# Patient Record
Sex: FEMALE | Race: WHITE | NOT HISPANIC OR LATINO | Employment: OTHER | URBAN - METROPOLITAN AREA
[De-identification: names, ages, dates, MRNs, and addresses within clinical notes are randomized per-mention and may not be internally consistent; named-entity substitution may affect disease eponyms.]

---

## 2017-01-04 ENCOUNTER — TELEPHONE (OUTPATIENT)
Dept: GASTROENTEROLOGY | Facility: CLINIC | Age: 67
End: 2017-01-04

## 2017-01-04 ENCOUNTER — TELEPHONE (OUTPATIENT)
Dept: MEDSURG UNIT | Facility: HOSPITAL | Age: 67
End: 2017-01-04

## 2017-01-05 NOTE — TELEPHONE ENCOUNTER
Patient notified and understands biopsy results, continue current medications and repeat egd 1-2 years for IM recall placed

## 2017-01-06 ENCOUNTER — OFFICE VISIT (OUTPATIENT)
Dept: PLASTIC SURGERY | Facility: CLINIC | Age: 67
End: 2017-01-06
Payer: MEDICARE

## 2017-01-06 DIAGNOSIS — Z09 SURGERY FOLLOW-UP EXAMINATION: Primary | ICD-10-CM

## 2017-01-06 PROCEDURE — 99024 POSTOP FOLLOW-UP VISIT: CPT | Mod: S$GLB,,, | Performed by: PHYSICIAN ASSISTANT

## 2017-01-06 PROCEDURE — 99999 PR PBB SHADOW E&M-EST. PATIENT-LVL III: CPT | Mod: PBBFAC,,, | Performed by: PHYSICIAN ASSISTANT

## 2017-01-06 NOTE — PROGRESS NOTES
Fifi Mcnair [facility] for a follow up visit status post panniculectomy on 11/28/16    Current Outpatient Prescriptions on File Prior to Visit   Medication Sig Dispense Refill    allopurinol (ZYLOPRIM) 100 MG tablet Take 2 tablets (200 mg total) by mouth nightly. 180 tablet 3    atenolol (TENORMIN) 25 MG tablet Take 1 tablet (25 mg total) by mouth once daily. 90 tablet 3    calcitRIOL (ROCALTROL) 0.25 MCG Cap Take 1 capsule by mouth twice a week. Monday Friday      CALCIUM CITRATE/VITAMIN D3 (CALCIUM CITRATE + D ORAL) Take 400 mg by mouth 3 (three) times daily.      cetirizine (ZYRTEC) 10 MG tablet Take 1 tablet (10 mg total) by mouth once daily. 1 Bottle 5    clotrimazole-betamethasone 1-0.05% (LOTRISONE) cream       cyanocobalamin, vitamin B-12, (VITAMIN B-12) 5,000 mcg Subl Place 5,000 mg under the tongue once a week.      DENOSUMAB (PROLIA SUBQ) Inject into the skin every 6 (six) months.       DIABETIC SUPPLIES,MISCELL (MEDTRONIC REMOTE CONTROL MISC) No Sig Provided      diphenoxylate-atropine 2.5-0.025 mg (LOMOTIL) 2.5-0.025 mg per tablet Take 1 tablet by mouth 4 (four) times daily as needed for Diarrhea. 60 tablet 3    fluoxetine (PROZAC) 20 MG capsule Take 20 mg by mouth once daily.       fluticasone (FLONASE) 50 mcg/actuation nasal spray 2 sprays by Nasal route once daily.       fluticasone-vilanterol (BREO) 100-25 mcg/dose diskus inhaler Inhale 1 puff into the lungs once daily.      insulin aspart (NOVOLOG) 100 unit/mL injection Inject into the skin continuous. Pt has insulin pump      Lactobacillus rhamnosus GG (CULTURELLE) 10 billion cell capsule Take 1 capsule by mouth once daily.      levmefolate-B6 phos-methyl-B12 3-35-2 mg Tab Take 1 tablet by mouth 2 (two) times daily. 60 each 2    levothyroxine (SYNTHROID) 25 MCG tablet Take 1 tablet (25 mcg total) by mouth once daily. 90 tablet 3    linezolid (ZYVOX) 600 mg Tab Take 1 tablet (600 mg total)  by mouth every 12 (twelve) hours. 10 tablet 0    lorazepam (ATIVAN) 0.5 MG tablet Take 1 tablet (0.5 mg total) by mouth every evening. (Patient taking differently: Take 1 mg by mouth every evening. ) 30 tablet 3    losartan (COZAAR) 25 MG tablet Take 1 tablet by mouth once daily.      melatonin 3 mg Tab Take 6 mg by mouth every evening.       metOLazone (ZAROXOLYN) 5 MG tablet Take 1 tablet (5 mg total) by mouth daily as needed (swelling). 30 tablet 11    metronidazole (METROGEL) 0.75 % gel Apply topically 2 (two) times daily. 45 g 1    montelukast (SINGULAIR) 10 mg tablet Take 10 mg by mouth once daily.       MULTIVIT-IRON-MIN-FOLIC ACID 3,500-18-0.4 UNIT-MG-MG ORAL CHEW Take by mouth 2 (two) times daily.      mupirocin (BACTROBAN) 2 % ointment APPLY OINTMENT TOPICALLY TO AFFECTED AREA ON NOSE THREE TIMES DAILY AS DIRECTED 1 Tube 11    nystatin (MYCOSTATIN) 100,000 unit/mL suspension Take 4 mLs (400,000 Units total) by mouth 4 (four) times daily. 224 mL 0    oxycodone (ROXICODONE) 15 MG Tab Take 15 mg by mouth every 8 (eight) hours as needed for Pain.       pantoprazole (PROTONIX) 40 MG tablet Take 1 tablet (40 mg total) by mouth once daily. 90 tablet 3    potassium chloride SA (K-DUR,KLOR-CON) 20 MEQ tablet Take 1 tablet (20 mEq total) by mouth 2 (two) times daily. 180 tablet 3    ranitidine (ZANTAC) 300 MG tablet Take 1 tablet (300 mg total) by mouth every evening. 30 tablet 5    torsemide (DEMADEX) 20 MG Tab Take 2 tablets (40 mg total) by mouth once daily. 60 tablet 0     No current facility-administered medications on file prior to visit.      Patient Active Problem List   Diagnosis    Arthritis    Hypertension    S/P LASIK (laser assisted in situ keratomileusis)    Allergy    Allergic asthma    Depression    GERD (gastroesophageal reflux disease)    Chronic kidney disease, stage III (moderate)    Thyroid disease    DM due to underlying condition with diabetic nephropathy, on Insulin     Conjunctival laceration - Left Eye    LOERA (dyspnea on exertion)    Leg pain, bilateral    COPD exacerbation    Chronic rhinitis    Asthma, severe persistent, poorly-controlled    Obstructive sleep apnea on CPAP    Chronic allergic rhinitis    Chronic diastolic heart failure, NYHA class 1    Frequent UTI    Decubitus ulcer of buttock, stage 3    Nuclear sclerosis    Dry eye    Myopia with astigmatism and presbyopia    Morbid obesity with BMI of 40.0-44.9, adult, 41.8    Peripheral edema    Cough    Diastolic dysfunction without heart failure    UTI (lower urinary tract infection)    Edema    Recurrent infections    Drug allergy    Anti-polysaccharide antibody deficiency    Vulvar abscess    Type II or unspecified type diabetes mellitus with renal manifestations, uncontrolled    Insulin pump status    Bariatric surgery status, 12/2014    LBBB (left bundle branch block)    Osteoarthritis, knee    COPD (chronic obstructive pulmonary disease)    Vitreo-retinal adhesions    Vitreous hemorrhage, right eye    Posterior vitreous detachment, right eye    Moderate nonproliferative diabetic retinopathy of both eyes    Osteopenia with high risk of fracture    BMI 38.0-38.9,adult    Chest pain syndrome    Symptomatic posterior vitreous detachment of left eye    Type 2 diabetes mellitus with moderate nonproliferative diabetic retinopathy and without macular edema    Chronic low back pain    MECHELLE on CPAP, using 100%    Chronic bronchitis    Posterior vitreous detachment, left eye    Vitreous hemorrhage, left eye    Compression fracture    Abdominal pain    Esophagitis    Cardiovascular event risk, ASCVD 10-year risk 12.3%, 12/2015    CKD (chronic kidney disease) stage 3, GFR 30-59 ml/min    Hypertensive left ventricular hypertrophy, without heart failure    Irritable bowel syndrome with diarrhea    Abdominal pannus    PUD (peptic ulcer disease)    Abscess of abdominal wall     Essential hypertension     Past Surgical History   Procedure Laterality Date    Rhinoplasty tip      Tonsillectomy      Cosmetic surgery      Tubal ligation      Cystoscopy      Refractive surgery       mono va//ou//    Adenoidectomy      Hysterectomy       ovaries remain    Hernia repair       5 years old    Gastrectomy      Colonoscopy  03/05/2013     repeat in 5 years    Upper gastrointestinal endoscopy  03/05/2013    Upper gastrointestinal endoscopy  08/24/2016     Dr. Veras, repeat in 8 weeks    Upper gastrointestinal endoscopy  07/21/2016     Dr. Randall    Gastric sleeve      Gastric sleeve      Abdominal surgery      Eye surgery       Doing well today. Was admitted to [facility] on 12/29/2016 s/p syncopal episode. I spoke with ER  to discuss his findings of fluid collection on abdomen. She was admitted, placed on IV abx and drain placed by IR. Fluid cx positive for MSSA and Enterococcus faecilis. Was discharged home on Linezolid in stable condition. Doing well today. Denies fever/chills/N/V. Does c/o nonproductive cough    Incision clean/dry/intact. No erythema or drainage. Drain in place with approx 35cc in bag. She asked that I remove the drain and I explained to her that we would have to leave the drain until the output decreases in order ot avoid another seroma.     All questions were answered. Will return to clinic in 1 week. The patient was advised to call the clinic with any questions or concerns prior to their next visit.

## 2017-01-09 ENCOUNTER — OFFICE VISIT (OUTPATIENT)
Dept: OPHTHALMOLOGY | Facility: CLINIC | Age: 67
End: 2017-01-09
Payer: MEDICARE

## 2017-01-09 ENCOUNTER — TELEPHONE (OUTPATIENT)
Dept: SURGERY | Facility: CLINIC | Age: 67
End: 2017-01-09

## 2017-01-09 ENCOUNTER — HOSPITAL ENCOUNTER (INPATIENT)
Facility: HOSPITAL | Age: 67
LOS: 2 days | Discharge: HOME | DRG: 920 | End: 2017-01-11
Attending: EMERGENCY MEDICINE | Admitting: FAMILY MEDICINE
Payer: MEDICARE

## 2017-01-09 DIAGNOSIS — L02.211 ABSCESS OF ABDOMINAL WALL: ICD-10-CM

## 2017-01-09 DIAGNOSIS — H52.7 REFRACTIVE ERROR: ICD-10-CM

## 2017-01-09 DIAGNOSIS — H25.13 NUCLEAR SCLEROSIS, BILATERAL: ICD-10-CM

## 2017-01-09 DIAGNOSIS — H43.813 PVD (POSTERIOR VITREOUS DETACHMENT), BILATERAL: ICD-10-CM

## 2017-01-09 DIAGNOSIS — L03.311 ABDOMINAL WALL CELLULITIS: ICD-10-CM

## 2017-01-09 DIAGNOSIS — H43.89 VITREO-RETINAL ADHESIONS: ICD-10-CM

## 2017-01-09 DIAGNOSIS — K65.1 ABSCESS OF ABDOMINAL CAVITY: Primary | ICD-10-CM

## 2017-01-09 DIAGNOSIS — E11.3393 MODERATE NONPROLIFERATIVE DIABETIC RETINOPATHY OF BOTH EYES ASSOCIATED WITH TYPE 2 DIABETES MELLITUS, MACULAR EDEMA PRESENCE UNSPECIFIED: Primary | ICD-10-CM

## 2017-01-09 LAB
ALBUMIN SERPL BCP-MCNC: 2.5 G/DL
ALP SERPL-CCNC: 101 U/L
ALT SERPL W/O P-5'-P-CCNC: 14 U/L
ANION GAP SERPL CALC-SCNC: 10 MMOL/L
AST SERPL-CCNC: 21 U/L
BASOPHILS # BLD AUTO: 0 K/UL
BASOPHILS NFR BLD: 0.3 %
BILIRUB SERPL-MCNC: 0.6 MG/DL
BUN SERPL-MCNC: 11 MG/DL
CALCIUM SERPL-MCNC: 8.7 MG/DL
CHLORIDE SERPL-SCNC: 107 MMOL/L
CO2 SERPL-SCNC: 24 MMOL/L
CREAT SERPL-MCNC: 0.9 MG/DL
DIFFERENTIAL METHOD: ABNORMAL
EOSINOPHIL # BLD AUTO: 0.1 K/UL
EOSINOPHIL NFR BLD: 0.9 %
ERYTHROCYTE [DISTWIDTH] IN BLOOD BY AUTOMATED COUNT: 14.8 %
EST. GFR  (AFRICAN AMERICAN): >60 ML/MIN/1.73 M^2
EST. GFR  (NON AFRICAN AMERICAN): >60 ML/MIN/1.73 M^2
GLUCOSE SERPL-MCNC: 138 MG/DL
HCT VFR BLD AUTO: 31.1 %
HGB BLD-MCNC: 10.3 G/DL
LYMPHOCYTES # BLD AUTO: 1.5 K/UL
LYMPHOCYTES NFR BLD: 12.5 %
MCH RBC QN AUTO: 29.1 PG
MCHC RBC AUTO-ENTMCNC: 33 %
MCV RBC AUTO: 88 FL
MONOCYTES # BLD AUTO: 0.4 K/UL
MONOCYTES NFR BLD: 3.7 %
NEUTROPHILS # BLD AUTO: 9.8 K/UL
NEUTROPHILS NFR BLD: 82.6 %
PLATELET # BLD AUTO: 502 K/UL
PMV BLD AUTO: 6.4 FL
POCT GLUCOSE: 110 MG/DL (ref 70–110)
POTASSIUM SERPL-SCNC: 4.2 MMOL/L
PROT SERPL-MCNC: 6.7 G/DL
RBC # BLD AUTO: 3.52 M/UL
SODIUM SERPL-SCNC: 141 MMOL/L
WBC # BLD AUTO: 11.9 K/UL

## 2017-01-09 PROCEDURE — 25000003 PHARM REV CODE 250: Performed by: PHYSICIAN ASSISTANT

## 2017-01-09 PROCEDURE — 63600175 PHARM REV CODE 636 W HCPCS: Performed by: PHYSICIAN ASSISTANT

## 2017-01-09 PROCEDURE — 99999 PR PBB SHADOW E&M-EST. PATIENT-LVL IV: CPT | Mod: PBBFAC,,, | Performed by: OPHTHALMOLOGY

## 2017-01-09 PROCEDURE — 36415 COLL VENOUS BLD VENIPUNCTURE: CPT

## 2017-01-09 PROCEDURE — 85025 COMPLETE CBC W/AUTO DIFF WBC: CPT

## 2017-01-09 PROCEDURE — 99285 EMERGENCY DEPT VISIT HI MDM: CPT

## 2017-01-09 PROCEDURE — 11000001 HC ACUTE MED/SURG PRIVATE ROOM

## 2017-01-09 PROCEDURE — 92014 COMPRE OPH EXAM EST PT 1/>: CPT | Mod: S$GLB,,, | Performed by: OPHTHALMOLOGY

## 2017-01-09 PROCEDURE — 25500020 PHARM REV CODE 255

## 2017-01-09 PROCEDURE — 80053 COMPREHEN METABOLIC PANEL: CPT

## 2017-01-09 RX ORDER — ONDANSETRON 2 MG/ML
4 INJECTION INTRAMUSCULAR; INTRAVENOUS EVERY 8 HOURS PRN
Status: DISCONTINUED | OUTPATIENT
Start: 2017-01-09 | End: 2017-01-11 | Stop reason: HOSPADM

## 2017-01-09 RX ORDER — POTASSIUM CHLORIDE 20 MEQ/1
20 TABLET, EXTENDED RELEASE ORAL 2 TIMES DAILY
Status: DISCONTINUED | OUTPATIENT
Start: 2017-01-09 | End: 2017-01-11 | Stop reason: HOSPADM

## 2017-01-09 RX ORDER — LEVOTHYROXINE SODIUM 25 UG/1
25 TABLET ORAL
Status: DISCONTINUED | OUTPATIENT
Start: 2017-01-10 | End: 2017-01-11 | Stop reason: HOSPADM

## 2017-01-09 RX ORDER — LORAZEPAM 0.5 MG/1
0.5 TABLET ORAL NIGHTLY
Status: DISCONTINUED | OUTPATIENT
Start: 2017-01-09 | End: 2017-01-11 | Stop reason: HOSPADM

## 2017-01-09 RX ORDER — LOSARTAN POTASSIUM 25 MG/1
25 TABLET ORAL DAILY
Status: DISCONTINUED | OUTPATIENT
Start: 2017-01-10 | End: 2017-01-11 | Stop reason: HOSPADM

## 2017-01-09 RX ORDER — IBUPROFEN 200 MG
24 TABLET ORAL
Status: DISCONTINUED | OUTPATIENT
Start: 2017-01-09 | End: 2017-01-11 | Stop reason: HOSPADM

## 2017-01-09 RX ORDER — HYDROCODONE BITARTRATE AND ACETAMINOPHEN 5; 325 MG/1; MG/1
1 TABLET ORAL EVERY 4 HOURS PRN
Status: DISCONTINUED | OUTPATIENT
Start: 2017-01-09 | End: 2017-01-09

## 2017-01-09 RX ORDER — OXYCODONE HYDROCHLORIDE 5 MG/1
15 TABLET ORAL EVERY 8 HOURS PRN
Status: DISCONTINUED | OUTPATIENT
Start: 2017-01-09 | End: 2017-01-11 | Stop reason: HOSPADM

## 2017-01-09 RX ORDER — GLUCAGON 1 MG
1 KIT INJECTION
Status: DISCONTINUED | OUTPATIENT
Start: 2017-01-09 | End: 2017-01-11 | Stop reason: HOSPADM

## 2017-01-09 RX ORDER — SODIUM CHLORIDE 9 MG/ML
INJECTION, SOLUTION INTRAVENOUS CONTINUOUS
Status: DISCONTINUED | OUTPATIENT
Start: 2017-01-09 | End: 2017-01-10

## 2017-01-09 RX ORDER — NYSTATIN 100000 [USP'U]/ML
4 SUSPENSION ORAL 4 TIMES DAILY
Status: DISCONTINUED | OUTPATIENT
Start: 2017-01-10 | End: 2017-01-11 | Stop reason: HOSPADM

## 2017-01-09 RX ORDER — OXYCODONE AND ACETAMINOPHEN 5; 325 MG/1; MG/1
1 TABLET ORAL
Status: COMPLETED | OUTPATIENT
Start: 2017-01-09 | End: 2017-01-09

## 2017-01-09 RX ORDER — PANTOPRAZOLE SODIUM 40 MG/1
40 TABLET, DELAYED RELEASE ORAL DAILY
Status: DISCONTINUED | OUTPATIENT
Start: 2017-01-10 | End: 2017-01-11 | Stop reason: HOSPADM

## 2017-01-09 RX ORDER — ALLOPURINOL 100 MG/1
200 TABLET ORAL NIGHTLY
Status: DISCONTINUED | OUTPATIENT
Start: 2017-01-09 | End: 2017-01-11 | Stop reason: HOSPADM

## 2017-01-09 RX ORDER — ATENOLOL 25 MG/1
25 TABLET ORAL DAILY
Status: DISCONTINUED | OUTPATIENT
Start: 2017-01-10 | End: 2017-01-11 | Stop reason: HOSPADM

## 2017-01-09 RX ORDER — METOLAZONE 5 MG/1
5 TABLET ORAL DAILY PRN
Status: DISCONTINUED | OUTPATIENT
Start: 2017-01-09 | End: 2017-01-11 | Stop reason: HOSPADM

## 2017-01-09 RX ORDER — TORSEMIDE 20 MG/1
40 TABLET ORAL DAILY
Status: DISCONTINUED | OUTPATIENT
Start: 2017-01-10 | End: 2017-01-11 | Stop reason: HOSPADM

## 2017-01-09 RX ORDER — MORPHINE SULFATE 2 MG/ML
2 INJECTION, SOLUTION INTRAMUSCULAR; INTRAVENOUS
Status: DISCONTINUED | OUTPATIENT
Start: 2017-01-09 | End: 2017-01-09

## 2017-01-09 RX ORDER — MONTELUKAST SODIUM 10 MG/1
10 TABLET ORAL DAILY
Status: DISCONTINUED | OUTPATIENT
Start: 2017-01-10 | End: 2017-01-11 | Stop reason: HOSPADM

## 2017-01-09 RX ORDER — IBUPROFEN 200 MG
16 TABLET ORAL
Status: DISCONTINUED | OUTPATIENT
Start: 2017-01-09 | End: 2017-01-11 | Stop reason: HOSPADM

## 2017-01-09 RX ORDER — INSULIN ASPART 100 [IU]/ML
0-5 INJECTION, SOLUTION INTRAVENOUS; SUBCUTANEOUS
Status: DISCONTINUED | OUTPATIENT
Start: 2017-01-09 | End: 2017-01-11 | Stop reason: HOSPADM

## 2017-01-09 RX ORDER — FLUOXETINE HYDROCHLORIDE 20 MG/1
20 CAPSULE ORAL DAILY
Status: DISCONTINUED | OUTPATIENT
Start: 2017-01-10 | End: 2017-01-11 | Stop reason: HOSPADM

## 2017-01-09 RX ADMIN — ALLOPURINOL 200 MG: 100 TABLET ORAL at 08:01

## 2017-01-09 RX ADMIN — OXYCODONE AND ACETAMINOPHEN 1 TABLET: 5; 325 TABLET ORAL at 05:01

## 2017-01-09 RX ADMIN — POTASSIUM CHLORIDE 20 MEQ: 1500 TABLET, EXTENDED RELEASE ORAL at 08:01

## 2017-01-09 RX ADMIN — SODIUM CHLORIDE: 0.9 INJECTION, SOLUTION INTRAVENOUS at 08:01

## 2017-01-09 RX ADMIN — LORAZEPAM 0.5 MG: 0.5 TABLET ORAL at 08:01

## 2017-01-09 RX ADMIN — DAPTOMYCIN 600 MG: 500 INJECTION, POWDER, LYOPHILIZED, FOR SOLUTION INTRAVENOUS at 08:01

## 2017-01-09 RX ADMIN — IOHEXOL 100 ML: 350 INJECTION, SOLUTION INTRAVENOUS at 03:01

## 2017-01-09 NOTE — TELEPHONE ENCOUNTER
Pt c/o leg pain/heaviness and states she thinks it is caused by a staph infection in her abdominal area. Informed MD who recommended that pt go to the ER for eval & tx. Called to inform pt. No answer. Left a voice message.

## 2017-01-09 NOTE — IP AVS SNAPSHOT
[address] [place] [address]  Phone: [phone]           I have received a copy of my After Visit Summary and discharge instructions from [facility].    INSTRUCTIONS RECEIVED AND UNDERSTOOD BY:                     Patient/Patient Representative: ________________________________________________________________     Date/Time: ________________________________________________________________                     Instructions Given By: ________________________________________________________________     Date/Time: ________________________________________________________________

## 2017-01-09 NOTE — IP AVS SNAPSHOT
[address] [place] [address]  Phone: [phone]           Patient Discharge Instructions     Our goal is to set you up for success. This packet includes information on your condition, medications, and your home care. It will help you to care for yourself so you don't get sicker and need to go back to the hospital.     Please ask your nurse if you have any questions.        There are many details to remember when preparing to leave the hospital. Here is what you will need to do:    1. Take your medicine. If you are prescribed medications, review your Medication List in the following pages. You may have new medications to  at the pharmacy and others that you'll need to stop taking. Review the instructions for how and when to take your medications. Talk with your doctor or nurses if you are unsure of what to do.     2. Go to your follow-up appointments. Specific follow-up information is listed in the following pages. Your may be contacted by a transition nurse or clinical provider about future appointments. Be sure we have all of the phone numbers to reach you, if needed. Please contact your provider's office if you are unable to make an appointment.     3. Watch for warning signs. Your doctor or nurse will give you detailed warning signs to watch for and when to call for assistance. These instructions may also include educational information about your condition. If you experience any of warning signs to your health, call your doctor.               ** Verify the list of medication(s) below is accurate and up to date. Carry this with you in case of emergency. If your medications have changed, please notify your healthcare provider.             Medication List      CHANGE how you take these medications        Additional Info                      lorazepam 0.5 MG tablet   Commonly known as:  ATIVAN   Quantity:  30 tablet   Refills:  3   Dose:  0.5 mg   What  changed:  how much to take    Last time this was given:  0.5 mg on 1/10/2017  9:31 PM   Instructions:  Take 1 tablet (0.5 mg total) by mouth every evening.     Begin Date    AM    Noon    PM    Bedtime         CONTINUE taking these medications        Additional Info                      allopurinol 100 MG tablet   Commonly known as:  ZYLOPRIM   Quantity:  180 tablet   Refills:  3   Dose:  200 mg    Last time this was given:  200 mg on 1/10/2017  9:31 PM   Instructions:  Take 2 tablets (200 mg total) by mouth nightly.     Begin Date    AM    Noon    PM    Bedtime       atenolol 25 MG tablet   Commonly known as:  TENORMIN   Quantity:  90 tablet   Refills:  3   Dose:  25 mg    Last time this was given:  25 mg on 1/11/2017  8:16 AM   Instructions:  Take 1 tablet (25 mg total) by mouth once daily.     Begin Date    AM    Noon    PM    Bedtime       calcitRIOL 0.25 MCG Cap   Commonly known as:  ROCALTROL   Refills:  0   Dose:  1 capsule    Instructions:  Take 1 capsule by mouth twice a week. Monday Friday     Begin Date    AM    Noon    PM    Bedtime       CALCIUM CITRATE + D ORAL   Refills:  0   Dose:  400 mg    Instructions:  Take 400 mg by mouth 3 (three) times daily.     Begin Date    AM    Noon    PM    Bedtime       CENTRUM 3,500-18-0.4 unit-mg-mg Chew   Refills:  0   Generic drug:  multivit-iron-min-folic acid    Instructions:  Take by mouth 2 (two) times daily.     Begin Date    AM    Noon    PM    Bedtime       cetirizine 10 MG tablet   Commonly known as:  ZYRTEC   Quantity:  1 Bottle   Refills:  5   Dose:  10 mg    Instructions:  Take 1 tablet (10 mg total) by mouth once daily.     Begin Date    AM    Noon    PM    Bedtime       clotrimazole-betamethasone 1-0.05% cream   Commonly known as:  LOTRISONE   Refills:  0      Begin Date    AM    Noon    PM    Bedtime       fluoxetine 20 MG capsule   Commonly known as:  PROZAC   Refills:  0   Dose:  20 mg    Last time this was given:  20 mg on 1/11/2017  8:16 AM    Instructions:  Take 20 mg by mouth once daily.     Begin Date    AM    Noon    PM    Bedtime       fluticasone 50 mcg/actuation nasal spray   Commonly known as:  FLONASE   Refills:  0   Dose:  2 spray    Instructions:  2 sprays by Nasal route once daily.     Begin Date    AM    Noon    PM    Bedtime       fluticasone-vilanterol 100-25 mcg/dose diskus inhaler   Commonly known as:  BREO   Refills:  0   Dose:  1 puff    Instructions:  Inhale 1 puff into the lungs once daily.     Begin Date    AM    Noon    PM    Bedtime       insulin aspart 100 unit/mL injection   Commonly known as:  NOVOLOG   Refills:  0    Instructions:  Inject into the skin continuous. Pt has insulin pump     Begin Date    AM    Noon    PM    Bedtime       Lactobacillus rhamnosus GG 10 billion cell capsule   Commonly known as:  CULTURELLE   Refills:  0   Dose:  1 capsule    Instructions:  Take 1 capsule by mouth once daily.     Begin Date    AM    Noon    PM    Bedtime       levothyroxine 25 MCG tablet   Commonly known as:  SYNTHROID   Quantity:  90 tablet   Refills:  3   Dose:  25 mcg    Last time this was given:  25 mcg on 1/11/2017  5:36 AM   Instructions:  Take 1 tablet (25 mcg total) by mouth once daily.     Begin Date    AM    Noon    PM    Bedtime       linezolid 600 mg Tab   Commonly known as:  ZYVOX   Quantity:  10 tablet   Refills:  0   Dose:  600 mg    Instructions:  Take 1 tablet (600 mg total) by mouth every 12 (twelve) hours.     Begin Date    AM    Noon    PM    Bedtime       losartan 25 MG tablet   Commonly known as:  COZAAR   Refills:  0   Dose:  1 tablet    Last time this was given:  25 mg on 1/11/2017  8:16 AM   Instructions:  Take 1 tablet by mouth once daily.     Begin Date    AM    Noon    PM    Bedtime       mecobal-levomefolat Ca-B6 phos 3-35-2 mg Tab   Quantity:  60 each   Refills:  2   Dose:  1 tablet    Instructions:  Take 1 tablet by mouth 2 (two) times daily.     Begin Date    AM    Noon    PM    Bedtime        MEDTRONIC REMOTE CONTROL MISC   Refills:  0    Instructions:  No Sig Provided     Begin Date    AM    Noon    PM    Bedtime       melatonin 3 mg Tab   Refills:  0   Dose:  6 mg    Instructions:  Take 6 mg by mouth every evening.     Begin Date    AM    Noon    PM    Bedtime       metOLazone 5 MG tablet   Commonly known as:  ZAROXOLYN   Quantity:  30 tablet   Refills:  11   Dose:  5 mg    Instructions:  Take 1 tablet (5 mg total) by mouth daily as needed (swelling).     Begin Date    AM    Noon    PM    Bedtime       metronidazole 0.75 % gel   Commonly known as:  METROGEL   Quantity:  45 g   Refills:  1    Instructions:  Apply topically 2 (two) times daily.     Begin Date    AM    Noon    PM    Bedtime       montelukast 10 mg tablet   Commonly known as:  SINGULAIR   Refills:  0   Dose:  10 mg    Last time this was given:  10 mg on 1/11/2017  8:16 AM   Instructions:  Take 10 mg by mouth once daily.     Begin Date    AM    Noon    PM    Bedtime       mupirocin 2 % ointment   Commonly known as:  BACTROBAN   Quantity:  1 Tube   Refills:  11    Instructions:  APPLY OINTMENT TOPICALLY TO AFFECTED AREA ON NOSE THREE TIMES DAILY AS DIRECTED     Begin Date    AM    Noon    PM    Bedtime       nystatin 100,000 unit/mL suspension   Commonly known as:  MYCOSTATIN   Quantity:  224 mL   Refills:  0   Dose:  4 mL    Last time this was given:  400,000 Units on 1/11/2017  8:16 AM   Instructions:  Take 4 mLs (400,000 Units total) by mouth 4 (four) times daily.     Begin Date    AM    Noon    PM    Bedtime       oxycodone 15 MG Tab   Commonly known as:  ROXICODONE   Refills:  0   Dose:  15 mg    Last time this was given:  15 mg on 1/11/2017  9:52 AM   Instructions:  Take 15 mg by mouth every 8 (eight) hours as needed for Pain.     Begin Date    AM    Noon    PM    Bedtime       pantoprazole 40 MG tablet   Commonly known as:  PROTONIX   Quantity:  90 tablet   Refills:  3   Dose:  40 mg    Last time this was given:  40 mg on 1/11/2017   8:16 AM   Instructions:  Take 1 tablet (40 mg total) by mouth once daily.     Begin Date    AM    Noon    PM    Bedtime       potassium chloride SA 20 MEQ tablet   Commonly known as:  K-DUR,KLOR-CON   Quantity:  180 tablet   Refills:  3   Dose:  20 mEq    Last time this was given:  20 mEq on 1/11/2017  8:16 AM   Instructions:  Take 1 tablet (20 mEq total) by mouth 2 (two) times daily.     Begin Date    AM    Noon    PM    Bedtime       PROLIA SUBQ   Refills:  0    Instructions:  Inject into the skin every 6 (six) months.     Begin Date    AM    Noon    PM    Bedtime       ranitidine 300 MG tablet   Commonly known as:  ZANTAC   Quantity:  30 tablet   Refills:  5   Dose:  300 mg    Instructions:  Take 1 tablet (300 mg total) by mouth every evening.     Begin Date    AM    Noon    PM    Bedtime       torsemide 20 MG Tab   Commonly known as:  DEMADEX   Quantity:  60 tablet   Refills:  0   Dose:  40 mg    Last time this was given:  40 mg on 1/11/2017  8:16 AM   Instructions:  Take 2 tablets (40 mg total) by mouth once daily.     Begin Date    AM    Noon    PM    Bedtime       VITAMIN B-12 5,000 mcg Subl   Refills:  0   Dose:  5000 mg   Generic drug:  cyanocobalamin (vitamin B-12)    Instructions:  Place 5,000 mg under the tongue once a week.     Begin Date    AM    Noon    PM    Bedtime         STOP taking these medications     diphenoxylate-atropine 2.5-0.025 mg 2.5-0.025 mg per tablet   Commonly known as:  LOMOTIL            Where to Get Your Medications      These medications were sent to [facility]  [place] - [address] [place], [place] [address] [address]     Phone:  [phone]     linezolid 600 mg Tab                  Please bring to all follow up appointments:    1. A copy of your discharge instructions.  2. All medicines you are currently taking in their original bottles.  3. Identification and insurance card.    Please arrive 15 minutes ahead of scheduled appointment  time.    Please call 24 hours in advance if you must reschedule your appointment and/or time.        Your Scheduled Appointments     Jan 13, 2017  9:15 AM CST   Post OP with Christiano Becerril MD   [place] [facility] [place]    [address] [facility] [address] [place] [address]   [phone]            Apr 12, 2017  1:00 PM CDT   Non-Fasting Lab with [facility], [facility] ([facility])    [address]  [facility] [address]   [phone]            Apr 12, 2017  4:00 PM CDT   Established Patient Visit with Werner Vargas MD   [place] [facility] ([place]    [address] [facility] [address]  [facility] [address]   [phone]            Apr 19, 2017  1:00 PM CDT   Established Patient Visit with Gabriela Green MD   [place] [facility]cology ([facility])    [address]  [facility] [address]   [phone]              Follow-up Information     Follow up with Werner Vargas MD In 1 week.    Specialty:  Family Medicine    Why:  As needed    Contact information:    [address] [facility] [address]  [phone]          Discharge Instructions     Future Orders    Activity as tolerated     Call MD for:  difficulty breathing or increased cough     Call MD for:  increased confusion or weakness     Call MD for:  persistent dizziness, light-headedness, or visual disturbances     Call MD for:  persistent nausea and vomiting or diarrhea     Call MD for:  redness, tenderness, or signs of infection (pain, swelling, redness, odor or green/yellow discharge around incision site)     Call MD for:  severe persistent headache     Call MD for:  severe uncontrolled pain     Call MD for:  worsening rash     Diet Cardiac     Diet Diabetic 2000 Calories         Discharge Instructions       Thank you for choosing [facility] for your medical care. The primary doctor who is taking care of you at the time of your discharge is Keena  "MD Prakash.     You were admitted to the hospital with Abscess of abdominal wall.     Please note your discharge instructions, including diet/activity restrictions, follow-up appointments, and medication changes.  If you have any questions about your medical issues, prescriptions, or any other questions, please feel free to contact the [facility] Medicine Dept at [phone] and we will help.    If you are previously with Home health, outpatient PT/OT or under a therapy program, you are cleared to return to those programs.    Please direct all long term medication refills and follow up to your primary care provider, Werner Vargas MD. Thank you again for letting us take care of your health care needs.    Please note the following discharge instructions per your discharging physician-  Keep your appointment with Dr. Becerril on Friday. Take all medications as prescribed.         Primary Diagnosis     Your primary diagnosis was:  Abscess Of Abdominal Wall      Admission Information     Date & Time Provider Department CSN    1/9/2017  2:13 PM Keena Randall MD [facility] [id number]      Care Providers     Provider Role Specialty Primary office phone    Keena Randall MD Attending Provider Family Medicine [phone]    Marcela Hollins MD Consulting Physician  Infectious Diseases [phone]    Sreedhar Hill MD Consulting Physician  General Surgery [phone]      Your Vitals Were     BP Pulse Temp Resp Height Weight    112/55 (BP Location: Right arm, Patient Position: Lying, BP Method: Automatic) 74 98.6 °F (37 °C) (Axillary) 18 5' 2" (1.575 m) 99.8 kg (220 lb)    Last Period SpO2 BMI          (LMP Unknown) 97% 40.24 kg/m2        Recent Lab Values        12/18/2013 4/28/2014 8/12/2014 6/18/2015 8/14/2015 12/3/2015 8/29/2016 12/29/2016     12:16 PM 11:46 AM 12:09 PM  3:03 PM  1:19 PM  9:30 AM  3:11 PM  1:48 PM    A1C 8.1 (H) 7.3 (H) 6.1 6.6 (H) 6.8 (H) 6.1 6.7 " (H) 6.6 (H)    Comment for A1C at  3:11 PM on 8/29/2016:  According to ADA guidelines, hemoglobin A1C <7.0% represents  optimal control in non-pregnant diabetic patients.  Different  metrics may apply to specific populations.   Standards of Medical Care in Diabetes - 2016.  For the purpose of screening for the presence of diabetes:  <5.7%     Consistent with the absence of diabetes  5.7-6.4%  Consistent with increasing risk for diabetes   (prediabetes)  >or=6.5%  Consistent with diabetes  Currently no consensus exists for use of hemoglobin A1C  for diagnosis of diabetes for children.      Comment for A1C at  1:48 PM on 12/29/2016:  According to ADA guidelines, hemoglobin A1C <7.0% represents  optimal control in non-pregnant diabetic patients.  Different  metrics may apply to specific populations.   Standards of Medical Care in Diabetes - 2016.  For the purpose of screening for the presence of diabetes:  <5.7%     Consistent with the absence of diabetes  5.7-6.4%  Consistent with increasing risk for diabetes   (prediabetes)  >or=6.5%  Consistent with diabetes  Currently no consensus exists for use of hemoglobin A1C  for diagnosis of diabetes for children.        Pending Labs     Order Current Status    Aerobic culture In process    Blood culture Preliminary result      Allergies as of 1/11/2017        Reactions    Adhesive Other (See Comments)    Blisters    Cleocin [Clindamycin Hcl] Hives    Ceclor [Cefaclor] Hives    Morphine Nausea And Vomiting    Advair Diskus [Fluticasone-salmeterol]     Dry mouth    Aleve [Naproxen Sodium]     Unable to take secondary to kidney function.     Levaquin [Levofloxacin] Dermatitis    Macrodantin [Nitrofurantoin Macrocrystalline] Hives    Motrin [Ibuprofen]     Unable to take secondary to kidney functions.    Sulfa (Sulfonamide Antibiotics)     Hold due to renal problems    Vancomycin Analogues Rash    Feet broke out/inflammed blood vessels      [facility]     [facility]  Nurse Care Line - 24/7 Assistance  Unless otherwise directed by your provider, please contact [facility], our nurse care line that is available for 24/7 assistance.     Registered nurses in the [facility] provide clinical advisement, health education, appointment booking, and other advisory services.  Call for this free service at [phone].        Advance Directives     An advance directive is a document which, in the event you are no longer able to make decisions for yourself, tells your healthcare team what kind of treatment you do or do not want to receive, or who you would like to make those decisions for you.  If you do not currently have an advance directive, [facility] encourages you to create one.  For more information call:  [phone]), [phone] ([phone]),  or log on to [email or web address].        Smoking Cessation     If you would like to quit smoking:   You may be eligible for free services if you are a [place] resident and started smoking cigarettes before September 1, 1988.  Call the Smoking Cessation Trust (SCT) toll free at (727) 390-1542 or (545) 452-4140.   Call -547-QUIT-NOW if you do not meet the above criteria.            Language Assistance Services     ATTENTION: Language assistance services are available, free of charge. Please call [phone].      ATENCIÓN: Si habla español, tiene a luz disposición servicios gratuitos de asistencia lingüística. Llame al [phone].     [facility] Ý: N?u b?n nói Ti?ng Vi?t, có các d?ch v? h? tr? ngôn ng? mi?n phí dành cho b?n. G?i s? [phone].        Heart Failure Education       Heart Failure: Being Active  You have a condition called heart failure. Being active doesnt mean that you have to wear yourself out. Even a little movement each day helps to strengthen your heart. If you cant get out to exercise, you can do simple stretching and strengthening exercises at home. These are good ways to  keep you well-conditioned and prevent you and your heart from becoming excessively weak.    Ideas to get you started  · Add a little movement to things you do now. Walk to mail letters. Park your car at the far end of the parking lot and walk to the store. Walk up a flight of stairs instead of taking the elevator.  · Choose activities you enjoy. You might walk, swim, or ride an exercise bike. Things like gardening and washing the car count, too. Other possibilities include: washing dishes, walking the dog, walking around the mall, and doing aerobic activities with friends.  · Join a group exercise program at a [facility] or [facility], a senior center, or a community center. Or look into a hospital cardiac rehabilitation program. Ask your doctor if you qualify.  Tips to keep you going  · Get up and get dressed each day. Go to a coffee shop and read a newspaper or go somewhere that you'll be in the presence of other active people. Youll feel more like being active.  · Make a plan. Choose one or more activities that you enjoy and that you can easily do. Then plan to do at least one each day. You might write your plan on a calendar.  · Go with a friend or a group if you like company. This can help you feel supported and stay motivated, too.  · Plan social events that you enjoy. This will keep you mentally engaged as well as physically motivated to do things you find pleasure in.  For your safety  · Talk with your healthcare provider before starting an exercise program.  · Exercise indoors when its too hot or too cold outside, or when the air quality is poor. Try walking at a shopping mall.  · Wear socks and sturdy shoes to maintain your balance and prevent falls.  · Start slowly. Do a few minutes several times a day at first. Increase your time and speed little by little.  · Stop and rest whenever you feel tired or get short of breath.  · Dont push yourself on days when you dont feel well.  © 2901-7829 [employer],  [employer]. [address] [place] [address]. All rights reserved. This information is not intended as a substitute for professional medical care. Always follow your healthcare professional's instructions.              Heart Failure: Evaluating Your Heart  You have a condition called heart failure. To evaluate your condition, your doctor will examine you, ask questions, and do some tests. Along with looking for signs of heart failure, the doctor looks for any other health problems that may have led to heart failure. The results of your evaluation will help your doctor form a treatment plan.  Health history and physical exam  Your visit will start with a health history. Tell the doctor about any symptoms youve noticed and about all medicines you take. Then youll have a physical exam. This includes listening to your heartbeat and breathing. Youll also be checked for swelling (edema) in your legs and neck. When you have fluid buildup or fluid in the lungs, it may be called congestive heart failure.  Diagnosing heart failure     During an echocardiogram, sound waves bounce off the heart. These are converted into a picture on the screen.   The following may be done to help your doctor form a diagnosis:  · X-rays show the size and shape of your heart. These pictures can also show fluid in your lungs.  · An electrocardiogram (ECG or EKG) shows the pattern of your heartbeat. Small pads (electrodes) are placed on your chest, arms, and legs. Wires connect the pads to the ECG machine, which records your hearts electrical signals. This can give the doctor information about heart function.  · An echocardiogram uses ultrasound waves to show the structure and movement of your heart muscle. This shows how well the heart pumps. It also shows the thickness of the heart walls, and if the heart is enlarged. It is one of the most useful, non-invasive tests as it provides information about the heart's general function. This helps  your doctor make treatment decisions.  · Lab tests evaluate small amounts of blood or urine for signs of problems. A BNP lab test can help diagnose and evaluate heart failure. BNP stands for B-type natriuretic peptide. The ventricles secrete more BNP when heart failure worsens. Lab tests can also provide information about metabolic dysfunction or heart dysfunction.  Your treatment plan  Based on the results of your evaluation and tests, your doctor will develop a treatment plan. This plan is designed to relieve some of your heart failure symptoms and help make you more comfortable. Your treatment plan may include:  · Medicine to help your heart work better and improve your quality of life  · Changes in what you eat and drink to help prevent fluid from backing up in your body  · Daily monitoring of your weight and heart failure symptoms to see how well your treatment plan is working  · Exercise to help you stay healthy  · Help with quitting smoking  · Emotional and psychological support to help adjust to the changes  · Referrals to other specialists to make sure you are being treated comprehensively  © 3628-1945 [employer]. [address] [place] [address], [place] [address]. All rights reserved. This information is not intended as a substitute for professional medical care. Always follow your healthcare professional's instructions.              Heart Failure: Making Changes to Your Diet  You have a condition called heart failure. When you have heart failure, excess fluid is more likely to build up in your body because your heart isn't working well. This makes the heart work harder to pump blood. Fluid buildup causes symptoms such as shortness of breath and swelling (edema). This is often referred to as congestive heart failure or CHF. Controlling the amount of salt (sodium) you eat may help stop fluid from building up. Your doctor may also tell you to reduce the amount of fluid you drink.  Reading food  labels    Your healthcare provider will tell you how much sodium you can eat each day. Read food labels to keep track. Keep in mind that certain foods are high in salt. These include canned, frozen, and processed foods. Check the amount of sodium in each serving. Watch out for high-sodium ingredients. These include MSG (monosodium glutamate), baking soda, and sodium phosphate.   Eating less salt  Give yourself time to get used to eating less salt. It may take a little while. Here are some tips to help:  · Take the saltshaker off the table. Replace it with salt-free herb mixes and spices.  · Eat fresh or plain frozen vegetables. These have much less salt than canned vegetables.  · Choose low-sodium snacks like sodium-free pretzels, crackers, or air-popped popcorn.  · Dont add salt to your food when youre cooking. Instead, season your foods with pepper, lemon, garlic, or onion.  · When you eat out, ask that your food be cooked without added salt.  · Avoid eating fried foods as these often have a great deal of salt.  If youre told to limit fluids  You may need to limit how much fluid you have to help prevent swelling. This includes anything that is liquid at room temperature, such as ice cream and soup. If your doctor tells you to limit fluid, try these tips:  · Measure drinks in a measuring cup before you drink them. This will help you meet daily goals.  · Chill drinks to make them more refreshing.  · Suck on frozen lemon wedges to quench thirst.  · Only drink when youre thirsty.  · Chew sugarless gum or suck on hard candy to keep your mouth moist.  · Weigh yourself daily to know if your body's fluid content is rising.  My sodium goal  Your healthcare provider may give you a sodium goal to meet each day. This includes sodium found in food as well as salt that you add. My goal is to eat no more than ___________ mg of sodium per day.     When to call your doctor  Call your doctor right away if you have any symptoms  of worsening heart failure. These can include:  · Sudden weight gain  · Increased swelling of your legs or ankles  · Trouble breathing when youre resting or at night  · Increase in the number of pillows you have to sleep on  · Chest pain, pressure, discomfort, or pain in the jaw, neck, or back   © 20009419-3406 [employer]. [address] [place] [address]. All rights reserved. This information is not intended as a substitute for professional medical care. Always follow your healthcare professional's instructions.              Heart Failure: Medicines to Help Your Heart    You have a condition called heart failure (also known as congestive heart failure, or CHF). Your doctor will likely prescribe medicines for heart failure and any underlying health problems you have. Most heart failure patients take one or more types of medicinen. Your healthcare provider will work to find the combination of medicines that works best for you.  Heart failure medicines  Here are the most common heart failure medicines:  · ACE inhibitors lower blood pressure and decrease strain on the heart. This makes it easier for the heart to pump. Angiotensin receptor blockers have similar effects. These are prescribed for some patients instead of ACE inhibitors.  · Beta-blockers relieve stress on the heart. They also improve symptoms. They may also improve the heart's pumping action over time.  · Diuretics (also called water pills) help rid your body of excess water. This can help rid your body of swelling (edema). Having less fluid to pump means your heart doesnt have to work as hard. Some diuretics make your body lose a mineral called potassium. Your doctor will tell you if you need to take supplements or eat more foods high in potassium.  · Digoxin helps your heart pump with more strength. This helps your heart pump more blood with each beat. So, more oxygen-rich blood travels to the rest of the body.  · Aldosterone  antagonists help alter hormones and decrease strain on the heart.  · Hydralazine and nitrates are two separate medicines used together to treat heart failure. They may come in one combination pill. They lower blood pressure and decrease how hard the heart has to pump.  Medicines for related conditions  Controlling other heart problems helps keep heart failure under control, too. Depending on other heart problems you have, medicines may be prescribed to:  · Lower blood pressure (antihypertensives).  · Lower cholesterol levels (statins).  · Prevent blood clots (anticoagulants or aspirin).  · Keep the heartbeat steady (antiarrhythmics).  © 9007-2277 [employer]. [address] [place] [address], [place] [address]. All rights reserved. This information is not intended as a substitute for professional medical care. Always follow your healthcare professional's instructions.              Heart Failure: Procedures That May Help    The heart is a muscle that pumps oxygen-rich blood to all parts of the body. When you have heart failure, the heart is not able to pump as well as it should. Blood and fluid may back up into the lungs (congestive heart failure), and some parts of the body dont get enough oxygen-rich blood to work normally. These problems lead to the symptoms of heart failure.     Certain procedures may help the heart pump better in some cases of heart failure. Some procedures are done to treat health problems that may have caused the heart failure such as coronary artery disease or heart rhythm problems. For more serious heart failure, other options are available.  Treating artery and valve problems  If you have coronary artery disease or valve disease, procedures may be done to improve blood flow. This helps the heart pump better, which can improve heart failure symptoms. First, your doctor may do a cardiac catheterization to help detect clogged blood vessels or valve damage. During this procedure, a  thin  tube (catheter) in inserted into a blood vessel and guided to the heart. There a dye is injected and a special type of X-ray (angiogram) is taken of the blood vessels. Procedures to open a blocked artery or fix damaged valves can also be done using catheterization.  · Angioplasty uses a balloon-tipped instrument at the end of the catheter. The balloon is inflated to widen the narrowed artery. In many cases, a stent is expanded to further support the narrowed artery. A stent is a metal mesh tube.  · Valve surgery repairs or replacement of faulty valves can also be done during catheterization so blood can flow properly through the chambers of the heart.  Bypass surgery is another option to help treat blocked arteries. It uses a healthy blood vessel from elsewhere in the body. The healthy blood vessel is attached above and below the blocked area so that blood can flow around the blocked artery.  Treating heart rhythm problems  A device may be placed in the chest to help a weak heart maintain a healthy, heartbeat so the heart can pump more effectively:  · Pacemaker. A pacemaker is an implanted device that regulates your heartbeat electronically. It monitors your heart's rhythm and generates a painless electric impulse that helps the heart beat in a regular rhythm. A pacemaker is programmed to meet your specific heart rhythm needs.  · Biventricular pacing/cardiac resynchronization therapy. A type of pacemaker that paces both pumping chambers of the heart at the same time to coordinate contractions and to improve the heart's function. Some people with heart failure are candidates for this therapy.  · Implantable cardioverter defibrillator. A device similar to a pacemaker that senses when the heart is beating too fast and delivers an electrical shock to convert the fast rhythm to a normal rhythm. This can be a life saving device.  In severe cases  In more serious cases of heart failure when other treatments no longer work,  other options may include:  · Ventricular assist devices (VADs). These are mechanical devices used to take over the pumping function for one or both of the heart's ventricles, or pumping chambers. A VAD may be necessary when heart failure progresses to the point that medicines and other treatments no longer help. In some cases, a VAD may be used as a bridge to transplant.  · Heart transplant. This is replacing the diseased heart with a healthy one from a donor. This is an option for a few people who are very sick. A heart transplant is very serious and not an option for all patients. Your doctor can tell you more.  © 5542-2885 [employer]. [address] [place] [address], [place] [address]. All rights reserved. This information is not intended as a substitute for professional medical care. Always follow your healthcare professional's instructions.              Heart Failure: Tracking Your Weight  You have a condition called heart failure. When you have heart failure, a sudden weight gain or a steady rise in weight is a warning sign that your body is retaining too much water and salt. This could mean your heart failure is getting worse. If left untreated, it can cause problems for your lungs and result in shortness of breath. Weighing yourself each day is the best way to know if youre retaining water. If your weight goes up quickly, call your doctor. You will be given instructions on how to get rid of the excess water. You will likely need medicines and to avoid salt. This will help your heart work better.  Call your doctor if you gain more than 2 pounds in 1 day, more than 5 pounds in 1 week, or whatever weight gain you were told to report by your doctor. This is often a sign of worsening heart failure and needs to be evaluated and treated. Your doctor will tell you what to do next.   Tips for weighing yourself    · Weigh yourself at the same time each morning, wearing the same clothes. Weigh yourself after  urinating and before eating.  · Use the same scale each day. Make sure the numbers are easy to read. Put the scale on a flat, hard surface -- not on a rug or carpet.  · Do not stop weighing yourself. If you forget one day, weigh again the next morning.  How to use your weight chart  · Keep your weight chart near the scale. Write your weight on the chart as soon as you get off the scale.  · Fill in the month and the start date on the chart. Then write down your weight each day. Your chart will look like this:    · If you miss a day, leave the space blank. Weigh yourself the next day and write your weight in the next space.  · Take your weight chart with you when you go to see your doctor.  © 5325-3411 [employer]. [address] [place] [address], [place] [address]. All rights reserved. This information is not intended as a substitute for professional medical care. Always follow your healthcare professional's instructions.              Heart Failure: Warning Signs of a Flare-Up  You have a condition called heart failure. Once you have heart failure, flare-ups can happen. Below are signs that can mean your heart failure is getting worse. If you notice any of these warning signs, call your healthcare provider.  Swelling    · Your feet, ankles, or lower legs get puffier.  · You notice skin changes on your lower legs.  · Your shoes feel too tight.  · Your clothes are tighter in the waist.  · You have trouble getting rings on or off your fingers.  Shortness of breath  · You have to breathe harder even when youre doing your normal activities or when youre resting.  · You are short of breath walking up stairs or even short distances.  · You wake up at night short of breath or coughing.  · You need to use more pillows or sit up to sleep.  · You wake up tired or restless.  Other warning signs  · You feel weaker, dizzy, or more tired.  · You have chest pain or changes in your heartbeat.  · You have a cough that wont go  away.  · You cant remember things or dont feel like eating.  Tracking your weight  Gaining weight is often the first warning sign that heart failure is getting worse. Gaining even a few pounds can be a sign that your body is retaining excess water and salt. Weighing yourself each day in the morning after you urinate and before you eat, is the best way to know if you're retaining water. Get a scale that is easy to read and make sure you wear the same clothes and use the same scale every time you weigh. Your healthcare provider will show you how to track your weight. Call your doctor if you gain more than 2 pounds in 1 day, 5 pounds in 1 week, or whatever weight gain you were told to report by your doctor. This is often a sign of worsening heart failure and needs to be evaluated and treated before it compromises your breathing. Your doctor will tell you what to do next.    © 6300-3186 [employer]. [address] [place] [address], [place] [address]. All rights reserved. This information is not intended as a substitute for professional medical care. Always follow your healthcare professional's instructions.              Chronic [place] Disease Education             Diabetes Discharge Instructions                                    [facility] complies with applicable Federal civil rights laws and does not discriminate on the basis of race, color, national origin, age, disability, or sex.

## 2017-01-09 NOTE — MR AVS SNAPSHOT
[facility] - Ophthalmology  [address] [place] [address] Drive [address]  [place] [address]  Phone: [phone]                  Fifi Mcnair   2017 9:15 AM   Office Visit    Description:  Female : 1950   Provider:  Marimar Yap MD   Department:  [facility] - Ophthalmology           Reason for Visit     Eye Problem                To Do List           Future Appointments        Provider Department Dept Phone    2017 10:40 AM MOIZ [facility] [phone]    2017 9:15 AM Christiano Becerril MD [facility] [phone]    2017 1:00 PM [facility] [phone]    2017 4:00 PM Werner Vargas MD [facility] [phone]    2017 1:00 PM Gabriela Green MD [facility] - Hematology Oncology [phone]      Goals (5 Years of Data)     None      Follow-Up and Disposition     Return for Pre-op cataract surgery OD - call when wound is healed.      [facility] On Call     [facility] On Call Nurse Care Line - 24 Assistance  Registered nurses in the [facility] On Call Center provide clinical advisement, health education, appointment booking, and other advisory services.  Call for this free service at [phone].             Medications           Message regarding Medications     Verify the changes and/or additions to your medication regime listed below are the same as discussed with your clinician today.  If any of these changes or additions are incorrect, please notify your healthcare provider.             Verify that the below list of medications is an accurate representation of the medications you are currently taking.  If none reported, the list may be blank. If incorrect, please contact your healthcare provider. Carry this list with you in case of emergency.           Current Medications     allopurinol (ZYLOPRIM) 100 MG tablet Take 2 tablets (200 mg total) by mouth  nightly.    atenolol (TENORMIN) 25 MG tablet Take 1 tablet (25 mg total) by mouth once daily.    calcitRIOL (ROCALTROL) 0.25 MCG Cap Take 1 capsule by mouth twice a week. Monday Friday    CALCIUM CITRATE/VITAMIN D3 (CALCIUM CITRATE + D ORAL) Take 400 mg by mouth 3 (three) times daily.    cetirizine (ZYRTEC) 10 MG tablet Take 1 tablet (10 mg total) by mouth once daily.    clotrimazole-betamethasone 1-0.05% (LOTRISONE) cream     cyanocobalamin, vitamin B-12, (VITAMIN B-12) 5,000 mcg Subl Place 5,000 mg under the tongue once a week.    DENOSUMAB (PROLIA SUBQ) Inject into the skin every 6 (six) months.     DIABETIC SUPPLIES,MISCELL (Syn[employer]ile REMOTE CONTROL MISC) No Sig Provided    diphenoxylate-atropine 2.5-0.025 mg (LOMOTIL) 2.5-0.025 mg per tablet Take 1 tablet by mouth 4 (four) times daily as needed for Diarrhea.    fluoxetine (PROZAC) 20 MG capsule Take 20 mg by mouth once daily.     fluticasone (FLONASE) 50 mcg/actuation nasal spray 2 sprays by Nasal route once daily.     fluticasone-vilanterol (BREO) 100-25 mcg/dose diskus inhaler Inhale 1 puff into the lungs once daily.    insulin aspart (NOVOLOG) 100 unit/mL injection Inject into the skin continuous. Pt has insulin pump    Lactobacillus rhamnosus GG (CULTURELLE) 10 billion cell capsule Take 1 capsule by mouth once daily.    levmefolate-B6 phos-methyl-B12 3-35-2 mg Tab Take 1 tablet by mouth 2 (two) times daily.    levothyroxine (SYNTHROID) 25 MCG tablet Take 1 tablet (25 mcg total) by mouth once daily.    lorazepam (ATIVAN) 0.5 MG tablet Take 1 tablet (0.5 mg total) by mouth every evening.    losartan (COZAAR) 25 MG tablet Take 1 tablet by mouth once daily.    melatonin 3 mg Tab Take 6 mg by mouth every evening.     metOLazone (ZAROXOLYN) 5 MG tablet Take 1 tablet (5 mg total) by mouth daily as needed (swelling).    metronidazole (METROGEL) 0.75 % gel Apply topically 2 (two) times daily.    montelukast (SINGULAIR) 10 mg tablet Take 10 mg by mouth once daily.      MULTIVIT-IRON-MIN-FOLIC ACID 3,500-18-0.4 UNIT-MG-MG ORAL CHEW Take by mouth 2 (two) times daily.    mupirocin (BACTROBAN) 2 % ointment APPLY OINTMENT TOPICALLY TO AFFECTED AREA ON NOSE THREE TIMES DAILY AS DIRECTED    nystatin (MYCOSTATIN) 100,000 unit/mL suspension Take 4 mLs (400,000 Units total) by mouth 4 (four) times daily.    oxycodone (ROXICODONE) 15 MG Tab Take 15 mg by mouth every 8 (eight) hours as needed for Pain.     pantoprazole (PROTONIX) 40 MG tablet Take 1 tablet (40 mg total) by mouth once daily.    potassium chloride SA (K-DUR,KLOR-CON) 20 MEQ tablet Take 1 tablet (20 mEq total) by mouth 2 (two) times daily.    ranitidine (ZANTAC) 300 MG tablet Take 1 tablet (300 mg total) by mouth every evening.    torsemide (DEMADEX) 20 MG Tab Take 2 tablets (40 mg total) by mouth once daily.           Clinical Reference Information           Allergies as of 1/9/2017     Adhesive    Cleocin [Clindamycin Hcl]    Ceclor [Cefaclor]    Advair Diskus [Fluticasone-salmeterol]    Aleve [Naproxen Sodium]    Levaquin [Levofloxacin]    Macrodantin [Nitrofurantoin Macrocrystalline]    Motrin [Ibuprofen]    Sulfa (Sulfonamide Antibiotics)    Vancomycin Analogues      Immunizations Administered on Date of Encounter - 1/9/2017     None      Instructions      What Are Cataracts?  A clear lens in the eye focuses light. This lets the eye see images sharply. With age, the lens slowly becomes cloudy. The cloudy lens is a cataract. A cataract scatters light and makes it hard for the eye to focus. Cataracts often form in both eyes. But one lens may cloud faster than the other.      The aging of your lens  Your lens may cloud so slowly that you don`t notice any vision changes at first. But as the cataract gets worse, the eye has a harder time focusing. In early stages, glasses may help you see better. As the lens gets more cloudy, your doctor may recommend surgery to restore your vision.  © 9071-2541 [employer] [address]  [place] [address]. All rights reserved. This information is not intended as a substitute for professional medical care. Always follow your healthcare professional's instructions.        Small-Incision Cataract Surgery: Removing the Old Lens  You may be surprised by how little time small-incision cataract surgery takes.  The procedure  Your doctor uses a microscope and tiny tools to make the incision and remove the old lens. A special tool breaks apart the old lens with sound waves (ultrasound) and then takes out the pieces. This process is called phacoemulsification. The natural membrane (capsule) that held your lens is left in place.  Incision size  A smaller incision (top) means a shorter recovery time for you. The location of the incision will vary.         © 4844-1319 [employer]. [address] [place] [address]. All rights reserved. This information is not intended as a substitute for professional medical care. Always follow your healthcare professional's instructions.        Small-Incision Cataract Surgery: Implanting the New Lens  Once your old lens has been removed, your doctor slips the new lens (IOL or intraocular lens) in through the incision. The IOL is then placed in the capsule that held your old lens. With the new lens in place, your doctor is ready to close the incision. Some incisions may be closed with stitches. Others are self-sealing. That means they will stay closed on their own without stitches. The IOL does much the same thing as your old lens did before it became cloudy. It focuses light, letting you see sharp images and vivid colors. The IOL normally lasts a lifetime.  How small is an IOL?        Flexible tabs hold the IOL in place in the eye's natural capsule.     © 7467-7739 [employer]. [address] [place] [address]. All rights reserved. This information is not intended as a substitute for professional medical care.  Always follow your healthcare professional's instructions.        Before Small-Incision Cataract Surgery  Like any operation, small-incision cataract surgery requires preparation.    Your health history  Your eye doctor will review your health history. Based on that, he or she may order tests or talk to your other health care providers. Tell your eye doctor which medicines you take. That includes over-the-counter medicine such as aspirin.  Your eye exam  You will have a complete eye exam. Your eye doctor or a technician will use devices that measure the length and curve of your eye. These measurements let your doctor select the proper new lens (IOL or intraocular lens) for you.  The night before surgery  Dont eat or drink anything after midnight the night before your surgery. This includes water, coffee, chewing gum, and mints. If you have been told to continue your daily medicine, take it only with small sips of water. Make sure you follow any other instructions your doctor gives you.  The day of surgery  Have someone you know drive you to and from the outpatient surgery clinic or hospital. Plan to be there for about 2 to 3 hours. When you arrive, youll sign a consent form if you havent done so already. This form explains the risks of surgery. Just before surgery, your doctor will give you medicine that will relax you and keep you from feeling pain. You may sleep lightly.  Risks and complications  · Your doctor may have to shift from a small incision to a larger incision.  · There is a small chance of bleeding, infection, or swelling.   © 1662-0313 [employer]. [address] [place] [address], [place] [address]. All rights reserved. This information is not intended as a substitute for professional medical care. Always follow your healthcare professional's instructions.        After Small-Incision Cataract Surgery: The First 24 Hours  After surgery, youll rest in a recovery area for about an hour. Even  though you may feel fine, you should take it easy. Your doctor will let you know what you should and shouldnt do once you get home. You may need to wear eye protection the first day. Also remember to take any eye drops or other medicine your doctor prescribes.    Back at home  Spend your first day relaxing at home. Watch TV, read, or talk to a friend. Be careful to:  · Not rub your eye  · Not lift anything that makes you strain  · Not drink alcohol within the first 24 hours  What to expect  Its normal for your eye to be bruised or bloodshot at first. You may also feel itching or mild discomfort. You may have some short-term (temporary) fluid discharge. These wont last long.   Getting back in action  You may be able to get back to much of your routine on the first day. But with some tasks, your doctor may ask you to wait. Always follow your doctor's recommendations.  Here are some general guidelines:  · You can shower again in 2 to 3 days.  · You can cook again in 2 to 3 days.  · You can drive again in 5 to 7 days.  · You can exercise again in 14 days.  When to call your doctor  Call your doctor if:  · Your pain is not relieved by over-the-counter medicine.  · You have nausea or vomiting.  · Your vision suddenly becomes worse.   © 1981-6403 [employer]. [address] [place] [address], [place] [address]. All rights reserved. This information is not intended as a substitute for professional medical care. Always follow your healthcare professional's instructions.

## 2017-01-09 NOTE — ED PROVIDER NOTES
Encounter Date: 1/9/2017    SCRIBE #1 NOTE: INathaly, am scribing for, and in the presence of, Ava Clay PA-C.       History     Chief Complaint   Patient presents with    Fever    Abscess     Review of patient's allergies indicates:   Allergen Reactions    Adhesive Other (See Comments)     Blisters    Cleocin [clindamycin hcl] Hives    Ceclor [cefaclor] Hives    Advair diskus [fluticasone-salmeterol]      Dry mouth    Aleve [naproxen sodium]      Unable to take secondary to kidney function.     Levaquin [levofloxacin] Dermatitis    Macrodantin [nitrofurantoin macrocrystalline] Hives    Motrin [ibuprofen]      Unable to take secondary to kidney functions.    Sulfa (sulfonamide antibiotics)      Hold due to renal problems    Vancomycin analogues Rash     Feet broke out/inflammed blood vessels       HPI Comments: 01/09/2017  2:53 PM     Chief Complaint: Diffuse redness around Abd Incision Site      The patient is a 66 y.o. female with a PMHx of allergy; anemia; anxiety; arthritis; asthma; CHF; CKD; colon polyp; COPD; depression; DM; diastolic dysfunction; diverticulosis; former smoker; GERD; hernia of unspecified site of abdominal cavity without mention of obstruction or gangrene; HLD; HTN; IBS; morbid obesity; nuclear sclerosis; osteoporosis; peripheral edema; rash; recurrent URI; sleep apnea; thyroid disease; TIA; and UTI who is presenting with an acute onset of diffuse redness around abd incision that started today s/p panniculectomy 6 wks ago. Pt started to c/o a fever at nighttime 2 days ago. She also c/o right leg pain and swelling. Associated symptoms of diaphoresis, decreased appetite, and nausea. Pt recently admitted for abd abscess and had a drain placed. She reported that 360 cc's of fluid were removed. Pt discharged home on a round of antibiotics 6 days ago, which she completed yesterday. She denied any redness or swelling when she was discharged. Her drain has not  "put out much for the past 2 days and her daughter noted green discharge from drain. Pt reported that her blood sugar is running fine. Pt f/u with plastic surgeon's PA 3 days ago and she was told everything was fine. No urinary symptoms. Pt denied any diarrhea, but she reported her BMs consist of "small chunks" of stool. No vomiting. Pt has a past surgical history that includes Cosmetic surgery; Tubal ligation; Cystoscopy; Hysterectomy; Hernia repair; Gastrectomy; Colonoscopy; Upper gastrointestinal endoscopy; gastric sleeve; and Abdominal surgery.      The history is provided by the patient and medical records.     Past Medical History   Diagnosis Date    Allergy      multiple antibiotic allergies    Anemia     Anxiety     Arthritis     Asthma     CHF (congestive heart failure)      NYHA class III     Chronic kidney disease     Colon polyp      benign    COPD (chronic obstructive pulmonary disease)      DLCO 37% , and mild pulmonary HTN    Depression     Diabetes mellitus     Diabetic retinopathy     Diastolic dysfunction     Diverticulosis     Former smoker     Fracture of lumbar spine     GERD (gastroesophageal reflux disease)     Hernia of unspecified site of abdominal cavity without mention of obstruction or gangrene     Hyperlipidemia     Hypertension      hypertensive renal    IBS (irritable bowel syndrome)     Mild nonproliferative diabetic retinopathy(362.04) 11/25/2013    Morbid obesity     Nuclear sclerosis 11/25/2013    Osteoporosis     Peripheral edema     Rash     Recurrent upper respiratory infection (URI)     S/P LASIK (laser assisted in situ keratomileusis)     Sleep apnea      sleep apnea uses bipap.    Thyroid disease      on synthroid    TIA (transient ischemic attack)     Urinary tract infection      Past Medical History Pertinent Negatives   Diagnosis Date Noted    Abnormal Pap smear 4/4/2012    Amblyopia 11/19/2012    Angio-edema 3/19/2013    Celiac disease " 2/19/2013    Cholelithiasis 2/19/2013    Chronic constipation 2/19/2013    Chronic diarrhea 2/19/2013    Cirrhosis 2/19/2013    Crohn's disease 2/19/2013    Cystic fibrosis 2/19/2013    Diverticulitis 2/19/2013    Eczema 3/19/2013    Encopresis(307.7) 2/19/2013    Food intolerance 2/19/2013    Glaucoma 11/25/2013    Headache(784.0) 4/10/2013    HEARING LOSS 4/10/2013    Hemochromatosis 2/19/2013    Hepatitis 2/19/2013    Hirschsprung's disease and other congenital functional disorders of colon 2/19/2013    Infertility 4/4/2012    Liver disease 4/4/2012    Macular degeneration 11/19/2012    Otitis media 4/10/2013    Pancreatitis 2/19/2013    Peripheral vascular disease 4/4/2012    Retinal detachment 11/19/2012    Sickle cell anemia 8/3/2015    Sickle cell trait 8/3/2015    STD (sexually transmitted disease) 4/4/2012    Strabismus 11/19/2012    Strep throat 4/10/2013    Ulcerative colitis 2/19/2013    Urticaria 3/19/2013    Uveitis 11/19/2012    Vaginal infection 4/4/2012    Catarino's disease 2/19/2013     Past Surgical History   Procedure Laterality Date    Rhinoplasty tip      Tonsillectomy      Cosmetic surgery      Tubal ligation      Cystoscopy      Refractive surgery       mono va//ou//    Adenoidectomy      Hysterectomy       ovaries remain    Hernia repair       5 years old    Gastrectomy      Colonoscopy  03/05/2013     repeat in 5 years    Upper gastrointestinal endoscopy  03/05/2013    Upper gastrointestinal endoscopy  08/24/2016     Dr. Veras, repeat in 8 weeks    Upper gastrointestinal endoscopy  07/21/2016     Dr. Randall    Gastric sleeve      Gastric sleeve      Abdominal surgery      Eye surgery Right      [facility]     Family History   Problem Relation Age of Onset    Heart disease Mother 59    Cancer Mother 59     throat    Allergies Mother     Diabetes Mother     Heart disease Father 70     flutter    Allergies Father     Diabetes Father      Cancer Sister 22     22 thyroid,49  breast    Allergies Sister     Breast cancer Sister     Diabetes Sister     Cancer Brother 62     lung    Diabetes Brother     Asthma Daughter     Diabetes Daughter     Depression Daughter     Asthma Grandchild     Eczema Grandchild     Eczema Grandchild     Breast cancer Maternal Grandmother     Ovarian cancer Cousin     Amblyopia Neg Hx     Blindness Neg Hx     Cataracts Neg Hx     Glaucoma Neg Hx     Hypertension Neg Hx     Macular degeneration Neg Hx     Retinal detachment Neg Hx     Strabismus Neg Hx     Stroke Neg Hx     Thyroid disease Neg Hx     Angioedema Neg Hx     Immunodeficiency Neg Hx     Allergic rhinitis Neg Hx     Atopy Neg Hx     Rhinitis Neg Hx     Urticaria Neg Hx     Colon cancer Neg Hx     Colon polyps Neg Hx     Crohn's disease Neg Hx     Ulcerative colitis Neg Hx     Celiac disease Neg Hx      Social History   Substance Use Topics    Smoking status: Former Smoker     Types: Cigarettes    Smokeless tobacco: Never Used      Comment: quit 30 years ago    Alcohol use No      Comment: rare     Review of Systems   Constitutional: Positive for appetite change (Decreased appetite.), diaphoresis and fever.   HENT: Negative for sore throat.    Eyes: Negative for visual disturbance.   Respiratory: Negative for shortness of breath.    Cardiovascular: Positive for leg swelling (Right leg swelling.). Negative for chest pain.   Gastrointestinal: Positive for nausea. Negative for diarrhea and vomiting.   Genitourinary: Negative for dysuria.   Musculoskeletal: Positive for myalgias (Right leg pain.).   Skin:        +Diffuse redness around abd incision site.   Neurological: Negative for weakness.   Hematological: Does not bruise/bleed easily.   Psychiatric/Behavioral: Negative for confusion.       Physical Exam   Initial Vitals   BP Pulse Resp Temp SpO2   01/09/17 1409 01/09/17 1409 01/09/17 1409 01/09/17 1409 01/09/17 1409   131/60  78 20 99 °F (37.2 °C) 97 %     Physical Exam    Nursing note and vitals reviewed.  Constitutional: She appears well-developed and well-nourished. No distress.   HENT:   Head: Normocephalic and atraumatic.   Right Ear: External ear normal.   Left Ear: External ear normal.   Nose: Nose normal.   Eyes: Conjunctivae are normal. Pupils are equal, round, and reactive to light. Right eye exhibits no discharge. Left eye exhibits no discharge.   Neck: Normal range of motion. Neck supple.   Cardiovascular: Normal rate, regular rhythm and normal heart sounds. Exam reveals no gallop and no friction rub.    No murmur heard.  Pulmonary/Chest: Breath sounds normal. She has no wheezes. She has no rhonchi. She has no rales.   Abdominal: Soft. Bowel sounds are normal. There is tenderness. There is no rigidity, no rebound, no guarding and no CVA tenderness.   Musculoskeletal: Normal range of motion.   No calf tenderness. Negative homans sign.   Neurological: She is alert.   Skin: Skin is warm and dry. There is erythema.              ED Course   Procedures  Labs Reviewed   CBC W/ AUTO DIFFERENTIAL - Abnormal; Notable for the following:        Result Value    RBC 3.52 (*)     Hemoglobin 10.3 (*)     Hematocrit 31.1 (*)     RDW 14.8 (*)     Platelets 502 (*)     MPV 6.4 (*)     Gran # 9.8 (*)     Gran% 82.6 (*)     Lymph% 12.5 (*)     Mono% 3.7 (*)     All other components within normal limits   COMPREHENSIVE METABOLIC PANEL - Abnormal; Notable for the following:     Glucose 138 (*)     Albumin 2.5 (*)     All other components within normal limits             Medical Decision Making:   History:   I obtained history from: someone other than patient.  Old Medical Records: I decided to obtain old medical records.  Old Records Summarized: records from previous admission(s).  Clinical Tests:   Lab Tests: Ordered  Radiological Study: Ordered  Other:   I have discussed this case with another health care provider.       APC / Resident Notes:    Patient is a 66-year-old female with complaint of redness and swelling to the abdomen. She reports mild fever of 100.5 Fahrenheit at home.  She reports she completed her course of antibiotics yesterday. Family member reports significant increase in erythema and swelling. She reports taking tylenol with improvement in fever. She reports she does not think her drain is working appropriately, as there is not adequate output. Well healed panniculectomy scar noted. There is no dehiscence noted. However, there if diffuse swelling, erythema and warmth noted across abdomen extending down to the mons pubis. Concern for continued intra-abdominal abscess in addition to abdominal wall cellulitis. Afebrile in ED. Vital signs stable. CT scan showed continued abscess formation, increased in size with drain in place. Case discussed with Dr. Boss. Unable to properly aspirate drain, able to flush without difficulty. Dr. Boss also attempted with no success. Due to worsening abscess and overlying cellulitis will admit for IV antibiotics and IR evaluation. Case discussed with Dr. Randall who has accepted the patient. Discussed results with patient. Case was discussed with Dr. Porter who has evaluated the patient and is in agreement with the plan of care. All questions answered.          Scribe Attestation:   Scribe #1: I performed the above scribed service and the documentation accurately describes the services I performed. I attest to the accuracy of the note.    Attending Attestation:           Physician Attestation for Scribe:  Physician Attestation Statement for Scribe #1: I, Ava Clay PA-C, reviewed documentation, as scribed by Nathaly Sellers in my presence, and it is both accurate and complete.                 ED Course     Clinical Impression:   The primary encounter diagnosis was Abscess of abdominal cavity. Diagnoses of Abdominal wall cellulitis and Abscess of abdominal wall were also pertinent to this  visit.          Ava Clay PA-C  01/10/17 2532

## 2017-01-09 NOTE — PATIENT INSTRUCTIONS
What Are Cataracts?  A clear lens in the eye focuses light. This lets the eye see images sharply. With age, the lens slowly becomes cloudy. The cloudy lens is a cataract. A cataract scatters light and makes it hard for the eye to focus. Cataracts often form in both eyes. But one lens may cloud faster than the other.      The aging of your lens  Your lens may cloud so slowly that you don`t notice any vision changes at first. But as the cataract gets worse, the eye has a harder time focusing. In early stages, glasses may help you see better. As the lens gets more cloudy, your doctor may recommend surgery to restore your vision.  © 8318-7576 [employer]. [address] [place] [address]. All rights reserved. This information is not intended as a substitute for professional medical care. Always follow your healthcare professional's instructions.        Small-Incision Cataract Surgery: Removing the Old Lens  You may be surprised by how little time small-incision cataract surgery takes.  The procedure  Your doctor uses a microscope and tiny tools to make the incision and remove the old lens. A special tool breaks apart the old lens with sound waves (ultrasound) and then takes out the pieces. This process is called phacoemulsification. The natural membrane (capsule) that held your lens is left in place.  Incision size  A smaller incision (top) means a shorter recovery time for you. The location of the incision will vary.         © 6756-2353 [employer]. [address] [place] [address]. All rights reserved. This information is not intended as a substitute for professional medical care. Always follow your healthcare professional's instructions.        Small-Incision Cataract Surgery: Implanting the New Lens  Once your old lens has been removed, your doctor slips the new lens (IOL or intraocular lens) in through the incision. The IOL is then placed in the capsule that held your  old lens. With the new lens in place, your doctor is ready to close the incision. Some incisions may be closed with stitches. Others are self-sealing. That means they will stay closed on their own without stitches. The IOL does much the same thing as your old lens did before it became cloudy. It focuses light, letting you see sharp images and vivid colors. The IOL normally lasts a lifetime.  How small is an IOL?        Flexible tabs hold the IOL in place in the eye's natural capsule.     © 0163-2695 [employer]. [address] [place] [address]. All rights reserved. This information is not intended as a substitute for professional medical care. Always follow your healthcare professional's instructions.        Before Small-Incision Cataract Surgery  Like any operation, small-incision cataract surgery requires preparation.    Your health history  Your eye doctor will review your health history. Based on that, he or she may order tests or talk to your other health care providers. Tell your eye doctor which medicines you take. That includes over-the-counter medicine such as aspirin.  Your eye exam  You will have a complete eye exam. Your eye doctor or a technician will use devices that measure the length and curve of your eye. These measurements let your doctor select the proper new lens (IOL or intraocular lens) for you.  The night before surgery  Dont eat or drink anything after midnight the night before your surgery. This includes water, coffee, chewing gum, and mints. If you have been told to continue your daily medicine, take it only with small sips of water. Make sure you follow any other instructions your doctor gives you.  The day of surgery  Have someone you know drive you to and from the outpatient surgery clinic or hospital. Plan to be there for about 2 to 3 hours. When you arrive, youll sign a consent form if you havent done so already. This form explains the risks of surgery. Just  before surgery, your doctor will give you medicine that will relax you and keep you from feeling pain. You may sleep lightly.  Risks and complications  · Your doctor may have to shift from a small incision to a larger incision.  · There is a small chance of bleeding, infection, or swelling.   © 8123-3393 [employer]. [address] [place] [address], [place] [address]. All rights reserved. This information is not intended as a substitute for professional medical care. Always follow your healthcare professional's instructions.        After Small-Incision Cataract Surgery: The First 24 Hours  After surgery, youll rest in a recovery area for about an hour. Even though you may feel fine, you should take it easy. Your doctor will let you know what you should and shouldnt do once you get home. You may need to wear eye protection the first day. Also remember to take any eye drops or other medicine your doctor prescribes.    Back at home  Spend your first day relaxing at home. Watch TV, read, or talk to a friend. Be careful to:  · Not rub your eye  · Not lift anything that makes you strain  · Not drink alcohol within the first 24 hours  What to expect  Its normal for your eye to be bruised or bloodshot at first. You may also feel itching or mild discomfort. You may have some short-term (temporary) fluid discharge. These wont last long.   Getting back in action  You may be able to get back to much of your routine on the first day. But with some tasks, your doctor may ask you to wait. Always follow your doctor's recommendations.  Here are some general guidelines:  · You can shower again in 2 to 3 days.  · You can cook again in 2 to 3 days.  · You can drive again in 5 to 7 days.  · You can exercise again in 14 days.  When to call your doctor  Call your doctor if:  · Your pain is not relieved by over-the-counter medicine.  · You have nausea or vomiting.  · Your vision suddenly becomes worse.   © 3270-2630 [employer]. [address] [place] [address], [place] [address]. All rights reserved. This information is not intended as a substitute for professional medical care. Always follow your healthcare professional's instructions.

## 2017-01-09 NOTE — ED NOTES
Was recently in hospital for abdominal abscess with wound vac in place currently which she states thinks has stopped working because it will not decompress anymore, small amount light yellow fluid present in bag, states low grade fever of 100 yesterday was told by PCP to be seen if any fever was present insulin pump in place to upper abdomin states that her blood sugars have been very good. C/o increased right sided abdominal pain causing her right leg to be painful able to ambulate but it is difficult. Alert calm aware to notify nurse of needs or concerns.

## 2017-01-09 NOTE — PROGRESS NOTES
"HPI     Eye Problem    Additional comments: pt va goes in and out x "when ever it does it"           Comments   pt va goes in and out x "when ever it does it"  Dm Hemoglobin A1C       Date                     Value               Ref Range             Status                12/29/2016               6.6 (H)             4.5 - 6.2 %           Final                 12/03/2015               6.1                 4.5 - 6.2 %           Final                 08/30/2013               8.0 (H)             4.8 - 5.6 %           Final              02/02/2013               8.2 (H)             4.8 - 5.6 %         Final                ----------    Agree with above. States spider webs and tree branches are gone. After   that, everything went back to normal. About a month ago, vision started   fluctuating, seemed to be OU. Things would been going blurry where she   cannot read them. States her glucose levels have been good, she even   checked it when having the episodes and it would be ok. BP also ok per pt.   Lasts at least a day, does go back to normal. Notices more at night.   Denies flashes or new floaters. Long-standing trouble with glare from   headlights at night, seems to be getting worse. States she is lubricating   OU TID.         Last edited by Marimar Yap MD on 1/9/2017 10:11 AM.   (History)        ROS     Positive for: Gastrointestinal (sleeve procedure, taking vitamins), Skin   (on steroids for hives), Genitourinary (nephropathy), Musculoskeletal   (recently dx'd with gout), Endocrine (DM - insulin pump), Cardiovascular   (HTN - controlled with meds per pt), Eyes (LASIK OU ~10 years ago),   Respiratory (SOB, COPD), Heme/Lymph (ASA QD d/c'd due to easy bruising on   arms, resumed again the quit again couple weeks ago)    Negative for: Neurological (denies neuropathy)    Last edited by Marimar Yap MD on 1/9/2017  9:50 AM.   (History)        Assessment /Plan     For exam results, see Encounter " Report.    Moderate nonproliferative diabetic retinopathy of both eyes associated with type 2 diabetes mellitus, macular edema presence unspecified    Vitreo-retinal adhesions    Nuclear sclerosis, bilateral    PVD (posterior vitreous detachment), bilateral    Refractive error        Moderate nonproliferative diabetic retinopathy of both eyes associated with type 2 diabetes mellitus, macular edema presence unspecified - appears stable, OCT stable. Pt with DM x 20 years, now with insulin pump. Will need Ketorolac 1 week prior to CE.    Vitreo-retinal adhesions - hemorrhagic PVD 2015, hx laser retinopexy OD 10/15. Stable.     Nuclear sclerosis, bilateral - appears to be visually significant, OD>OS. Dilates well. States Versed does not work well on her, but Propofol does. RTC for pre-op CE after wound heals from recent surgical procedure - states she will call to schedule.    PVD (posterior vitreous detachment), bilateral - RD precautions    Refractive error      Dry eye, bilateral - continue artificial tears.

## 2017-01-10 PROBLEM — D75.839 THROMBOCYTOSIS: Status: ACTIVE | Noted: 2017-01-10

## 2017-01-10 LAB
ALBUMIN SERPL BCP-MCNC: 2.2 G/DL
ALP SERPL-CCNC: 89 U/L
ALT SERPL W/O P-5'-P-CCNC: 13 U/L
ANION GAP SERPL CALC-SCNC: 9 MMOL/L
AST SERPL-CCNC: 19 U/L
BASOPHILS # BLD AUTO: 0 K/UL
BASOPHILS NFR BLD: 0.2 %
BILIRUB SERPL-MCNC: 0.4 MG/DL
BNP SERPL-MCNC: 34 PG/ML
BUN SERPL-MCNC: 9 MG/DL
CALCIUM SERPL-MCNC: 8.3 MG/DL
CHLORIDE SERPL-SCNC: 109 MMOL/L
CO2 SERPL-SCNC: 21 MMOL/L
CREAT SERPL-MCNC: 0.8 MG/DL
DIFFERENTIAL METHOD: ABNORMAL
EOSINOPHIL # BLD AUTO: 0.1 K/UL
EOSINOPHIL NFR BLD: 1.1 %
ERYTHROCYTE [DISTWIDTH] IN BLOOD BY AUTOMATED COUNT: 15 %
EST. GFR  (AFRICAN AMERICAN): >60 ML/MIN/1.73 M^2
EST. GFR  (NON AFRICAN AMERICAN): >60 ML/MIN/1.73 M^2
GLUCOSE SERPL-MCNC: 110 MG/DL
HCT VFR BLD AUTO: 28.7 %
HGB BLD-MCNC: 9.8 G/DL
LYMPHOCYTES # BLD AUTO: 1.2 K/UL
LYMPHOCYTES NFR BLD: 13 %
MCH RBC QN AUTO: 30.7 PG
MCHC RBC AUTO-ENTMCNC: 34.2 %
MCV RBC AUTO: 90 FL
MONOCYTES # BLD AUTO: 0.4 K/UL
MONOCYTES NFR BLD: 4.5 %
NEUTROPHILS # BLD AUTO: 7.5 K/UL
NEUTROPHILS NFR BLD: 81.2 %
PLATELET # BLD AUTO: 404 K/UL
PMV BLD AUTO: 6.9 FL
POCT GLUCOSE: 116 MG/DL (ref 70–110)
POCT GLUCOSE: 123 MG/DL (ref 70–110)
POCT GLUCOSE: 196 MG/DL (ref 70–110)
POCT GLUCOSE: 89 MG/DL (ref 70–110)
POTASSIUM SERPL-SCNC: 4.5 MMOL/L
PROT SERPL-MCNC: 6.1 G/DL
RBC # BLD AUTO: 3.2 M/UL
SODIUM SERPL-SCNC: 139 MMOL/L
WBC # BLD AUTO: 9.3 K/UL

## 2017-01-10 PROCEDURE — 11000001 HC ACUTE MED/SURG PRIVATE ROOM

## 2017-01-10 PROCEDURE — 87070 CULTURE OTHR SPECIMN AEROBIC: CPT

## 2017-01-10 PROCEDURE — 0W9F30Z DRAINAGE OF ABDOMINAL WALL WITH DRAINAGE DEVICE, PERCUTANEOUS APPROACH: ICD-10-PCS | Performed by: RADIOLOGY

## 2017-01-10 PROCEDURE — 25000003 PHARM REV CODE 250: Performed by: RADIOLOGY

## 2017-01-10 PROCEDURE — 85025 COMPLETE CBC W/AUTO DIFF WBC: CPT

## 2017-01-10 PROCEDURE — 80053 COMPREHEN METABOLIC PANEL: CPT

## 2017-01-10 PROCEDURE — 25000003 PHARM REV CODE 250: Performed by: PHYSICIAN ASSISTANT

## 2017-01-10 PROCEDURE — 99222 1ST HOSP IP/OBS MODERATE 55: CPT | Mod: ,,, | Performed by: SURGERY

## 2017-01-10 PROCEDURE — 63600175 PHARM REV CODE 636 W HCPCS: Performed by: RADIOLOGY

## 2017-01-10 PROCEDURE — 83880 ASSAY OF NATRIURETIC PEPTIDE: CPT

## 2017-01-10 PROCEDURE — 87040 BLOOD CULTURE FOR BACTERIA: CPT

## 2017-01-10 PROCEDURE — 63600175 PHARM REV CODE 636 W HCPCS: Performed by: PHYSICIAN ASSISTANT

## 2017-01-10 PROCEDURE — 36415 COLL VENOUS BLD VENIPUNCTURE: CPT

## 2017-01-10 PROCEDURE — 25000003 PHARM REV CODE 250: Performed by: FAMILY MEDICINE

## 2017-01-10 RX ORDER — MIDAZOLAM HYDROCHLORIDE 5 MG/ML
1 INJECTION INTRAMUSCULAR; INTRAVENOUS ONCE
Status: COMPLETED | OUTPATIENT
Start: 2017-01-10 | End: 2017-01-10

## 2017-01-10 RX ORDER — MEPERIDINE HYDROCHLORIDE 100 MG/ML
INJECTION INTRAMUSCULAR; INTRAVENOUS; SUBCUTANEOUS
Status: DISPENSED
Start: 2017-01-10 | End: 2017-01-11

## 2017-01-10 RX ORDER — DEXTROSE MONOHYDRATE AND SODIUM CHLORIDE 5; .45 G/100ML; G/100ML
INJECTION, SOLUTION INTRAVENOUS CONTINUOUS
Status: DISCONTINUED | OUTPATIENT
Start: 2017-01-10 | End: 2017-01-11 | Stop reason: HOSPADM

## 2017-01-10 RX ORDER — MEPERIDINE HYDROCHLORIDE 50 MG/ML
50 INJECTION INTRAMUSCULAR; INTRAVENOUS; SUBCUTANEOUS ONCE
Status: COMPLETED | OUTPATIENT
Start: 2017-01-10 | End: 2017-01-10

## 2017-01-10 RX ORDER — MIDAZOLAM HYDROCHLORIDE 1 MG/ML
INJECTION, SOLUTION INTRAMUSCULAR; INTRAVENOUS
Status: DISPENSED
Start: 2017-01-10 | End: 2017-01-11

## 2017-01-10 RX ADMIN — MIDAZOLAM HYDROCHLORIDE 2 MG: 5 INJECTION, SOLUTION INTRAMUSCULAR; INTRAVENOUS at 02:01

## 2017-01-10 RX ADMIN — DEXTROSE AND SODIUM CHLORIDE: 5; .45 INJECTION, SOLUTION INTRAVENOUS at 09:01

## 2017-01-10 RX ADMIN — POTASSIUM CHLORIDE 20 MEQ: 1500 TABLET, EXTENDED RELEASE ORAL at 09:01

## 2017-01-10 RX ADMIN — PANTOPRAZOLE SODIUM 40 MG: 40 TABLET, DELAYED RELEASE ORAL at 09:01

## 2017-01-10 RX ADMIN — ALLOPURINOL 200 MG: 100 TABLET ORAL at 09:01

## 2017-01-10 RX ADMIN — LEVOTHYROXINE SODIUM 25 MCG: 25 TABLET ORAL at 05:01

## 2017-01-10 RX ADMIN — NYSTATIN 400000 UNITS: 100000 SUSPENSION ORAL at 11:01

## 2017-01-10 RX ADMIN — DEXTROSE AND SODIUM CHLORIDE: 5; .45 INJECTION, SOLUTION INTRAVENOUS at 11:01

## 2017-01-10 RX ADMIN — FLUOXETINE 20 MG: 20 CAPSULE ORAL at 09:01

## 2017-01-10 RX ADMIN — MEPERIDINE HYDROCHLORIDE 50 MG: 50 INJECTION INTRAMUSCULAR; INTRAVENOUS; SUBCUTANEOUS at 02:01

## 2017-01-10 RX ADMIN — ATENOLOL 25 MG: 25 TABLET ORAL at 09:01

## 2017-01-10 RX ADMIN — MONTELUKAST SODIUM 10 MG: 10 TABLET, FILM COATED ORAL at 09:01

## 2017-01-10 RX ADMIN — TORSEMIDE 40 MG: 20 TABLET ORAL at 09:01

## 2017-01-10 RX ADMIN — DAPTOMYCIN 600 MG: 500 INJECTION, POWDER, LYOPHILIZED, FOR SOLUTION INTRAVENOUS at 09:01

## 2017-01-10 RX ADMIN — OXYCODONE HYDROCHLORIDE 15 MG: 5 TABLET ORAL at 09:01

## 2017-01-10 RX ADMIN — LOSARTAN POTASSIUM 25 MG: 25 TABLET, FILM COATED ORAL at 09:01

## 2017-01-10 RX ADMIN — NYSTATIN 400000 UNITS: 100000 SUSPENSION ORAL at 05:01

## 2017-01-10 RX ADMIN — LORAZEPAM 0.5 MG: 0.5 TABLET ORAL at 09:01

## 2017-01-10 RX ADMIN — OXYCODONE HYDROCHLORIDE 15 MG: 5 TABLET ORAL at 05:01

## 2017-01-10 NOTE — NURSING
Pt BG was 89mg/dL at thus time, pt has an insulin pump with cont Q1hr injections, pt stated she will suspended insulin pump until she is able to eat , nad noted will cont to monitor

## 2017-01-10 NOTE — CONSULTS
Consult Note  Infectious Disease    Reason for Consult:  Antibiotic advice    HPI: Fifi Mcnair is a 66 y.o. female who underwent a panniculectomy on 11/28. She had 2 drains post operatively and one was removed on 12/9. The other(right) had continued drainage and she developed fever to 103 at which time it was removed and she was started on keflex. She was admitted the following day to [facility] and found to have a fluid collection on CT scan. A percutaneous drain was placed on the right, draining 350 cc and she describes the drainage as purulent. She received daptomycin with improvement. A culture of the fluid grew MSSA and enterococcus. She was discharged on oral linezolid and was seen in [facility] clinic 1/6. She developed fever, on linezolid 1/6 pm and the drainage from the drain stopped and she was readmitted on 1/9. A repeat CT showed a much larger fluid collection and an additional drain was placed on the left. Only 100 cc came out suggesting that the fluid collection may be loculated(this is not evident on the CT). The quality of drainage from both drains is serous, slightly cloudy. She is again on daptomycin.     Review of patient's allergies indicates:   Allergen Reactions    Adhesive Other (See Comments)     Blisters    Cleocin [clindamycin hcl] Hives    Ceclor [cefaclor] Hives    Morphine Nausea And Vomiting    Advair diskus [fluticasone-salmeterol]      Dry mouth    Aleve [naproxen sodium]      Unable to take secondary to kidney function.     Levaquin [levofloxacin] Dermatitis    Macrodantin [nitrofurantoin macrocrystalline] Hives    Motrin [ibuprofen]      Unable to take secondary to kidney functions.    Sulfa (sulfonamide antibiotics)      Hold due to renal problems    Vancomycin analogues Rash     Feet broke out/inflammed blood vessels       Past Medical History   Diagnosis Date    Allergy      multiple antibiotic allergies    Anemia     Anxiety     Arthritis     Asthma      CHF (congestive heart failure)      NYHA class III     Chronic kidney disease     Colon polyp      benign    COPD (chronic obstructive pulmonary disease)      DLCO 37% , and mild pulmonary HTN    Depression     Diabetes mellitus     Diabetic retinopathy     Diastolic dysfunction     Diverticulosis     Former smoker     Fracture of lumbar spine     GERD (gastroesophageal reflux disease)     Hernia of unspecified site of abdominal cavity without mention of obstruction or gangrene     Hyperlipidemia     Hypertension      hypertensive renal    IBS (irritable bowel syndrome)     Mild nonproliferative diabetic retinopathy(362.04) 11/25/2013    Morbid obesity     Nuclear sclerosis 11/25/2013    Osteoporosis     Peripheral edema     Rash     Recurrent upper respiratory infection (URI)     S/P LASIK (laser assisted in situ keratomileusis)     Sleep apnea      sleep apnea uses bipap.    Thyroid disease      on synthroid    TIA (transient ischemic attack)     Urinary tract infection      Past Surgical History   Procedure Laterality Date    Rhinoplasty tip      Tonsillectomy      Cosmetic surgery      Tubal ligation      Cystoscopy      Refractive surgery       mono va//ou//    Adenoidectomy      Hysterectomy       ovaries remain    Hernia repair       5 years old    Gastrectomy      Colonoscopy  03/05/2013     repeat in 5 years    Upper gastrointestinal endoscopy  03/05/2013    Upper gastrointestinal endoscopy  08/24/2016     Dr. Veras, repeat in 8 weeks    Upper gastrointestinal endoscopy  07/21/2016     Dr. Randall    Gastric sleeve      Gastric sleeve      Abdominal surgery      Eye surgery [facility]     Social History     Social History    Marital status:      Spouse name: N/A    Number of children: N/A    Years of education: N/A     Social History Main Topics    Smoking status: Former Smoker     Types: Cigarettes    Smokeless tobacco: Never Used       Comment: quit 30 years ago    Alcohol use No      Comment: rare    Drug use: No    Sexual activity: Yes     Birth control/ protection: Surgical     Other Topics Concern    None     Social History Narrative     Family History   Problem Relation Age of Onset    Heart disease Mother 59    Cancer Mother 59     throat    Allergies Mother     Diabetes Mother     Heart disease Father 70     flutter    Allergies Father     Diabetes Father     Cancer Sister 22     22 thyroid,49  breast    Allergies Sister     Breast cancer Sister     Diabetes Sister     Cancer Brother 62     lung    Diabetes Brother     Asthma Daughter     Diabetes Daughter     Depression Daughter     Asthma Grandchild     Eczema Grandchild     Eczema Grandchild     Breast cancer Maternal Grandmother     Ovarian cancer Cousin     Amblyopia Neg Hx     Blindness Neg Hx     Cataracts Neg Hx     Glaucoma Neg Hx     Hypertension Neg Hx     Macular degeneration Neg Hx     Retinal detachment Neg Hx     Strabismus Neg Hx     Stroke Neg Hx     Thyroid disease Neg Hx     Angioedema Neg Hx     Immunodeficiency Neg Hx     Allergic rhinitis Neg Hx     Atopy Neg Hx     Rhinitis Neg Hx     Urticaria Neg Hx     Colon cancer Neg Hx     Colon polyps Neg Hx     Crohn's disease Neg Hx     Ulcerative colitis Neg Hx     Celiac disease Neg Hx        Pertinent medications noted:     Review of Systems:   Positive chills, fever, sweats  No nausea, vomiting, diarrhea,  Her blood sugar has been well controlled with an insulin pump.  She did have a staph infection on buttock for which I saw her a couple of years ago in the office    EXAM & DIAGNOSTICS REVIEWED:   Vitals:     Temp:  [98 °F (36.7 °C)-98.7 °F (37.1 °C)]   Temp: 98 °F (36.7 °C) (01/10/17 1700)  Pulse: 75 (01/10/17 1700)  Resp: 18 (01/10/17 1700)  BP: (!) 110/55 (01/10/17 1700)  SpO2: 98 % (01/10/17 1700)    Intake/Output Summary (Last 24 hours) at 01/10/17 1926  Last data filed  at 01/10/17 1900   Gross per 24 hour   Intake          2006.75 ml   Output              130 ml   Net          1876.75 ml       General:  In NAD. Looks non toxic Alert and attentive, cooperative  Eyes:  Anicteric, PERRL, EOMI  ENT:  Mouth w/ pink MMM, no lesions/exudate, good dentition  Neck:  Trachea midline, supple, no adenopathy appreciated  Lungs: clear  Heart:  RRR, no gallop/murmur noted  Abd:  soft, NT, ND, normal BS, no masses/organomegaly appreciated.  :  Voids  Musc:  Joints without effusion, swelling,  erythema, synovitis,   Skin:  Generally warm, dry, normal for color. No rashes. No palmar or plantar    lesions. No subungual petechiae  Wound: Trans abd incision is well healed. She has congestion and erythema of the mons pubis. There is abd wall edema, most prominent inferior and lateral to incision with increased redness along the lateral edges. No palpable abscess. Bilateral drains are in place, both serous in quality.   Neuro: AAOx3, speech clear, moves all extrems equally  Extrem: No edema, erythema, phlebitis, cellulitis,   VAD:    Lines/Tubes/Drains:    General Labs reviewed:    Recent Labs  Lab 01/10/17  0550   WBC 9.30   RBC 3.20*   HGB 9.8*   HCT 28.7*   *   MCV 90   MCH 30.7   MCHC 34.2       Recent Labs  Lab 01/10/17  0550   CALCIUM 8.3*   PROT 6.1      K 4.5   CO2 21*      BUN 9   CREATININE 0.8   ALKPHOS 89   ALT 13   AST 19   BILITOT 0.4     Micro:  Microbiology Results (last 7 days)     Procedure Component Value Units Date/Time    Aerobic culture [[id number]] Collected:  01/10/17 1505    Order Status:  Sent Specimen:  Abscess from Abdomen Updated:  01/10/17 1505    Blood culture [[id number]] Collected:  01/10/17 1139    Order Status:  Sent Specimen:  Blood Updated:  01/10/17 1139    Clostridium difficile EIA [[id number]]     Order Status:  No result Specimen:  Stool from Stool         Imaging Reviewed:   CT abd from last admit and this one      IMPRESSION & PLAN   Imp:  persistent seroma, lower abd wall           Cellulitis of the abd wall and mons pubis, may be from infection or from lymphedema.             Fluid from both drains appears non purulent, serous today           Purulent drainage and positive culture from initial drain placed 12/30, MSSA and enterococcus    Rec; continue daptomycin. May be able to go back to linezolid at discharge           We need to contact Dr. Becerril so that he can review images and advise on anything he wants done before visit on Friday.

## 2017-01-10 NOTE — NURSING
Pt has a CT guided drain to left quadrant clean, dry intact with clear drainage noted, pt still has CT guided drain to right quadrant intact. Nurse called Dr. Randall to informed her pt has returned to floor with the same right quadrant drain. pt and family is wondering why the RQ drain wasn't removed since it isn't working properly. Dr. Randall stated she will call Dr. Martines in regards of why drain wasn't removed and she will remove the drain if necessary.

## 2017-01-10 NOTE — ASSESSMENT & PLAN NOTE
Allergic vs asthma vs COPD vs CHF  Monitor  PRN nebulized bronchodilator  PRN antihistamine  Consider further workup - BNP, CT chest

## 2017-01-10 NOTE — PROGRESS NOTES
Radiology Post-Procedure Note    Pre Op Diagnosis: anterior abdominal wall mass  Post Op Diagnosis: Same    Procedure: percutaneous ultrasound guided biopsy    Procedure performed by: Dr. Margaret Martines    Written Informed Consent Obtained: Yes  Specimen Removed:YES , sent to lab  Estimated Blood Loss: Minimal    Findings:   Successful ultrasound guided percutaneous biopsy anterior abdominal wall mass    Patient tolerated procedure well.    @SIG@

## 2017-01-10 NOTE — PLAN OF CARE
Abdominal abscess drainage completed with 100 ml of yellowish orange colored fluid noted with drain placement to right abd, pt tolerated the procedure well with Versed 2 mg and Demerol 50 mg IV given as directed. Vitals stable view graphics and report called to [facility] with questions and concerns addressed at this time. No needs noted at this time. Safety maintained. Specimen collected and walked over to lab for processing.

## 2017-01-10 NOTE — PROGRESS NOTES
Called Dr. Hollins's office. Spoke with Nancy, states she will notify Dr. Hollins of consult. IDALIA Jamison

## 2017-01-10 NOTE — PLAN OF CARE
"Met with pt to complete her readmission questionnaire.  Pt verified that her demographic information had not changed since her last admission in December, 2016.  Pt reports that her PCP Dr. Vargas has been treating her for depression for the past 15 years and that she is on Prozac.  Pt reports that her current medical condition is making her feel depressed and not having interest in doing any activities.  Pt denies suicidal ideations and reports good sleep.  Pt awaiting consults to determine her course of treatment.  Pt stated that she is diabetic, requesting to have something to eat as she  has not eaten anything since yesterday.  Updated Dr. Randall.   Pt's discharge plan is home with no anticipated needs.  CM will continue to follow for discharge needs.      01/10/17 1013   Readmission Questionnaire   At the time of your discharge, did someone talk to you about what your health problems were? Yes   At the time of discharge, did someone talk to you about what to watch out for regarding worsening of your health problem? Yes   At the time of discharge, did someone talk to you about what to do if you experienced worsening of your health problem? Yes   At the time of discharge, did someone talk to you about which medication to take when you left the hospital and which ones to stop taking? Yes   At the time of discharge, did someone talk to you about when and where to follow up with a doctor after you left the hospital? Yes   What do you believe caused you to be sick enough to be re-admitted? ("my drain stopped working")   How often do you need to have someone help you when you read instructions, pamphlets, or other written material from your doctor or pharmacy? Never   Do you have problems taking your medications as prescribed? No   Do you have any problems affording any of  your prescribed medications? Yes  (Pt states that she had a problem affording her inhaler.  Pt states that she called the company and they sent " her 3 inhalers.)   Do you have problems obtaining/receiving your medications? No   Does the patient have transportation to healthcare appointments? Yes   Lives With spouse   Living Arrangements house   Does the patient have family/friends to help with healtcare needs after discharge? yes   Who are your caregiver(s) and their phone number(s)? (spouse Florian Mcnair, [phone])   Does your caregiver provide all the help you need? Yes   Are you currently feeling confused? No   Are you currently having problems thinking? No   Are you currently having memory problems? No   Have you felt down, depressed, or hopeless? 1   Have you felt little interest or pleasure in doing things? 1  (Pt reports that her current medical condition affects her mood and not wanting to do anything. )   In the last 7 days, my sleep quality was: good

## 2017-01-10 NOTE — ASSESSMENT & PLAN NOTE
Uncertain cause. Possibly chronic disease vs hypoplastic marrow.  Trend H/H, monitor for bleeding. Can follow up with her hematologist (Dr. Green)

## 2017-01-10 NOTE — ASSESSMENT & PLAN NOTE
With cellulitis and possible panniculitis and possible fistula to bowel  Start Daptomycin  Consult a general surgeon for evaluation and possible surgical intervention - debridement  Consult an infectious disease physician  Monitor temperature  Consider CT A/P with IV contrast only to evaluate for fistula

## 2017-01-10 NOTE — CONSULTS
Consult Note    Inpatient consult to General Surgery  Consult performed by: NABILA PEREZ  Consult ordered by: ARA PALMA        SUBJECTIVE:     History of Present Illness:  Patient is a 66 y.o. female presents lower abdominal pain and reported fever. Pt had abdominoplasty one month ago by Dr. Becerril. Was seen in his clinic 4 days ago. Had CT guided drain placed last week in subcutaneous fluid collection which has stopped draining. CT shows re accumulation of fluid collection. There is redness of abdominal wall. No recorded fever. Cultures from drainage grew staph and enterococcus. No intra abdominal findings on CT. Pt is scheduled to see Dr. Becerril in clinic in 3 days.    Review of patient's allergies indicates:   Allergen Reactions    Adhesive Other (See Comments)     Blisters    Cleocin [clindamycin hcl] Hives    Ceclor [cefaclor] Hives    Morphine Nausea And Vomiting    Advair diskus [fluticasone-salmeterol]      Dry mouth    Aleve [naproxen sodium]      Unable to take secondary to kidney function.     Levaquin [levofloxacin] Dermatitis    Macrodantin [nitrofurantoin macrocrystalline] Hives    Motrin [ibuprofen]      Unable to take secondary to kidney functions.    Sulfa (sulfonamide antibiotics)      Hold due to renal problems    Vancomycin analogues Rash     Feet broke out/inflammed blood vessels       Past Medical History   Diagnosis Date    Allergy      multiple antibiotic allergies    Anemia     Anxiety     Arthritis     Asthma     CHF (congestive heart failure)      NYHA class III     Chronic kidney disease     Colon polyp      benign    COPD (chronic obstructive pulmonary disease)      DLCO 37% , and mild pulmonary HTN    Depression     Diabetes mellitus     Diabetic retinopathy     Diastolic dysfunction     Diverticulosis     Former smoker     Fracture of lumbar spine     GERD (gastroesophageal reflux disease)     Hernia of unspecified site of abdominal cavity  without mention of obstruction or gangrene     Hyperlipidemia     Hypertension      hypertensive renal    IBS (irritable bowel syndrome)     Mild nonproliferative diabetic retinopathy(362.04) 11/25/2013    Morbid obesity     Nuclear sclerosis 11/25/2013    Osteoporosis     Peripheral edema     Rash     Recurrent upper respiratory infection (URI)     S/P LASIK (laser assisted in situ keratomileusis)     Sleep apnea      sleep apnea uses bipap.    Thyroid disease      on synthroid    TIA (transient ischemic attack)     Urinary tract infection      Past Surgical History   Procedure Laterality Date    Rhinoplasty tip      Tonsillectomy      Cosmetic surgery      Tubal ligation      Cystoscopy      Refractive surgery       mono va//ou//    Adenoidectomy      Hysterectomy       ovaries remain    Hernia repair       5 years old    Gastrectomy      Colonoscopy  03/05/2013     repeat in 5 years    Upper gastrointestinal endoscopy  03/05/2013    Upper gastrointestinal endoscopy  08/24/2016     Dr. Veras, repeat in 8 weeks    Upper gastrointestinal endoscopy  07/21/2016     Dr. Randall    Gastric sleeve      Gastric sleeve      Abdominal surgery      Eye surgery Right      [facility]     Family History   Problem Relation Age of Onset    Heart disease Mother 59    Cancer Mother 59     throat    Allergies Mother     Diabetes Mother     Heart disease Father 70     flutter    Allergies Father     Diabetes Father     Cancer Sister 22     22 thyroid,49  breast    Allergies Sister     Breast cancer Sister     Diabetes Sister     Cancer Brother 62     lung    Diabetes Brother     Asthma Daughter     Diabetes Daughter     Depression Daughter     Asthma Grandchild     Eczema Grandchild     Eczema Grandchild     Breast cancer Maternal Grandmother     Ovarian cancer Cousin     Amblyopia Neg Hx     Blindness Neg Hx     Cataracts Neg Hx     Glaucoma Neg Hx     Hypertension Neg Hx      Macular degeneration Neg Hx     Retinal detachment Neg Hx     Strabismus Neg Hx     Stroke Neg Hx     Thyroid disease Neg Hx     Angioedema Neg Hx     Immunodeficiency Neg Hx     Allergic rhinitis Neg Hx     Atopy Neg Hx     Rhinitis Neg Hx     Urticaria Neg Hx     Colon cancer Neg Hx     Colon polyps Neg Hx     Crohn's disease Neg Hx     Ulcerative colitis Neg Hx     Celiac disease Neg Hx      Social History   Substance Use Topics    Smoking status: Former Smoker     Types: Cigarettes    Smokeless tobacco: Never Used      Comment: quit 30 years ago    Alcohol use No      Comment: rare     Review of Systems   Constitutional: Positive for fever and malaise/fatigue. Negative for weight loss.   HENT: Negative.    Eyes: Negative.    Respiratory: Negative.    Cardiovascular: Negative.    Gastrointestinal: Positive for abdominal pain. Negative for blood in stool.   Neurological: Negative.    Psychiatric/Behavioral: Negative.      OBJECTIVE:     Vital Signs:  Temp:  [98 °F (36.7 °C)-98.6 °F (37 °C)]   Pulse:  [74-89]   Resp:  [17-20]   BP: ()/(46-71)   SpO2:  [96 %-99 %]     Physical Exam   Constitutional: She is oriented to person, place, and time. She appears well-developed and well-nourished. No distress.   HENT:   Head: Normocephalic and atraumatic.   Mouth/Throat: Oropharynx is clear and moist. No oropharyngeal exudate.   Eyes: Conjunctivae and EOM are normal. Pupils are equal, round, and reactive to light. No scleral icterus.   Neck: Normal range of motion. Neck supple.   Cardiovascular: Normal rate, regular rhythm, normal heart sounds and intact distal pulses.    No murmur heard.  Pulmonary/Chest: Effort normal and breath sounds normal. No respiratory distress.   Abdominal: Soft. Bowel sounds are normal. She exhibits no distension and no mass. There is tenderness (Tender in lower abdomen.). There is no rebound and no guarding.   Erythema of lower abdominal wall. Incision appears to be  healing well.   Musculoskeletal: Normal range of motion. She exhibits no edema.   Neurological: She is alert and oriented to person, place, and time.   Skin: Skin is warm and dry. No rash noted. No erythema.   Psychiatric: She has a normal mood and affect. Her behavior is normal. Judgment normal.     Laboratory:  CBC:   Recent Labs  Lab 01/10/17  0550   WBC 9.30   RBC 3.20*   HGB 9.8*   HCT 28.7*   *   MCV 90   MCH 30.7   MCHC 34.2     BMP:   Recent Labs  Lab 01/10/17  0550         K 4.5      CO2 21*   BUN 9   CREATININE 0.8   CALCIUM 8.3*     CT reports and images reviewed.  DIscussed with Radiology.        ASSESSMENT/PLAN:     1. Reaccumulation of subcutaneous fluid collection    Plan: 1. CT guided drain placement into current collection. Discussed with Radiology.  2. Would notify Dr. Becerril. Pt needs follow up with him.  3. No need for General Surgery intervention.

## 2017-01-10 NOTE — SUBJECTIVE & OBJECTIVE
Past Medical History   Diagnosis Date    Allergy      multiple antibiotic allergies    Anemia     Anxiety     Arthritis     Asthma     CHF (congestive heart failure)      NYHA class III     Chronic kidney disease     Colon polyp      benign    COPD (chronic obstructive pulmonary disease)      DLCO 37% , and mild pulmonary HTN    Depression     Diabetes mellitus     Diabetic retinopathy     Diastolic dysfunction     Diverticulosis     Former smoker     Fracture of lumbar spine     GERD (gastroesophageal reflux disease)     Hernia of unspecified site of abdominal cavity without mention of obstruction or gangrene     Hyperlipidemia     Hypertension      hypertensive renal    IBS (irritable bowel syndrome)     Mild nonproliferative diabetic retinopathy(362.04) 11/25/2013    Morbid obesity     Nuclear sclerosis 11/25/2013    Osteoporosis     Peripheral edema     Rash     Recurrent upper respiratory infection (URI)     S/P LASIK (laser assisted in situ keratomileusis)     Sleep apnea      sleep apnea uses bipap.    Thyroid disease      on synthroid    TIA (transient ischemic attack)     Urinary tract infection        Past Surgical History   Procedure Laterality Date    Rhinoplasty tip      Tonsillectomy      Cosmetic surgery      Tubal ligation      Cystoscopy      Refractive surgery       mono va//ou//    Adenoidectomy      Hysterectomy       ovaries remain    Hernia repair       5 years old    Gastrectomy      Colonoscopy  03/05/2013     repeat in 5 years    Upper gastrointestinal endoscopy  03/05/2013    Upper gastrointestinal endoscopy  08/24/2016     Dr. Veras, repeat in 8 weeks    Upper gastrointestinal endoscopy  07/21/2016     Dr. Randall    Gastric sleeve      Gastric sleeve      Abdominal surgery      [facility]       Review of patient's allergies indicates:   Allergen Reactions    Adhesive Other (See Comments)     Blisters    Cleocin  [clindamycin hcl] Hives    Ceclor [cefaclor] Hives    Morphine Nausea And Vomiting    Advair diskus [fluticasone-salmeterol]      Dry mouth    Aleve [naproxen sodium]      Unable to take secondary to kidney function.     Levaquin [levofloxacin] Dermatitis    Macrodantin [nitrofurantoin macrocrystalline] Hives    Motrin [ibuprofen]      Unable to take secondary to kidney functions.    Sulfa (sulfonamide antibiotics)      Hold due to renal problems    Vancomycin analogues Rash     Feet broke out/inflammed blood vessels         No current facility-administered medications on file prior to encounter.      Current Outpatient Prescriptions on File Prior to Encounter   Medication Sig    allopurinol (ZYLOPRIM) 100 MG tablet Take 2 tablets (200 mg total) by mouth nightly.    atenolol (TENORMIN) 25 MG tablet Take 1 tablet (25 mg total) by mouth once daily.    calcitRIOL (ROCALTROL) 0.25 MCG Cap Take 1 capsule by mouth twice a week. Monday Friday    CALCIUM CITRATE/VITAMIN D3 (CALCIUM CITRATE + D ORAL) Take 400 mg by mouth 3 (three) times daily.    cetirizine (ZYRTEC) 10 MG tablet Take 1 tablet (10 mg total) by mouth once daily.    clotrimazole-betamethasone 1-0.05% (LOTRISONE) cream     cyanocobalamin, vitamin B-12, (VITAMIN B-12) 5,000 mcg Subl Place 5,000 mg under the tongue once a week.    diphenoxylate-atropine 2.5-0.025 mg (LOMOTIL) 2.5-0.025 mg per tablet Take 1 tablet by mouth 4 (four) times daily as needed for Diarrhea.    fluoxetine (PROZAC) 20 MG capsule Take 20 mg by mouth once daily.     fluticasone (FLONASE) 50 mcg/actuation nasal spray 2 sprays by Nasal route once daily.     fluticasone-vilanterol (BREO) 100-25 mcg/dose diskus inhaler Inhale 1 puff into the lungs once daily.    insulin aspart (NOVOLOG) 100 unit/mL injection Inject into the skin continuous. Pt has insulin pump    Lactobacillus rhamnosus GG (CULTURELLE) 10 billion cell capsule Take 1 capsule by mouth once daily.     levmefolate-B6 phos-methyl-B12 3-35-2 mg Tab Take 1 tablet by mouth 2 (two) times daily.    levothyroxine (SYNTHROID) 25 MCG tablet Take 1 tablet (25 mcg total) by mouth once daily.    lorazepam (ATIVAN) 0.5 MG tablet Take 1 tablet (0.5 mg total) by mouth every evening. (Patient taking differently: Take 1 mg by mouth every evening. )    losartan (COZAAR) 25 MG tablet Take 1 tablet by mouth once daily.    melatonin 3 mg Tab Take 6 mg by mouth every evening.     metOLazone (ZAROXOLYN) 5 MG tablet Take 1 tablet (5 mg total) by mouth daily as needed (swelling).    montelukast (SINGULAIR) 10 mg tablet Take 10 mg by mouth once daily.     MULTIVIT-IRON-MIN-FOLIC ACID 3,500-18-0.4 UNIT-MG-MG ORAL CHEW Take by mouth 2 (two) times daily.    mupirocin (BACTROBAN) 2 % ointment APPLY OINTMENT TOPICALLY TO AFFECTED AREA ON NOSE THREE TIMES DAILY AS DIRECTED    nystatin (MYCOSTATIN) 100,000 unit/mL suspension Take 4 mLs (400,000 Units total) by mouth 4 (four) times daily.    oxycodone (ROXICODONE) 15 MG Tab Take 15 mg by mouth every 8 (eight) hours as needed for Pain.     pantoprazole (PROTONIX) 40 MG tablet Take 1 tablet (40 mg total) by mouth once daily.    potassium chloride SA (K-DUR,KLOR-CON) 20 MEQ tablet Take 1 tablet (20 mEq total) by mouth 2 (two) times daily.    ranitidine (ZANTAC) 300 MG tablet Take 1 tablet (300 mg total) by mouth every evening.    torsemide (DEMADEX) 20 MG Tab Take 2 tablets (40 mg total) by mouth once daily.    DENOSUMAB (PROLIA SUBQ) Inject into the skin every 6 (six) months.     DIABETIC SUPPLIES,MISCELL (MEDTRONIC REMOTE CONTROL MISC) No Sig Provided    metronidazole (METROGEL) 0.75 % gel Apply topically 2 (two) times daily.     Family History     Problem Relation (Age of Onset)    Allergies Mother, Father, Sister    Asthma Daughter, Grandchild    Breast cancer Sister, Maternal Grandmother    Cancer Mother (59), Sister (22), Brother (62)    Depression Daughter    Diabetes Mother,  Father, Sister, Brother, Daughter    Eczema Grandchild, Grandchild    Heart disease Mother (59), Father (70)    Ovarian cancer Cousin        Social History Main Topics    Smoking status: Former Smoker     Types: Cigarettes    Smokeless tobacco: Never Used      Comment: quit 30 years ago    Alcohol use No      Comment: rare    Drug use: No    Sexual activity: Yes     Birth control/ protection: Surgical     Review of Systems   Constitutional: Positive for chills and fever.   HENT: Negative for congestion and sore throat.    Eyes: Positive for visual disturbance. Negative for photophobia.   Respiratory: Negative for cough and shortness of breath.    Cardiovascular: Negative for palpitations and leg swelling.   Gastrointestinal: Negative for constipation, diarrhea and nausea.   Genitourinary: Negative for difficulty urinating and dyspareunia.   Musculoskeletal: Negative for neck pain and neck stiffness.   Skin: Positive for rash and wound.   Neurological: Positive for light-headedness. Negative for syncope.   Psychiatric/Behavioral: Negative for confusion. The patient is not nervous/anxious.      Objective:     Vital Signs (Most Recent):  Temp: 98.6 °F (37 °C) (01/10/17 0100)  Pulse: 70 (01/10/17 0100)  Resp: 16 (01/10/17 0100)  BP: 122/76 (01/10/17 0100)  SpO2: 97 % (01/10/17 0100) Vital Signs (24h Range):  Temp:  [97.8 °F (36.6 °C)-99 °F (37.2 °C)] 98.6 °F (37 °C)  Pulse:  [70-81] 70  Resp:  [16-20] 16  BP: (115-132)/(52-76) 122/76     Weight: 99.8 kg (220 lb)  Body mass index is 40.24 kg/(m^2).    Physical Exam   Constitutional: She is oriented to person, place, and time. She appears well-developed and well-nourished. No distress.   HENT:   Head: Normocephalic and atraumatic.   Eyes: Right eye exhibits no discharge. Left eye exhibits no discharge. No scleral icterus.   Neck: Neck supple. No JVD present.   Cardiovascular: Normal rate and regular rhythm.    LEFT upper chest device   Pulmonary/Chest: No respiratory  distress. She has wheezes.   Abdominal:   Obese. Suprapubic area erythematous with bulging area which is warm, this bulge is not fluctuant. There is a percutaneous drain to her RIGHT lower quadrant with serous fluid in the collection bag.   Musculoskeletal: She exhibits no edema or tenderness.   Neurological: She is alert and oriented to person, place, and time.   Skin: Skin is warm and dry.   Psychiatric: She has a normal mood and affect. Her behavior is normal.   Nursing note and vitals reviewed.       Significant Labs:   CBC:   Recent Labs  Lab 01/09/17  1554   WBC 11.90   HGB 10.3*   HCT 31.1*   *     CMP:   Recent Labs  Lab 01/09/17  1554      K 4.2      CO2 24   *   BUN 11   CREATININE 0.9   CALCIUM 8.7   PROT 6.7   ALBUMIN 2.5*   BILITOT 0.6   ALKPHOS 101   AST 21   ALT 14   ANIONGAP 10   EGFRNONAA >60       Significant Imaging:     CT abdomen & pelvis with IV and oral contrast:    Impression:           1.  Interval increase in size of large ventral lower abdominal superficial fluid collection when compared to the 12/29/16 examination.  A right-sided percutaneous drain has been placed in the interim and does appear to be within the right superior lateral aspect of the collection.  Recommend checking drain patency and flushing the drain, as needed.  Surrounding cellulitis noted.    2.  No acute intra-abdominal findings identified.

## 2017-01-11 VITALS
HEART RATE: 74 BPM | DIASTOLIC BLOOD PRESSURE: 55 MMHG | HEIGHT: 62 IN | OXYGEN SATURATION: 97 % | SYSTOLIC BLOOD PRESSURE: 112 MMHG | RESPIRATION RATE: 18 BRPM | TEMPERATURE: 99 F | WEIGHT: 220 LBS | BODY MASS INDEX: 40.48 KG/M2

## 2017-01-11 LAB
ALBUMIN SERPL BCP-MCNC: 2.1 G/DL
ALP SERPL-CCNC: 84 U/L
ALT SERPL W/O P-5'-P-CCNC: 11 U/L
ANION GAP SERPL CALC-SCNC: 9 MMOL/L
AST SERPL-CCNC: 18 U/L
BASOPHILS # BLD AUTO: 0 K/UL
BASOPHILS NFR BLD: 0.3 %
BILIRUB SERPL-MCNC: 0.5 MG/DL
BUN SERPL-MCNC: 12 MG/DL
CALCIUM SERPL-MCNC: 8.1 MG/DL
CHLORIDE SERPL-SCNC: 103 MMOL/L
CO2 SERPL-SCNC: 22 MMOL/L
CREAT SERPL-MCNC: 0.8 MG/DL
DIFFERENTIAL METHOD: ABNORMAL
EOSINOPHIL # BLD AUTO: 0.2 K/UL
EOSINOPHIL NFR BLD: 1.8 %
ERYTHROCYTE [DISTWIDTH] IN BLOOD BY AUTOMATED COUNT: 14.6 %
EST. GFR  (AFRICAN AMERICAN): >60 ML/MIN/1.73 M^2
EST. GFR  (NON AFRICAN AMERICAN): >60 ML/MIN/1.73 M^2
GLUCOSE SERPL-MCNC: 174 MG/DL
HCT VFR BLD AUTO: 27.1 %
HGB BLD-MCNC: 9.1 G/DL
LYMPHOCYTES # BLD AUTO: 1.3 K/UL
LYMPHOCYTES NFR BLD: 15.2 %
MCH RBC QN AUTO: 30.5 PG
MCHC RBC AUTO-ENTMCNC: 33.6 %
MCV RBC AUTO: 91 FL
MONOCYTES # BLD AUTO: 0.6 K/UL
MONOCYTES NFR BLD: 7.5 %
NEUTROPHILS # BLD AUTO: 6.2 K/UL
NEUTROPHILS NFR BLD: 75.2 %
PLATELET # BLD AUTO: 416 K/UL
PMV BLD AUTO: 6.5 FL
POCT GLUCOSE: 170 MG/DL (ref 70–110)
POCT GLUCOSE: 198 MG/DL (ref 70–110)
POTASSIUM SERPL-SCNC: 4.1 MMOL/L
PROT SERPL-MCNC: 5.9 G/DL
RBC # BLD AUTO: 2.99 M/UL
SODIUM SERPL-SCNC: 134 MMOL/L
WBC # BLD AUTO: 8.2 K/UL

## 2017-01-11 PROCEDURE — 25000003 PHARM REV CODE 250: Performed by: PHYSICIAN ASSISTANT

## 2017-01-11 PROCEDURE — 85025 COMPLETE CBC W/AUTO DIFF WBC: CPT

## 2017-01-11 PROCEDURE — 36415 COLL VENOUS BLD VENIPUNCTURE: CPT

## 2017-01-11 PROCEDURE — 80053 COMPREHEN METABOLIC PANEL: CPT

## 2017-01-11 RX ORDER — LINEZOLID 600 MG/1
600 TABLET, FILM COATED ORAL EVERY 12 HOURS
Qty: 10 TABLET | Refills: 0 | Status: SHIPPED | OUTPATIENT
Start: 2017-01-11 | End: 2017-01-21

## 2017-01-11 RX ADMIN — POTASSIUM CHLORIDE 20 MEQ: 1500 TABLET, EXTENDED RELEASE ORAL at 08:01

## 2017-01-11 RX ADMIN — FLUOXETINE 20 MG: 20 CAPSULE ORAL at 08:01

## 2017-01-11 RX ADMIN — NYSTATIN 400000 UNITS: 100000 SUSPENSION ORAL at 08:01

## 2017-01-11 RX ADMIN — OXYCODONE HYDROCHLORIDE 15 MG: 5 TABLET ORAL at 09:01

## 2017-01-11 RX ADMIN — MONTELUKAST SODIUM 10 MG: 10 TABLET, FILM COATED ORAL at 08:01

## 2017-01-11 RX ADMIN — LOSARTAN POTASSIUM 25 MG: 25 TABLET, FILM COATED ORAL at 08:01

## 2017-01-11 RX ADMIN — ATENOLOL 25 MG: 25 TABLET ORAL at 08:01

## 2017-01-11 RX ADMIN — LEVOTHYROXINE SODIUM 25 MCG: 25 TABLET ORAL at 05:01

## 2017-01-11 RX ADMIN — TORSEMIDE 40 MG: 20 TABLET ORAL at 08:01

## 2017-01-11 RX ADMIN — PANTOPRAZOLE SODIUM 40 MG: 40 TABLET, DELAYED RELEASE ORAL at 08:01

## 2017-01-11 NOTE — PLAN OF CARE
Problem: Patient Care Overview  Goal: Plan of Care Review  Q 2hour rounding/safety checks utilized. Pt. Needs met. POC discussed with pt. She verbalized understanding to same. New drain placed to left side today. Moderate amount of serosang drainage noted to drainage bag. Cannister kept compressed. Right sided drain has minimal drainage, yellow/clear liquid. Pannus area of abdomen remains red, warm to touch. Pt. States it looks better than it did. Cont. On IV Cubicin. Possible DC home in am. Pain well controlled with po Oxycodone 15mg, which is a home medication. Pt. Remains free from injury. Call light in reach. Bed low and locked. SRx2.

## 2017-01-11 NOTE — DISCHARGE SUMMARY
[facility] Medicine  Discharge Summary      Patient Name: Fifi Mcnair  MRN: [id number]  Admission Date: 1/9/2017  Hospital Length of Stay: 2 days  Discharge Date and Time: 1/11/2017 2:18 PM  Attending Physician: Keena Randall MD   Discharging Provider: Keena Randall MD  Primary Care Provider: Werner Vargas MD      HPI:   Miss Mcnair presents for evaluation of fever. She reports fever and chills for a couple of days. She was recently admitted for abdominal wall abscess. A drain was inserted and she was discharged on oral antibiotics. She reports there has been less drainage and there has been pain and redness to her skin of her lower abdomen. She also reports yellow watery BM. She reports a non-productive cough for the past week or so. She denies sore throat. She reports visual disturbance. She was evaluated by her ophthalmologist who attributed her LEFT eye visual disturbance to cataract. She denies vomiting. She reports generalized weakness. She denies chest pain.    * No surgery found *      Indwelling Lines/Drains at time of discharge:   Lines/Drains/Airways     Drain                 Closed/Suction Drain 11/28/16 0901 Left Abdomen Bulb 15 Fr. 44 days         Closed/Suction Drain 11/28/16 0902 Right Abdomen 15 Fr. 44 days         Closed/Suction Drain 12/29/16 2257 RLQ Accordion 12 days         Closed/Suction Drain 12/30/16 1430 Right Abdomen Accordion 11 days         Closed/Suction Drain 01/10/17 1439 Left Abdomen less than 1 day              Hospital Course:   The patient was admitted to hospital service for management of her malfunctioning drain and surrounding cellulitis. Surgery was consulted for I&D, but, it was not felt warranted at this time. IR was consulted repeat drain placement. She underwent successful placement of a new drain without complication. ID was consulted for antibiotic management. It was felt she can continue oral linezolid. She will follow up with  her plastic surgeon this upcoming Friday the 13th. I have personally spoken to him and given him the details of her hospitalization. He agrees he will see her this Friday.      Consults:   Consults         Status Ordering Provider     Inpatient consult to General Surgery  Once     Provider:  Sreedhar Hill MD    Completed SHELL ENGLAND     Inpatient consult to Infectious Diseases  Once     Provider:  Marcela Hollins MD    Completed TAYLA VANEGAS     Inpatient consult to Interventional Radi  Once     Provider:  (Not yet assigned)    Acknowledged TAYLA VANEGAS          Significant Diagnostic Studies: Labs: All labs within the past 24 hours have been reviewed  Microbiology:   blood and Wound Culture: pending      Pending Diagnostic Studies:     None        Physical Exam:  General- Patient alert and oriented x3 in NAD  HEENT- PERRLA, EOMI, OP clear, MMM  Neck- No JVD, Lymphadenopathy, Thyromegaly  CV- Regular rate and rhythm, No Murmur/callie/rubs  Resp- Lungs CTA Bilaterally, No increased WOB  GI- pannus in lower abdomen has erythema and induration- no fluctuance. Transabdominal surgical scar present. Mid pannus with drain present. Small amount of fluid present bag. R flank with percutaneous drain in place. No active drainage. + thin goldish fluid in bag. Surrounding skin (of old drain) is normal.   Extrem- No cyanosis, clubbing, edema. Pulses 2+ and symmetric  Neuro- Strength 5/5 flexors/extensors. She has spontaneous jerks of her upper extremities/ torso.       Final Active Diagnoses:    Diagnosis Date Noted POA    PRINCIPAL PROBLEM:  Abscess of abdominal wall [L02.211] 12/29/2016 Yes    Thrombocytosis [D47.3] 01/10/2017 Yes    Essential hypertension [I10] 12/29/2016 Yes    PUD (peptic ulcer disease) [K27.9] 12/27/2016 Yes    Obstructive sleep apnea, on CPAP (16), using 100% [G47.33] 01/21/2016 Yes    COPD (chronic obstructive pulmonary disease) [J44.9]  Yes    Controlled  diabetes mellitus type 2 with complications [E11.8] 12/12/2014 Yes    Obesity, Class III, BMI 40-49.9 (morbid obesity) [E66.01] 11/15/2012 Yes    Anemia [D64.9]  Yes    Depressive disorder [F32.9]  Yes    Acquired hypothyroidism [E03.9]  Yes      Problems Resolved During this Admission:    Diagnosis Date Noted Date Resolved POA    Coughing [R05] 12/18/2013 01/11/2017 Yes      * Abscess of abdominal wall  Keep drain outpt. PO linezolid for 10 days. Fu with surgeon in 2 days.       Anemia  Stable. Follow up with pcp/ hemotologist.       Depressive disorder  Continue Fluoxetine.      Acquired hypothyroidism  Continue Levothyroxine.      Obesity, Class III, BMI 40-49.9 (morbid obesity)  Encouraged weight-loss with diet and exercise.      Controlled diabetes mellitus type 2 with complications  Continue insulin pump therapy. Follow up with pcp/ endocrine.       COPD (chronic obstructive pulmonary disease)  Stable. Continue home regimen.       Obstructive sleep apnea, on CPAP (16), using 100%  Continue CPAP      PUD (peptic ulcer disease)  Pantoprazole daily      Essential hypertension  Continue Losartan, Atenolol, Torsemide      Thrombocytosis  Stable. Likely reactive.         Discharged Condition: good    Disposition: Home or Self Care    Follow Up:  Follow-up Information     Follow up with Werner Vargas MD In 1 week.    Specialty:  Family Medicine    Why:  As needed    Contact information:    [address]  [place] [address] [phone]          Patient Instructions:     Diet Diabetic 2000 Calories     Diet Cardiac     Activity as tolerated     Call MD for:  persistent nausea and vomiting or diarrhea     Call MD for:  severe uncontrolled pain     Call MD for:  redness, tenderness, or signs of infection (pain, swelling, redness, odor or green/yellow discharge around incision site)     Call MD for:  difficulty breathing or increased cough     Call MD for:  severe persistent headache     Call MD for:  persistent  dizziness, light-headedness, or visual disturbances     Call MD for:  increased confusion or weakness     Call MD for:  worsening rash       Medications:  Reconciled Home Medications:   Current Discharge Medication List      CONTINUE these medications which have CHANGED    Details   linezolid (ZYVOX) 600 mg Tab Take 1 tablet (600 mg total) by mouth every 12 (twelve) hours.  Qty: 10 tablet, Refills: 0         CONTINUE these medications which have NOT CHANGED    Details   allopurinol (ZYLOPRIM) 100 MG tablet Take 2 tablets (200 mg total) by mouth nightly.  Qty: 180 tablet, Refills: 3      atenolol (TENORMIN) 25 MG tablet Take 1 tablet (25 mg total) by mouth once daily.  Qty: 90 tablet, Refills: 3    Associated Diagnoses: Diastolic dysfunction; CHF (congestive heart failure), NYHA class III, chronic, diastolic      calcitRIOL (ROCALTROL) 0.25 MCG Cap Take 1 capsule by mouth twice a week. Monday Friday      CALCIUM CITRATE/VITAMIN D3 (CALCIUM CITRATE + D ORAL) Take 400 mg by mouth 3 (three) times daily.      cetirizine (ZYRTEC) 10 MG tablet Take 1 tablet (10 mg total) by mouth once daily.  Qty: 1 Bottle, Refills: 5      clotrimazole-betamethasone 1-0.05% (LOTRISONE) cream       cyanocobalamin, vitamin B-12, (VITAMIN B-12) 5,000 mcg Subl Place 5,000 mg under the tongue once a week.      fluoxetine (PROZAC) 20 MG capsule Take 20 mg by mouth once daily.       fluticasone (FLONASE) 50 mcg/actuation nasal spray 2 sprays by Nasal route once daily.       fluticasone-vilanterol (BREO) 100-25 mcg/dose diskus inhaler Inhale 1 puff into the lungs once daily.      insulin aspart (NOVOLOG) 100 unit/mL injection Inject into the skin continuous. Pt has insulin pump      Lactobacillus rhamnosus GG (CULTURELLE) 10 billion cell capsule Take 1 capsule by mouth once daily.      levmefolate-B6 phos-methyl-B12 3-35-2 mg Tab Take 1 tablet by mouth 2 (two) times daily.  Qty: 60 each, Refills: 2      levothyroxine (SYNTHROID) 25 MCG tablet Take  1 tablet (25 mcg total) by mouth once daily.  Qty: 90 tablet, Refills: 3      lorazepam (ATIVAN) 0.5 MG tablet Take 1 tablet (0.5 mg total) by mouth every evening.  Qty: 30 tablet, Refills: 3    Associated Diagnoses: Sleep initiation disorder      losartan (COZAAR) 25 MG tablet Take 1 tablet by mouth once daily.      melatonin 3 mg Tab Take 6 mg by mouth every evening.       metOLazone (ZAROXOLYN) 5 MG tablet Take 1 tablet (5 mg total) by mouth daily as needed (swelling).  Qty: 30 tablet, Refills: 11      montelukast (SINGULAIR) 10 mg tablet Take 10 mg by mouth once daily.       MULTIVIT-IRON-MIN-FOLIC ACID 3,500-18-0.4 UNIT-MG-MG ORAL CHEW Take by mouth 2 (two) times daily.      mupirocin (BACTROBAN) 2 % ointment APPLY OINTMENT TOPICALLY TO AFFECTED AREA ON NOSE THREE TIMES DAILY AS DIRECTED  Qty: 1 Tube, Refills: 11      nystatin (MYCOSTATIN) 100,000 unit/mL suspension Take 4 mLs (400,000 Units total) by mouth 4 (four) times daily.  Qty: 224 mL, Refills: 0      oxycodone (ROXICODONE) 15 MG Tab Take 15 mg by mouth every 8 (eight) hours as needed for Pain.       pantoprazole (PROTONIX) 40 MG tablet Take 1 tablet (40 mg total) by mouth once daily.  Qty: 90 tablet, Refills: 3      potassium chloride SA (K-DUR,KLOR-CON) 20 MEQ tablet Take 1 tablet (20 mEq total) by mouth 2 (two) times daily.  Qty: 180 tablet, Refills: 3    Associated Diagnoses: Hypokalemia      ranitidine (ZANTAC) 300 MG tablet Take 1 tablet (300 mg total) by mouth every evening.  Qty: 30 tablet, Refills: 5    Associated Diagnoses: Gastroesophageal reflux disease without esophagitis      torsemide (DEMADEX) 20 MG Tab Take 2 tablets (40 mg total) by mouth once daily.  Qty: 60 tablet, Refills: 0      DENOSUMAB (PROLIA SUBQ) Inject into the skin every 6 (six) months.       DIABETIC SUPPLIES,MISCELL (MEDTRONIC REMOTE CONTROL MISC) No Sig Provided      metronidazole (METROGEL) 0.75 % gel Apply topically 2 (two) times daily.  Qty: 45 g, Refills: 1     Associated Diagnoses: Rosacea         STOP taking these medications       diphenoxylate-atropine 2.5-0.025 mg (LOMOTIL) 2.5-0.025 mg per tablet Comments:   Reason for Stopping:             Time spent on the discharge of patient: 45 minutes    Keena Randall MD  Department of Hospital Medicine  [facility]

## 2017-01-11 NOTE — PLAN OF CARE
01/11/17 1201   Final Note   Assessment Type Final Discharge Note   Discharge Disposition Home   Discharge planning education complete? Yes

## 2017-01-11 NOTE — PLAN OF CARE
Problem: Patient Care Overview  Goal: Plan of Care Review  Outcome: Ongoing (interventions implemented as appropriate)  Pt denied pain this shift, VSS and noted, cont fluids infusing, absent from injury, accu checks WDL nad noted will cont to monitor

## 2017-01-11 NOTE — NURSING
Pt discharge instructions given to pt. And pt stated understanding. piv removed. Vss. Pt discharged with accordion drain and teaching reinforced of drain care. Pt demonstrated understanding. F/u appts encouraged. Discharge instructions sent home with pt. Pt discharged to home. Pt escorted off floor via wheel chair  By transport attendant.

## 2017-01-11 NOTE — DISCHARGE INSTRUCTIONS
Thank you for choosing [facility] for your medical care. The primary doctor who is taking care of you at the time of your discharge is Keena Randall MD.     You were admitted to the hospital with Abscess of abdominal wall.     Please note your discharge instructions, including diet/activity restrictions, follow-up appointments, and medication changes.  If you have any questions about your medical issues, prescriptions, or any other questions, please feel free to contact the [facility] Medicine Dept at [phone] and we will help.    If you are previously with Home health, outpatient PT/OT or under a therapy program, you are cleared to return to those programs.    Please direct all long term medication refills and follow up to your primary care provider, Werner Vargas MD. Thank you again for letting us take care of your health care needs.    Please note the following discharge instructions per your discharging physician-  Keep your appointment with Dr. Becerril on Friday. Take all medications as prescribed.

## 2017-01-11 NOTE — PROGRESS NOTES
"Spoke with pt regarding recommended discharge follow up with Dr. Vargas "in one week, as needed."  Pt stated that it takes 6 months to see him and that she had an appointment today with his NP however had to cancel it.  Pt denied wanting me to make the appointment stating that she would call herself.  Pt reports having an appointment with her surgeon, Dr. Day this Friday, 1-13-17 @ 9:16 a.m. In [place].  Pt's nurse and a friend were in the room as we spoke.  "

## 2017-01-13 ENCOUNTER — OFFICE VISIT (OUTPATIENT)
Dept: PLASTIC SURGERY | Facility: CLINIC | Age: 67
End: 2017-01-13
Payer: MEDICARE

## 2017-01-13 ENCOUNTER — PATIENT OUTREACH (OUTPATIENT)
Dept: ADMINISTRATIVE | Facility: CLINIC | Age: 67
End: 2017-01-13
Payer: MEDICARE

## 2017-01-13 DIAGNOSIS — Z09 SURGERY FOLLOW-UP EXAMINATION: Primary | ICD-10-CM

## 2017-01-13 PROCEDURE — 10140 I&D HMTMA SEROMA/FLUID COLLJ: CPT | Mod: 58,S$GLB,, | Performed by: SURGERY

## 2017-01-13 PROCEDURE — 99024 POSTOP FOLLOW-UP VISIT: CPT | Mod: S$GLB,,, | Performed by: SURGERY

## 2017-01-13 PROCEDURE — 99999 PR PBB SHADOW E&M-EST. PATIENT-LVL I: CPT | Mod: PBBFAC,,, | Performed by: SURGERY

## 2017-01-13 NOTE — PROGRESS NOTES
Ms. Mcnair presents.  She has a significant history of having multiple   admissions secondary to seromas to the hospital.  According to the record, she   did grew out a few colonies what I believe is staph.  Her drainage today, she   has no purulence draining, but she swollen, so we took her to the minor surgery   room after proper informed consent, prepped and draped, numbed her up and opened   a small amount of the incision.  She had loculated areas of a seroma, this was   all cleaned out fresh drain was placed, the old drain was removed.  She was   closed using 3-0 nylon.  She is on appropriate antibiotics from her Infectious   Disease doctor, we will see her back next week.      QUIQUE  dd: 01/13/2017 13:06:15 (CST)  td: 01/14/2017 12:27:20 (CST)  Doc ID   #[id number]  Job ID #[id number]    CC:

## 2017-01-13 NOTE — PATIENT INSTRUCTIONS
Abscess (Incision & Drainage)  An abscess (sometimes called a boil) occurs when bacteria get trapped under the skin and begin to grow. Pus forms inside the abscess as the body responds to the bacteria. An abscess can occur with an insect bite, ingrown hair, blocked oil gland, pimple, cyst, or puncture wound.  Treatment of your abscess has required an incision to drain the pus. If the abscess pocket was large, a gauze packing may have been inserted. This will need to be removed and possibly replaced on your next visit. Antibiotics are not needed in the treatment of a simple abscess, unless the infection is spreading into the skin around the wound (cellulitis).  Healing of the wound will take about 1 to 2 weeks, depending on the size of the abscess. Healthy tissue will grow from the bottom and sides of the opening until it seals over.  Home care  The following home care guidelines can help your wound heal:  · The wound may drain for the first 2 days. Cover the wound with a clean dry dressing. If the dressing becomes soaked with blood or pus, change it.  · If a gauze packing was placed inside the abscess cavity, you may be told to remove it yourself. You may do this in the shower. Once the packing is removed, you should wash the area in the shower or bath 3 to 4 times a day, until the skin opening has closed. Make sure you wash your hands after changing the packing or cleaning the wound.  · If you were prescribed antibiotics, take them as directed until they are all gone.  · You may use acetaminophen or ibuprofen to control pain, unless another pain medicine was prescribed. If you have liver disease or ever had a stomach ulcer, talk with your doctor before using these medicines.  Follow-up care  Follow up with your doctor as advised by our staff. If a gauze packing was inserted in your wound, it should be removed in 1 to 2 days. Check your wound every day for the signs of worsening infection listed below.  When to  seek medical advice  Call your healthcare provider right away if any of the following occur:  · Increasing redness or swelling  · Red streaks in the skin leading away from the wound  · Increasing local pain or swelling  · Continued pus draining from the wound 2 days after treatment  · Fever of 100.4ºF (38ºC) or higher, or as directed by your healthcare provider  · Boil returns when you are at home  © 7555-1805 [employer]. [address] [place] [address], [place] [address]. All rights reserved. This information is not intended as a substitute for professional medical care. Always follow your healthcare professional's instructions.

## 2017-01-14 LAB — BACTERIA SPEC AEROBE CULT: NO GROWTH

## 2017-01-15 LAB — BACTERIA BLD CULT: NORMAL

## 2017-01-16 RX ORDER — TORSEMIDE 20 MG/1
TABLET ORAL
Qty: 60 TABLET | Refills: 8 | Status: SHIPPED | OUTPATIENT
Start: 2017-01-16 | End: 2017-01-23 | Stop reason: SDUPTHER

## 2017-01-20 ENCOUNTER — OFFICE VISIT (OUTPATIENT)
Dept: PLASTIC SURGERY | Facility: CLINIC | Age: 67
End: 2017-01-20
Payer: MEDICARE

## 2017-01-20 ENCOUNTER — DOCUMENTATION ONLY (OUTPATIENT)
Dept: FAMILY MEDICINE | Facility: CLINIC | Age: 67
End: 2017-01-20

## 2017-01-20 DIAGNOSIS — Z09 SURGERY FOLLOW-UP EXAMINATION: Primary | ICD-10-CM

## 2017-01-20 PROCEDURE — 99024 POSTOP FOLLOW-UP VISIT: CPT | Mod: S$GLB,,, | Performed by: SURGERY

## 2017-01-20 NOTE — PROGRESS NOTES
Pre-Visit Chart Review  For Appointment Scheduled on 1/23/17    Health Maintenance Due   Topic Date Due    Lipid Panel  12/03/2016

## 2017-01-20 NOTE — PROGRESS NOTES
Fifi Mcnair is doing well today after having drain placed in abdomen last Friday.     Incision healing, no infection. No erythema or incisional drainage.     States she is feeling really good. Will leave drain in and have her see us back next Friday.     All questions were answered.

## 2017-01-23 ENCOUNTER — OFFICE VISIT (OUTPATIENT)
Dept: FAMILY MEDICINE | Facility: CLINIC | Age: 67
End: 2017-01-23
Payer: MEDICARE

## 2017-01-23 ENCOUNTER — LAB VISIT (OUTPATIENT)
Dept: LAB | Facility: HOSPITAL | Age: 67
End: 2017-01-23
Attending: NURSE PRACTITIONER
Payer: MEDICARE

## 2017-01-23 VITALS
TEMPERATURE: 98 F | DIASTOLIC BLOOD PRESSURE: 62 MMHG | BODY MASS INDEX: 39.39 KG/M2 | HEIGHT: 62 IN | HEART RATE: 77 BPM | WEIGHT: 214.06 LBS | SYSTOLIC BLOOD PRESSURE: 125 MMHG

## 2017-01-23 DIAGNOSIS — E11.59 HYPERTENSION ASSOCIATED WITH DIABETES: ICD-10-CM

## 2017-01-23 DIAGNOSIS — I15.2 HYPERTENSION ASSOCIATED WITH DIABETES: ICD-10-CM

## 2017-01-23 DIAGNOSIS — E11.8 CONTROLLED TYPE 2 DIABETES MELLITUS WITH COMPLICATION, WITH LONG-TERM CURRENT USE OF INSULIN: ICD-10-CM

## 2017-01-23 DIAGNOSIS — Z09 HOSPITAL DISCHARGE FOLLOW-UP: ICD-10-CM

## 2017-01-23 DIAGNOSIS — Z09 HOSPITAL DISCHARGE FOLLOW-UP: Primary | ICD-10-CM

## 2017-01-23 DIAGNOSIS — L02.211 ABDOMINAL WALL ABSCESS: ICD-10-CM

## 2017-01-23 DIAGNOSIS — Z79.4 CONTROLLED TYPE 2 DIABETES MELLITUS WITH COMPLICATION, WITH LONG-TERM CURRENT USE OF INSULIN: ICD-10-CM

## 2017-01-23 DIAGNOSIS — G47.09 SLEEP INITIATION DISORDER: ICD-10-CM

## 2017-01-23 LAB
BASOPHILS # BLD AUTO: 0.02 K/UL
BASOPHILS NFR BLD: 0.2 %
DIFFERENTIAL METHOD: ABNORMAL
EOSINOPHIL # BLD AUTO: 0.3 K/UL
EOSINOPHIL NFR BLD: 2.9 %
ERYTHROCYTE [DISTWIDTH] IN BLOOD BY AUTOMATED COUNT: 15.4 %
HCT VFR BLD AUTO: 33.6 %
HGB BLD-MCNC: 10.8 G/DL
LYMPHOCYTES # BLD AUTO: 2.4 K/UL
LYMPHOCYTES NFR BLD: 25.4 %
MCH RBC QN AUTO: 29.3 PG
MCHC RBC AUTO-ENTMCNC: 32.1 %
MCV RBC AUTO: 91 FL
MONOCYTES # BLD AUTO: 0.6 K/UL
MONOCYTES NFR BLD: 6.7 %
NEUTROPHILS # BLD AUTO: 5.9 K/UL
NEUTROPHILS NFR BLD: 63.5 %
PLATELET # BLD AUTO: 393 K/UL
PMV BLD AUTO: 9.2 FL
RBC # BLD AUTO: 3.68 M/UL
WBC # BLD AUTO: 9.24 K/UL

## 2017-01-23 PROCEDURE — 99999 PR PBB SHADOW E&M-EST. PATIENT-LVL III: CPT | Mod: PBBFAC,,, | Performed by: NURSE PRACTITIONER

## 2017-01-23 PROCEDURE — 85025 COMPLETE CBC W/AUTO DIFF WBC: CPT

## 2017-01-23 PROCEDURE — 80053 COMPREHEN METABOLIC PANEL: CPT

## 2017-01-23 PROCEDURE — 36415 COLL VENOUS BLD VENIPUNCTURE: CPT | Mod: PO

## 2017-01-23 NOTE — PROGRESS NOTES
Subjective:       Patient ID: Fifi Mcnair is a 66 y.o. female.    Chief Complaint: Transitional Care (abdominal wall abscess)    HPI Comments: Ms. Mcnair presents to the clinic today for transitional care for abdominal wall abscess.  She was hospitalized at [facility] from 12/29-1/3 for and again 1/9-1/11 for abdominal abscess.  She had a pannectomy with Dr. Becerril in November.  She states the drain stopped working and she went to see Dr. Becerril's PA who pulled the drain because it was almost already out.  She states she then became weak and passed out several days later at her grandson's doctor's office.  She was revived with fluids by EMS and she states her blood pressure was 50's/30's.  She had fever 103.  She was taking oral antibiotics at the time.  She required IV antibiotics and another drain was placed.  She was discharged and this drain stopped working as well so she went back to the hospital and had yet another drain placed.  She was discharged again and has seen Dr. Becerril in the office.  Dr. Becerril incised the abscessed area, cleaned this out.  She has sutures to the abdomen and will follow up again this Friday.  She has not had fevers since this past discharge.  No new problems.  She is feeling much better since the area was opened up by Dr. Becerril.       Family and/or Caretaker present at visit?  No.  Diagnostic tests reviewed/disposition: No diagnosic tests pending after this hospitalization.  Disease/illness education: abscess  Home health/community services discussion/referrals: Patient does not have home health established from hospital visit.  They do not need home health.  If needed, we will set up home health for the patient.   Establishment or re-establishment of referral orders for community resources: No other necessary community resources.   Discussion with other health care providers: No discussion with other health care providers necessary.         Review of Systems    Constitutional: Negative for chills, fatigue and fever.   HENT: Negative for congestion, ear pain and sinus pressure.    Respiratory: Negative for cough, shortness of breath and wheezing.    Cardiovascular: Negative for chest pain, palpitations and leg swelling.   Gastrointestinal: Negative for abdominal pain, constipation, diarrhea, nausea and vomiting.   Skin: Positive for wound. Negative for rash.       Objective:      Physical Exam   Constitutional: She is oriented to person, place, and time. She appears well-nourished. No distress.   HENT:   Head: Normocephalic and atraumatic.   Right Ear: External ear normal.   Left Ear: External ear normal.   Mouth/Throat: Oropharynx is clear and moist. No oropharyngeal exudate.   Eyes: Pupils are equal, round, and reactive to light. Right eye exhibits no discharge. Left eye exhibits no discharge.   Neck: Neck supple. No thyromegaly present.   Cardiovascular: Normal rate and regular rhythm.  Exam reveals no gallop and no friction rub.    No murmur heard.  Pulmonary/Chest: Effort normal and breath sounds normal. No respiratory distress. She has no wheezes. She has no rales.   Abdominal: Soft. She exhibits no distension. There is no tenderness.   Lymphadenopathy:     She has no cervical adenopathy.   Neurological: She is alert and oriented to person, place, and time. Coordination normal.   Skin: Skin is warm and dry.   Abdominal incision with 10 sutures,  without redness or surrounding cellulitis.  No visible drainage to site.  SIENA drain to right abdomen with dressing CDI.  This is taped to leg with paper tape.  Left abdominal wall with one suture.   Psychiatric: She has a normal mood and affect. Her behavior is normal. Thought content normal.   Vitals reviewed.          Current Outpatient Prescriptions:     allopurinol (ZYLOPRIM) 100 MG tablet, Take 2 tablets (200 mg total) by mouth nightly., Disp: 180 tablet, Rfl: 3    atenolol (TENORMIN) 25 MG tablet, Take 1 tablet (25 mg  total) by mouth once daily., Disp: 90 tablet, Rfl: 3    calcitRIOL (ROCALTROL) 0.25 MCG Cap, Take 1 capsule by mouth twice a week. Monday Friday, Disp: , Rfl:     CALCIUM CITRATE/VITAMIN D3 (CALCIUM CITRATE + D ORAL), Take 400 mg by mouth 3 (three) times daily., Disp: , Rfl:     cetirizine (ZYRTEC) 10 MG tablet, Take 1 tablet (10 mg total) by mouth once daily., Disp: 1 Bottle, Rfl: 5    clotrimazole-betamethasone 1-0.05% (LOTRISONE) cream, , Disp: , Rfl:     cyanocobalamin, vitamin B-12, (VITAMIN B-12) 5,000 mcg Subl, Place 5,000 mg under the tongue once a week., Disp: , Rfl:     DENOSUMAB (PROLIA SUBQ), Inject into the skin every 6 (six) months. , Disp: , Rfl:     DIABETIC SUPPLIES,MISCELL ([employer] REMOTE CONTROL MISC), No Sig Provided, Disp: , Rfl:     fluoxetine (PROZAC) 20 MG capsule, Take 20 mg by mouth once daily. , Disp: , Rfl:     fluticasone (FLONASE) 50 mcg/actuation nasal spray, 2 sprays by Nasal route once daily. , Disp: , Rfl:     fluticasone-vilanterol (BREO) 100-25 mcg/dose diskus inhaler, Inhale 1 puff into the lungs once daily., Disp: , Rfl:     insulin aspart (NOVOLOG) 100 unit/mL injection, Inject into the skin continuous. Pt has insulin pump, Disp: , Rfl:     Lactobacillus rhamnosus GG (CULTURELLE) 10 billion cell capsule, Take 1 capsule by mouth once daily., Disp: , Rfl:     levmefolate-B6 phos-methyl-B12 3-35-2 mg Tab, Take 1 tablet by mouth 2 (two) times daily., Disp: 60 each, Rfl: 2    levothyroxine (SYNTHROID) 25 MCG tablet, Take 1 tablet (25 mcg total) by mouth once daily., Disp: 90 tablet, Rfl: 3    lorazepam (ATIVAN) 0.5 MG tablet, Take 1 tablet (0.5 mg total) by mouth every evening. (Patient taking differently: Take 1 mg by mouth every evening. ), Disp: 30 tablet, Rfl: 3    losartan (COZAAR) 25 MG tablet, Take 1 tablet by mouth once daily., Disp: , Rfl:     melatonin 3 mg Tab, Take 6 mg by mouth every evening. , Disp: , Rfl:     metOLazone (ZAROXOLYN) 5 MG tablet,  Take 1 tablet (5 mg total) by mouth daily as needed (swelling)., Disp: 30 tablet, Rfl: 11    metronidazole (METROGEL) 0.75 % gel, Apply topically 2 (two) times daily., Disp: 45 g, Rfl: 1    montelukast (SINGULAIR) 10 mg tablet, Take 10 mg by mouth once daily. , Disp: , Rfl:     MULTIVIT-IRON-MIN-FOLIC ACID 3,500-18-0.4 UNIT-MG-MG ORAL CHEW, Take by mouth 2 (two) times daily., Disp: , Rfl:     mupirocin (BACTROBAN) 2 % ointment, APPLY OINTMENT TOPICALLY TO AFFECTED AREA ON NOSE THREE TIMES DAILY AS DIRECTED, Disp: 1 Tube, Rfl: 11    oxycodone (ROXICODONE) 15 MG Tab, Take 15 mg by mouth every 8 (eight) hours as needed for Pain. , Disp: , Rfl:     pantoprazole (PROTONIX) 40 MG tablet, Take 1 tablet (40 mg total) by mouth once daily., Disp: 90 tablet, Rfl: 3    potassium chloride SA (K-DUR,KLOR-CON) 20 MEQ tablet, Take 1 tablet (20 mEq total) by mouth 2 (two) times daily., Disp: 180 tablet, Rfl: 3    ranitidine (ZANTAC) 300 MG tablet, Take 1 tablet (300 mg total) by mouth every evening., Disp: 30 tablet, Rfl: 5    torsemide (DEMADEX) 20 MG Tab, Take 2 tablets (40 mg total) by mouth once daily., Disp: 60 tablet, Rfl: 0  Assessment:       1. Hospital discharge follow-up    2. Hypertension associated with diabetes    3. Controlled type 2 diabetes mellitus with complication, with long-term current use of insulin    4. Abdominal wall abscess        Plan:      Hospital discharge follow-up  -     Comprehensive metabolic panel; Future; Expected date: 1/23/17  -     CBC auto differential; Future; Expected date: 1/23/17    Hypertension associated with diabetes  Patient readiness: acceptance and barriers:none    During the course of the visit the patient was educated and counseled about the following:     Diabetes:  Discussed general issues about diabetes pathophysiology and management.  Encouraged aerobic exercise.  Hypertension:   Dietary sodium restriction.  Regular aerobic exercise.    Goals: Diabetes: Maintain  Hemoglobin A1C below 7 and Hypertension: Reduce Blood Pressure    Did patient meet goals/outcomes: Yes    The following self management tools provided: declined    Patient Instructions (the written plan) was given to the patient/family.     Time spent with patient: 30 minutes    Controlled type 2 diabetes mellitus with complication, with long-term current use of insulin    Abdominal wall abscess  Healing well, with SIENA drain to right abdominal wall that is still draining.  Follow up Dr. Becerril.

## 2017-01-23 NOTE — MR AVS SNAPSHOT
[facility]  [address]  [place] [address]  Phone: [phone]  Fax: [phone]                  Fifi Mcnair   2017 2:40 PM   Office Visit    Description:  Female : 1950   Provider:  MOIZ Maurer   Department:  [facility]           Reason for Visit     Transitional Care           Diagnoses this Visit        Comments    Hospital discharge follow-up    -  Primary            To Do List           Future Appointments        Provider Department Dept Phone    2017 3:45 PM LAB, [facility] - Lab [phone]    2017 9:30 AM Christiano Becerril MD [facility] Plastic Surgery [phone]    2017 1:00 PM LAB, [facility] [phone]    2017 4:00 PM Werner Vargas MD [facility] [phone]    2017 1:00 PM Gabriela Green MD [facility] Hematology Oncology [phone]      Goals (5 Years of Data)     None      Follow-Up and Disposition     Return if symptoms worsen or fail to improve, for labs now.      [facility]     [facility] Care [facility] - 24/7 Assistance  Registered nurses in the [facility] provide clinical advisement, health education, appointment booking, and other advisory services.  Call for this free service at [phone].             Medications           Message regarding Medications     Verify the changes and/or additions to your medication regime listed below are the same as discussed with your clinician today.  If any of these changes or additions are incorrect, please notify your healthcare provider.             Verify that the below list of medications is an accurate representation of the medications you are currently taking.  If none reported, the list may be blank. If incorrect, please contact your healthcare provider. Carry this list with you in case of emergency.           Current Medications     allopurinol  (ZYLOPRIM) 100 MG tablet Take 2 tablets (200 mg total) by mouth nightly.    atenolol (TENORMIN) 25 MG tablet Take 1 tablet (25 mg total) by mouth once daily.    calcitRIOL (ROCALTROL) 0.25 MCG Cap Take 1 capsule by mouth twice a week. Monday Friday    CALCIUM CITRATE/VITAMIN D3 (CALCIUM CITRATE + D ORAL) Take 400 mg by mouth 3 (three) times daily.    cetirizine (ZYRTEC) 10 MG tablet Take 1 tablet (10 mg total) by mouth once daily.    clotrimazole-betamethasone 1-0.05% (LOTRISONE) cream     cyanocobalamin, vitamin B-12, (VITAMIN B-12) 5,000 mcg Subl Place 5,000 mg under the tongue once a week.    DENOSUMAB (PROLIA SUBQ) Inject into the skin every 6 (six) months.     DIABETIC SUPPLIES,MISCELL ([employer] REMOTE CONTROL MISC) No Sig Provided    fluoxetine (PROZAC) 20 MG capsule Take 20 mg by mouth once daily.     fluticasone (FLONASE) 50 mcg/actuation nasal spray 2 sprays by Nasal route once daily.     fluticasone-vilanterol (BREO) 100-25 mcg/dose diskus inhaler Inhale 1 puff into the lungs once daily.    insulin aspart (NOVOLOG) 100 unit/mL injection Inject into the skin continuous. Pt has insulin pump    Lactobacillus rhamnosus GG (CULTURELLE) 10 billion cell capsule Take 1 capsule by mouth once daily.    levmefolate-B6 phos-methyl-B12 3-35-2 mg Tab Take 1 tablet by mouth 2 (two) times daily.    levothyroxine (SYNTHROID) 25 MCG tablet Take 1 tablet (25 mcg total) by mouth once daily.    lorazepam (ATIVAN) 0.5 MG tablet Take 1 tablet (0.5 mg total) by mouth every evening.    losartan (COZAAR) 25 MG tablet Take 1 tablet by mouth once daily.    melatonin 3 mg Tab Take 6 mg by mouth every evening.     metOLazone (ZAROXOLYN) 5 MG tablet Take 1 tablet (5 mg total) by mouth daily as needed (swelling).    metronidazole (METROGEL) 0.75 % gel Apply topically 2 (two) times daily.    montelukast (SINGULAIR) 10 mg tablet Take 10 mg by mouth once daily.     MULTIVIT-IRON-MIN-FOLIC ACID 3,500-18-0.4 UNIT-MG-MG ORAL CHEW Take by  "mouth 2 (two) times daily.    mupirocin (BACTROBAN) 2 % ointment APPLY OINTMENT TOPICALLY TO AFFECTED AREA ON NOSE THREE TIMES DAILY AS DIRECTED    oxycodone (ROXICODONE) 15 MG Tab Take 15 mg by mouth every 8 (eight) hours as needed for Pain.     pantoprazole (PROTONIX) 40 MG tablet Take 1 tablet (40 mg total) by mouth once daily.    potassium chloride SA (K-DUR,KLOR-CON) 20 MEQ tablet Take 1 tablet (20 mEq total) by mouth 2 (two) times daily.    ranitidine (ZANTAC) 300 MG tablet Take 1 tablet (300 mg total) by mouth every evening.    torsemide (DEMADEX) 20 MG Tab Take 2 tablets (40 mg total) by mouth once daily.           Clinical Reference Information           Vital Signs - Last Recorded  Most recent update: 1/23/2017  2:42 PM by Luanne Mccord MA    BP Pulse Temp Ht Wt LMP    125/62 (BP Location: Right arm, Patient Position: Sitting, BP Method: Automatic) 77 98.3 °F (36.8 °C) (Oral) 5' 2" (1.575 m) 97.1 kg (214 lb 1.1 oz) (LMP Unknown)    BMI                39.15 kg/m2          Blood Pressure          Most Recent Value    BP  125/62      Allergies as of 1/23/2017     Adhesive    Cleocin [Clindamycin Hcl]    Ceclor [Cefaclor]    Morphine    Advair Diskus [Fluticasone-salmeterol]    Aleve [Naproxen Sodium]    Levaquin [Levofloxacin]    Macrodantin [Nitrofurantoin Macrocrystalline]    Motrin [Ibuprofen]    Sulfa (Sulfonamide Antibiotics)    Remeron [Mirtazapine]    Vancomycin Analogues      Immunizations Administered on Date of Encounter - 1/23/2017     None      Orders Placed During Today's Visit     Future Labs/Procedures Expected by Expires    CBC auto differential  1/23/2017 3/24/2018    Comprehensive metabolic panel  1/23/2017 3/24/2018      "

## 2017-01-23 NOTE — TELEPHONE ENCOUNTER
Patient states she does not want to take Remeron because it caused palpitations, jitters.  She wants to take Ativan 1 mg nightly like she used to instead of 0.5 mg nightly.

## 2017-01-24 LAB
ALBUMIN SERPL BCP-MCNC: 3.1 G/DL
ALP SERPL-CCNC: 87 U/L
ALT SERPL W/O P-5'-P-CCNC: 14 U/L
ANION GAP SERPL CALC-SCNC: 10 MMOL/L
AST SERPL-CCNC: 20 U/L
BILIRUB SERPL-MCNC: 0.3 MG/DL
BUN SERPL-MCNC: 16 MG/DL
CALCIUM SERPL-MCNC: 8.5 MG/DL
CHLORIDE SERPL-SCNC: 101 MMOL/L
CO2 SERPL-SCNC: 26 MMOL/L
CREAT SERPL-MCNC: 1.1 MG/DL
EST. GFR  (AFRICAN AMERICAN): >60 ML/MIN/1.73 M^2
EST. GFR  (NON AFRICAN AMERICAN): 52.4 ML/MIN/1.73 M^2
GLUCOSE SERPL-MCNC: 187 MG/DL
POTASSIUM SERPL-SCNC: 3.7 MMOL/L
PROT SERPL-MCNC: 7.2 G/DL
SODIUM SERPL-SCNC: 137 MMOL/L

## 2017-01-24 RX ORDER — LORAZEPAM 0.5 MG/1
0.5 TABLET ORAL NIGHTLY
Qty: 30 TABLET | Refills: 3 | Status: SHIPPED | OUTPATIENT
Start: 2017-01-24 | End: 2017-04-12

## 2017-01-27 ENCOUNTER — OFFICE VISIT (OUTPATIENT)
Dept: PLASTIC SURGERY | Facility: CLINIC | Age: 67
End: 2017-01-27
Payer: MEDICARE

## 2017-01-27 DIAGNOSIS — Z09 SURGERY FOLLOW-UP EXAMINATION: Primary | ICD-10-CM

## 2017-01-27 PROCEDURE — 99024 POSTOP FOLLOW-UP VISIT: CPT | Mod: S$GLB,,, | Performed by: SURGERY

## 2017-01-27 NOTE — PROGRESS NOTES
Ms. Mcnair presents today.  She has done really well.  She has 15 mL in her   drain this morning.  She had 20 mL the entire day yesterday and the day before,   it was about 25 mL  No sign of infection.  She is looking good.  The drain has   been in two weeks.  We went ahead and removed the drain.  She will see us next   week.      AMAURI/VINCE  dd: 01/27/2017 10:39:54 (CST)  td: 01/27/2017 18:30:35 (CST)  Doc ID   #[id number]  Job ID #[id number]    CC:

## 2017-02-03 ENCOUNTER — OFFICE VISIT (OUTPATIENT)
Dept: PLASTIC SURGERY | Facility: CLINIC | Age: 67
End: 2017-02-03
Payer: MEDICARE

## 2017-02-03 DIAGNOSIS — Z09 SURGERY FOLLOW-UP EXAMINATION: Primary | ICD-10-CM

## 2017-02-03 PROCEDURE — 99024 POSTOP FOLLOW-UP VISIT: CPT | Mod: S$GLB,,, | Performed by: SURGERY

## 2017-02-03 NOTE — PROGRESS NOTES
Ms. Mcnair presents to the [facility] after a panniculectomy.  She   had difficulty with a chronic seroma.  She has had several drains.  She presents   today.  She looks good.  She has one pinpoint area measuring about 1.5 x 1.5 cm   of redness around the old drain site.  A Q-tip was used to basically to open   that tract.  The patient says it has stopped draining on Tuesday.  I elicited no   fluid from this.  We have placed her on Bactrim.  We will go ahead and see her   next week.      AMAURI/VINCE  dd: 02/03/2017 10:07:43 (CST)  td: 02/03/2017 14:00:23 (CST)  Doc ID   #[id number]  Job ID #[id number]    CC:

## 2017-02-09 PROCEDURE — 99495 TRANSJ CARE MGMT MOD F2F 14D: CPT | Mod: S$GLB,,, | Performed by: NURSE PRACTITIONER

## 2017-02-10 ENCOUNTER — OFFICE VISIT (OUTPATIENT)
Dept: PLASTIC SURGERY | Facility: CLINIC | Age: 67
End: 2017-02-10
Payer: MEDICARE

## 2017-02-10 DIAGNOSIS — Z09 SURGERY FOLLOW-UP EXAMINATION: Primary | ICD-10-CM

## 2017-02-10 PROCEDURE — 99024 POSTOP FOLLOW-UP VISIT: CPT | Mod: S$GLB,,, | Performed by: SURGERY

## 2017-02-10 NOTE — PROGRESS NOTES
Ms. Mcnair presents to [facility] after having a panniculectomy.    She is all healed.  She is very happy.  We will go ahead and see her in two   months.      AMAURI/VINCE  dd: 02/10/2017 09:36:00 (CST)  td: 02/10/2017 10:16:10 (CST)  Doc ID   #[id number]  Job ID #[id number]    CC:

## 2017-02-16 DIAGNOSIS — K21.9 GASTROESOPHAGEAL REFLUX DISEASE WITHOUT ESOPHAGITIS: ICD-10-CM

## 2017-02-21 ENCOUNTER — DOCUMENTATION ONLY (OUTPATIENT)
Dept: FAMILY MEDICINE | Facility: CLINIC | Age: 67
End: 2017-02-21

## 2017-02-21 NOTE — PROGRESS NOTES
Pre-Visit Chart Review  For Appointment Scheduled on 2/22/17    Health Maintenance Due   Topic Date Due    Lipid Panel  12/03/2016

## 2017-02-22 ENCOUNTER — OFFICE VISIT (OUTPATIENT)
Dept: FAMILY MEDICINE | Facility: CLINIC | Age: 67
End: 2017-02-22
Payer: MEDICARE

## 2017-02-22 ENCOUNTER — LAB VISIT (OUTPATIENT)
Dept: LAB | Facility: HOSPITAL | Age: 67
End: 2017-02-22
Attending: NURSE PRACTITIONER
Payer: MEDICARE

## 2017-02-22 ENCOUNTER — TELEPHONE (OUTPATIENT)
Dept: FAMILY MEDICINE | Facility: CLINIC | Age: 67
End: 2017-02-22

## 2017-02-22 VITALS
SYSTOLIC BLOOD PRESSURE: 119 MMHG | WEIGHT: 220.25 LBS | HEIGHT: 62 IN | DIASTOLIC BLOOD PRESSURE: 54 MMHG | BODY MASS INDEX: 40.53 KG/M2 | TEMPERATURE: 98 F | HEART RATE: 66 BPM

## 2017-02-22 DIAGNOSIS — G89.29 CHRONIC LEFT-SIDED THORACIC BACK PAIN: ICD-10-CM

## 2017-02-22 DIAGNOSIS — M54.6 CHRONIC LEFT-SIDED THORACIC BACK PAIN: ICD-10-CM

## 2017-02-22 DIAGNOSIS — I10 ESSENTIAL HYPERTENSION: ICD-10-CM

## 2017-02-22 DIAGNOSIS — M54.6 CHRONIC LEFT-SIDED THORACIC BACK PAIN: Primary | ICD-10-CM

## 2017-02-22 DIAGNOSIS — G47.00 INSOMNIA, UNSPECIFIED TYPE: Primary | ICD-10-CM

## 2017-02-22 DIAGNOSIS — G89.29 CHRONIC LEFT-SIDED THORACIC BACK PAIN: Primary | ICD-10-CM

## 2017-02-22 LAB
BILIRUB UR QL STRIP: NEGATIVE
CLARITY UR REFRACT.AUTO: CLEAR
COLOR UR AUTO: NORMAL
GLUCOSE UR QL STRIP: NEGATIVE
HGB UR QL STRIP: NEGATIVE
KETONES UR QL STRIP: NEGATIVE
LEUKOCYTE ESTERASE UR QL STRIP: NEGATIVE
NITRITE UR QL STRIP: NEGATIVE
PH UR STRIP: 7 [PH] (ref 5–8)
PROT UR QL STRIP: NEGATIVE
SP GR UR STRIP: 1 (ref 1–1.03)
URN SPEC COLLECT METH UR: NORMAL
UROBILINOGEN UR STRIP-ACNC: NEGATIVE EU/DL

## 2017-02-22 PROCEDURE — 81003 URINALYSIS AUTO W/O SCOPE: CPT

## 2017-02-22 PROCEDURE — 99999 PR PBB SHADOW E&M-EST. PATIENT-LVL III: CPT | Mod: PBBFAC,,, | Performed by: NURSE PRACTITIONER

## 2017-02-22 PROCEDURE — 87086 URINE CULTURE/COLONY COUNT: CPT

## 2017-02-22 PROCEDURE — 99213 OFFICE O/P EST LOW 20 MIN: CPT | Mod: S$GLB,,, | Performed by: NURSE PRACTITIONER

## 2017-02-22 RX ORDER — CYCLOBENZAPRINE HCL 10 MG
10 TABLET ORAL 3 TIMES DAILY PRN
Qty: 60 TABLET | Refills: 1 | Status: SHIPPED | OUTPATIENT
Start: 2017-02-22 | End: 2017-09-16 | Stop reason: SDUPTHER

## 2017-02-22 RX ORDER — TRAZODONE HYDROCHLORIDE 50 MG/1
50 TABLET ORAL NIGHTLY
Qty: 30 TABLET | Refills: 11 | Status: SHIPPED | OUTPATIENT
Start: 2017-02-22 | End: 2017-04-12

## 2017-02-22 NOTE — MR AVS SNAPSHOT
[facility]  [address]  [place] [address]  Phone: [phone]  Fax: [phone]                  Fifi Mcnair   2017 11:20 AM   Office Visit    Description:  Female : 1950   Provider:  MOIZ Maurer   Department:  [place] [facility]           Reason for Visit     Back Pain           Diagnoses this Visit        Comments    Chronic left-sided thoracic back pain    -  Primary            To Do List           Future Appointments        Provider Department Dept Phone    2017 1:00 PM LAB, [facility] [phone]    2017 4:00 PM Werner Vargas MD [facility] [phone]    2017 1:00 PM Gabriela Green MD [place] [facility] [phone]      Goals (5 Years of Data)     None      Follow-Up and Disposition     Return if symptoms worsen or fail to improve.       These Medications        Disp Refills Start End    cyclobenzaprine (FLEXERIL) 10 MG tablet 60 tablet 1 2017 3/4/2017    Take 1 tablet (10 mg total) by mouth 3 (three) times daily as needed for Muscle spasms. - Oral    Pharmacy: [facility] [id number]  [place] [address]  #: [phone]         [facility] On Call     [facility] On Call Nurse Care Line -  Assistance  Registered nurses in the [facility] provide clinical advisement, health education, appointment booking, and other advisory services.  Call for this free service at [phone].             Medications           Message regarding Medications     Verify the changes and/or additions to your medication regime listed below are the same as discussed with your clinician today.  If any of these changes or additions are incorrect, please notify your healthcare provider.        START taking these NEW medications        Refills    cyclobenzaprine (FLEXERIL) 10 MG tablet 1    Sig: Take 1 tablet (10 mg total) by mouth 3 (three) times daily  as needed for Muscle spasms.    Class: Normal    Route: Oral           Verify that the below list of medications is an accurate representation of the medications you are currently taking.  If none reported, the list may be blank. If incorrect, please contact your healthcare provider. Carry this list with you in case of emergency.           Current Medications     allopurinol (ZYLOPRIM) 100 MG tablet Take 2 tablets (200 mg total) by mouth nightly.    atenolol (TENORMIN) 25 MG tablet Take 1 tablet (25 mg total) by mouth once daily.    calcitRIOL (ROCALTROL) 0.25 MCG Cap Take 1 capsule by mouth twice a week. Monday Friday    CALCIUM CITRATE/VITAMIN D3 (CALCIUM CITRATE + D ORAL) Take 400 mg by mouth 3 (three) times daily.    cetirizine (ZYRTEC) 10 MG tablet Take 1 tablet (10 mg total) by mouth once daily.    clotrimazole-betamethasone 1-0.05% (LOTRISONE) cream     cyanocobalamin, vitamin B-12, (VITAMIN B-12) 5,000 mcg Subl Place 5,000 mg under the tongue once a week.    DENOSUMAB (PROLIA SUBQ) Inject into the skin every 6 (six) months.     DIABETIC SUPPLIES,MISCELL (MEDTRONIC REMOTE CONTROL MISC) No Sig Provided    fluoxetine (PROZAC) 20 MG capsule Take 20 mg by mouth once daily.     fluticasone (FLONASE) 50 mcg/actuation nasal spray 2 sprays by Nasal route once daily.     fluticasone-vilanterol (BREO) 100-25 mcg/dose diskus inhaler Inhale 1 puff into the lungs once daily.    insulin aspart (NOVOLOG) 100 unit/mL injection Inject into the skin continuous. Pt has insulin pump    Lactobacillus rhamnosus GG (CULTURELLE) 10 billion cell capsule Take 1 capsule by mouth once daily.    levmefolate-B6 phos-methyl-B12 3-35-2 mg Tab Take 1 tablet by mouth 2 (two) times daily.    levothyroxine (SYNTHROID) 25 MCG tablet Take 1 tablet (25 mcg total) by mouth once daily.    lorazepam (ATIVAN) 0.5 MG tablet Take 1 tablet (0.5 mg total) by mouth every evening.    losartan (COZAAR) 25 MG tablet Take 1 tablet by mouth once daily.     "melatonin 3 mg Tab Take 6 mg by mouth every evening.     metOLazone (ZAROXOLYN) 5 MG tablet Take 1 tablet (5 mg total) by mouth daily as needed (swelling).    metronidazole (METROGEL) 0.75 % gel Apply topically 2 (two) times daily.    montelukast (SINGULAIR) 10 mg tablet Take 10 mg by mouth once daily.     MULTIVIT-IRON-MIN-FOLIC ACID 3,500-18-0.4 UNIT-MG-MG ORAL CHEW Take by mouth 2 (two) times daily.    mupirocin (BACTROBAN) 2 % ointment APPLY OINTMENT TOPICALLY TO AFFECTED AREA ON NOSE THREE TIMES DAILY AS DIRECTED    oxycodone (ROXICODONE) 15 MG Tab Take 15 mg by mouth every 8 (eight) hours as needed for Pain.     pantoprazole (PROTONIX) 40 MG tablet Take 1 tablet (40 mg total) by mouth once daily.    potassium chloride SA (K-DUR,KLOR-CON) 20 MEQ tablet Take 1 tablet (20 mEq total) by mouth 2 (two) times daily.    ranitidine (ZANTAC) 300 MG tablet TAKE ONE TABLET BY MOUTH IN THE EVENING    torsemide (DEMADEX) 20 MG Tab Take 2 tablets (40 mg total) by mouth once daily.    cyclobenzaprine (FLEXERIL) 10 MG tablet Take 1 tablet (10 mg total) by mouth 3 (three) times daily as needed for Muscle spasms.           Clinical Reference Information           Your Vitals Were     BP Pulse Temp Height Weight Last Period    119/54 (BP Location: Right arm, Patient Position: Sitting, BP Method: Automatic) 66 98.2 °F (36.8 °C) (Oral) 5' 2" (1.575 m) 99.9 kg (220 lb 3.8 oz) (LMP Unknown)    BMI                40.28 kg/m2          Blood Pressure          Most Recent Value    BP  (!)  119/54      Allergies as of 2/22/2017     Adhesive    Cleocin [Clindamycin Hcl]    Ceclor [Cefaclor]    Morphine    Advair Diskus [Fluticasone-salmeterol]    Aleve [Naproxen Sodium]    Levaquin [Levofloxacin]    Macrodantin [Nitrofurantoin Macrocrystalline]    Motrin [Ibuprofen]    Sulfa (Sulfonamide Antibiotics)    Remeron [Mirtazapine]    Vancomycin Analogues      Immunizations Administered on Date of Encounter - 2/22/2017     None      Instructions  "     Controlling High Blood Pressure  High blood pressure (hypertension) is often called the silent killer. This is because many people who have it dont know it. High blood pressure is defined as 140/90 mm Hg or higher. Know your blood pressure and remember to check it regularly. Doing so can save your life. Here are some things you can do to help control your blood pressure.    Choose heart-healthy foods  · Select low-salt, low-fat foods. Limit sodium intake to 2,400 mg per day or the amount suggested by your healthcare provider.  · Limit canned, dried, cured, packaged, and fast foods. These can contain a lot of salt.  · Eat 8 to 10 servings of fruits and vegetables every day.  · Choose lean meats, fish, or chicken.  · Eat whole-grain pasta, brown rice, and beans.  · Eat 2 to 3 servings of low-fat or fat-free dairy products.  · Ask your doctor about the DASH eating plan. This plan helps reduce blood pressure.  · When you go to a restaurant, ask that your meal be prepared with no added salt.  Maintain a healthy weight  · Ask your healthcare provider how many calories to eat a day. Then stick to that number.  · Ask your healthcare provider what weight range is healthiest for you. If you are overweight, a weight loss of only 3% to 5% of your body weight can help lower blood pressure. Generally, a good weight loss goal is to lose 10% of your body weight in a year.  · Limit snacks and sweets.  · Get regular exercise.  Get up and get active  · Choose activities you enjoy. Find ones you can do with friends or family. This includes bicycling, dancing, walking, and jogging.  · Park farther away from building entrances.  · Use stairs instead of the elevator.  · When you can, walk or bike instead of driving.  · [place] leaves, garden, or do household repairs.  · Be active at a moderate to vigorous level of physical activity for at least 40 minutes for a minimum of 3 to 4 days a week.   Manage stress  · Make time to relax and  enjoy life. Find time to laugh.  · Communicate your concerns with your loved ones and your healthcare provider.  · Visit with family and friends, and keep up with hobbies.  Limit alcohol and quit smoking  · Men should have no more than 2 drinks per day.  · Women should have no more than 1 drink per day.  · Talk with your healthcare provider about quitting smoking. Smoking significantly increases your risk for heart disease and stroke. Ask your healthcare provider about community smoking cessation programs and other options.  Medicines  If lifestyle changes arent enough, your healthcare provider may prescribe high blood pressure medicine. Take all medicines as prescribed. If you have any questions about your medicines, ask your healthcare provider before stopping or changing them.   Date Last Reviewed: 4/27/2016 © 2000-2016 [employer]. [address] [place] [address], [place] [address]. All rights reserved. This information is not intended as a substitute for professional medical care. Always follow your healthcare professional's instructions.        Diabetes (General Information)  Diabetes is a long-term health problem. It means your body does not make enough insulin. Or it may mean that your body cannot use the insulin it makes. Insulin is a hormone in your body. It lets blood sugar (glucose) reach the cells in your body. All of your cells need glucose for fuel.  When you have diabetes, the glucose in your blood builds up because it cannot get into the cells. This buildup is called high blood sugar (hyperglycemia).  Your blood sugar level depends on several things. It depends on what kind of food you eat and how much of it you eat. It also depends on how much exercise you get, and how much insulin you have in your body. Eating too much of the wrong kinds of food or not taking diabetes medicine on time can cause high blood sugar. Infections can cause high blood sugar even if you are taking medicines  correctly.  These things can also cause low blood sugar:  · Missing meals  · Not eating enough food  · Taking too much diabetes medicine  Diabetes can cause serious problems over time if you do not get treated. These problems include heart disease, stroke, kidney failure, and blindness. They also include nerve pain or loss of feeling in your legs and feet, and gangrene of the feet. By keeping your blood sugar under control you can prevent or delay these problems.  Normal blood sugar levels are 80 to 100 before a meal and less than 180 in the 1 to 2 hours after a meal.  Home care  Follow these guidelines when caring for yourself at home:  · Follow the diet your healthcare provider gives you. Take insulin or other diabetes medicine exactly as told to.  · Watch your blood sugar as you are told to. Keep a log of your results. This will help your provider change your medicines to keep your blood sugar under control.  · Try to reach your ideal weight. You may be able to cut back on or not have to take diabetes medicine if you eat the right foods and get exercise.  · Do not smoke. Smoking worsens the effects of diabetes on your circulation. You are much more likely to have a heart attack if you have diabetes and you smoke.  · Take good care of your feet. If you have lost feeling in your feet, you may not see an injury or infection. Check your feet and between your toes at least once a week.  · Wear a medical alert bracelet or necklace, or carry a card in your wallet that says you have diabetes. This will help healthcare providers give you the right care if you get very ill and cannot tell them that you have diabetes.  Sick day plan  If you get a cold, the flu, or a bacterial or viral infection, take these steps:  · Look at your diabetes sick plan and call your healthcare provider as you were told to. You may need to call your provider right away if:  ¨ Your blood sugar is above 240 while taking your diabetes  medicine  ¨ Your urine ketone levels are above normal or high  ¨ You have been vomiting more than 6 hours  ¨ You have trouble breathing or your breath ha s a fruity smell  ¨ You have a high fever  ¨ You have a fever for several days and you are not getting better  ¨ You get light-headed and are sleepier than usual  · Keep taking your diabetes pills (oral medicine) even if you have been vomiting and are feeling sick. Call your provider right away because you may need insulin to lower your blood sugar until you recover from your illness.  · Keep taking your insulin even if you have been vomiting and are feeling sick. Call your provider right away to ask if you need to change your insulin dose. This will depend on your blood sugar results.  · Check your blood sugar every 2 to 4 hours, or at least 4 times a day.  · Check your ketones often. If you are vomiting and having diarrhea, watch them more often.  · Do not skip meals. Try to eat small meals on a regular schedule. Do this even if you do not feel like eating.  · Drink water or other liquids that do not have caffeine or calories. This will keep you from getting dehydrated. If you are nauseated or vomiting, takes small sips every 5 minutes. To prevent dehydration try to drink a cup (8 ounces) of fluids every hour while you are awake.  General care  Always bring a source of fast-acting sugar with you in case you have symptoms of low blood sugar (below 70). At the first sign of low blood sugar, eat or drink 15 to 20 grams of fast-acting sugar to raise your blood sugar. Examples are:  · 3 to 4 glucose tablets. You can buy these at most drugstores.  · 4 ounces (1/2 cup) of regular (not diet) soft drinks  · 4 ounces (1/2 cup) of any fruit juice  · 8 ounces (1 cup) of milk  · 5 to 6 pieces of hard candy  · 1 tablespoon of honey  Check your blood sugar 15 minutes after treating yourself. If it is still below 70, take 15 to 20 more grams of fast-acting sugar. Test again in  15 minutes. If it returns to normal (70 or above), eat a snack or meal to keep your blood sugar in a safe range. If it stays low, call your doctor or go to an emergency room.  Follow-up care  Follow-up with your healthcare provider, or as advised. For more information about diabetes, visit the American Diabetes Association website at www.diabetes.org or call 720-400-6757.  When to seek medical advice  Call your healthcare provider right away if you have any of these symptoms of high blood sugar:  · Frequent urination  · Dizziness  · Drowsiness  · Thirst  · Headache  · Nausea or vomiting  · Abdominal pain  · Eyesight changes  · Fast breathing  · Confusion or loss of consciousness  Also call your provider right away if you have any of these signs of low blood sugar:  · Fatigue  · Headache  · Shakes  · Excess sweating  · Hunger  · Feeling anxious or restless  · Eyesight changes  · Drowsiness  · Weakness  · Confusion or loss of consciousness  Call [phone]  Call for emergency help right away if any of these occur:  · Chest pain or shortness of breath  · Dizziness or fainting  · Weakness of an arm or leg or one side of the face  · Trouble speaking or seeing   Date Last Reviewed: 6/1/2016  © 2510-2757 [employer]. [address] [place] [address], [place] [address]. All rights reserved. This information is not intended as a substitute for professional medical care. Always follow your healthcare professional's instructions.        Weight Management: Getting Started  Healthy bodies come in all shapes and sizes. Not all bodies are made to be thin. For some people, a healthy weight is higher than the average weight listed on weight charts. Your healthcare provider can help you decide on a healthy weight for you.    Reasons to lose weight  Losing weight can help with some health problems, such as high blood pressure, heart disease, diabetes, sleep apnea, and arthritis. You may also feel more energy.  Set your long-term  goal  Your goal doesn't even have to be a specific weight. You may decide on a fitness goal (such as being able to walk 10 miles a week), or a health goal (such as lowering your blood pressure). Choose a goal that is measurable and reasonable, so you know when you've reached it. A goal of reaching a BMI of less than 25 is not always reasonable (or possible).   Make an action plan  Habits dont change overnight. Setting your goals too high can leave you feeling discouraged if you cant reach them. Be realistic. Choose one or two small changes you can make now. Set an action plan for how you are going to make these changes. When you can stick to this plan, keep making a few more small changes. Taking small steps will help you stay on the path to success.  Track your progress  Write down your goals. Then, keep a daily record of your progress. Write down what you eat and how active you are. This record lets you look back on how much youve done. It may also help when youre feeling frustrated. Reward yourself for success. Even if you dont reach every goal, give yourself credit for what you do get done.  Get support  Encouragement from others can help make losing weight easier. Ask your family members and friends for support. They may even want to join you. Also look to your healthcare provider, registered dietitian, and  for help. Your local hospital can give you more information about nutrition, exercise, and weight loss.  Date Last Reviewed: 1/31/2016  © 0583-4240 [employer], [employer]. [address] [place] [address], [place] [address]. All rights reserved. This information is not intended as a substitute for professional medical care. Always follow your healthcare professional's instructions.        Walking for Fitness  Fitness walking has something for everyone, even people who are already fit. Walking is one of the safest ways to condition your body aerobically. It can boost energy, help you lose  weight, and reduce stress.    Physical benefits  · Walking strengthens your heart and lungs, and tones your muscles.  · When walking, your feet land with less impact than in other sports. This reduces chances of muscle, bone, and joint injury.  · Regular walking improves your cholesterol levels and lowers your risk of heart disease. And it helps you control your blood sugar if you have diabetes.  · Walking is a weight-bearing activity, which helps maintain bone density. This can help prevent osteoporosis.  Personal rewards  · Taking walks can help you relax and manage stress. And fitness walking may make you feel better about yourself.  · Walking can help you sleep better at night and make you less likely to be depressed.  · Regular walking may help maintain your memory as you get older.  · Walking is a great way to spend extra time with friends and family members. Be sure to invite your dog along!  Q&A about fitness walking  Q: Will walking keep me fit?  A: Yes. Regular walking at the right pace gives you all the benefits of other aerobic activities, such as jogging and swimming.  Q: Will walking help me lose weight and keep it off?  A: Yes. Per mile, walking can burn as many calories as jogging. Your health care provider can help work walking into your weight-loss plan.  Q: Is walking safe for my health?  A: Yes. Walking is safe if you have high blood pressure, diabetes, heart disease, or other conditions. Talk to your health care provider before you start.  Date Last Reviewed: 5/9/2015  © 2713-1337 [employer]. [address] [place] [address], [place] [address]. All rights reserved. This information is not intended as a substitute for professional medical care. Always follow your healthcare professional's instructions.        Back Care Tips    Caring for your back  These are things you can do to prevent a recurrence of acute back pain and to reduce symptoms from chronic back pain:  · Maintain a healthy  weight. If you are overweight, losing weight will help most types of back pain.  · Exercise is an important part of recovery from most types of back pain. The muscles behind and in front of the spine support the back. This means strengthening both the back muscles and the abdominal muscles will provide better support for your spine.   · Swimming and brisk walking are good overall exercises to improve your fitness level.  · Practice safe lifting methods (below).  · Practice good posture when sitting, standing and walking. Avoid prolonged sitting. This puts more stress on the lower back than standing or walking.  · Wear quality shoes with sufficient arch support. Foot and ankle alignment can affect back symptoms. Women should avoid wearing high heels.  · Therapeutic massage can help relax the back muscles without stretching them.  · During the first 24 to 72 hours after an acute injury or flare-up of chronic back pain, apply an ice pack to the painful area for 20 minutes and then remove it for 20 minutes, over a period of 60 to 90 minutes, or several times a day. As a safety precaution, do not use a heating pad at bedtime. Sleeping on a heating pad can lead to skin burns or tissue damage.  · You can alternate ice and heat therapies.  Medications  Talk to your healthcare provider before using medicines, especially if you have other medical problems or are taking other medicines.  · You may use acetaminophen or ibuprofen to control pain, unless your healthcare provider prescribed other pain medicine. If you have chronic conditions like diabetes, liver or kidney disease, stomach ulcers, or gastrointestinal bleeding, or are taking blood thinners, talk with your healthcare provider before taking any medicines.  · Be careful if you are given prescription pain medicines, narcotics, or medicine for muscle spasm. They can cause drowsiness, affect your coordination, reflexes, and judgment. Do not drive or operate heavy  machinery while taking these types of medicines. Take prescription pain medicine only as prescribed by your healthcare provider.  Lumbar stretch  Here is a simple stretching exercise that will help relax muscle spasm and keep your back more limber. If exercise makes your back pain worse, dont do it.  · Lie on your back with your knees bent and both feet on the ground.  · Slowly raise your left knee to your chest as you flatten your lower back against the floor. Hold for 5 seconds.  · Relax and repeat the exercise with your right knee.  · Do 10 of these exercises for each leg.  Safe lifting method  · Dont bend over at the waist to lift an object off the floor.  Instead, bend your knees and hips in a squat.   · Keep your back and head upright  · Hold the object close to your body, directly in front of you.  · Straighten your legs to lift the object.   · Lower the object to the floor in the reverse fashion.  · If you must slide something across the floor, push it.  Posture tips  Sitting  Sit in chairs with straight backs or low-back support. Keep your knees lower than your hips, with your feet flat on the floor.  When driving, sit up straight. Adjust the seat forward so you are not leaning toward the steering wheel.  A small pillow or rolled towel behind your lower back may help if you are driving long distances.   Standing  When standing for long periods, shift most of your weight to one leg at a time. Alternate legs every few minutes.   Sleeping  The best way to sleep is on your side with your knees bent. Put a low pillow under your head to support your neck in a neutral spine position. Avoid thick pillows that bend your neck to one side. Put a pillow between your legs to further relax your lower back. If you sleep on your back, put pillows under your knees to support your legs in a slightly flexed position. Use a firm mattress. If your mattress sags, replace it, or use a 1/2-inch plywood board under the mattress  to add support.  Follow-up care  Follow up with your healthcare provider, or as advised.  If X-rays, a CT scan or an MRI scan were taken, they will be reviewed by a radiologist. You will be notified of any new findings that may affect your care.  Call 911  Seek emergency medical care if any of the following occur:  · Trouble breathing  · Confusion  · Very drowsy  · Fainting or loss of consciousness  · Rapid or very slow heart rate  · Loss of  bowel or bladder control  When to seek medical care  Call your healthcare provider if any of the following occur:  · Pain becomes worse or spreads to your arms or legs  · Weakness or numbness in one or both arms or legs  · Numbness in the groin area  Date Last Reviewed: 6/1/2016 © 2000-2016 [employer]. [address] [place] [address]. All rights reserved. This information is not intended as a substitute for professional medical care. Always follow your healthcare professional's instructions.             Language Assistance Services     ATTENTION: Language assistance services are available, free of charge. Please call [phone].      ATENCIÓN: Si habla español, tiene a luz disposición servicios gratuitos de asistencia lingüística. Llame al [phone].     ZAKIA Ý: N?u b?n nói Ti?ng Vi?t, có các d?ch v? h? tr? ngôn ng? mi?n phí dành cho b?n. G?i s? [phone].         [facility] complies with applicable Federal civil rights laws and does not discriminate on the basis of race, color, national origin, age, disability, or sex.

## 2017-02-22 NOTE — PROGRESS NOTES
Subjective:       Patient ID: Fifi Mcnair is a 66 y.o. female.    Chief Complaint: Back Pain    HPI Comments: Ms. Mcnair presents to the clinic today for left sided thoracic back pain which has been present for two months.  She states this is worse when she is moving around and using her arms.  She denies any injury.  She has tried heat, ice, Bengay, Voltaren.  No dysuria, no fever, no constipation. She has not tried a muscle relaxant.  She does have chronic lower back pain but this is higher than her usual pain.  She does have a history of lumbar compression fracture last year and she follows up with neurology.  She cannot remember her Neurologist's name.      Back Pain   This is a new problem. The current episode started more than 1 month ago (2 mos). The problem occurs intermittently. The problem has been gradually worsening since onset. The pain is present in the thoracic spine. The quality of the pain is described as stabbing. The pain does not radiate. The pain is the same all the time. The symptoms are aggravated by position and twisting. Associated symptoms include numbness (right leg, chronic). Pertinent negatives include no abdominal pain, bladder incontinence, bowel incontinence, chest pain, dysuria, fever, leg pain, paresis, paresthesias, perianal numbness or weakness. Risk factors include poor posture and lack of exercise. She has tried heat and analgesics for the symptoms. The treatment provided no relief.     Review of Systems   Constitutional: Negative for fever.   Eyes: Negative for visual disturbance.   Cardiovascular: Negative for chest pain.   Gastrointestinal: Negative for abdominal pain and bowel incontinence.   Genitourinary: Negative for bladder incontinence, difficulty urinating, dysuria and hematuria.   Musculoskeletal: Positive for back pain. Negative for gait problem and neck stiffness.   Neurological: Positive for numbness (right leg, chronic). Negative for dizziness, weakness and  paresthesias.       Objective:      Physical Exam   Constitutional: She is oriented to person, place, and time. She appears well-nourished. No distress.   HENT:   Head: Normocephalic and atraumatic.   Right Ear: External ear normal.   Left Ear: External ear normal.   Mouth/Throat: Oropharynx is clear and moist. No oropharyngeal exudate.   Eyes: Pupils are equal, round, and reactive to light. Right eye exhibits no discharge. Left eye exhibits no discharge.   Cardiovascular: Normal rate.    Pulmonary/Chest: Effort normal.   Musculoskeletal:        Arms:  Pain reproduced with twisting   Neurological: She is alert and oriented to person, place, and time. Coordination normal.   Skin: Skin is warm and dry.   Psychiatric: She has a normal mood and affect. Her behavior is normal. Thought content normal.   Vitals reviewed.          Current Outpatient Prescriptions:     allopurinol (ZYLOPRIM) 100 MG tablet, Take 2 tablets (200 mg total) by mouth nightly., Disp: 180 tablet, Rfl: 3    atenolol (TENORMIN) 25 MG tablet, Take 1 tablet (25 mg total) by mouth once daily., Disp: 90 tablet, Rfl: 3    calcitRIOL (ROCALTROL) 0.25 MCG Cap, Take 1 capsule by mouth twice a week. Monday Friday, Disp: , Rfl:     CALCIUM CITRATE/VITAMIN D3 (CALCIUM CITRATE + D ORAL), Take 400 mg by mouth 3 (three) times daily., Disp: , Rfl:     cetirizine (ZYRTEC) 10 MG tablet, Take 1 tablet (10 mg total) by mouth once daily., Disp: 1 Bottle, Rfl: 5    clotrimazole-betamethasone 1-0.05% (LOTRISONE) cream, , Disp: , Rfl:     cyanocobalamin, vitamin B-12, (VITAMIN B-12) 5,000 mcg Subl, Place 5,000 mg under the tongue once a week., Disp: , Rfl:     DENOSUMAB (PROLIA SUBQ), Inject into the skin every 6 (six) months. , Disp: , Rfl:     DIABETIC SUPPLIES,MISCELL ([employer] REMOTE CONTROL MISC), No Sig Provided, Disp: , Rfl:     fluoxetine (PROZAC) 20 MG capsule, Take 20 mg by mouth once daily. , Disp: , Rfl:     fluticasone (FLONASE) 50 mcg/actuation  nasal spray, 2 sprays by Nasal route once daily. , Disp: , Rfl:     fluticasone-vilanterol (BREO) 100-25 mcg/dose diskus inhaler, Inhale 1 puff into the lungs once daily., Disp: , Rfl:     insulin aspart (NOVOLOG) 100 unit/mL injection, Inject into the skin continuous. Pt has insulin pump, Disp: , Rfl:     Lactobacillus rhamnosus GG (CULTURELLE) 10 billion cell capsule, Take 1 capsule by mouth once daily., Disp: , Rfl:     levmefolate-B6 phos-methyl-B12 3-35-2 mg Tab, Take 1 tablet by mouth 2 (two) times daily., Disp: 60 each, Rfl: 2    levothyroxine (SYNTHROID) 25 MCG tablet, Take 1 tablet (25 mcg total) by mouth once daily., Disp: 90 tablet, Rfl: 3    lorazepam (ATIVAN) 0.5 MG tablet, Take 1 tablet (0.5 mg total) by mouth every evening., Disp: 30 tablet, Rfl: 3    losartan (COZAAR) 25 MG tablet, Take 1 tablet by mouth once daily., Disp: , Rfl:     melatonin 3 mg Tab, Take 6 mg by mouth every evening. , Disp: , Rfl:     metOLazone (ZAROXOLYN) 5 MG tablet, Take 1 tablet (5 mg total) by mouth daily as needed (swelling)., Disp: 30 tablet, Rfl: 11    metronidazole (METROGEL) 0.75 % gel, Apply topically 2 (two) times daily., Disp: 45 g, Rfl: 1    montelukast (SINGULAIR) 10 mg tablet, Take 10 mg by mouth once daily. , Disp: , Rfl:     MULTIVIT-IRON-MIN-FOLIC ACID 3,500-18-0.4 UNIT-MG-MG ORAL CHEW, Take by mouth 2 (two) times daily., Disp: , Rfl:     mupirocin (BACTROBAN) 2 % ointment, APPLY OINTMENT TOPICALLY TO AFFECTED AREA ON NOSE THREE TIMES DAILY AS DIRECTED, Disp: 1 Tube, Rfl: 11    oxycodone (ROXICODONE) 15 MG Tab, Take 15 mg by mouth every 8 (eight) hours as needed for Pain. , Disp: , Rfl:     pantoprazole (PROTONIX) 40 MG tablet, Take 1 tablet (40 mg total) by mouth once daily., Disp: 90 tablet, Rfl: 3    potassium chloride SA (K-DUR,KLOR-CON) 20 MEQ tablet, Take 1 tablet (20 mEq total) by mouth 2 (two) times daily., Disp: 180 tablet, Rfl: 3    ranitidine (ZANTAC) 300 MG tablet, TAKE ONE TABLET  BY MOUTH IN THE EVENING, Disp: 30 tablet, Rfl: 3    torsemide (DEMADEX) 20 MG Tab, Take 2 tablets (40 mg total) by mouth once daily., Disp: 60 tablet, Rfl: 0    cyclobenzaprine (FLEXERIL) 10 MG tablet, Take 1 tablet (10 mg total) by mouth 3 (three) times daily as needed for Muscle spasms., Disp: 60 tablet, Rfl: 1  Assessment:       1. Chronic left-sided thoracic back pain    2. Essential hypertension        Plan:      Chronic left-sided thoracic back pain  Back care tips given.  If no relief, notify provider.  May need imaging at that time.  -     cyclobenzaprine (FLEXERIL) 10 MG tablet; Take 1 tablet (10 mg total) by mouth 3 (three) times daily as needed for Muscle spasms.  Dispense: 60 tablet; Refill: 1    Essential hypertension  Patient readiness: acceptance and barriers:none    During the course of the visit the patient was educated and counseled about the following:     Hypertension:   Dietary sodium restriction.    Goals: Hypertension: Reduce Blood Pressure    Did patient meet goals/outcomes: Yes    The following self management tools provided: declined    Patient Instructions (the written plan) was given to the patient/family.     Time spent with patient: 15 minutes

## 2017-02-22 NOTE — PATIENT INSTRUCTIONS
Controlling High Blood Pressure  High blood pressure (hypertension) is often called the silent killer. This is because many people who have it dont know it. High blood pressure is defined as 140/90 mm Hg or higher. Know your blood pressure and remember to check it regularly. Doing so can save your life. Here are some things you can do to help control your blood pressure.    Choose heart-healthy foods  · Select low-salt, low-fat foods. Limit sodium intake to 2,400 mg per day or the amount suggested by your healthcare provider.  · Limit canned, dried, cured, packaged, and fast foods. These can contain a lot of salt.  · Eat 8 to 10 servings of fruits and vegetables every day.  · Choose lean meats, fish, or chicken.  · Eat whole-grain pasta, brown rice, and beans.  · Eat 2 to 3 servings of low-fat or fat-free dairy products.  · Ask your doctor about the DASH eating plan. This plan helps reduce blood pressure.  · When you go to a restaurant, ask that your meal be prepared with no added salt.  Maintain a healthy weight  · Ask your healthcare provider how many calories to eat a day. Then stick to that number.  · Ask your healthcare provider what weight range is healthiest for you. If you are overweight, a weight loss of only 3% to 5% of your body weight can help lower blood pressure. Generally, a good weight loss goal is to lose 10% of your body weight in a year.  · Limit snacks and sweets.  · Get regular exercise.  Get up and get active  · Choose activities you enjoy. Find ones you can do with friends or family. This includes bicycling, dancing, walking, and jogging.  · Park farther away from building entrances.  · Use stairs instead of the elevator.  · When you can, walk or bike instead of driving.  · [place] leaves, garden, or do household repairs.  · Be active at a moderate to vigorous level of physical activity for at least 40 minutes for a minimum of 3 to 4 days a week.   Manage stress  · Make time to relax and enjoy  life. Find time to laugh.  · Communicate your concerns with your loved ones and your healthcare provider.  · Visit with family and friends, and keep up with hobbies.  Limit alcohol and quit smoking  · Men should have no more than 2 drinks per day.  · Women should have no more than 1 drink per day.  · Talk with your healthcare provider about quitting smoking. Smoking significantly increases your risk for heart disease and stroke. Ask your healthcare provider about community smoking cessation programs and other options.  Medicines  If lifestyle changes arent enough, your healthcare provider may prescribe high blood pressure medicine. Take all medicines as prescribed. If you have any questions about your medicines, ask your healthcare provider before stopping or changing them.   Date Last Reviewed: 4/27/2016 © 2000-2016 [employer]. [address] [place] [address], [place] [address]. All rights reserved. This information is not intended as a substitute for professional medical care. Always follow your healthcare professional's instructions.        Diabetes (General Information)  Diabetes is a long-term health problem. It means your body does not make enough insulin. Or it may mean that your body cannot use the insulin it makes. Insulin is a hormone in your body. It lets blood sugar (glucose) reach the cells in your body. All of your cells need glucose for fuel.  When you have diabetes, the glucose in your blood builds up because it cannot get into the cells. This buildup is called high blood sugar (hyperglycemia).  Your blood sugar level depends on several things. It depends on what kind of food you eat and how much of it you eat. It also depends on how much exercise you get, and how much insulin you have in your body. Eating too much of the wrong kinds of food or not taking diabetes medicine on time can cause high blood sugar. Infections can cause high blood sugar even if you are taking medicines  correctly.  These things can also cause low blood sugar:  · Missing meals  · Not eating enough food  · Taking too much diabetes medicine  Diabetes can cause serious problems over time if you do not get treated. These problems include heart disease, stroke, kidney failure, and blindness. They also include nerve pain or loss of feeling in your legs and feet, and gangrene of the feet. By keeping your blood sugar under control you can prevent or delay these problems.  Normal blood sugar levels are 80 to 100 before a meal and less than 180 in the 1 to 2 hours after a meal.  Home care  Follow these guidelines when caring for yourself at home:  · Follow the diet your healthcare provider gives you. Take insulin or other diabetes medicine exactly as told to.  · Watch your blood sugar as you are told to. Keep a log of your results. This will help your provider change your medicines to keep your blood sugar under control.  · Try to reach your ideal weight. You may be able to cut back on or not have to take diabetes medicine if you eat the right foods and get exercise.  · Do not smoke. Smoking worsens the effects of diabetes on your circulation. You are much more likely to have a heart attack if you have diabetes and you smoke.  · Take good care of your feet. If you have lost feeling in your feet, you may not see an injury or infection. Check your feet and between your toes at least once a week.  · Wear a medical alert bracelet or necklace, or carry a card in your wallet that says you have diabetes. This will help healthcare providers give you the right care if you get very ill and cannot tell them that you have diabetes.  Sick day plan  If you get a cold, the flu, or a bacterial or viral infection, take these steps:  · Look at your diabetes sick plan and call your healthcare provider as you were told to. You may need to call your provider right away if:  ¨ Your blood sugar is above 240 while taking your diabetes  medicine  ¨ Your urine ketone levels are above normal or high  ¨ You have been vomiting more than 6 hours  ¨ You have trouble breathing or your breath ha s a fruity smell  ¨ You have a high fever  ¨ You have a fever for several days and you are not getting better  ¨ You get light-headed and are sleepier than usual  · Keep taking your diabetes pills (oral medicine) even if you have been vomiting and are feeling sick. Call your provider right away because you may need insulin to lower your blood sugar until you recover from your illness.  · Keep taking your insulin even if you have been vomiting and are feeling sick. Call your provider right away to ask if you need to change your insulin dose. This will depend on your blood sugar results.  · Check your blood sugar every 2 to 4 hours, or at least 4 times a day.  · Check your ketones often. If you are vomiting and having diarrhea, watch them more often.  · Do not skip meals. Try to eat small meals on a regular schedule. Do this even if you do not feel like eating.  · Drink water or other liquids that do not have caffeine or calories. This will keep you from getting dehydrated. If you are nauseated or vomiting, takes small sips every 5 minutes. To prevent dehydration try to drink a cup (8 ounces) of fluids every hour while you are awake.  General care  Always bring a source of fast-acting sugar with you in case you have symptoms of low blood sugar (below 70). At the first sign of low blood sugar, eat or drink 15 to 20 grams of fast-acting sugar to raise your blood sugar. Examples are:  · 3 to 4 glucose tablets. You can buy these at most drugstores.  · 4 ounces (1/2 cup) of regular (not diet) soft drinks  · 4 ounces (1/2 cup) of any fruit juice  · 8 ounces (1 cup) of milk  · 5 to 6 pieces of hard candy  · 1 tablespoon of honey  Check your blood sugar 15 minutes after treating yourself. If it is still below 70, take 15 to 20 more grams of fast-acting sugar. Test again in  15 minutes. If it returns to normal (70 or above), eat a snack or meal to keep your blood sugar in a safe range. If it stays low, call your doctor or go to an emergency room.  Follow-up care  Follow-up with your healthcare provider, or as advised. For more information about diabetes, visit the American Diabetes Association website at www.diabetes.org or call 025-322-0843.  When to seek medical advice  Call your healthcare provider right away if you have any of these symptoms of high blood sugar:  · Frequent urination  · Dizziness  · Drowsiness  · Thirst  · Headache  · Nausea or vomiting  · Abdominal pain  · Eyesight changes  · Fast breathing  · Confusion or loss of consciousness  Also call your provider right away if you have any of these signs of low blood sugar:  · Fatigue  · Headache  · Shakes  · Excess sweating  · Hunger  · Feeling anxious or restless  · Eyesight changes  · Drowsiness  · Weakness  · Confusion or loss of consciousness  Call [phone]  Call for emergency help right away if any of these occur:  · Chest pain or shortness of breath  · Dizziness or fainting  · Weakness of an arm or leg or one side of the face  · Trouble speaking or seeing   Date Last Reviewed: 6/1/2016  © 0227-4598 [employer]. [address] [place] [address], [place] [address]. All rights reserved. This information is not intended as a substitute for professional medical care. Always follow your healthcare professional's instructions.        Weight Management: Getting Started  Healthy bodies come in all shapes and sizes. Not all bodies are made to be thin. For some people, a healthy weight is higher than the average weight listed on weight charts. Your healthcare provider can help you decide on a healthy weight for you.    Reasons to lose weight  Losing weight can help with some health problems, such as high blood pressure, heart disease, diabetes, sleep apnea, and arthritis. You may also feel more energy.  Set your long-term  goal  Your goal doesn't even have to be a specific weight. You may decide on a fitness goal (such as being able to walk 10 miles a week), or a health goal (such as lowering your blood pressure). Choose a goal that is measurable and reasonable, so you know when you've reached it. A goal of reaching a BMI of less than 25 is not always reasonable (or possible).   Make an action plan  Habits dont change overnight. Setting your goals too high can leave you feeling discouraged if you cant reach them. Be realistic. Choose one or two small changes you can make now. Set an action plan for how you are going to make these changes. When you can stick to this plan, keep making a few more small changes. Taking small steps will help you stay on the path to success.  Track your progress  Write down your goals. Then, keep a daily record of your progress. Write down what you eat and how active you are. This record lets you look back on how much youve done. It may also help when youre feeling frustrated. Reward yourself for success. Even if you dont reach every goal, give yourself credit for what you do get done.  Get support  Encouragement from others can help make losing weight easier. Ask your family members and friends for support. They may even want to join you. Also look to your healthcare provider, registered dietitian, and  for help. Your local hospital can give you more information about nutrition, exercise, and weight loss.  Date Last Reviewed: 1/31/2016  © 4914-1583 [employer]. [address] [place] [address], [place] [address]. All rights reserved. This information is not intended as a substitute for professional medical care. Always follow your healthcare professional's instructions.        Walking for Fitness  Fitness walking has something for everyone, even people who are already fit. Walking is one of the safest ways to condition your body aerobically. It can boost energy, help you lose  weight, and reduce stress.    Physical benefits  · Walking strengthens your heart and lungs, and tones your muscles.  · When walking, your feet land with less impact than in other sports. This reduces chances of muscle, bone, and joint injury.  · Regular walking improves your cholesterol levels and lowers your risk of heart disease. And it helps you control your blood sugar if you have diabetes.  · Walking is a weight-bearing activity, which helps maintain bone density. This can help prevent osteoporosis.  Personal rewards  · Taking walks can help you relax and manage stress. And fitness walking may make you feel better about yourself.  · Walking can help you sleep better at night and make you less likely to be depressed.  · Regular walking may help maintain your memory as you get older.  · Walking is a great way to spend extra time with friends and family members. Be sure to invite your dog along!  Q&A about fitness walking  Q: Will walking keep me fit?  A: Yes. Regular walking at the right pace gives you all the benefits of other aerobic activities, such as jogging and swimming.  Q: Will walking help me lose weight and keep it off?  A: Yes. Per mile, walking can burn as many calories as jogging. Your health care provider can help work walking into your weight-loss plan.  Q: Is walking safe for my health?  A: Yes. Walking is safe if you have high blood pressure, diabetes, heart disease, or other conditions. Talk to your health care provider before you start.  Date Last Reviewed: 5/9/2015  © 8525-2934 [employer]. [address] [place] [address], [place] [address]. All rights reserved. This information is not intended as a substitute for professional medical care. Always follow your healthcare professional's instructions.        Back Care Tips    Caring for your back  These are things you can do to prevent a recurrence of acute back pain and to reduce symptoms from chronic back pain:  · Maintain a healthy  weight. If you are overweight, losing weight will help most types of back pain.  · Exercise is an important part of recovery from most types of back pain. The muscles behind and in front of the spine support the back. This means strengthening both the back muscles and the abdominal muscles will provide better support for your spine.   · Swimming and brisk walking are good overall exercises to improve your fitness level.  · Practice safe lifting methods (below).  · Practice good posture when sitting, standing and walking. Avoid prolonged sitting. This puts more stress on the lower back than standing or walking.  · Wear quality shoes with sufficient arch support. Foot and ankle alignment can affect back symptoms. Women should avoid wearing high heels.  · Therapeutic massage can help relax the back muscles without stretching them.  · During the first 24 to 72 hours after an acute injury or flare-up of chronic back pain, apply an ice pack to the painful area for 20 minutes and then remove it for 20 minutes, over a period of 60 to 90 minutes, or several times a day. As a safety precaution, do not use a heating pad at bedtime. Sleeping on a heating pad can lead to skin burns or tissue damage.  · You can alternate ice and heat therapies.  Medications  Talk to your healthcare provider before using medicines, especially if you have other medical problems or are taking other medicines.  · You may use acetaminophen or ibuprofen to control pain, unless your healthcare provider prescribed other pain medicine. If you have chronic conditions like diabetes, liver or kidney disease, stomach ulcers, or gastrointestinal bleeding, or are taking blood thinners, talk with your healthcare provider before taking any medicines.  · Be careful if you are given prescription pain medicines, narcotics, or medicine for muscle spasm. They can cause drowsiness, affect your coordination, reflexes, and judgment. Do not drive or operate heavy  machinery while taking these types of medicines. Take prescription pain medicine only as prescribed by your healthcare provider.  Lumbar stretch  Here is a simple stretching exercise that will help relax muscle spasm and keep your back more limber. If exercise makes your back pain worse, dont do it.  · Lie on your back with your knees bent and both feet on the ground.  · Slowly raise your left knee to your chest as you flatten your lower back against the floor. Hold for 5 seconds.  · Relax and repeat the exercise with your right knee.  · Do 10 of these exercises for each leg.  Safe lifting method  · Dont bend over at the waist to lift an object off the floor.  Instead, bend your knees and hips in a squat.   · Keep your back and head upright  · Hold the object close to your body, directly in front of you.  · Straighten your legs to lift the object.   · Lower the object to the floor in the reverse fashion.  · If you must slide something across the floor, push it.  Posture tips  Sitting  Sit in chairs with straight backs or low-back support. Keep your knees lower than your hips, with your feet flat on the floor.  When driving, sit up straight. Adjust the seat forward so you are not leaning toward the steering wheel.  A small pillow or rolled towel behind your lower back may help if you are driving long distances.   Standing  When standing for long periods, shift most of your weight to one leg at a time. Alternate legs every few minutes.   Sleeping  The best way to sleep is on your side with your knees bent. Put a low pillow under your head to support your neck in a neutral spine position. Avoid thick pillows that bend your neck to one side. Put a pillow between your legs to further relax your lower back. If you sleep on your back, put pillows under your knees to support your legs in a slightly flexed position. Use a firm mattress. If your mattress sags, replace it, or use a 1/2-inch plywood board under the mattress  to add support.  Follow-up care  Follow up with your healthcare provider, or as advised.  If X-rays, a CT scan or an MRI scan were taken, they will be reviewed by a radiologist. You will be notified of any new findings that may affect your care.  Call 911  Seek emergency medical care if any of the following occur:  · Trouble breathing  · Confusion  · Very drowsy  · Fainting or loss of consciousness  · Rapid or very slow heart rate  · Loss of  bowel or bladder control  When to seek medical care  Call your healthcare provider if any of the following occur:  · Pain becomes worse or spreads to your arms or legs  · Weakness or numbness in one or both arms or legs  · Numbness in the groin area  Date Last Reviewed: 6/1/2016  © 0505-9767 [employer], [employer] [address] [place] [address], [place] [address]. All rights reserved. This information is not intended as a substitute for professional medical care. Always follow your healthcare professional's instructions.

## 2017-02-24 ENCOUNTER — TELEPHONE (OUTPATIENT)
Dept: HEMATOLOGY/ONCOLOGY | Facility: CLINIC | Age: 67
End: 2017-02-24

## 2017-02-24 LAB
BACTERIA UR CULT: NORMAL
BACTERIA UR CULT: NORMAL

## 2017-02-24 NOTE — TELEPHONE ENCOUNTER
Returned pts call and rescheduled her lab appt to 5/17/17 @ 1000, Dr. Green appt to 5/24/17 @ 1:40PM, and scheduled her prolia for 5/24/17 @ 2:20PM @ [facility].  Pt verbalizes understanding and appreciation.

## 2017-02-24 NOTE — TELEPHONE ENCOUNTER
----- Message from Corie Ibarra sent at 2/24/2017  1:11 PM CST -----  Contact: self  Patient [phone] is needing to change her appt on [phone] as she will not be in town/please call patient

## 2017-03-13 ENCOUNTER — OFFICE VISIT (OUTPATIENT)
Dept: ORTHOPEDICS | Facility: CLINIC | Age: 67
End: 2017-03-13
Payer: MEDICARE

## 2017-03-13 DIAGNOSIS — M17.0 PRIMARY OSTEOARTHRITIS OF BOTH KNEES: Primary | ICD-10-CM

## 2017-03-13 PROCEDURE — 99999 PR PBB SHADOW E&M-EST. PATIENT-LVL II: CPT | Mod: PBBFAC,,, | Performed by: ORTHOPAEDIC SURGERY

## 2017-03-13 PROCEDURE — 99213 OFFICE O/P EST LOW 20 MIN: CPT | Mod: 25,S$GLB,, | Performed by: ORTHOPAEDIC SURGERY

## 2017-03-13 PROCEDURE — 20610 DRAIN/INJ JOINT/BURSA W/O US: CPT | Mod: 50,S$GLB,, | Performed by: ORTHOPAEDIC SURGERY

## 2017-03-13 NOTE — PROGRESS NOTES
Past Medical History:   Diagnosis Date    Allergy     multiple antibiotic allergies    Anemia     Anxiety     Arthritis     Asthma     CHF (congestive heart failure)     NYHA class III     Chronic kidney disease     Colon polyp     benign    COPD (chronic obstructive pulmonary disease)     DLCO 37% , and mild pulmonary HTN    Depression     Diabetes mellitus     Diabetic retinopathy     Diastolic dysfunction     Diverticulosis     Former smoker     Fracture of lumbar spine     GERD (gastroesophageal reflux disease)     Hernia of unspecified site of abdominal cavity without mention of obstruction or gangrene     Hyperlipidemia     Hypertension     hypertensive renal    IBS (irritable bowel syndrome)     Mild nonproliferative diabetic retinopathy(362.04) 11/25/2013    Morbid obesity     Nuclear sclerosis 11/25/2013    Osteoporosis     Peripheral edema     Rash     Recurrent upper respiratory infection (URI)     S/P LASIK (laser assisted in situ keratomileusis)     Sleep apnea     sleep apnea uses bipap.    Thyroid disease     on synthroid    TIA (transient ischemic attack)     Urinary tract infection        Past Surgical History:   Procedure Laterality Date    ABDOMINAL SURGERY      ADENOIDECTOMY      COLONOSCOPY  03/05/2013    repeat in 5 years    COSMETIC SURGERY      CYSTOSCOPY      EYE SURGERY Right     mazzulla    GASTRECTOMY      gastric sleeve      gastric sleeve      HERNIA REPAIR      5 years old    HYSTERECTOMY      ovaries remain    REFRACTIVE SURGERY      mono va//ou//    RHINOPLASTY TIP      TONSILLECTOMY      TUBAL LIGATION      UPPER GASTROINTESTINAL ENDOSCOPY  03/05/2013    UPPER GASTROINTESTINAL ENDOSCOPY  08/24/2016    Dr. Veras, repeat in 8 weeks    UPPER GASTROINTESTINAL ENDOSCOPY  07/21/2016    Dr. Randall       Current Outpatient Prescriptions   Medication Sig    allopurinol (ZYLOPRIM) 100 MG tablet Take 2 tablets (200 mg total) by mouth  nightly.    atenolol (TENORMIN) 25 MG tablet Take 1 tablet (25 mg total) by mouth once daily.    calcitRIOL (ROCALTROL) 0.25 MCG Cap Take 1 capsule by mouth twice a week. Monday Friday    CALCIUM CITRATE/VITAMIN D3 (CALCIUM CITRATE + D ORAL) Take 400 mg by mouth 3 (three) times daily.    cetirizine (ZYRTEC) 10 MG tablet Take 1 tablet (10 mg total) by mouth once daily.    clotrimazole-betamethasone 1-0.05% (LOTRISONE) cream     cyanocobalamin, vitamin B-12, (VITAMIN B-12) 5,000 mcg Subl Place 5,000 mg under the tongue once a week.    DENOSUMAB (PROLIA SUBQ) Inject into the skin every 6 (six) months.     DIABETIC SUPPLIES,MISCELL ([employer] REMOTE CONTROL MISC) No Sig Provided    fluoxetine (PROZAC) 20 MG capsule Take 20 mg by mouth once daily.     fluticasone (FLONASE) 50 mcg/actuation nasal spray 2 sprays by Nasal route once daily.     fluticasone-vilanterol (BREO) 100-25 mcg/dose diskus inhaler Inhale 1 puff into the lungs once daily.    insulin aspart (NOVOLOG) 100 unit/mL injection Inject into the skin continuous. Pt has insulin pump    Lactobacillus rhamnosus GG (CULTURELLE) 10 billion cell capsule Take 1 capsule by mouth once daily.    levmefolate-B6 phos-methyl-B12 3-35-2 mg Tab Take 1 tablet by mouth 2 (two) times daily.    levothyroxine (SYNTHROID) 25 MCG tablet Take 1 tablet (25 mcg total) by mouth once daily.    lorazepam (ATIVAN) 0.5 MG tablet Take 1 tablet (0.5 mg total) by mouth every evening.    losartan (COZAAR) 25 MG tablet Take 1 tablet by mouth once daily.    melatonin 3 mg Tab Take 6 mg by mouth every evening.     metOLazone (ZAROXOLYN) 5 MG tablet Take 1 tablet (5 mg total) by mouth daily as needed (swelling).    metronidazole (METROGEL) 0.75 % gel Apply topically 2 (two) times daily.    montelukast (SINGULAIR) 10 mg tablet Take 10 mg by mouth once daily.     MULTIVIT-IRON-MIN-FOLIC ACID 3,500-18-0.4 UNIT-MG-MG ORAL CHEW Take by mouth 2 (two) times daily.    mupirocin  (BACTROBAN) 2 % ointment APPLY OINTMENT TOPICALLY TO AFFECTED AREA ON NOSE THREE TIMES DAILY AS DIRECTED    oxycodone (ROXICODONE) 15 MG Tab Take 15 mg by mouth every 8 (eight) hours as needed for Pain.     pantoprazole (PROTONIX) 40 MG tablet Take 1 tablet (40 mg total) by mouth once daily.    potassium chloride SA (K-DUR,KLOR-CON) 20 MEQ tablet Take 1 tablet (20 mEq total) by mouth 2 (two) times daily.    ranitidine (ZANTAC) 300 MG tablet TAKE ONE TABLET BY MOUTH IN THE EVENING    torsemide (DEMADEX) 20 MG Tab Take 2 tablets (40 mg total) by mouth once daily.    trazodone (DESYREL) 50 MG tablet Take 1 tablet (50 mg total) by mouth every evening.     No current facility-administered medications for this visit.        Review of patient's allergies indicates:   Allergen Reactions    Adhesive Other (See Comments)     Blisters    Cleocin [clindamycin hcl] Hives    Ceclor [cefaclor] Hives    Morphine Nausea And Vomiting    Advair diskus [fluticasone-salmeterol]      Dry mouth    Aleve [naproxen sodium]      Unable to take secondary to kidney function.     Levaquin [levofloxacin] Dermatitis    Macrodantin [nitrofurantoin macrocrystalline] Hives    Motrin [ibuprofen]      Unable to take secondary to kidney functions.    Sulfa (sulfonamide antibiotics)      Hold due to renal problems    Remeron [mirtazapine] Palpitations and Other (See Comments)     Jittery    Vancomycin analogues Rash     Feet broke out/inflammed blood vessels         Family History   Problem Relation Age of Onset    Heart disease Mother 59    Cancer Mother 59     throat    Allergies Mother     Diabetes Mother     Heart disease Father 70     flutter    Allergies Father     Diabetes Father     Cancer Sister 22     22 thyroid,49  breast    Allergies Sister     Breast cancer Sister     Diabetes Sister     Cancer Brother 62     lung    Diabetes Brother     Asthma Daughter     Diabetes Daughter     Depression Daughter      Asthma Grandchild     Eczema Grandchild     Eczema Grandchild     Breast cancer Maternal Grandmother     Ovarian cancer Cousin     Amblyopia Neg Hx     Blindness Neg Hx     Cataracts Neg Hx     Glaucoma Neg Hx     Hypertension Neg Hx     Macular degeneration Neg Hx     Retinal detachment Neg Hx     Strabismus Neg Hx     Stroke Neg Hx     Thyroid disease Neg Hx     Angioedema Neg Hx     Immunodeficiency Neg Hx     Allergic rhinitis Neg Hx     Atopy Neg Hx     Rhinitis Neg Hx     Urticaria Neg Hx     Colon cancer Neg Hx     Colon polyps Neg Hx     Crohn's disease Neg Hx     Ulcerative colitis Neg Hx     Celiac disease Neg Hx        Social History     Social History    Marital status:      Spouse name: N/A    Number of children: N/A    Years of education: N/A     Occupational History    Not on file.     Social History Main Topics    Smoking status: Former Smoker     Types: Cigarettes    Smokeless tobacco: Never Used      Comment: quit 30 years ago    Alcohol use No      Comment: rare    Drug use: No    Sexual activity: Yes     Birth control/ protection: Surgical     Other Topics Concern    Not on file     Social History Narrative       Chief Complaint:   Chief Complaint   Patient presents with    Knee Pain     bilateral Synvisc 1/3        History: This is a 66-year-old female with chronic bilateral knee pain.  Left is greater than the right.  Patient has had injections previously.  Cortisone does not work very well.  Has had good success with a Synvisc series.  Synvisc one did not work well for her.  Rates her pain currently as a 7 out of 10.  Symptoms are worsening and moderate to severe.    Present: She got good results with the last Synvisc series.  Pain is back up to an 8 out of 10.  Been getting slowly worse over the last few months.    Review of Systems:    Musculoskeletal:  See HPI          Physical Examination:    Vital Signs:    There were no vitals filed for this  visit.    This a well-developed, well nourished patient in no acute distress.  They are alert and oriented and cooperative to examination.  Pt. walks without an antalgic gait.      Examination of bilateral knees knee shows no rashes or erythema. There are no masses ecchymosis or effusion. Patient has full range of motion from 0-130°. Patient is nontender to palpation over lateral joint line and moderately tender to palpation over the medial joint line.  Knee is stable to varus and valgus stress. 5 out of 5 motor strength. Palpable distal pulses. Intact light touch sensation. Negative Patellofemoral crepitus        X-rays: 4 views of bilateral knees are  reviewed which show medial narrowing that is significantly worse on the left     Assessment:: Bilateral knee arthritis     Plan: Talked about treatment options.  Patient gets good results from the Synvisc series.  I injected her with Synvisc 1 of 3 today.  Follow-up next week

## 2017-03-20 ENCOUNTER — TELEPHONE (OUTPATIENT)
Dept: FAMILY MEDICINE | Facility: CLINIC | Age: 67
End: 2017-03-20

## 2017-03-20 ENCOUNTER — OFFICE VISIT (OUTPATIENT)
Dept: ORTHOPEDICS | Facility: CLINIC | Age: 67
End: 2017-03-20
Payer: MEDICARE

## 2017-03-20 DIAGNOSIS — M17.0 PRIMARY OSTEOARTHRITIS OF BOTH KNEES: Primary | ICD-10-CM

## 2017-03-20 PROCEDURE — 99999 PR PBB SHADOW E&M-EST. PATIENT-LVL II: CPT | Mod: PBBFAC,,, | Performed by: ORTHOPAEDIC SURGERY

## 2017-03-20 PROCEDURE — 20610 DRAIN/INJ JOINT/BURSA W/O US: CPT | Mod: 50,S$GLB,, | Performed by: ORTHOPAEDIC SURGERY

## 2017-03-20 PROCEDURE — 99499 UNLISTED E&M SERVICE: CPT | Mod: S$GLB,,, | Performed by: ORTHOPAEDIC SURGERY

## 2017-03-20 NOTE — PROCEDURES
Large Joint Aspiration/Injection  Date/Time: 3/20/2017 1:13 PM  Performed by: WALE BENTON  Authorized by: WALE BENTON     Consent Done?:  Yes (Verbal)  Indications:  Pain  Procedure site marked: Yes    Timeout: Prior to procedure the correct patient, procedure, and site was verified      Location:  Knee  Site:  R knee and L knee  Prep: Patient was prepped and draped in usual sterile fashion    Needle size:  20 G  Approach:  Anterolateral  Medications:  16 mg hyaluronate 16 mg/2 mL; 16 mg hyaluronate 16 mg/2 mL  Patient tolerance:  Patient tolerated the procedure well with no immediate complications

## 2017-03-20 NOTE — PROGRESS NOTES
Past Medical History:   Diagnosis Date    Allergy     multiple antibiotic allergies    Anemia     Anxiety     Arthritis     Asthma     CHF (congestive heart failure)     NYHA class III     Chronic kidney disease     Colon polyp     benign    COPD (chronic obstructive pulmonary disease)     DLCO 37% , and mild pulmonary HTN    Depression     Diabetes mellitus     Diabetic retinopathy     Diastolic dysfunction     Diverticulosis     Former smoker     Fracture of lumbar spine     GERD (gastroesophageal reflux disease)     Hernia of unspecified site of abdominal cavity without mention of obstruction or gangrene     Hyperlipidemia     Hypertension     hypertensive renal    IBS (irritable bowel syndrome)     Mild nonproliferative diabetic retinopathy(362.04) 11/25/2013    Morbid obesity     Nuclear sclerosis 11/25/2013    Osteoporosis     Peripheral edema     Rash     Recurrent upper respiratory infection (URI)     S/P LASIK (laser assisted in situ keratomileusis)     Sleep apnea     sleep apnea uses bipap.    Thyroid disease     on synthroid    TIA (transient ischemic attack)     Urinary tract infection        Past Surgical History:   Procedure Laterality Date    ABDOMINAL SURGERY      ADENOIDECTOMY      COLONOSCOPY  03/05/2013    repeat in 5 years    COSMETIC SURGERY      CYSTOSCOPY      EYE SURGERY Right     mazzulla    GASTRECTOMY      gastric sleeve      gastric sleeve      HERNIA REPAIR      5 years old    HYSTERECTOMY      ovaries remain    REFRACTIVE SURGERY      mono va//ou//    RHINOPLASTY TIP      TONSILLECTOMY      TUBAL LIGATION      UPPER GASTROINTESTINAL ENDOSCOPY  03/05/2013    UPPER GASTROINTESTINAL ENDOSCOPY  08/24/2016    Dr. Veras, repeat in 8 weeks    UPPER GASTROINTESTINAL ENDOSCOPY  07/21/2016    Dr. Randall       Current Outpatient Prescriptions   Medication Sig    allopurinol (ZYLOPRIM) 100 MG tablet Take 2 tablets (200 mg total) by mouth  nightly.    atenolol (TENORMIN) 25 MG tablet Take 1 tablet (25 mg total) by mouth once daily.    calcitRIOL (ROCALTROL) 0.25 MCG Cap Take 1 capsule by mouth twice a week. Monday Friday    CALCIUM CITRATE/VITAMIN D3 (CALCIUM CITRATE + D ORAL) Take 400 mg by mouth 3 (three) times daily.    cetirizine (ZYRTEC) 10 MG tablet Take 1 tablet (10 mg total) by mouth once daily.    clotrimazole-betamethasone 1-0.05% (LOTRISONE) cream     cyanocobalamin, vitamin B-12, (VITAMIN B-12) 5,000 mcg Subl Place 5,000 mg under the tongue once a week.    DENOSUMAB (PROLIA SUBQ) Inject into the skin every 6 (six) months.     DIABETIC SUPPLIES,MISCELL ([employer] REMOTE CONTROL MISC) No Sig Provided    fluoxetine (PROZAC) 20 MG capsule Take 20 mg by mouth once daily.     fluticasone (FLONASE) 50 mcg/actuation nasal spray 2 sprays by Nasal route once daily.     fluticasone-vilanterol (BREO) 100-25 mcg/dose diskus inhaler Inhale 1 puff into the lungs once daily.    insulin aspart (NOVOLOG) 100 unit/mL injection Inject into the skin continuous. Pt has insulin pump    Lactobacillus rhamnosus GG (CULTURELLE) 10 billion cell capsule Take 1 capsule by mouth once daily.    levmefolate-B6 phos-methyl-B12 3-35-2 mg Tab Take 1 tablet by mouth 2 (two) times daily.    levothyroxine (SYNTHROID) 25 MCG tablet Take 1 tablet (25 mcg total) by mouth once daily.    lorazepam (ATIVAN) 0.5 MG tablet Take 1 tablet (0.5 mg total) by mouth every evening.    losartan (COZAAR) 25 MG tablet Take 1 tablet by mouth once daily.    melatonin 3 mg Tab Take 6 mg by mouth every evening.     metOLazone (ZAROXOLYN) 5 MG tablet Take 1 tablet (5 mg total) by mouth daily as needed (swelling).    metronidazole (METROGEL) 0.75 % gel Apply topically 2 (two) times daily.    montelukast (SINGULAIR) 10 mg tablet Take 10 mg by mouth once daily.     MULTIVIT-IRON-MIN-FOLIC ACID 3,500-18-0.4 UNIT-MG-MG ORAL CHEW Take by mouth 2 (two) times daily.    mupirocin  (BACTROBAN) 2 % ointment APPLY OINTMENT TOPICALLY TO AFFECTED AREA ON NOSE THREE TIMES DAILY AS DIRECTED    oxycodone (ROXICODONE) 15 MG Tab Take 15 mg by mouth every 8 (eight) hours as needed for Pain.     pantoprazole (PROTONIX) 40 MG tablet Take 1 tablet (40 mg total) by mouth once daily.    potassium chloride SA (K-DUR,KLOR-CON) 20 MEQ tablet Take 1 tablet (20 mEq total) by mouth 2 (two) times daily.    ranitidine (ZANTAC) 300 MG tablet TAKE ONE TABLET BY MOUTH IN THE EVENING    torsemide (DEMADEX) 20 MG Tab Take 2 tablets (40 mg total) by mouth once daily.    trazodone (DESYREL) 50 MG tablet Take 1 tablet (50 mg total) by mouth every evening.     No current facility-administered medications for this visit.        Review of patient's allergies indicates:   Allergen Reactions    Adhesive Other (See Comments)     Blisters    Cleocin [clindamycin hcl] Hives    Ceclor [cefaclor] Hives    Morphine Nausea And Vomiting    Advair diskus [fluticasone-salmeterol]      Dry mouth    Aleve [naproxen sodium]      Unable to take secondary to kidney function.     Levaquin [levofloxacin] Dermatitis    Macrodantin [nitrofurantoin macrocrystalline] Hives    Motrin [ibuprofen]      Unable to take secondary to kidney functions.    Sulfa (sulfonamide antibiotics)      Hold due to renal problems    Remeron [mirtazapine] Palpitations and Other (See Comments)     Jittery    Vancomycin analogues Rash     Feet broke out/inflammed blood vessels         Family History   Problem Relation Age of Onset    Heart disease Mother 59    Cancer Mother 59     throat    Allergies Mother     Diabetes Mother     Heart disease Father 70     flutter    Allergies Father     Diabetes Father     Cancer Sister 22     22 thyroid,49  breast    Allergies Sister     Breast cancer Sister     Diabetes Sister     Cancer Brother 62     lung    Diabetes Brother     Asthma Daughter     Diabetes Daughter     Depression Daughter      Asthma Grandchild     Eczema Grandchild     Eczema Grandchild     Breast cancer Maternal Grandmother     Ovarian cancer Cousin     Amblyopia Neg Hx     Blindness Neg Hx     Cataracts Neg Hx     Glaucoma Neg Hx     Hypertension Neg Hx     Macular degeneration Neg Hx     Retinal detachment Neg Hx     Strabismus Neg Hx     Stroke Neg Hx     Thyroid disease Neg Hx     Angioedema Neg Hx     Immunodeficiency Neg Hx     Allergic rhinitis Neg Hx     Atopy Neg Hx     Rhinitis Neg Hx     Urticaria Neg Hx     Colon cancer Neg Hx     Colon polyps Neg Hx     Crohn's disease Neg Hx     Ulcerative colitis Neg Hx     Celiac disease Neg Hx        Social History     Social History    Marital status:      Spouse name: N/A    Number of children: N/A    Years of education: N/A     Occupational History    Not on file.     Social History Main Topics    Smoking status: Former Smoker     Types: Cigarettes    Smokeless tobacco: Never Used      Comment: quit 30 years ago    Alcohol use No      Comment: rare    Drug use: No    Sexual activity: Yes     Birth control/ protection: Surgical     Other Topics Concern    Not on file     Social History Narrative       Chief Complaint:   Chief Complaint   Patient presents with    Knee Pain     bilateral knee-Synvisc 2/3        History: This is a 66-year-old female with chronic bilateral knee pain.  Left is greater than the right.  Patient has had injections previously.  Cortisone does not work very well.  Has had good success with a Synvisc series.  Synvisc one did not work well for her.  Rates her pain currently as a 7 out of 10.  Symptoms are worsening and moderate to severe.    Present: She got good results with the last Synvisc series.  Pain as a 5 out of 10.    Review of Systems:    Musculoskeletal:  See HPI          Physical Examination:    Vital Signs:    There were no vitals filed for this visit.    This a well-developed, well nourished patient in no  acute distress.  They are alert and oriented and cooperative to examination.  Pt. walks without an antalgic gait.      Examination of bilateral knees knee shows no rashes or erythema. There are no masses ecchymosis or effusion. Patient has full range of motion from 0-130°. Patient is nontender to palpation over lateral joint line and moderately tender to palpation over the medial joint line.  Knee is stable to varus and valgus stress. 5 out of 5 motor strength. Palpable distal pulses. Intact light touch sensation. Negative Patellofemoral crepitus        X-rays: 4 views of bilateral knees are  reviewed which show medial narrowing that is significantly worse on the left     Assessment:: Bilateral knee arthritis     Plan:   I injected her with Synvisc 2 of 3 today.  Follow-up next week

## 2017-03-20 NOTE — TELEPHONE ENCOUNTER
Received message stating trazodone is causing increased pulse. Call placed to patient regarding. No answer received. Phone rings several x's and then there is a busy signal. No voicemail available.

## 2017-03-22 ENCOUNTER — TELEPHONE (OUTPATIENT)
Dept: GASTROENTEROLOGY | Facility: CLINIC | Age: 67
End: 2017-03-22

## 2017-03-22 ENCOUNTER — TELEPHONE (OUTPATIENT)
Dept: FAMILY MEDICINE | Facility: CLINIC | Age: 67
End: 2017-03-22

## 2017-03-22 NOTE — TELEPHONE ENCOUNTER
----- Message from Kellen Mcintyre sent at 3/22/2017 11:07 AM CDT -----  Contact: self  Patient called regarding an issue with medication, trazodone. Patient left a message on Monday and did not receive a call. Please contact [phone] (home)

## 2017-03-22 NOTE — TELEPHONE ENCOUNTER
Spoke to patient who states for 4 nights in a row after taking Trazodone she experienced a feeling of her heart racing for 3 straight hours. Patient stopped taking the trazodone and switched back to taking Ativan. Patient has an appointment with Dr. Vargas on 4-12-17, states she will discuss this at that appointment. Assured patient her concerns will be forwarded to PCP for notification.

## 2017-03-22 NOTE — TELEPHONE ENCOUNTER
rojelio states she is having diarrhea and nothing helping patient informed the stool specimen she collected yesterday is no longer good to submit for sample. Will keep appointment as scheduled.

## 2017-03-22 NOTE — TELEPHONE ENCOUNTER
----- Message from Kellen Mcintyre sent at 3/22/2017 11:06 AM CDT -----  Contact: self  Patient called regarding having issues with stool. Stating she has a sample of it and wanted to drop off at the lab. Wanted the office to submit an order to do so. Please contact [phone] (home)

## 2017-03-27 ENCOUNTER — OFFICE VISIT (OUTPATIENT)
Dept: ORTHOPEDICS | Facility: CLINIC | Age: 67
End: 2017-03-27
Payer: MEDICARE

## 2017-03-27 DIAGNOSIS — M17.0 PRIMARY OSTEOARTHRITIS OF BOTH KNEES: Primary | ICD-10-CM

## 2017-03-27 PROCEDURE — 20610 DRAIN/INJ JOINT/BURSA W/O US: CPT | Mod: 50,S$GLB,, | Performed by: ORTHOPAEDIC SURGERY

## 2017-03-27 PROCEDURE — 99499 UNLISTED E&M SERVICE: CPT | Mod: S$GLB,,, | Performed by: ORTHOPAEDIC SURGERY

## 2017-03-27 PROCEDURE — 99999 PR PBB SHADOW E&M-EST. PATIENT-LVL II: CPT | Mod: PBBFAC,,, | Performed by: ORTHOPAEDIC SURGERY

## 2017-03-27 RX ORDER — FLUTICASONE PROPIONATE 50 MCG
SPRAY, SUSPENSION (ML) NASAL
Qty: 2 BOTTLE | Refills: 3 | Status: SHIPPED | OUTPATIENT
Start: 2017-03-27 | End: 2017-12-23 | Stop reason: SDUPTHER

## 2017-03-27 NOTE — PROCEDURES
Large Joint Aspiration/Injection  Date/Time: 3/27/2017 1:19 PM  Performed by: WALE BENTON  Authorized by: WALE BENTON     Consent Done?:  Yes (Verbal)  Indications:  Pain  Procedure site marked: Yes    Timeout: Prior to procedure the correct patient, procedure, and site was verified      Location:  Knee  Site:  R knee and L knee  Prep: Patient was prepped and draped in usual sterile fashion    Needle size:  20 G  Approach:  Anterolateral  Medications:  16 mg hyaluronate 16 mg/2 mL; 16 mg hyaluronate 16 mg/2 mL  Patient tolerance:  Patient tolerated the procedure well with no immediate complications

## 2017-04-02 ENCOUNTER — TELEPHONE (OUTPATIENT)
Dept: FAMILY MEDICINE | Facility: CLINIC | Age: 67
End: 2017-04-02

## 2017-04-02 RX ORDER — TEMAZEPAM 15 MG/1
15 CAPSULE ORAL NIGHTLY PRN
Qty: 15 CAPSULE | Refills: 0 | Status: SHIPPED | OUTPATIENT
Start: 2017-04-02 | End: 2017-04-12

## 2017-04-02 NOTE — TELEPHONE ENCOUNTER
----- Message from Tanika Srivastava LPN sent at 3/20/2017  6:09 PM CDT -----  Contact: Patient      ----- Message -----     From: Marie Bee     Sent: 3/20/2017  10:26 AM       To: Alicia Caldera Staff    Fifi, patient [phone], Calling about sleeping medication Rx Trazodone is making her heart race and she advised pharmacy. They have label her allergic to the medication. Patient is wanting to speak with the nurse, regarding same. Patient has stopped medication and went back to the Ativan. Please advise. Thanks.

## 2017-04-07 ENCOUNTER — LAB VISIT (OUTPATIENT)
Dept: LAB | Facility: HOSPITAL | Age: 67
End: 2017-04-07
Attending: FAMILY MEDICINE
Payer: MEDICARE

## 2017-04-07 ENCOUNTER — OFFICE VISIT (OUTPATIENT)
Dept: PLASTIC SURGERY | Facility: CLINIC | Age: 67
End: 2017-04-07
Payer: MEDICARE

## 2017-04-07 DIAGNOSIS — E11.9 TYPE 2 DIABETES MELLITUS WITHOUT COMPLICATION: ICD-10-CM

## 2017-04-07 DIAGNOSIS — Z09 SURGERY FOLLOW-UP EXAMINATION: Primary | ICD-10-CM

## 2017-04-07 LAB
ESTIMATED AVG GLUCOSE: 157 MG/DL
HBA1C MFR BLD HPLC: 7.1 %

## 2017-04-07 PROCEDURE — 83036 HEMOGLOBIN GLYCOSYLATED A1C: CPT

## 2017-04-07 PROCEDURE — 99212 OFFICE O/P EST SF 10 MIN: CPT | Mod: S$GLB,,, | Performed by: SURGERY

## 2017-04-07 PROCEDURE — 36415 COLL VENOUS BLD VENIPUNCTURE: CPT | Mod: PO

## 2017-04-07 NOTE — PROGRESS NOTES
Ms. Mcnair presents after having a panniculectomy.  She has done great.    Everything is healing nicely.  She has gained over 10 pounds.  I encouraged her   not to do that.  We will see her back in six months.      AMAURI/VINCE  dd: 04/07/2017 10:41:04 (CDT)  td: 04/07/2017 17:41:13 (CDT)  Doc ID   #[id number]  Job ID #[id number]    CC:

## 2017-04-10 ENCOUNTER — OFFICE VISIT (OUTPATIENT)
Dept: GASTROENTEROLOGY | Facility: CLINIC | Age: 67
End: 2017-04-10
Payer: MEDICARE

## 2017-04-10 VITALS
HEART RATE: 65 BPM | BODY MASS INDEX: 40.93 KG/M2 | WEIGHT: 222.44 LBS | HEIGHT: 62 IN | DIASTOLIC BLOOD PRESSURE: 56 MMHG | SYSTOLIC BLOOD PRESSURE: 117 MMHG

## 2017-04-10 DIAGNOSIS — Z86.010 HISTORY OF COLON POLYPS: ICD-10-CM

## 2017-04-10 DIAGNOSIS — K92.1 BLOOD IN STOOL: ICD-10-CM

## 2017-04-10 DIAGNOSIS — R19.7 DIARRHEA, UNSPECIFIED TYPE: Primary | ICD-10-CM

## 2017-04-10 DIAGNOSIS — R19.4 CHANGE IN BOWEL HABITS: ICD-10-CM

## 2017-04-10 DIAGNOSIS — D64.9 ANEMIA, UNSPECIFIED TYPE: ICD-10-CM

## 2017-04-10 PROCEDURE — 99999 PR PBB SHADOW E&M-EST. PATIENT-LVL II: CPT | Mod: PBBFAC,,, | Performed by: INTERNAL MEDICINE

## 2017-04-10 PROCEDURE — 99214 OFFICE O/P EST MOD 30 MIN: CPT | Mod: S$GLB,,, | Performed by: INTERNAL MEDICINE

## 2017-04-10 RX ORDER — DIPHENOXYLATE HYDROCHLORIDE AND ATROPINE SULFATE 2.5; .025 MG/1; MG/1
TABLET ORAL
COMMUNITY
Start: 2017-02-16 | End: 2017-11-30 | Stop reason: SDUPTHER

## 2017-04-10 NOTE — PROGRESS NOTES
Subjective:       Patient ID: Fifi Mcnair is a 66 y.o. female.    This is an established patient.      Chief Complaint: Diarrhea and Rectal Bleeding    Diarrhea    This is a new problem. The current episode started more than 1 month ago. The problem occurs 2 to 4 times per day. The problem has been waxing and waning. Diarrhea characteristics: sometimes with scant BRBPR and whitish mucus discharge. The patient states that diarrhea does not awaken her from sleep. Associated symptoms include abdominal pain (vague, improving). Pertinent negatives include no arthralgias, bloating, chills, coughing, fever, headaches, myalgias, vomiting or weight loss. Associated symptoms comments: BRBPR. Nothing aggravates the symptoms. Risk factors include recent antibiotic use (Had panniculectomy in 11/2016 with subsequent skin infection/sepsis). She has tried anti-motility drug and change of diet for the symptoms. The treatment provided mild relief. Her past medical history is significant for a recent abdominal surgery. + history of sleeve gastrectomy and panniculectomy.  She had adenoma resected during colonoscopy 4 years ago with Dr. Randall     Review of Systems   Constitutional: Negative for chills, fatigue, fever and weight loss.   HENT: Negative for sore throat and trouble swallowing.    Respiratory: Negative for cough, shortness of breath and wheezing.    Cardiovascular: Negative for chest pain and palpitations.   Gastrointestinal: Positive for abdominal pain (vague, improving) and diarrhea. Negative for bloating, blood in stool, nausea and vomiting.   Genitourinary: Negative for dysuria and hematuria.   Musculoskeletal: Negative for arthralgias and myalgias.   Skin: Negative for color change and rash.   Neurological: Negative for dizziness and headaches.   Hematological: Negative for adenopathy.   Psychiatric/Behavioral: Negative for confusion. The patient is not nervous/anxious.    All other systems reviewed and are  "negative.      Objective:       Vitals:    04/10/17 1319   BP: (!) 117/56   Pulse: 65   Weight: 100.9 kg (222 lb 7.1 oz)   Height: 5' 2" (1.575 m)       Physical Exam   Constitutional: She appears well-developed and well-nourished.   HENT:   Head: Normocephalic and atraumatic.   Eyes: Pupils are equal, round, and reactive to light. No scleral icterus.   Neck: Normal range of motion.   Cardiovascular: Normal rate and regular rhythm.    No murmur heard.  Pulmonary/Chest: Effort normal and breath sounds normal. She has no wheezes.   Abdominal: Soft. Bowel sounds are normal. She exhibits no distension. There is tenderness (mild).   + large transverse surgical scar - well healed     Musculoskeletal: She exhibits no edema or tenderness.   Lymphadenopathy:     She has no cervical adenopathy.   Neurological: She is alert.   Skin: Skin is warm and dry. No rash noted.         Lab Results   Component Value Date    WBC 9.24 01/23/2017    HGB 10.8 (L) 01/23/2017    HCT 33.6 (L) 01/23/2017    MCV 91 01/23/2017     (H) 01/23/2017       CMP  Sodium   Date Value Ref Range Status   01/23/2017 137 136 - 145 mmol/L Final     Potassium   Date Value Ref Range Status   01/23/2017 3.7 3.5 - 5.1 mmol/L Final     Chloride   Date Value Ref Range Status   01/23/2017 101 95 - 110 mmol/L Final     CO2   Date Value Ref Range Status   01/23/2017 26 23 - 29 mmol/L Final     Glucose   Date Value Ref Range Status   01/23/2017 187 (H) 70 - 110 mg/dL Final     BUN, Bld   Date Value Ref Range Status   01/23/2017 16 8 - 23 mg/dL Final     Creatinine   Date Value Ref Range Status   01/23/2017 1.1 0.5 - 1.4 mg/dL Final   08/31/2013 1.1 0.5 - 1.4 mg/dL Final     Calcium   Date Value Ref Range Status   01/23/2017 8.5 (L) 8.7 - 10.5 mg/dL Final   08/31/2013 9.5 8.7 - 10.5 mg/dL Final     Total Protein   Date Value Ref Range Status   01/23/2017 7.2 6.0 - 8.4 g/dL Final     Albumin   Date Value Ref Range Status   01/23/2017 3.1 (L) 3.5 - 5.2 g/dL Final "     Total Bilirubin   Date Value Ref Range Status   01/23/2017 0.3 0.1 - 1.0 mg/dL Final     Comment:     For infants and newborns, interpretation of results should be based  on gestational age, weight and in agreement with clinical  observations.  Premature Infant recommended reference ranges:  Up to 24 hours.............<8.0 mg/dL  Up to 48 hours............<12.0 mg/dL  3-5 days..................<15.0 mg/dL  6-29 days.................<15.0 mg/dL       Alkaline Phosphatase   Date Value Ref Range Status   01/23/2017 87 55 - 135 U/L Final   08/30/2013 93 55 - 135 U/L Final     AST   Date Value Ref Range Status   01/23/2017 20 10 - 40 U/L Final   08/30/2013 21 10 - 40 U/L Final     ALT   Date Value Ref Range Status   01/23/2017 14 10 - 44 U/L Final     Anion Gap   Date Value Ref Range Status   01/23/2017 10 8 - 16 mmol/L Final   08/31/2013 12 5 - 15 meq/L Final     eGFR if    Date Value Ref Range Status   01/23/2017 >60.0 >60 mL/min/1.73 m^2 Final     eGFR if non    Date Value Ref Range Status   01/23/2017 52.4 (A) >60 mL/min/1.73 m^2 Final     Comment:     Calculation used to obtain the estimated glomerular filtration  rate (eGFR) is the CKD-EPI equation. Since race is unknown   in our information system, the eGFR values for   -American and Non--American patients are given   for each creatinine result.           Assessment:       1. Diarrhea, unspecified type    2. Blood in stool    3. Change in bowel habits    4. Anemia, unspecified type    5. History of colon polyps        Plan:       1.  Check stool studies  2.  Check labs   3.  Schedule colonoscopy with biopsy  4.  Increase fiber intake  5.  OK to use antimotility agents PRN but patient has been cautioned to use them only sparingly.  6.  Further recommendations to follow after above.  7.  Communication will be sent to the referring MD, Dr. Mcnair regarding my assessment and plan on this patient via EP[facility].

## 2017-04-12 ENCOUNTER — OFFICE VISIT (OUTPATIENT)
Dept: FAMILY MEDICINE | Facility: CLINIC | Age: 67
End: 2017-04-12
Payer: MEDICARE

## 2017-04-12 VITALS
BODY MASS INDEX: 40.98 KG/M2 | SYSTOLIC BLOOD PRESSURE: 124 MMHG | WEIGHT: 222.69 LBS | HEART RATE: 66 BPM | DIASTOLIC BLOOD PRESSURE: 64 MMHG | TEMPERATURE: 98 F | HEIGHT: 62 IN

## 2017-04-12 DIAGNOSIS — R60.0 EDEMA EXTREMITIES: ICD-10-CM

## 2017-04-12 DIAGNOSIS — J45.50 ASTHMA, SEVERE PERSISTENT, POORLY-CONTROLLED, UNCOMPLICATED: ICD-10-CM

## 2017-04-12 DIAGNOSIS — M54.42 CHRONIC LOW BACK PAIN WITH LEFT-SIDED SCIATICA, UNSPECIFIED BACK PAIN LATERALITY: ICD-10-CM

## 2017-04-12 DIAGNOSIS — K59.1 FUNCTIONAL DIARRHEA: Chronic | ICD-10-CM

## 2017-04-12 DIAGNOSIS — E11.3399 TYPE 2 DIABETES MELLITUS WITH MODERATE NONPROLIFERATIVE RETINOPATHY WITHOUT MACULAR EDEMA, WITHOUT LONG-TERM CURRENT USE OF INSULIN, UNSPECIFIED LATERALITY: Primary | ICD-10-CM

## 2017-04-12 DIAGNOSIS — G89.29 CHRONIC LOW BACK PAIN WITH LEFT-SIDED SCIATICA, UNSPECIFIED BACK PAIN LATERALITY: ICD-10-CM

## 2017-04-12 DIAGNOSIS — F51.02 ADJUSTMENT INSOMNIA: ICD-10-CM

## 2017-04-12 DIAGNOSIS — F32.A DEPRESSIVE DISORDER: ICD-10-CM

## 2017-04-12 PROCEDURE — 99214 OFFICE O/P EST MOD 30 MIN: CPT | Mod: S$GLB,,, | Performed by: FAMILY MEDICINE

## 2017-04-12 PROCEDURE — 99999 PR PBB SHADOW E&M-EST. PATIENT-LVL III: CPT | Mod: PBBFAC,,, | Performed by: FAMILY MEDICINE

## 2017-04-12 RX ORDER — METOLAZONE 5 MG/1
5 TABLET ORAL DAILY PRN
Qty: 30 TABLET | Refills: 11
Start: 2017-04-12 | End: 2018-08-16 | Stop reason: ALTCHOICE

## 2017-04-12 RX ORDER — DIAZEPAM 5 MG/1
5 TABLET ORAL EVERY 6 HOURS PRN
Qty: 15 TABLET | Refills: 0 | Status: SHIPPED | OUTPATIENT
Start: 2017-04-12 | End: 2017-08-03 | Stop reason: SINTOL

## 2017-04-12 RX ORDER — TORSEMIDE 20 MG/1
40 TABLET ORAL DAILY
Qty: 60 TABLET | Refills: 0
Start: 2017-04-12 | End: 2018-08-16

## 2017-04-12 NOTE — PATIENT INSTRUCTIONS
Managing Diabetes: The A1C Test       Healthy red blood cells have some glucose stuck to them. A high A1C means that unhealthy amounts of glucose are stuck to the cells.   What is the A1C test?  Using your meter helps you track your blood sugar every day. But your glucose meter tells you the value at the time of testing only. You also need to know if your treatment plan is keeping you healthy over time. The hemoglobin A1C (or glycated hemoglobin) test can help. This test measures your average blood sugar level over a few months. A higher A1C result means that you have a higher risk of developing complications.  The A1C test  The A1C is a blood test done by your healthcare provider. You will likely have an A1C test every 3 to 6 months.  Your blood glucose goal  A1C has been shown as a percentage. But it can also be shown as a number representing the estimated Average Glucose (eAG). Unlike the A1C percentage, eAG is a number similar to the numbers listed on your daily glucose monitor. Both A1C and eAG measure the amount of glucose stuck to a protein called hemoglobin in red blood cells. Your healthcare provider will help you figure out what your ideal A1C or eAG should be. Your target number will depend on your age, general health, and other factors. If your current number is too high, your treatment plan may need changes, such as different medicines.  Sample results  Most people aim for an A1c lower than 7%. Thats an eAG less than 154 mg/dL. Or, your healthcare provider may want you to aim for an A1C of 6%. Thats an eAG of 126 mg/dL.     Glucose calculator  Visit http://professional.diabetes.org/diapro/glucose_calc for a chart that helps convert your A1C percentages into eAG numbers.   Date Last Reviewed: 6/1/2016  © 3895-7691 [employer]. [address] [place] [address], [place] [address]. All rights reserved. This information is not intended as a substitute for professional medical care. Always follow your  healthcare professional's instructions.        Healthy Meals for Diabetes     A healthcare provider will help you develop a meal plan that fits your needs.   Ask your healthcare team to help you make a meal plan that fits your needs. Your meal plan tells you when to eat your meals and snacks, what kinds of foods to eat, and how much of each food to eat. You dont have to give up all the foods you like. But you do need to follow some guidelines.  Choose healthy carbohydrates  Starches, sugars, and fiber are all types of carbohydrates. Fiber can help lower your cholesterol and triglycerides. Fiber is also healthy for your heart. You should have 20 to 35 grams of total fiber each day. Fiber-rich foods include:  · Whole-grain breads and cereals  · Bulgur wheat  · Brown rice     · Whole-wheat pasta  · Fruits and vegetables  · Dry beans, and peas   Keep track of the amount of carbohydrates you eat. This can help you keep the right balance of physical activity and medicine. The amount of carbohydrates needed will vary for each person. It depends on many things such as your health, the medicines you take, and how active you are. Your healthcare team will help you figure out the right amount of carbohydrates for you. You may start with around 45 to 60 grams of carbohydrates per meal, depending on your situation.   Here are some examples of foods containing about 15 grams of carbohydrates (1 serving of carbohydrates):  · 1/2 cup of canned or frozen fruit  · A small piece of fresh fruit (4 ounces)  · 1 slice of bread  · 1/2 cup of oatmeal  · 1/3 cup of rice  · 4 to 6 crackers  · 1/2 English muffin  · 1/2 cup of black beans  · 1/4 of a large baked potato (3 ounces)  · 2/3 cup of plain fat-free yogurt  · 1 cup of soup  · 1/2 cup of casserole  · 6 chicken nuggets  · 2-inch-square brownie or cake without frosting  · 2 small cookies  · 1/2 cup of ice cream or sherbet  Choose healthy protein foods  Eating protein that is low in fat  can help you control your weight. It also helps keep your heart healthy. Low-fat protein foods include:  · Fish  · Plant proteins, such as dry beans and peas, nuts, and soy products like tofu and soymilk  · Lean meat with all visible fat removed  · Poultry with the skin removed  · Low-fat or nonfat milk, cheese, and yogurt  Limit unhealthy fats and sugar  Saturated and trans fats are unhealthy for your heart. They raise LDL (bad) cholesterol. Fat is also high in calories, so it can make you gain weight. To cut down on unhealthy fats and sugar, limit these foods:  · Butter or margarine  · Palm and palm kernel oils and coconut oil  · Cream  · Cheese  · Pantoja  · Lunch meats     · Ice cream  · Sweet bakery goods such as pies, muffins, and donuts  · Jams and jellies  · Candy bars  · Regular sodas   How much to eat  The amount of food you eat affects your blood sugar. It also affects your weight. Your healthcare team will tell you how much of each type of food you should eat.  · Use measuring cups and spoons and a food scale to measure serving sizes.  · Learn what a correct serving size looks like on your plate. This will help when you are away from home and cant measure your servings.  · Eat only the number of servings given on your meal plan for each food. Dont take seconds.  · Learn to read food labels. Be sure to look at serving size, total carbohydrates, fiber, calories, sugar, and saturated and trans fats. Look for healthier alternatives to foods that have added sugar.  · Plan ahead for parties so you can still have a good time without going overboard with unhealthy food choices. Set a good example yourself by bringing a healthy dish to pot lucks.   Choose healthy snacks  When it comes to snacks, we usually think about foods with added sugar and fats. But there are many other options for healthier snack choices. Here are a few snack ideas to choose from:  Snacks with less than 5 grams of carbohydrates  · 1 piece  of string cheese  · 3 celery sticks plus 1 tablespoon of peanut butter  · 5 cherry tomatoes plus 1 tablespoon of ranch dressing  · 1 hard-boiled egg  · 1/4 cup of fresh blueberries  ·  5 baby carrots  · 1 cup of light popcorn  · 1/2 cup of sugar-free gelatin  · 15 almonds  Snacks with about 10 to 20 grams of carbohydrates  · 1/3 cup of hummus plus 1 cup of fresh cut nonstarchy vegetables (carrots, green peppers, broccoli, celery, or a combination)  · 1/2 cup of fresh or canned fruit plus 1/4 cup of cottage cheese  · 1/2 cup of tuna salad with 4 crackers  · 2 rice cakes and a tablespoon of peanut butter  · 1 small apple or orange  · 3 cups light popcorn  · 1/2 of a turkey sandwich (1 slice of whole-wheat bread, 2 ounces of turkey, and mustard)  Portion sizes are important to controlling your blood sugar and staying at a healthy weight. Stock up on healthy snack items so you always have them on hand.  When to eat  Your meal plan will likely include breakfast, lunch, dinner, and some snacks.  · Try to eat your meals and snacks at about the same times each day.  · Eat all your meals and snacks. Skipping a meal or snack can make your blood sugar drop too low. It can also cause you to eat too much at the next meal or snack. Then your blood sugar could get too high.  Date Last Reviewed: 7/1/2016  © 5646-8107 [employer]. [address] [place] [address], [place] [address]. All rights reserved. This information is not intended as a substitute for professional medical care. Always follow your healthcare professional's instructions.

## 2017-04-12 NOTE — MR AVS SNAPSHOT
[facility]  [address]  [facility] [address]  Phone: [phone]  Fax: [phone]                  Fifi Mcnair   2017 4:20 PM   Office Visit    Description:  Female : 1950   Provider:  Werner Vargas MD   Department:  [facility]           Reason for Visit     Diabetes     COPD     Anxiety           Diagnoses this Visit        Comments    Type 2 diabetes mellitus with moderate nonproliferative retinopathy without macular edema, without long-term current use of insulin, unspecified laterality    -  Primary     Adjustment insomnia         Functional diarrhea         Uncontrolled type 2 diabetes mellitus with other neurologic complication, with long-term current use of insulin         Edema extremities         Asthma, severe persistent, poorly-controlled, uncomplicated         Chronic low back pain with left-sided sciatica, unspecified back pain laterality         Depressive disorder                To Do List           Future Appointments        Provider Department Dept Phone    2017 10:00 AM LAB, [facility] [phone]    2017 1:40 PM Gabriela Green MD [facility] H[facility]ology [facility] [phone]    2017 1:00 PM Werner Vargas MD [facility] [phone]      Your Future Surgeries/Procedures     May 04, 2017   Surgery with Luis Navarro MD   [facility] ([facility]) [address]  [facility] [address] [phone]              Goals (5 Years of Data)     None      Follow-Up and Disposition     Return in about 4 months (around 2017).       These Medications        Disp Refills Start End    diazePAM (VALIUM) 5 MG tablet 15 tablet 0 2017    Take 1 tablet (5 mg total) by mouth every 6 (six) hours as needed for Anxiety. - Oral    Pharmacy: [id number] - [place] - [address]. Ph #:  [phone]       torsemide (DEMADEX) 20 MG Tab 60 tablet 0 4/12/2017     Take 2 tablets (40 mg total) by mouth once daily. - Oral    Pharmacy: [address] Ph #: [phone]       metOLazone (ZAROXOLYN) 5 MG tablet 30 tablet 11 4/12/2017 4/12/2018    Take 1 tablet (5 mg total) by mouth daily as needed (swelling). - Oral    Pharmacy: [address] Ph #: [phone]       glucagon (human recombinant) inj 1mg/mL kit 2 kit 3 4/12/2017 4/12/2018    Inject 1 mL (1 mg total) into the muscle as needed. - Intramuscular    Pharmacy: [address] Ph #: [phone]         [facility]     [facility] On Call Nurse Care Line - 24/7 Assistance  Unless otherwise directed by your provider, please contact [facility], our nurse care line that is available for 24/7 assistance.     Registered nurses in the [facility] provide: appointment scheduling, clinical advisement, health education, and other advisory services.  Call: [phone] (toll free)               Medications           Message regarding Medications     Verify the changes and/or additions to your medication regime listed below are the same as discussed with your clinician today.  If any of these changes or additions are incorrect, please notify your healthcare provider.        START taking these NEW medications        Refills    diazePAM (VALIUM) 5 MG tablet 0    Sig: Take 1 tablet (5 mg total) by mouth every 6 (six) hours as needed for Anxiety.    Class: Print    Route: Oral    glucagon (human recombinant) inj 1mg/mL kit 3    Sig: Inject 1 mL (1 mg total) into the muscle as needed.    Class: Normal    Route: Intramuscular      STOP taking these medications     levmefolate-B6 phos-methyl-B12 3-35-2 mg Tab Take 1 tablet by mouth 2 (two) times daily.    lorazepam (ATIVAN) 0.5 MG tablet Take 1 tablet (0.5 mg  total) by mouth every evening.    temazepam (RESTORIL) 15 mg Cap Take 1 capsule (15 mg total) by mouth nightly as needed.    trazodone (DESYREL) 50 MG tablet Take 1 tablet (50 mg total) by mouth every evening.           Verify that the below list of medications is an accurate representation of the medications you are currently taking.  If none reported, the list may be blank. If incorrect, please contact your healthcare provider. Carry this list with you in case of emergency.           Current Medications     allopurinol (ZYLOPRIM) 100 MG tablet Take 2 tablets (200 mg total) by mouth nightly.    atenolol (TENORMIN) 25 MG tablet Take 1 tablet (25 mg total) by mouth once daily.    calcitRIOL (ROCALTROL) 0.25 MCG Cap Take 1 capsule by mouth twice a week. Monday Friday    CALCIUM CITRATE/VITAMIN D3 (CALCIUM CITRATE + D ORAL) Take 400 mg by mouth 3 (three) times daily.    cetirizine (ZYRTEC) 10 MG tablet Take 1 tablet (10 mg total) by mouth once daily.    clotrimazole-betamethasone 1-0.05% (LOTRISONE) cream     cyanocobalamin, vitamin B-12, (VITAMIN B-12) 5,000 mcg Subl Place 5,000 mg under the tongue once a week.    DENOSUMAB (PROLIA SUBQ) Inject into the skin every 6 (six) months.     DIABETIC SUPPLIES,MISCELL (MEDTRONIC REMOTE CONTROL MISC) No Sig Provided    diphenoxylate-atropine 2.5-0.025 mg (LOMOTIL) 2.5-0.025 mg per tablet     fluoxetine (PROZAC) 20 MG capsule Take 20 mg by mouth once daily.     fluticasone (FLONASE) 50 mcg/actuation nasal spray 2 sprays by Nasal route once daily.     fluticasone-vilanterol (BREO) 100-25 mcg/dose diskus inhaler Inhale 1 puff into the lungs once daily.    insulin aspart (NOVOLOG) 100 unit/mL injection Inject into the skin continuous. Pt has insulin pump    l-methylfolate-b2-b6-b12 (CEREFOLIN) 6-5-50-1 mg Tab Take 1 tablet by mouth once daily.    Lactobacillus rhamnosus GG (CULTURELLE) 10 billion cell capsule Take 1 capsule by mouth once daily.    levothyroxine (SYNTHROID) 25 MCG  "tablet Take 1 tablet (25 mcg total) by mouth once daily.    losartan (COZAAR) 25 MG tablet Take 1 tablet by mouth once daily.    melatonin 3 mg Tab Take 5 mg by mouth every evening. 10 MG NIGHTLY    metOLazone (ZAROXOLYN) 5 MG tablet Take 1 tablet (5 mg total) by mouth daily as needed (swelling).    montelukast (SINGULAIR) 10 mg tablet Take 10 mg by mouth once daily.     MULTIVIT-IRON-MIN-FOLIC ACID 3,500-18-0.4 UNIT-MG-MG ORAL CHEW Take by mouth 2 (two) times daily.    mupirocin (BACTROBAN) 2 % ointment APPLY OINTMENT TOPICALLY TO AFFECTED AREA ON NOSE THREE TIMES DAILY AS DIRECTED    oxycodone (ROXICODONE) 15 MG Tab Take 15 mg by mouth every 8 (eight) hours as needed for Pain.     pantoprazole (PROTONIX) 40 MG tablet Take 1 tablet (40 mg total) by mouth once daily.    potassium chloride SA (K-DUR,KLOR-CON) 20 MEQ tablet Take 1 tablet (20 mEq total) by mouth 2 (two) times daily.    ranitidine (ZANTAC) 300 MG tablet TAKE ONE TABLET BY MOUTH IN THE EVENING    torsemide (DEMADEX) 20 MG Tab Take 2 tablets (40 mg total) by mouth once daily.    diazePAM (VALIUM) 5 MG tablet Take 1 tablet (5 mg total) by mouth every 6 (six) hours as needed for Anxiety.    glucagon (human recombinant) inj 1mg/mL kit Inject 1 mL (1 mg total) into the muscle as needed.    metronidazole (METROGEL) 0.75 % gel Apply topically 2 (two) times daily.           Clinical Reference Information           Your Vitals Were     BP Pulse Temp Height Weight Last Period    124/64 (BP Location: Right arm, Patient Position: Sitting, BP Method: Automatic) 66 98.2 °F (36.8 °C) (Oral) 5' 2" (1.575 m) 101 kg (222 lb 10.6 oz) (LMP Unknown)    BMI                40.73 kg/m2          Blood Pressure          Most Recent Value    BP  124/64      Allergies as of 4/12/2017     Adhesive    Cleocin [Clindamycin Hcl]    Ceclor [Cefaclor]    Morphine    Advair Diskus [Fluticasone-salmeterol]    Aleve [Naproxen Sodium]    Levaquin [Levofloxacin]    Macrodantin [Nitrofurantoin " Macrocrystalline]    Motrin [Ibuprofen]    Restoril [Temazepam]    Sulfa (Sulfonamide Antibiotics)    Trazodone    Remeron [Mirtazapine]    Vancomycin Analogues      Immunizations Administered on Date of Encounter - 4/12/2017     None      Instructions      Managing Diabetes: The A1C Test       Healthy red blood cells have some glucose stuck to them. A high A1C means that unhealthy amounts of glucose are stuck to the cells.   What is the A1C test?  Using your meter helps you track your blood sugar every day. But your glucose meter tells you the value at the time of testing only. You also need to know if your treatment plan is keeping you healthy over time. The hemoglobin A1C (or glycated hemoglobin) test can help. This test measures your average blood sugar level over a few months. A higher A1C result means that you have a higher risk of developing complications.  The A1C test  The A1C is a blood test done by your healthcare provider. You will likely have an A1C test every 3 to 6 months.  Your blood glucose goal  A1C has been shown as a percentage. But it can also be shown as a number representing the estimated Average Glucose (eAG). Unlike the A1C percentage, eAG is a number similar to the numbers listed on your daily glucose monitor. Both A1C and eAG measure the amount of glucose stuck to a protein called hemoglobin in red blood cells. Your healthcare provider will help you figure out what your ideal A1C or eAG should be. Your target number will depend on your age, general health, and other factors. If your current number is too high, your treatment plan may need changes, such as different medicines.  Sample results  Most people aim for an A1c lower than 7%. Thats an eAG less than 154 mg/dL. Or, your healthcare provider may want you to aim for an A1C of 6%. Thats an eAG of 126 mg/dL.     Glucose calculator  Visit http://professional.diabetes.org/diapro/glucose_calc for a chart that helps convert your A1C  percentages into eAG numbers.   Date Last Reviewed: 6/1/2016 © 2000-2016 "[employer]". [address] [place] [address], [place] [address]. All rights reserved. This information is not intended as a substitute for professional medical care. Always follow your healthcare professional's instructions.        Healthy Meals for Diabetes     A healthcare provider will help you develop a meal plan that fits your needs.   Ask your healthcare team to help you make a meal plan that fits your needs. Your meal plan tells you when to eat your meals and snacks, what kinds of foods to eat, and how much of each food to eat. You dont have to give up all the foods you like. But you do need to follow some guidelines.  Choose healthy carbohydrates  Starches, sugars, and fiber are all types of carbohydrates. Fiber can help lower your cholesterol and triglycerides. Fiber is also healthy for your heart. You should have 20 to 35 grams of total fiber each day. Fiber-rich foods include:  · Whole-grain breads and cereals  · Bulgur wheat  · Brown rice     · Whole-wheat pasta  · Fruits and vegetables  · Dry beans, and peas   Keep track of the amount of carbohydrates you eat. This can help you keep the right balance of physical activity and medicine. The amount of carbohydrates needed will vary for each person. It depends on many things such as your health, the medicines you take, and how active you are. Your healthcare team will help you figure out the right amount of carbohydrates for you. You may start with around 45 to 60 grams of carbohydrates per meal, depending on your situation.   Here are some examples of foods containing about 15 grams of carbohydrates (1 serving of carbohydrates):  · 1/2 cup of canned or frozen fruit  · A small piece of fresh fruit (4 ounces)  · 1 slice of bread  · 1/2 cup of oatmeal  · 1/3 cup of rice  · 4 to 6 crackers  · 1/2 English muffin  · 1/2 cup of black beans  · 1/4 of a large baked potato (3  ounces)  · 2/3 cup of plain fat-free yogurt  · 1 cup of soup  · 1/2 cup of casserole  · 6 chicken nuggets  · 2-inch-square brownie or cake without frosting  · 2 small cookies  · 1/2 cup of ice cream or sherbet  Choose healthy protein foods  Eating protein that is low in fat can help you control your weight. It also helps keep your heart healthy. Low-fat protein foods include:  · Fish  · Plant proteins, such as dry beans and peas, nuts, and soy products like tofu and soymilk  · Lean meat with all visible fat removed  · Poultry with the skin removed  · Low-fat or nonfat milk, cheese, and yogurt  Limit unhealthy fats and sugar  Saturated and trans fats are unhealthy for your heart. They raise LDL (bad) cholesterol. Fat is also high in calories, so it can make you gain weight. To cut down on unhealthy fats and sugar, limit these foods:  · Butter or margarine  · Palm and palm kernel oils and coconut oil  · Cream  · Cheese  · Pantoja  · Lunch meats     · Ice cream  · Sweet bakery goods such as pies, muffins, and donuts  · Jams and jellies  · Candy bars  · Regular sodas   How much to eat  The amount of food you eat affects your blood sugar. It also affects your weight. Your healthcare team will tell you how much of each type of food you should eat.  · Use measuring cups and spoons and a food scale to measure serving sizes.  · Learn what a correct serving size looks like on your plate. This will help when you are away from home and cant measure your servings.  · Eat only the number of servings given on your meal plan for each food. Dont take seconds.  · Learn to read food labels. Be sure to look at serving size, total carbohydrates, fiber, calories, sugar, and saturated and trans fats. Look for healthier alternatives to foods that have added sugar.  · Plan ahead for parties so you can still have a good time without going overboard with unhealthy food choices. Set a good example yourself by bringing a healthy dish to pot  liang.   Choose healthy snacks  When it comes to snacks, we usually think about foods with added sugar and fats. But there are many other options for healthier snack choices. Here are a few snack ideas to choose from:  Snacks with less than 5 grams of carbohydrates  · 1 piece of string cheese  · 3 celery sticks plus 1 tablespoon of peanut butter  · 5 cherry tomatoes plus 1 tablespoon of ranch dressing  · 1 hard-boiled egg  · 1/4 cup of fresh blueberries  ·  5 baby carrots  · 1 cup of light popcorn  · 1/2 cup of sugar-free gelatin  · 15 almonds  Snacks with about 10 to 20 grams of carbohydrates  · 1/3 cup of hummus plus 1 cup of fresh cut nonstarchy vegetables (carrots, green peppers, broccoli, celery, or a combination)  · 1/2 cup of fresh or canned fruit plus 1/4 cup of cottage cheese  · 1/2 cup of tuna salad with 4 crackers  · 2 rice cakes and a tablespoon of peanut butter  · 1 small apple or orange  · 3 cups light popcorn  · 1/2 of a turkey sandwich (1 slice of whole-wheat bread, 2 ounces of turkey, and mustard)  Portion sizes are important to controlling your blood sugar and staying at a healthy weight. Stock up on healthy snack items so you always have them on hand.  When to eat  Your meal plan will likely include breakfast, lunch, dinner, and some snacks.  · Try to eat your meals and snacks at about the same times each day.  · Eat all your meals and snacks. Skipping a meal or snack can make your blood sugar drop too low. It can also cause you to eat too much at the next meal or snack. Then your blood sugar could get too high.  Date Last Reviewed: 7/1/2016  © 8815-6455 [employer]. [address] [place] [address], [place] [address]. All rights reserved. This information is not intended as a substitute for professional medical care. Always follow your healthcare professional's instructions.             Language Assistance Services     ATTENTION: Language assistance services are available, free of charge.  Please call [phone].      ATENCIÓN: Si habla español, tiene a luz disposición servicios gratuitos de asistencia lingüística. Llame al [phone].     CHÚ Ý: N?u b?n nói Ti?ng Vi?t, có các d?ch v? h? tr? ngôn ng? mi?n phí dành cho b?n. G?i s? [phone].         [facility] complies with applicable Federal civil rights laws and does not discriminate on the basis of race, color, national origin, age, disability, or sex.

## 2017-04-17 NOTE — PROGRESS NOTES
Answers for HPI/ROS submitted by the patient on 8/28/2016   Rash  Chronicity: chronic  Onset: more than 1 month ago  Progression since onset: gradually worsening  Affected locations: groin  Characteristics: burning, pain, redness, itchiness  Exposed to: nothing  fatigue: Yes  Treatments tried: antibiotic cream, moisturizer  Improvement on treatment: no relief  asthma: Yes  allergies: Yes  varicella: No  The patient appears alert, well appearing, and in no distress, oriented to person, place, and time, morbidly obese, acyanotic, in no respiratory distress and anxious. ENT: ENT exam normal, no neck nodes or sinus tenderness, bilateral TM normal without fluid or infection and throat normal without erythema or exudate. Chest:clear to auscultation, no rales or rhonchi, symmetric air entry, no tachypnea, retractions or cyanosis, wheezing noted . Heart sounds are normal. Abdomen soft, nontender, no masses or organomegaly.  exposures (soaps, lotions, laundry detergents, foods, medications, plants, insects or animals.)  Exam of the skin shows an active dermatitis; atopic dermatitis on generalized.Protective Sensation (w/ 10 gram monofilament):  Right: Intact  Left: Intact    Visual Inspection:  Normal -  Bilateral    Pedal Pulses:   Right: Present  Left: Present    Posterior tibialis:   Right:Present  Left: Present    Lab Results   Component Value Date    HGBA1C 7.1 (H) 04/07/2017       Type 2 diabetes mellitus with moderate nonproliferative retinopathy without macular edema, without long-term current use of insulin, unspecified laterality  -     glucagon (human recombinant) inj 1mg/mL kit; Inject 1 mL (1 mg total) into the muscle as needed.  Dispense: 2 kit; Refill: 3    Adjustment insomnia  -     diazePAM (VALIUM) 5 MG tablet; Take 1 tablet (5 mg total) by mouth every 6 (six) hours as needed for Anxiety.  Dispense: 15 tablet; Refill: 0    Functional diarrhea    Uncontrolled type 2 diabetes mellitus with other neurologic  complication, with long-term current use of insulin  -     glucagon (human recombinant) inj 1mg/mL kit; Inject 1 mL (1 mg total) into the muscle as needed.  Dispense: 2 kit; Refill: 3    Edema extremities  -     torsemide (DEMADEX) 20 MG Tab; Take 2 tablets (40 mg total) by mouth once daily.  Dispense: 60 tablet; Refill: 0  -     metOLazone (ZAROXOLYN) 5 MG tablet; Take 1 tablet (5 mg total) by mouth daily as needed (swelling).  Dispense: 30 tablet; Refill: 11    Asthma, severe persistent, poorly-controlled, uncomplicated    Chronic low back pain with left-sided sciatica, unspecified back pain laterality    Depressive disorder    Patient readiness: acceptance and barriers:readiness    During the course of the visit the patient was educated and counseled about the following:     Diabetes:  Discussed general issues about diabetes pathophysiology and management.  Hypertension:   Dietary sodium restriction.  Regular aerobic exercise.  Obesity:   General weight loss/lifestyle modification strategies discussed (elicit support from others; identify saboteurs; non-food rewards, etc).    Goals: Diabetes: Maintain Hemoglobin A1C below 7, Hypertension: Reduce Blood Pressure and Obesity: Reduce calorie intake and BMI    Did patient meet goals/outcomes: Yes    The following self management tools provided: blood pressure log  excercise log    Patient Instructions (the written plan) was given to the patient/family.     Time spent with patient: 30 minutes

## 2017-04-27 ENCOUNTER — LAB VISIT (OUTPATIENT)
Dept: LAB | Facility: HOSPITAL | Age: 67
End: 2017-04-27
Attending: INTERNAL MEDICINE
Payer: MEDICARE

## 2017-04-27 DIAGNOSIS — K92.1 BLOOD IN STOOL: ICD-10-CM

## 2017-04-27 DIAGNOSIS — D64.9 ANEMIA, UNSPECIFIED TYPE: ICD-10-CM

## 2017-04-27 DIAGNOSIS — R19.7 DIARRHEA, UNSPECIFIED TYPE: ICD-10-CM

## 2017-04-27 DIAGNOSIS — R19.4 CHANGE IN BOWEL HABITS: ICD-10-CM

## 2017-04-27 LAB
ALBUMIN SERPL BCP-MCNC: 3.6 G/DL
ALP SERPL-CCNC: 101 U/L
ALT SERPL W/O P-5'-P-CCNC: 17 U/L
ANION GAP SERPL CALC-SCNC: 10 MMOL/L
AST SERPL-CCNC: 22 U/L
BASOPHILS # BLD AUTO: 0 K/UL
BASOPHILS NFR BLD: 0.4 %
BILIRUB SERPL-MCNC: 0.4 MG/DL
BUN SERPL-MCNC: 25 MG/DL
CALCIUM SERPL-MCNC: 10.2 MG/DL
CHLORIDE SERPL-SCNC: 100 MMOL/L
CO2 SERPL-SCNC: 29 MMOL/L
CREAT SERPL-MCNC: 1.1 MG/DL
DIFFERENTIAL METHOD: ABNORMAL
EOSINOPHIL # BLD AUTO: 0.1 K/UL
EOSINOPHIL NFR BLD: 1.6 %
ERYTHROCYTE [DISTWIDTH] IN BLOOD BY AUTOMATED COUNT: 15.6 %
EST. GFR  (AFRICAN AMERICAN): >60 ML/MIN/1.73 M^2
EST. GFR  (NON AFRICAN AMERICAN): 52 ML/MIN/1.73 M^2
FERRITIN SERPL-MCNC: 26 NG/ML
GLUCOSE SERPL-MCNC: 88 MG/DL
HCT VFR BLD AUTO: 35.8 %
HGB BLD-MCNC: 12.1 G/DL
IGA SERPL-MCNC: 460 MG/DL
IRON SERPL-MCNC: 51 UG/DL
LYMPHOCYTES # BLD AUTO: 1.5 K/UL
LYMPHOCYTES NFR BLD: 25.4 %
MCH RBC QN AUTO: 29.4 PG
MCHC RBC AUTO-ENTMCNC: 33.8 %
MCV RBC AUTO: 87 FL
MONOCYTES # BLD AUTO: 0.4 K/UL
MONOCYTES NFR BLD: 7.7 %
NEUTROPHILS # BLD AUTO: 3.7 K/UL
NEUTROPHILS NFR BLD: 64.9 %
PLATELET # BLD AUTO: 230 K/UL
PMV BLD AUTO: 7.6 FL
POTASSIUM SERPL-SCNC: 4.6 MMOL/L
PROT SERPL-MCNC: 7.2 G/DL
RBC # BLD AUTO: 4.12 M/UL
SATURATED IRON: 15 %
SODIUM SERPL-SCNC: 139 MMOL/L
TOTAL IRON BINDING CAPACITY: 348 UG/DL
TRANSFERRIN SERPL-MCNC: 235 MG/DL
TSH SERPL DL<=0.005 MIU/L-ACNC: 1.07 UIU/ML
WBC # BLD AUTO: 5.7 K/UL

## 2017-04-27 PROCEDURE — 82784 ASSAY IGA/IGD/IGG/IGM EACH: CPT

## 2017-04-27 PROCEDURE — 82728 ASSAY OF FERRITIN: CPT

## 2017-04-27 PROCEDURE — 85025 COMPLETE CBC W/AUTO DIFF WBC: CPT | Mod: 91

## 2017-04-27 PROCEDURE — 84443 ASSAY THYROID STIM HORMONE: CPT

## 2017-04-27 PROCEDURE — 36415 COLL VENOUS BLD VENIPUNCTURE: CPT

## 2017-04-27 PROCEDURE — 83540 ASSAY OF IRON: CPT

## 2017-04-27 PROCEDURE — 83516 IMMUNOASSAY NONANTIBODY: CPT

## 2017-05-01 LAB — TTG IGA SER IA-ACNC: 7 UNITS

## 2017-05-02 ENCOUNTER — TELEPHONE (OUTPATIENT)
Dept: GASTROENTEROLOGY | Facility: CLINIC | Age: 67
End: 2017-05-02

## 2017-05-02 NOTE — TELEPHONE ENCOUNTER
----- Message from Robel Jackson sent at 5/2/2017 10:43 AM CDT -----  Contact: self    [phone]  Patient needs to reschedule procedure. Thanks!

## 2017-05-03 ENCOUNTER — TELEPHONE (OUTPATIENT)
Dept: HEMATOLOGY/ONCOLOGY | Facility: CLINIC | Age: 67
End: 2017-05-03

## 2017-05-03 DIAGNOSIS — K58.0 IRRITABLE BOWEL SYNDROME WITH DIARRHEA: ICD-10-CM

## 2017-05-03 RX ORDER — DIPHENOXYLATE HYDROCHLORIDE AND ATROPINE SULFATE 2.5; .025 MG/1; MG/1
TABLET ORAL
Qty: 60 TABLET | Refills: 1 | Status: SHIPPED | OUTPATIENT
Start: 2017-05-03 | End: 2021-03-03 | Stop reason: DRUGHIGH

## 2017-05-03 NOTE — TELEPHONE ENCOUNTER
----- Message from Tanika Burns sent at 5/3/2017 11:43 AM CDT -----  Patients is requesting to reschedule her labs but has questions about timing of labs contact her at [phone] to advise.     Thank you

## 2017-05-04 ENCOUNTER — TELEPHONE (OUTPATIENT)
Dept: FAMILY MEDICINE | Facility: CLINIC | Age: 67
End: 2017-05-04

## 2017-05-04 RX ORDER — FLUOXETINE HYDROCHLORIDE 20 MG/1
CAPSULE ORAL
Qty: 180 CAPSULE | Refills: 4 | Status: SHIPPED | OUTPATIENT
Start: 2017-05-04 | End: 2019-05-31 | Stop reason: SDUPTHER

## 2017-05-04 NOTE — TELEPHONE ENCOUNTER
Received message from patient requesting prescription for Cpap supplies. Spoke to patient who states she needs a prescription for hose, mask, and filters. States she used DME [facility] [facility] who no longer accept her insurance. Requesting prescription be sent to [facility]. Call placed to [facility] who states patient's insurance is not accepted. Call placed to patient for notification who states she will contact her insurance company to received a list of DME companies who will accept her insurance and she will contact office with which company to refill Cpap supplies through.

## 2017-05-04 NOTE — TELEPHONE ENCOUNTER
----- Message from Daphnie Bennett sent at 5/3/2017  2:50 PM CDT -----  Patient is requesting a refill on her CPAP supplies    Please call patient back at [phone]    Thank you

## 2017-05-05 ENCOUNTER — TELEPHONE (OUTPATIENT)
Dept: FAMILY MEDICINE | Facility: CLINIC | Age: 67
End: 2017-05-05

## 2017-05-05 NOTE — TELEPHONE ENCOUNTER
----- Message from Josi Cancino sent at 5/5/2017  9:25 AM CDT -----  Please call patient in reference to her sleep machine.  Call [phone]

## 2017-05-05 NOTE — TELEPHONE ENCOUNTER
"Received message from patient requesting a call regarding "sleep machine".  Call placed to patient. No answer received. No voicemail available.  "

## 2017-05-11 ENCOUNTER — TELEPHONE (OUTPATIENT)
Dept: FAMILY MEDICINE | Facility: CLINIC | Age: 67
End: 2017-05-11

## 2017-05-11 DIAGNOSIS — G47.33 OSA (OBSTRUCTIVE SLEEP APNEA): Primary | ICD-10-CM

## 2017-05-18 ENCOUNTER — LAB VISIT (OUTPATIENT)
Dept: LAB | Facility: HOSPITAL | Age: 67
End: 2017-05-18
Attending: INTERNAL MEDICINE
Payer: MEDICARE

## 2017-05-18 DIAGNOSIS — M81.0 OSTEOPOROSIS: ICD-10-CM

## 2017-05-18 DIAGNOSIS — D50.9 ANEMIA, IRON DEFICIENCY: ICD-10-CM

## 2017-05-18 DIAGNOSIS — M85.80 OSTEOPENIA: ICD-10-CM

## 2017-05-18 LAB
ALBUMIN SERPL BCP-MCNC: 3.3 G/DL
ALBUMIN SERPL BCP-MCNC: 3.3 G/DL
ALP SERPL-CCNC: 90 U/L
ALP SERPL-CCNC: 90 U/L
ALT SERPL W/O P-5'-P-CCNC: 13 U/L
ALT SERPL W/O P-5'-P-CCNC: 13 U/L
ANION GAP SERPL CALC-SCNC: 8 MMOL/L
ANION GAP SERPL CALC-SCNC: 8 MMOL/L
AST SERPL-CCNC: 18 U/L
AST SERPL-CCNC: 18 U/L
B2 MICROGLOB SERPL-MCNC: 3.3 UG/ML
BASOPHILS # BLD AUTO: 0 K/UL
BASOPHILS NFR BLD: 0.2 %
BILIRUB SERPL-MCNC: 0.4 MG/DL
BILIRUB SERPL-MCNC: 0.4 MG/DL
BUN SERPL-MCNC: 24 MG/DL
BUN SERPL-MCNC: 24 MG/DL
CALCIUM SERPL-MCNC: 9.5 MG/DL
CALCIUM SERPL-MCNC: 9.5 MG/DL
CHLORIDE SERPL-SCNC: 103 MMOL/L
CHLORIDE SERPL-SCNC: 103 MMOL/L
CO2 SERPL-SCNC: 28 MMOL/L
CO2 SERPL-SCNC: 28 MMOL/L
CREAT SERPL-MCNC: 1.1 MG/DL
CREAT SERPL-MCNC: 1.1 MG/DL
DIFFERENTIAL METHOD: ABNORMAL
EOSINOPHIL # BLD AUTO: 0.1 K/UL
EOSINOPHIL NFR BLD: 1.6 %
ERYTHROCYTE [DISTWIDTH] IN BLOOD BY AUTOMATED COUNT: 15.7 %
EST. GFR  (AFRICAN AMERICAN): >60 ML/MIN/1.73 M^2
EST. GFR  (AFRICAN AMERICAN): >60 ML/MIN/1.73 M^2
EST. GFR  (NON AFRICAN AMERICAN): 52 ML/MIN/1.73 M^2
EST. GFR  (NON AFRICAN AMERICAN): 52 ML/MIN/1.73 M^2
GLUCOSE SERPL-MCNC: 230 MG/DL
GLUCOSE SERPL-MCNC: 230 MG/DL
HCT VFR BLD AUTO: 34.6 %
HGB BLD-MCNC: 11.7 G/DL
IGA SERPL-MCNC: 433 MG/DL
LYMPHOCYTES # BLD AUTO: 1.5 K/UL
LYMPHOCYTES NFR BLD: 27.9 %
MCH RBC QN AUTO: 29.5 PG
MCHC RBC AUTO-ENTMCNC: 33.9 %
MCV RBC AUTO: 87 FL
MONOCYTES # BLD AUTO: 0.3 K/UL
MONOCYTES NFR BLD: 6 %
NEUTROPHILS # BLD AUTO: 3.4 K/UL
NEUTROPHILS NFR BLD: 64.3 %
PLATELET # BLD AUTO: 210 K/UL
PMV BLD AUTO: 7.8 FL
POTASSIUM SERPL-SCNC: 3.4 MMOL/L
POTASSIUM SERPL-SCNC: 3.4 MMOL/L
PROT SERPL-MCNC: 6.9 G/DL
PROT SERPL-MCNC: 6.9 G/DL
RBC # BLD AUTO: 3.98 M/UL
SODIUM SERPL-SCNC: 139 MMOL/L
SODIUM SERPL-SCNC: 139 MMOL/L
WBC # BLD AUTO: 5.4 K/UL

## 2017-05-18 PROCEDURE — 83520 IMMUNOASSAY QUANT NOS NONAB: CPT

## 2017-05-18 PROCEDURE — 82784 ASSAY IGA/IGD/IGG/IGM EACH: CPT

## 2017-05-18 PROCEDURE — 36415 COLL VENOUS BLD VENIPUNCTURE: CPT

## 2017-05-18 PROCEDURE — 82232 ASSAY OF BETA-2 PROTEIN: CPT

## 2017-05-18 PROCEDURE — 80053 COMPREHEN METABOLIC PANEL: CPT

## 2017-05-18 PROCEDURE — 85025 COMPLETE CBC W/AUTO DIFF WBC: CPT

## 2017-05-19 LAB
KAPPA LC SER QL IA: 4.69 MG/DL
KAPPA LC/LAMBDA SER IA: 1.86
LAMBDA LC SER QL IA: 2.52 MG/DL

## 2017-05-24 ENCOUNTER — OFFICE VISIT (OUTPATIENT)
Dept: HEMATOLOGY/ONCOLOGY | Facility: CLINIC | Age: 67
End: 2017-05-24
Payer: MEDICARE

## 2017-05-24 VITALS
TEMPERATURE: 98 F | SYSTOLIC BLOOD PRESSURE: 131 MMHG | WEIGHT: 227.06 LBS | RESPIRATION RATE: 18 BRPM | BODY MASS INDEX: 41.78 KG/M2 | HEIGHT: 62 IN | HEART RATE: 62 BPM | DIASTOLIC BLOOD PRESSURE: 63 MMHG

## 2017-05-24 DIAGNOSIS — M85.80 OSTEOPENIA WITH HIGH RISK OF FRACTURE: ICD-10-CM

## 2017-05-24 DIAGNOSIS — K92.1 HEMATOCHEZIA: ICD-10-CM

## 2017-05-24 DIAGNOSIS — F32.A DEPRESSIVE DISORDER: ICD-10-CM

## 2017-05-24 DIAGNOSIS — D47.2 MGUS (MONOCLONAL GAMMOPATHY OF UNKNOWN SIGNIFICANCE): Primary | ICD-10-CM

## 2017-05-24 DIAGNOSIS — K29.70 GASTRITIS, PRESENCE OF BLEEDING UNSPECIFIED, UNSPECIFIED CHRONICITY, UNSPECIFIED GASTRITIS TYPE: ICD-10-CM

## 2017-05-24 DIAGNOSIS — N28.9 RENAL INSUFFICIENCY: ICD-10-CM

## 2017-05-24 DIAGNOSIS — E66.01 OBESITY, CLASS III, BMI 40-49.9 (MORBID OBESITY): ICD-10-CM

## 2017-05-24 DIAGNOSIS — E11.8 DIABETES MELLITUS WITH COMPLICATION: ICD-10-CM

## 2017-05-24 DIAGNOSIS — Z71.89 ENCOUNTER FOR MEDICATION REVIEW AND COUNSELING: ICD-10-CM

## 2017-05-24 DIAGNOSIS — M81.0 SENILE OSTEOPOROSIS: Primary | ICD-10-CM

## 2017-05-24 PROCEDURE — 1125F AMNT PAIN NOTED PAIN PRSNT: CPT | Mod: S$GLB,,, | Performed by: INTERNAL MEDICINE

## 2017-05-24 PROCEDURE — 99999 PR PBB SHADOW E&M-EST. PATIENT-LVL III: CPT | Mod: PBBFAC,,, | Performed by: INTERNAL MEDICINE

## 2017-05-24 PROCEDURE — 3045F PR MOST RECENT HEMOGLOBIN A1C LEVEL 7.0-9.0%: CPT | Mod: S$GLB,,, | Performed by: INTERNAL MEDICINE

## 2017-05-24 PROCEDURE — 99215 OFFICE O/P EST HI 40 MIN: CPT | Mod: S$GLB,,, | Performed by: INTERNAL MEDICINE

## 2017-05-24 PROCEDURE — 4010F ACE/ARB THERAPY RXD/TAKEN: CPT | Mod: S$GLB,,, | Performed by: INTERNAL MEDICINE

## 2017-05-24 NOTE — PROGRESS NOTES
Subjective:       Patient ID: Fifi Mcnair is a 66 y.o. female.    Chief Complaint: I am worried about my labs    HPI    This is a 66 yr old female tolerating prolia for osteoporosis.  She started prolia 12 months ago for osteoporosis. Pt has had a gastric sleeve in the past. She remains on B12 and iron supplement    She is tolerating Synthroid for thyroid disorder and neurontin for neuropathy.  Labs revealed an elevated kappa light chain with elevated ratio.  Bone marrow showed no evidence of a plasma cell dyscrasia  Pt with history of sleeve gastrectomy    EGD reviewed with pt  C scope for may 31 since pt with hematochezia    IgA remains elevated yet the number is lower than it was 3 weeks ago same as with the elevated kappa levels  Patient has presented with hematochezia over the last week and she will undergo colonoscopy with GI        Current Outpatient Prescriptions:     atenolol (TENORMIN) 25 MG tablet, Take 1 tablet (25 mg total) by mouth once daily., Disp: 90 tablet, Rfl: 3    calcitRIOL (ROCALTROL) 0.25 MCG Cap, Take 1 capsule by mouth twice a week. Monday Friday, Disp: , Rfl:     CALCIUM CITRATE/VITAMIN D3 (CALCIUM CITRATE + D ORAL), Take 400 mg by mouth 3 (three) times daily., Disp: , Rfl:     cetirizine (ZYRTEC) 10 MG tablet, Take 1 tablet (10 mg total) by mouth once daily., Disp: 1 Bottle, Rfl: 5    clotrimazole-betamethasone 1-0.05% (LOTRISONE) cream, , Disp: , Rfl:     cyanocobalamin, vitamin B-12, (VITAMIN B-12) 5,000 mcg Subl, Place 5,000 mg under the tongue once a week., Disp: , Rfl:     DENOSUMAB (PROLIA SUBQ), Inject into the skin every 6 (six) months. , Disp: , Rfl:     DIABETIC SUPPLIES,MISCELL (MEDTRONIC REMOTE CONTROL MISC), No Sig Provided, Disp: , Rfl:     diphenoxylate-atropine 2.5-0.025 mg (LOMOTIL) 2.5-0.025 mg per tablet, TAKE ONE TABLET BY MOUTH 4 TIMES DAILY AS NEEDED FOR DIARRHEA, Disp: 60 tablet, Rfl: 1    fluoxetine (PROZAC) 20 MG capsule, Take 20 mg by mouth once  daily. , Disp: , Rfl:     fluoxetine (PROZAC) 20 MG capsule, TAKE TWO CAPSULES BY MOUTH ONCE DAILY, Disp: 180 capsule, Rfl: 4    fluticasone (FLONASE) 50 mcg/actuation nasal spray, 2 sprays by Nasal route once daily. , Disp: , Rfl:     fluticasone-vilanterol (BREO) 100-25 mcg/dose diskus inhaler, Inhale 1 puff into the lungs once daily., Disp: , Rfl:     glucagon (human recombinant) inj 1mg/mL kit, Inject 1 mL (1 mg total) into the muscle as needed., Disp: 2 kit, Rfl: 3    insulin aspart (NOVOLOG) 100 unit/mL injection, Inject into the skin continuous. Pt has insulin pump, Disp: , Rfl:     l-methylfolate-b2-b6-b12 (CEREFOLIN) 6-5-50-1 mg Tab, Take 1 tablet by mouth once daily., Disp: , Rfl:     Lactobacillus rhamnosus GG (CULTURELLE) 10 billion cell capsule, Take 1 capsule by mouth once daily., Disp: , Rfl:     levothyroxine (SYNTHROID) 25 MCG tablet, Take 1 tablet (25 mcg total) by mouth once daily., Disp: 90 tablet, Rfl: 3    losartan (COZAAR) 25 MG tablet, Take 1 tablet by mouth once daily., Disp: , Rfl:     melatonin 3 mg Tab, Take 5 mg by mouth every evening. 10 MG NIGHTLY, Disp: , Rfl:     metOLazone (ZAROXOLYN) 5 MG tablet, Take 1 tablet (5 mg total) by mouth daily as needed (swelling)., Disp: 30 tablet, Rfl: 11    metronidazole (METROGEL) 0.75 % gel, Apply topically 2 (two) times daily., Disp: 45 g, Rfl: 1    montelukast (SINGULAIR) 10 mg tablet, Take 10 mg by mouth once daily. , Disp: , Rfl:     MULTIVIT-IRON-MIN-FOLIC ACID 3,500-18-0.4 UNIT-MG-MG ORAL CHEW, Take by mouth 2 (two) times daily., Disp: , Rfl:     mupirocin (BACTROBAN) 2 % ointment, APPLY OINTMENT TOPICALLY TO AFFECTED AREA ON NOSE THREE TIMES DAILY AS DIRECTED, Disp: 1 Tube, Rfl: 11    oxycodone (ROXICODONE) 15 MG Tab, Take 15 mg by mouth every 8 (eight) hours as needed for Pain. , Disp: , Rfl:     pantoprazole (PROTONIX) 40 MG tablet, Take 1 tablet (40 mg total) by mouth once daily., Disp: 90 tablet, Rfl: 3    potassium  "chloride SA (K-DUR,KLOR-CON) 20 MEQ tablet, Take 1 tablet (20 mEq total) by mouth 2 (two) times daily., Disp: 180 tablet, Rfl: 3    ranitidine (ZANTAC) 300 MG tablet, TAKE ONE TABLET BY MOUTH IN THE EVENING, Disp: 30 tablet, Rfl: 3    torsemide (DEMADEX) 20 MG Tab, Take 2 tablets (40 mg total) by mouth once daily., Disp: 60 tablet, Rfl: 0    allopurinol (ZYLOPRIM) 100 MG tablet, Take 2 tablets (200 mg total) by mouth nightly., Disp: 180 tablet, Rfl: 3    diazePAM (VALIUM) 5 MG tablet, Take 1 tablet (5 mg total) by mouth every 6 (six) hours as needed for Anxiety., Disp: 15 tablet, Rfl: 0    diphenoxylate-atropine 2.5-0.025 mg (LOMOTIL) 2.5-0.025 mg per tablet, , Disp: , Rfl:     All medications and past history have been reviewed.    Review of Systems   Constitutional: Negative for fatigue, fever and unexpected weight change.   HENT: Negative for rhinorrhea and sore throat.    Eyes: Negative for photophobia.   Respiratory: Negative for cough and shortness of breath.    Cardiovascular: Negative for chest pain and leg swelling.   Gastrointestinal: Negative for abdominal pain, constipation, diarrhea, nausea and vomiting.   Endocrine: Negative for cold intolerance and heat intolerance.   Genitourinary: Negative for dysuria, frequency, hematuria and urgency.   Musculoskeletal: Negative for arthralgias, back pain and neck pain.   Skin: Negative for pallor and rash.   Neurological: Negative for weakness, numbness and headaches.   Hematological: Negative for adenopathy. Does not bruise/bleed easily.   Psychiatric/Behavioral: Negative for agitation.   All other systems reviewed and are negative.          Objective:        /63   Pulse 62   Temp 98.2 °F (36.8 °C) (Oral)   Resp 18   Ht 5' 2" (1.575 m)   Wt 103 kg (227 lb 1.2 oz)   LMP  (LMP Unknown)   BMI 41.53 kg/m²   Physical Exam   Constitutional: She is oriented to person, place, and time. She appears well-developed and well-nourished.   HENT:   Head: " Normocephalic.   Mouth/Throat: Oropharynx is clear and moist. No oropharyngeal exudate.   Eyes: Conjunctivae are normal. No scleral icterus.   Neck: Normal range of motion. No JVD present. No tracheal deviation present.   Cardiovascular: Normal rate and regular rhythm.    No murmur (2= capillary refill) heard.  Pulmonary/Chest: Effort normal. No respiratory distress. She has no wheezes.   Abdominal: Soft. She exhibits no distension. There is no tenderness (midepigastric).   Musculoskeletal: Normal range of motion. She exhibits no edema.   Neurological: She is alert and oriented to person, place, and time. No cranial nerve deficit.   Steady gait   Skin: Skin is dry. No rash noted. No erythema.   Psychiatric: She has a normal mood and affect.   Vitals reviewed.      Bone marrow with no evidence of plasma cell dyscrasia, no evidence of myelodysplasia.  I reviewed the report with her her daughter and her other relative extensively  I explained the meaning of B-cell monoclonality as well as T-cell expression.  I reviewed the percentage of cells noted in the marrow along with flow cytometry and cytogenetics  Skeletal survey negative for any lytic or ostial sclerotic disease  All labs and imaging have been reviewed.   Lab Results   Component Value Date    WBC 5.40 05/18/2017    HGB 11.7 (L) 05/18/2017    HCT 34.6 (L) 05/18/2017    MCV 87 05/18/2017     05/18/2017     CMP  Sodium   Date Value Ref Range Status   05/18/2017 139 136 - 145 mmol/L Final   05/18/2017 139 136 - 145 mmol/L Final     Potassium   Date Value Ref Range Status   05/18/2017 3.4 (L) 3.5 - 5.1 mmol/L Final   05/18/2017 3.4 (L) 3.5 - 5.1 mmol/L Final     Chloride   Date Value Ref Range Status   05/18/2017 103 95 - 110 mmol/L Final   05/18/2017 103 95 - 110 mmol/L Final     CO2   Date Value Ref Range Status   05/18/2017 28 23 - 29 mmol/L Final   05/18/2017 28 23 - 29 mmol/L Final     Glucose   Date Value Ref Range Status   05/18/2017 230 (H) 70 - 110  mg/dL Final   05/18/2017 230 (H) 70 - 110 mg/dL Final     BUN, Bld   Date Value Ref Range Status   05/18/2017 24 (H) 8 - 23 mg/dL Final   05/18/2017 24 (H) 8 - 23 mg/dL Final     Creatinine   Date Value Ref Range Status   05/18/2017 1.1 0.5 - 1.4 mg/dL Final   05/18/2017 1.1 0.5 - 1.4 mg/dL Final   08/31/2013 1.1 0.5 - 1.4 mg/dL Final     Calcium   Date Value Ref Range Status   05/18/2017 9.5 8.7 - 10.5 mg/dL Final   05/18/2017 9.5 8.7 - 10.5 mg/dL Final   08/31/2013 9.5 8.7 - 10.5 mg/dL Final     Total Protein   Date Value Ref Range Status   05/18/2017 6.9 6.0 - 8.4 g/dL Final   05/18/2017 6.9 6.0 - 8.4 g/dL Final     Albumin   Date Value Ref Range Status   05/18/2017 3.3 (L) 3.5 - 5.2 g/dL Final   05/18/2017 3.3 (L) 3.5 - 5.2 g/dL Final     Total Bilirubin   Date Value Ref Range Status   05/18/2017 0.4 0.1 - 1.0 mg/dL Final     Comment:     For infants and newborns, interpretation of results should be based  on gestational age, weight and in agreement with clinical  observations.  Premature Infant recommended reference ranges:  Up to 24 hours.............<8.0 mg/dL  Up to 48 hours............<12.0 mg/dL  3-5 days..................<15.0 mg/dL  6-29 days.................<15.0 mg/dL     05/18/2017 0.4 0.1 - 1.0 mg/dL Final     Comment:     For infants and newborns, interpretation of results should be based  on gestational age, weight and in agreement with clinical  observations.  Premature Infant recommended reference ranges:  Up to 24 hours.............<8.0 mg/dL  Up to 48 hours............<12.0 mg/dL  3-5 days..................<15.0 mg/dL  6-29 days.................<15.0 mg/dL       Alkaline Phosphatase   Date Value Ref Range Status   05/18/2017 90 55 - 135 U/L Final   05/18/2017 90 55 - 135 U/L Final   08/30/2013 93 55 - 135 U/L Final     AST   Date Value Ref Range Status   05/18/2017 18 10 - 40 U/L Final   05/18/2017 18 10 - 40 U/L Final   08/30/2013 21 10 - 40 U/L Final     ALT   Date Value Ref Range Status    05/18/2017 13 10 - 44 U/L Final   05/18/2017 13 10 - 44 U/L Final     Anion Gap   Date Value Ref Range Status   05/18/2017 8 8 - 16 mmol/L Final   05/18/2017 8 8 - 16 mmol/L Final   08/31/2013 12 5 - 15 meq/L Final     eGFR if    Date Value Ref Range Status   05/18/2017 >60 >60 mL/min/1.73 m^2 Final   05/18/2017 >60 >60 mL/min/1.73 m^2 Final     eGFR if non    Date Value Ref Range Status   05/18/2017 52 (A) >60 mL/min/1.73 m^2 Final     Comment:     Calculation used to obtain the estimated glomerular filtration  rate (eGFR) is the CKD-EPI equation. Since race is unknown   in our information system, the eGFR values for   -American and Non--American patients are given   for each creatinine result.     05/18/2017 52 (A) >60 mL/min/1.73 m^2 Final     Comment:     Calculation used to obtain the estimated glomerular filtration  rate (eGFR) is the CKD-EPI equation. Since race is unknown   in our information system, the eGFR values for   -American and Non--American patients are given   for each creatinine result.       IgA 40 - 350 mg/dL 416 (H)     All labs were reviewed, SPEP and UPEP and immunofixation studies were all negative    Assessment:       MGUS (monoclonal gammopathy of unknown significance)    Hematochezia    Gastritis, presence of bleeding unspecified, unspecified chronicity, unspecified gastritis type    Obesity, Class III, BMI 40-49.9 (morbid obesity)    Osteopenia with high risk of fracture    Depressive disorder    Renal insufficiency    Diabetes mellitus with complication    Encounter for medication review and counseling    Other orders  -     denosumab (PROLIA) injection 60 mg; Inject 1 mL (60 mg total) into the skin one time.          Plan:   Cleared for prolia    increase calcium to 3 tabs daily to prevent further hypocalcemia  She is to continue her usual medications for hypertension, diabetes as well as depression.  Review diet for  diabetes mellitus given the fact that she presented with hyperglycemia.  Watching IGA levels and hb since post gastric sleeve and may require IV iron in the future  RTC 8 weels with cbc and iron studies , proceed with colonoscopy 2 to hematochezia rule out GI bleed    The plan was discussed with the patient and all questions/concerns have been answered to the patient's satisfaction.

## 2017-05-31 ENCOUNTER — HOSPITAL ENCOUNTER (OUTPATIENT)
Facility: HOSPITAL | Age: 67
Discharge: HOME | End: 2017-05-31
Attending: INTERNAL MEDICINE | Admitting: INTERNAL MEDICINE
Payer: MEDICARE

## 2017-05-31 ENCOUNTER — SURGERY (OUTPATIENT)
Age: 67
End: 2017-05-31

## 2017-05-31 ENCOUNTER — ANESTHESIA (OUTPATIENT)
Dept: ENDOSCOPY | Facility: HOSPITAL | Age: 67
End: 2017-05-31
Payer: MEDICARE

## 2017-05-31 ENCOUNTER — ANESTHESIA EVENT (OUTPATIENT)
Dept: ENDOSCOPY | Facility: HOSPITAL | Age: 67
End: 2017-05-31
Payer: MEDICARE

## 2017-05-31 VITALS — RESPIRATION RATE: 24 BRPM

## 2017-05-31 DIAGNOSIS — K63.5 POLYP OF COLON, UNSPECIFIED PART OF COLON, UNSPECIFIED TYPE: Primary | ICD-10-CM

## 2017-05-31 DIAGNOSIS — K57.30 DIVERTICULOSIS OF LARGE INTESTINE WITHOUT HEMORRHAGE: ICD-10-CM

## 2017-05-31 DIAGNOSIS — K92.1 BLOOD IN STOOL: ICD-10-CM

## 2017-05-31 DIAGNOSIS — K64.8 INTERNAL HEMORRHOIDS: ICD-10-CM

## 2017-05-31 LAB — POCT GLUCOSE: 213 MG/DL (ref 70–110)

## 2017-05-31 PROCEDURE — 88341 IMHCHEM/IMCYTCHM EA ADD ANTB: CPT | Mod: 26,,, | Performed by: PATHOLOGY

## 2017-05-31 PROCEDURE — 37000008 HC ANESTHESIA 1ST 15 MINUTES: Performed by: INTERNAL MEDICINE

## 2017-05-31 PROCEDURE — D9220A PRA ANESTHESIA: Mod: CRNA,,, | Performed by: NURSE ANESTHETIST, CERTIFIED REGISTERED

## 2017-05-31 PROCEDURE — 82962 GLUCOSE BLOOD TEST: CPT | Performed by: INTERNAL MEDICINE

## 2017-05-31 PROCEDURE — 25000003 PHARM REV CODE 250: Performed by: INTERNAL MEDICINE

## 2017-05-31 PROCEDURE — 45385 COLONOSCOPY W/LESION REMOVAL: CPT | Performed by: INTERNAL MEDICINE

## 2017-05-31 PROCEDURE — 27201012 HC FORCEPS, HOT/COLD, DISP: Performed by: INTERNAL MEDICINE

## 2017-05-31 PROCEDURE — D9220A PRA ANESTHESIA: Mod: ANES,,, | Performed by: ANESTHESIOLOGY

## 2017-05-31 PROCEDURE — 88342 IMHCHEM/IMCYTCHM 1ST ANTB: CPT | Mod: 26,,, | Performed by: PATHOLOGY

## 2017-05-31 PROCEDURE — 45380 COLONOSCOPY AND BIOPSY: CPT | Performed by: INTERNAL MEDICINE

## 2017-05-31 PROCEDURE — 25000003 PHARM REV CODE 250: Performed by: NURSE ANESTHETIST, CERTIFIED REGISTERED

## 2017-05-31 PROCEDURE — 63600175 PHARM REV CODE 636 W HCPCS: Performed by: NURSE ANESTHETIST, CERTIFIED REGISTERED

## 2017-05-31 PROCEDURE — 88305 TISSUE EXAM BY PATHOLOGIST: CPT | Performed by: PATHOLOGY

## 2017-05-31 PROCEDURE — 27201089 HC SNARE, DISP (ANY): Performed by: INTERNAL MEDICINE

## 2017-05-31 PROCEDURE — 45380 COLONOSCOPY AND BIOPSY: CPT | Mod: 59,,, | Performed by: INTERNAL MEDICINE

## 2017-05-31 PROCEDURE — 45385 COLONOSCOPY W/LESION REMOVAL: CPT | Mod: ,,, | Performed by: INTERNAL MEDICINE

## 2017-05-31 PROCEDURE — 37000009 HC ANESTHESIA EA ADD 15 MINS: Performed by: INTERNAL MEDICINE

## 2017-05-31 RX ORDER — PROPOFOL 10 MG/ML
VIAL (ML) INTRAVENOUS
Status: DISCONTINUED | OUTPATIENT
Start: 2017-05-31 | End: 2017-05-31

## 2017-05-31 RX ORDER — SODIUM CHLORIDE 9 MG/ML
INJECTION, SOLUTION INTRAVENOUS CONTINUOUS
Status: DISCONTINUED | OUTPATIENT
Start: 2017-05-31 | End: 2017-05-31 | Stop reason: HOSPADM

## 2017-05-31 RX ORDER — PROPOFOL 10 MG/ML
INJECTION, EMULSION INTRAVENOUS
Status: DISCONTINUED
Start: 2017-05-31 | End: 2017-05-31 | Stop reason: HOSPADM

## 2017-05-31 RX ORDER — LIDOCAINE HYDROCHLORIDE 20 MG/ML
INJECTION, SOLUTION EPIDURAL; INFILTRATION; INTRACAUDAL; PERINEURAL
Status: DISCONTINUED
Start: 2017-05-31 | End: 2017-05-31 | Stop reason: HOSPADM

## 2017-05-31 RX ORDER — LIDOCAINE HCL/PF 100 MG/5ML
SYRINGE (ML) INTRAVENOUS
Status: DISCONTINUED | OUTPATIENT
Start: 2017-05-31 | End: 2017-05-31

## 2017-05-31 RX ORDER — PHENYLEPHRINE HYDROCHLORIDE 10 MG/ML
INJECTION INTRAVENOUS
Status: DISCONTINUED | OUTPATIENT
Start: 2017-05-31 | End: 2017-05-31

## 2017-05-31 RX ADMIN — LIDOCAINE HYDROCHLORIDE 50 MG: 20 INJECTION, SOLUTION INTRAVENOUS at 08:05

## 2017-05-31 RX ADMIN — SODIUM CHLORIDE 1000 ML: 0.9 INJECTION, SOLUTION INTRAVENOUS at 08:05

## 2017-05-31 RX ADMIN — PROPOFOL 60 MG: 10 INJECTION, EMULSION INTRAVENOUS at 08:05

## 2017-05-31 RX ADMIN — PROPOFOL 40 MG: 10 INJECTION, EMULSION INTRAVENOUS at 08:05

## 2017-05-31 RX ADMIN — PHENYLEPHRINE HYDROCHLORIDE 100 MCG: 10 INJECTION INTRAVENOUS at 08:05

## 2017-05-31 RX ADMIN — PROPOFOL 40 MG: 10 INJECTION, EMULSION INTRAVENOUS at 09:05

## 2017-05-31 NOTE — TRANSFER OF CARE
"Anesthesia Transfer of Care Note    Patient: Fifi Mcnair    Procedure(s) Performed: Procedure(s) (LRB):  COLONOSCOPY (N/A)    Patient location: PACU    Anesthesia Type: general    Transport from OR: Transported from OR on room air with adequate spontaneous ventilation    Post pain: adequate analgesia    Post assessment: no apparent anesthetic complications and tolerated procedure well    Post vital signs: stable    Level of consciousness: awake, alert and oriented    Nausea/Vomiting: no nausea/vomiting    Complications: none    Transfer of care protocol was followed      Last vitals:   Visit Vitals  BP (!) 132/59   Pulse 64   Temp 36.9 °C (98.4 °F)   Resp 11   Ht 5' 2" (1.575 m)   Wt 99.8 kg (220 lb)   LMP  (LMP Unknown)   SpO2 97%   Breastfeeding? No   BMI 40.24 kg/m²     "

## 2017-05-31 NOTE — ANESTHESIA POSTPROCEDURE EVALUATION
"Anesthesia Post Evaluation    Patient: Fifi Mcnair    Procedure(s) Performed: Procedure(s) (LRB):  COLONOSCOPY (N/A)    Final Anesthesia Type: general  Patient location during evaluation: PACU  Patient participation: Yes- Able to Participate  Level of consciousness: awake and alert and oriented  Post-procedure vital signs: reviewed and stable  Pain management: adequate  Airway patency: patent  PONV status at discharge: No PONV  Anesthetic complications: no      Cardiovascular status: blood pressure returned to baseline  Respiratory status: unassisted, spontaneous ventilation and room air  Hydration status: euvolemic  Follow-up not needed.        Visit Vitals  BP (!) 154/60   Pulse 64   Temp 36.6 °C (97.9 °F) (Temporal)   Resp 16   Ht 5' 2" (1.575 m)   Wt 99.8 kg (220 lb)   LMP  (LMP Unknown)   SpO2 100%   Breastfeeding? No   BMI 40.24 kg/m²       Pain/Mohamud Score: Pain Assessment Performed: Yes (5/31/2017  9:40 AM)  Presence of Pain: denies (5/31/2017  9:40 AM)  Mohamud Score: 10 (5/31/2017  9:40 AM)      "

## 2017-05-31 NOTE — PLAN OF CARE
Pt AAO, VSS, states no pain or nausea, tolerating sips of coffee. Spouse at bedside. Pt and spouse given discharge instructions, both verbalized understanding.  IV removed, tip WNL.  Pt meets criteria to discharge home.

## 2017-05-31 NOTE — DISCHARGE INSTRUCTIONS

## 2017-05-31 NOTE — ANESTHESIA PREPROCEDURE EVALUATION
05/31/2017  Fifi Mcnair is a 66 y.o., female.    Anesthesia Evaluation    I have reviewed the Patient Summary Reports.    I have reviewed the Nursing Notes.   I have reviewed the Medications.     Review of Systems  Anesthesia Hx:  No problems with previous Anesthesia Denies Hx of Anesthetic complications    Social:  Former Smoker    Cardiovascular:   Exercise tolerance: poor Hypertension Dysrhythmias atrial fibrillation Denies Angina. CHF    Pulmonary:   COPD Asthma Recent URI, resolved Sleep Apnea, CPAP    Renal/:   Chronic Renal Disease, CRI    Hepatic/GI:   PUD, GERD Denies Liver Disease.    Musculoskeletal:   Arthritis     Neurological:   TIA, Denies Seizures.    Endocrine:   Diabetes Hypothyroidism    Psych:   Psychiatric History depression          Physical Exam  General:  Well nourished, Obesity    Airway/Jaw/Neck:  Airway Findings: Mouth Opening: Normal Tongue: Normal  General Airway Assessment: Adult, Good  Mallampati: II  Improves to II with phonation.  TM Distance: 4-6 cm      Dental:  Dental Findings: In tact   Chest/Lungs:  Chest/Lungs Findings: Clear to auscultation, Normal Respiratory Rate     Heart/Vascular:  Heart Findings: Rate: Normal  Rhythm: Regular Rhythm  Sounds: Normal  Heart murmur: negative       Mental Status:  Mental Status Findings:  Cooperative, Alert and Oriented         Anesthesia Plan  Type of Anesthesia, risks & benefits discussed:  Anesthesia Type:  general  Patient's Preference:   Intra-op Monitoring Plan:   Intra-op Monitoring Plan Comments:   Post Op Pain Control Plan:   Post Op Pain Control Plan Comments:   Induction:   IV  Beta Blocker:  Patient is not currently on a Beta-Blocker (No further documentation required).       Informed Consent: Patient understands risks and agrees with Anesthesia plan.  Questions answered. Anesthesia consent signed with patient.  ASA  Score: 3     Day of Surgery Review of History & Physical:    H&P update referred to the surgeon.         Ready For Surgery From Anesthesia Perspective.

## 2017-05-31 NOTE — H&P
CC: Diarrhea, change in bowel habits - last scope in 2013    66 year old female with above. States that symptoms are stable, no alleviating/exacerbating factors. No family history of CA. Positive personal history of polyps. No bleeding or weight loss.     ROS:  No headache, no fever/chills, no chest pain/SOB, no nausea/vomiting/diarrhea/constipation/GI bleeding/abdominal pain, no dysuria/hematuria.    VSSAF   Exam:   Alert and oriented x 3; no apparent distress   PERRLA, sclera anicteric  CV: Regular rate/rhythm, normal PMI   Lungs: Clear bilaterally with no wheeze/rales   Abdomen: Soft, NT/ND, normal bowel sounds   Ext: No cyanosis, clubbing     Impression:   As above    Plan:   Proceed with endoscopy. Further recs to follow.

## 2017-06-01 VITALS
RESPIRATION RATE: 16 BRPM | DIASTOLIC BLOOD PRESSURE: 60 MMHG | BODY MASS INDEX: 40.48 KG/M2 | HEIGHT: 62 IN | SYSTOLIC BLOOD PRESSURE: 154 MMHG | OXYGEN SATURATION: 100 % | WEIGHT: 220 LBS | HEART RATE: 64 BPM | TEMPERATURE: 98 F

## 2017-06-08 ENCOUNTER — DOCUMENTATION ONLY (OUTPATIENT)
Dept: HEMATOLOGY/ONCOLOGY | Facility: CLINIC | Age: 67
End: 2017-06-08

## 2017-06-09 ENCOUNTER — TELEPHONE (OUTPATIENT)
Dept: GASTROENTEROLOGY | Facility: CLINIC | Age: 67
End: 2017-06-09

## 2017-06-10 DIAGNOSIS — K21.9 GASTROESOPHAGEAL REFLUX DISEASE WITHOUT ESOPHAGITIS: ICD-10-CM

## 2017-06-28 RX ORDER — ALLOPURINOL 100 MG/1
TABLET ORAL
Qty: 180 TABLET | Refills: 4 | Status: SHIPPED | OUTPATIENT
Start: 2017-06-28 | End: 2018-09-05 | Stop reason: SDUPTHER

## 2017-07-12 ENCOUNTER — LAB VISIT (OUTPATIENT)
Dept: LAB | Facility: HOSPITAL | Age: 67
End: 2017-07-12
Attending: INTERNAL MEDICINE
Payer: MEDICARE

## 2017-07-12 DIAGNOSIS — N18.2 CHRONIC KIDNEY DISEASE, STAGE II (MILD): ICD-10-CM

## 2017-07-12 DIAGNOSIS — E07.9 UNSPECIFIED DISORDER OF THYROID: ICD-10-CM

## 2017-07-12 DIAGNOSIS — K21.9 GASTROESOPHAGEAL REFLUX DISEASE WITHOUT ESOPHAGITIS: ICD-10-CM

## 2017-07-12 DIAGNOSIS — E11.9 DIABETES MELLITUS WITHOUT COMPLICATION: Primary | ICD-10-CM

## 2017-07-12 DIAGNOSIS — I10 ESSENTIAL HYPERTENSION, MALIGNANT: ICD-10-CM

## 2017-07-12 LAB
ALBUMIN SERPL BCP-MCNC: 3.4 G/DL
ALP SERPL-CCNC: 105 U/L
ALT SERPL W/O P-5'-P-CCNC: 15 U/L
ANION GAP SERPL CALC-SCNC: 12 MMOL/L
AST SERPL-CCNC: 20 U/L
BASOPHILS # BLD AUTO: 0 K/UL
BASOPHILS NFR BLD: 0.4 %
BILIRUB SERPL-MCNC: 0.4 MG/DL
BUN SERPL-MCNC: 32 MG/DL
CALCIUM SERPL-MCNC: 9.7 MG/DL
CHLORIDE SERPL-SCNC: 101 MMOL/L
CHOLEST/HDLC SERPL: 3.2 {RATIO}
CO2 SERPL-SCNC: 25 MMOL/L
CREAT SERPL-MCNC: 1.2 MG/DL
CREAT UR-MCNC: 71 MG/DL
DIFFERENTIAL METHOD: ABNORMAL
EOSINOPHIL # BLD AUTO: 0.1 K/UL
EOSINOPHIL NFR BLD: 0.8 %
ERYTHROCYTE [DISTWIDTH] IN BLOOD BY AUTOMATED COUNT: 15.6 %
EST. GFR  (AFRICAN AMERICAN): 54 ML/MIN/1.73 M^2
EST. GFR  (NON AFRICAN AMERICAN): 47 ML/MIN/1.73 M^2
GLUCOSE SERPL-MCNC: 337 MG/DL
HCT VFR BLD AUTO: 37 %
HDL/CHOLESTEROL RATIO: 31.3 %
HDLC SERPL-MCNC: 144 MG/DL
HDLC SERPL-MCNC: 45 MG/DL
HGB BLD-MCNC: 12.7 G/DL
LDLC SERPL CALC-MCNC: 50.6 MG/DL
LYMPHOCYTES # BLD AUTO: 1.6 K/UL
LYMPHOCYTES NFR BLD: 26.4 %
MCH RBC QN AUTO: 30.7 PG
MCHC RBC AUTO-ENTMCNC: 34.4 %
MCV RBC AUTO: 89 FL
MICROALBUMIN UR DL<=1MG/L-MCNC: 14 UG/ML
MICROALBUMIN/CREATININE RATIO: 19.7 UG/MG
MONOCYTES # BLD AUTO: 0.4 K/UL
MONOCYTES NFR BLD: 5.9 %
NEUTROPHILS # BLD AUTO: 4 K/UL
NEUTROPHILS NFR BLD: 66.5 %
NONHDLC SERPL-MCNC: 99 MG/DL
PLATELET # BLD AUTO: 224 K/UL
PMV BLD AUTO: 7.6 FL
POTASSIUM SERPL-SCNC: 4.2 MMOL/L
PROT SERPL-MCNC: 7.3 G/DL
RBC # BLD AUTO: 4.14 M/UL
SODIUM SERPL-SCNC: 138 MMOL/L
T3FREE SERPL-MCNC: 2.6 PG/ML
T4 FREE SERPL-MCNC: 0.97 NG/DL
TRIGL SERPL-MCNC: 242 MG/DL
TSH SERPL DL<=0.005 MIU/L-ACNC: 1.71 UIU/ML
WBC # BLD AUTO: 6 K/UL

## 2017-07-12 PROCEDURE — 83036 HEMOGLOBIN GLYCOSYLATED A1C: CPT

## 2017-07-12 PROCEDURE — 84443 ASSAY THYROID STIM HORMONE: CPT

## 2017-07-12 PROCEDURE — 84481 FREE ASSAY (FT-3): CPT

## 2017-07-12 PROCEDURE — 36415 COLL VENOUS BLD VENIPUNCTURE: CPT

## 2017-07-12 PROCEDURE — 84439 ASSAY OF FREE THYROXINE: CPT

## 2017-07-12 PROCEDURE — 80061 LIPID PANEL: CPT

## 2017-07-12 PROCEDURE — 80053 COMPREHEN METABOLIC PANEL: CPT

## 2017-07-12 PROCEDURE — 82570 ASSAY OF URINE CREATININE: CPT

## 2017-07-12 PROCEDURE — 85025 COMPLETE CBC W/AUTO DIFF WBC: CPT

## 2017-07-13 LAB
ESTIMATED AVG GLUCOSE: 180 MG/DL
HBA1C MFR BLD HPLC: 7.9 %

## 2017-07-17 ENCOUNTER — LAB VISIT (OUTPATIENT)
Dept: LAB | Facility: HOSPITAL | Age: 67
End: 2017-07-17
Attending: DENTIST
Payer: MEDICARE

## 2017-07-17 DIAGNOSIS — M81.0 SENILE OSTEOPOROSIS: ICD-10-CM

## 2017-07-17 LAB
BASOPHILS # BLD AUTO: 0 K/UL
BASOPHILS NFR BLD: 0.3 %
DIFFERENTIAL METHOD: ABNORMAL
EOSINOPHIL # BLD AUTO: 0.1 K/UL
EOSINOPHIL NFR BLD: 1.1 %
ERYTHROCYTE [DISTWIDTH] IN BLOOD BY AUTOMATED COUNT: 15.7 %
HCT VFR BLD AUTO: 37.1 %
HGB BLD-MCNC: 12.7 G/DL
IRON SERPL-MCNC: 116 UG/DL
LYMPHOCYTES # BLD AUTO: 1.6 K/UL
LYMPHOCYTES NFR BLD: 26.4 %
MCH RBC QN AUTO: 31 PG
MCHC RBC AUTO-ENTMCNC: 34.2 %
MCV RBC AUTO: 91 FL
MONOCYTES # BLD AUTO: 0.4 K/UL
MONOCYTES NFR BLD: 6.6 %
NEUTROPHILS # BLD AUTO: 4.1 K/UL
NEUTROPHILS NFR BLD: 65.6 %
PLATELET # BLD AUTO: 214 K/UL
PMV BLD AUTO: 7.7 FL
RBC # BLD AUTO: 4.09 M/UL
SATURATED IRON: 38 %
TOTAL IRON BINDING CAPACITY: 309 UG/DL
TRANSFERRIN SERPL-MCNC: 209 MG/DL
WBC # BLD AUTO: 6.2 K/UL

## 2017-07-17 PROCEDURE — 83540 ASSAY OF IRON: CPT

## 2017-07-17 PROCEDURE — 85025 COMPLETE CBC W/AUTO DIFF WBC: CPT

## 2017-07-17 PROCEDURE — 36415 COLL VENOUS BLD VENIPUNCTURE: CPT

## 2017-07-24 ENCOUNTER — OFFICE VISIT (OUTPATIENT)
Dept: HEMATOLOGY/ONCOLOGY | Facility: CLINIC | Age: 67
End: 2017-07-24
Payer: MEDICARE

## 2017-07-24 VITALS
BODY MASS INDEX: 43.08 KG/M2 | TEMPERATURE: 98 F | SYSTOLIC BLOOD PRESSURE: 149 MMHG | RESPIRATION RATE: 20 BRPM | HEIGHT: 62 IN | WEIGHT: 234.13 LBS | HEART RATE: 65 BPM | DIASTOLIC BLOOD PRESSURE: 68 MMHG

## 2017-07-24 DIAGNOSIS — R76.8 ELEVATED SERUM IMMUNOGLOBULIN FREE LIGHT CHAINS: ICD-10-CM

## 2017-07-24 DIAGNOSIS — E66.01 MORBID OBESITY WITH BMI OF 40.0-44.9, ADULT: ICD-10-CM

## 2017-07-24 DIAGNOSIS — E03.9 ACQUIRED HYPOTHYROIDISM: ICD-10-CM

## 2017-07-24 DIAGNOSIS — E11.8 CONTROLLED TYPE 2 DIABETES MELLITUS WITH COMPLICATION, WITH LONG-TERM CURRENT USE OF INSULIN: ICD-10-CM

## 2017-07-24 DIAGNOSIS — M85.80 OSTEOPENIA WITH HIGH RISK OF FRACTURE: ICD-10-CM

## 2017-07-24 DIAGNOSIS — D89.0 POLYCLONAL GAMMOPATHY: Primary | ICD-10-CM

## 2017-07-24 DIAGNOSIS — Z79.4 CONTROLLED TYPE 2 DIABETES MELLITUS WITH COMPLICATION, WITH LONG-TERM CURRENT USE OF INSULIN: ICD-10-CM

## 2017-07-24 PROCEDURE — 3045F PR MOST RECENT HEMOGLOBIN A1C LEVEL 7.0-9.0%: CPT | Mod: S$GLB,,, | Performed by: INTERNAL MEDICINE

## 2017-07-24 PROCEDURE — 1159F MED LIST DOCD IN RCRD: CPT | Mod: S$GLB,,, | Performed by: INTERNAL MEDICINE

## 2017-07-24 PROCEDURE — 99214 OFFICE O/P EST MOD 30 MIN: CPT | Mod: S$GLB,,, | Performed by: INTERNAL MEDICINE

## 2017-07-24 PROCEDURE — 99999 PR PBB SHADOW E&M-EST. PATIENT-LVL IV: CPT | Mod: PBBFAC,,, | Performed by: INTERNAL MEDICINE

## 2017-07-24 PROCEDURE — 1125F AMNT PAIN NOTED PAIN PRSNT: CPT | Mod: S$GLB,,, | Performed by: INTERNAL MEDICINE

## 2017-07-24 PROCEDURE — 4010F ACE/ARB THERAPY RXD/TAKEN: CPT | Mod: S$GLB,,, | Performed by: INTERNAL MEDICINE

## 2017-07-24 NOTE — PROGRESS NOTES
Subjective:       Patient ID: Fifi Mcnair is a 66 y.o. female.    Chief Complaint: I am worried about my labs    HPI    This is a 66 yr old female tolerating prolia for osteoporosis.  She started prolia  for osteoporosis. Pt has had a gastric sleeve in the past. She remains on B12 and iron supplement  Pt is transitioning from one benzodiazepine to another per Dr Vargas    She is tolerating Synthroid for thyroid disorder and neurontin for neuropathy.  Labs revealed an elevated kappa light chain with elevated ratio.  Bone marrow showed no evidence of a plasma cell dyscrasia  Pt with history of sleeve gastrectomy    EGD C scope reviewed    IgA remains elevated  as with the elevated kappa levels      Current Outpatient Prescriptions:     allopurinol (ZYLOPRIM) 100 MG tablet, TAKE TWO TABLETS BY MOUTH NIGHTLY, Disp: 180 tablet, Rfl: 4    atenolol (TENORMIN) 25 MG tablet, Take 1 tablet (25 mg total) by mouth once daily., Disp: 90 tablet, Rfl: 3    calcitRIOL (ROCALTROL) 0.25 MCG Cap, Take 1 capsule by mouth twice a week. Monday Friday, Disp: , Rfl:     CALCIUM CITRATE/VITAMIN D3 (CALCIUM CITRATE + D ORAL), Take 400 mg by mouth 3 (three) times daily., Disp: , Rfl:     cetirizine (ZYRTEC) 10 MG tablet, Take 1 tablet (10 mg total) by mouth once daily., Disp: 1 Bottle, Rfl: 5    clotrimazole-betamethasone 1-0.05% (LOTRISONE) cream, , Disp: , Rfl:     cyanocobalamin, vitamin B-12, (VITAMIN B-12) 5,000 mcg Subl, Place 5,000 mg under the tongue once a week., Disp: , Rfl:     DENOSUMAB (PROLIA SUBQ), Inject into the skin every 6 (six) months. , Disp: , Rfl:     DIABETIC SUPPLIES,MISCELL (MEDTRONIC REMOTE CONTROL MISC), No Sig Provided, Disp: , Rfl:     diazePAM (VALIUM) 5 MG tablet, Take 1 tablet (5 mg total) by mouth every 6 (six) hours as needed for Anxiety., Disp: 15 tablet, Rfl: 0    diphenoxylate-atropine 2.5-0.025 mg (LOMOTIL) 2.5-0.025 mg per tablet, , Disp: , Rfl:     diphenoxylate-atropine 2.5-0.025 mg  (LOMOTIL) 2.5-0.025 mg per tablet, TAKE ONE TABLET BY MOUTH 4 TIMES DAILY AS NEEDED FOR DIARRHEA, Disp: 60 tablet, Rfl: 1    fluoxetine (PROZAC) 20 MG capsule, Take 20 mg by mouth once daily. , Disp: , Rfl:     fluoxetine (PROZAC) 20 MG capsule, TAKE TWO CAPSULES BY MOUTH ONCE DAILY, Disp: 180 capsule, Rfl: 4    fluticasone (FLONASE) 50 mcg/actuation nasal spray, 2 sprays by Nasal route once daily. , Disp: , Rfl:     fluticasone-vilanterol (BREO) 100-25 mcg/dose diskus inhaler, Inhale 1 puff into the lungs once daily., Disp: , Rfl:     glucagon (human recombinant) inj 1mg/mL kit, Inject 1 mL (1 mg total) into the muscle as needed., Disp: 2 kit, Rfl: 3    insulin aspart (NOVOLOG) 100 unit/mL injection, Inject into the skin continuous. Pt has insulin pump, Disp: , Rfl:     l-methylfolate-b2-b6-b12 (CEREFOLIN) 6-5-50-1 mg Tab, Take 1 tablet by mouth once daily., Disp: , Rfl:     Lactobacillus rhamnosus GG (CULTURELLE) 10 billion cell capsule, Take 1 capsule by mouth once daily., Disp: , Rfl:     levothyroxine (SYNTHROID) 25 MCG tablet, Take 1 tablet (25 mcg total) by mouth once daily., Disp: 90 tablet, Rfl: 3    losartan (COZAAR) 25 MG tablet, Take 1 tablet by mouth once daily., Disp: , Rfl:     melatonin 3 mg Tab, Take 5 mg by mouth every evening. 10 MG NIGHTLY, Disp: , Rfl:     metOLazone (ZAROXOLYN) 5 MG tablet, Take 1 tablet (5 mg total) by mouth daily as needed (swelling)., Disp: 30 tablet, Rfl: 11    metronidazole (METROGEL) 0.75 % gel, Apply topically 2 (two) times daily., Disp: 45 g, Rfl: 1    montelukast (SINGULAIR) 10 mg tablet, Take 10 mg by mouth once daily. , Disp: , Rfl:     MULTIVIT-IRON-MIN-FOLIC ACID 3,500-18-0.4 UNIT-MG-MG ORAL CHEW, Take by mouth 2 (two) times daily., Disp: , Rfl:     mupirocin (BACTROBAN) 2 % ointment, APPLY OINTMENT TOPICALLY TO AFFECTED AREA ON NOSE THREE TIMES DAILY AS DIRECTED, Disp: 1 Tube, Rfl: 11    oxycodone (ROXICODONE) 15 MG Tab, Take 15 mg by mouth every  "8 (eight) hours as needed for Pain. , Disp: , Rfl:     pantoprazole (PROTONIX) 40 MG tablet, Take 1 tablet (40 mg total) by mouth once daily., Disp: 90 tablet, Rfl: 3    potassium chloride SA (K-DUR,KLOR-CON) 20 MEQ tablet, Take 1 tablet (20 mEq total) by mouth 2 (two) times daily., Disp: 180 tablet, Rfl: 3    ranitidine (ZANTAC) 300 MG tablet, TAKE ONE TABLET BY MOUTH IN THE EVENING, Disp: 30 tablet, Rfl: 2    torsemide (DEMADEX) 20 MG Tab, Take 2 tablets (40 mg total) by mouth once daily., Disp: 60 tablet, Rfl: 0    All medications and past history have been reviewed.    Review of Systems   Constitutional: Negative for fatigue, fever and unexpected weight change.   HENT: Negative for rhinorrhea and sore throat.    Eyes: Negative for photophobia.   Respiratory: Negative for cough and shortness of breath.    Cardiovascular: Negative for chest pain and leg swelling.   Gastrointestinal: Negative for abdominal pain, constipation, diarrhea, nausea and vomiting.   Endocrine: Negative for cold intolerance and heat intolerance.   Genitourinary: Negative for dysuria, frequency, hematuria and urgency.   Musculoskeletal: Negative for arthralgias, back pain and neck pain.   Skin: Negative for pallor and rash.   Neurological: Negative for weakness, numbness and headaches.   Hematological: Negative for adenopathy. Does not bruise/bleed easily.   Psychiatric/Behavioral: Negative for agitation.   All other systems reviewed and are negative.          Objective:        BP (!) 149/68 (BP Location: Left arm, Patient Position: Sitting, BP Method: Automatic)   Pulse 65   Temp 97.9 °F (36.6 °C) (Oral)   Resp 20   Ht 5' 2" (1.575 m)   Wt 106.2 kg (234 lb 2.1 oz)   LMP  (LMP Unknown)   BMI 42.82 kg/m²     Physical Exam   Constitutional: She is oriented to person, place, and time. She appears well-developed and well-nourished.   HENT:   Head: Normocephalic.   Mouth/Throat: Oropharynx is clear and moist. No oropharyngeal exudate. "   Eyes: Conjunctivae are normal. No scleral icterus.   Neck: Normal range of motion. No JVD present. No tracheal deviation present.   Cardiovascular: Normal rate and regular rhythm.    No murmur (2= capillary refill) heard.  Pulmonary/Chest: Effort normal. No respiratory distress. She has no wheezes.   Abdominal: Soft. She exhibits no distension. There is no tenderness (midepigastric).   Musculoskeletal: Normal range of motion. She exhibits no edema.   Neurological: She is alert and oriented to person, place, and time. No cranial nerve deficit.   Steady gait   Skin: Skin is dry. No rash noted. No erythema.   Psychiatric: She has a normal mood and affect.   Vitals reviewed.      Bone marrow with no evidence of plasma cell dyscrasia, no evidence of myelodysplasia.    I explained the meaning of B-cell monoclonality as well as T-cell expression.  I reviewed the percentage of cells noted in the marrow along with flow cytometry and cytogenetics  Skeletal survey negative for any lytic or ostial sclerotic disease  All labs and imaging have been reviewed.   Lab Results   Component Value Date    WBC 6.20 07/17/2017    HGB 12.7 07/17/2017    HCT 37.1 07/17/2017    MCV 91 07/17/2017     07/17/2017        12d ago  (7/12/17) 2mo ago  (5/18/17) 2mo ago  (5/18/17)      Sodium 136 - 145 mmol/L 138 139 139    Potassium 3.5 - 5.1 mmol/L 4.2 3.4  3.4     Chloride 95 - 110 mmol/L 101 103 103    CO2 23 - 29 mmol/L 25 28 28    Glucose 70 - 110 mg/dL 337  230  230     BUN, Bld 8 - 23 mg/dL 32  24  24     Creatinine 0.5 - 1.4 mg/dL 1.2 1.1 1.1    Calcium 8.7 - 10.5 mg/dL 9.7 9.5 9.5    Total Protein 6.0 - 8.4 g/dL 7.3 6.9 6.9    Albumin 3.5 - 5.2 g/dL 3.4  3.3  3.3     Total Bilirubin 0.1 - 1.0 mg/dL 0.4 0.4CM 0.4CM   Comments: For infants and newborns, interpretation of results should be based   on gestational age, weight and in agreement with clinical   observations.   Premature Infant recommended reference ranges:   Up to 24  hours.............<8.0 mg/dL   Up to 48 hours............<12.0 mg/dL   3-5 days..................<15.0 mg/dL   6-29 days.................<15.0 mg/dL     Alkaline Phosphatase 55 - 135 U/L 105 90 90    AST 10 - 40 U/L 20 18 18    ALT 10 - 44 U/L 15 13 13    Anion Gap 8 - 16 mmol/L 12 8 8    eGFR if African American >60 mL/min/1.73 m^2 54  >60 >60    eGFR if non African American >60 mL/min/1.73 m^2 47  52CM  52CM           IgA 40 - 350 mg/dL 416 (H)     All labs were reviewed, SPEP and UPEP and immunofixation studies were all negative    Assessment:       Polyclonal gammopathy  -     CBC auto differential; Future; Expected date: 07/24/2017  -     Immunoglobulin free LT chains blood; Future; Expected date: 07/24/2017  -     IgA; Future; Expected date: 07/24/2017  -     CMP; Future; Expected date: 07/24/2017  -     UIFE  -     Protein electrophoresis, urine; Future    Elevated serum immunoglobulin free light chains  -     CBC auto differential; Future; Expected date: 07/24/2017  -     Immunoglobulin free LT chains blood; Future; Expected date: 07/24/2017  -     IgA; Future; Expected date: 07/24/2017  -     CMP; Future; Expected date: 07/24/2017  -     UIFE  -     Protein electrophoresis, urine; Future    Morbid obesity with BMI of 40.0-44.9, adult, 41.8    Acquired hypothyroidism    Controlled type 2 diabetes mellitus with complication, with long-term current use of insulin    Osteopenia with high risk of fracture          Plan:       Pt tolerated prolia last visit  Polyclonal gamma  Explained that her glucose is too high  Renal function progressing  She is to continue her usual medications for hypertension, diabetes as well as depression.  Review diet for diabetes mellitus given the fact that she presented with hyperglycemia.  Watching IGA levels and hb since post gastric sleeve and may require IV iron in the future  RTC 8 weels with cbc and iron studies , proceed with colonoscopy 2 to hematochezia rule out GI  bleed      Current Outpatient Prescriptions:     allopurinol (ZYLOPRIM) 100 MG tablet, TAKE TWO TABLETS BY MOUTH NIGHTLY, Disp: 180 tablet, Rfl: 4    atenolol (TENORMIN) 25 MG tablet, Take 1 tablet (25 mg total) by mouth once daily., Disp: 90 tablet, Rfl: 3    calcitRIOL (ROCALTROL) 0.25 MCG Cap, Take 1 capsule by mouth twice a week. Monday Friday, Disp: , Rfl:     CALCIUM CITRATE/VITAMIN D3 (CALCIUM CITRATE + D ORAL), Take 400 mg by mouth 3 (three) times daily., Disp: , Rfl:     cetirizine (ZYRTEC) 10 MG tablet, Take 1 tablet (10 mg total) by mouth once daily., Disp: 1 Bottle, Rfl: 5    clotrimazole-betamethasone 1-0.05% (LOTRISONE) cream, , Disp: , Rfl:     cyanocobalamin, vitamin B-12, (VITAMIN B-12) 5,000 mcg Subl, Place 5,000 mg under the tongue once a week., Disp: , Rfl:     DENOSUMAB (PROLIA SUBQ), Inject into the skin every 6 (six) months. , Disp: , Rfl:     diphenoxylate-atropine 2.5-0.025 mg (LOMOTIL) 2.5-0.025 mg per tablet, , Disp: , Rfl:     diphenoxylate-atropine 2.5-0.025 mg (LOMOTIL) 2.5-0.025 mg per tablet, TAKE ONE TABLET BY MOUTH 4 TIMES DAILY AS NEEDED FOR DIARRHEA, Disp: 60 tablet, Rfl: 1    fluoxetine (PROZAC) 20 MG capsule, TAKE TWO CAPSULES BY MOUTH ONCE DAILY, Disp: 180 capsule, Rfl: 4    fluticasone (FLONASE) 50 mcg/actuation nasal spray, 2 sprays by Nasal route once daily. , Disp: , Rfl:     glucagon (human recombinant) inj 1mg/mL kit, Inject 1 mL (1 mg total) into the muscle as needed., Disp: 2 kit, Rfl: 3    insulin aspart (NOVOLOG) 100 unit/mL injection, Inject into the skin continuous. Pt has insulin pump, Disp: , Rfl:     l-methylfolate-b2-b6-b12 (CEREFOLIN) 6-5-50-1 mg Tab, Take 1 tablet by mouth once daily., Disp: , Rfl:     Lactobacillus rhamnosus GG (CULTURELLE) 10 billion cell capsule, Take 1 capsule by mouth once daily., Disp: , Rfl:     levothyroxine (SYNTHROID) 25 MCG tablet, Take 1 tablet (25 mcg total) by mouth once daily., Disp: 90 tablet, Rfl: 3     losartan (COZAAR) 25 MG tablet, Take 1 tablet by mouth once daily., Disp: , Rfl:     melatonin 3 mg Tab, Take 5 mg by mouth every evening. 10 MG NIGHTLY, Disp: , Rfl:     metOLazone (ZAROXOLYN) 5 MG tablet, Take 1 tablet (5 mg total) by mouth daily as needed (swelling)., Disp: 30 tablet, Rfl: 11    metronidazole (METROGEL) 0.75 % gel, Apply topically 2 (two) times daily., Disp: 45 g, Rfl: 1    montelukast (SINGULAIR) 10 mg tablet, Take 10 mg by mouth once daily. , Disp: , Rfl:     MULTIVIT-IRON-MIN-FOLIC ACID 3,500-18-0.4 UNIT-MG-MG ORAL CHEW, Take by mouth 2 (two) times daily., Disp: , Rfl:     mupirocin (BACTROBAN) 2 % ointment, APPLY OINTMENT TOPICALLY TO AFFECTED AREA ON NOSE THREE TIMES DAILY AS DIRECTED, Disp: 1 Tube, Rfl: 11    oxycodone (ROXICODONE) 15 MG Tab, Take 15 mg by mouth every 8 (eight) hours as needed for Pain. , Disp: , Rfl:     pantoprazole (PROTONIX) 40 MG tablet, Take 1 tablet (40 mg total) by mouth once daily., Disp: 90 tablet, Rfl: 3    potassium chloride SA (K-DUR,KLOR-CON) 20 MEQ tablet, Take 1 tablet (20 mEq total) by mouth 2 (two) times daily., Disp: 180 tablet, Rfl: 3    ranitidine (ZANTAC) 300 MG tablet, TAKE ONE TABLET BY MOUTH IN THE EVENING, Disp: 30 tablet, Rfl: 2    torsemide (DEMADEX) 20 MG Tab, Take 2 tablets (40 mg total) by mouth once daily., Disp: 60 tablet, Rfl: 0    DIABETIC SUPPLIES,MISCELL (MEDTRONIC REMOTE CONTROL MISC), No Sig Provided, Disp: , Rfl:     diazePAM (VALIUM) 5 MG tablet, Take 1 tablet (5 mg total) by mouth every 6 (six) hours as needed for Anxiety., Disp: 15 tablet, Rfl: 0    fluticasone-vilanterol (BREO) 100-25 mcg/dose diskus inhaler, Inhale 1 puff into the lungs once daily., Disp: , Rfl:     Discuss with Dr Vargas re ativan vs valium        The plan was discussed with the patient and all questions/concerns have been answered to the patient's satisfaction.

## 2017-07-28 DIAGNOSIS — E11.9 TYPE 2 DIABETES, HBA1C GOAL < 7%: Primary | ICD-10-CM

## 2017-07-31 RX ORDER — PANTOPRAZOLE SODIUM 40 MG/1
TABLET, DELAYED RELEASE ORAL
Qty: 90 TABLET | Refills: 0 | Status: SHIPPED | OUTPATIENT
Start: 2017-07-31 | End: 2017-11-16 | Stop reason: SDUPTHER

## 2017-08-01 ENCOUNTER — DOCUMENTATION ONLY (OUTPATIENT)
Dept: FAMILY MEDICINE | Facility: CLINIC | Age: 67
End: 2017-08-01

## 2017-08-01 NOTE — PROGRESS NOTES
Pre-Visit Chart Review  For Appointment Scheduled on 08/03/2017    Health Maintenance Due   Topic Date Due    Influenza Vaccine  08/01/2017    Foot Exam  08/29/2017

## 2017-08-03 ENCOUNTER — OFFICE VISIT (OUTPATIENT)
Dept: FAMILY MEDICINE | Facility: CLINIC | Age: 67
End: 2017-08-03
Payer: MEDICARE

## 2017-08-03 VITALS
DIASTOLIC BLOOD PRESSURE: 57 MMHG | HEART RATE: 67 BPM | SYSTOLIC BLOOD PRESSURE: 110 MMHG | HEIGHT: 62 IN | WEIGHT: 231.5 LBS | TEMPERATURE: 99 F | BODY MASS INDEX: 42.6 KG/M2

## 2017-08-03 DIAGNOSIS — N39.0 RECURRENT UTI: Primary | ICD-10-CM

## 2017-08-03 DIAGNOSIS — M62.838 MUSCLE SPASM: ICD-10-CM

## 2017-08-03 DIAGNOSIS — J44.9 CHRONIC OBSTRUCTIVE PULMONARY DISEASE, UNSPECIFIED COPD TYPE: ICD-10-CM

## 2017-08-03 PROCEDURE — 99214 OFFICE O/P EST MOD 30 MIN: CPT | Mod: S$GLB,,, | Performed by: FAMILY MEDICINE

## 2017-08-03 PROCEDURE — 99999 PR PBB SHADOW E&M-EST. PATIENT-LVL III: CPT | Mod: PBBFAC,,, | Performed by: FAMILY MEDICINE

## 2017-08-03 PROCEDURE — 3074F SYST BP LT 130 MM HG: CPT | Mod: S$GLB,,, | Performed by: FAMILY MEDICINE

## 2017-08-03 PROCEDURE — 3008F BODY MASS INDEX DOCD: CPT | Mod: S$GLB,,, | Performed by: FAMILY MEDICINE

## 2017-08-03 PROCEDURE — 1126F AMNT PAIN NOTED NONE PRSNT: CPT | Mod: S$GLB,,, | Performed by: FAMILY MEDICINE

## 2017-08-03 PROCEDURE — 1159F MED LIST DOCD IN RCRD: CPT | Mod: S$GLB,,, | Performed by: FAMILY MEDICINE

## 2017-08-03 PROCEDURE — 3078F DIAST BP <80 MM HG: CPT | Mod: S$GLB,,, | Performed by: FAMILY MEDICINE

## 2017-08-03 RX ORDER — METHOCARBAMOL 750 MG/1
750 TABLET, FILM COATED ORAL 2 TIMES DAILY PRN
Qty: 20 TABLET | Refills: 3 | Status: SHIPPED | OUTPATIENT
Start: 2017-08-03 | End: 2017-08-13

## 2017-08-03 RX ORDER — FLUCONAZOLE 100 MG/1
100 TABLET ORAL DAILY
Qty: 3 TABLET | Refills: 0 | Status: SHIPPED | OUTPATIENT
Start: 2017-08-03 | End: 2017-09-02

## 2017-08-03 RX ORDER — CIPROFLOXACIN 500 MG/5ML
KIT ORAL 2 TIMES DAILY
COMMUNITY
End: 2017-08-03 | Stop reason: CLARIF

## 2017-08-03 RX ORDER — LORAZEPAM 1 MG/1
1 TABLET ORAL EVERY 12 HOURS PRN
Qty: 45 TABLET | Refills: 3 | Status: SHIPPED | OUTPATIENT
Start: 2017-08-03 | End: 2018-02-26 | Stop reason: SDUPTHER

## 2017-08-04 ENCOUNTER — LAB VISIT (OUTPATIENT)
Dept: LAB | Facility: HOSPITAL | Age: 67
End: 2017-08-04
Attending: FAMILY MEDICINE
Payer: MEDICARE

## 2017-08-04 DIAGNOSIS — N39.0 RECURRENT UTI: ICD-10-CM

## 2017-08-04 LAB
BACTERIA #/AREA URNS AUTO: ABNORMAL /HPF
BILIRUB UR QL STRIP: NEGATIVE
CLARITY UR REFRACT.AUTO: CLEAR
COLOR UR AUTO: YELLOW
GLUCOSE UR QL STRIP: ABNORMAL
HGB UR QL STRIP: NEGATIVE
KETONES UR QL STRIP: NEGATIVE
LEUKOCYTE ESTERASE UR QL STRIP: ABNORMAL
MICROSCOPIC COMMENT: ABNORMAL
NITRITE UR QL STRIP: NEGATIVE
NON-SQ EPI CELLS #/AREA URNS AUTO: 1 /HPF
PH UR STRIP: 6 [PH] (ref 5–8)
PROT UR QL STRIP: NEGATIVE
SP GR UR STRIP: 1.01 (ref 1–1.03)
SQUAMOUS #/AREA URNS AUTO: 1 /HPF
URN SPEC COLLECT METH UR: ABNORMAL
UROBILINOGEN UR STRIP-ACNC: NEGATIVE EU/DL
WBC #/AREA URNS AUTO: 3 /HPF (ref 0–5)
YEAST UR QL AUTO: ABNORMAL

## 2017-08-04 PROCEDURE — 87086 URINE CULTURE/COLONY COUNT: CPT

## 2017-08-04 PROCEDURE — 81001 URINALYSIS AUTO W/SCOPE: CPT

## 2017-08-06 LAB — BACTERIA UR CULT: NO GROWTH

## 2017-08-10 ENCOUNTER — TELEPHONE (OUTPATIENT)
Dept: FAMILY MEDICINE | Facility: CLINIC | Age: 67
End: 2017-08-10

## 2017-08-10 NOTE — TELEPHONE ENCOUNTER
----- Message from Betsy Leon sent at 8/10/2017  4:00 PM CDT -----  Contact: self  Patient want to speak with a nurse regarding test results please call back at [phone]

## 2017-08-14 NOTE — PROGRESS NOTES
Subjective:       Patient ID: Fifi Mcnair is a 66 y.o. female.    Chief Complaint: Nausea; Urinary Tract Infection; tooth infection; and Diabetes    Nausea   This is a recurrent problem. The current episode started in the past 7 days. The problem occurs intermittently. The problem has been waxing and waning. Associated symptoms include abdominal pain, anorexia, arthralgias, nausea and urinary symptoms. Pertinent negatives include no chest pain, fatigue or headaches.   Urinary Tract Infection    Associated symptoms include nausea.   Diabetes   Pertinent negatives for hypoglycemia include no dizziness or headaches. Pertinent negatives for diabetes include no chest pain and no fatigue.     Review of Systems   Constitutional: Negative for fatigue and unexpected weight change.   Respiratory: Negative for chest tightness and shortness of breath.    Cardiovascular: Negative for chest pain, palpitations and leg swelling.   Gastrointestinal: Positive for abdominal pain, anorexia and nausea.   Musculoskeletal: Positive for arthralgias.   Neurological: Negative for dizziness, syncope, light-headedness and headaches.       Patient Active Problem List   Diagnosis    Arthritis    Hypertension    S/P LASIK (laser assisted in situ keratomileusis)    Allergy    Anemia    Allergic asthma    Depressive disorder    GERD (gastroesophageal reflux disease)    Chronic kidney disease, stage III (moderate)    Acquired hypothyroidism    DM due to underlying condition with diabetic nephropathy, on Insulin    Conjunctival laceration - Left Eye    Obesity, Class III, BMI 40-49.9 (morbid obesity)    Leg pain, bilateral    Chronic rhinitis    Asthma, severe persistent, poorly-controlled    Chronic allergic rhinitis    Chronic diastolic heart failure, NYHA class 1    Nuclear sclerosis    Dry eye    Myopia with astigmatism and presbyopia    Morbid obesity with BMI of 40.0-44.9, adult, 41.8    Peripheral edema     Diastolic dysfunction without heart failure    Edema    Anti-polysaccharide antibody deficiency    Vulvar abscess    Controlled diabetes mellitus type 2 with complications    Insulin pump status    Bariatric surgery status, 12/2014    LBBB (left bundle branch block)    Osteoarthritis, knee    COPD (chronic obstructive pulmonary disease)    Vitreo-retinal adhesions    Vitreous hemorrhage, right eye    Posterior vitreous detachment, right eye    Moderate nonproliferative diabetic retinopathy of both eyes    Osteopenia with high risk of fracture    BMI 38.0-38.9,adult    Chest pain syndrome    Symptomatic posterior vitreous detachment of left eye    Type 2 diabetes mellitus with moderate nonproliferative diabetic retinopathy and without macular edema    Chronic low back pain    Obstructive sleep apnea, on CPAP (16), using 100%    Chronic bronchitis    Posterior vitreous detachment, left eye    Vitreous hemorrhage, left eye    Compression fracture    Abdominal pain    Esophagitis    Cardiovascular event risk, ASCVD 10-year risk 12.3%, 12/2015    CKD (chronic kidney disease) stage 3, GFR 30-59 ml/min    Hypertensive left ventricular hypertrophy, without heart failure    Irritable bowel syndrome with diarrhea    Abdominal pannus    PUD (peptic ulcer disease)    Abscess of abdominal wall    Essential hypertension    Thrombocytosis    Adjustment insomnia    Functional diarrhea    Blood in stool       Objective:      Physical Exam   Constitutional: She is oriented to person, place, and time. She appears well-developed and well-nourished.   Cardiovascular: Normal rate, regular rhythm and normal heart sounds.    Pulses:       Dorsalis pedis pulses are 3+ on the right side, and 3+ on the left side.        Posterior tibial pulses are 3+ on the right side, and 3+ on the left side.   Pulmonary/Chest: Effort normal and breath sounds normal. No respiratory distress.   Abdominal: Soft. Bowel  sounds are normal. She exhibits no distension and no mass. There is tenderness in the suprapubic area. There is no rebound and no guarding. No hernia.   Feet:   Right Foot:   Protective Sensation: 6 sites tested. 6 sites sensed.   Skin Integrity: Negative for ulcer, blister or skin breakdown.   Left Foot:   Protective Sensation: 6 sites tested. 6 sites sensed.   Skin Integrity: Negative for ulcer, blister or skin breakdown.   Neurological: She is alert and oriented to person, place, and time.   Skin: Skin is warm and dry.   Psychiatric: She has a normal mood and affect.   Nursing note and vitals reviewed.      Lab Results   Component Value Date    WBC 6.20 07/17/2017    HGB 12.7 07/17/2017    HCT 37.1 07/17/2017     07/17/2017    CHOL 144 07/12/2017    TRIG 242 (H) 07/12/2017    HDL 45 07/12/2017    ALT 15 07/12/2017    AST 20 07/12/2017     07/12/2017    K 4.2 07/12/2017     07/12/2017    CREATININE 1.2 07/12/2017    BUN 32 (H) 07/12/2017    CO2 25 07/12/2017    TSH 1.711 07/12/2017    INR 1.1 12/30/2016    HGBA1C 7.9 (H) 07/12/2017     The 10-year ASCVD risk score (Radha JOSE Jr., et al., 2013) is: 10.7%    Values used to calculate the score:      Age: 66 years      Sex: Female      Is Non- : No      Diabetic: Yes      Tobacco smoker: No      Systolic Blood Pressure: 110 mmHg      Is BP treated: Yes      HDL Cholesterol: 45 mg/dL      Total Cholesterol: 144 mg/dL    Assessment:       1. Recurrent UTI    2. Chronic obstructive pulmonary disease, unspecified COPD type    3. Muscle spasm        Plan:       Recurrent UTI  -     URINALYSIS; Future; Expected date: 08/03/2017  -     Urine culture; Future; Expected date: 08/03/2017  -     fluconazole (DIFLUCAN) 100 MG tablet; Take 1 tablet (100 mg total) by mouth once daily.  Dispense: 3 tablet; Refill: 0    Chronic obstructive pulmonary disease, unspecified COPD type    Muscle spasm  -     lorazepam (ATIVAN) 1 MG tablet; Take 1  tablet (1 mg total) by mouth every 12 (twelve) hours as needed for Anxiety.  Dispense: 45 tablet; Refill: 3  -     methocarbamol (ROBAXIN) 750 MG Tab; Take 1 tablet (750 mg total) by mouth 2 (two) times daily as needed.  Dispense: 20 tablet; Refill: 3      Patient readiness: acceptance and barriers:none    During the course of the visit the patient was educated and counseled about the following:     Diabetes:  Discussed general issues about diabetes pathophysiology and management.  Hypertension:   Screening for causes of secondary hypertension: GFR (chronic kidney disease).  Dietary sodium restriction.  Regular aerobic exercise.  Check blood pressures daily and record.  Follow up: 3 month and as needed.  Obesity:   General weight loss/lifestyle modification strategies discussed (elicit support from others; identify saboteurs; non-food rewards, etc).  Informal exercise measures discussed, e.g. taking stairs instead of elevator.  Regular aerobic exercise program discussed.  Medication: bulk-forming agents.    Goals: Diabetes: Maintain Hemoglobin A1C below 7, Hypertension: Reduce Blood Pressure and Obesity: Reduce calorie intake and BMI    Did patient meet goals/outcomes: Yes    The following self management tools provided: blood pressure log  blood glucose log  excercise log    Patient Instructions (the written plan) was given to the patient/family.     Time spent with patient: 45 minutes          40-minute visit. 25 minutes spent counseling patient on diet, exercise, and weight loss.  This has been fully explained to the patient, who indicates understanding.

## 2017-09-16 DIAGNOSIS — M54.6 CHRONIC LEFT-SIDED THORACIC BACK PAIN: ICD-10-CM

## 2017-09-16 DIAGNOSIS — G89.29 CHRONIC LEFT-SIDED THORACIC BACK PAIN: ICD-10-CM

## 2017-09-17 RX ORDER — CYCLOBENZAPRINE HCL 10 MG
TABLET ORAL
Qty: 60 TABLET | Refills: 1 | Status: SHIPPED | OUTPATIENT
Start: 2017-09-17 | End: 2018-03-09 | Stop reason: SDUPTHER

## 2017-09-20 ENCOUNTER — TELEPHONE (OUTPATIENT)
Dept: HEMATOLOGY/ONCOLOGY | Facility: CLINIC | Age: 67
End: 2017-09-20

## 2017-09-20 NOTE — TELEPHONE ENCOUNTER
----- Message from Robel Jackson sent at 9/20/2017  2:44 PM CDT -----  Contact: self [phone]  Patient called stating she is schedule for jury duty on Monday. Not sure when she will be able to come in to see the doctor. Thanks!

## 2017-09-21 ENCOUNTER — TELEPHONE (OUTPATIENT)
Dept: HEMATOLOGY/ONCOLOGY | Facility: CLINIC | Age: 67
End: 2017-09-21

## 2017-09-21 NOTE — TELEPHONE ENCOUNTER
----- Message from Cameron Cesar sent at 9/21/2017 12:22 PM CDT -----  Contact: Fifi  Please call patient. She thought she was going to get a call yesterday. Please call back [phone] (home)   Thanks!

## 2017-09-21 NOTE — TELEPHONE ENCOUNTER
----- Message from Kellen Mcintyre sent at 9/21/2017  3:33 PM CDT -----  Contact: self  Patient called regarding rescheduling her appt on 10/5. Please contact [phone] (home)

## 2017-09-26 ENCOUNTER — LAB VISIT (OUTPATIENT)
Dept: LAB | Facility: HOSPITAL | Age: 67
End: 2017-09-26
Attending: INTERNAL MEDICINE
Payer: MEDICARE

## 2017-09-26 DIAGNOSIS — R05.9 COUGH: ICD-10-CM

## 2017-09-26 DIAGNOSIS — R60.9 EDEMA: ICD-10-CM

## 2017-09-26 DIAGNOSIS — I12.9 PARENCHYMAL RENAL HYPERTENSION: ICD-10-CM

## 2017-09-26 DIAGNOSIS — N39.0 URINARY TRACT INFECTION, SITE NOT SPECIFIED: ICD-10-CM

## 2017-09-26 DIAGNOSIS — E11.9 DIABETES MELLITUS WITHOUT COMPLICATION: ICD-10-CM

## 2017-09-26 DIAGNOSIS — E66.01 MORBID OBESITY: ICD-10-CM

## 2017-09-26 DIAGNOSIS — N18.30 CHRONIC KIDNEY DISEASE, STAGE III (MODERATE): Primary | ICD-10-CM

## 2017-09-26 DIAGNOSIS — I10 ESSENTIAL HYPERTENSION, MALIGNANT: ICD-10-CM

## 2017-09-26 LAB
ALBUMIN SERPL BCP-MCNC: 3.3 G/DL
ALP SERPL-CCNC: 86 U/L
ALT SERPL W/O P-5'-P-CCNC: 15 U/L
ANION GAP SERPL CALC-SCNC: 10 MMOL/L
AST SERPL-CCNC: 20 U/L
BASOPHILS # BLD AUTO: 0 K/UL
BASOPHILS NFR BLD: 0.4 %
BILIRUB SERPL-MCNC: 0.4 MG/DL
BUN SERPL-MCNC: 20 MG/DL
CALCIUM SERPL-MCNC: 9.5 MG/DL
CHLORIDE SERPL-SCNC: 100 MMOL/L
CO2 SERPL-SCNC: 28 MMOL/L
CREAT SERPL-MCNC: 1.2 MG/DL
DIFFERENTIAL METHOD: ABNORMAL
EOSINOPHIL # BLD AUTO: 0.1 K/UL
EOSINOPHIL NFR BLD: 1.8 %
ERYTHROCYTE [DISTWIDTH] IN BLOOD BY AUTOMATED COUNT: 14.5 %
EST. GFR  (AFRICAN AMERICAN): 54 ML/MIN/1.73 M^2
EST. GFR  (NON AFRICAN AMERICAN): 47 ML/MIN/1.73 M^2
GLUCOSE SERPL-MCNC: 121 MG/DL
HCT VFR BLD AUTO: 36.6 %
HGB BLD-MCNC: 12.6 G/DL
LYMPHOCYTES # BLD AUTO: 1.8 K/UL
LYMPHOCYTES NFR BLD: 28 %
MCH RBC QN AUTO: 32.2 PG
MCHC RBC AUTO-ENTMCNC: 34.5 G/DL
MCV RBC AUTO: 93 FL
MONOCYTES # BLD AUTO: 0.4 K/UL
MONOCYTES NFR BLD: 6.3 %
NEUTROPHILS # BLD AUTO: 4 K/UL
NEUTROPHILS NFR BLD: 63.5 %
PLATELET # BLD AUTO: 233 K/UL
PMV BLD AUTO: 7.9 FL
POTASSIUM SERPL-SCNC: 4.1 MMOL/L
PROT SERPL-MCNC: 6.9 G/DL
PTH-INTACT SERPL-MCNC: 64.7 PG/ML
RBC # BLD AUTO: 3.92 M/UL
SODIUM SERPL-SCNC: 138 MMOL/L
WBC # BLD AUTO: 6.3 K/UL

## 2017-09-26 PROCEDURE — 83970 ASSAY OF PARATHORMONE: CPT

## 2017-09-26 PROCEDURE — 85025 COMPLETE CBC W/AUTO DIFF WBC: CPT

## 2017-09-26 PROCEDURE — 80053 COMPREHEN METABOLIC PANEL: CPT

## 2017-09-26 PROCEDURE — 36415 COLL VENOUS BLD VENIPUNCTURE: CPT

## 2017-09-28 ENCOUNTER — LAB VISIT (OUTPATIENT)
Dept: LAB | Facility: HOSPITAL | Age: 67
End: 2017-09-28
Attending: INTERNAL MEDICINE
Payer: MEDICARE

## 2017-09-28 DIAGNOSIS — R76.8 ELEVATED SERUM IMMUNOGLOBULIN FREE LIGHT CHAINS: ICD-10-CM

## 2017-09-28 DIAGNOSIS — D89.0 POLYCLONAL GAMMOPATHY: ICD-10-CM

## 2017-09-28 LAB
ALBUMIN SERPL BCP-MCNC: 3.6 G/DL
ALP SERPL-CCNC: 102 U/L
ALT SERPL W/O P-5'-P-CCNC: 16 U/L
ANION GAP SERPL CALC-SCNC: 11 MMOL/L
AST SERPL-CCNC: 20 U/L
BASOPHILS # BLD AUTO: 0 K/UL
BASOPHILS NFR BLD: 0.5 %
BILIRUB SERPL-MCNC: 0.5 MG/DL
BUN SERPL-MCNC: 20 MG/DL
CALCIUM SERPL-MCNC: 9.5 MG/DL
CHLORIDE SERPL-SCNC: 101 MMOL/L
CO2 SERPL-SCNC: 28 MMOL/L
CREAT SERPL-MCNC: 1.1 MG/DL
DIFFERENTIAL METHOD: ABNORMAL
EOSINOPHIL # BLD AUTO: 0.1 K/UL
EOSINOPHIL NFR BLD: 1.6 %
ERYTHROCYTE [DISTWIDTH] IN BLOOD BY AUTOMATED COUNT: 14.5 %
EST. GFR  (AFRICAN AMERICAN): >60 ML/MIN/1.73 M^2
EST. GFR  (NON AFRICAN AMERICAN): 52 ML/MIN/1.73 M^2
GLUCOSE SERPL-MCNC: 97 MG/DL
HCT VFR BLD AUTO: 38.9 %
HGB BLD-MCNC: 13.3 G/DL
IGA SERPL-MCNC: 438 MG/DL
LYMPHOCYTES # BLD AUTO: 1.9 K/UL
LYMPHOCYTES NFR BLD: 26.9 %
MCH RBC QN AUTO: 31.6 PG
MCHC RBC AUTO-ENTMCNC: 34.2 G/DL
MCV RBC AUTO: 92 FL
MONOCYTES # BLD AUTO: 0.5 K/UL
MONOCYTES NFR BLD: 6.7 %
NEUTROPHILS # BLD AUTO: 4.5 K/UL
NEUTROPHILS NFR BLD: 64.3 %
PLATELET # BLD AUTO: 236 K/UL
PMV BLD AUTO: 8 FL
POTASSIUM SERPL-SCNC: 3.9 MMOL/L
PROT SERPL-MCNC: 7.5 G/DL
RBC # BLD AUTO: 4.21 M/UL
SODIUM SERPL-SCNC: 140 MMOL/L
WBC # BLD AUTO: 6.9 K/UL

## 2017-09-28 PROCEDURE — 36415 COLL VENOUS BLD VENIPUNCTURE: CPT

## 2017-09-28 PROCEDURE — 80053 COMPREHEN METABOLIC PANEL: CPT

## 2017-09-28 PROCEDURE — 85025 COMPLETE CBC W/AUTO DIFF WBC: CPT

## 2017-09-28 PROCEDURE — 84166 PROTEIN E-PHORESIS/URINE/CSF: CPT

## 2017-09-28 PROCEDURE — 83520 IMMUNOASSAY QUANT NOS NONAB: CPT

## 2017-09-28 PROCEDURE — 82784 ASSAY IGA/IGD/IGG/IGM EACH: CPT

## 2017-09-28 PROCEDURE — 84156 ASSAY OF PROTEIN URINE: CPT

## 2017-09-29 ENCOUNTER — TELEPHONE (OUTPATIENT)
Dept: FAMILY MEDICINE | Facility: CLINIC | Age: 67
End: 2017-09-29

## 2017-09-29 LAB
KAPPA LC SER QL IA: 3.6 MG/DL
KAPPA LC/LAMBDA SER IA: 1.35
LAMBDA LC SER QL IA: 2.67 MG/DL

## 2017-09-29 PROCEDURE — 84166 PROTEIN E-PHORESIS/URINE/CSF: CPT | Mod: 26,,, | Performed by: PATHOLOGY

## 2017-09-29 NOTE — TELEPHONE ENCOUNTER
----- Message from Francia Grey sent at 9/29/2017  2:51 PM CDT -----  sleep apnea supplies needed..[phone] ([facility]isd)

## 2017-09-29 NOTE — TELEPHONE ENCOUNTER
Received message from patient stating she needed sleep apnea supplies. Call placed to patient to discuss. Patient states she contacted the company she gets her supplies from and they are going to ship her supplies. No further action needed from office.

## 2017-09-30 LAB
PROT 24H UR-MRATE: NORMAL MG/SPEC
PROT UR-MCNC: <7 MG/DL
URINE COLLECTION DURATION: 24 HR
URINE VOLUME: 1400 ML

## 2017-10-02 ENCOUNTER — OFFICE VISIT (OUTPATIENT)
Dept: HEMATOLOGY/ONCOLOGY | Facility: CLINIC | Age: 67
End: 2017-10-02
Payer: MEDICARE

## 2017-10-02 VITALS
BODY MASS INDEX: 42.44 KG/M2 | HEIGHT: 62 IN | DIASTOLIC BLOOD PRESSURE: 66 MMHG | TEMPERATURE: 99 F | RESPIRATION RATE: 18 BRPM | SYSTOLIC BLOOD PRESSURE: 146 MMHG | WEIGHT: 230.63 LBS | HEART RATE: 75 BPM

## 2017-10-02 DIAGNOSIS — M85.80 OSTEOPENIA WITH HIGH RISK OF FRACTURE: ICD-10-CM

## 2017-10-02 DIAGNOSIS — D64.9 ANEMIA, UNSPECIFIED TYPE: Primary | ICD-10-CM

## 2017-10-02 DIAGNOSIS — Z96.41 INSULIN PUMP STATUS: ICD-10-CM

## 2017-10-02 DIAGNOSIS — E66.01 MORBID OBESITY WITH BMI OF 40.0-44.9, ADULT: ICD-10-CM

## 2017-10-02 DIAGNOSIS — Z98.84 BARIATRIC SURGERY STATUS: ICD-10-CM

## 2017-10-02 PROCEDURE — 1159F MED LIST DOCD IN RCRD: CPT | Mod: S$GLB,,, | Performed by: INTERNAL MEDICINE

## 2017-10-02 PROCEDURE — 99999 PR PBB SHADOW E&M-EST. PATIENT-LVL III: CPT | Mod: PBBFAC,,, | Performed by: INTERNAL MEDICINE

## 2017-10-02 PROCEDURE — 3078F DIAST BP <80 MM HG: CPT | Mod: S$GLB,,, | Performed by: INTERNAL MEDICINE

## 2017-10-02 PROCEDURE — 99214 OFFICE O/P EST MOD 30 MIN: CPT | Mod: S$GLB,,, | Performed by: INTERNAL MEDICINE

## 2017-10-02 PROCEDURE — 3008F BODY MASS INDEX DOCD: CPT | Mod: S$GLB,,, | Performed by: INTERNAL MEDICINE

## 2017-10-02 PROCEDURE — 3077F SYST BP >= 140 MM HG: CPT | Mod: S$GLB,,, | Performed by: INTERNAL MEDICINE

## 2017-10-02 NOTE — PROGRESS NOTES
Subjective:       Patient ID: Fifi Mcnair is a 66 y.o. female.    Chief Complaint: I am worried about my labs    HPI    This is a 66 yr old female tolerating prolia for osteoporosis.  She started prolia  for osteoporosis. Pt has had a gastric sleeve in the past. She remains on B12 and iron supplement  Pt is transitioning from one benzodiazepine to another per Dr Vargas    She is tolerating Synthroid for thyroid disorder and neurontin for neuropathy.  Labs revealed an elevated kappa light chain with elevated ratio.  Bone marrow showed no evidence of a plasma cell dyscrasia  Pt with history of sleeve gastrectomy    EGD C scope reviewed    IgA remains elevated  as with the elevated kappa levels      Current Outpatient Prescriptions:     allopurinol (ZYLOPRIM) 100 MG tablet, TAKE TWO TABLETS BY MOUTH NIGHTLY, Disp: 180 tablet, Rfl: 4    atenolol (TENORMIN) 25 MG tablet, Take 1 tablet (25 mg total) by mouth once daily., Disp: 90 tablet, Rfl: 3    calcitRIOL (ROCALTROL) 0.25 MCG Cap, Take 1 capsule by mouth twice a week. Monday Friday, Disp: , Rfl:     CALCIUM CITRATE/VITAMIN D3 (CALCIUM CITRATE + D ORAL), Take 400 mg by mouth 3 (three) times daily., Disp: , Rfl:     cetirizine (ZYRTEC) 10 MG tablet, Take 1 tablet (10 mg total) by mouth once daily., Disp: 1 Bottle, Rfl: 5    clotrimazole-betamethasone 1-0.05% (LOTRISONE) cream, , Disp: , Rfl:     cyanocobalamin, vitamin B-12, (VITAMIN B-12) 5,000 mcg Subl, Place 5,000 mg under the tongue once a week., Disp: , Rfl:     cyclobenzaprine (FLEXERIL) 10 MG tablet, TAKE ONE TABLET BY MOUTH THREE TIMES DAILY AS NEEDED FOR MUSCLE SPASM, Disp: 60 tablet, Rfl: 1    DENOSUMAB (PROLIA SUBQ), Inject into the skin every 6 (six) months. , Disp: , Rfl:     DIABETIC SUPPLIES,MISCELL (MEDTRONIC REMOTE CONTROL MISC), No Sig Provided, Disp: , Rfl:     diphenoxylate-atropine 2.5-0.025 mg (LOMOTIL) 2.5-0.025 mg per tablet, , Disp: , Rfl:     diphenoxylate-atropine 2.5-0.025 mg  (LOMOTIL) 2.5-0.025 mg per tablet, TAKE ONE TABLET BY MOUTH 4 TIMES DAILY AS NEEDED FOR DIARRHEA, Disp: 60 tablet, Rfl: 1    fluoxetine (PROZAC) 20 MG capsule, TAKE TWO CAPSULES BY MOUTH ONCE DAILY, Disp: 180 capsule, Rfl: 4    fluticasone (FLONASE) 50 mcg/actuation nasal spray, 2 sprays by Nasal route once daily. , Disp: , Rfl:     fluticasone-vilanterol (BREO) 100-25 mcg/dose diskus inhaler, Inhale 1 puff into the lungs once daily., Disp: , Rfl:     glucagon (human recombinant) inj 1mg/mL kit, Inject 1 mL (1 mg total) into the muscle as needed., Disp: 2 kit, Rfl: 3    insulin aspart (NOVOLOG) 100 unit/mL injection, Inject into the skin continuous. Pt has insulin pump, Disp: , Rfl:     l-methylfolate-b2-b6-b12 (CEREFOLIN) 6-5-50-1 mg Tab, Take 1 tablet by mouth once daily., Disp: , Rfl:     Lactobacillus rhamnosus GG (CULTURELLE) 10 billion cell capsule, Take 1 capsule by mouth once daily., Disp: , Rfl:     levothyroxine (SYNTHROID) 25 MCG tablet, Take 1 tablet (25 mcg total) by mouth once daily., Disp: 90 tablet, Rfl: 3    losartan (COZAAR) 25 MG tablet, Take 1 tablet by mouth once daily., Disp: , Rfl:     melatonin 3 mg Tab, Take 5 mg by mouth every evening. 10 MG NIGHTLY, Disp: , Rfl:     metOLazone (ZAROXOLYN) 5 MG tablet, Take 1 tablet (5 mg total) by mouth daily as needed (swelling)., Disp: 30 tablet, Rfl: 11    metronidazole (METROGEL) 0.75 % gel, Apply topically 2 (two) times daily., Disp: 45 g, Rfl: 1    montelukast (SINGULAIR) 10 mg tablet, Take 10 mg by mouth once daily. , Disp: , Rfl:     MULTIVIT-IRON-MIN-FOLIC ACID 3,500-18-0.4 UNIT-MG-MG ORAL CHEW, Take by mouth 2 (two) times daily., Disp: , Rfl:     mupirocin (BACTROBAN) 2 % ointment, APPLY OINTMENT TOPICALLY TO AFFECTED AREA ON NOSE THREE TIMES DAILY AS DIRECTED, Disp: 1 Tube, Rfl: 11    oxycodone (ROXICODONE) 15 MG Tab, Take 15 mg by mouth every 8 (eight) hours as needed for Pain. , Disp: , Rfl:     pantoprazole (PROTONIX) 40 MG  "tablet, TAKE ONE TABLET BY MOUTH ONCE DAILY, Disp: 90 tablet, Rfl: 0    potassium chloride SA (K-DUR,KLOR-CON) 20 MEQ tablet, Take 1 tablet (20 mEq total) by mouth 2 (two) times daily., Disp: 180 tablet, Rfl: 3    ranitidine (ZANTAC) 300 MG tablet, TAKE ONE TABLET BY MOUTH IN THE EVENING, Disp: 30 tablet, Rfl: 2    torsemide (DEMADEX) 20 MG Tab, Take 2 tablets (40 mg total) by mouth once daily., Disp: 60 tablet, Rfl: 0    lorazepam (ATIVAN) 1 MG tablet, Take 1 tablet (1 mg total) by mouth every 12 (twelve) hours as needed for Anxiety., Disp: 45 tablet, Rfl: 3    All medications and past history have been reviewed.    Review of Systems   Constitutional: Negative for fatigue, fever and unexpected weight change.   HENT: Negative for rhinorrhea and sore throat.    Eyes: Negative for photophobia.   Respiratory: Negative for cough and shortness of breath.    Cardiovascular: Negative for chest pain and leg swelling.   Gastrointestinal: Negative for abdominal pain, constipation, diarrhea, nausea and vomiting.   Endocrine: Negative for cold intolerance and heat intolerance.   Genitourinary: Negative for dysuria, frequency, hematuria and urgency.   Musculoskeletal: Negative for arthralgias, back pain and neck pain.   Skin: Negative for pallor and rash.   Neurological: Negative for weakness, numbness and headaches.   Hematological: Negative for adenopathy. Does not bruise/bleed easily.   Psychiatric/Behavioral: Negative for agitation.   All other systems reviewed and are negative.          Objective:        BP (!) 146/66   Pulse 75   Temp 98.9 °F (37.2 °C)   Resp 18   Ht 5' 2" (1.575 m)   Wt 104.6 kg (230 lb 9.6 oz)   LMP  (LMP Unknown)   BMI 42.18 kg/m²     Physical Exam   Constitutional: She is oriented to person, place, and time. She appears well-developed and well-nourished.   HENT:   Head: Normocephalic.   Mouth/Throat: Oropharynx is clear and moist. No oropharyngeal exudate.   Eyes: Conjunctivae are normal. No " scleral icterus.   Neck: Normal range of motion. No JVD present. No tracheal deviation present.   Cardiovascular: Normal rate and regular rhythm.    No murmur (2= capillary refill) heard.  Pulmonary/Chest: Effort normal. No respiratory distress. She has no wheezes.   Abdominal: Soft. She exhibits no distension. There is no tenderness (midepigastric).   Musculoskeletal: Normal range of motion. She exhibits no edema.   Neurological: She is alert and oriented to person, place, and time. No cranial nerve deficit.   Steady gait   Skin: Skin is dry. No rash noted. No erythema.   Psychiatric: She has a normal mood and affect.   Vitals reviewed.      Bone marrow with no evidence of plasma cell dyscrasia, no evidence of myelodysplasia.    I explained the meaning of B-cell monoclonality as well as T-cell expression.  I reviewed the percentage of cells noted in the marrow along with flow cytometry and cytogenetics  Skeletal survey negative for any lytic or ostial sclerotic disease  All labs and imaging have been reviewed.   Lab Results   Component Value Date    WBC 6.90 09/28/2017    HGB 13.3 09/28/2017    HCT 38.9 09/28/2017    MCV 92 09/28/2017     09/28/2017        12d ago  (7/12/17) 2mo ago  (5/18/17) 2mo ago  (5/18/17)      Sodium 136 - 145 mmol/L 138 139 139    Potassium 3.5 - 5.1 mmol/L 4.2 3.4  3.4     Chloride 95 - 110 mmol/L 101 103 103    CO2 23 - 29 mmol/L 25 28 28    Glucose 70 - 110 mg/dL 337  230  230     BUN, Bld 8 - 23 mg/dL 32  24  24     Creatinine 0.5 - 1.4 mg/dL 1.2 1.1 1.1    Calcium 8.7 - 10.5 mg/dL 9.7 9.5 9.5    Total Protein 6.0 - 8.4 g/dL 7.3 6.9 6.9    Albumin 3.5 - 5.2 g/dL 3.4  3.3  3.3     Total Bilirubin 0.1 - 1.0 mg/dL 0.4 0.4CM 0.4CM   Comments: For infants and newborns, interpretation of results should be based   on gestational age, weight and in agreement with clinical   observations.   Premature Infant recommended reference ranges:   Up to 24 hours.............<8.0 mg/dL   Up to 48  hours............<12.0 mg/dL   3-5 days..................<15.0 mg/dL   6-29 days.................<15.0 mg/dL     Alkaline Phosphatase 55 - 135 U/L 105 90 90    AST 10 - 40 U/L 20 18 18    ALT 10 - 44 U/L 15 13 13    Anion Gap 8 - 16 mmol/L 12 8 8    eGFR if African American >60 mL/min/1.73 m^2 54  >60 >60    eGFR if non African American >60 mL/min/1.73 m^2 47  52CM  52CM           IgA 40 - 350 mg/dL 416 (H now 438)     All labs were reviewed, SPEP and UPEP and immunofixation studies were all negative    Assessment:       Anemia, unspecified type  -     Comprehensive metabolic panel; Future; Expected date: 10/02/2017    Morbid obesity with BMI of 40.0-44.9, adult, 41.8    Insulin pump status    Bariatric surgery status, 12/2014    Osteopenia with high risk of fracture  -     Comprehensive metabolic panel; Future; Expected date: 10/02/2017    elev IGA and elev light chains  Likely related to renal function      Plan:       Pt due for prolia Nov 28   cmp   Polyclonal gamma  Explained that IGA is stable  SHe was worried about her kidneys  Explained she is doing well  Renal function improving  She is to continue her usual medications for hypertension, diabetes as well as depression.  Review diet for diabetes mellitus given the fact that she presented with hyperglycemia.  Watching IGA levels and hb since post gastric sleeve and may require IV iron in the future  RTC NOvember for prolia  Cont oral iron      Current Outpatient Prescriptions:     allopurinol (ZYLOPRIM) 100 MG tablet, TAKE TWO TABLETS BY MOUTH NIGHTLY, Disp: 180 tablet, Rfl: 4    atenolol (TENORMIN) 25 MG tablet, Take 1 tablet (25 mg total) by mouth once daily., Disp: 90 tablet, Rfl: 3    calcitRIOL (ROCALTROL) 0.25 MCG Cap, Take 1 capsule by mouth twice a week. Monday Friday, Disp: , Rfl:     CALCIUM CITRATE/VITAMIN D3 (CALCIUM CITRATE + D ORAL), Take 400 mg by mouth 3 (three) times daily., Disp: , Rfl:     cetirizine (ZYRTEC) 10 MG tablet, Take 1  tablet (10 mg total) by mouth once daily., Disp: 1 Bottle, Rfl: 5    clotrimazole-betamethasone 1-0.05% (LOTRISONE) cream, , Disp: , Rfl:     cyanocobalamin, vitamin B-12, (VITAMIN B-12) 5,000 mcg Subl, Place 5,000 mg under the tongue once a week., Disp: , Rfl:     cyclobenzaprine (FLEXERIL) 10 MG tablet, TAKE ONE TABLET BY MOUTH THREE TIMES DAILY AS NEEDED FOR MUSCLE SPASM, Disp: 60 tablet, Rfl: 1    DENOSUMAB (PROLIA SUBQ), Inject into the skin every 6 (six) months. , Disp: , Rfl:     DIABETIC SUPPLIES,MISCELL ([employer] REMOTE CONTROL MISC), No Sig Provided, Disp: , Rfl:     diphenoxylate-atropine 2.5-0.025 mg (LOMOTIL) 2.5-0.025 mg per tablet, , Disp: , Rfl:     diphenoxylate-atropine 2.5-0.025 mg (LOMOTIL) 2.5-0.025 mg per tablet, TAKE ONE TABLET BY MOUTH 4 TIMES DAILY AS NEEDED FOR DIARRHEA, Disp: 60 tablet, Rfl: 1    fluoxetine (PROZAC) 20 MG capsule, TAKE TWO CAPSULES BY MOUTH ONCE DAILY, Disp: 180 capsule, Rfl: 4    fluticasone (FLONASE) 50 mcg/actuation nasal spray, 2 sprays by Nasal route once daily. , Disp: , Rfl:     fluticasone-vilanterol (BREO) 100-25 mcg/dose diskus inhaler, Inhale 1 puff into the lungs once daily., Disp: , Rfl:     glucagon (human recombinant) inj 1mg/mL kit, Inject 1 mL (1 mg total) into the muscle as needed., Disp: 2 kit, Rfl: 3    insulin aspart (NOVOLOG) 100 unit/mL injection, Inject into the skin continuous. Pt has insulin pump, Disp: , Rfl:     l-methylfolate-b2-b6-b12 (CEREFOLIN) 6-5-50-1 mg Tab, Take 1 tablet by mouth once daily., Disp: , Rfl:     Lactobacillus rhamnosus GG (CULTURELLE) 10 billion cell capsule, Take 1 capsule by mouth once daily., Disp: , Rfl:     levothyroxine (SYNTHROID) 25 MCG tablet, Take 1 tablet (25 mcg total) by mouth once daily., Disp: 90 tablet, Rfl: 3    losartan (COZAAR) 25 MG tablet, Take 1 tablet by mouth once daily., Disp: , Rfl:     melatonin 3 mg Tab, Take 5 mg by mouth every evening. 10 MG NIGHTLY, Disp: , Rfl:      metOLazone (ZAROXOLYN) 5 MG tablet, Take 1 tablet (5 mg total) by mouth daily as needed (swelling)., Disp: 30 tablet, Rfl: 11    metronidazole (METROGEL) 0.75 % gel, Apply topically 2 (two) times daily., Disp: 45 g, Rfl: 1    montelukast (SINGULAIR) 10 mg tablet, Take 10 mg by mouth once daily. , Disp: , Rfl:     MULTIVIT-IRON-MIN-FOLIC ACID 3,500-18-0.4 UNIT-MG-MG ORAL CHEW, Take by mouth 2 (two) times daily., Disp: , Rfl:     mupirocin (BACTROBAN) 2 % ointment, APPLY OINTMENT TOPICALLY TO AFFECTED AREA ON NOSE THREE TIMES DAILY AS DIRECTED, Disp: 1 Tube, Rfl: 11    oxycodone (ROXICODONE) 15 MG Tab, Take 15 mg by mouth every 8 (eight) hours as needed for Pain. , Disp: , Rfl:     pantoprazole (PROTONIX) 40 MG tablet, TAKE ONE TABLET BY MOUTH ONCE DAILY, Disp: 90 tablet, Rfl: 0    potassium chloride SA (K-DUR,KLOR-CON) 20 MEQ tablet, Take 1 tablet (20 mEq total) by mouth 2 (two) times daily., Disp: 180 tablet, Rfl: 3    ranitidine (ZANTAC) 300 MG tablet, TAKE ONE TABLET BY MOUTH IN THE EVENING, Disp: 30 tablet, Rfl: 2    torsemide (DEMADEX) 20 MG Tab, Take 2 tablets (40 mg total) by mouth once daily., Disp: 60 tablet, Rfl: 0    lorazepam (ATIVAN) 1 MG tablet, Take 1 tablet (1 mg total) by mouth every 12 (twelve) hours as needed for Anxiety., Disp: 45 tablet, Rfl: 3    Discuss with Dr Alicia carpenter ativan vs valium        The plan was discussed with the patient and all questions/concerns have been answered to the patient's satisfaction.

## 2017-10-05 LAB
PATHOLOGIST INTERPRETATION UPE: NORMAL
PROT PATTERN UR ELPH-IMP: NORMAL

## 2017-10-10 DIAGNOSIS — K21.9 GASTROESOPHAGEAL REFLUX DISEASE WITHOUT ESOPHAGITIS: ICD-10-CM

## 2017-10-10 RX ORDER — LEVOTHYROXINE SODIUM 25 UG/1
TABLET ORAL
Qty: 90 TABLET | Refills: 3 | Status: SHIPPED | OUTPATIENT
Start: 2017-10-10 | End: 2018-11-20 | Stop reason: SDUPTHER

## 2017-10-10 NOTE — ASSESSMENT & PLAN NOTE
Keep drain outpt. PO linezolid for 10 days. Fu with surgeon in 2 days.      Gave 1 cup of apple juice just as dinner is being delivered. Consider transfusion STOP infusion for 60 minutes, then DECREASE dose rate by: 300 Unit(s)/Hr (3 mL/Hr). fingerstick taken after blood draw, glucose trending down follow heparin nomogram follow heparin nomogram as ordered. pause heparin gtt for 1 hour and restart at 17ml/hr. follow nomogram held for 60mins  run 16ml/hr hold heparin drip 60 minutes and restart at 18ml/hr per heparin nomogram lower dose to 18ml/hr per nomogram. scan heparin with current PTT result for nomogram calculation

## 2017-10-25 ENCOUNTER — TELEPHONE (OUTPATIENT)
Dept: ORTHOPEDICS | Facility: CLINIC | Age: 67
End: 2017-10-25

## 2017-10-25 NOTE — TELEPHONE ENCOUNTER
----- Message from Anna Ortega sent at 10/25/2017  3:52 PM CDT -----  Contact: patient  Patient called requesting prior authorization to come get injections in knee.please abbe back at [phone]. Thanks,

## 2017-10-26 ENCOUNTER — TELEPHONE (OUTPATIENT)
Dept: ORTHOPEDICS | Facility: CLINIC | Age: 67
End: 2017-10-26

## 2017-10-26 DIAGNOSIS — E11.9 TYPE 2 DIABETES MELLITUS WITHOUT COMPLICATION: ICD-10-CM

## 2017-10-26 NOTE — TELEPHONE ENCOUNTER
----- Message from Kaley Chau sent at 10/26/2017  4:23 PM CDT -----  Contact: self  Patient left message regarding referral for injections. Please call patient to confirm referral at [phone]. Thanks!

## 2017-11-02 ENCOUNTER — OFFICE VISIT (OUTPATIENT)
Dept: ORTHOPEDICS | Facility: CLINIC | Age: 67
End: 2017-11-02
Payer: MEDICARE

## 2017-11-02 DIAGNOSIS — M17.0 PRIMARY OSTEOARTHRITIS OF BOTH KNEES: Primary | ICD-10-CM

## 2017-11-02 PROCEDURE — 20610 DRAIN/INJ JOINT/BURSA W/O US: CPT | Mod: 50,S$GLB,, | Performed by: ORTHOPAEDIC SURGERY

## 2017-11-02 PROCEDURE — 99499 UNLISTED E&M SERVICE: CPT | Mod: S$GLB,,, | Performed by: ORTHOPAEDIC SURGERY

## 2017-11-02 PROCEDURE — 99999 PR PBB SHADOW E&M-EST. PATIENT-LVL II: CPT | Mod: PBBFAC,,, | Performed by: ORTHOPAEDIC SURGERY

## 2017-11-02 NOTE — PROCEDURES
Large Joint Aspiration/Injection  Date/Time: 11/2/2017 11:20 AM  Performed by: WALE BENTON  Authorized by: WALE BENTON     Consent Done?:  Yes (Verbal)  Indications:  Pain  Procedure site marked: Yes    Timeout: Prior to procedure the correct patient, procedure, and site was verified      Location:  Knee  Site:  R knee and L knee  Prep: Patient was prepped and draped in usual sterile fashion    Needle size:  20 G  Approach:  Anterolateral  Medications:  16 mg hyaluronate 16 mg/2 mL; 16 mg hyaluronate 16 mg/2 mL  Patient tolerance:  Patient tolerated the procedure well with no immediate complications

## 2017-11-02 NOTE — PROGRESS NOTES
Past Medical History:   Diagnosis Date    Allergy     multiple antibiotic allergies    Anemia     Anxiety     Arthritis     Asthma     CHF (congestive heart failure)     NYHA class III     Chronic kidney disease     Colon polyp     benign    COPD (chronic obstructive pulmonary disease)     DLCO 37% , and mild pulmonary HTN    Depression     Diabetes mellitus     Diabetic retinopathy     Diastolic dysfunction     Diverticulosis     Former smoker     Fracture of lumbar spine     GERD (gastroesophageal reflux disease)     Hernia of unspecified site of abdominal cavity without mention of obstruction or gangrene     Hyperlipidemia     Hypertension     hypertensive renal    IBS (irritable bowel syndrome)     Mild nonproliferative diabetic retinopathy(362.04) 11/25/2013    Morbid obesity     Nuclear sclerosis 11/25/2013    Osteoporosis     Peripheral edema     Rash     Recurrent upper respiratory infection (URI)     S/P LASIK (laser assisted in situ keratomileusis)     Sleep apnea     sleep apnea uses bipap.    Thyroid disease     on synthroid    TIA (transient ischemic attack)     Urinary tract infection        Past Surgical History:   Procedure Laterality Date    ABDOMINAL SURGERY      ADENOIDECTOMY      COLONOSCOPY  03/05/2013    repeat in 5 years    COLONOSCOPY N/A 5/31/2017    Procedure: COLONOSCOPY;  Surgeon: Luis Navarro MD;  Location: [facility];  Service: Endoscopy;  Laterality: N/A;    COSMETIC SURGERY      CYSTOSCOPY      EYE SURGERY Right     mazzulla    GASTRECTOMY      gastric sleeve      gastric sleeve      HERNIA REPAIR      5 years old    HYSTERECTOMY      ovaries remain    REFRACTIVE SURGERY      mono va//ou//    RHINOPLASTY TIP      TONSILLECTOMY      TUBAL LIGATION      UPPER GASTROINTESTINAL ENDOSCOPY  03/05/2013    UPPER GASTROINTESTINAL ENDOSCOPY  08/24/2016    Dr. Navarro, repeat in 8 weeks    UPPER GASTROINTESTINAL ENDOSCOPY  07/21/2016      Prakash       Current Outpatient Prescriptions   Medication Sig    allopurinol (ZYLOPRIM) 100 MG tablet TAKE TWO TABLETS BY MOUTH NIGHTLY    atenolol (TENORMIN) 25 MG tablet Take 1 tablet (25 mg total) by mouth once daily.    calcitRIOL (ROCALTROL) 0.25 MCG Cap Take 1 capsule by mouth twice a week. Monday Friday    CALCIUM CITRATE/VITAMIN D3 (CALCIUM CITRATE + D ORAL) Take 400 mg by mouth 3 (three) times daily.    cetirizine (ZYRTEC) 10 MG tablet Take 1 tablet (10 mg total) by mouth once daily.    clotrimazole-betamethasone 1-0.05% (LOTRISONE) cream     cyanocobalamin, vitamin B-12, (VITAMIN B-12) 5,000 mcg Subl Place 5,000 mg under the tongue once a week.    cyclobenzaprine (FLEXERIL) 10 MG tablet TAKE ONE TABLET BY MOUTH THREE TIMES DAILY AS NEEDED FOR MUSCLE SPASM    DENOSUMAB (PROLIA SUBQ) Inject into the skin every 6 (six) months.     DIABETIC SUPPLIES,MISCELL (MEDTRONIC REMOTE CONTROL MISC) No Sig Provided    diphenoxylate-atropine 2.5-0.025 mg (LOMOTIL) 2.5-0.025 mg per tablet     diphenoxylate-atropine 2.5-0.025 mg (LOMOTIL) 2.5-0.025 mg per tablet TAKE ONE TABLET BY MOUTH 4 TIMES DAILY AS NEEDED FOR DIARRHEA    fluoxetine (PROZAC) 20 MG capsule TAKE TWO CAPSULES BY MOUTH ONCE DAILY    fluticasone (FLONASE) 50 mcg/actuation nasal spray 2 sprays by Nasal route once daily.     fluticasone-vilanterol (BREO) 100-25 mcg/dose diskus inhaler Inhale 1 puff into the lungs once daily.    glucagon (human recombinant) inj 1mg/mL kit Inject 1 mL (1 mg total) into the muscle as needed.    insulin aspart (NOVOLOG) 100 unit/mL injection Inject into the skin continuous. Pt has insulin pump    l-methylfolate-b2-b6-b12 (CEREFOLIN) 6-5-50-1 mg Tab Take 1 tablet by mouth once daily.    Lactobacillus rhamnosus GG (CULTURELLE) 10 billion cell capsule Take 1 capsule by mouth once daily.    levothyroxine (SYNTHROID) 25 MCG tablet TAKE ONE TABLET BY MOUTH ONCE DAILY    losartan (COZAAR) 25 MG tablet Take 1 tablet  by mouth once daily.    melatonin 3 mg Tab Take 5 mg by mouth every evening. 10 MG NIGHTLY    metOLazone (ZAROXOLYN) 5 MG tablet Take 1 tablet (5 mg total) by mouth daily as needed (swelling).    metronidazole (METROGEL) 0.75 % gel Apply topically 2 (two) times daily.    montelukast (SINGULAIR) 10 mg tablet Take 10 mg by mouth once daily.     MULTIVIT-IRON-MIN-FOLIC ACID 3,500-18-0.4 UNIT-MG-MG ORAL CHEW Take by mouth 2 (two) times daily.    mupirocin (BACTROBAN) 2 % ointment APPLY OINTMENT TOPICALLY TO AFFECTED AREA ON NOSE THREE TIMES DAILY AS DIRECTED    oxycodone (ROXICODONE) 15 MG Tab Take 15 mg by mouth every 8 (eight) hours as needed for Pain.     pantoprazole (PROTONIX) 40 MG tablet TAKE ONE TABLET BY MOUTH ONCE DAILY    potassium chloride SA (K-DUR,KLOR-CON) 20 MEQ tablet Take 1 tablet (20 mEq total) by mouth 2 (two) times daily.    ranitidine (ZANTAC) 300 MG tablet TAKE ONE TABLET BY MOUTH IN THE EVENING    torsemide (DEMADEX) 20 MG Tab Take 2 tablets (40 mg total) by mouth once daily.    lorazepam (ATIVAN) 1 MG tablet Take 1 tablet (1 mg total) by mouth every 12 (twelve) hours as needed for Anxiety.     No current facility-administered medications for this visit.        Review of patient's allergies indicates:   Allergen Reactions    Adhesive Other (See Comments)     Blisters    Cleocin [clindamycin hcl] Hives    Ceclor [cefaclor] Hives    Morphine Nausea And Vomiting    Advair diskus [fluticasone-salmeterol]      Dry mouth    Aleve [naproxen sodium]      Unable to take secondary to kidney function.     Levaquin [levofloxacin] Dermatitis    Macrodantin [nitrofurantoin macrocrystalline] Hives    Motrin [ibuprofen]      Unable to take secondary to kidney functions.    Sulfa (sulfonamide antibiotics)      Hold due to renal problems    Remeron [mirtazapine] Palpitations and Other (See Comments)     Jittery    Vancomycin analogues Rash     Feet broke out/inflammed blood vessels          Family History   Problem Relation Age of Onset    Heart disease Mother 59    Cancer Mother 59     throat    Allergies Mother     Diabetes Mother     Heart disease Father 70     flutter    Allergies Father     Diabetes Father     Cancer Sister 22     22 thyroid,49  breast    Allergies Sister     Breast cancer Sister     Diabetes Sister     Cancer Brother 62     lung    Diabetes Brother     Asthma Daughter     Diabetes Daughter     Depression Daughter     Asthma Grandchild     Eczema Grandchild     Eczema Grandchild     Breast cancer Maternal Grandmother     Ovarian cancer Cousin     Amblyopia Neg Hx     Blindness Neg Hx     Cataracts Neg Hx     Glaucoma Neg Hx     Hypertension Neg Hx     Macular degeneration Neg Hx     Retinal detachment Neg Hx     Strabismus Neg Hx     Stroke Neg Hx     Thyroid disease Neg Hx     Angioedema Neg Hx     Immunodeficiency Neg Hx     Allergic rhinitis Neg Hx     Atopy Neg Hx     Rhinitis Neg Hx     Urticaria Neg Hx     Colon cancer Neg Hx     Colon polyps Neg Hx     Crohn's disease Neg Hx     Ulcerative colitis Neg Hx     Celiac disease Neg Hx        Social History     Social History    Marital status:      Spouse name: N/A    Number of children: N/A    Years of education: N/A     Occupational History    Not on file.     Social History Main Topics    Smoking status: Former Smoker     Types: Cigarettes    Smokeless tobacco: Never Used      Comment: quit 30 years ago    Alcohol use No      Comment: rare    Drug use: No    Sexual activity: Yes     Birth control/ protection: Surgical     Other Topics Concern    Not on file     Social History Narrative    No narrative on file       Chief Complaint:   Chief Complaint   Patient presents with    Knee Pain     bilateral knee-Synvisc 1/3        History: This is a 66-year-old female with chronic bilateral knee pain.  Left is greater than the right.  Patient has had injections  previously.  Cortisone does not work very well.  Has had good success with a Synvisc series.  Synvisc one did not work well for her.  Rates her pain currently as a 7 out of 10.  Symptoms are worsening and moderate to severe.    Present: She got good results with the last Synvisc series.  Here to started again.  Pain as a 6 out of 10.    Review of Systems:    Musculoskeletal:  See HPI          Physical Examination:    Vital Signs:    There were no vitals filed for this visit.    This a well-developed, well nourished patient in no acute distress.  They are alert and oriented and cooperative to examination.  Pt. walks without an antalgic gait.      Examination of bilateral knees knee shows no rashes or erythema. There are no masses ecchymosis or effusion. Patient has full range of motion from 0-130°. Patient is nontender to palpation over lateral joint line and moderately tender to palpation over the medial joint line.  Knee is stable to varus and valgus stress. 5 out of 5 motor strength. Palpable distal pulses. Intact light touch sensation. Negative Patellofemoral crepitus        X-rays: 4 views of bilateral knees are  reviewed which show medial narrowing that is significantly worse on the left     Assessment:: Bilateral knee arthritis     Plan:   I injected her with Synvisc 1 of 3 today.  Follow-up in one week.  Talked about possibly needing to do knee replacements of the stop working well.

## 2017-11-09 ENCOUNTER — OFFICE VISIT (OUTPATIENT)
Dept: ORTHOPEDICS | Facility: CLINIC | Age: 67
End: 2017-11-09
Payer: MEDICARE

## 2017-11-09 VITALS — HEIGHT: 62 IN | BODY MASS INDEX: 42.33 KG/M2 | WEIGHT: 230 LBS

## 2017-11-09 DIAGNOSIS — M17.0 PRIMARY OSTEOARTHRITIS OF BOTH KNEES: Primary | ICD-10-CM

## 2017-11-09 PROCEDURE — 20610 DRAIN/INJ JOINT/BURSA W/O US: CPT | Mod: 50,S$GLB,, | Performed by: ORTHOPAEDIC SURGERY

## 2017-11-09 PROCEDURE — 99499 UNLISTED E&M SERVICE: CPT | Mod: S$GLB,,, | Performed by: ORTHOPAEDIC SURGERY

## 2017-11-09 PROCEDURE — 99999 PR PBB SHADOW E&M-EST. PATIENT-LVL II: CPT | Mod: PBBFAC,,, | Performed by: ORTHOPAEDIC SURGERY

## 2017-11-09 NOTE — PROGRESS NOTES
Past Medical History:   Diagnosis Date    Allergy     multiple antibiotic allergies    Anemia     Anxiety     Arthritis     Asthma     CHF (congestive heart failure)     NYHA class III     Chronic kidney disease     Colon polyp     benign    COPD (chronic obstructive pulmonary disease)     DLCO 37% , and mild pulmonary HTN    Depression     Diabetes mellitus     Diabetic retinopathy     Diastolic dysfunction     Diverticulosis     Former smoker     Fracture of lumbar spine     GERD (gastroesophageal reflux disease)     Hernia of unspecified site of abdominal cavity without mention of obstruction or gangrene     Hyperlipidemia     Hypertension     hypertensive renal    IBS (irritable bowel syndrome)     Mild nonproliferative diabetic retinopathy(362.04) 11/25/2013    Morbid obesity     Nuclear sclerosis 11/25/2013    Osteoporosis     Peripheral edema     Rash     Recurrent upper respiratory infection (URI)     S/P LASIK (laser assisted in situ keratomileusis)     Sleep apnea     sleep apnea uses bipap.    Thyroid disease     on synthroid    TIA (transient ischemic attack)     Urinary tract infection        Past Surgical History:   Procedure Laterality Date    ABDOMINAL SURGERY      ADENOIDECTOMY      COLONOSCOPY  03/05/2013    repeat in 5 years    COLONOSCOPY N/A 5/31/2017    Procedure: COLONOSCOPY;  Surgeon: Luis Navarro MD;  Location: [facility];  Service: Endoscopy;  Laterality: N/A;    COSMETIC SURGERY      CYSTOSCOPY      EYE SURGERY Right     mazzulla    GASTRECTOMY      gastric sleeve      gastric sleeve      HERNIA REPAIR      5 years old    HYSTERECTOMY      ovaries remain    REFRACTIVE SURGERY      mono va//ou//    RHINOPLASTY TIP      TONSILLECTOMY      TUBAL LIGATION      UPPER GASTROINTESTINAL ENDOSCOPY  03/05/2013    UPPER GASTROINTESTINAL ENDOSCOPY  08/24/2016    Dr. Navarro, repeat in 8 weeks    UPPER GASTROINTESTINAL ENDOSCOPY  07/21/2016      Prakash       Current Outpatient Prescriptions   Medication Sig    allopurinol (ZYLOPRIM) 100 MG tablet TAKE TWO TABLETS BY MOUTH NIGHTLY    atenolol (TENORMIN) 25 MG tablet Take 1 tablet (25 mg total) by mouth once daily.    calcitRIOL (ROCALTROL) 0.25 MCG Cap Take 1 capsule by mouth twice a week. Monday Friday    CALCIUM CITRATE/VITAMIN D3 (CALCIUM CITRATE + D ORAL) Take 400 mg by mouth 3 (three) times daily.    cetirizine (ZYRTEC) 10 MG tablet Take 1 tablet (10 mg total) by mouth once daily.    clotrimazole-betamethasone 1-0.05% (LOTRISONE) cream     cyanocobalamin, vitamin B-12, (VITAMIN B-12) 5,000 mcg Subl Place 5,000 mg under the tongue once a week.    cyclobenzaprine (FLEXERIL) 10 MG tablet TAKE ONE TABLET BY MOUTH THREE TIMES DAILY AS NEEDED FOR MUSCLE SPASM    DENOSUMAB (PROLIA SUBQ) Inject into the skin every 6 (six) months.     DIABETIC SUPPLIES,MISCELL (MEDTRONIC REMOTE CONTROL MISC) No Sig Provided    diphenoxylate-atropine 2.5-0.025 mg (LOMOTIL) 2.5-0.025 mg per tablet     diphenoxylate-atropine 2.5-0.025 mg (LOMOTIL) 2.5-0.025 mg per tablet TAKE ONE TABLET BY MOUTH 4 TIMES DAILY AS NEEDED FOR DIARRHEA    fluoxetine (PROZAC) 20 MG capsule TAKE TWO CAPSULES BY MOUTH ONCE DAILY    fluticasone (FLONASE) 50 mcg/actuation nasal spray 2 sprays by Nasal route once daily.     fluticasone-vilanterol (BREO) 100-25 mcg/dose diskus inhaler Inhale 1 puff into the lungs once daily.    glucagon (human recombinant) inj 1mg/mL kit Inject 1 mL (1 mg total) into the muscle as needed.    insulin aspart (NOVOLOG) 100 unit/mL injection Inject into the skin continuous. Pt has insulin pump    l-methylfolate-b2-b6-b12 (CEREFOLIN) 6-5-50-1 mg Tab Take 1 tablet by mouth once daily.    Lactobacillus rhamnosus GG (CULTURELLE) 10 billion cell capsule Take 1 capsule by mouth once daily.    levothyroxine (SYNTHROID) 25 MCG tablet TAKE ONE TABLET BY MOUTH ONCE DAILY    losartan (COZAAR) 25 MG tablet Take 1 tablet  by mouth once daily.    melatonin 3 mg Tab Take 5 mg by mouth every evening. 10 MG NIGHTLY    metOLazone (ZAROXOLYN) 5 MG tablet Take 1 tablet (5 mg total) by mouth daily as needed (swelling).    metronidazole (METROGEL) 0.75 % gel Apply topically 2 (two) times daily.    montelukast (SINGULAIR) 10 mg tablet Take 10 mg by mouth once daily.     MULTIVIT-IRON-MIN-FOLIC ACID 3,500-18-0.4 UNIT-MG-MG ORAL CHEW Take by mouth 2 (two) times daily.    mupirocin (BACTROBAN) 2 % ointment APPLY OINTMENT TOPICALLY TO AFFECTED AREA ON NOSE THREE TIMES DAILY AS DIRECTED    oxycodone (ROXICODONE) 15 MG Tab Take 15 mg by mouth every 8 (eight) hours as needed for Pain.     pantoprazole (PROTONIX) 40 MG tablet TAKE ONE TABLET BY MOUTH ONCE DAILY    potassium chloride SA (K-DUR,KLOR-CON) 20 MEQ tablet Take 1 tablet (20 mEq total) by mouth 2 (two) times daily.    ranitidine (ZANTAC) 300 MG tablet TAKE ONE TABLET BY MOUTH IN THE EVENING    torsemide (DEMADEX) 20 MG Tab Take 2 tablets (40 mg total) by mouth once daily.    lorazepam (ATIVAN) 1 MG tablet Take 1 tablet (1 mg total) by mouth every 12 (twelve) hours as needed for Anxiety.     No current facility-administered medications for this visit.        Review of patient's allergies indicates:   Allergen Reactions    Adhesive Other (See Comments)     Blisters    Cleocin [clindamycin hcl] Hives    Ceclor [cefaclor] Hives    Morphine Nausea And Vomiting    Advair diskus [fluticasone-salmeterol]      Dry mouth    Aleve [naproxen sodium]      Unable to take secondary to kidney function.     Levaquin [levofloxacin] Dermatitis    Macrodantin [nitrofurantoin macrocrystalline] Hives    Motrin [ibuprofen]      Unable to take secondary to kidney functions.    Sulfa (sulfonamide antibiotics)      Hold due to renal problems    Remeron [mirtazapine] Palpitations and Other (See Comments)     Jittery    Vancomycin analogues Rash     Feet broke out/inflammed blood vessels          Family History   Problem Relation Age of Onset    Heart disease Mother 59    Cancer Mother 59     throat    Allergies Mother     Diabetes Mother     Heart disease Father 70     flutter    Allergies Father     Diabetes Father     Cancer Sister 22     22 thyroid,49  breast    Allergies Sister     Breast cancer Sister     Diabetes Sister     Cancer Brother 62     lung    Diabetes Brother     Asthma Daughter     Diabetes Daughter     Depression Daughter     Asthma Grandchild     Eczema Grandchild     Eczema Grandchild     Breast cancer Maternal Grandmother     Ovarian cancer Cousin     Amblyopia Neg Hx     Blindness Neg Hx     Cataracts Neg Hx     Glaucoma Neg Hx     Hypertension Neg Hx     Macular degeneration Neg Hx     Retinal detachment Neg Hx     Strabismus Neg Hx     Stroke Neg Hx     Thyroid disease Neg Hx     Angioedema Neg Hx     Immunodeficiency Neg Hx     Allergic rhinitis Neg Hx     Atopy Neg Hx     Rhinitis Neg Hx     Urticaria Neg Hx     Colon cancer Neg Hx     Colon polyps Neg Hx     Crohn's disease Neg Hx     Ulcerative colitis Neg Hx     Celiac disease Neg Hx        Social History     Social History    Marital status:      Spouse name: N/A    Number of children: N/A    Years of education: N/A     Occupational History    Not on file.     Social History Main Topics    Smoking status: Former Smoker     Types: Cigarettes    Smokeless tobacco: Never Used      Comment: quit 30 years ago    Alcohol use No      Comment: rare    Drug use: No    Sexual activity: Yes     Birth control/ protection: Surgical     Other Topics Concern    Not on file     Social History Narrative    No narrative on file       Chief Complaint:   Chief Complaint   Patient presents with    Knee Pain     bilateral knee Synvisc 2/3       History: This is a 66-year-old female with chronic bilateral knee pain.  Left is greater than the right.  Patient has had injections  previously.  Cortisone does not work very well.  Has had good success with a Synvisc series.  Synvisc one did not work well for her.  Rates her pain currently as a 7 out of 10.  Symptoms are worsening and moderate to severe.    Present: She got good results with the last Synvisc series.  Here to started again.  Pain as a 6 out of 10.    Review of Systems:    Musculoskeletal:  See HPI          Physical Examination:    Vital Signs:    There were no vitals filed for this visit.    This a well-developed, well nourished patient in no acute distress.  They are alert and oriented and cooperative to examination.  Pt. walks without an antalgic gait.      Examination of bilateral knees knee shows no rashes or erythema. There are no masses ecchymosis or effusion. Patient has full range of motion from 0-130°. Patient is nontender to palpation over lateral joint line and moderately tender to palpation over the medial joint line.  Knee is stable to varus and valgus stress. 5 out of 5 motor strength. Palpable distal pulses. Intact light touch sensation. Negative Patellofemoral crepitus        X-rays: 4 views of bilateral knees are  reviewed which show medial narrowing that is significantly worse on the left     Assessment:: Bilateral knee arthritis     Plan:   I injected her with Synvisc 2 of 3 today.  Follow-up in one week.

## 2017-11-09 NOTE — PROCEDURES
Large Joint Aspiration/Injection  Date/Time: 11/9/2017 1:32 PM  Performed by: WALE BENTON  Authorized by: WALE BENTON     Consent Done?:  Yes (Verbal)  Indications:  Pain  Procedure site marked: Yes    Timeout: Prior to procedure the correct patient, procedure, and site was verified      Location:  Knee  Site:  R knee and L knee  Prep: Patient was prepped and draped in usual sterile fashion    Needle size:  20 G  Approach:  Anterolateral  Medications:  16 mg hyaluronate 16 mg/2 mL; 16 mg hyaluronate 16 mg/2 mL  Patient tolerance:  Patient tolerated the procedure well with no immediate complications

## 2017-11-16 ENCOUNTER — OFFICE VISIT (OUTPATIENT)
Dept: ORTHOPEDICS | Facility: CLINIC | Age: 67
End: 2017-11-16
Payer: MEDICARE

## 2017-11-16 VITALS — BODY MASS INDEX: 42.31 KG/M2 | WEIGHT: 229.94 LBS | HEIGHT: 62 IN

## 2017-11-16 DIAGNOSIS — M17.0 PRIMARY OSTEOARTHRITIS OF BOTH KNEES: Primary | ICD-10-CM

## 2017-11-16 PROCEDURE — 99999 PR PBB SHADOW E&M-EST. PATIENT-LVL II: CPT | Mod: PBBFAC,,, | Performed by: ORTHOPAEDIC SURGERY

## 2017-11-16 PROCEDURE — 20610 DRAIN/INJ JOINT/BURSA W/O US: CPT | Mod: 50,S$GLB,, | Performed by: ORTHOPAEDIC SURGERY

## 2017-11-16 PROCEDURE — 99499 UNLISTED E&M SERVICE: CPT | Mod: S$GLB,,, | Performed by: ORTHOPAEDIC SURGERY

## 2017-11-16 NOTE — PROCEDURES
Large Joint Aspiration/Injection  Date/Time: 11/16/2017 2:51 PM  Performed by: WALE BENTON  Authorized by: WALE BENTON     Consent Done?:  Yes (Verbal)  Indications:  Pain  Procedure site marked: Yes    Timeout: Prior to procedure the correct patient, procedure, and site was verified      Location:  Knee  Site:  R knee and L knee  Prep: Patient was prepped and draped in usual sterile fashion    Needle size:  20 G  Approach:  Anterolateral  Medications:  16 mg hyaluronate 16 mg/2 mL; 16 mg hyaluronate 16 mg/2 mL  Patient tolerance:  Patient tolerated the procedure well with no immediate complications

## 2017-11-16 NOTE — PROGRESS NOTES
Past Medical History:   Diagnosis Date    Allergy     multiple antibiotic allergies    Anemia     Anxiety     Arthritis     Asthma     CHF (congestive heart failure)     NYHA class III     Chronic kidney disease     Colon polyp     benign    COPD (chronic obstructive pulmonary disease)     DLCO 37% , and mild pulmonary HTN    Depression     Diabetes mellitus     Diabetic retinopathy     Diastolic dysfunction     Diverticulosis     Former smoker     Fracture of lumbar spine     GERD (gastroesophageal reflux disease)     Hernia of unspecified site of abdominal cavity without mention of obstruction or gangrene     Hyperlipidemia     Hypertension     hypertensive renal    IBS (irritable bowel syndrome)     Mild nonproliferative diabetic retinopathy(362.04) 11/25/2013    Morbid obesity     Nuclear sclerosis 11/25/2013    Osteoporosis     Peripheral edema     Rash     Recurrent upper respiratory infection (URI)     S/P LASIK (laser assisted in situ keratomileusis)     Sleep apnea     sleep apnea uses bipap.    Thyroid disease     on synthroid    TIA (transient ischemic attack)     Urinary tract infection        Past Surgical History:   Procedure Laterality Date    ABDOMINAL SURGERY      ADENOIDECTOMY      COLONOSCOPY  03/05/2013    repeat in 5 years    COLONOSCOPY N/A 5/31/2017    Procedure: COLONOSCOPY;  Surgeon: Luis Navarro MD;  Location: [facility];  Service: Endoscopy;  Laterality: N/A;    COSMETIC SURGERY      CYSTOSCOPY      EYE SURGERY Right     mazzulla    GASTRECTOMY      gastric sleeve      gastric sleeve      HERNIA REPAIR      5 years old    HYSTERECTOMY      ovaries remain    REFRACTIVE SURGERY      mono va//ou//    RHINOPLASTY TIP      TONSILLECTOMY      TUBAL LIGATION      UPPER GASTROINTESTINAL ENDOSCOPY  03/05/2013    UPPER GASTROINTESTINAL ENDOSCOPY  08/24/2016    Dr. Navarro, repeat in 8 weeks    UPPER GASTROINTESTINAL ENDOSCOPY  07/21/2016      Prakash       Current Outpatient Prescriptions   Medication Sig    allopurinol (ZYLOPRIM) 100 MG tablet TAKE TWO TABLETS BY MOUTH NIGHTLY    atenolol (TENORMIN) 25 MG tablet Take 1 tablet (25 mg total) by mouth once daily.    calcitRIOL (ROCALTROL) 0.25 MCG Cap Take 1 capsule by mouth twice a week. Monday Friday    CALCIUM CITRATE/VITAMIN D3 (CALCIUM CITRATE + D ORAL) Take 400 mg by mouth 3 (three) times daily.    cetirizine (ZYRTEC) 10 MG tablet Take 1 tablet (10 mg total) by mouth once daily.    clotrimazole-betamethasone 1-0.05% (LOTRISONE) cream     cyanocobalamin, vitamin B-12, (VITAMIN B-12) 5,000 mcg Subl Place 5,000 mg under the tongue once a week.    cyclobenzaprine (FLEXERIL) 10 MG tablet TAKE ONE TABLET BY MOUTH THREE TIMES DAILY AS NEEDED FOR MUSCLE SPASM    DENOSUMAB (PROLIA SUBQ) Inject into the skin every 6 (six) months.     DIABETIC SUPPLIES,MISCELL (MEDTRONIC REMOTE CONTROL MISC) No Sig Provided    diphenoxylate-atropine 2.5-0.025 mg (LOMOTIL) 2.5-0.025 mg per tablet     diphenoxylate-atropine 2.5-0.025 mg (LOMOTIL) 2.5-0.025 mg per tablet TAKE ONE TABLET BY MOUTH 4 TIMES DAILY AS NEEDED FOR DIARRHEA    fluoxetine (PROZAC) 20 MG capsule TAKE TWO CAPSULES BY MOUTH ONCE DAILY    fluticasone (FLONASE) 50 mcg/actuation nasal spray 2 sprays by Nasal route once daily.     fluticasone-vilanterol (BREO) 100-25 mcg/dose diskus inhaler Inhale 1 puff into the lungs once daily.    glucagon (human recombinant) inj 1mg/mL kit Inject 1 mL (1 mg total) into the muscle as needed.    insulin aspart (NOVOLOG) 100 unit/mL injection Inject into the skin continuous. Pt has insulin pump    l-methylfolate-b2-b6-b12 (CEREFOLIN) 6-5-50-1 mg Tab Take 1 tablet by mouth once daily.    Lactobacillus rhamnosus GG (CULTURELLE) 10 billion cell capsule Take 1 capsule by mouth once daily.    levothyroxine (SYNTHROID) 25 MCG tablet TAKE ONE TABLET BY MOUTH ONCE DAILY    losartan (COZAAR) 25 MG tablet Take 1 tablet  by mouth once daily.    melatonin 3 mg Tab Take 5 mg by mouth every evening. 10 MG NIGHTLY    metOLazone (ZAROXOLYN) 5 MG tablet Take 1 tablet (5 mg total) by mouth daily as needed (swelling).    metronidazole (METROGEL) 0.75 % gel Apply topically 2 (two) times daily.    montelukast (SINGULAIR) 10 mg tablet Take 10 mg by mouth once daily.     MULTIVIT-IRON-MIN-FOLIC ACID 3,500-18-0.4 UNIT-MG-MG ORAL CHEW Take by mouth 2 (two) times daily.    mupirocin (BACTROBAN) 2 % ointment APPLY OINTMENT TOPICALLY TO AFFECTED AREA ON NOSE THREE TIMES DAILY AS DIRECTED    oxycodone (ROXICODONE) 15 MG Tab Take 15 mg by mouth every 8 (eight) hours as needed for Pain.     pantoprazole (PROTONIX) 40 MG tablet TAKE ONE TABLET BY MOUTH ONCE DAILY    potassium chloride SA (K-DUR,KLOR-CON) 20 MEQ tablet Take 1 tablet (20 mEq total) by mouth 2 (two) times daily.    ranitidine (ZANTAC) 300 MG tablet TAKE ONE TABLET BY MOUTH IN THE EVENING    torsemide (DEMADEX) 20 MG Tab Take 2 tablets (40 mg total) by mouth once daily.    lorazepam (ATIVAN) 1 MG tablet Take 1 tablet (1 mg total) by mouth every 12 (twelve) hours as needed for Anxiety.     No current facility-administered medications for this visit.        Review of patient's allergies indicates:   Allergen Reactions    Adhesive Other (See Comments)     Blisters    Cleocin [clindamycin hcl] Hives    Ceclor [cefaclor] Hives    Morphine Nausea And Vomiting    Advair diskus [fluticasone-salmeterol]      Dry mouth    Aleve [naproxen sodium]      Unable to take secondary to kidney function.     Levaquin [levofloxacin] Dermatitis    Macrodantin [nitrofurantoin macrocrystalline] Hives    Motrin [ibuprofen]      Unable to take secondary to kidney functions.    Sulfa (sulfonamide antibiotics)      Hold due to renal problems    Remeron [mirtazapine] Palpitations and Other (See Comments)     Jittery    Vancomycin analogues Rash     Feet broke out/inflammed blood vessels          Family History   Problem Relation Age of Onset    Heart disease Mother 59    Cancer Mother 59     throat    Allergies Mother     Diabetes Mother     Heart disease Father 70     flutter    Allergies Father     Diabetes Father     Cancer Sister 22     22 thyroid,49  breast    Allergies Sister     Breast cancer Sister     Diabetes Sister     Cancer Brother 62     lung    Diabetes Brother     Asthma Daughter     Diabetes Daughter     Depression Daughter     Asthma Grandchild     Eczema Grandchild     Eczema Grandchild     Breast cancer Maternal Grandmother     Ovarian cancer Cousin     Amblyopia Neg Hx     Blindness Neg Hx     Cataracts Neg Hx     Glaucoma Neg Hx     Hypertension Neg Hx     Macular degeneration Neg Hx     Retinal detachment Neg Hx     Strabismus Neg Hx     Stroke Neg Hx     Thyroid disease Neg Hx     Angioedema Neg Hx     Immunodeficiency Neg Hx     Allergic rhinitis Neg Hx     Atopy Neg Hx     Rhinitis Neg Hx     Urticaria Neg Hx     Colon cancer Neg Hx     Colon polyps Neg Hx     Crohn's disease Neg Hx     Ulcerative colitis Neg Hx     Celiac disease Neg Hx        Social History     Social History    Marital status:      Spouse name: N/A    Number of children: N/A    Years of education: N/A     Occupational History    Not on file.     Social History Main Topics    Smoking status: Former Smoker     Types: Cigarettes    Smokeless tobacco: Never Used      Comment: quit 30 years ago    Alcohol use No      Comment: rare    Drug use: No    Sexual activity: Yes     Birth control/ protection: Surgical     Other Topics Concern    Not on file     Social History Narrative    No narrative on file       Chief Complaint:   Chief Complaint   Patient presents with    Injections     Synvisc #3 Bilat knee       History: This is a 66-year-old female with chronic bilateral knee pain.  Left is greater than the right.  Patient has had injections previously.   Cortisone does not work very well.  Has had good success with a Synvisc series.  Synvisc one did not work well for her.  Rates her pain currently as a 7 out of 10.  Symptoms are worsening and moderate to severe.    Present: She got good results with the last Synvisc series.  Here to started again.  Pain as a 6 out of 10.    Review of Systems:    Musculoskeletal:  See HPI          Physical Examination:    Vital Signs:    There were no vitals filed for this visit.    This a well-developed, well nourished patient in no acute distress.  They are alert and oriented and cooperative to examination.  Pt. walks without an antalgic gait.      Examination of bilateral knees knee shows no rashes or erythema. There are no masses ecchymosis or effusion. Patient has full range of motion from 0-130°. Patient is nontender to palpation over lateral joint line and moderately tender to palpation over the medial joint line.  Knee is stable to varus and valgus stress. 5 out of 5 motor strength. Palpable distal pulses. Intact light touch sensation. Negative Patellofemoral crepitus        X-rays: 4 views of bilateral knees are  reviewed which show medial narrowing that is significantly worse on the left     Assessment:: Bilateral knee arthritis     Plan:   I injected her with Synvisc 3 of 3 today.  Follow-up in 6-8 weeks.

## 2017-11-17 RX ORDER — PANTOPRAZOLE SODIUM 40 MG/1
TABLET, DELAYED RELEASE ORAL
Qty: 90 TABLET | Refills: 3 | Status: SHIPPED | OUTPATIENT
Start: 2017-11-17 | End: 2018-11-20 | Stop reason: SDUPTHER

## 2017-11-21 ENCOUNTER — LAB VISIT (OUTPATIENT)
Dept: LAB | Facility: HOSPITAL | Age: 67
End: 2017-11-21
Attending: INTERNAL MEDICINE
Payer: MEDICARE

## 2017-11-21 DIAGNOSIS — D64.9 ANEMIA, UNSPECIFIED TYPE: ICD-10-CM

## 2017-11-21 DIAGNOSIS — M85.80 OSTEOPENIA WITH HIGH RISK OF FRACTURE: ICD-10-CM

## 2017-11-21 LAB
ALBUMIN SERPL BCP-MCNC: 3.4 G/DL
ALP SERPL-CCNC: 115 U/L
ALT SERPL W/O P-5'-P-CCNC: 16 U/L
ANION GAP SERPL CALC-SCNC: 8 MMOL/L
AST SERPL-CCNC: 19 U/L
BILIRUB SERPL-MCNC: 0.5 MG/DL
BUN SERPL-MCNC: 17 MG/DL
CALCIUM SERPL-MCNC: 9.4 MG/DL
CHLORIDE SERPL-SCNC: 98 MMOL/L
CO2 SERPL-SCNC: 30 MMOL/L
CREAT SERPL-MCNC: 1.2 MG/DL
EST. GFR  (AFRICAN AMERICAN): 54 ML/MIN/1.73 M^2
EST. GFR  (NON AFRICAN AMERICAN): 47 ML/MIN/1.73 M^2
GLUCOSE SERPL-MCNC: 235 MG/DL
POTASSIUM SERPL-SCNC: 4 MMOL/L
PROT SERPL-MCNC: 7.2 G/DL
SODIUM SERPL-SCNC: 136 MMOL/L

## 2017-11-21 PROCEDURE — 36415 COLL VENOUS BLD VENIPUNCTURE: CPT

## 2017-11-21 PROCEDURE — 80053 COMPREHEN METABOLIC PANEL: CPT

## 2017-11-30 ENCOUNTER — OFFICE VISIT (OUTPATIENT)
Dept: FAMILY MEDICINE | Facility: CLINIC | Age: 67
End: 2017-11-30
Payer: MEDICARE

## 2017-11-30 VITALS
TEMPERATURE: 98 F | BODY MASS INDEX: 43.05 KG/M2 | HEART RATE: 68 BPM | DIASTOLIC BLOOD PRESSURE: 60 MMHG | RESPIRATION RATE: 18 BRPM | HEIGHT: 62 IN | WEIGHT: 233.94 LBS | SYSTOLIC BLOOD PRESSURE: 128 MMHG

## 2017-11-30 DIAGNOSIS — L03.031 CELLULITIS OF TOE OF RIGHT FOOT: Primary | ICD-10-CM

## 2017-11-30 PROCEDURE — 99999 PR PBB SHADOW E&M-EST. PATIENT-LVL III: CPT | Mod: PBBFAC,,, | Performed by: FAMILY MEDICINE

## 2017-11-30 PROCEDURE — 99214 OFFICE O/P EST MOD 30 MIN: CPT | Mod: S$GLB,,, | Performed by: FAMILY MEDICINE

## 2017-11-30 RX ORDER — DOXYCYCLINE 100 MG/1
100 CAPSULE ORAL 2 TIMES DAILY
Qty: 20 CAPSULE | Refills: 0 | Status: SHIPPED | OUTPATIENT
Start: 2017-11-30 | End: 2018-02-05

## 2017-11-30 RX ORDER — MUPIROCIN 20 MG/G
OINTMENT TOPICAL
Qty: 1 TUBE | Refills: 11 | Status: SHIPPED | OUTPATIENT
Start: 2017-11-30 | End: 2018-03-30 | Stop reason: ALTCHOICE

## 2017-11-30 NOTE — PROGRESS NOTES
Subjective:       Patient ID: Fifi Mcnair is a 67 y.o. female.    Chief Complaint: sore on toe (Monday noticed a sore between right 2nd and 3rd toes)    Rash   This is a new problem. The current episode started in the past 7 days. The problem is unchanged. Location: b/t toes 2 and 3 on right. The rash is characterized by redness and draining. Pertinent negatives include no fever or shortness of breath. Past treatments include antibiotic cream. The treatment provided no relief.     Review of Systems   Constitutional: Negative for fever.   Respiratory: Negative for shortness of breath.    Cardiovascular: Negative for chest pain.   Gastrointestinal: Negative for abdominal pain and nausea.   Skin: Positive for rash.   All other systems reviewed and are negative.      Objective:      Physical Exam   Cardiovascular:   Pulses:       Dorsalis pedis pulses are 1+ on the right side.   Musculoskeletal:        Feet:    Skin: Capillary refill takes less than 2 seconds.       Assessment:       1. Cellulitis of toe of right foot        Plan:         Fifi was seen today for sore on toe.    Diagnoses and all orders for this visit:    Cellulitis of toe of right foot    Other orders  -     doxycycline (MONODOX) 100 MG capsule; Take 1 capsule (100 mg total) by mouth 2 (two) times daily.  -     mupirocin (BACTROBAN) 2 % ointment; APPLY OINTMENT TOPICALLY TO AFFECTED AREA ON NOSE THREE TIMES DAILY AS DIRECTED

## 2017-12-08 ENCOUNTER — TELEPHONE (OUTPATIENT)
Dept: HEMATOLOGY/ONCOLOGY | Facility: CLINIC | Age: 67
End: 2017-12-08

## 2017-12-08 NOTE — TELEPHONE ENCOUNTER
----- Message from Constance Ventura sent at 12/7/2017  4:32 PM CST -----  Patient has an appointment tomorrow but would like to reschedule due to the weather. Please call back at [phone].

## 2017-12-13 ENCOUNTER — TELEPHONE (OUTPATIENT)
Dept: HEMATOLOGY/ONCOLOGY | Facility: CLINIC | Age: 67
End: 2017-12-13

## 2017-12-13 NOTE — TELEPHONE ENCOUNTER
----- Message from Corie Bennett sent at 12/13/2017 11:22 AM CST -----  Contact: Jessica Giordano is calling again regarding appointment tomorrow. She is asking for today for Prolia injection. Please call [phone]. Thanks!

## 2017-12-13 NOTE — TELEPHONE ENCOUNTER
----- Message from Tanika Burns sent at 12/13/2017  9:27 AM CST -----  Patient is requesting to be seen today instead of tomorrow 12/14/17, contact patient at [phone].    Thank you

## 2017-12-14 ENCOUNTER — OFFICE VISIT (OUTPATIENT)
Dept: HEMATOLOGY/ONCOLOGY | Facility: CLINIC | Age: 67
End: 2017-12-14
Payer: MEDICARE

## 2017-12-14 VITALS
TEMPERATURE: 98 F | WEIGHT: 234.38 LBS | SYSTOLIC BLOOD PRESSURE: 130 MMHG | BODY MASS INDEX: 43.13 KG/M2 | HEIGHT: 62 IN | RESPIRATION RATE: 20 BRPM | DIASTOLIC BLOOD PRESSURE: 63 MMHG | HEART RATE: 73 BPM

## 2017-12-14 DIAGNOSIS — R76.8 ELEVATED SERUM IMMUNOGLOBULIN FREE LIGHT CHAIN LEVEL: Primary | ICD-10-CM

## 2017-12-14 DIAGNOSIS — D89.2 HYPERGAMMAGLOBULINEMIA: ICD-10-CM

## 2017-12-14 DIAGNOSIS — M81.8 OTHER OSTEOPOROSIS WITHOUT CURRENT PATHOLOGICAL FRACTURE: ICD-10-CM

## 2017-12-14 DIAGNOSIS — M81.0 AGE-RELATED OSTEOPOROSIS WITHOUT CURRENT PATHOLOGICAL FRACTURE: ICD-10-CM

## 2017-12-14 PROCEDURE — 99999 PR PBB SHADOW E&M-EST. PATIENT-LVL V: CPT | Mod: PBBFAC,,, | Performed by: INTERNAL MEDICINE

## 2017-12-14 PROCEDURE — 99215 OFFICE O/P EST HI 40 MIN: CPT | Mod: S$GLB,,, | Performed by: INTERNAL MEDICINE

## 2017-12-14 NOTE — PROGRESS NOTES
Subjective:       Patient ID: Fifi Mcnair is a 67 y.o. female.    Chief Complaint: I am here for prolia    HPI    This is a 67 yr old female tolerating prolia for osteoporosis.  She started prolia  for osteoporosis. Pt has had a gastric sleeve in the past. She remains on B12 and iron supplement  Pt is transitioning from one benzodiazepine to another per Dr Vargas    She is tolerating Synthroid for thyroid disorder and neurontin for neuropathy.  Labs revealed an elevated kappa light chain with elevated ratio.  Bone marrow showed no evidence of a plasma cell dyscrasia  Pt with history of sleeve gastrectomy    IgA remains elevated  as with the elevated kappa levels      Current Outpatient Prescriptions:     allopurinol (ZYLOPRIM) 100 MG tablet, TAKE TWO TABLETS BY MOUTH NIGHTLY, Disp: 180 tablet, Rfl: 4    atenolol (TENORMIN) 25 MG tablet, Take 1 tablet (25 mg total) by mouth once daily., Disp: 90 tablet, Rfl: 3    calcitRIOL (ROCALTROL) 0.25 MCG Cap, Take 1 capsule by mouth twice a week. Monday Friday, Disp: , Rfl:     CALCIUM CITRATE/VITAMIN D3 (CALCIUM CITRATE + D ORAL), Take 400 mg by mouth 3 (three) times daily., Disp: , Rfl:     cetirizine (ZYRTEC) 10 MG tablet, Take 1 tablet (10 mg total) by mouth once daily., Disp: 1 Bottle, Rfl: 5    clotrimazole-betamethasone 1-0.05% (LOTRISONE) cream, , Disp: , Rfl:     cyanocobalamin, vitamin B-12, (VITAMIN B-12) 5,000 mcg Subl, Place 5,000 mg under the tongue once a week., Disp: , Rfl:     cyclobenzaprine (FLEXERIL) 10 MG tablet, TAKE ONE TABLET BY MOUTH THREE TIMES DAILY AS NEEDED FOR MUSCLE SPASM, Disp: 60 tablet, Rfl: 1    DENOSUMAB (PROLIA SUBQ), Inject into the skin every 6 (six) months. , Disp: , Rfl:     DIABETIC SUPPLIES,MISCELL ([employer] REMOTE CONTROL MISC), No Sig Provided, Disp: , Rfl:     diphenoxylate-atropine 2.5-0.025 mg (LOMOTIL) 2.5-0.025 mg per tablet, TAKE ONE TABLET BY MOUTH 4 TIMES DAILY AS NEEDED FOR DIARRHEA, Disp: 60 tablet, Rfl:  1    doxycycline (MONODOX) 100 MG capsule, Take 1 capsule (100 mg total) by mouth 2 (two) times daily., Disp: 20 capsule, Rfl: 0    fluoxetine (PROZAC) 20 MG capsule, TAKE TWO CAPSULES BY MOUTH ONCE DAILY, Disp: 180 capsule, Rfl: 4    fluticasone (FLONASE) 50 mcg/actuation nasal spray, 2 sprays by Nasal route once daily. , Disp: , Rfl:     fluticasone-vilanterol (BREO) 100-25 mcg/dose diskus inhaler, Inhale 1 puff into the lungs once daily., Disp: , Rfl:     glucagon (human recombinant) inj 1mg/mL kit, Inject 1 mL (1 mg total) into the muscle as needed., Disp: 2 kit, Rfl: 3    insulin aspart (NOVOLOG) 100 unit/mL injection, Inject into the skin continuous. Pt has insulin pump, Disp: , Rfl:     l-methylfolate-b2-b6-b12 (CEREFOLIN) 6-5-50-1 mg Tab, Take 1 tablet by mouth once daily., Disp: , Rfl:     Lactobacillus rhamnosus GG (CULTURELLE) 10 billion cell capsule, Take 1 capsule by mouth once daily., Disp: , Rfl:     levothyroxine (SYNTHROID) 25 MCG tablet, TAKE ONE TABLET BY MOUTH ONCE DAILY, Disp: 90 tablet, Rfl: 3    losartan (COZAAR) 25 MG tablet, Take 1 tablet by mouth once daily., Disp: , Rfl:     melatonin 3 mg Tab, Take 5 mg by mouth every evening. 10 MG NIGHTLY, Disp: , Rfl:     metOLazone (ZAROXOLYN) 5 MG tablet, Take 1 tablet (5 mg total) by mouth daily as needed (swelling)., Disp: 30 tablet, Rfl: 11    metronidazole (METROGEL) 0.75 % gel, Apply topically 2 (two) times daily., Disp: 45 g, Rfl: 1    montelukast (SINGULAIR) 10 mg tablet, Take 10 mg by mouth once daily. , Disp: , Rfl:     MULTIVIT-IRON-MIN-FOLIC ACID 3,500-18-0.4 UNIT-MG-MG ORAL CHEW, Take by mouth 2 (two) times daily., Disp: , Rfl:     mupirocin (BACTROBAN) 2 % ointment, APPLY OINTMENT TOPICALLY TO AFFECTED AREA ON NOSE THREE TIMES DAILY AS DIRECTED, Disp: 1 Tube, Rfl: 11    oxycodone (ROXICODONE) 15 MG Tab, Take 15 mg by mouth every 6 (six) hours as needed for Pain. , Disp: , Rfl:     pantoprazole (PROTONIX) 40 MG tablet,  "TAKE ONE TABLET BY MOUTH ONCE DAILY, Disp: 90 tablet, Rfl: 3    potassium chloride SA (K-DUR,KLOR-CON) 20 MEQ tablet, Take 1 tablet (20 mEq total) by mouth 2 (two) times daily., Disp: 180 tablet, Rfl: 3    ranitidine (ZANTAC) 300 MG tablet, TAKE ONE TABLET BY MOUTH IN THE EVENING, Disp: 30 tablet, Rfl: 2    torsemide (DEMADEX) 20 MG Tab, Take 2 tablets (40 mg total) by mouth once daily., Disp: 60 tablet, Rfl: 0    lorazepam (ATIVAN) 1 MG tablet, Take 1 tablet (1 mg total) by mouth every 12 (twelve) hours as needed for Anxiety., Disp: 45 tablet, Rfl: 3    All medications and past history have been reviewed.    Review of Systems   Constitutional: Negative for fatigue, fever and unexpected weight change.   HENT: Negative for rhinorrhea and sore throat.    Eyes: Negative for photophobia.   Respiratory: Negative for cough and shortness of breath.    Cardiovascular: Negative for chest pain and leg swelling.   Gastrointestinal: Negative for abdominal pain, constipation, diarrhea, nausea and vomiting.   Endocrine: Negative for cold intolerance and heat intolerance.   Genitourinary: Negative for dysuria, frequency, hematuria and urgency.   Musculoskeletal: Negative for arthralgias, back pain and neck pain.   Skin: Negative for pallor and rash.   Neurological: Negative for weakness, numbness and headaches.   Hematological: Negative for adenopathy. Does not bruise/bleed easily.   Psychiatric/Behavioral: Negative for agitation.   All other systems reviewed and are negative.          Objective:        /63   Pulse 73   Temp 98.2 °F (36.8 °C) (Oral)   Resp 20   Ht 5' 2" (1.575 m)   Wt 106.3 kg (234 lb 5.6 oz)   LMP  (LMP Unknown)   BMI 42.86 kg/m²     Physical Exam   Constitutional: She is oriented to person, place, and time. She appears well-developed and well-nourished.   HENT:   Head: Normocephalic.   Mouth/Throat: Oropharynx is clear and moist. No oropharyngeal exudate.   Eyes: Conjunctivae are normal. No " scleral icterus.   Neck: Normal range of motion. No JVD present. No tracheal deviation present.   Cardiovascular: Normal rate and regular rhythm.    No murmur (2= capillary refill) heard.  Pulmonary/Chest: Effort normal. No respiratory distress. She has no wheezes.   Abdominal: Soft. She exhibits no distension. There is no tenderness (midepigastric).   Musculoskeletal: Normal range of motion. She exhibits no edema.   Neurological: She is alert and oriented to person, place, and time. No cranial nerve deficit.   Steady gait   Skin: Skin is dry. No rash noted. No erythema.   Psychiatric: She has a normal mood and affect.   Vitals reviewed.      Bone marrow with no evidence of plasma cell dyscrasia, no evidence of myelodysplasia.    I explained the meaning of B-cell monoclonality as well as T-cell expression.  I reviewed the percentage of cells noted in the marrow along with flow cytometry and cytogenetics  Skeletal survey negative for any lytic or ostial sclerotic disease  All labs and imaging have been reviewed.   Lab Results   Component Value Date    WBC 6.90 09/28/2017    HGB 13.3 09/28/2017    HCT 38.9 09/28/2017    MCV 92 09/28/2017     09/28/2017         CMP  Sodium   Date Value Ref Range Status   11/21/2017 136 136 - 145 mmol/L Final     Potassium   Date Value Ref Range Status   11/21/2017 4.0 3.5 - 5.1 mmol/L Final     Chloride   Date Value Ref Range Status   11/21/2017 98 95 - 110 mmol/L Final     CO2   Date Value Ref Range Status   11/21/2017 30 (H) 23 - 29 mmol/L Final     Glucose   Date Value Ref Range Status   11/21/2017 235 (H) 70 - 110 mg/dL Final     BUN, Bld   Date Value Ref Range Status   11/21/2017 17 8 - 23 mg/dL Final     Creatinine   Date Value Ref Range Status   11/21/2017 1.2 0.5 - 1.4 mg/dL Final   08/31/2013 1.1 0.5 - 1.4 mg/dL Final     Calcium   Date Value Ref Range Status   11/21/2017 9.4 8.7 - 10.5 mg/dL Final   08/31/2013 9.5 8.7 - 10.5 mg/dL Final     Total Protein   Date Value  Ref Range Status   11/21/2017 7.2 6.0 - 8.4 g/dL Final     Albumin   Date Value Ref Range Status   11/21/2017 3.4 (L) 3.5 - 5.2 g/dL Final     Total Bilirubin   Date Value Ref Range Status   11/21/2017 0.5 0.1 - 1.0 mg/dL Final     Comment:     For infants and newborns, interpretation of results should be based  on gestational age, weight and in agreement with clinical  observations.  Premature Infant recommended reference ranges:  Up to 24 hours.............<8.0 mg/dL  Up to 48 hours............<12.0 mg/dL  3-5 days..................<15.0 mg/dL  6-29 days.................<15.0 mg/dL       Alkaline Phosphatase   Date Value Ref Range Status   11/21/2017 115 55 - 135 U/L Final   08/30/2013 93 55 - 135 U/L Final     AST   Date Value Ref Range Status   11/21/2017 19 10 - 40 U/L Final   08/30/2013 21 10 - 40 U/L Final     ALT   Date Value Ref Range Status   11/21/2017 16 10 - 44 U/L Final     Anion Gap   Date Value Ref Range Status   11/21/2017 8 8 - 16 mmol/L Final   08/31/2013 12 5 - 15 meq/L Final     eGFR if    Date Value Ref Range Status   11/21/2017 54 (A) >60 mL/min/1.73 m^2 Final     eGFR if non    Date Value Ref Range Status   11/21/2017 47 (A) >60 mL/min/1.73 m^2 Final     Comment:     Calculation used to obtain the estimated glomerular filtration  rate (eGFR) is the CKD-EPI equation.          All labs were reviewed, SPEP and UPEP and immunofixation studies were all negative    Assessment:       Elevated serum immunoglobulin free light chain level  -     DXA Bone Density Spine And Hip; Future; Expected date: 12/14/2017  -     IgA; Future; Expected date: 12/14/2017  -     Immunoglobulin free LT chains blood; Future; Expected date: 12/14/2017  -     SPEP; Future; Expected date: 12/14/2017  -     IMMUNOFIXATION ELECTROPHORESIS, SERUM; Future; Expected date: 12/14/2017  -     CBC auto differential; Future; Expected date: 12/14/2017    Hypergammaglobulinemia  -     DXA Bone Density  Spine And Hip; Future; Expected date: 12/14/2017  -     IgA; Future; Expected date: 12/14/2017  -     Immunoglobulin free LT chains blood; Future; Expected date: 12/14/2017  -     SPEP; Future; Expected date: 12/14/2017  -     IMMUNOFIXATION ELECTROPHORESIS, SERUM; Future; Expected date: 12/14/2017  -     CBC auto differential; Future; Expected date: 12/14/2017    Age-related osteoporosis without current pathological fracture  -     DXA Bone Density Spine And Hip; Future; Expected date: 12/14/2017    Other osteoporosis without current pathological fracture         elev IGA and elev light chains    Plan:     See above  Pt due for prolia December 14   Cleared for today  Increase hydration  IGA increasing , kappa decreasing  Calcium normal  May need renal biopsy if counts progress  Cont calcium and vitamin D  Cont prozac for depression      Current Outpatient Prescriptions:     allopurinol (ZYLOPRIM) 100 MG tablet, TAKE TWO TABLETS BY MOUTH NIGHTLY, Disp: 180 tablet, Rfl: 4    atenolol (TENORMIN) 25 MG tablet, Take 1 tablet (25 mg total) by mouth once daily., Disp: 90 tablet, Rfl: 3    calcitRIOL (ROCALTROL) 0.25 MCG Cap, Take 1 capsule by mouth twice a week. Monday Friday, Disp: , Rfl:     CALCIUM CITRATE/VITAMIN D3 (CALCIUM CITRATE + D ORAL), Take 400 mg by mouth 3 (three) times daily., Disp: , Rfl:     cetirizine (ZYRTEC) 10 MG tablet, Take 1 tablet (10 mg total) by mouth once daily., Disp: 1 Bottle, Rfl: 5    clotrimazole-betamethasone 1-0.05% (LOTRISONE) cream, , Disp: , Rfl:     cyanocobalamin, vitamin B-12, (VITAMIN B-12) 5,000 mcg Subl, Place 5,000 mg under the tongue once a week., Disp: , Rfl:     cyclobenzaprine (FLEXERIL) 10 MG tablet, TAKE ONE TABLET BY MOUTH THREE TIMES DAILY AS NEEDED FOR MUSCLE SPASM, Disp: 60 tablet, Rfl: 1    DENOSUMAB (PROLIA SUBQ), Inject into the skin every 6 (six) months. , Disp: , Rfl:     DIABETIC SUPPLIES,MISCELL ([employer] REMOTE CONTROL MISC), No Sig Provided, Disp:  , Rfl:     diphenoxylate-atropine 2.5-0.025 mg (LOMOTIL) 2.5-0.025 mg per tablet, TAKE ONE TABLET BY MOUTH 4 TIMES DAILY AS NEEDED FOR DIARRHEA, Disp: 60 tablet, Rfl: 1    doxycycline (MONODOX) 100 MG capsule, Take 1 capsule (100 mg total) by mouth 2 (two) times daily., Disp: 20 capsule, Rfl: 0    fluoxetine (PROZAC) 20 MG capsule, TAKE TWO CAPSULES BY MOUTH ONCE DAILY, Disp: 180 capsule, Rfl: 4    fluticasone (FLONASE) 50 mcg/actuation nasal spray, 2 sprays by Nasal route once daily. , Disp: , Rfl:     fluticasone-vilanterol (BREO) 100-25 mcg/dose diskus inhaler, Inhale 1 puff into the lungs once daily., Disp: , Rfl:     glucagon (human recombinant) inj 1mg/mL kit, Inject 1 mL (1 mg total) into the muscle as needed., Disp: 2 kit, Rfl: 3    insulin aspart (NOVOLOG) 100 unit/mL injection, Inject into the skin continuous. Pt has insulin pump, Disp: , Rfl:     l-methylfolate-b2-b6-b12 (CEREFOLIN) 6-5-50-1 mg Tab, Take 1 tablet by mouth once daily., Disp: , Rfl:     Lactobacillus rhamnosus GG (CULTURELLE) 10 billion cell capsule, Take 1 capsule by mouth once daily., Disp: , Rfl:     levothyroxine (SYNTHROID) 25 MCG tablet, TAKE ONE TABLET BY MOUTH ONCE DAILY, Disp: 90 tablet, Rfl: 3    losartan (COZAAR) 25 MG tablet, Take 1 tablet by mouth once daily., Disp: , Rfl:     melatonin 3 mg Tab, Take 5 mg by mouth every evening. 10 MG NIGHTLY, Disp: , Rfl:     metOLazone (ZAROXOLYN) 5 MG tablet, Take 1 tablet (5 mg total) by mouth daily as needed (swelling)., Disp: 30 tablet, Rfl: 11    metronidazole (METROGEL) 0.75 % gel, Apply topically 2 (two) times daily., Disp: 45 g, Rfl: 1    montelukast (SINGULAIR) 10 mg tablet, Take 10 mg by mouth once daily. , Disp: , Rfl:     MULTIVIT-IRON-MIN-FOLIC ACID 3,500-18-0.4 UNIT-MG-MG ORAL CHEW, Take by mouth 2 (two) times daily., Disp: , Rfl:     mupirocin (BACTROBAN) 2 % ointment, APPLY OINTMENT TOPICALLY TO AFFECTED AREA ON NOSE THREE TIMES DAILY AS DIRECTED, Disp: 1  Tube, Rfl: 11    oxycodone (ROXICODONE) 15 MG Tab, Take 15 mg by mouth every 6 (six) hours as needed for Pain. , Disp: , Rfl:     pantoprazole (PROTONIX) 40 MG tablet, TAKE ONE TABLET BY MOUTH ONCE DAILY, Disp: 90 tablet, Rfl: 3    potassium chloride SA (K-DUR,KLOR-CON) 20 MEQ tablet, Take 1 tablet (20 mEq total) by mouth 2 (two) times daily., Disp: 180 tablet, Rfl: 3    ranitidine (ZANTAC) 300 MG tablet, TAKE ONE TABLET BY MOUTH IN THE EVENING, Disp: 30 tablet, Rfl: 2    torsemide (DEMADEX) 20 MG Tab, Take 2 tablets (40 mg total) by mouth once daily., Disp: 60 tablet, Rfl: 0    lorazepam (ATIVAN) 1 MG tablet, Take 1 tablet (1 mg total) by mouth every 12 (twelve) hours as needed for Anxiety., Disp: 45 tablet, Rfl: 3    Discuss with Dr Alicia beltránivan vs valium        The plan was discussed with the patient and all questions/concerns have been answered to the patient's satisfaction.

## 2017-12-18 ENCOUNTER — HOSPITAL ENCOUNTER (OUTPATIENT)
Dept: RADIOLOGY | Facility: HOSPITAL | Age: 67
Discharge: HOME | End: 2017-12-18
Attending: INTERNAL MEDICINE
Payer: MEDICARE

## 2017-12-18 DIAGNOSIS — M81.0 AGE-RELATED OSTEOPOROSIS WITHOUT CURRENT PATHOLOGICAL FRACTURE: ICD-10-CM

## 2017-12-18 DIAGNOSIS — D89.2 HYPERGAMMAGLOBULINEMIA: ICD-10-CM

## 2017-12-18 DIAGNOSIS — R76.8 ELEVATED SERUM IMMUNOGLOBULIN FREE LIGHT CHAIN LEVEL: ICD-10-CM

## 2017-12-18 PROCEDURE — 77080 DXA BONE DENSITY AXIAL: CPT | Mod: 26,,, | Performed by: RADIOLOGY

## 2017-12-18 PROCEDURE — 77080 DXA BONE DENSITY AXIAL: CPT | Mod: TC

## 2017-12-20 ENCOUNTER — TELEPHONE (OUTPATIENT)
Dept: HEMATOLOGY/ONCOLOGY | Facility: CLINIC | Age: 67
End: 2017-12-20

## 2017-12-20 NOTE — TELEPHONE ENCOUNTER
----- Message from Steff Norton sent at 12/20/2017  9:47 AM CST -----  Contact: Self  Patients states that when she called the  to get her injection, she was told there were no orders.  She wants to talk to someone about what happened, you told her she was to have the injection today.  Please call [phone] (home).  Thank you!

## 2017-12-21 RX ORDER — TORSEMIDE 20 MG/1
TABLET ORAL
Qty: 60 TABLET | Refills: 8 | Status: SHIPPED | OUTPATIENT
Start: 2017-12-21 | End: 2018-02-05 | Stop reason: SDUPTHER

## 2017-12-22 ENCOUNTER — OFFICE VISIT (OUTPATIENT)
Dept: PLASTIC SURGERY | Facility: CLINIC | Age: 67
End: 2017-12-22
Payer: MEDICARE

## 2017-12-22 VITALS
DIASTOLIC BLOOD PRESSURE: 63 MMHG | HEIGHT: 62 IN | SYSTOLIC BLOOD PRESSURE: 139 MMHG | BODY MASS INDEX: 43.06 KG/M2 | HEART RATE: 80 BPM | WEIGHT: 234 LBS

## 2017-12-22 DIAGNOSIS — Z98.890 S/P PANNICULECTOMY: Primary | ICD-10-CM

## 2017-12-22 PROCEDURE — 99212 OFFICE O/P EST SF 10 MIN: CPT | Mod: S$GLB,,, | Performed by: SURGERY

## 2017-12-23 DIAGNOSIS — H10.10 ALLERGIC RHINOCONJUNCTIVITIS: Primary | ICD-10-CM

## 2017-12-23 DIAGNOSIS — J30.9 ALLERGIC RHINOCONJUNCTIVITIS: Primary | ICD-10-CM

## 2017-12-23 RX ORDER — FLUTICASONE PROPIONATE 50 MCG
SPRAY, SUSPENSION (ML) NASAL
Qty: 3 BOTTLE | Refills: 1 | Status: SHIPPED | OUTPATIENT
Start: 2017-12-23 | End: 2018-07-05 | Stop reason: SDUPTHER

## 2018-01-03 ENCOUNTER — PATIENT MESSAGE (OUTPATIENT)
Dept: GASTROENTEROLOGY | Facility: CLINIC | Age: 68
End: 2018-01-03

## 2018-01-03 ENCOUNTER — TELEPHONE (OUTPATIENT)
Dept: GASTROENTEROLOGY | Facility: CLINIC | Age: 68
End: 2018-01-03

## 2018-01-03 DIAGNOSIS — K31.A0 INTESTINAL METAPLASIA OF GASTRIC MUCOSA: Primary | ICD-10-CM

## 2018-01-03 DIAGNOSIS — K21.9 GASTROESOPHAGEAL REFLUX DISEASE WITHOUT ESOPHAGITIS: ICD-10-CM

## 2018-01-03 NOTE — TELEPHONE ENCOUNTER
Please inform the patient that I refilled the prescription for zantac and she is due for a follow-up visit for continued evaluation and management.  Thanks  JOAO

## 2018-01-03 NOTE — TELEPHONE ENCOUNTER
Spoke with pt. Informed of message per SHANNAN Kuo NP. Pt verbalized understanding, however pt stated  wanted to scope her again in a year due to finding pre-cancerous cells. Pt would like me to check with Dr. Navarro's office & if this is correct, have his nurse call to set up scope.     Please advise & call pt to schedule scope.

## 2018-01-09 DIAGNOSIS — Z87.310 HISTORY OF PATHOLOGICAL FRACTURE DUE TO OSTEOPOROSIS: ICD-10-CM

## 2018-01-11 ENCOUNTER — HOSPITAL ENCOUNTER (OUTPATIENT)
Facility: HOSPITAL | Age: 68
Discharge: HOME | End: 2018-01-11
Attending: INTERNAL MEDICINE | Admitting: INTERNAL MEDICINE
Payer: MEDICARE

## 2018-01-11 ENCOUNTER — ANESTHESIA EVENT (OUTPATIENT)
Dept: ENDOSCOPY | Facility: HOSPITAL | Age: 68
End: 2018-01-11
Payer: MEDICARE

## 2018-01-11 ENCOUNTER — SURGERY (OUTPATIENT)
Age: 68
End: 2018-01-11

## 2018-01-11 ENCOUNTER — ANESTHESIA (OUTPATIENT)
Dept: ENDOSCOPY | Facility: HOSPITAL | Age: 68
End: 2018-01-11
Payer: MEDICARE

## 2018-01-11 VITALS
OXYGEN SATURATION: 97 % | RESPIRATION RATE: 29 BRPM | HEART RATE: 75 BPM | SYSTOLIC BLOOD PRESSURE: 125 MMHG | TEMPERATURE: 98 F | DIASTOLIC BLOOD PRESSURE: 58 MMHG

## 2018-01-11 DIAGNOSIS — K29.70 GASTRITIS, PRESENCE OF BLEEDING UNSPECIFIED, UNSPECIFIED CHRONICITY, UNSPECIFIED GASTRITIS TYPE: Primary | ICD-10-CM

## 2018-01-11 DIAGNOSIS — K31.A0 INTESTINAL METAPLASIA OF GASTRIC MUCOSA: ICD-10-CM

## 2018-01-11 PROCEDURE — 25000003 PHARM REV CODE 250: Performed by: INTERNAL MEDICINE

## 2018-01-11 PROCEDURE — 27201012 HC FORCEPS, HOT/COLD, DISP: Performed by: INTERNAL MEDICINE

## 2018-01-11 PROCEDURE — 63600175 PHARM REV CODE 636 W HCPCS: Performed by: NURSE ANESTHETIST, CERTIFIED REGISTERED

## 2018-01-11 PROCEDURE — D9220A PRA ANESTHESIA: Mod: CRNA,,,

## 2018-01-11 PROCEDURE — 37000008 HC ANESTHESIA 1ST 15 MINUTES: Performed by: INTERNAL MEDICINE

## 2018-01-11 PROCEDURE — 43239 EGD BIOPSY SINGLE/MULTIPLE: CPT | Mod: ,,, | Performed by: INTERNAL MEDICINE

## 2018-01-11 PROCEDURE — 88305 TISSUE EXAM BY PATHOLOGIST: CPT | Performed by: PATHOLOGY

## 2018-01-11 PROCEDURE — D9220A PRA ANESTHESIA: Mod: ANES,,, | Performed by: ANESTHESIOLOGY

## 2018-01-11 PROCEDURE — 43239 EGD BIOPSY SINGLE/MULTIPLE: CPT | Performed by: INTERNAL MEDICINE

## 2018-01-11 PROCEDURE — 88305 TISSUE EXAM BY PATHOLOGIST: CPT | Mod: 26,,, | Performed by: PATHOLOGY

## 2018-01-11 RX ORDER — SODIUM CHLORIDE 9 MG/ML
INJECTION, SOLUTION INTRAVENOUS CONTINUOUS
Status: DISCONTINUED | OUTPATIENT
Start: 2018-01-11 | End: 2018-01-11 | Stop reason: HOSPADM

## 2018-01-11 RX ORDER — PROPOFOL 10 MG/ML
INJECTION, EMULSION INTRAVENOUS
Status: COMPLETED
Start: 2018-01-11 | End: 2018-01-11

## 2018-01-11 RX ORDER — LIDOCAINE HCL/PF 100 MG/5ML
SYRINGE (ML) INTRAVENOUS
Status: DISCONTINUED | OUTPATIENT
Start: 2018-01-11 | End: 2018-01-11

## 2018-01-11 RX ORDER — LIDOCAINE HYDROCHLORIDE 20 MG/ML
INJECTION, SOLUTION EPIDURAL; INFILTRATION; INTRACAUDAL; PERINEURAL
Status: DISCONTINUED
Start: 2018-01-11 | End: 2018-01-11 | Stop reason: HOSPADM

## 2018-01-11 RX ORDER — PROPOFOL 10 MG/ML
VIAL (ML) INTRAVENOUS
Status: DISCONTINUED | OUTPATIENT
Start: 2018-01-11 | End: 2018-01-11

## 2018-01-11 RX ADMIN — SODIUM CHLORIDE: 0.9 INJECTION, SOLUTION INTRAVENOUS at 09:01

## 2018-01-11 RX ADMIN — PROPOFOL 50 MG: 10 INJECTION, EMULSION INTRAVENOUS at 11:01

## 2018-01-11 RX ADMIN — LIDOCAINE HYDROCHLORIDE 100 MG: 20 INJECTION, SOLUTION INTRAVENOUS at 11:01

## 2018-01-11 RX ADMIN — PROPOFOL 100 MG: 10 INJECTION, EMULSION INTRAVENOUS at 11:01

## 2018-01-11 NOTE — ANESTHESIA PREPROCEDURE EVALUATION
01/11/2018  Fifi Mcnair is a 67 y.o., female.    Anesthesia Evaluation    I have reviewed the Patient Summary Reports.    I have reviewed the Nursing Notes.      Review of Systems  Anesthesia Hx:  No problems with previous Anesthesia    Cardiovascular:   Hypertension, well controlled    Pulmonary:   COPD, mild    Hepatic/GI:   PUD, GERD, well controlled        Physical Exam  General:  Well nourished                 Anesthesia Plan  Type of Anesthesia, risks & benefits discussed:  Anesthesia Type:  general  Patient's Preference:   Intra-op Monitoring Plan:   Intra-op Monitoring Plan Comments:   Post Op Pain Control Plan:   Post Op Pain Control Plan Comments:   Induction:   IV  Beta Blocker:  Patient is not currently on a Beta-Blocker (No further documentation required).       Informed Consent: Patient understands risks and agrees with Anesthesia plan.  Questions answered. Anesthesia consent signed with patient.  ASA Score: 2     Day of Surgery Review of History & Physical:    H&P update referred to the surgeon.         Ready For Surgery From Anesthesia Perspective.

## 2018-01-11 NOTE — DISCHARGE INSTRUCTIONS
Gastritis (Adult)    Gastritis is inflammation and irritation of the stomach lining. It can be present for a short time (acute) or be long lasting (chronic). Gastritis is often caused by infection with bacteria called H pylori. More than a third of people in the [place] have this bacteria in their bodies. In many cases, H pylori causes no problems or symptoms. In some people, though, the infection irritates the stomach lining and causes gastritis. Other causes of stomach irritation include drinking alcohol or taking pain-relieving medicines called NSAIDs (such as aspirin or ibuprofen).   Symptoms of gastritis can include:  · Abdominal pain or bloating  · Loss of appetite  · Nausea or vomiting  · Vomiting blood or having black stools  · Feeling more tired than usual  An inflamed and irritated stomach lining is more likely to develop a sore called an ulcer. To help prevent this, gastritis should be treated.  Home care  If needed, medicines may be prescribed. If you have H pylori infection, treating it will likely relieve your symptoms. Other changes can help reduce stomach irritation and help it heal.  · If you have been prescribed medicines for H pylori infection, take them as directed. Take all of the medicine until it is finished or your healthcare provider tells you to stop, even if you feel better.  · Your healthcare provider may recommend avoiding NSAIDs. If you take daily aspirin for your heart or other medical reasons, do not stop without talking to your healthcare provider first.  · Avoid drinking alcohol.  · Stop smoking. Smoking can irritate the stomach and delay healing. As much as possible, stay away from second hand smoke.  Follow-up care  Follow up with your healthcare provider, or as advised by our staff. Testing may be needed to check for inflammation or an ulcer.  When to seek medical advice  Call your healthcare provider for any of the following:  · Stomach pain that gets worse or moves to the lower  right abdomen (appendix area)  · Chest pain that appears or gets worse, or spreads to the back, neck, shoulder, or arm  · Frequent vomiting (cant keep down liquids)  · Blood in the stool or vomit (red or black in color)  · Feeling weak or dizzy  · Fever of 100.4ºF (38ºC) or higher, or as directed by your healthcare provider  Date Last Reviewed: 6/22/2015  © 2597-9042 [employer]. [address] [place] [address]. All rights reserved. This information is not intended as a substitute for professional medical care. Always follow your healthcare professional's instructions.

## 2018-01-11 NOTE — OR NURSING
Patient awake, alert and oriented. No complaints of any abdominal pain or discomfort. Vital signs within defined limits, Tolerated procedure well. No distress observed. Sitting up in bed drinking coffee. Tolerating Po fluids well. Patient ready for transport home.

## 2018-01-11 NOTE — TRANSFER OF CARE
Anesthesia Transfer of Care Note    Patient: Fifi Mcnair    Procedure(s) Performed: Procedure(s) (LRB):  ESOPHAGOGASTRODUODENOSCOPY (EGD) (N/A)    Patient location: GI    Anesthesia Type: general    Transport from OR: Transported from OR on room air with adequate spontaneous ventilation    Post pain: adequate analgesia    Post assessment: no apparent anesthetic complications    Post vital signs: stable    Level of consciousness: awake    Nausea/Vomiting: no nausea/vomiting    Complications: none    Transfer of care protocol was followed      Last vitals:   Visit Vitals  /61   Pulse 77   Resp (!) 26   LMP  (LMP Unknown)   SpO2 98%   Breastfeeding? No

## 2018-01-11 NOTE — H&P
CC: Intestinal metaplasia of gastric mucosa    67 year old female with above. States that symptoms are absent, no alleviating/exacerbating factors. No family history of CA. No bleeding or weight loss.     ROS:  No headache, no fever/chills, no chest pain/SOB, no nausea/vomiting/diarrhea/constipation/GI bleeding/abdominal pain, no dysuria/hematuria.    VSSAF   Exam:   Alert and oriented x 3; no apparent distress   PERRLA, sclera anicteric  CV: Regular rate/rhythm, normal PMI   Lungs: Clear bilaterally with no wheeze/rales   Abdomen: Soft, NT/ND, normal bowel sounds   Ext: No cyanosis, clubbing     Impression:   As above    Plan:   Proceed with endoscopy. Further recs to follow.

## 2018-01-15 ENCOUNTER — PATIENT MESSAGE (OUTPATIENT)
Dept: GASTROENTEROLOGY | Facility: CLINIC | Age: 68
End: 2018-01-15

## 2018-01-18 ENCOUNTER — PATIENT MESSAGE (OUTPATIENT)
Dept: ADMINISTRATIVE | Facility: OTHER | Age: 68
End: 2018-01-18

## 2018-01-19 ENCOUNTER — TELEPHONE (OUTPATIENT)
Dept: HEMATOLOGY/ONCOLOGY | Facility: CLINIC | Age: 68
End: 2018-01-19

## 2018-01-19 NOTE — TELEPHONE ENCOUNTER
----- Message from Tressa Forte sent at 1/19/2018  1:39 PM CST -----  Contact: Patient  Patient is calling to cancel her appointment on 2/1.  Patient does not need to reschedule.  Call Back#[phone]  Thanks

## 2018-01-23 DIAGNOSIS — M79.671 RIGHT FOOT PAIN: ICD-10-CM

## 2018-01-23 DIAGNOSIS — M25.562 LEFT KNEE PAIN, UNSPECIFIED CHRONICITY: Primary | ICD-10-CM

## 2018-01-25 ENCOUNTER — HOSPITAL ENCOUNTER (OUTPATIENT)
Dept: RADIOLOGY | Facility: HOSPITAL | Age: 68
Discharge: HOME | End: 2018-01-25
Attending: ORTHOPAEDIC SURGERY
Payer: MEDICARE

## 2018-01-25 ENCOUNTER — OFFICE VISIT (OUTPATIENT)
Dept: ORTHOPEDICS | Facility: CLINIC | Age: 68
End: 2018-01-25
Payer: MEDICARE

## 2018-01-25 VITALS
DIASTOLIC BLOOD PRESSURE: 66 MMHG | SYSTOLIC BLOOD PRESSURE: 154 MMHG | HEART RATE: 63 BPM | WEIGHT: 234 LBS | HEIGHT: 62 IN | BODY MASS INDEX: 43.06 KG/M2

## 2018-01-25 DIAGNOSIS — M17.12 PRIMARY OSTEOARTHRITIS OF LEFT KNEE: Primary | ICD-10-CM

## 2018-01-25 DIAGNOSIS — M19.079 ARTHRITIS OF MIDFOOT: ICD-10-CM

## 2018-01-25 DIAGNOSIS — M25.562 LEFT KNEE PAIN, UNSPECIFIED CHRONICITY: ICD-10-CM

## 2018-01-25 DIAGNOSIS — M79.671 RIGHT FOOT PAIN: ICD-10-CM

## 2018-01-25 PROCEDURE — 73630 X-RAY EXAM OF FOOT: CPT | Mod: 26,RT,, | Performed by: RADIOLOGY

## 2018-01-25 PROCEDURE — 20610 DRAIN/INJ JOINT/BURSA W/O US: CPT | Mod: LT,S$GLB,, | Performed by: ORTHOPAEDIC SURGERY

## 2018-01-25 PROCEDURE — 73564 X-RAY EXAM KNEE 4 OR MORE: CPT | Mod: 26,LT,, | Performed by: RADIOLOGY

## 2018-01-25 PROCEDURE — 99214 OFFICE O/P EST MOD 30 MIN: CPT | Mod: 25,S$GLB,, | Performed by: ORTHOPAEDIC SURGERY

## 2018-01-25 PROCEDURE — 73630 X-RAY EXAM OF FOOT: CPT | Mod: TC,PN,RT

## 2018-01-25 PROCEDURE — 73562 X-RAY EXAM OF KNEE 3: CPT | Mod: TC,PN,RT

## 2018-01-25 PROCEDURE — 73562 X-RAY EXAM OF KNEE 3: CPT | Mod: 26,59,RT, | Performed by: RADIOLOGY

## 2018-01-25 PROCEDURE — 99999 PR PBB SHADOW E&M-EST. PATIENT-LVL III: CPT | Mod: PBBFAC,,, | Performed by: ORTHOPAEDIC SURGERY

## 2018-01-25 RX ORDER — OXYCODONE HYDROCHLORIDE 15 MG/1
15 TABLET ORAL EVERY 6 HOURS PRN
Qty: 30 TABLET | Refills: 0 | Status: ON HOLD | OUTPATIENT
Start: 2018-01-25 | End: 2020-09-21 | Stop reason: SDUPTHER

## 2018-01-25 RX ORDER — TRIAMCINOLONE ACETONIDE 40 MG/ML
40 INJECTION, SUSPENSION INTRA-ARTICULAR; INTRAMUSCULAR
Status: DISCONTINUED | OUTPATIENT
Start: 2018-01-25 | End: 2018-01-25 | Stop reason: HOSPADM

## 2018-01-25 RX ADMIN — TRIAMCINOLONE ACETONIDE 40 MG: 40 INJECTION, SUSPENSION INTRA-ARTICULAR; INTRAMUSCULAR at 11:01

## 2018-01-25 NOTE — PROGRESS NOTES
Past Medical History:   Diagnosis Date    Allergy     multiple antibiotic allergies    Anemia     Anxiety     Arthritis     Asthma     CHF (congestive heart failure)     NYHA class III     Chronic kidney disease     Colon polyp     benign    COPD (chronic obstructive pulmonary disease)     DLCO 37% , and mild pulmonary HTN    Depression     Diabetes mellitus     Diabetic retinopathy     Diastolic dysfunction     Diverticulosis     Former smoker     Fracture of lumbar spine     GERD (gastroesophageal reflux disease)     Hernia of unspecified site of abdominal cavity without mention of obstruction or gangrene     Hyperlipidemia     Hypertension     hypertensive renal    IBS (irritable bowel syndrome)     Mild nonproliferative diabetic retinopathy(362.04) 11/25/2013    Morbid obesity     Nuclear sclerosis 11/25/2013    Osteoporosis     Peripheral edema     Rash     Recurrent upper respiratory infection (URI)     S/P LASIK (laser assisted in situ keratomileusis)     Sleep apnea     sleep apnea uses bipap.    Thyroid disease     on synthroid    TIA (transient ischemic attack)     Urinary tract infection        Past Surgical History:   Procedure Laterality Date    ABDOMINAL SURGERY      ADENOIDECTOMY      COLONOSCOPY  03/05/2013    repeat in 5 years    COLONOSCOPY N/A 5/31/2017    Procedure: COLONOSCOPY;  Surgeon: Luis Navarro MD;  Location: [facility];  Service: Endoscopy;  Laterality: N/A;    COSMETIC SURGERY      CYSTOSCOPY      EYE SURGERY Right     mazzulla    GASTRECTOMY      gastric sleeve      gastric sleeve      HERNIA REPAIR      5 years old    HYSTERECTOMY      ovaries remain    REFRACTIVE SURGERY      mono va//ou//    RHINOPLASTY TIP      TONSILLECTOMY      TUBAL LIGATION      UPPER GASTROINTESTINAL ENDOSCOPY  03/05/2013    UPPER GASTROINTESTINAL ENDOSCOPY  08/24/2016    Dr. Navarro, repeat in 8 weeks    UPPER GASTROINTESTINAL ENDOSCOPY  07/21/2016      Prakash       Current Outpatient Prescriptions   Medication Sig    allopurinol (ZYLOPRIM) 100 MG tablet TAKE TWO TABLETS BY MOUTH NIGHTLY    calcitRIOL (ROCALTROL) 0.25 MCG Cap Take 1 capsule by mouth twice a week. Monday Friday    CALCIUM CITRATE/VITAMIN D3 (CALCIUM CITRATE + D ORAL) Take 400 mg by mouth 3 (three) times daily.    cetirizine (ZYRTEC) 10 MG tablet Take 1 tablet (10 mg total) by mouth once daily.    clotrimazole-betamethasone 1-0.05% (LOTRISONE) cream     cyanocobalamin, vitamin B-12, (VITAMIN B-12) 5,000 mcg Subl Place 5,000 mg under the tongue once a week.    cyclobenzaprine (FLEXERIL) 10 MG tablet TAKE ONE TABLET BY MOUTH THREE TIMES DAILY AS NEEDED FOR MUSCLE SPASM    DENOSUMAB (PROLIA SUBQ) Inject into the skin every 6 (six) months.     DIABETIC SUPPLIES,MISCELL (Magine REMOTE CONTROL MISC) No Sig Provided    diphenoxylate-atropine 2.5-0.025 mg (LOMOTIL) 2.5-0.025 mg per tablet TAKE ONE TABLET BY MOUTH 4 TIMES DAILY AS NEEDED FOR DIARRHEA    doxycycline (MONODOX) 100 MG capsule Take 1 capsule (100 mg total) by mouth 2 (two) times daily.    fluoxetine (PROZAC) 20 MG capsule TAKE TWO CAPSULES BY MOUTH ONCE DAILY    fluticasone (FLONASE) 50 mcg/actuation nasal spray 2 sprays by Nasal route once daily.     fluticasone (FLONASE) 50 mcg/actuation nasal spray USE TWO SPRAY(S) IN EACH NOSTRIL ONCE DAILY    fluticasone-vilanterol (BREO) 100-25 mcg/dose diskus inhaler Inhale 1 puff into the lungs once daily.    glucagon (human recombinant) inj 1mg/mL kit Inject 1 mL (1 mg total) into the muscle as needed.    insulin aspart (NOVOLOG) 100 unit/mL injection Inject into the skin continuous. Pt has insulin pump    l-methylfolate-b2-b6-b12 (CEREFOLIN) 6-5-50-1 mg Tab Take 1 tablet by mouth once daily.    Lactobacillus rhamnosus GG (CULTURELLE) 10 billion cell capsule Take 1 capsule by mouth once daily.    levothyroxine (SYNTHROID) 25 MCG tablet TAKE ONE TABLET BY MOUTH ONCE DAILY     losartan (COZAAR) 25 MG tablet Take 1 tablet by mouth once daily.    melatonin 3 mg Tab Take 5 mg by mouth every evening. 10 MG NIGHTLY    metOLazone (ZAROXOLYN) 5 MG tablet Take 1 tablet (5 mg total) by mouth daily as needed (swelling).    metronidazole (METROGEL) 0.75 % gel Apply topically 2 (two) times daily.    montelukast (SINGULAIR) 10 mg tablet Take 10 mg by mouth once daily.     MULTIVIT-IRON-MIN-FOLIC ACID 3,500-18-0.4 UNIT-MG-MG ORAL CHEW Take by mouth 2 (two) times daily.    mupirocin (BACTROBAN) 2 % ointment APPLY OINTMENT TOPICALLY TO AFFECTED AREA ON NOSE THREE TIMES DAILY AS DIRECTED    oxycodone (ROXICODONE) 15 MG Tab Take 15 mg by mouth every 6 (six) hours as needed for Pain.     pantoprazole (PROTONIX) 40 MG tablet TAKE ONE TABLET BY MOUTH ONCE DAILY    potassium chloride SA (K-DUR,KLOR-CON) 20 MEQ tablet Take 1 tablet (20 mEq total) by mouth 2 (two) times daily.    ranitidine (ZANTAC) 300 MG tablet TAKE ONE TABLET BY MOUTH IN THE EVENING    torsemide (DEMADEX) 20 MG Tab Take 2 tablets (40 mg total) by mouth once daily.    torsemide (DEMADEX) 20 MG Tab TAKE TWO TABLETS BY MOUTH ONCE DAILY    atenolol (TENORMIN) 25 MG tablet Take 1 tablet (25 mg total) by mouth once daily. (Patient taking differently: Take 25 mg by mouth once daily. )    lorazepam (ATIVAN) 1 MG tablet Take 1 tablet (1 mg total) by mouth every 12 (twelve) hours as needed for Anxiety.     No current facility-administered medications for this visit.        Review of patient's allergies indicates:   Allergen Reactions    Adhesive Other (See Comments)     Blisters    Cleocin [clindamycin hcl] Hives    Ceclor [cefaclor] Hives    Morphine Nausea And Vomiting    Advair diskus [fluticasone-salmeterol]      Dry mouth    Aleve [naproxen sodium]      Unable to take secondary to kidney function.     Levaquin [levofloxacin] Dermatitis    Macrodantin [nitrofurantoin macrocrystalline] Hives    Motrin [ibuprofen]      Unable  to take secondary to kidney functions.    Sulfa (sulfonamide antibiotics)      Hold due to renal problems    Remeron [mirtazapine] Palpitations and Other (See Comments)     Jittery    Vancomycin analogues Rash     Feet broke out/inflammed blood vessels         Family History   Problem Relation Age of Onset    Heart disease Mother 59    Cancer Mother 59     throat    Allergies Mother     Diabetes Mother     Heart disease Father 70     flutter    Allergies Father     Diabetes Father     Cancer Sister 22     22 thyroid,49  breast    Allergies Sister     Breast cancer Sister     Diabetes Sister     Cancer Brother 62     lung    Diabetes Brother     Asthma Daughter     Diabetes Daughter     Depression Daughter     Asthma Grandchild     Eczema Grandchild     Eczema Grandchild     Breast cancer Maternal Grandmother     Ovarian cancer Cousin     Amblyopia Neg Hx     Blindness Neg Hx     Cataracts Neg Hx     Glaucoma Neg Hx     Hypertension Neg Hx     Macular degeneration Neg Hx     Retinal detachment Neg Hx     Strabismus Neg Hx     Stroke Neg Hx     Thyroid disease Neg Hx     Angioedema Neg Hx     Immunodeficiency Neg Hx     Allergic rhinitis Neg Hx     Atopy Neg Hx     Rhinitis Neg Hx     Urticaria Neg Hx     Colon cancer Neg Hx     Colon polyps Neg Hx     Crohn's disease Neg Hx     Ulcerative colitis Neg Hx     Celiac disease Neg Hx        Social History     Social History    Marital status:      Spouse name: N/A    Number of children: N/A    Years of education: N/A     Occupational History    Not on file.     Social History Main Topics    Smoking status: Former Smoker     Types: Cigarettes    Smokeless tobacco: Never Used      Comment: quit 30 years ago    Alcohol use No      Comment: rare    Drug use: No    Sexual activity: Yes     Birth control/ protection: Surgical     Other Topics Concern    Not on file     Social History Narrative    No narrative on  file       Chief Complaint:   Chief Complaint   Patient presents with    Foot Pain     right foot pain    Knee Pain     left knee pain       History: This is a 67-year-old female with chronic bilateral knee pain.  Left is greater than the right.  Patient has had injections previously.  Cortisone does not work very well.  Has had good success with a Synvisc series.  Synvisc one did not work well for her.  Rates her pain currently as a 7 out of 10.  Symptoms are worsening and moderate to severe.    Present: The last Synvisc series didn't really help well.  Her left knee is still giving her significant trouble.  Pain of an 8 out of 10.  She is also having more pain along the dorsum of her right foot over the last week.  Pain with walking standing sitting or laying.    Review of Systems:    Musculoskeletal:  See HPI          Physical Examination:    Vital Signs:    Vitals:    01/25/18 1018   BP: (!) 154/66   Pulse: 63       This a well-developed, well nourished patient in no acute distress.  They are alert and oriented and cooperative to examination.  Pt. walks without an antalgic gait.      Examination of bilateral knees knee shows no rashes or erythema. There are no masses ecchymosis or effusion. Patient has full range of motion from 0-130°. Patient is nontender to palpation over lateral joint line and moderately tender to palpation over the medial joint line.  Knee is stable to varus and valgus stress. 5 out of 5 motor strength. Palpable distal pulses. Intact light touch sensation. Negative Patellofemoral crepitus    Examination of the patient's right foot and ankle shows no signs of rashes or erythema. The patient has no ecchymosis or effusion or masses. The patient has a negative anterior drawer and talar tilting exam. The patient has full range of motion of ankle dorsiflexion, plantarflexion, inversion, and eversion. Patient has 5 out of 5 motor strength in all muscle groups. Patient has 2+ dorsalis pedis pulses  and intact light touch sensation. The patient is nontender over both medial ankle ligaments and medial malleolus as well as lateral ankle ligaments and the lateral malleolus. Negative squeeze test.  Mild tenderness over the talonavicular joint and midfoot.      X-rays: 4 views of left knee is ordered and  reviewed which show medial narrowing that is significantly worse on the left.  Severe left knee arthritis  X-rays of the right foot are ordered and reviewed which show some degenerative changes particularly around the midfoot.     Assessment:: Bilateral knee arthritis  Right midfoot arthritis     Plan:   I inject her left knee with cortisone.  We talked in great detail about total knee replacement.  She might do it in the late spring or summer.  I did give her a small prescription of pain medicine until she gets in with a new pain management doctor.

## 2018-01-25 NOTE — PROCEDURES
Large Joint Aspiration/Injection  Date/Time: 1/25/2018 11:20 AM  Performed by: WALE BENTON  Authorized by: WALE BENTON     Consent Done?:  Yes (Verbal)  Indications:  Pain  Procedure site marked: Yes    Timeout: Prior to procedure the correct patient, procedure, and site was verified      Location:  Knee  Site:  L knee  Prep: Patient was prepped and draped in usual sterile fashion    Needle size:  20 G  Approach:  Anterolateral  Medications:  40 mg triamcinolone acetonide 40 mg/mL  Patient tolerance:  Patient tolerated the procedure well with no immediate complications

## 2018-02-05 ENCOUNTER — OFFICE VISIT (OUTPATIENT)
Dept: FAMILY MEDICINE | Facility: CLINIC | Age: 68
End: 2018-02-05
Payer: MEDICARE

## 2018-02-05 VITALS
DIASTOLIC BLOOD PRESSURE: 55 MMHG | WEIGHT: 233.69 LBS | HEART RATE: 66 BPM | BODY MASS INDEX: 43 KG/M2 | HEIGHT: 62 IN | SYSTOLIC BLOOD PRESSURE: 115 MMHG | TEMPERATURE: 98 F

## 2018-02-05 DIAGNOSIS — D50.8 IRON DEFICIENCY ANEMIA SECONDARY TO INADEQUATE DIETARY IRON INTAKE: ICD-10-CM

## 2018-02-05 DIAGNOSIS — D50.1 IRON DEFICIENCY ANEMIA DUE TO SIDEROPENIC DYSPHAGIA: ICD-10-CM

## 2018-02-05 DIAGNOSIS — Z12.31 VISIT FOR SCREENING MAMMOGRAM: ICD-10-CM

## 2018-02-05 DIAGNOSIS — E11.22 CONTROLLED TYPE 2 DIABETES MELLITUS WITH CHRONIC KIDNEY DISEASE, UNSPECIFIED CKD STAGE, UNSPECIFIED LONG TERM INSULIN USE STATUS: Primary | ICD-10-CM

## 2018-02-05 DIAGNOSIS — E79.0 HYPERURICEMIA: ICD-10-CM

## 2018-02-05 PROBLEM — M47.817 OSTEOARTHRITIS OF LUMBOSACRAL SPINE WITHOUT MYELOPATHY: Status: ACTIVE | Noted: 2018-02-05

## 2018-02-05 PROBLEM — M15.9 GENERALIZED OSTEOARTHRITIS: Status: ACTIVE | Noted: 2018-02-05

## 2018-02-05 PROBLEM — I50.9 CARDIAC FAILURE: Status: ACTIVE | Noted: 2018-02-05

## 2018-02-05 PROBLEM — N28.9 RENAL INSUFFICIENCY: Status: ACTIVE | Noted: 2018-02-05

## 2018-02-05 PROBLEM — M79.7 FIBROMYALGIA: Status: ACTIVE | Noted: 2018-02-05

## 2018-02-05 PROBLEM — M48.061 SPINAL STENOSIS OF LUMBAR REGION: Status: ACTIVE | Noted: 2018-02-05

## 2018-02-05 PROCEDURE — 99214 OFFICE O/P EST MOD 30 MIN: CPT | Mod: S$GLB,,, | Performed by: FAMILY MEDICINE

## 2018-02-05 PROCEDURE — 3008F BODY MASS INDEX DOCD: CPT | Mod: S$GLB,,, | Performed by: FAMILY MEDICINE

## 2018-02-05 PROCEDURE — 1159F MED LIST DOCD IN RCRD: CPT | Mod: S$GLB,,, | Performed by: FAMILY MEDICINE

## 2018-02-05 PROCEDURE — 1126F AMNT PAIN NOTED NONE PRSNT: CPT | Mod: S$GLB,,, | Performed by: FAMILY MEDICINE

## 2018-02-05 PROCEDURE — 99999 PR PBB SHADOW E&M-EST. PATIENT-LVL III: CPT | Mod: PBBFAC,,, | Performed by: FAMILY MEDICINE

## 2018-02-05 NOTE — PATIENT INSTRUCTIONS
Established High Blood Pressure    High blood pressure (hypertension) is a chronic disease. Often, healthcare providers dont know what causes it. But it can be caused by certain health conditions and medicines.  If you have high blood pressure, you may not have any symptoms. If you do have symptoms, they may include headache, dizziness, changes in your vision, chest pain, and shortness of breath. But even without symptoms, high blood pressure thats not treated raises your risk for heart attack and stroke. High blood pressure is a serious health risk and shouldnt be ignored.  A blood pressure reading is made up of two numbers: a higher number over a lower number. The top number is the systolic pressure. The bottom number is the diastolic pressure. A normal blood pressure is a systolic pressure of  less than 120 over a diastolic pressure of less than 80. You will see your blood pressure readings written together. For example, a person with a systolic pressure of 188 and a diastolic pressure of 78 will have 118/78 written in the medical record.  High blood pressure is when either the top number is 140 or higher, or the bottom number is 90 or higher. This must be the result when taking your blood pressure a number of times. The blood pressures between normal and high are called prehypertension.  Home care  If you have high blood pressure, you should do what is listed below to lower your blood pressure. If you are taking medicines for high blood pressure, these methods may reduce or end your need for medicines in the future.  · Begin a weight-loss program if you are overweight.  · Cut back on how much salt you get in your diet. Heres how to do this:  ¨ Dont eat foods that have a lot of salt. These include olives, pickles, smoked meats, and salted potato chips.  ¨ Dont add salt to your food at the table.  ¨ Use only small amounts of salt when cooking.  · Start an exercise program. Talk with your healthcare  provider about the type of exercise program that would be best for you. It doesn't have to be hard. Even brisk walking for 20 minutes 3 times a week is a good form of exercise.  · Dont take medicines that stimulate the heart. This includes many over-the-counter cold and sinus decongestant pills and sprays, as well as diet pills. Check the warnings about hypertension on the label. Before buying any over-the-counter medicines or supplements, always ask the pharmacist about the product's potential interaction with your high blood pressure and your high blood pressure medicines.  · Stimulants such as amphetamine or cocaine could be deadly for someone with high blood pressure. Never take these.  · Limit how much caffeine you get in your diet. Switch to caffeine-free products.  · Stop smoking. If you are a long-time smoker, this can be hard. Talk to your healthcare provider about medicines and nicotine replacement options to help you. Also, enroll in a stop-smoking program to make it more likely that you will quit for good.  · Learn how to handle stress. This is an important part of any program to lower blood pressure. Learn about relaxation methods like meditation, yoga, or biofeedback.  · If your provider prescribed medicines, take them exactly as directed. Missing doses may cause your blood pressure get out of control.  · If you miss a dose or doses, check with your healthcare provider or pharmacist about what to do.  · Consider buying an automatic blood pressure machine. Ask your provider for a recommendation. You can get one of these at most pharmacies.     The American Heart Association recommends the following guidelines for home blood pressure monitoring:  · Don't smoke or drink coffee for 30 minutes before taking your blood pressure.  · Go to the bathroom before the test.  · Relax for 5 minutes before taking the measurement.  · Sit with your back supported (don't sit on a couch or soft chair); keep your feet on  the floor uncrossed. Place your arm on a solid flat surface (like a table) with the upper part of the arm at heart level. Place the middle of the cuff directly above the eye of the elbow. Check the monitor's instruction manual for an illustration.  · Take multiple readings. When you measure, take 2 to 3 readings one minute apart and record all of the results.  · Take your blood pressure at the same time every day, or as your healthcare provider recommends.  · Record the date, time, and blood pressure reading.  · Take the record with you to your next medical appointment. If your blood pressure monitor has a built-in memory, simply take the monitor with you to your next appointment.  · Call your provider if you have several high readings. Don't be frightened by a single high blood pressure reading, but if you get several high readings, check in with your healthcare provider.  · Note: When blood pressure reaches a systolic (top number) of 180 or higher OR diastolic (bottom number) of 110 or higher, seek emergency medical treatment.  Follow-up care  You will need to see your healthcare provider regularly. This is to check your blood pressure and to make changes to your medicines. Make a follow-up appointment as directed. Bring the record of your home blood pressure readings to the appointment.  When to seek medical advice  Call your healthcare provider right away if any of these occur:  · Blood pressure reaches a systolic (upper number) of 180 or higher OR a diastolic (bottom number) of 110 or higher  · Chest pain or shortness of breath  · Severe headache  · Throbbing or rushing sound in the ears  · Nosebleed  · Sudden severe pain in your belly (abdomen)  · Extreme drowsiness, confusion, or fainting  · Dizziness or spinning sensation (vertigo)  · Weakness of an arm or leg or one side of the face  · You have problems speaking or seeing   Date Last Reviewed: 12/1/2016  © 5735-2679 [employer]. [address] [place] [address]  "[place] [address]. All rights reserved. This information is not intended as a substitute for professional medical care. Always follow your healthcare professional's instructions.        Exercise to Manage Your Blood Sugar    Being physically active every day can help you manage your blood sugar. Thats because an active lifestyle can improve your bodys ability to use insulin. Daily activity can also help delay or prevent complications of diabetes. And its a great way to relieve stress. If you arent normally active, be sure to consult your healthcare provider before getting started.  How much activity do you need?  If daily activity is new to you, start slow and steady. Begin with 10 minutes of activity each day. Then work up to at least 150 minutes a week of physical activity. Don't let more than 2 days go by without being active. When sitting for long periods of time, get up for short sessions of light activity every 30 minutes  Just move!  You dont have to join a gym or own pricey sports equipment. Just get out and walk. Walking is an aerobic exercise that makes your heart and lungs work hard. It helps your heart and blood vessels. Walking needs only a sturdy pair of sneakers and your own two feet. The more you walk, the easier it gets:  · Schedule time every day to move your feet.  · Make it part of your daily routine.  · Walk with a friend or a group to keep it interesting and fun.  · Try taking several short walks during the day to meet your daily activity goal.  A pedometer makes every step count  A pedometer is a small device that keeps track of how many steps you take. You can clip it to your belt (or a strap on your arm or leg) and go about your daily routine. "Smartphones" now also have apps to record your walking. At the end of the day, the pedometer shows the total number of steps you took. Use a pedometer to set daily goals for yourself. For instance, if you walk 4,000 steps a day, try " adding 200 more steps each day. Aim for a goal of 7,500. With every step, youre doing a little more to help your body use insulin.   Adding resistance exercise  Resistance exercise (also called strength training), makes muscles stronger. It also helps muscles use insulin better. Ask your healthcare provider whether this type of exercise is right for you. If it is, your healthcare provider can help you work it in to your activity plan.  Staying safe  Being active may cause blood sugar to drop faster than usual. This is especially true if you take medicine to manage your blood sugar. But there are things you can do to help reduce the risk of accidental lows. Keep these tips in mind:  · Always carry identification when you exercise outside your home. Carry a cell phone to use in case of emergency.  · If you can, include friends and family in your activities.  · Wear a medical ID bracelet that says you have diabetes.  · Use the right safety equipment for the activity you do (such as a bicycle helmet when you ride a bicycle outdoors). Wear closed-toed shoes that fit your feet well.  · Drink plenty of water before and during activity.  · Keep a fast-acting sugar (such as glucose tablets) on hand in case of low blood sugar.  · Dress properly for the weather. Wear a hat if its herb, or wait until evening if its too hot.  · Avoid being active for long periods in very hot or very cold weather.  · Skip activity if youre sick.     Notice how activity affects blood sugar  Physical activity is important when you have diabetes. But you need to keep an eye on your blood sugar level. Check often if you have been active for longer than usual, or if the activity was unplanned. Make it a habit to check your blood sugar before being active. And check again a few hours later. Use your log book to write down how activity affects your numbers. If you take insulin, you may be able to adjust your dose before a planned activity. This can  help prevent lows. You may also need to take a small carbohydrate snack before the exercise. Talk to your healthcare provider to learn more.    Date Last Reviewed: 6/1/2016  © 0834-6300 [employer]. [address] [place] [address], [place] [address]. All rights reserved. This information is not intended as a substitute for professional medical care. Always follow your healthcare professional's instructions.

## 2018-02-06 NOTE — PROGRESS NOTES
Subjective:       Patient ID: Fifi Mcnair is a 67 y.o. female.    Chief Complaint: Diabetes    HPI  Review of Systems   Constitutional: Negative for activity change and unexpected weight change.   HENT: Negative for hearing loss, rhinorrhea and trouble swallowing.    Eyes: Positive for discharge and visual disturbance.   Respiratory: Positive for shortness of breath. Negative for chest tightness and wheezing.    Cardiovascular: Negative for chest pain and palpitations.   Gastrointestinal: Positive for diarrhea. Negative for blood in stool, constipation and vomiting.   Endocrine: Negative for polydipsia and polyuria.   Genitourinary: Negative for difficulty urinating, dysuria, hematuria and menstrual problem.   Musculoskeletal: Positive for arthralgias, joint swelling and neck pain.   Neurological: Positive for headaches. Negative for weakness.   Psychiatric/Behavioral: Negative for confusion and dysphoric mood.       Patient Active Problem List   Diagnosis    Arthritis    Hypertension    S/P LASIK (laser assisted in situ keratomileusis)    Allergy    Anemia    Allergic asthma    Depressive disorder    GERD (gastroesophageal reflux disease)    Chronic kidney disease, stage III (moderate)    Acquired hypothyroidism    DM due to underlying condition with diabetic nephropathy, on Insulin    Conjunctival laceration - Left Eye    Obesity, Class III, BMI 40-49.9 (morbid obesity)    Leg pain, bilateral    Chronic rhinitis    Asthma, severe persistent, poorly-controlled    Chronic allergic rhinitis    Chronic diastolic heart failure, NYHA class 1    Nuclear sclerosis    Dry eye    Myopia with astigmatism and presbyopia    Morbid obesity with BMI of 40.0-44.9, adult, 41.8    Peripheral edema    Diastolic dysfunction without heart failure    Edema    Anti-polysaccharide antibody deficiency    Vulvar abscess    Controlled diabetes mellitus type 2 with complications    Insulin pump status     Bariatric surgery status, 12/2014    LBBB (left bundle branch block)    Osteoarthritis, knee    COPD (chronic obstructive pulmonary disease)    Vitreo-retinal adhesions    Vitreous hemorrhage, right eye    Posterior vitreous detachment, right eye    Moderate nonproliferative diabetic retinopathy of both eyes    Osteopenia with high risk of fracture    BMI 38.0-38.9,adult    Chest pain syndrome    Symptomatic posterior vitreous detachment of left eye    Type 2 diabetes mellitus with moderate nonproliferative diabetic retinopathy and without macular edema    Chronic low back pain    Obstructive sleep apnea, on CPAP (16), using 100%    Chronic bronchitis    Posterior vitreous detachment, left eye    Vitreous hemorrhage, left eye    Compression fracture    Abdominal pain    Esophagitis    Cardiovascular event risk, ASCVD 10-year risk 12.3%, 12/2015    CKD (chronic kidney disease) stage 3, GFR 30-59 ml/min    Hypertensive left ventricular hypertrophy, without heart failure    Irritable bowel syndrome with diarrhea    Abdominal pannus    PUD (peptic ulcer disease)    Abscess of abdominal wall    Essential hypertension    Thrombocytosis    Adjustment insomnia    Functional diarrhea    Blood in stool    Other osteoporosis without current pathological fracture    History of pathological fracture due to osteoporosis    Intestinal metaplasia of gastric mucosa       Objective:      Physical Exam   Constitutional: She appears well-developed and well-nourished.   HENT:   Right Ear: Tympanic membrane and ear canal normal.   Left Ear: Tympanic membrane and ear canal normal.   Nose: Mucosal edema and rhinorrhea present. Right sinus exhibits no maxillary sinus tenderness and no frontal sinus tenderness. Left sinus exhibits no maxillary sinus tenderness and no frontal sinus tenderness.   Mouth/Throat: Posterior oropharyngeal erythema present. No oropharyngeal exudate, posterior oropharyngeal edema or  tonsillar abscesses.   Cardiovascular: Normal rate, regular rhythm and normal heart sounds.    Pulses:       Carotid pulses are 2+ on the right side, and 2+ on the left side.       Radial pulses are 2+ on the right side, and 2+ on the left side.        Femoral pulses are 2+ on the right side, and 2+ on the left side.       Popliteal pulses are 2+ on the right side, and 2+ on the left side.        Dorsalis pedis pulses are 2+ on the right side, and 2+ on the left side.        Posterior tibial pulses are 2+ on the right side, and 2+ on the left side.   Pulmonary/Chest: Effort normal. She has decreased breath sounds in the right upper field and the left upper field.   Feet:   Right Foot:   Protective Sensation: 6 sites tested. 6 sites sensed.   Skin Integrity: Negative for ulcer, blister or skin breakdown.   Left Foot:   Protective Sensation: 6 sites tested. 6 sites sensed.   Skin Integrity: Negative for ulcer, blister or skin breakdown.   Skin: Skin is warm and dry.   Psychiatric: She has a normal mood and affect.   Nursing note and vitals reviewed.      Lab Results   Component Value Date    WBC 7.20 12/18/2017    HGB 13.1 12/18/2017    HCT 38.5 12/18/2017     12/18/2017    CHOL 144 07/12/2017    TRIG 242 (H) 07/12/2017    HDL 45 07/12/2017    ALT 16 11/21/2017    AST 19 11/21/2017     11/21/2017    K 4.0 11/21/2017    CL 98 11/21/2017    CREATININE 1.2 11/21/2017    BUN 17 11/21/2017    CO2 30 (H) 11/21/2017    TSH 1.711 07/12/2017    INR 1.1 12/30/2016    HGBA1C 7.9 (H) 07/12/2017     The 10-year ASCVD risk score (Highlandmayur GARCIA JrRigo, et al., 2013) is: 13%    Values used to calculate the score:      Age: 67 years      Sex: Female      Is Non- : No      Diabetic: Yes      Tobacco smoker: No      Systolic Blood Pressure: 115 mmHg      Is BP treated: Yes      HDL Cholesterol: 45 mg/dL      Total Cholesterol: 144 mg/dL    Assessment:       1. Iron deficiency anemia due to sideropenic  dysphagia    2. Visit for screening mammogram    3. Controlled type 2 diabetes mellitus with chronic kidney disease, unspecified CKD stage, unspecified long term insulin use status    4. Iron deficiency anemia secondary to inadequate dietary iron intake    5. Hyperuricemia        Plan:       Iron deficiency anemia due to sideropenic dysphagia  -     Iron and TIBC; Future; Expected date: 02/05/2018  -     Ferritin; Future; Expected date: 02/05/2018    Visit for screening mammogram  -     Mammo Digital Screening Bilateral With CAD; Future; Expected date: 02/05/2018    Controlled type 2 diabetes mellitus with chronic kidney disease, unspecified CKD stage, unspecified long term insulin use status  -     URINALYSIS; Future; Expected date: 02/05/2018  -     Uric acid; Future; Expected date: 02/05/2018    Iron deficiency anemia secondary to inadequate dietary iron intake  -     CBC auto differential; Future; Expected date: 02/05/2018    Hyperuricemia  -     Uric acid; Future; Expected date: 02/05/2018      Patient readiness: acceptance and barriers:readiness    During the course of the visit the patient was educated and counseled about the following:     Diabetes:  Discussed general issues about diabetes pathophysiology and management.  Educational material distributed.  Addressed ADA diet.  Suggested low cholesterol diet.  Encouraged aerobic exercise.  Discussed ways to avoid symptomatic hypoglycemia.  Discussed sick day management.  Endocrinology clinic referral.  Reminded to bring in blood sugar diary at next visit.  Hypertension:   Check blood pressures daily and record.  Obesity:   General weight loss/lifestyle modification strategies discussed (elicit support from others; identify saboteurs; non-food rewards, etc).    Goals: Diabetes: Maintain Hemoglobin A1C below 7, Hypertension: Reduce Blood Pressure and Obesity: Reduce calorie intake and BMI    Did patient meet goals/outcomes: No    The following self management  tools provided: blood pressure log  blood glucose log  excercise log    Patient Instructions (the written plan) was given to the patient/family.     Time spent with patient: 30 minutes    Barriers to medications present (yes )    Adverse reactions to current medications (yes)    Over the counter medications reviewed (Yes)        30-minute visit. 20 minutes spent counseling patient on diet, exercise, and weight loss.  This has been fully explained to the patient, who indicates understanding.

## 2018-02-08 ENCOUNTER — TELEPHONE (OUTPATIENT)
Dept: FAMILY MEDICINE | Facility: CLINIC | Age: 68
End: 2018-02-08

## 2018-02-08 ENCOUNTER — HOSPITAL ENCOUNTER (OUTPATIENT)
Dept: RADIOLOGY | Facility: CLINIC | Age: 68
Discharge: HOME | End: 2018-02-08
Attending: FAMILY MEDICINE
Payer: MEDICARE

## 2018-02-08 DIAGNOSIS — K57.32 DIVERTICULITIS OF LARGE INTESTINE WITHOUT PERFORATION OR ABSCESS, UNSPECIFIED BLEEDING STATUS: Primary | ICD-10-CM

## 2018-02-08 DIAGNOSIS — Z12.31 VISIT FOR SCREENING MAMMOGRAM: ICD-10-CM

## 2018-02-08 DIAGNOSIS — I50.32 CHF (CONGESTIVE HEART FAILURE), NYHA CLASS III, CHRONIC, DIASTOLIC: ICD-10-CM

## 2018-02-08 DIAGNOSIS — I51.89 DIASTOLIC DYSFUNCTION: ICD-10-CM

## 2018-02-08 PROCEDURE — 77063 BREAST TOMOSYNTHESIS BI: CPT | Mod: 26,,, | Performed by: RADIOLOGY

## 2018-02-08 PROCEDURE — 77067 SCR MAMMO BI INCL CAD: CPT | Mod: 26,,, | Performed by: RADIOLOGY

## 2018-02-08 PROCEDURE — 77067 SCR MAMMO BI INCL CAD: CPT | Mod: TC,PO

## 2018-02-08 RX ORDER — ATENOLOL 25 MG/1
25 TABLET ORAL DAILY
Qty: 90 TABLET | Refills: 0 | Status: SHIPPED | OUTPATIENT
Start: 2018-02-08 | End: 2018-08-06 | Stop reason: SDUPTHER

## 2018-02-08 RX ORDER — ATENOLOL 25 MG/1
25 TABLET ORAL DAILY
Qty: 90 TABLET | Refills: 0 | Status: SHIPPED | OUTPATIENT
Start: 2018-02-08 | End: 2018-02-08 | Stop reason: SDUPTHER

## 2018-02-08 NOTE — TELEPHONE ENCOUNTER
----- Message from Corie Ibarra sent at 2/8/2018  1:29 PM CST -----  Contact: self  Patient [phone] called her kidney doctor but he is not in the office/patient feels she has a urinary tract infection and the kidney doctor's office advised patient to call her primary doctor and ask him to call a prescription for this to her pharmacy/patient has burning when urinating/pains in the bottom of stomach/running a fever of 99.2/Dr Soto is out of town/please call patient to advise    [id number]  [place] [address] [place] [address]  Phone: [phone] Fax: [phone]

## 2018-02-08 NOTE — TELEPHONE ENCOUNTER
Called pt to schedule an appointment for her to be seen by Dr. Appiah in order to refill her Rx. Pt scheduled for 5/21/2018. Pt verbalized understanding. No further issues discussed. Letter sent in the mail.

## 2018-02-08 NOTE — TELEPHONE ENCOUNTER
----- Message from Marie Bee sent at 2/8/2018  1:08 PM CST -----  Contact: Patient  Fifi [phone], Patient is calling for a refill on Rx  atenolol (TENORMIN) 25 MG tablet . For a 90 day supply. Please advise. Will be out in four days. Thanks.    [id number] [address] [place] [address]  Phone: [phone] Fax: [phone]

## 2018-02-08 NOTE — TELEPHONE ENCOUNTER
"Patient states she feels like she has an urinary tract infection. Reports burning upon urination, pain in lower abdomen, temp of 99.2. Denies blood in urine but states "I know that will come next. I have had the same symptoms before."  Appointment scheduled for evaluation. Patient agreed to appointment date and time.   "

## 2018-02-08 NOTE — TELEPHONE ENCOUNTER
----- Message from Marie Bee sent at 2/8/2018  1:08 PM CST -----  Contact: Patient  Fifi [phone], Patient is calling for a refill on Rx  atenolol (TENORMIN) 25 MG tablet . For a 90 day supply. Please advise. Will be out in four days. Thanks.    [id number] [address] [place] [address]  Phone: [phone] Fax: [phone]

## 2018-02-09 ENCOUNTER — OFFICE VISIT (OUTPATIENT)
Dept: FAMILY MEDICINE | Facility: CLINIC | Age: 68
End: 2018-02-09
Payer: MEDICARE

## 2018-02-09 ENCOUNTER — HOSPITAL ENCOUNTER (OUTPATIENT)
Dept: RADIOLOGY | Facility: HOSPITAL | Age: 68
Discharge: HOME | End: 2018-02-09
Attending: FAMILY MEDICINE
Payer: MEDICARE

## 2018-02-09 VITALS
TEMPERATURE: 99 F | WEIGHT: 235.44 LBS | BODY MASS INDEX: 43.32 KG/M2 | HEIGHT: 62 IN | HEART RATE: 77 BPM | DIASTOLIC BLOOD PRESSURE: 57 MMHG | SYSTOLIC BLOOD PRESSURE: 118 MMHG | RESPIRATION RATE: 18 BRPM

## 2018-02-09 DIAGNOSIS — K57.92 DIVERTICULITIS: ICD-10-CM

## 2018-02-09 DIAGNOSIS — K57.92 DIVERTICULITIS: Primary | ICD-10-CM

## 2018-02-09 LAB
BILIRUB SERPL-MCNC: NORMAL MG/DL
BLOOD, POC UA: NEGATIVE
GLUCOSE UR QL STRIP: 1000
KETONES UR QL STRIP: NEGATIVE
LEUKOCYTE ESTERASE URINE, POC: NEGATIVE
NITRITE, POC UA: NEGATIVE
PH, POC UA: 6
PROTEIN, POC: NEGATIVE
SPECIFIC GRAVITY, POC UA: 1.01
UROBILINOGEN, POC UA: NORMAL

## 2018-02-09 PROCEDURE — 1125F AMNT PAIN NOTED PAIN PRSNT: CPT | Mod: S$GLB,,, | Performed by: FAMILY MEDICINE

## 2018-02-09 PROCEDURE — 74176 CT ABD & PELVIS W/O CONTRAST: CPT | Mod: TC

## 2018-02-09 PROCEDURE — 99999 PR PBB SHADOW E&M-EST. PATIENT-LVL III: CPT | Mod: PBBFAC,,, | Performed by: FAMILY MEDICINE

## 2018-02-09 PROCEDURE — 81000 URINALYSIS NONAUTO W/SCOPE: CPT | Mod: S$GLB,,, | Performed by: FAMILY MEDICINE

## 2018-02-09 PROCEDURE — 3008F BODY MASS INDEX DOCD: CPT | Mod: S$GLB,,, | Performed by: FAMILY MEDICINE

## 2018-02-09 PROCEDURE — 74176 CT ABD & PELVIS W/O CONTRAST: CPT | Mod: 26,,, | Performed by: RADIOLOGY

## 2018-02-09 PROCEDURE — 1159F MED LIST DOCD IN RCRD: CPT | Mod: S$GLB,,, | Performed by: FAMILY MEDICINE

## 2018-02-09 PROCEDURE — 99214 OFFICE O/P EST MOD 30 MIN: CPT | Mod: 25,S$GLB,, | Performed by: FAMILY MEDICINE

## 2018-02-09 PROCEDURE — 25500020 PHARM REV CODE 255

## 2018-02-09 RX ORDER — METRONIDAZOLE 500 MG/1
500 TABLET ORAL EVERY 12 HOURS
Qty: 20 TABLET | Refills: 1 | Status: SHIPPED | OUTPATIENT
Start: 2018-02-09 | End: 2018-03-09

## 2018-02-09 RX ORDER — OXYMORPHONE HYDROCHLORIDE 10 MG/1
1 TABLET, FILM COATED, EXTENDED RELEASE ORAL 2 TIMES DAILY PRN
COMMUNITY
Start: 2018-02-06 | End: 2018-08-16

## 2018-02-09 RX ORDER — CIPROFLOXACIN 500 MG/1
500 TABLET ORAL 2 TIMES DAILY
Qty: 20 TABLET | Refills: 1 | Status: SHIPPED | OUTPATIENT
Start: 2018-02-09 | End: 2018-03-09

## 2018-02-09 RX ADMIN — IOHEXOL 30 ML: 350 INJECTION, SOLUTION INTRAVENOUS at 12:02

## 2018-02-09 NOTE — Clinical Note
Please f/u CBC and CT being done stat now.  I am off and unavailable after noon today.  Suspect Diverticulosis - see chart

## 2018-02-09 NOTE — PROGRESS NOTES
Subjective:       Patient ID: Fifi Mcnair is a 67 y.o. female.    Chief Complaint: No chief complaint on file.    Abdominal Pain   This is a new problem. Episode onset: 2 days. The onset quality is gradual. The problem occurs constantly. The problem has been gradually worsening. The pain is located in the LLQ. The pain is moderate. The quality of the pain is aching. The abdominal pain radiates to the suprapubic region. Associated symptoms include a fever (102.8), melena and nausea. Pertinent negatives include no constipation, diarrhea, dysuria, frequency, hematuria or vomiting. The pain is aggravated by palpation, bowel movement, urination and movement. The pain is relieved by nothing.     Review of Systems   Constitutional: Positive for fever (102.8).   Respiratory: Negative for shortness of breath.    Cardiovascular: Negative for chest pain.   Gastrointestinal: Positive for abdominal pain, melena and nausea. Negative for constipation, diarrhea and vomiting.   Genitourinary: Negative for dysuria, frequency and hematuria.   Skin: Negative for rash.   All other systems reviewed and are negative.      Objective:      Physical Exam   Constitutional: She appears well-developed and well-nourished.   Eyes: Pupils are equal, round, and reactive to light. No scleral icterus.   Neck: Neck supple.   Cardiovascular: Normal rate and regular rhythm.    No murmur heard.  Pulmonary/Chest: Effort normal and breath sounds normal.   Abdominal: Soft. Bowel sounds are decreased. There is tenderness in the left lower quadrant. There is no rebound, no guarding and no CVA tenderness.   Musculoskeletal: She exhibits no edema or tenderness.   Lymphadenopathy:     She has no cervical adenopathy.   Skin: Skin is warm and dry.       Assessment:     h  1. Diverticulitis        Plan:         Diagnoses and all orders for this visit:    Diverticulitis  -     CBC auto differential; Future  -     Cancel: CT Abdomen Without Contrast;  Future    Stat labs and CT; Rx pending same.  CBC - WBC upper normal; increased from yesterday's CBC which was also higher than her average.  CT pending now - will defer treatment decisions to Dr Vargas as I will be out of office/offline as of now.

## 2018-02-09 NOTE — TELEPHONE ENCOUNTER
Diverticulitis, phegmon, no abscess. Need to rtc in MOnday. This has been fully explained to the patient, who indicates understanding.

## 2018-02-12 ENCOUNTER — HOSPITAL ENCOUNTER (OUTPATIENT)
Dept: RADIOLOGY | Facility: HOSPITAL | Age: 68
Discharge: HOME | End: 2018-02-12
Attending: FAMILY MEDICINE
Payer: MEDICARE

## 2018-02-12 ENCOUNTER — LAB VISIT (OUTPATIENT)
Dept: LAB | Facility: HOSPITAL | Age: 68
End: 2018-02-12
Attending: FAMILY MEDICINE
Payer: MEDICARE

## 2018-02-12 ENCOUNTER — OFFICE VISIT (OUTPATIENT)
Dept: FAMILY MEDICINE | Facility: CLINIC | Age: 68
End: 2018-02-12
Payer: MEDICARE

## 2018-02-12 VITALS
BODY MASS INDEX: 42.6 KG/M2 | WEIGHT: 231.5 LBS | DIASTOLIC BLOOD PRESSURE: 63 MMHG | TEMPERATURE: 98 F | HEIGHT: 62 IN | SYSTOLIC BLOOD PRESSURE: 134 MMHG | HEART RATE: 60 BPM

## 2018-02-12 DIAGNOSIS — K57.32 DIVERTICULITIS OF LARGE INTESTINE WITHOUT PERFORATION OR ABSCESS WITHOUT BLEEDING: ICD-10-CM

## 2018-02-12 DIAGNOSIS — R10.84 GENERALIZED ABDOMINAL PAIN: ICD-10-CM

## 2018-02-12 LAB
ANION GAP SERPL CALC-SCNC: 10 MMOL/L
BASOPHILS # BLD AUTO: 0 K/UL
BASOPHILS NFR BLD: 0.3 %
BILIRUB SERPL-MCNC: ABNORMAL MG/DL
BLOOD URINE, POC: ABNORMAL
BUN SERPL-MCNC: 14 MG/DL
CALCIUM SERPL-MCNC: 8.8 MG/DL
CHLORIDE SERPL-SCNC: 103 MMOL/L
CO2 SERPL-SCNC: 26 MMOL/L
COLOR, POC UA: ABNORMAL
CREAT SERPL-MCNC: 1.1 MG/DL
DIFFERENTIAL METHOD: ABNORMAL
EOSINOPHIL # BLD AUTO: 0.1 K/UL
EOSINOPHIL NFR BLD: 0.9 %
ERYTHROCYTE [DISTWIDTH] IN BLOOD BY AUTOMATED COUNT: 13.8 %
EST. GFR  (AFRICAN AMERICAN): >60 ML/MIN/1.73 M^2
EST. GFR  (NON AFRICAN AMERICAN): 52 ML/MIN/1.73 M^2
GLUCOSE SERPL-MCNC: 196 MG/DL
GLUCOSE UR QL STRIP: ABNORMAL
HCT VFR BLD AUTO: 35.8 %
HGB BLD-MCNC: 12.1 G/DL
KETONES UR QL STRIP: ABNORMAL
LEUKOCYTE ESTERASE URINE, POC: ABNORMAL
LYMPHOCYTES # BLD AUTO: 1.5 K/UL
LYMPHOCYTES NFR BLD: 28.2 %
MCH RBC QN AUTO: 31.4 PG
MCHC RBC AUTO-ENTMCNC: 33.7 G/DL
MCV RBC AUTO: 93 FL
MONOCYTES # BLD AUTO: 0.3 K/UL
MONOCYTES NFR BLD: 5.5 %
NEUTROPHILS # BLD AUTO: 3.5 K/UL
NEUTROPHILS NFR BLD: 65.1 %
NITRITE, POC UA: ABNORMAL
PH, POC UA: 5
PLATELET # BLD AUTO: 274 K/UL
PMV BLD AUTO: 7.4 FL
POTASSIUM SERPL-SCNC: 3.6 MMOL/L
PROTEIN, POC: ABNORMAL
RBC # BLD AUTO: 3.85 M/UL
SODIUM SERPL-SCNC: 139 MMOL/L
SPECIFIC GRAVITY, POC UA: 100.5
UROBILINOGEN, POC UA: ABNORMAL
WBC # BLD AUTO: 5.4 K/UL

## 2018-02-12 PROCEDURE — 3008F BODY MASS INDEX DOCD: CPT | Mod: S$GLB,,, | Performed by: FAMILY MEDICINE

## 2018-02-12 PROCEDURE — 74178 CT ABD&PLV WO CNTR FLWD CNTR: CPT | Mod: 26,,, | Performed by: RADIOLOGY

## 2018-02-12 PROCEDURE — 1159F MED LIST DOCD IN RCRD: CPT | Mod: S$GLB,,, | Performed by: FAMILY MEDICINE

## 2018-02-12 PROCEDURE — 99999 PR PBB SHADOW E&M-EST. PATIENT-LVL III: CPT | Mod: PBBFAC,,, | Performed by: FAMILY MEDICINE

## 2018-02-12 PROCEDURE — 85025 COMPLETE CBC W/AUTO DIFF WBC: CPT

## 2018-02-12 PROCEDURE — 74178 CT ABD&PLV WO CNTR FLWD CNTR: CPT | Mod: TC

## 2018-02-12 PROCEDURE — 1125F AMNT PAIN NOTED PAIN PRSNT: CPT | Mod: S$GLB,,, | Performed by: FAMILY MEDICINE

## 2018-02-12 PROCEDURE — 80048 BASIC METABOLIC PNL TOTAL CA: CPT

## 2018-02-12 PROCEDURE — 96372 THER/PROPH/DIAG INJ SC/IM: CPT | Mod: S$GLB,,, | Performed by: FAMILY MEDICINE

## 2018-02-12 PROCEDURE — 99214 OFFICE O/P EST MOD 30 MIN: CPT | Mod: 25,S$GLB,, | Performed by: FAMILY MEDICINE

## 2018-02-12 PROCEDURE — 25500020 PHARM REV CODE 255

## 2018-02-12 PROCEDURE — 81002 URINALYSIS NONAUTO W/O SCOPE: CPT | Mod: S$GLB,,, | Performed by: FAMILY MEDICINE

## 2018-02-12 PROCEDURE — 36415 COLL VENOUS BLD VENIPUNCTURE: CPT

## 2018-02-12 RX ORDER — SODIUM CHLORIDE 9 MG/ML
INJECTION, SOLUTION INTRAVENOUS
Status: DISCONTINUED
Start: 2018-02-12 | End: 2018-02-13 | Stop reason: HOSPADM

## 2018-02-12 RX ORDER — CEFTRIAXONE 500 MG/1
500 INJECTION, POWDER, FOR SOLUTION INTRAMUSCULAR; INTRAVENOUS
Status: COMPLETED | OUTPATIENT
Start: 2018-02-12 | End: 2018-02-12

## 2018-02-12 RX ADMIN — IOHEXOL 100 ML: 350 INJECTION, SOLUTION INTRAVENOUS at 03:02

## 2018-02-12 RX ADMIN — CEFTRIAXONE 500 MG: 500 INJECTION, POWDER, FOR SOLUTION INTRAMUSCULAR; INTRAVENOUS at 08:02

## 2018-02-12 NOTE — PROGRESS NOTES
Subjective:       Patient ID: Fifi Mcnair is a 67 y.o. female.    Chief Complaint: Diverticulitis    She has adiverticulitis with partial improvement with Cipro and Flagyl, tolerating solid food but has persistent abdominal fullness, chills and suprapubic pain.Ct positive for phlegmon.      Abdominal Pain   This is a recurrent problem. The current episode started 1 to 4 weeks ago. The onset quality is gradual. The most recent episode lasted 7 days. The problem has been gradually improving. The pain is located in the left flank. The pain is at a severity of 6/10. The pain is moderate. The quality of the pain is aching. The abdominal pain radiates to the LLQ. Associated symptoms include anorexia, a fever and nausea. Pertinent negatives include no frequency, headaches, hematochezia, melena or myalgias.     Review of Systems   Constitutional: Positive for fever.   Gastrointestinal: Positive for abdominal pain, anorexia and nausea. Negative for hematochezia and melena.   Genitourinary: Negative for frequency.   Musculoskeletal: Negative for myalgias.   Neurological: Negative for headaches.       Patient Active Problem List   Diagnosis    Arthritis    Hypertension    S/P LASIK (laser assisted in situ keratomileusis)    Allergy    Allergic asthma    GERD (gastroesophageal reflux disease)    Chronic kidney disease, stage III (moderate)    Acquired hypothyroidism    DM due to underlying condition with diabetic nephropathy, on Insulin    Conjunctival laceration - Left Eye    Adiposity    Leg pain, bilateral    Chronic rhinitis    Asthma, severe persistent, poorly-controlled    Chronic allergic rhinitis    Chronic diastolic heart failure, NYHA class 1    Nuclear sclerosis    Dry eye    Myopia with astigmatism and presbyopia    Morbid obesity with BMI of 40.0-44.9, adult, 41.8    Peripheral edema    Diastolic dysfunction without heart failure    Anti-polysaccharide antibody deficiency    Controlled  diabetes mellitus type 2 with complications    Insulin pump status    Bariatric surgery status, 12/2014    LBBB (left bundle branch block)    Osteoarthritis    COPD (chronic obstructive pulmonary disease)    Vitreo-retinal adhesions    Vitreous hemorrhage, right eye    Posterior vitreous detachment, right eye    Moderate nonproliferative diabetic retinopathy of both eyes    Osteopenia with high risk of fracture    BMI 38.0-38.9,adult    Symptomatic posterior vitreous detachment of left eye    Type 2 diabetes mellitus    Chronic low back pain    Sleep apnea    Chronic bronchitis    Posterior vitreous detachment, left eye    Vitreous hemorrhage, left eye    Compression fracture    Esophagitis    Cardiovascular event risk, ASCVD 10-year risk 12.3%, 12/2015    CKD (chronic kidney disease) stage 3, GFR 30-59 ml/min    Hypertensive left ventricular hypertrophy, without heart failure    Irritable bowel syndrome with diarrhea    PUD (peptic ulcer disease)    Essential hypertension    Thrombocytosis    Adjustment insomnia    Functional diarrhea    Blood in stool    Other osteoporosis without current pathological fracture    History of pathological fracture due to osteoporosis    Intestinal metaplasia of gastric mucosa    Fibromyalgia    Generalized osteoarthritis    Cardiac failure    Spinal stenosis of lumbar region    Renal insufficiency    Osteoarthritis of lumbosacral spine without myelopathy    Diverticulitis of large intestine without perforation or abscess without bleeding       Objective:      Physical Exam   Constitutional: She is oriented to person, place, and time. She appears well-developed and well-nourished.   Cardiovascular: Normal rate, regular rhythm and normal heart sounds.    Pulmonary/Chest: Effort normal and breath sounds normal. No respiratory distress.   Abdominal: Soft. She exhibits distension. She exhibits no mass. Bowel sounds are decreased. There is  tenderness in the suprapubic area. There is guarding. There is no rebound. No hernia.   Neurological: She is alert and oriented to person, place, and time.   Skin: Skin is warm and dry.   Psychiatric: She has a normal mood and affect.   Nursing note and vitals reviewed.      Lab Results   Component Value Date    WBC 12.50 02/09/2018    HGB 12.3 02/09/2018    HCT 36.4 (L) 02/09/2018     02/09/2018    CHOL 129 02/08/2018    TRIG 166 (H) 02/08/2018    HDL 42 02/08/2018    ALT 16 02/08/2018    AST 17 02/08/2018     02/08/2018    K 3.7 02/08/2018     02/08/2018    CREATININE 1.1 02/08/2018    BUN 19 02/08/2018    CO2 28 02/08/2018    TSH 1.711 07/12/2017    INR 1.1 12/30/2016    HGBA1C 8.0 (H) 02/08/2018     The ASCVD Risk score (Austin JOSE Jr., et al., 2013) failed to calculate for the following reasons:    The valid total cholesterol range is 130 to 320 mg/dL    Assessment:       1. Diverticulitis of large intestine without perforation or abscess without bleeding    2. Generalized abdominal pain        Plan:       Diverticulitis of large intestine without perforation or abscess without bleeding  -     CT Abdomen Pelvis W Wo Contrast; Future; Expected date: 02/12/2018  -     Basic metabolic panel; Future; Expected date: 02/12/2018  -     CBC auto differential; Future; Expected date: 02/12/2018  -     POCT URINE DIPSTICK WITHOUT MICROSCOPE  -     cefTRIAXone injection 500 mg; Inject 0.5 g (500 mg total) into the muscle one time.    Generalized abdominal pain  -     CT Abdomen Pelvis W Wo Contrast; Future; Expected date: 02/12/2018

## 2018-02-12 NOTE — PROGRESS NOTES
2 Patent identifiers used (name and ). Administered 500 mg Ceftriaxone IM. Patient tolerated well. No bleeding at insertion site noted. Pain scale 1/10. Aseptic technique maintained.

## 2018-02-14 ENCOUNTER — OFFICE VISIT (OUTPATIENT)
Dept: FAMILY MEDICINE | Facility: CLINIC | Age: 68
End: 2018-02-14
Payer: MEDICARE

## 2018-02-14 VITALS
HEIGHT: 62 IN | WEIGHT: 231.5 LBS | TEMPERATURE: 98 F | BODY MASS INDEX: 42.6 KG/M2 | SYSTOLIC BLOOD PRESSURE: 132 MMHG | DIASTOLIC BLOOD PRESSURE: 57 MMHG | HEART RATE: 62 BPM

## 2018-02-14 DIAGNOSIS — K57.92 DIVERTICULITIS: Primary | ICD-10-CM

## 2018-02-14 DIAGNOSIS — I10 ESSENTIAL HYPERTENSION: ICD-10-CM

## 2018-02-14 DIAGNOSIS — J44.9 CHRONIC OBSTRUCTIVE PULMONARY DISEASE, UNSPECIFIED COPD TYPE: ICD-10-CM

## 2018-02-14 PROCEDURE — 3008F BODY MASS INDEX DOCD: CPT | Mod: S$GLB,,, | Performed by: NURSE PRACTITIONER

## 2018-02-14 PROCEDURE — 99999 PR PBB SHADOW E&M-EST. PATIENT-LVL V: CPT | Mod: PBBFAC,,, | Performed by: NURSE PRACTITIONER

## 2018-02-14 PROCEDURE — 1126F AMNT PAIN NOTED NONE PRSNT: CPT | Mod: S$GLB,,, | Performed by: NURSE PRACTITIONER

## 2018-02-14 PROCEDURE — 99214 OFFICE O/P EST MOD 30 MIN: CPT | Mod: S$GLB,,, | Performed by: NURSE PRACTITIONER

## 2018-02-14 PROCEDURE — 1159F MED LIST DOCD IN RCRD: CPT | Mod: S$GLB,,, | Performed by: NURSE PRACTITIONER

## 2018-02-14 NOTE — PROGRESS NOTES
"Subjective:       Patient ID: Fifi Mcnair is a 67 y.o. female.    Chief Complaint: No chief complaint on file.    Ms. Mcnair presents to the clinic today for two day follow up diverticulitis.  She is feeling much better, no more abdominal pain.  She has diarrhea.  There is "maybe some blood" although this is dark not bright red.  She recently had EGD.  She is concerned her CT abdomen showed a possible mass of the colon.  She did have a colonoscopy less than one year ago and multiple polyps were removed.  She denies nausea or vomiting.      Review of Systems   Constitutional: Negative for chills and fever.   Eyes: Negative for visual disturbance.   Respiratory: Negative for cough and shortness of breath.    Cardiovascular: Negative for chest pain and leg swelling.   Gastrointestinal: Positive for diarrhea. Negative for abdominal pain and constipation.   Genitourinary: Negative for dysuria.   Neurological: Negative for dizziness and light-headedness.       Objective:      Physical Exam   Constitutional: She is oriented to person, place, and time. She appears well-nourished. No distress.   HENT:   Head: Normocephalic and atraumatic.   Right Ear: External ear normal.   Left Ear: External ear normal.   Mouth/Throat: Oropharynx is clear and moist. No oropharyngeal exudate.   Eyes: Pupils are equal, round, and reactive to light. Right eye exhibits no discharge. Left eye exhibits no discharge.   Neck: Neck supple. No thyromegaly present.   Cardiovascular: Normal rate and regular rhythm.  Exam reveals no gallop and no friction rub.    No murmur heard.  Pulmonary/Chest: Effort normal and breath sounds normal. No respiratory distress. She has no wheezes. She has no rales.   Abdominal: Soft. Bowel sounds are normal. She exhibits no distension. There is no tenderness.   Lymphadenopathy:     She has no cervical adenopathy.   Neurological: She is alert and oriented to person, place, and time. Coordination normal.   Skin: " Skin is warm and dry.   Psychiatric: She has a normal mood and affect. Her behavior is normal. Thought content normal.   Vitals reviewed.          Current Outpatient Prescriptions:     allopurinol (ZYLOPRIM) 100 MG tablet, TAKE TWO TABLETS BY MOUTH NIGHTLY, Disp: 180 tablet, Rfl: 4    atenolol (TENORMIN) 25 MG tablet, Take 1 tablet (25 mg total) by mouth once daily., Disp: 90 tablet, Rfl: 0    calcitRIOL (ROCALTROL) 0.25 MCG Cap, Take 1 capsule by mouth twice a week. Monday Friday, Disp: , Rfl:     CALCIUM CITRATE/VITAMIN D3 (CALCIUM CITRATE + D ORAL), Take 400 mg by mouth 3 (three) times daily., Disp: , Rfl:     cetirizine (ZYRTEC) 10 MG tablet, Take 1 tablet (10 mg total) by mouth once daily., Disp: 1 Bottle, Rfl: 5    ciprofloxacin HCl (CIPRO) 500 MG tablet, Take 1 tablet (500 mg total) by mouth 2 (two) times daily., Disp: 20 tablet, Rfl: 1    clotrimazole-betamethasone 1-0.05% (LOTRISONE) cream, , Disp: , Rfl:     cyanocobalamin, vitamin B-12, (VITAMIN B-12) 5,000 mcg Subl, Place 5,000 mg under the tongue once a week., Disp: , Rfl:     cyclobenzaprine (FLEXERIL) 10 MG tablet, TAKE ONE TABLET BY MOUTH THREE TIMES DAILY AS NEEDED FOR MUSCLE SPASM, Disp: 60 tablet, Rfl: 1    DENOSUMAB (PROLIA SUBQ), Inject into the skin every 6 (six) months. , Disp: , Rfl:     DIABETIC SUPPLIES,MISCELL (MEDTRONIC REMOTE CONTROL MISC), No Sig Provided, Disp: , Rfl:     diphenoxylate-atropine 2.5-0.025 mg (LOMOTIL) 2.5-0.025 mg per tablet, TAKE ONE TABLET BY MOUTH 4 TIMES DAILY AS NEEDED FOR DIARRHEA, Disp: 60 tablet, Rfl: 1    fluoxetine (PROZAC) 20 MG capsule, TAKE TWO CAPSULES BY MOUTH ONCE DAILY, Disp: 180 capsule, Rfl: 4    fluticasone (FLONASE) 50 mcg/actuation nasal spray, 2 sprays by Nasal route once daily. , Disp: , Rfl:     fluticasone-vilanterol (BREO) 100-25 mcg/dose diskus inhaler, Inhale 1 puff into the lungs once daily., Disp: , Rfl:     glucagon (human recombinant) inj 1mg/mL kit, Inject 1 mL (1 mg  total) into the muscle as needed., Disp: 2 kit, Rfl: 3    insulin aspart (NOVOLOG) 100 unit/mL injection, Inject into the skin continuous. Pt has insulin pump, Disp: , Rfl:     l-methylfolate-b2-b6-b12 (CEREFOLIN) 6-5-50-1 mg Tab, Take 1 tablet by mouth once daily., Disp: , Rfl:     Lacto.acidophilus-Bif.animalis (PROBIOTIC) 5 billion cell CpSP, Take 1 capsule by mouth once daily., Disp: 30 capsule, Rfl: 3    levothyroxine (SYNTHROID) 25 MCG tablet, TAKE ONE TABLET BY MOUTH ONCE DAILY, Disp: 90 tablet, Rfl: 3    lorazepam (ATIVAN) 1 MG tablet, Take 1 tablet (1 mg total) by mouth every 12 (twelve) hours as needed for Anxiety., Disp: 45 tablet, Rfl: 3    losartan (COZAAR) 25 MG tablet, Take 1 tablet by mouth once daily., Disp: , Rfl:     melatonin 3 mg Tab, Take 5 mg by mouth every evening. 10 MG NIGHTLY, Disp: , Rfl:     metOLazone (ZAROXOLYN) 5 MG tablet, Take 1 tablet (5 mg total) by mouth daily as needed (swelling)., Disp: 30 tablet, Rfl: 11    metroNIDAZOLE (FLAGYL) 500 MG tablet, Take 1 tablet (500 mg total) by mouth every 12 (twelve) hours., Disp: 20 tablet, Rfl: 1    metronidazole (METROGEL) 0.75 % gel, Apply topically 2 (two) times daily., Disp: 45 g, Rfl: 1    montelukast (SINGULAIR) 10 mg tablet, Take 10 mg by mouth once daily. , Disp: , Rfl:     MULTIVIT-IRON-MIN-FOLIC ACID 3,500-18-0.4 UNIT-MG-MG ORAL CHEW, Take by mouth 2 (two) times daily., Disp: , Rfl:     mupirocin (BACTROBAN) 2 % ointment, APPLY OINTMENT TOPICALLY TO AFFECTED AREA ON NOSE THREE TIMES DAILY AS DIRECTED, Disp: 1 Tube, Rfl: 11    oxyCODONE (ROXICODONE) 15 MG Tab, Take 1 tablet (15 mg total) by mouth every 6 (six) hours as needed for Pain., Disp: 30 tablet, Rfl: 0    oxyMORphone (OPANA ER) 10 MG 12 hr tablet, Take 1 tablet by mouth 2 (two) times daily as needed., Disp: , Rfl:     pantoprazole (PROTONIX) 40 MG tablet, TAKE ONE TABLET BY MOUTH ONCE DAILY, Disp: 90 tablet, Rfl: 3    potassium chloride SA (K-DUR,KLOR-CON) 20  MEQ tablet, Take 1 tablet (20 mEq total) by mouth 2 (two) times daily., Disp: 180 tablet, Rfl: 3    ranitidine (ZANTAC) 300 MG tablet, TAKE ONE TABLET BY MOUTH IN THE EVENING, Disp: 30 tablet, Rfl: 1    torsemide (DEMADEX) 20 MG Tab, Take 2 tablets (40 mg total) by mouth once daily., Disp: 60 tablet, Rfl: 0  Assessment:       1. Diverticulitis    2. Essential hypertension    3. Chronic obstructive pulmonary disease, unspecified COPD type    4. Uncontrolled type 2 diabetes mellitus without complication, with long-term current use of insulin        Plan:       Diagnoses and all orders for this visit:    Diverticulitis  Resolving.  Diarrhea may be from diverticulitis or from antibiotics.    Hydrate, advance diet as tolerated.  -     Ambulatory referral to Gastroenterology  -     Lacto.acidophilus-Bif.animalis (PROBIOTIC) 5 billion cell CpSP; Take 1 capsule by mouth once daily.    Essential hypertension  Stable on current medication.    Chronic obstructive pulmonary disease, unspecified COPD type  Stable on current medication.    Uncontrolled type 2 diabetes mellitus without complication, with long-term current use of insulin  Last hemoglobin A1c 8 on insulin pump.  Follow up with Endocrinology.    Patient readiness: acceptance and barriers:none    During the course of the visit the patient was educated and counseled about the following:     Diabetes:  Discussed general issues about diabetes pathophysiology and management.  Hypertension:   Medication: no change.    Goals: Diabetes: Maintain Hemoglobin A1C below 7 and Hypertension: Reduce Blood Pressure    Did patient meet goals/outcomes: No    The following self management tools provided: declined    Patient Instructions (the written plan) was given to the patient/family.     Time spent with patient: 15 minutes    Barriers to medications present (no )    Adverse reactions to current medications (no)    Over the counter medications reviewed (Yes)

## 2018-02-15 NOTE — PROGRESS NOTES
Patient, Fifi Mcnair (MRN #[id number]), presented with a recorded BMI of 42.34 kg/m^2 consistent with the definition of morbid obesity (ICD-10 E66.01). The patient's morbid obesity was monitored, evaluated, addressed and/or treated. This addendum to the medical record is made on 02/14/2018.

## 2018-02-26 DIAGNOSIS — M62.838 MUSCLE SPASM: ICD-10-CM

## 2018-03-01 RX ORDER — LORAZEPAM 1 MG/1
TABLET ORAL
Qty: 45 TABLET | Refills: 0 | Status: SHIPPED | OUTPATIENT
Start: 2018-03-01 | End: 2018-03-30 | Stop reason: SDUPTHER

## 2018-03-05 ENCOUNTER — TELEPHONE (OUTPATIENT)
Dept: GASTROENTEROLOGY | Facility: CLINIC | Age: 68
End: 2018-03-05

## 2018-03-05 ENCOUNTER — TELEPHONE (OUTPATIENT)
Dept: FAMILY MEDICINE | Facility: CLINIC | Age: 68
End: 2018-03-05

## 2018-03-05 DIAGNOSIS — M62.838 MUSCLE SPASM: ICD-10-CM

## 2018-03-05 NOTE — TELEPHONE ENCOUNTER
----- Message from Nitish Lee sent at 3/5/2018  2:45 PM CST -----  Contact: self   Patient want to speak with a nurse regarding if she has to come in to do another scope, please call back at [phone] (home)

## 2018-03-05 NOTE — TELEPHONE ENCOUNTER
Prior authorization for Lorazepam approved for dates 2-3-18 through 3-5-19.  Patient notified. Verbalized understanding.

## 2018-03-08 ENCOUNTER — DOCUMENTATION ONLY (OUTPATIENT)
Dept: FAMILY MEDICINE | Facility: CLINIC | Age: 68
End: 2018-03-08

## 2018-03-08 ENCOUNTER — NURSE TRIAGE (OUTPATIENT)
Dept: ADMINISTRATIVE | Facility: CLINIC | Age: 68
End: 2018-03-08

## 2018-03-08 NOTE — PROGRESS NOTES
Pre-Visit Chart Review  For Appointment Scheduled on 03/08/2019    Health Maintenance Due   Topic Date Due    Eye Exam  01/09/2018

## 2018-03-09 ENCOUNTER — OFFICE VISIT (OUTPATIENT)
Dept: FAMILY MEDICINE | Facility: CLINIC | Age: 68
End: 2018-03-09
Payer: MEDICARE

## 2018-03-09 VITALS
TEMPERATURE: 99 F | DIASTOLIC BLOOD PRESSURE: 64 MMHG | SYSTOLIC BLOOD PRESSURE: 122 MMHG | HEIGHT: 62 IN | BODY MASS INDEX: 42.6 KG/M2 | HEART RATE: 67 BPM | WEIGHT: 231.5 LBS

## 2018-03-09 DIAGNOSIS — G89.29 CHRONIC LEFT-SIDED THORACIC BACK PAIN: ICD-10-CM

## 2018-03-09 DIAGNOSIS — M54.6 CHRONIC LEFT-SIDED THORACIC BACK PAIN: ICD-10-CM

## 2018-03-09 DIAGNOSIS — I89.0 LYMPHEDEMA OF RIGHT LOWER EXTREMITY: Primary | ICD-10-CM

## 2018-03-09 DIAGNOSIS — G57.01 NEUROPATHY OF RIGHT SCIATIC NERVE: ICD-10-CM

## 2018-03-09 PROCEDURE — 3074F SYST BP LT 130 MM HG: CPT | Mod: S$GLB,,, | Performed by: FAMILY MEDICINE

## 2018-03-09 PROCEDURE — 99999 PR PBB SHADOW E&M-EST. PATIENT-LVL IV: CPT | Mod: PBBFAC,,, | Performed by: FAMILY MEDICINE

## 2018-03-09 PROCEDURE — 99214 OFFICE O/P EST MOD 30 MIN: CPT | Mod: S$GLB,,, | Performed by: FAMILY MEDICINE

## 2018-03-09 PROCEDURE — 3078F DIAST BP <80 MM HG: CPT | Mod: S$GLB,,, | Performed by: FAMILY MEDICINE

## 2018-03-09 RX ORDER — AMITRIPTYLINE HYDROCHLORIDE 50 MG/1
50 TABLET, FILM COATED ORAL NIGHTLY
Qty: 30 TABLET | Refills: 11 | Status: SHIPPED | OUTPATIENT
Start: 2018-03-09 | End: 2018-11-14 | Stop reason: SDUPTHER

## 2018-03-09 RX ORDER — CYCLOBENZAPRINE HCL 10 MG
10 TABLET ORAL 3 TIMES DAILY PRN
Qty: 60 TABLET | Refills: 1 | Status: SHIPPED | OUTPATIENT
Start: 2018-03-09 | End: 2018-03-30

## 2018-03-09 NOTE — TELEPHONE ENCOUNTER
Reason for Disposition   [1] Can't walk or can barely walk AND [2] new onset    Protocols used: ST LEG SWELLING AND EDEMA-A-AH

## 2018-03-09 NOTE — PROGRESS NOTES
Subjective:       Patient ID: Fifi Mcnair is a 67 y.o. female.    Chief Complaint: Diabetes and Leg Swelling    HPI  Review of Systems    Patient Active Problem List   Diagnosis    Arthritis    S/P LASIK (laser assisted in situ keratomileusis)    Allergy    Allergic asthma    GERD (gastroesophageal reflux disease)    Chronic kidney disease, stage III (moderate)    Acquired hypothyroidism    DM due to underlying condition with diabetic nephropathy, on Insulin    Conjunctival laceration - Left Eye    Adiposity    Leg pain, bilateral    Chronic rhinitis    Asthma, severe persistent, poorly-controlled    Chronic allergic rhinitis    Chronic diastolic heart failure, NYHA class 1    Nuclear sclerosis    Dry eye    Myopia with astigmatism and presbyopia    Morbid obesity with BMI of 40.0-44.9, adult, 41.8    Peripheral edema    Diastolic dysfunction without heart failure    Anti-polysaccharide antibody deficiency    Controlled diabetes mellitus type 2 with complications    Insulin pump status    Bariatric surgery status, 12/2014    LBBB (left bundle branch block)    Osteoarthritis    COPD (chronic obstructive pulmonary disease)    Vitreo-retinal adhesions    Vitreous hemorrhage, right eye    Posterior vitreous detachment, right eye    Moderate nonproliferative diabetic retinopathy of both eyes    Osteopenia with high risk of fracture    BMI 38.0-38.9,adult    Symptomatic posterior vitreous detachment of left eye    Type 2 diabetes mellitus    Chronic low back pain    Sleep apnea    Chronic bronchitis    Posterior vitreous detachment, left eye    Vitreous hemorrhage, left eye    Compression fracture    Esophagitis    Cardiovascular event risk, ASCVD 10-year risk 12.3%, 12/2015    CKD (chronic kidney disease) stage 3, GFR 30-59 ml/min    Hypertensive left ventricular hypertrophy, without heart failure    Irritable bowel syndrome with diarrhea    PUD (peptic ulcer disease)     Essential hypertension    Thrombocytosis    Adjustment insomnia    Functional diarrhea    Blood in stool    Other osteoporosis without current pathological fracture    History of pathological fracture due to osteoporosis    Intestinal metaplasia of gastric mucosa    Fibromyalgia    Generalized osteoarthritis    Cardiac failure    Spinal stenosis of lumbar region    Renal insufficiency    Osteoarthritis of lumbosacral spine without myelopathy    Diverticulitis of large intestine without perforation or abscess without bleeding       Objective:      Physical Exam   Constitutional: She is oriented to person, place, and time.   Musculoskeletal:        Right hip: She exhibits decreased range of motion, decreased strength and swelling.        Lumbar back: She exhibits decreased range of motion, tenderness, bony tenderness and pain.   Neurological: She is oriented to person, place, and time. A cranial nerve deficit is present. GCS eye subscore is 4. GCS verbal subscore is 5. GCS motor subscore is 6.   Reflex Scores:       Patellar reflexes are 1+ on the right side.  Rt leg with strait leg positive, decreased rom and rt leg weakness.       Lab Results   Component Value Date    WBC 5.40 02/12/2018    HGB 12.1 02/12/2018    HCT 35.8 (L) 02/12/2018     02/12/2018    CHOL 129 02/08/2018    TRIG 166 (H) 02/08/2018    HDL 42 02/08/2018    ALT 16 02/08/2018    AST 17 02/08/2018     02/12/2018    K 3.6 02/12/2018     02/12/2018    CREATININE 1.1 02/12/2018    BUN 14 02/12/2018    CO2 26 02/12/2018    TSH 1.711 07/12/2017    INR 1.1 12/30/2016    HGBA1C 8.0 (H) 02/08/2018     The ASCVD Risk score (Radha JOSE Jr., et al., 2013) failed to calculate for the following reasons:    The valid total cholesterol range is 130 to 320 mg/dL    Assessment:       1. Neuropathy of right sciatic nerve    2. Lymphedema of right lower extremity    3. Chronic left-sided thoracic back pain        Plan:       Neuropathy  of right sciatic nerve  -     Ambulatory referral to Pain Clinic  -     COMPRESSION STOCKINGS  -     Ambulatory Referral to Physical/Occupational Therapy  -     amitriptyline (ELAVIL) 50 MG tablet; Take 1 tablet (50 mg total) by mouth every evening.  Dispense: 30 tablet; Refill: 11  -     Ambulatory referral to Neurosurgery    Lymphedema of right lower extremity  -     Ambulatory referral to Pain Clinic  -     COMPRESSION STOCKINGS  -     Ambulatory Referral to Physical/Occupational Therapy  -     amitriptyline (ELAVIL) 50 MG tablet; Take 1 tablet (50 mg total) by mouth every evening.  Dispense: 30 tablet; Refill: 11  -     Ambulatory referral to Neurosurgery    Chronic left-sided thoracic back pain  -     cyclobenzaprine (FLEXERIL) 10 MG tablet; Take 1 tablet (10 mg total) by mouth 3 (three) times daily as needed.  Dispense: 60 tablet; Refill: 1  -     amitriptyline (ELAVIL) 50 MG tablet; Take 1 tablet (50 mg total) by mouth every evening.  Dispense: 30 tablet; Refill: 11  -     Ambulatory referral to Neurosurgery

## 2018-03-12 ENCOUNTER — TELEPHONE (OUTPATIENT)
Dept: NEUROSURGERY | Facility: CLINIC | Age: 68
End: 2018-03-12

## 2018-03-12 DIAGNOSIS — M54.5 LOW BACK PAIN, UNSPECIFIED BACK PAIN LATERALITY, UNSPECIFIED CHRONICITY, WITH SCIATICA PRESENCE UNSPECIFIED: Primary | ICD-10-CM

## 2018-03-12 NOTE — TELEPHONE ENCOUNTER
Spoke with pt and scheduled appt and imaging. Date, time, and location verified.  Pt verbalized understanding.

## 2018-03-12 NOTE — TELEPHONE ENCOUNTER
----- Message from Flora Gonzalez sent at 3/9/2018  3:29 PM CST -----  Pt was seen today by dr baron and needs an appt for Neuropathy of right sciatic nerve  Lymphedema of right lower extremity  Chronic left-sided thoracic back pain  Please call her @ [phone]  Thanks !

## 2018-03-19 ENCOUNTER — INITIAL CONSULT (OUTPATIENT)
Dept: NEUROSURGERY | Facility: CLINIC | Age: 68
End: 2018-03-19
Payer: MEDICARE

## 2018-03-19 ENCOUNTER — HOSPITAL ENCOUNTER (OUTPATIENT)
Dept: RADIOLOGY | Facility: HOSPITAL | Age: 68
Discharge: HOME | End: 2018-03-19
Attending: PHYSICIAN ASSISTANT
Payer: MEDICARE

## 2018-03-19 VITALS
HEART RATE: 88 BPM | WEIGHT: 231.5 LBS | DIASTOLIC BLOOD PRESSURE: 78 MMHG | HEIGHT: 62 IN | BODY MASS INDEX: 42.6 KG/M2 | SYSTOLIC BLOOD PRESSURE: 132 MMHG

## 2018-03-19 DIAGNOSIS — M51.36 DDD (DEGENERATIVE DISC DISEASE), LUMBAR: ICD-10-CM

## 2018-03-19 DIAGNOSIS — M54.16 LUMBAR RADICULOPATHY: Primary | ICD-10-CM

## 2018-03-19 DIAGNOSIS — M54.5 LOW BACK PAIN, UNSPECIFIED BACK PAIN LATERALITY, UNSPECIFIED CHRONICITY, WITH SCIATICA PRESENCE UNSPECIFIED: ICD-10-CM

## 2018-03-19 PROCEDURE — 3075F SYST BP GE 130 - 139MM HG: CPT | Mod: S$GLB,,, | Performed by: PHYSICIAN ASSISTANT

## 2018-03-19 PROCEDURE — 3078F DIAST BP <80 MM HG: CPT | Mod: S$GLB,,, | Performed by: PHYSICIAN ASSISTANT

## 2018-03-19 PROCEDURE — 72120 X-RAY BEND ONLY L-S SPINE: CPT | Mod: TC,FY,PO

## 2018-03-19 PROCEDURE — 99204 OFFICE O/P NEW MOD 45 MIN: CPT | Mod: S$GLB,,, | Performed by: PHYSICIAN ASSISTANT

## 2018-03-19 PROCEDURE — 99999 PR PBB SHADOW E&M-EST. PATIENT-LVL V: CPT | Mod: PBBFAC,,, | Performed by: PHYSICIAN ASSISTANT

## 2018-03-19 PROCEDURE — 72120 X-RAY BEND ONLY L-S SPINE: CPT | Mod: 26,,, | Performed by: RADIOLOGY

## 2018-03-19 PROCEDURE — 72100 X-RAY EXAM L-S SPINE 2/3 VWS: CPT | Mod: 26,,, | Performed by: RADIOLOGY

## 2018-03-19 NOTE — PROGRESS NOTES
Neurosurgery History & Physical    Patient ID: Fifi Mcnair is a 67 y.o. female.    Chief Complaint   Patient presents with    Lumbar Spine Pain (L-Spine)     Long history of worsening low back pain noting pain, numbness, and weakness to her entire RLE to her foot. Denies bladder or bowel dysfunction. Denies particular aggravating factors. Denies history of injections or PT.        Review of Systems   Constitutional: Negative for chills, diaphoresis, fatigue and fever.   HENT: Negative for congestion, ear pain, rhinorrhea, sneezing, sore throat and tinnitus.    Eyes: Negative for photophobia, pain, redness and visual disturbance.   Respiratory: Negative for cough, chest tightness, shortness of breath and wheezing.    Cardiovascular: Negative for chest pain, palpitations and leg swelling.   Gastrointestinal: Negative for abdominal distention, abdominal pain, constipation, diarrhea, nausea and vomiting.   Genitourinary: Negative for difficulty urinating, dysuria, frequency and urgency.   Musculoskeletal: Positive for back pain. Negative for gait problem, myalgias and neck pain.   Skin: Negative for pallor and rash.   Neurological: Positive for weakness and numbness. Negative for dizziness, seizures, speech difficulty and headaches.   Psychiatric/Behavioral: Negative for confusion and hallucinations.       Past Medical History:   Diagnosis Date    Allergy     multiple antibiotic allergies    Anemia     Anxiety     Arthritis     Asthma     CHF (congestive heart failure)     NYHA class III     Chronic kidney disease     Colon polyp     benign    COPD (chronic obstructive pulmonary disease)     DLCO 37% , and mild pulmonary HTN    Depression     Diabetes mellitus     Diabetic retinopathy     Diastolic dysfunction     Diverticulitis of large intestine without perforation or abscess without bleeding 2/12/2018    Diverticulosis     Former smoker     Fracture of lumbar spine     GERD (gastroesophageal  reflux disease)     Hernia of unspecified site of abdominal cavity without mention of obstruction or gangrene     Hyperlipidemia     Hypertension     hypertensive renal    IBS (irritable bowel syndrome)     Mild nonproliferative diabetic retinopathy(362.04) 11/25/2013    Morbid obesity     Nuclear sclerosis 11/25/2013    Osteoporosis     Peripheral edema     Rash     Recurrent upper respiratory infection (URI)     S/P LASIK (laser assisted in situ keratomileusis)     Sleep apnea     sleep apnea uses bipap.    Thyroid disease     on synthroid    TIA (transient ischemic attack)     Urinary tract infection      Social History     Social History    Marital status:      Spouse name: N/A    Number of children: N/A    Years of education: N/A     Occupational History    Not on file.     Social History Main Topics    Smoking status: Former Smoker     Types: Cigarettes    Smokeless tobacco: Never Used      Comment: quit 30 years ago    Alcohol use No      Comment: rare    Drug use: No    Sexual activity: Yes     Birth control/ protection: Surgical     Other Topics Concern    Not on file     Social History Narrative    No narrative on file     Family History   Problem Relation Age of Onset    Heart disease Mother 59    Cancer Mother 59     throat    Allergies Mother     Diabetes Mother     Heart disease Father 70     flutter    Allergies Father     Diabetes Father     Cancer Sister 22     22 thyroid,49  breast    Allergies Sister     Breast cancer Sister     Diabetes Sister     Cancer Brother 62     lung    Diabetes Brother     Asthma Daughter     Diabetes Daughter     Depression Daughter     Asthma Grandchild     Eczema Grandchild     Eczema Grandchild     Breast cancer Maternal Grandmother     Ovarian cancer Cousin     Amblyopia Neg Hx     Blindness Neg Hx     Cataracts Neg Hx     Glaucoma Neg Hx     Hypertension Neg Hx     Macular degeneration Neg Hx     Retinal  detachment Neg Hx     Strabismus Neg Hx     Stroke Neg Hx     Thyroid disease Neg Hx     Angioedema Neg Hx     Immunodeficiency Neg Hx     Allergic rhinitis Neg Hx     Atopy Neg Hx     Rhinitis Neg Hx     Urticaria Neg Hx     Colon cancer Neg Hx     Colon polyps Neg Hx     Crohn's disease Neg Hx     Ulcerative colitis Neg Hx     Celiac disease Neg Hx      Review of patient's allergies indicates:   Allergen Reactions    Adhesive Other (See Comments)     Blisters    Cleocin [clindamycin hcl] Hives    Ceclor [cefaclor] Hives    Morphine Nausea And Vomiting    Advair diskus [fluticasone-salmeterol]      Dry mouth    Aleve [naproxen sodium]      Unable to take secondary to kidney function.     Erythromycin Hives    Levaquin [levofloxacin] Dermatitis    Macrobid [nitrofurantoin monohyd/m-cryst] Other (See Comments)     Stomach pain/ GI issues    Macrodantin [nitrofurantoin macrocrystalline] Hives    Motrin [ibuprofen]      Unable to take secondary to kidney functions.    Restoril [temazepam]      LIGHTHEADED UPON WAKING.with poor results    Sulfa (sulfonamide antibiotics)      Hold due to renal problems    Trazodone      PALPITATIONS    Remeron [mirtazapine] Palpitations and Other (See Comments)     Jittery    Vancomycin analogues Rash     Feet broke out/inflammed blood vessels         Current Outpatient Prescriptions:     allopurinol (ZYLOPRIM) 100 MG tablet, TAKE TWO TABLETS BY MOUTH NIGHTLY, Disp: 180 tablet, Rfl: 4    amitriptyline (ELAVIL) 50 MG tablet, Take 1 tablet (50 mg total) by mouth every evening., Disp: 30 tablet, Rfl: 11    atenolol (TENORMIN) 25 MG tablet, Take 1 tablet (25 mg total) by mouth once daily., Disp: 90 tablet, Rfl: 0    calcitRIOL (ROCALTROL) 0.25 MCG Cap, Take 1 capsule by mouth twice a week. Monday Friday, Disp: , Rfl:     CALCIUM CITRATE/VITAMIN D3 (CALCIUM CITRATE + D ORAL), Take 400 mg by mouth 3 (three) times daily., Disp: , Rfl:     cetirizine (ZYRTEC)  10 MG tablet, Take 1 tablet (10 mg total) by mouth once daily., Disp: 1 Bottle, Rfl: 5    clotrimazole-betamethasone 1-0.05% (LOTRISONE) cream, , Disp: , Rfl:     cyanocobalamin, vitamin B-12, (VITAMIN B-12) 5,000 mcg Subl, Place 5,000 mg under the tongue once a week., Disp: , Rfl:     cyclobenzaprine (FLEXERIL) 10 MG tablet, Take 1 tablet (10 mg total) by mouth 3 (three) times daily as needed., Disp: 60 tablet, Rfl: 1    DENOSUMAB (PROLIA SUBQ), Inject into the skin every 6 (six) months. , Disp: , Rfl:     DIABETIC SUPPLIES,MISCELL ([employer] CONTROL MISC), No Sig Provided, Disp: , Rfl:     diphenoxylate-atropine 2.5-0.025 mg (LOMOTIL) 2.5-0.025 mg per tablet, TAKE ONE TABLET BY MOUTH 4 TIMES DAILY AS NEEDED FOR DIARRHEA, Disp: 60 tablet, Rfl: 1    fluoxetine (PROZAC) 20 MG capsule, TAKE TWO CAPSULES BY MOUTH ONCE DAILY, Disp: 180 capsule, Rfl: 4    fluticasone (FLONASE) 50 mcg/actuation nasal spray, 2 sprays by Nasal route once daily. , Disp: , Rfl:     fluticasone-vilanterol (BREO) 100-25 mcg/dose diskus inhaler, Inhale 1 puff into the lungs once daily., Disp: , Rfl:     INSULIN ASPART (NOVOLOG FLEXPEN U-100 INSULIN SUBQ), Inject into the skin., Disp: , Rfl:     l-methylfolate-b2-b6-b12 (CEREFOLIN) 6-5-50-1 mg Tab, Take 1 tablet by mouth once daily., Disp: , Rfl:     Lacto.acidophilus-Bif.animalis (PROBIOTIC) 5 billion cell CpSP, Take 1 capsule by mouth once daily., Disp: 30 capsule, Rfl: 3    levothyroxine (SYNTHROID) 25 MCG tablet, TAKE ONE TABLET BY MOUTH ONCE DAILY, Disp: 90 tablet, Rfl: 3    LORazepam (ATIVAN) 1 MG tablet, TAKE ONE TABLET BY MOUTH EVERY 12 HOURS AS NEEDED FOR ANXIETY, Disp: 45 tablet, Rfl: 0    losartan (COZAAR) 25 MG tablet, Take 1 tablet by mouth once daily., Disp: , Rfl:     melatonin 3 mg Tab, Take 5 mg by mouth every evening. 10 MG NIGHTLY, Disp: , Rfl:     metOLazone (ZAROXOLYN) 5 MG tablet, Take 1 tablet (5 mg total) by mouth daily as needed (swelling)., Disp:  "30 tablet, Rfl: 11    metronidazole (METROGEL) 0.75 % gel, Apply topically 2 (two) times daily., Disp: 45 g, Rfl: 1    montelukast (SINGULAIR) 10 mg tablet, Take 10 mg by mouth once daily. , Disp: , Rfl:     MULTIVIT-IRON-MIN-FOLIC ACID 3,500-18-0.4 UNIT-MG-MG ORAL CHEW, Take by mouth 2 (two) times daily., Disp: , Rfl:     mupirocin (BACTROBAN) 2 % ointment, APPLY OINTMENT TOPICALLY TO AFFECTED AREA ON NOSE THREE TIMES DAILY AS DIRECTED, Disp: 1 Tube, Rfl: 11    oxyCODONE (ROXICODONE) 15 MG Tab, Take 1 tablet (15 mg total) by mouth every 6 (six) hours as needed for Pain., Disp: 30 tablet, Rfl: 0    oxyMORphone (OPANA ER) 10 MG 12 hr tablet, Take 1 tablet by mouth 2 (two) times daily as needed., Disp: , Rfl:     pantoprazole (PROTONIX) 40 MG tablet, TAKE ONE TABLET BY MOUTH ONCE DAILY, Disp: 90 tablet, Rfl: 3    potassium chloride SA (K-DUR,KLOR-CON) 20 MEQ tablet, Take 1 tablet (20 mEq total) by mouth 2 (two) times daily., Disp: 180 tablet, Rfl: 3    ranitidine (ZANTAC) 300 MG tablet, TAKE ONE TABLET BY MOUTH IN THE EVENING, Disp: 30 tablet, Rfl: 1    torsemide (DEMADEX) 20 MG Tab, Take 2 tablets (40 mg total) by mouth once daily., Disp: 60 tablet, Rfl: 0  Blood pressure 132/78, pulse 88, height 5' 2" (1.575 m), weight 105 kg (231 lb 7.7 oz).      Neurologic Exam     Mental Status   Oriented to person, place, and time.   Oriented to person.   Oriented to place.   Oriented to time.   Follows 3 step commands.   Attention: normal. Concentration: normal.   Speech: speech is normal   Level of consciousness: alert  Knowledge: consistent with education.   Able to name object. Able to read. Able to repeat. Able to write. Normal comprehension.      Cranial Nerves      CN II   Visual acuity: normal  Right visual field deficit: none  Left visual field deficit: none      CN III, IV, VI   Pupils are equal, round, and reactive to light.  Right pupil: Size: 3 mm. Shape: regular. Reactivity: brisk. Consensual response: " intact.   Left pupil: Size: 3 mm. Shape: regular. Reactivity: brisk. Consensual response: intact.   CN III: no CN III palsy  CN VI: no CN VI palsy  Nystagmus: none   Diplopia: none  Ophthalmoparesis: none  Conjugate gaze: present     CN V   Right facial sensation deficit: none  Left facial sensation deficit: none     CN VII   Right facial weakness: none  Left facial weakness: none     CN VIII   Hearing: intact     CN IX, X   CN IX normal.   CN X normal.      CN XI   Right sternocleidomastoid strength: normal  Left sternocleidomastoid strength: normal  Right trapezius strength: normal  Left trapezius strength: normal     CN XII   Fasciculations: absent  Tongue deviation: none     Motor Exam   Muscle bulk: normal  Overall muscle tone: normal  Right arm pronator drift: absent  Left arm pronator drift: absent     Strength   Right neck flexion: 5/5  Left neck flexion: 5/5  Right neck extension: 5/5  Left neck extension: 5/5  Right deltoid: 5/5  Left deltoid: 5/5  Right biceps: 5/5  Left biceps: 5/5  Right triceps: 5/5  Left triceps: 5/5  Right wrist flexion: 5/5  Left wrist flexion: 5/5  Right wrist extension: 5/5  Left wrist extension: 5/5  Right interossei: 5/5  Left interossei: 5/5  Right abdominals: 5/5  Left abdominals: 5/5  Right iliopsoas: 4/5-pain limited  Left iliopsoas: 5/5  Right quadriceps: 5/5  Left quadriceps: 5/5  Right hamstrin/5  Left hamstrin/5  Right glutei: 5/5  Left glutei: 5/5  Right anterior tibial: 5/5  Left anterior tibial: 5/5  Right posterior tibial: 5/5  Left posterior tibial: 5/5  Right peroneal: 5/5  Left peroneal: 5/5  Right gastroc: 5/5  Left gastroc: 5/5     Sensory Exam   Right arm light touch: normal  Left arm light touch: normal  Right leg light touch: decreased in the S1 distribution  Left leg light touch: normal       Gait, Coordination, and Reflexes      Gait  Gait: antalgic     Coordination   Romberg: negative  Finger to nose coordination: normal  Heel to shin coordination:  normal  Tandem walking coordination: normal     Tremor   Resting tremor: absent  Intention tremor: absent  Action tremor: absent     Reflexes   Right brachioradialis: 2+  Left brachioradialis: 2+  Right biceps: 2+  Left biceps: 2+  Right triceps: 2+  Left triceps: 2+  Right patellar: 2+  Left patellar: 2+  Right achilles: 1+  Left achilles: 1+  Right Prakash: absent  Left Prakash: absent  Right ankle clonus: absent  Left ankle clonus: absent       Physical Exam  Constitutional: Oriented to person, place, and time. Appears well-developed and well-nourished.   HENT:   Head: Normocephalic and atraumatic.   Eyes: Pupils are equal, round, and reactive to light.   Neck: Normal range of motion. Neck supple.   Cardiovascular: Normal rate.    Pulmonary/Chest: Effort normal.   Musculoskeletal: Normal range of motion. Exhibits no edema.   Neurological: Alert and oriented to person, place, and time. Normal Finger-Nose-Finger Test, a normal Heel to Shin Test, a normal Romberg Test and a normal Tandem Gait Test. Gait normal.   Reflex Scores:       Tricep reflexes are 2+ on the right side and 2+ on the left side.       Bicep reflexes are 2+ on the right side and 2+ on the left side.       Brachioradialis reflexes are 2+ on the right side and 2+ on the left side.       Patellar reflexes are 2+ on the right side and 2+ on the left side.       Achilles reflexes are 1+ on the right side and 1+ on the left side.  Skin: Skin is warm, dry and intact.   Psychiatric: Normal mood and affect. Speech is normal and behavior is normal. Judgment and thought content normal.   Nursing note and vitals reviewed.    Oswestry Disability Index score: 62    Patient Health Questionnaire score: 19    Provider dictation:    Ms. Mcnair is a 67 year old female who presents for neurosurgical consultation referred by Dr. Vargas for back pain and right lower extremity pain. Patient reports chronic lower back pain, but reports new onset of right lower extremity  pain for 2 weeks. She states the pain is in her right posterior leg and travels down into the 3rd-5th digits of her right foot. The leg pain is constant and the back pain is intermittent. She also reports numbness in the right anterior thigh to mid tibia. She denies any bowel/bladder incontinence or retention. She does report right leg weakness and had a fall 1 week ago. Her pain became significantly worse following the fall. Her PCP has ordered PT, but this has not been scheduled at this time. She has seen Dr. Posey with pain management about 3 weeks ago for her back pain and was started on percocet and oxymorphone, but denies any significant improvement in her symptoms. She was informed by Dr. Posey office that her insurance would not cover ESIs at their clinic. Her leg pain is now worse than her back pain and is limiting her daily activities. She has borrowed a friend's walker to use since the fall.     On exam patient had decreased sensation in the right S1 distribution. She has full strength except pain limited right hip flexion. Antalgic gait.     X-ray lumbar spine with flex/ext was personally reviewed and shows DDD most significant at L5-S1. No motion on flex/ext views seen.       In conclusion, Ms. Mcnair is a 67 year old female with back pain and right lower extremity pain/numbness. I would like to obtain an MRI Lumbar spine w/o contrast to further review. In the meantime I would also like her to start PT and we will refer her to see Dr. Venegas for an L5-S1 LEO. She should follow-up with me in about 6 weeks following PT and LEO. She was informed to call the clinic with any further questions or concerns.    1. Lumbar radiculopathy     2. DDD (degenerative disc disease), lumbar

## 2018-03-19 NOTE — LETTER
March 19, 2018      Werner Vargas MD  [address] [facility] [place] [address]           [place] [facility]  [address] [facility] [address] [place] [address]  Phone: [phone]  Fax: [phone]          Patient: Fifi Mcnair   MR Number: [id number]   YOB: 1950   Date of Visit: 3/19/2018       Dear Dr. Werner Vargas:    Thank you for referring Fifi Mcnair to me for evaluation. Attached you will find relevant portions of my assessment and plan of care.    If you have questions, please do not hesitate to call me. I look forward to following Fifi Mcnair along with you.    Sincerely,    Cande Mak PA-C    Enclosure  CC:  No Recipients    If you would like to receive this communication electronically, please contact [email or web address] or [phone] to request more information on [facility] access.    For providers and/or their staff who would like to refer a patient to [facility], please contact us through our one-stop-shop provider referral line, [facility] , at [phone].    If you feel you have received this communication in error or would no longer like to receive these types of communications, please e-mail [email or web address]

## 2018-03-21 ENCOUNTER — TELEPHONE (OUTPATIENT)
Dept: PAIN MEDICINE | Facility: CLINIC | Age: 68
End: 2018-03-21

## 2018-03-21 NOTE — TELEPHONE ENCOUNTER
----- Message from Nancy Torres LPN sent at 3/21/2018  9:59 AM CDT -----  Can you schedule MRN [id number] Fifi Mcnair an LEO L5-S1 with Dr. Venegas per [facility] for lumbar radiculopathy and DDD lumbar? Thanks in advance.

## 2018-03-22 ENCOUNTER — LAB VISIT (OUTPATIENT)
Dept: LAB | Facility: HOSPITAL | Age: 68
End: 2018-03-22
Attending: INTERNAL MEDICINE
Payer: MEDICARE

## 2018-03-22 ENCOUNTER — OFFICE VISIT (OUTPATIENT)
Dept: ORTHOPEDICS | Facility: CLINIC | Age: 68
End: 2018-03-22
Payer: MEDICARE

## 2018-03-22 ENCOUNTER — TELEPHONE (OUTPATIENT)
Dept: ORTHOPEDICS | Facility: CLINIC | Age: 68
End: 2018-03-22

## 2018-03-22 ENCOUNTER — HOSPITAL ENCOUNTER (OUTPATIENT)
Dept: RADIOLOGY | Facility: HOSPITAL | Age: 68
Discharge: HOME | End: 2018-03-22
Attending: ORTHOPAEDIC SURGERY
Payer: MEDICARE

## 2018-03-22 VITALS
SYSTOLIC BLOOD PRESSURE: 145 MMHG | WEIGHT: 231 LBS | DIASTOLIC BLOOD PRESSURE: 67 MMHG | HEART RATE: 90 BPM | HEIGHT: 62 IN | BODY MASS INDEX: 42.51 KG/M2

## 2018-03-22 DIAGNOSIS — E66.01 MORBID OBESITY: ICD-10-CM

## 2018-03-22 DIAGNOSIS — E11.9 DIABETES MELLITUS WITHOUT COMPLICATION: ICD-10-CM

## 2018-03-22 DIAGNOSIS — M17.0 PRIMARY OSTEOARTHRITIS OF BOTH KNEES: Primary | ICD-10-CM

## 2018-03-22 DIAGNOSIS — N18.30 CHRONIC KIDNEY DISEASE, STAGE III (MODERATE): Primary | ICD-10-CM

## 2018-03-22 DIAGNOSIS — I10 ESSENTIAL HYPERTENSION, MALIGNANT: ICD-10-CM

## 2018-03-22 DIAGNOSIS — K21.9 GASTROESOPHAGEAL REFLUX DISEASE WITHOUT ESOPHAGITIS: ICD-10-CM

## 2018-03-22 DIAGNOSIS — R60.9 EDEMA: ICD-10-CM

## 2018-03-22 DIAGNOSIS — M25.561 RIGHT KNEE PAIN, UNSPECIFIED CHRONICITY: ICD-10-CM

## 2018-03-22 DIAGNOSIS — I12.9 PARENCHYMAL RENAL HYPERTENSION: ICD-10-CM

## 2018-03-22 DIAGNOSIS — M25.561 RIGHT KNEE PAIN, UNSPECIFIED CHRONICITY: Primary | ICD-10-CM

## 2018-03-22 DIAGNOSIS — N39.0 URINARY TRACT INFECTION, SITE NOT SPECIFIED: ICD-10-CM

## 2018-03-22 DIAGNOSIS — R05.9 COUGH: ICD-10-CM

## 2018-03-22 LAB
ALBUMIN SERPL BCP-MCNC: 3.5 G/DL
ALP SERPL-CCNC: 103 U/L
ALT SERPL W/O P-5'-P-CCNC: 19 U/L
ANION GAP SERPL CALC-SCNC: 9 MMOL/L
AST SERPL-CCNC: 23 U/L
BASOPHILS # BLD AUTO: 0 K/UL
BASOPHILS NFR BLD: 0.1 %
BILIRUB SERPL-MCNC: 0.5 MG/DL
BUN SERPL-MCNC: 10 MG/DL
CALCIUM SERPL-MCNC: 9.5 MG/DL
CHLORIDE SERPL-SCNC: 103 MMOL/L
CO2 SERPL-SCNC: 26 MMOL/L
CREAT SERPL-MCNC: 1 MG/DL
CREAT UR-MCNC: 26.2 MG/DL
DIFFERENTIAL METHOD: ABNORMAL
EOSINOPHIL # BLD AUTO: 0 K/UL
EOSINOPHIL NFR BLD: 0.6 %
ERYTHROCYTE [DISTWIDTH] IN BLOOD BY AUTOMATED COUNT: 14.2 %
EST. GFR  (AFRICAN AMERICAN): >60 ML/MIN/1.73 M^2
EST. GFR  (NON AFRICAN AMERICAN): 58 ML/MIN/1.73 M^2
GLUCOSE SERPL-MCNC: 149 MG/DL
HCT VFR BLD AUTO: 38.7 %
HGB BLD-MCNC: 13.2 G/DL
LYMPHOCYTES # BLD AUTO: 1 K/UL
LYMPHOCYTES NFR BLD: 17.5 %
MCH RBC QN AUTO: 31.2 PG
MCHC RBC AUTO-ENTMCNC: 34.1 G/DL
MCV RBC AUTO: 91 FL
MONOCYTES # BLD AUTO: 0.2 K/UL
MONOCYTES NFR BLD: 3.5 %
NEUTROPHILS # BLD AUTO: 4.6 K/UL
NEUTROPHILS NFR BLD: 78.3 %
PHOSPHATE SERPL-MCNC: 1.2 MG/DL
PLATELET # BLD AUTO: 257 K/UL
PMV BLD AUTO: 7.3 FL
POTASSIUM SERPL-SCNC: 4.3 MMOL/L
PROT SERPL-MCNC: 7.3 G/DL
PROT UR-MCNC: <7 MG/DL
PROT/CREAT RATIO, UR: NORMAL
PTH-INTACT SERPL-MCNC: 117.3 PG/ML
RBC # BLD AUTO: 4.24 M/UL
SODIUM SERPL-SCNC: 138 MMOL/L
WBC # BLD AUTO: 5.9 K/UL

## 2018-03-22 PROCEDURE — 73564 X-RAY EXAM KNEE 4 OR MORE: CPT | Mod: 26,RT,, | Performed by: RADIOLOGY

## 2018-03-22 PROCEDURE — 36415 COLL VENOUS BLD VENIPUNCTURE: CPT

## 2018-03-22 PROCEDURE — 73562 X-RAY EXAM OF KNEE 3: CPT | Mod: 26,59,LT, | Performed by: RADIOLOGY

## 2018-03-22 PROCEDURE — 99999 PR PBB SHADOW E&M-EST. PATIENT-LVL III: CPT | Mod: PBBFAC,,, | Performed by: ORTHOPAEDIC SURGERY

## 2018-03-22 PROCEDURE — 3078F DIAST BP <80 MM HG: CPT | Mod: S$GLB,,, | Performed by: ORTHOPAEDIC SURGERY

## 2018-03-22 PROCEDURE — 85025 COMPLETE CBC W/AUTO DIFF WBC: CPT

## 2018-03-22 PROCEDURE — 99213 OFFICE O/P EST LOW 20 MIN: CPT | Mod: 25,S$GLB,, | Performed by: ORTHOPAEDIC SURGERY

## 2018-03-22 PROCEDURE — 84100 ASSAY OF PHOSPHORUS: CPT

## 2018-03-22 PROCEDURE — 3077F SYST BP >= 140 MM HG: CPT | Mod: S$GLB,,, | Performed by: ORTHOPAEDIC SURGERY

## 2018-03-22 PROCEDURE — 84156 ASSAY OF PROTEIN URINE: CPT

## 2018-03-22 PROCEDURE — 73562 X-RAY EXAM OF KNEE 3: CPT | Mod: TC,PN,LT

## 2018-03-22 PROCEDURE — 80053 COMPREHEN METABOLIC PANEL: CPT

## 2018-03-22 PROCEDURE — 20610 DRAIN/INJ JOINT/BURSA W/O US: CPT | Mod: 50,S$GLB,, | Performed by: ORTHOPAEDIC SURGERY

## 2018-03-22 PROCEDURE — 83970 ASSAY OF PARATHORMONE: CPT

## 2018-03-22 RX ORDER — TRIAMCINOLONE ACETONIDE 40 MG/ML
40 INJECTION, SUSPENSION INTRA-ARTICULAR; INTRAMUSCULAR
Status: DISCONTINUED | OUTPATIENT
Start: 2018-03-22 | End: 2018-03-22 | Stop reason: HOSPADM

## 2018-03-22 RX ADMIN — TRIAMCINOLONE ACETONIDE 40 MG: 40 INJECTION, SUSPENSION INTRA-ARTICULAR; INTRAMUSCULAR at 11:03

## 2018-03-22 NOTE — PROGRESS NOTES
Past Medical History:   Diagnosis Date    Allergy     multiple antibiotic allergies    Anemia     Anxiety     Arthritis     Asthma     CHF (congestive heart failure)     NYHA class III     Chronic kidney disease     Colon polyp     benign    COPD (chronic obstructive pulmonary disease)     DLCO 37% , and mild pulmonary HTN    Depression     Diabetes mellitus     Diabetic retinopathy     Diastolic dysfunction     Diverticulitis of large intestine without perforation or abscess without bleeding 2/12/2018    Diverticulosis     Former smoker     Fracture of lumbar spine     GERD (gastroesophageal reflux disease)     Hernia of unspecified site of abdominal cavity without mention of obstruction or gangrene     Hyperlipidemia     Hypertension     hypertensive renal    IBS (irritable bowel syndrome)     Mild nonproliferative diabetic retinopathy(362.04) 11/25/2013    Morbid obesity     Nuclear sclerosis 11/25/2013    Osteoporosis     Peripheral edema     Rash     Recurrent upper respiratory infection (URI)     S/P LASIK (laser assisted in situ keratomileusis)     Sleep apnea     sleep apnea uses bipap.    Thyroid disease     on synthroid    TIA (transient ischemic attack)     Urinary tract infection        Past Surgical History:   Procedure Laterality Date    ABDOMINAL SURGERY      ADENOIDECTOMY      COLONOSCOPY  03/05/2013    repeat in 5 years    COLONOSCOPY N/A 5/31/2017    Procedure: COLONOSCOPY;  Surgeon: Luis Navarro MD;  Location[facility];  Service: Endoscopy;  Laterality: N/A;    COSMETIC SURGERY      CYSTOSCOPY      EYE SURGERY Right     mazzulla    GASTRECTOMY      gastric sleeve      gastric sleeve      HERNIA REPAIR      5 years old    HYSTERECTOMY      ovaries remain    REFRACTIVE SURGERY      mono va//ou//    RHINOPLASTY TIP      TONSILLECTOMY      TUBAL LIGATION      UPPER GASTROINTESTINAL ENDOSCOPY  03/05/2013    UPPER GASTROINTESTINAL ENDOSCOPY   08/24/2016    Dr. Veras, repeat in 8 weeks    UPPER GASTROINTESTINAL ENDOSCOPY  07/21/2016    Dr. Randall       Current Outpatient Prescriptions   Medication Sig    allopurinol (ZYLOPRIM) 100 MG tablet TAKE TWO TABLETS BY MOUTH NIGHTLY    amitriptyline (ELAVIL) 50 MG tablet Take 1 tablet (50 mg total) by mouth every evening.    atenolol (TENORMIN) 25 MG tablet Take 1 tablet (25 mg total) by mouth once daily.    calcitRIOL (ROCALTROL) 0.25 MCG Cap Take 1 capsule by mouth twice a week. Monday Friday    CALCIUM CITRATE/VITAMIN D3 (CALCIUM CITRATE + D ORAL) Take 400 mg by mouth 3 (three) times daily.    cetirizine (ZYRTEC) 10 MG tablet Take 1 tablet (10 mg total) by mouth once daily.    clotrimazole-betamethasone 1-0.05% (LOTRISONE) cream     cyanocobalamin, vitamin B-12, (VITAMIN B-12) 5,000 mcg Subl Place 5,000 mg under the tongue once a week.    cyclobenzaprine (FLEXERIL) 10 MG tablet Take 1 tablet (10 mg total) by mouth 3 (three) times daily as needed.    DENOSUMAB (PROLIA SUBQ) Inject into the skin every 6 (six) months.     DIABETIC SUPPLIES,MISCELL (MEDTRONIC REMOTE CONTROL MISC) No Sig Provided    diphenoxylate-atropine 2.5-0.025 mg (LOMOTIL) 2.5-0.025 mg per tablet TAKE ONE TABLET BY MOUTH 4 TIMES DAILY AS NEEDED FOR DIARRHEA    fluoxetine (PROZAC) 20 MG capsule TAKE TWO CAPSULES BY MOUTH ONCE DAILY    fluticasone (FLONASE) 50 mcg/actuation nasal spray 2 sprays by Nasal route once daily.     fluticasone-vilanterol (BREO) 100-25 mcg/dose diskus inhaler Inhale 1 puff into the lungs once daily.    INSULIN ASPART (NOVOLOG FLEXPEN U-100 INSULIN SUBQ) Inject into the skin.    l-methylfolate-b2-b6-b12 (CEREFOLIN) 6-5-50-1 mg Tab Take 1 tablet by mouth once daily.    Lacto.acidophilus-Bif.animalis (PROBIOTIC) 5 billion cell CpSP Take 1 capsule by mouth once daily.    levothyroxine (SYNTHROID) 25 MCG tablet TAKE ONE TABLET BY MOUTH ONCE DAILY    LORazepam (ATIVAN) 1 MG tablet TAKE ONE TABLET BY  MOUTH EVERY 12 HOURS AS NEEDED FOR ANXIETY    losartan (COZAAR) 25 MG tablet Take 1 tablet by mouth once daily.    melatonin 3 mg Tab Take 5 mg by mouth every evening. 10 MG NIGHTLY    metOLazone (ZAROXOLYN) 5 MG tablet Take 1 tablet (5 mg total) by mouth daily as needed (swelling).    metronidazole (METROGEL) 0.75 % gel Apply topically 2 (two) times daily.    montelukast (SINGULAIR) 10 mg tablet Take 10 mg by mouth once daily.     MULTIVIT-IRON-MIN-FOLIC ACID 3,500-18-0.4 UNIT-MG-MG ORAL CHEW Take by mouth 2 (two) times daily.    mupirocin (BACTROBAN) 2 % ointment APPLY OINTMENT TOPICALLY TO AFFECTED AREA ON NOSE THREE TIMES DAILY AS DIRECTED    oxyCODONE (ROXICODONE) 15 MG Tab Take 1 tablet (15 mg total) by mouth every 6 (six) hours as needed for Pain.    oxyMORphone (OPANA ER) 10 MG 12 hr tablet Take 1 tablet by mouth 2 (two) times daily as needed.    pantoprazole (PROTONIX) 40 MG tablet TAKE ONE TABLET BY MOUTH ONCE DAILY    potassium chloride SA (K-DUR,KLOR-CON) 20 MEQ tablet Take 1 tablet (20 mEq total) by mouth 2 (two) times daily.    ranitidine (ZANTAC) 300 MG tablet TAKE ONE TABLET BY MOUTH IN THE EVENING    torsemide (DEMADEX) 20 MG Tab Take 2 tablets (40 mg total) by mouth once daily.     No current facility-administered medications for this visit.        Review of patient's allergies indicates:   Allergen Reactions    Adhesive Other (See Comments)     Blisters    Cleocin [clindamycin hcl] Hives    Ceclor [cefaclor] Hives    Morphine Nausea And Vomiting    Advair diskus [fluticasone-salmeterol]      Dry mouth    Aleve [naproxen sodium]      Unable to take secondary to kidney function.     Levaquin [levofloxacin] Dermatitis    Macrodantin [nitrofurantoin macrocrystalline] Hives    Motrin [ibuprofen]      Unable to take secondary to kidney functions.    Sulfa (sulfonamide antibiotics)      Hold due to renal problems    Remeron [mirtazapine] Palpitations and Other (See Comments)      Jittery    Vancomycin analogues Rash     Feet broke out/inflammed blood vessels         Family History   Problem Relation Age of Onset    Heart disease Mother 59    Cancer Mother 59     throat    Allergies Mother     Diabetes Mother     Heart disease Father 70     flutter    Allergies Father     Diabetes Father     Cancer Sister 22     22 thyroid,49  breast    Allergies Sister     Breast cancer Sister     Diabetes Sister     Cancer Brother 62     lung    Diabetes Brother     Asthma Daughter     Diabetes Daughter     Depression Daughter     Asthma Grandchild     Eczema Grandchild     Eczema Grandchild     Breast cancer Maternal Grandmother     Ovarian cancer Cousin     Amblyopia Neg Hx     Blindness Neg Hx     Cataracts Neg Hx     Glaucoma Neg Hx     Hypertension Neg Hx     Macular degeneration Neg Hx     Retinal detachment Neg Hx     Strabismus Neg Hx     Stroke Neg Hx     Thyroid disease Neg Hx     Angioedema Neg Hx     Immunodeficiency Neg Hx     Allergic rhinitis Neg Hx     Atopy Neg Hx     Rhinitis Neg Hx     Urticaria Neg Hx     Colon cancer Neg Hx     Colon polyps Neg Hx     Crohn's disease Neg Hx     Ulcerative colitis Neg Hx     Celiac disease Neg Hx        Social History     Social History    Marital status:      Spouse name: N/A    Number of children: N/A    Years of education: N/A     Occupational History    Not on file.     Social History Main Topics    Smoking status: Former Smoker     Types: Cigarettes    Smokeless tobacco: Never Used      Comment: quit 30 years ago    Alcohol use No      Comment: rare    Drug use: No    Sexual activity: Yes     Birth control/ protection: Surgical     Other Topics Concern    Not on file     Social History Narrative    No narrative on file       Chief Complaint:   Chief Complaint   Patient presents with    Right Knee - Injury       History: This is a 67-year-old female with chronic bilateral knee pain.  Left  is greater than the right.  Patient has had injections previously.  Cortisone does not work very well.  Has had good success with a Synvisc series.  Synvisc one did not work well for her.  Rates her pain currently as a 7 out of 10.  Symptoms are worsening and moderate to severe.    Present: She had a fall about 2 weeks ago.  Significant pain particularly in the right knee for a few days.  Pain is an 8 out of 10.  Left knee is also starting to hurt her more since she is having to use it more predominantly.    Review of Systems:    Musculoskeletal:  See HPI          Physical Examination:    Vital Signs:    Vitals:    03/22/18 1038   BP: (!) 145/67   Pulse: 90       This a well-developed, well nourished patient in no acute distress.  They are alert and oriented and cooperative to examination.  Pt. walks without an antalgic gait.      Examination of bilateral knees knee shows no rashes or erythema. There are some enlargement of the right thigh. Patient has full range of motion from 0-130°. Patient is nontender to palpation over lateral joint line and moderately tender to palpation over the medial joint line.  Knee is stable to varus and valgus stress. 5 out of 5 motor strength. Palpable distal pulses. Intact light touch sensation. Negative Patellofemoral crepitus      X-rays: 4 views of right knee is ordered and  reviewed which show medial narrowing that is significantly worse on the left.  Severe left knee arthritis  X-rays of the right foot are reviewed which show some degenerative changes particularly around the midfoot.     Assessment:: Bilateral knee arthritis  Right midfoot arthritis     Plan:   I inject both of her knees with cortisone.  We talked in great detail about total knee replacement.  She might do it in the late spring or summer.  No new fracture or significant change.

## 2018-03-22 NOTE — TELEPHONE ENCOUNTER
----- Message from Nitish Lee sent at 3/21/2018  4:52 PM CDT -----  Contact: self   Patient fell and is having knee pain, she is requesting to be seen tomorrow. Please call back at [phone] (home)

## 2018-03-22 NOTE — TELEPHONE ENCOUNTER
"I have been asked to see this patient in consultation at the request of Dr. Mimi Zheng for paresthesias    CHIEF COMPLAINT:    Abnormal sensory symptoms in the arms and legs    HISTORY OF PRESENT ILLNESS:  dictated    PAST MEDICAL HISTORY:    Hypothyroid                                                   Current Outpatient Prescriptions   Medication Sig Dispense Refill   â¢ norethindrone-ethinyl estradiol-FE (MICROGESTIN FE 1/20) 1-20 MG-MCG per tablet Take 1 tablet by mouth daily. â¢ levothyroxine (SYNTHROID, LEVOTHROID) 75 MCG tablet Take 1 tablet by mouth daily. â¢ [START ON 12/25/2017] Vitamin D, Ergocalciferol, 13312 units capsule Take 1 capsule by mouth once a week. â¢ Cyanocobalamin (VITAMIN B12) 1000 MCG Tab CR daily 30 tablet    â¢ ferrous sulfate 324 (65 Fe) MG EC tablet daily     â¢ LORazepam (ATIVAN) 1 MG tablet One 30 to 60 minute prior to MRI, repeat if needed 3 tablet 0     No current facility-administered medications for this visit. ALLERGIES:  No Known Allergies    SOCIAL HISTORY:   Social History   Substance Use Topics   â¢ Smoking status: Never Smoker   â¢ Smokeless tobacco: Never Used   â¢ Alcohol use No       FAMILY HISTORY:  Family History   Problem Relation Age of Onset   â¢ Diabetes Mother    â¢ Hypertension Mother    â¢ Lipids Mother    â¢ Thyroid Mother    â¢ Cancer Father      lung   â¢ Cancer Maternal Grandmother      pancreatic   â¢ Diabetes Maternal Grandmother    â¢ Aneurysm Maternal Grandfather      aortic   â¢ Kidney disease Paternal Grandmother      dialysis   â¢ Crohn's Disease Son    â¢ Rheum Arthritis Son      diagnosed 13       REVIEW OF SYSTEMS:  Reviewed per nursing note, agree    NEUROLOGICAL EXAMINATION:  GENERAL:  Well appearing woman in no distress    Visit Vitals  /70   Pulse 80   Ht 5' 1"" (1.549 m)   Wt 83.5 kg   BMI 34.77 kg/mÂ²       CARDIOVASCULAR:  Heart rate was regular without abnormal sounds. CAROTID ARTERIES:  There were no carotid bruits.     PERIPHERAL " Appointment made today for right knee due to fall.    VASCULAR:  The peripheral vascular system was normal.    MENTAL STATUS  Orientation:  Normal for time, place, person. Memory:  Normal recent and remote. Attention/concentration:  Normal.  Language:  Normal.  Fund of knowledge:  Normal.    CRANIAL NERVES:    Cranial nerve II:  Examination of the fundi revealed no papilledema. The posterior segment was quiet. Visual fields were full to finger confrontation. Cranial nerve III, IV and VI:  The pupils were equal, round and reactive to light. There was no afferent pupillary defect. Extraocular movements were normal.  Cranial nerve V:  Facial sensation normal.  Cranial nerve VII:  Facial movement normal, no flattening of the nasolabial fold. Cranial nerve VIII:  Hearing intact to finger rub   Cranial nerves IX, X:  Normal palate movement. Cranial nerve XI:  Normal sternocleidomastoid and trapezius function. Cranial nerve XII:  Normal tongue protrusion, movement and appearance. MOTOR EXAMINATION:   Muscle strength was graded 5/5 throughout. Muscle tone                 Upper extremity normal            Lower extremity normal  Bulk                             Upper extremity normal            Lower extremity  normal    REFLEXES                        Right                          Left  Biceps                                   2/4                            2/4  Triceps                                  2/4                            2/4   Brachioradialis                      2/4                            2/4  Patellar                                  2+/4                            2+/4  Achilles                                  2+/4                            2+/4  Plantar response               Mute                       Mute    SENSORY EXAMINATION:     Vibration sense normal 15 plus seconds at the toes.   Joint position sense normal.  Pinprick normal.    COORDINATION:  Finger-to-nose normal.  Heel-to-shin normal.  Fine motor coordination normal.    GAIT AND STATION:  Routine gait, toe, heel and tandem maneuver was normal.      IMPRESSION:  dictated    RECOMMENDATIONS:  dictated

## 2018-03-22 NOTE — PROCEDURES
Large Joint Aspiration/Injection  Date/Time: 3/22/2018 11:18 AM  Performed by: WALE BENTON  Authorized by: WALE BENTON     Consent Done?:  Yes (Verbal)  Indications:  Pain  Procedure site marked: Yes    Timeout: Prior to procedure the correct patient, procedure, and site was verified      Location:  Knee  Site:  L knee and R knee  Prep: Patient was prepped and draped in usual sterile fashion    Needle size:  20 G  Approach:  Anterolateral  Medications:  40 mg triamcinolone acetonide 40 mg/mL; 40 mg triamcinolone acetonide 40 mg/mL  Patient tolerance:  Patient tolerated the procedure well with no immediate complications

## 2018-03-23 ENCOUNTER — TELEPHONE (OUTPATIENT)
Dept: VASCULAR SURGERY | Facility: CLINIC | Age: 68
End: 2018-03-23

## 2018-03-23 DIAGNOSIS — R60.0 LOCALIZED EDEMA: Primary | ICD-10-CM

## 2018-03-23 NOTE — TELEPHONE ENCOUNTER
Informed of Dr Varela's ultrasound guidelines for venous symptoms.  Ultrasound of deep and superficial veins BLE ordered and scheduled per [facility], and consultation also scheduled with Dr Varela.     Voices understanding, will call back as needed.

## 2018-03-23 NOTE — TELEPHONE ENCOUNTER
----- Message from Constance Ventura sent at 3/22/2018  2:51 PM CDT -----  Patient is calling to make an appointment because the top of right leg is three times the size of her left leg.  recommended her to come to you. It is very painful. She has already had an ultrasound at [facility]. Please call to advise at [phone].

## 2018-03-26 ENCOUNTER — HOSPITAL ENCOUNTER (OUTPATIENT)
Dept: RADIOLOGY | Facility: HOSPITAL | Age: 68
Discharge: HOME | End: 2018-03-26
Attending: PHYSICIAN ASSISTANT
Payer: MEDICARE

## 2018-03-26 DIAGNOSIS — M51.36 DDD (DEGENERATIVE DISC DISEASE), LUMBAR: ICD-10-CM

## 2018-03-26 DIAGNOSIS — M54.16 LUMBAR RADICULOPATHY: ICD-10-CM

## 2018-03-26 PROCEDURE — 72148 MRI LUMBAR SPINE W/O DYE: CPT | Mod: 26,,, | Performed by: RADIOLOGY

## 2018-03-26 PROCEDURE — 72148 MRI LUMBAR SPINE W/O DYE: CPT | Mod: TC

## 2018-03-27 DIAGNOSIS — G47.09 SLEEP INITIATION DISORDER: ICD-10-CM

## 2018-03-28 ENCOUNTER — TELEPHONE (OUTPATIENT)
Dept: FAMILY MEDICINE | Facility: CLINIC | Age: 68
End: 2018-03-28

## 2018-03-28 ENCOUNTER — OFFICE VISIT (OUTPATIENT)
Dept: GASTROENTEROLOGY | Facility: CLINIC | Age: 68
End: 2018-03-28
Payer: MEDICARE

## 2018-03-28 VITALS
HEART RATE: 76 BPM | HEIGHT: 62 IN | RESPIRATION RATE: 20 BRPM | SYSTOLIC BLOOD PRESSURE: 136 MMHG | BODY MASS INDEX: 42.31 KG/M2 | DIASTOLIC BLOOD PRESSURE: 60 MMHG | WEIGHT: 229.94 LBS

## 2018-03-28 DIAGNOSIS — K57.32 DIVERTICULITIS OF LARGE INTESTINE WITHOUT PERFORATION OR ABSCESS WITHOUT BLEEDING: Primary | ICD-10-CM

## 2018-03-28 DIAGNOSIS — R93.3 ABNORMAL CT SCAN, COLON: ICD-10-CM

## 2018-03-28 PROBLEM — K59.1 FUNCTIONAL DIARRHEA: Chronic | Status: RESOLVED | Noted: 2017-04-12 | Resolved: 2018-03-28

## 2018-03-28 PROBLEM — K92.1 BLOOD IN STOOL: Status: RESOLVED | Noted: 2017-05-31 | Resolved: 2018-03-28

## 2018-03-28 PROCEDURE — 3075F SYST BP GE 130 - 139MM HG: CPT | Mod: S$GLB,,, | Performed by: INTERNAL MEDICINE

## 2018-03-28 PROCEDURE — 99999 PR PBB SHADOW E&M-EST. PATIENT-LVL III: CPT | Mod: PBBFAC,,, | Performed by: INTERNAL MEDICINE

## 2018-03-28 PROCEDURE — 99214 OFFICE O/P EST MOD 30 MIN: CPT | Mod: S$GLB,,, | Performed by: INTERNAL MEDICINE

## 2018-03-28 PROCEDURE — 3078F DIAST BP <80 MM HG: CPT | Mod: S$GLB,,, | Performed by: INTERNAL MEDICINE

## 2018-03-28 NOTE — TELEPHONE ENCOUNTER
"Spoke to patient who states she has "a pocket of fluid" to right leg which is making it difficult to ambulate. Spoke to Dr. Vargas who states patient needs acute visit. Appointment scheduled for tomorrow 3-29-18. Patient agreed to appointment date and time.   "

## 2018-03-28 NOTE — TELEPHONE ENCOUNTER
----- Message from Kellen Mcintyre sent at 3/28/2018  2:56 PM CDT -----  Contact: self  Patient called regarding her rt leg pain/trouble walking, a lot of fluid retention. No availabilites with any orthopedic for tomorrow in [place]. Can't make the appts with Dr. Donaldson in [place]. Not sure how to proceed. Please contact [phone] (home)

## 2018-03-29 ENCOUNTER — OFFICE VISIT (OUTPATIENT)
Dept: FAMILY MEDICINE | Facility: CLINIC | Age: 68
End: 2018-03-29
Payer: MEDICARE

## 2018-03-29 ENCOUNTER — OFFICE VISIT (OUTPATIENT)
Dept: ORTHOPEDICS | Facility: CLINIC | Age: 68
End: 2018-03-29
Payer: MEDICARE

## 2018-03-29 ENCOUNTER — HOSPITAL ENCOUNTER (OUTPATIENT)
Dept: RADIOLOGY | Facility: CLINIC | Age: 68
Discharge: HOME | End: 2018-03-29
Attending: FAMILY MEDICINE
Payer: MEDICARE

## 2018-03-29 VITALS — BODY MASS INDEX: 42.74 KG/M2 | RESPIRATION RATE: 14 BRPM | HEIGHT: 62 IN | WEIGHT: 232.25 LBS

## 2018-03-29 VITALS
HEIGHT: 62 IN | BODY MASS INDEX: 42.44 KG/M2 | HEART RATE: 66 BPM | DIASTOLIC BLOOD PRESSURE: 72 MMHG | TEMPERATURE: 98 F | SYSTOLIC BLOOD PRESSURE: 139 MMHG | WEIGHT: 230.63 LBS

## 2018-03-29 DIAGNOSIS — S80.11XA TRAUMATIC HEMATOMA OF RIGHT LOWER LEG, INITIAL ENCOUNTER: Primary | ICD-10-CM

## 2018-03-29 DIAGNOSIS — S80.11XA TRAUMATIC HEMATOMA OF RIGHT LOWER LEG, INITIAL ENCOUNTER: ICD-10-CM

## 2018-03-29 DIAGNOSIS — M62.838 MUSCLE SPASM: ICD-10-CM

## 2018-03-29 DIAGNOSIS — M17.11 PRIMARY OSTEOARTHRITIS OF RIGHT KNEE: Primary | ICD-10-CM

## 2018-03-29 PROCEDURE — 76882 US LMTD JT/FCL EVL NVASC XTR: CPT | Mod: 26,,, | Performed by: RADIOLOGY

## 2018-03-29 PROCEDURE — 3078F DIAST BP <80 MM HG: CPT | Mod: S$GLB,,, | Performed by: NURSE PRACTITIONER

## 2018-03-29 PROCEDURE — 99214 OFFICE O/P EST MOD 30 MIN: CPT | Mod: S$GLB,,, | Performed by: FAMILY MEDICINE

## 2018-03-29 PROCEDURE — 99213 OFFICE O/P EST LOW 20 MIN: CPT | Mod: S$GLB,,, | Performed by: NURSE PRACTITIONER

## 2018-03-29 PROCEDURE — 3075F SYST BP GE 130 - 139MM HG: CPT | Mod: S$GLB,,, | Performed by: FAMILY MEDICINE

## 2018-03-29 PROCEDURE — 3078F DIAST BP <80 MM HG: CPT | Mod: S$GLB,,, | Performed by: FAMILY MEDICINE

## 2018-03-29 PROCEDURE — 76882 US LMTD JT/FCL EVL NVASC XTR: CPT | Mod: TC,PO

## 2018-03-29 PROCEDURE — 99999 PR PBB SHADOW E&M-EST. PATIENT-LVL III: CPT | Mod: PBBFAC,,, | Performed by: FAMILY MEDICINE

## 2018-03-29 PROCEDURE — 97760 ORTHOTIC MGMT&TRAING 1ST ENC: CPT | Mod: GP,S$GLB,, | Performed by: NURSE PRACTITIONER

## 2018-03-29 PROCEDURE — 3074F SYST BP LT 130 MM HG: CPT | Mod: S$GLB,,, | Performed by: NURSE PRACTITIONER

## 2018-03-29 PROCEDURE — 99999 PR PBB SHADOW E&M-EST. PATIENT-LVL II: CPT | Mod: PBBFAC,,, | Performed by: NURSE PRACTITIONER

## 2018-03-29 RX ORDER — AZELASTINE HYDROCHLORIDE AND FLUTICASONE PROPIONATE 137; 50 UG/1; UG/1
SPRAY, METERED NASAL
COMMUNITY
Start: 2018-03-21

## 2018-03-29 NOTE — LETTER
March 29, 2018      John Beckham MD  [address] [facility] [address]           [facility]  [address] [facility] [address] [place] [address]  Phone: [phone]          Patient: Fifi Mcnair   MR Number: [id number]   YOB: 1950   Date of Visit: 3/29/2018       Dear Dr. John Beckham:    Thank you for referring Fifi Mcnair to me for evaluation. Attached you will find relevant portions of my assessment and plan of care.    If you have questions, please do not hesitate to call me. I look forward to following Fifi Mcnair along with you.    Sincerely,    Martha Abel, APRN    Enclosure  CC:  No Recipients    If you would like to receive this communication electronically, please contact [email or web address] or [phone] to request more information on [employer] access.    For providers and/or their staff who would like to refer a patient to [facility], please contact us through our one-stop-shop provider referral line, [facility], at [phone].    If you feel you have received this communication in error or would no longer like to receive these types of communications, please e-mail [email or web address]

## 2018-03-29 NOTE — PROGRESS NOTES
"Subjective:       Patient ID: Fifi Mcnair is a 67 y.o. female.    This is an established patient.      Chief Complaint: Abnormal CT scan    Patient seen for abnormal CT scan, colonic in location, associated with eccentric thickening, recent onset, improved on subsequent scan 3 days later, associated with abdominal pain consistent with diverticulitis which has now improved since treatment.  Colonoscopy about a year ago reviewed and showed no mass and only small polyps, however the possibility of mass was questioned on report.  Patient denies any symptoms currently and would like colonoscopy to rule out mass.      Review of Systems   Constitutional: Negative for chills, fatigue and fever.   HENT: Negative for sore throat and trouble swallowing.    Respiratory: Negative for cough, shortness of breath and wheezing.    Cardiovascular: Negative for chest pain and palpitations.   Gastrointestinal: Negative for abdominal pain, blood in stool, nausea and vomiting.   Musculoskeletal: Negative for arthralgias and myalgias.   Skin: Negative for color change and rash.   Psychiatric/Behavioral: Negative for confusion. The patient is not nervous/anxious.    All other systems reviewed and are negative.      Objective:      Physical Exam   Constitutional: She is oriented to person, place, and time. She appears well-developed and well-nourished.   HENT:   Head: Normocephalic and atraumatic.   Eyes: Pupils are equal, round, and reactive to light. No scleral icterus.   Cardiovascular: Normal rate and regular rhythm.    No murmur heard.  Pulmonary/Chest: Effort normal and breath sounds normal. She has no wheezes.   Abdominal: Soft. Bowel sounds are normal. She exhibits no distension. There is no tenderness.   Neurological: She is alert and oriented to person, place, and time.   Vitals reviewed.        Vitals:    03/28/18 1531   BP: 136/60   Pulse: 76   Resp: 20   Weight: 104.3 kg (229 lb 15 oz)   Height: 5' 2" (1.575 m)       CT " scan was independently visualized and reviewed by me and showed findings as above.    Assessment:       1. Diverticulitis of large intestine without perforation or abscess without bleeding    2. Abnormal CT scan, colon        Plan:       1.  High fiber diet  2.  Schedule colonoscopy  3.  Further recommendations to follow after above.

## 2018-03-29 NOTE — PROGRESS NOTES
DATE: 3/29/2018  PATIENT: Fifi Mcnair  REFERRING MD:  CHIEF COMPLAINT:   Chief Complaint   Patient presents with    Right Knee - Pain, Swelling       HISTORY:  Fifi Mcnair is a 67 y.o. female  who presents for initial evaluation of her right knee pain.  She is referred by Dr Beckham today.  She saw Dr Molina last week for her bilateral knee pain and had bilateral cortisone injections.  She began having increased pain below her knee medially about 4 days ago.  She saw Dr Beckham today and he suggested that the pain be evaluated incase of secondary complication to the cortisone injections.  Her pain today is 9/10.      PAST MEDICAL/SURGICAL HISTORY:  Past Medical History:   Diagnosis Date    Allergy     multiple antibiotic allergies    Anemia     Anxiety     Arthritis     Asthma     CHF (congestive heart failure)     NYHA class III     Chronic kidney disease     Colon polyp     benign    COPD (chronic obstructive pulmonary disease)     DLCO 37% , and mild pulmonary HTN    Depression     Diabetes mellitus     Diabetic retinopathy     Diastolic dysfunction     Diverticulitis of large intestine without perforation or abscess without bleeding 2/12/2018    Diverticulosis     Former smoker     Fracture of lumbar spine     GERD (gastroesophageal reflux disease)     Hernia of unspecified site of abdominal cavity without mention of obstruction or gangrene     Hyperlipidemia     Hypertension     hypertensive renal    IBS (irritable bowel syndrome)     Mild nonproliferative diabetic retinopathy(362.04) 11/25/2013    Morbid obesity     Nuclear sclerosis 11/25/2013    Osteoporosis     Peripheral edema     Rash     Recurrent upper respiratory infection (URI)     S/P LASIK (laser assisted in situ keratomileusis)     Sleep apnea     sleep apnea uses bipap.    Thyroid disease     on synthroid    TIA (transient ischemic attack)     Urinary tract infection      Past Surgical History:    Procedure Laterality Date    ABDOMINAL SURGERY      ADENOIDECTOMY      COLONOSCOPY  03/05/2013    repeat in 5 years    COLONOSCOPY N/A 5/31/2017    Procedure: COLONOSCOPY;  Surgeon: Luis Navarro MD;  Location: [facility];  Service: Endoscopy;  Laterality: N/A;    COSMETIC SURGERY      CYSTOSCOPY      EYE SURGERY Right     mazzulla    GASTRECTOMY      gastric sleeve      gastric sleeve      HERNIA REPAIR      5 years old    HYSTERECTOMY      ovaries remain    REFRACTIVE SURGERY      mono va//ou//    RHINOPLASTY TIP      TONSILLECTOMY      TUBAL LIGATION      UPPER GASTROINTESTINAL ENDOSCOPY  03/05/2013    UPPER GASTROINTESTINAL ENDOSCOPY  08/24/2016    Dr. Navarro, repeat in 8 weeks    UPPER GASTROINTESTINAL ENDOSCOPY  07/21/2016    Dr. Randall       Current Medications:   Current Outpatient Prescriptions:     allopurinol (ZYLOPRIM) 100 MG tablet, TAKE TWO TABLETS BY MOUTH NIGHTLY, Disp: 180 tablet, Rfl: 4    amitriptyline (ELAVIL) 50 MG tablet, Take 1 tablet (50 mg total) by mouth every evening., Disp: 30 tablet, Rfl: 11    atenolol (TENORMIN) 25 MG tablet, Take 1 tablet (25 mg total) by mouth once daily., Disp: 90 tablet, Rfl: 0    calcitRIOL (ROCALTROL) 0.25 MCG Cap, Take 1 capsule by mouth twice a week. Monday Friday, Disp: , Rfl:     CALCIUM CITRATE/VITAMIN D3 (CALCIUM CITRATE + D ORAL), Take 400 mg by mouth 3 (three) times daily., Disp: , Rfl:     cetirizine (ZYRTEC) 10 MG tablet, Take 1 tablet (10 mg total) by mouth once daily., Disp: 1 Bottle, Rfl: 5    clotrimazole-betamethasone 1-0.05% (LOTRISONE) cream, , Disp: , Rfl:     cyanocobalamin, vitamin B-12, (VITAMIN B-12) 5,000 mcg Subl, Place 5,000 mg under the tongue once a week., Disp: , Rfl:     cyclobenzaprine (FLEXERIL) 10 MG tablet, Take 1 tablet (10 mg total) by mouth 3 (three) times daily as needed., Disp: 60 tablet, Rfl: 1    DENOSUMAB (PROLIA SUBQ), Inject into the skin every 6 (six) months. , Disp: , Rfl:      DIABETIC SUPPLIES,MISCELL (MEDTRONIC REMOTE CONTROL MISC), No Sig Provided, Disp: , Rfl:     diphenoxylate-atropine 2.5-0.025 mg (LOMOTIL) 2.5-0.025 mg per tablet, TAKE ONE TABLET BY MOUTH 4 TIMES DAILY AS NEEDED FOR DIARRHEA, Disp: 60 tablet, Rfl: 1    DYMISTA 137-50 mcg/spray [place] nassal spray, , Disp: , Rfl:     fluoxetine (PROZAC) 20 MG capsule, TAKE TWO CAPSULES BY MOUTH ONCE DAILY, Disp: 180 capsule, Rfl: 4    fluticasone (FLONASE) 50 mcg/actuation nasal spray, 2 sprays by Nasal route once daily. , Disp: , Rfl:     fluticasone-vilanterol (BREO) 100-25 mcg/dose diskus inhaler, Inhale 1 puff into the lungs once daily., Disp: , Rfl:     INSULIN ASPART (NOVOLOG FLEXPEN U-100 INSULIN SUBQ), Inject into the skin., Disp: , Rfl:     l-methylfolate-b2-b6-b12 (CEREFOLIN) 6-5-50-1 mg Tab, Take 1 tablet by mouth once daily., Disp: , Rfl:     Lacto.acidophilus-Bif.animalis (PROBIOTIC) 5 billion cell CpSP, Take 1 capsule by mouth once daily., Disp: 30 capsule, Rfl: 3    levothyroxine (SYNTHROID) 25 MCG tablet, TAKE ONE TABLET BY MOUTH ONCE DAILY, Disp: 90 tablet, Rfl: 3    LORazepam (ATIVAN) 1 MG tablet, TAKE ONE TABLET BY MOUTH EVERY 12 HOURS AS NEEDED FOR ANXIETY, Disp: 45 tablet, Rfl: 0    losartan (COZAAR) 25 MG tablet, Take 1 tablet by mouth once daily., Disp: , Rfl:     melatonin 3 mg Tab, Take 5 mg by mouth every evening. 10 MG NIGHTLY, Disp: , Rfl:     metOLazone (ZAROXOLYN) 5 MG tablet, Take 1 tablet (5 mg total) by mouth daily as needed (swelling)., Disp: 30 tablet, Rfl: 11    metronidazole (METROGEL) 0.75 % gel, Apply topically 2 (two) times daily., Disp: 45 g, Rfl: 1    montelukast (SINGULAIR) 10 mg tablet, Take 10 mg by mouth once daily. , Disp: , Rfl:     MULTIVIT-IRON-MIN-FOLIC ACID 3,500-18-0.4 UNIT-MG-MG ORAL CHEW, Take by mouth 2 (two) times daily., Disp: , Rfl:     mupirocin (BACTROBAN) 2 % ointment, APPLY OINTMENT TOPICALLY TO AFFECTED AREA ON NOSE THREE TIMES DAILY AS DIRECTED, Disp: 1  Tube, Rfl: 11    oxyCODONE (ROXICODONE) 15 MG Tab, Take 1 tablet (15 mg total) by mouth every 6 (six) hours as needed for Pain., Disp: 30 tablet, Rfl: 0    oxyMORphone (OPANA ER) 10 MG 12 hr tablet, Take 1 tablet by mouth 2 (two) times daily as needed., Disp: , Rfl:     pantoprazole (PROTONIX) 40 MG tablet, TAKE ONE TABLET BY MOUTH ONCE DAILY, Disp: 90 tablet, Rfl: 3    potassium chloride SA (K-DUR,KLOR-CON) 20 MEQ tablet, Take 1 tablet (20 mEq total) by mouth 2 (two) times daily., Disp: 180 tablet, Rfl: 3    ranitidine (ZANTAC) 300 MG tablet, TAKE ONE TABLET BY MOUTH IN THE EVENING, Disp: 30 tablet, Rfl: 1    torsemide (DEMADEX) 20 MG Tab, Take 2 tablets (40 mg total) by mouth once daily., Disp: 60 tablet, Rfl: 0    Family History: family history was reviewed and is noncontributory  Social History:   Social History     Social History    Marital status:      Spouse name: N/A    Number of children: N/A    Years of education: N/A     Occupational History    Not on file.     Social History Main Topics    Smoking status: Former Smoker     Types: Cigarettes    Smokeless tobacco: Never Used      Comment: quit 30 years ago    Alcohol use No      Comment: rare    Drug use: No    Sexual activity: Yes     Birth control/ protection: Surgical     Other Topics Concern    Not on file     Social History Narrative    No narrative on file       ROS:  Constitution: Negative for chills, fever, and sweats. Negative for unexplained weight loss.  HENT: Negative for headaches and blurry vision.   Cardiovascular: Negative for chest pain, irregular heartbeat, leg swelling and palpitations.   Respiratory: Negative for cough and shortness of breath.   Gastrointestinal: Negative for abdominal pain, heartburn, nausea and vomiting.   Genitourinary: Negative for bladder incontinence and dysuria.   Musculoskeletal: Negative for systemic arthritis, muscle weakness and myalgias.   Neurological: Negative for numbness.  "  Psychiatric/Behavioral: Negative for depression.  Endocrine: Negative for polyuria.   Hematologic/Lymphatic: Negative for bleeding disorders.   Skin: Negative for poor wound healing.        PHYSICAL EXAM:  Resp 14   Ht 5' 2" (1.575 m)   Wt 105.3 kg (232 lb 4.1 oz)   LMP  (LMP Unknown)   BMI 42.48 kg/m²   Fifi Mcnair is a well developed, well nourished female in no acute distress. Physical examination of the right knee evaluated the following:    Gait and Alignment  Inspection for ecchymosis, swelling, atrophy, or deformity  Inspection for intra-articular and/or bursal effusions  Tenderness to palpation over the bony and soft tissue structures around the knee  Range of Motion and presence of extensor lag/contractures  Sensation and motor strength  Varus/valgus or anterior/posterior/rotatory instability  Flexion pinch and Mervin's Tests  Patellar alignment/tracking/pain to palpation  Vascular exam to include skin temperature/color/capillary refill    Remarkable findings included:  No effusion or swelling to Right knee  Tenderness to upper leg, medially in soft tissue  No ecchymosis, warmth or erythema    IMAGING:   No xray images obtained today     ASSESSMENT:   osteoarthritis to right knee    PLAN:  The nature of the diagnosis, using models and diagrams when appropriate, was explained to the patient in detail.  I have explained to the patient that I believe this pain is referred from her knee.  It does not appear to be a hematoma or inflammation/infectious reaction to the cortisone injection.  Nataliya Boss and I, performed a custom orthotic /brace adjustment, fitting and training with the patient. The patient demonstrated understanding and proper care. This was performed for 15 minutes.  I have instructed her to keep her follow up appointment with Dr Molina and return prn worsening of symptoms.   "

## 2018-03-29 NOTE — PROGRESS NOTES
"Subjective:       Patient ID: Fifi Mcnair is a 67 y.o. female.    Chief Complaint: right knee swelling (x 4ays)    Pt reports a very painful swelling below her right knee noted past 4 days.  On Oxycodone 15 mg and amityiptiline and no relief of this pain.  She fell onto both knees about 10-11 days ago.  Got knees injected with good relief after that.  She also has a working diagnosis of lymphedema of right upper leg and due for further vascular eval.      Review of Systems   Constitutional: Negative for fever.   Respiratory: Negative for shortness of breath.    Cardiovascular: Negative for chest pain.   Gastrointestinal: Negative for abdominal pain and nausea.   Skin: Negative for rash.   All other systems reviewed and are negative.      Objective:      Physical Exam   Constitutional: She appears well-developed. No distress.   Cardiovascular: Normal rate, regular rhythm and intact distal pulses.    No murmur heard.  Pulmonary/Chest: Effort normal and breath sounds normal.   Musculoskeletal:   Right leg - ant-medial aspect of lower leg below knee has an area approx 4 x 6" of fluctuant, fluid feeling collection.  No surface bruising or redness or heat noted.  Pt reports is to be very tender.         Assessment:       1. Traumatic hematoma of right lower leg, initial encounter        Plan:         Fifi was seen today for right knee swelling.    Diagnoses and all orders for this visit:    Traumatic hematoma of right lower leg, initial encounter  -     Ambulatory consult to Orthopedics  -     US Extremity Non Vascular Limited Right; Future    Suspect delayed onset of hematoma - will get local US now and attempt urgent Ortho eval - may need aspiration??   "

## 2018-03-30 RX ORDER — LORAZEPAM 0.5 MG/1
TABLET ORAL
Qty: 30 TABLET | Refills: 3 | OUTPATIENT
Start: 2018-03-30

## 2018-03-30 RX ORDER — LORAZEPAM 1 MG/1
1 TABLET ORAL NIGHTLY
Qty: 30 TABLET | Refills: 2 | Status: SHIPPED | OUTPATIENT
Start: 2018-03-30 | End: 2019-04-10 | Stop reason: SDUPTHER

## 2018-04-11 ENCOUNTER — HOSPITAL ENCOUNTER (OUTPATIENT)
Facility: HOSPITAL | Age: 68
Discharge: HOME | End: 2018-04-11
Attending: INTERNAL MEDICINE | Admitting: INTERNAL MEDICINE
Payer: MEDICARE

## 2018-04-11 ENCOUNTER — SURGERY (OUTPATIENT)
Age: 68
End: 2018-04-11

## 2018-04-11 ENCOUNTER — ANESTHESIA (OUTPATIENT)
Dept: ENDOSCOPY | Facility: HOSPITAL | Age: 68
End: 2018-04-11
Payer: MEDICARE

## 2018-04-11 ENCOUNTER — ANESTHESIA EVENT (OUTPATIENT)
Dept: ENDOSCOPY | Facility: HOSPITAL | Age: 68
End: 2018-04-11
Payer: MEDICARE

## 2018-04-11 DIAGNOSIS — K64.8 INTERNAL HEMORRHOIDS: ICD-10-CM

## 2018-04-11 DIAGNOSIS — K57.30 DIVERTICULOSIS OF LARGE INTESTINE WITHOUT HEMORRHAGE: ICD-10-CM

## 2018-04-11 DIAGNOSIS — K63.5 POLYP OF COLON, UNSPECIFIED PART OF COLON, UNSPECIFIED TYPE: Primary | ICD-10-CM

## 2018-04-11 DIAGNOSIS — R93.89 ABNORMAL CT SCAN: ICD-10-CM

## 2018-04-11 PROCEDURE — 45385 COLONOSCOPY W/LESION REMOVAL: CPT | Performed by: INTERNAL MEDICINE

## 2018-04-11 PROCEDURE — 45385 COLONOSCOPY W/LESION REMOVAL: CPT | Mod: ,,, | Performed by: INTERNAL MEDICINE

## 2018-04-11 PROCEDURE — 63600175 PHARM REV CODE 636 W HCPCS: Performed by: NURSE ANESTHETIST, CERTIFIED REGISTERED

## 2018-04-11 PROCEDURE — 45380 COLONOSCOPY AND BIOPSY: CPT | Performed by: INTERNAL MEDICINE

## 2018-04-11 PROCEDURE — 45380 COLONOSCOPY AND BIOPSY: CPT | Mod: 59,,, | Performed by: INTERNAL MEDICINE

## 2018-04-11 PROCEDURE — D9220A PRA ANESTHESIA: Mod: 33,ANES,, | Performed by: ANESTHESIOLOGY

## 2018-04-11 PROCEDURE — 27201012 HC FORCEPS, HOT/COLD, DISP: Performed by: INTERNAL MEDICINE

## 2018-04-11 PROCEDURE — 37000009 HC ANESTHESIA EA ADD 15 MINS: Performed by: INTERNAL MEDICINE

## 2018-04-11 PROCEDURE — 25000003 PHARM REV CODE 250: Performed by: INTERNAL MEDICINE

## 2018-04-11 PROCEDURE — D9220A PRA ANESTHESIA: Mod: 33,CRNA,, | Performed by: NURSE ANESTHETIST, CERTIFIED REGISTERED

## 2018-04-11 PROCEDURE — 37000008 HC ANESTHESIA 1ST 15 MINUTES: Performed by: INTERNAL MEDICINE

## 2018-04-11 PROCEDURE — 88305 TISSUE EXAM BY PATHOLOGIST: CPT | Performed by: PATHOLOGY

## 2018-04-11 PROCEDURE — 27201089 HC SNARE, DISP (ANY): Performed by: INTERNAL MEDICINE

## 2018-04-11 RX ORDER — LIDOCAINE HYDROCHLORIDE 20 MG/ML
INJECTION, SOLUTION EPIDURAL; INFILTRATION; INTRACAUDAL; PERINEURAL
Status: DISCONTINUED
Start: 2018-04-11 | End: 2018-04-11 | Stop reason: HOSPADM

## 2018-04-11 RX ORDER — SODIUM CHLORIDE 9 MG/ML
INJECTION, SOLUTION INTRAVENOUS CONTINUOUS
Status: DISCONTINUED | OUTPATIENT
Start: 2018-04-11 | End: 2018-04-11 | Stop reason: HOSPADM

## 2018-04-11 RX ORDER — LIDOCAINE HCL/PF 100 MG/5ML
SYRINGE (ML) INTRAVENOUS
Status: DISCONTINUED | OUTPATIENT
Start: 2018-04-11 | End: 2018-04-11

## 2018-04-11 RX ORDER — PROPOFOL 10 MG/ML
VIAL (ML) INTRAVENOUS
Status: DISCONTINUED | OUTPATIENT
Start: 2018-04-11 | End: 2018-04-11

## 2018-04-11 RX ADMIN — PROPOFOL 50 MG: 10 INJECTION, EMULSION INTRAVENOUS at 09:04

## 2018-04-11 RX ADMIN — SODIUM CHLORIDE 1000 ML: 0.9 INJECTION, SOLUTION INTRAVENOUS at 09:04

## 2018-04-11 RX ADMIN — LIDOCAINE HYDROCHLORIDE 100 MG: 20 INJECTION, SOLUTION INTRAVENOUS at 09:04

## 2018-04-11 RX ADMIN — PROPOFOL 100 MG: 10 INJECTION, EMULSION INTRAVENOUS at 09:04

## 2018-04-11 NOTE — TRANSFER OF CARE
"Anesthesia Transfer of Care Note    Patient: Fifi Mcnair    Procedure(s) Performed: Procedure(s) (LRB):  COLONOSCOPY (N/A)    Patient location: GI    Anesthesia Type: general    Transport from OR: Transported from OR on room air with adequate spontaneous ventilation    Post pain: adequate analgesia    Post assessment: no apparent anesthetic complications    Post vital signs: stable    Level of consciousness: awake, alert and oriented    Nausea/Vomiting: no nausea/vomiting    Complications: none    Transfer of care protocol was followed      Last vitals:   Visit Vitals  /68 (BP Location: Left arm, Patient Position: Lying)   Pulse 72   Temp 36.7 °C (98 °F) (Oral)   Resp 16   Ht 5' 2" (1.575 m)   Wt 101.6 kg (224 lb)   LMP  (LMP Unknown)   SpO2 98%   Breastfeeding? No   BMI 40.97 kg/m²     "

## 2018-04-11 NOTE — OR NURSING
Sedation, oxygen delivery, and monitoring per anesthesia for endoscopic procedure.  \  Have you had a colonoscopy LESS THAN 3 years ago?   * If YES, answer these questions*:  Yes    1. Did patient have a prior colonic polyp in a previous surveillance/diagnostic colonoscopy and is 18 years or older on date of encounter?  Yes    2. Documentation of < 3 year interval since the patients last colonoscopy due to medical reasons (eg., last colonoscopy incomplete, last colonoscopy had inadequate prep, piecemeal removal of adenomas, or last colonoscopy found > 10 adenomas) ? Abn. ct

## 2018-04-11 NOTE — ANESTHESIA PREPROCEDURE EVALUATION
04/11/2018  Fifi Mcnair is a 67 y.o., female.    Pre-op Assessment    I have reviewed the Patient Summary Reports.     I have reviewed the Nursing Notes.   I have reviewed the Medications.     Review of Systems  Anesthesia Hx:  No problems with previous Anesthesia Denies Hx of Anesthetic complications  Denies Family Hx of Anesthesia complications.   Denies Personal Hx of Anesthesia complications.   Social:  Former Smoker    Cardiovascular:   Exercise tolerance: poor Hypertension Dysrhythmias atrial fibrillation Denies Angina. CHF    Pulmonary:   COPD Asthma Recent URI, resolved Sleep Apnea, CPAP    Renal/:   Chronic Renal Disease, CRI    Hepatic/GI:   PUD, GERD Denies Liver Disease.    Musculoskeletal:   Arthritis     Neurological:   TIA, Denies Seizures.    Endocrine:   Diabetes Hypothyroidism    Psych:   Psychiatric History depression          Physical Exam  General:  Well nourished, Obesity    Airway/Jaw/Neck:  Airway Findings: Mouth Opening: Normal Tongue: Normal  General Airway Assessment: Adult, Good  Mallampati: II  Improves to II with phonation.  TM Distance: 4-6 cm      Dental:  Dental Findings: In tact   Chest/Lungs:  Chest/Lungs Findings: Clear to auscultation, Normal Respiratory Rate     Heart/Vascular:  Heart Findings: Rate: Normal  Rhythm: Regular Rhythm  Sounds: Normal  Heart murmur: negative       Mental Status:  Mental Status Findings:  Cooperative, Alert and Oriented         Anesthesia Plan  Type of Anesthesia, risks & benefits discussed:  Anesthesia Type:  general  Patient's Preference:   Intra-op Monitoring Plan:   Intra-op Monitoring Plan Comments:   Post Op Pain Control Plan:   Post Op Pain Control Plan Comments:   Induction:   IV  Beta Blocker:  Patient is on a Beta-Blocker and has received one dose within the past 24 hours (No further documentation required).       Informed  Consent: Patient understands risks and agrees with Anesthesia plan.  Questions answered. Anesthesia consent signed with patient.  ASA Score: 3     Day of Surgery Review of History & Physical:    H&P update referred to the surgeon.         Ready For Surgery From Anesthesia Perspective.

## 2018-04-11 NOTE — DISCHARGE INSTRUCTIONS
Understanding Colon and Rectal Polyps    The colon (also called the large intestine) is a muscular tube that forms the last part of the digestive tract. It absorbs water and stores food waste. The colon is about 4 to 6 feet long. The rectum is the last 6 inches of the colon. The colon and rectum have a smooth lining composed of millions of cells. Changes in these cells can lead to growths in the colon that can become cancerous and should be removed. Multiple tests are available to screen for colon cancer, but the colonoscopy is the most recommended test. During colonoscopy, these polyps can be removed. How often you need this test depends on many things including your condition, your family history, symptoms, and what the findings were at the previous colonoscopy.   When the colon lining changes  Changes that happen in the cells that line the colon or rectum can lead to growths called polyps. Over a period of years, polyps can turn cancerous. Removing polyps early may prevent cancer from ever forming.  Polyps  Polyps are fleshy clumps of tissue that form on the lining of the colon or rectum. Small polyps are usually benign (not cancerous). However, over time, cells in a polyp can change and become cancerous. Certain types of polyps known as adenomatous polyps are premalignant. The risk for invasive cancer increases with the size of the polyp and certain cell and gene features. This means that they can become cancerous if they're not removed. Hyperplastic polyps are benign. They can grow quite large and not turn cancerous.   Cancer  Almost all colorectal cancers start when polyp cells begin growing abnormally. As a cancerous tumor grows, it may involve more and more of the colon or rectum. In time, cancer can also grow beyond the colon or rectum and spread to nearby organs or to glands called lymph nodes. The cells can also travel to other parts of the body. This is known as metastasis. The earlier a cancerous  tumor is removed, the better the chance of preventing its spread.    Date Last Reviewed: 8/1/2016  © 8674-2019 [employer]. [address] [place] [address], [place] [address]. All rights reserved. This information is not intended as a substitute for professional medical care. Always follow your healthcare professional's instructions.        Hemorrhoids    Hemorrhoids are swollen and inflamed veins inside the rectum and near the anus. The rectum is the last several inches of the colon. The anus is the passage between the rectum and the outside of the body.  Causes  The veins can become swollen due to increased pressure in them. This is most often caused by:  · Chronic constipation or diarrhea  · Straining when having a bowel movement  · Sitting too long on the toilet  · A low-fiber diet  · Pregnancy  Symptoms  · Bleeding from the rectum (this may be noticeable after bowel movements)  · Lump near the anus  · Itching around the anus  · Pain around the anus  There are different types of hemorrhoids. Depending on the type you have and the severity, you may be able to treat yourself at home. In some cases, a procedure may be the best treatment option. Your healthcare provider can tell you more about this, if needed.  Home care  General care  · To get relief from pain or itching, try:  ¨ Topical products. Your healthcare provider may prescribe or recommend creams, ointments, or pads that can be applied to the hemorrhoid. Use these exactly as directed.  ¨ Medicines. Your healthcare provider may recommend stool softeners, suppositories, or laxatives to help manage constipation. Use these exactly as directed.  ¨ Sitz baths. A sitz bath involves sitting in a few inches of warm bath water. Be careful not to make the water so hot that you burn yourself--test it before sitting in it. Soak for about 10 to 15 minutes a few times a day. This may help relieve pain.  Tips to help prevent hemorrhoids  · Eat more fiber. Fiber adds  bulk to stool and absorbs water as it moves through your colon. This makes stool softer and easier to pass.  ¨ Increase the fiber in your diet with more fiber-rich foods. These include fresh fruit, vegetables, and whole grains.  ¨ Take a fiber supplement or bulking agent, if advised to by your provider. These include products such as psyllium or methylcellulose.  · Drink plenty of water, if directed to by your provider. This can help keep stool soft.  · Be more active. Frequent exercise aids digestion and helps prevent constipation. It may also help make bowel movements more regular.  · Dont strain during bowel movements. This can make hemorrhoids more likely. Also, dont sit on the toilet for long periods of time.  Follow-up care  Follow up with your healthcare provider, or as advised. If a culture or imaging tests were done, you will be notified of the results when they are ready. This may take a few days or longer.  When to seek medical advice  Call your healthcare provider right away if any of these occur:  · Increased bleeding from the rectum  · Increased pain around the rectum or anus  · Weakness or dizziness  Call 911  Call 911 or return to the emergency department right away if any of these occur:  · Trouble breathing or swallowing  · Fainting or loss of consciousness  · Unusually fast heart rate  · Vomiting blood  · Large amounts of blood in stool  Date Last Reviewed: 6/22/2015  © 9176-1672 [employer]. [address] [place] [address]. All rights reserved. This information is not intended as a substitute for professional medical care. Always follow your healthcare professional's instructions.        Diverticulosis    Diverticulosis means that small pouches have formed in the wall of your large intestine (colon). Most often, this problem causes no symptoms and is common as people age. But the pouches in the colon are at risk of becoming infected. When this happens, the condition is  called diverticulitis. Although most people with diverticulosis never develop diverticulitis, it is still not uncommon. Rectal bleeding can also occur and in less common situations, a type of colon inflammation called colitis.  While most people do not have symptoms, some people with diverticulosis may have:  · Abdominal cramps and pain  · Bloating  · Constipation  · Change in bowel habits  Causes  The exact cause of diverticulosis (and diverticulitis) has not been proved, but a few things are associated with the condition:  · Low-fiber diet  · Constipation  · Lack of exercise  Your healthcare provider will talk with you about how to manage your condition. Diet changes may be all that are needed to help control diverticulosis and prevent progression to diverticulitis. If you develop diverticulitis, you will likely need other treatments.  Home care  You may be told to take fiber supplements daily. Fiber adds bulk to the stool so that it passes through the colon more easily. Stool softeners may be recommended. You may also be given medications for pain relief. Be sure to take all medications as directed.  In the past, people were told to avoid corn, nuts, and seeds. This is no longer necessary.  Follow these guidelines when caring for yourself at home:  · Eat unprocessed foods that are high in fiber. Whole grains, fruits, and vegetables are good choices.  · Drink 6 to 8 glasses of water every day unless your healthcare provider has you limit how much fluid you should have.  · Watch for changes in your bowel movements. Tell your provider if you notice any changes.  · Begin an exercise program. Ask your provider how to get started. Generally, walking is the best.  · Get plenty of rest and sleep.  Follow-up care  Follow up with your healthcare provider, or as advised. Regular visits may be needed to check on your health. Sometimes special procedures such as colonoscopy, are needed after an episode of diverticulitis or  blooding. Be sure to keep all your appointments.  If a stool sample was taken, or cultures were done, you should be told if they are positive, or if your treatment needs to be changed. You can call as directed for the results.  If X-rays were done, a radiologist will look at them. You will be told if there is a change in your treatment.  If antibiotics were prescribed, be sure to finish them all.  When to seek medical advice  Call your healthcare provider right away if any of these occur:  · Fever of 100.4°F (38°C) or higher, or as directed by your healthcare provider  · Severe cramps in the lower left side of the abdomen or pain that is getting worse  · Tenderness in the lower left side of the abdomen or worsening pain throughout the abdomen  · Diarrhea or constipation that doesn't get better within 24 hours  · Nausea and vomiting  · Bleeding from the rectum  Call 911  Call emergency services if any of the following occur:  · Trouble breathing  · Confusion  · Very drowsy or trouble awakening  · Fainting or loss of consciousness  · Rapid heart rate  · Chest pain  Date Last Reviewed: 12/30/2015 © 2000-2017 [employer]. [address] [place] [address]. All rights reserved. This information is not intended as a substitute for professional medical care. Always follow your healthcare professional's instructions.        Understanding Colon and Rectal Polyps    The colon (also called the large intestine) is a muscular tube that forms the last part of the digestive tract. It absorbs water and stores food waste. The colon is about 4 to 6 feet long. The rectum is the last 6 inches of the colon. The colon and rectum have a smooth lining composed of millions of cells. Changes in these cells can lead to growths in the colon that can become cancerous and should be removed. Multiple tests are available to screen for colon cancer, but the colonoscopy is the most recommended test. During colonoscopy, these  polyps can be removed. How often you need this test depends on many things including your condition, your family history, symptoms, and what the findings were at the previous colonoscopy.   When the colon lining changes  Changes that happen in the cells that line the colon or rectum can lead to growths called polyps. Over a period of years, polyps can turn cancerous. Removing polyps early may prevent cancer from ever forming.  Polyps  Polyps are fleshy clumps of tissue that form on the lining of the colon or rectum. Small polyps are usually benign (not cancerous). However, over time, cells in a polyp can change and become cancerous. Certain types of polyps known as adenomatous polyps are premalignant. The risk for invasive cancer increases with the size of the polyp and certain cell and gene features. This means that they can become cancerous if they're not removed. Hyperplastic polyps are benign. They can grow quite large and not turn cancerous.   Cancer  Almost all colorectal cancers start when polyp cells begin growing abnormally. As a cancerous tumor grows, it may involve more and more of the colon or rectum. In time, cancer can also grow beyond the colon or rectum and spread to nearby organs or to glands called lymph nodes. The cells can also travel to other parts of the body. This is known as metastasis. The earlier a cancerous tumor is removed, the better the chance of preventing its spread.    Date Last Reviewed: 8/1/2016  © 8673-8594 [employer]. [address] [place] [address], [place] [address]. All rights reserved. This information is not intended as a substitute for professional medical care. Always follow your healthcare professional's instructions.        Colonoscopy     A camera attached to a flexible tube with a viewing lens is used to take video pictures.     Colonoscopy is a test to view the inside of your lower digestive tract (colon and rectum). Sometimes it can show the last part of the  small intestine (ileum). During the test, small pieces of tissue may be removed for testing. This is called a biopsy. Small growths, such as polyps, may also be removed.   Why is colonoscopy done?  The test is done to help look for colon cancer. And it can help find the source of abdominal pain, bleeding, and changes in bowel habits. It may be needed once a year, depending on factors such as your:  · Age  · Health history  · Family health history  · Symptoms  · Results from any prior colonoscopy  Risks and possible complications  These include:  · Bleeding               · A puncture or tear in the colon   · Risks of anesthesia  · A cancer lesion not being seen  Getting ready   To prepare for the test:  · Talk with your healthcare provider about the risks of the test (see below). Also ask your healthcare provider about alternatives to the test.  · Tell your healthcare provider about any medicines you take. Also tell him or her about any health conditions you may have.  · Make sure your rectum and colon are empty for the test. Follow the diet and bowel prep instructions exactly. If you dont, the test may need to be rescheduled.  · Plan for a friend or family member to drive you home after the test.     Colonoscopy provides an inside view of the entire colon.     You may discuss the results with your doctor right away or at a future visit.  During the test   The test is usually done in the hospital on an outpatient basis. This means you go home the same day. The procedure takes about 30 minutes. During that time:  · You are given relaxing (sedating) medicine through an IV line. You may be drowsy, or fully asleep.  · The healthcare provider will first give you a physical exam to check for anal and rectal problems.  · Then the anus is lubricated and the scope inserted.  · If you are awake, you may have a feeling similar to needing to have a bowel movement. You may also feel pressure as air is pumped into the colon.  Its OK to pass gas during the procedure.  · Biopsy, polyp removal, or other treatments may be done during the test.  After the test   You may have gas right after the test. It can help to try to pass it to help prevent later bloating. Your healthcare provider may discuss the results with you right away. Or you may need to schedule a follow-up visit to talk about the results. After the test, you can go back to your normal eating and other activities. You may be tired from the sedation and need to rest for a few hours.  Date Last Reviewed: 11/1/2016  © 0303-8720 [employer]. [address] [place] [address]. All rights reserved. This information is not intended as a substitute for professional medical care. Always follow your healthcare professional's instructions.      Have you had a colonoscopy LESS THAN 3 years ago?   * If YES, answer these questions*:    1. Did patient have a prior colonic polyp in a previous surveillance/diagnostic colonoscopy and is 18 years or older on date of encounter?    2. Documentation of < 3 year interval since the patients last colonoscopy due to medical reasons (eg., last colonoscopy incomplete, last colonoscopy had inadequate prep, piecemeal removal of adenomas, or last colonoscopy found > 10 adenomas) ?

## 2018-04-11 NOTE — ANESTHESIA POSTPROCEDURE EVALUATION
"Anesthesia Post Evaluation    Patient: Fifi Mcnair    Procedure(s) Performed: Procedure(s) (LRB):  COLONOSCOPY (N/A)    Final Anesthesia Type: general  Patient location during evaluation: PACU  Patient participation: Yes- Able to Participate  Level of consciousness: awake and alert  Post-procedure vital signs: reviewed and stable  Pain management: adequate  Airway patency: patent  PONV status at discharge: No PONV  Anesthetic complications: no      Cardiovascular status: blood pressure returned to baseline  Respiratory status: unassisted  Hydration status: euvolemic  Follow-up not needed.        Visit Vitals  BP (!) 170/75   Pulse 76   Temp 36.4 °C (97.6 °F) (Temporal)   Resp 16   Ht 5' 2" (1.575 m)   Wt 101.6 kg (224 lb)   LMP  (LMP Unknown)   SpO2 100%   Breastfeeding? No   BMI 40.97 kg/m²       Pain/Mohamud Score: Pain Assessment Performed: Yes (4/11/2018 10:20 AM)  Presence of Pain: denies (4/11/2018 10:20 AM)  Pain Rating Prior to Med Admin: 0 (4/11/2018 10:20 AM)  Pain Rating Post Med Admin: 0 (4/11/2018 10:20 AM)  Mohamud Score: 10 (4/11/2018 10:19 AM)      "

## 2018-04-12 VITALS
BODY MASS INDEX: 41.22 KG/M2 | OXYGEN SATURATION: 100 % | TEMPERATURE: 98 F | HEIGHT: 62 IN | RESPIRATION RATE: 16 BRPM | WEIGHT: 224 LBS | SYSTOLIC BLOOD PRESSURE: 170 MMHG | HEART RATE: 76 BPM | DIASTOLIC BLOOD PRESSURE: 75 MMHG

## 2018-04-18 ENCOUNTER — PATIENT MESSAGE (OUTPATIENT)
Dept: GASTROENTEROLOGY | Facility: CLINIC | Age: 68
End: 2018-04-18

## 2018-04-28 DIAGNOSIS — K21.9 GASTROESOPHAGEAL REFLUX DISEASE WITHOUT ESOPHAGITIS: ICD-10-CM

## 2018-04-28 DIAGNOSIS — E87.6 HYPOKALEMIA: ICD-10-CM

## 2018-04-30 ENCOUNTER — OFFICE VISIT (OUTPATIENT)
Dept: NEUROSURGERY | Facility: CLINIC | Age: 68
End: 2018-04-30
Payer: MEDICARE

## 2018-04-30 ENCOUNTER — HOSPITAL ENCOUNTER (OUTPATIENT)
Dept: RADIOLOGY | Facility: CLINIC | Age: 68
Discharge: HOME | End: 2018-04-30
Attending: THORACIC SURGERY (CARDIOTHORACIC VASCULAR SURGERY)
Payer: MEDICARE

## 2018-04-30 VITALS
HEIGHT: 62 IN | DIASTOLIC BLOOD PRESSURE: 67 MMHG | WEIGHT: 233.94 LBS | HEART RATE: 90 BPM | BODY MASS INDEX: 43.05 KG/M2 | SYSTOLIC BLOOD PRESSURE: 127 MMHG

## 2018-04-30 DIAGNOSIS — M48.061 SPINAL STENOSIS OF LUMBAR REGION, UNSPECIFIED WHETHER NEUROGENIC CLAUDICATION PRESENT: Primary | ICD-10-CM

## 2018-04-30 DIAGNOSIS — M54.16 LUMBAR RADICULOPATHY: ICD-10-CM

## 2018-04-30 DIAGNOSIS — R60.0 LOCALIZED EDEMA: ICD-10-CM

## 2018-04-30 PROCEDURE — 93970 EXTREMITY STUDY: CPT | Mod: TC,PO

## 2018-04-30 PROCEDURE — 3074F SYST BP LT 130 MM HG: CPT | Mod: S$GLB,,, | Performed by: PHYSICIAN ASSISTANT

## 2018-04-30 PROCEDURE — 99999 PR PBB SHADOW E&M-EST. PATIENT-LVL V: CPT | Mod: PBBFAC,,, | Performed by: PHYSICIAN ASSISTANT

## 2018-04-30 PROCEDURE — 99214 OFFICE O/P EST MOD 30 MIN: CPT | Mod: S$GLB,,, | Performed by: PHYSICIAN ASSISTANT

## 2018-04-30 PROCEDURE — 93970 EXTREMITY STUDY: CPT | Mod: 26,,, | Performed by: RADIOLOGY

## 2018-04-30 PROCEDURE — 3078F DIAST BP <80 MM HG: CPT | Mod: S$GLB,,, | Performed by: PHYSICIAN ASSISTANT

## 2018-04-30 RX ORDER — POTASSIUM CHLORIDE 1500 MG/1
TABLET, EXTENDED RELEASE ORAL
Qty: 180 TABLET | Refills: 3 | Status: SHIPPED | OUTPATIENT
Start: 2018-04-30 | End: 2019-11-27 | Stop reason: SDUPTHER

## 2018-05-03 ENCOUNTER — OFFICE VISIT (OUTPATIENT)
Dept: VASCULAR SURGERY | Facility: CLINIC | Age: 68
End: 2018-05-03
Payer: MEDICARE

## 2018-05-03 VITALS
SYSTOLIC BLOOD PRESSURE: 143 MMHG | DIASTOLIC BLOOD PRESSURE: 73 MMHG | HEIGHT: 62 IN | BODY MASS INDEX: 43 KG/M2 | WEIGHT: 233.69 LBS | HEART RATE: 84 BPM

## 2018-05-03 DIAGNOSIS — R60.0 LOWER EXTREMITY EDEMA: Primary | ICD-10-CM

## 2018-05-03 PROCEDURE — 3078F DIAST BP <80 MM HG: CPT | Mod: S$GLB,,, | Performed by: THORACIC SURGERY (CARDIOTHORACIC VASCULAR SURGERY)

## 2018-05-03 PROCEDURE — 3008F BODY MASS INDEX DOCD: CPT | Mod: S$GLB,,, | Performed by: THORACIC SURGERY (CARDIOTHORACIC VASCULAR SURGERY)

## 2018-05-03 PROCEDURE — 3077F SYST BP >= 140 MM HG: CPT | Mod: S$GLB,,, | Performed by: THORACIC SURGERY (CARDIOTHORACIC VASCULAR SURGERY)

## 2018-05-03 PROCEDURE — 99999 PR PBB SHADOW E&M-EST. PATIENT-LVL III: CPT | Mod: PBBFAC,,, | Performed by: THORACIC SURGERY (CARDIOTHORACIC VASCULAR SURGERY)

## 2018-05-03 PROCEDURE — 99205 OFFICE O/P NEW HI 60 MIN: CPT | Mod: S$GLB,,, | Performed by: THORACIC SURGERY (CARDIOTHORACIC VASCULAR SURGERY)

## 2018-05-03 NOTE — PROGRESS NOTES
OFFICE VISIT NOTE    I was asked to see this patient by Dr. Vargas.  The patient is a 67-year-old   female who has been experiencing edema of the right thigh over the last two   months.  In reality, it is unclear as to how long the thigh has been swollen   since she did not notice it.  She states that her daughter came to see her in   about two months ago and I asked her what was wrong with her thigh.  She really   did not know what her daughter was talking about and when her daughter pointed   it out.  She states that it was obvious that the right thigh was significantly   larger than the left thigh.  The patient does have a history of trauma when she   fell and hit her right knee just below the knee and the knee itself has been   hurting her.  However, that trauma occurred after the swelling of the right   thigh was noted.  She denies any pain in the right thigh and cannot feel any   masses.  She denies any infections or cellulitis.    PAST MEDICAL HISTORY:  The patient was very obese and had a gastric sleeve   operation done and she lost a lot of weight.  She underwent a panniculectomy on   11/28/2016 and states that the wound got infected and had to be opened up.  It   has since healed.  She also has a anxiety, arthritis, asthma, congestive heart   failure, New York Heart Association class III chronic kidney disease, colon   polyps, chronic obstructive pulmonary disease, mild pulmonary hypertension,   depression, diabetes mellitus, diabetic retinopathy, diastolic dysfunction,   diverticulitis, fracture of the lumbar spine, gastroesophageal reflux disease,   hyperlipidemia, hypertension, irritable bowel syndrome, nuclear sclerosis,   osteoporosis, sleep apnea, hypothyroidism, transient ischemic attack, and   urinary tract infection.    PAST SURGICAL HISTORY:  Rhinoplasty, tonsillectomy, tubal ligation, cystoscopy,   adenoidectomy, hysterectomy, hernia repair, sleeve gastrectomy, panniculectomy,   and  colonoscopy.    ALLERGIES:  Adhesives, Cleocin, Ceclor, Advair Diskus, Aleve, erythromycin,   Levaquin, Macrobid, Motrin, Restoril, sulfa, trazodone, Remeron, and vancomycin.    MEDICATIONS:  Zyloprim, Elavil, Tenormin, Rocaltrol, calcium citrate, Zyrtec,   Lotrisone, denosumab, Dymista, Prozac, Flonase, Breo, insulin, Klor-Con,   Synthroid, Ativan, Cozaar, Zaroxolyn, Singulair, Roxicodone, Protonix, Zantac,   and Demadex.    FAMILY HISTORY:  Lung cancer, diabetes, ovarian cancer, asthma, depression,   coronary artery disease, breast cancer, throat cancer, and hypothyroidism.    SOCIAL HISTORY:  The patient used to smoke cigarettes, but quit smoking in 1990.    Drinks alcohol occasionally.    REVISION OF SYSTEMS:  She complains of significant edema of the right thigh   without edema of the right leg, ankle, or foot.  She complains of pain in the   right knee and the proximal right leg from a trauma when she fell and hit her   knee about six weeks ago.  All other systems are reviewed and are negative.    PHYSICAL EXAMINATION:  VITAL SIGNS:  Blood pressure is 143/73, respiratory rate is 18, heart rate is   84, height 5 feet 2 inches, weight 106 kg.  GENERAL:  She is awake, alert, pleasant female in no apparent distress.  HEENT:  Normocephalic.  Pupils are equal, round and reactive.  Sclerae are   anicteric.  NECK:  Supple.  Trachea midline.  No masses.  LUNGS:  Clear.  HEART:  Has a regular rate and rhythm.  ABDOMEN:  Soft and nontender.  No masses.  She has a scar in the lower abdomen   from previous panniculectomy.   EXTREMITIES:  The right thigh is significantly larger than the left thigh.    There are no palpable masses.  There are no indurations and no tenderness.  I do   not see any significant varicose veins.  The patient has no edema of the legs,   ankles, or feet.  She has palpable posterior tibial pulses bilaterally, which   are normal palpable pulses.  NEUROLOGIC:  Awake, alert and oriented.  No  lateralizing neurologic deficits.    Ultrasound of the veins of the lower extremities showed venous insufficiency of   the right greater saphenous vein with reflux time of 1194 milliseconds.  The   right greater saphenous vein measured 8 mm at the saphenofemoral junction, but   distal to that, the vein was normal, measuring 5 mm at the mid thigh and 3 mm at   the knee.  The right lesser saphenous vein showed no evidence of venous   insufficiency.  There was no venous insufficiency in the left lower extremity.    Nonvascular ultrasound of the right lower extremity showed a 7 mm nonspecific   peripherally calcified hypoechoic mass in the anteromedial soft tissues of the   upper right leg.  This is in the area of previous trauma and is thought to be   hematoma secondary to trauma.  Ultrasound also showed a Baker's cyst.      The patient recently had abdominal pain and a CT scan was performed.  This   showed diverticulosis and findings most likely representing mild acute   diverticulitis of the sigmoid colon without perforation or abscess.  The last   study for this was done on 02/12/2018.  On that CT scan, a small hiatal hernia   was noted and there was diffuse fatty infiltration of the liver.  There was   evidence for a probable cholelithiasis, but there were no findings of acute   cholecystitis.  There was no abdominal aortic aneurysm.  The pancreas, adrenal   glands, and kidneys were unremarkable.  There were postoperative changes of the   anterior abdominal wall and evidence of prior hysterectomy.  Diverticulosis was   seen and there was very mild eccentric wall thickening of the sigmoid colon with   mild pericolonic fat stranding.  These findings were improved compared to the   CT scan done about a week earlier.    IMPRESSION:  1.  Edema of the right thigh.  2.  Trauma to the right knee.  3.  Hematoma of the right leg.  4.  Right Baker's cyst.  5.  Venous insufficiency of the right greater saphenous vein.  6.   Status post sleeve gastrectomy.  7.  Status post panniculectomy.  8.  Congestive heart failure, New York Heart Association class III.  9.  Chronic kidney disease.  10.  Colon polyps.  11.  Chronic obstructive pulmonary disease.  13.  Diabetes mellitus.  14.  Diastolic dysfunction.  15.  Diabetic retinopathy.  16.  Diverticulitis of the sigmoid colon.  17.  Hyperlipidemia.  18.  Hypertension.  19.  Irritable bowel syndrome.  20.  Osteoporosis.  21.  Sleep apnea.  22.  History of transient ischemic attack.    RECOMMENDATIONS:  The patient has a significantly enlarged right thigh compared   with her left eye.  She has no edema of the right leg.  She wears compression   stockings, which are knee high stockings nearly every day.  This could account   for the fact that she has no edema, but this usually does not happen where there   is significant edema of the thigh and no edema of the leg.  There are no masses   that can be felt.  She has venous insufficiency of the right greater saphenous   vein, but the distribution of the edema is not typical of what is seen with   venous insufficiency.  This venous insufficiency will not cause edema of the   thigh only, going all the way up to the groin and hip.  A recent CT scan did not   show any findings in the abdomen and pelvis that are concerning for obstruction   of the venous outflow of the lower extremities.  This patient could have some   sort of lymphedema, although usually there will be edema of the leg also.  We   will get a lymphoscintigram.  At some point, another imaging study of the thigh,   abdomen, and pelvis will be necessary.  In the meanwhile, the patient will   continue to wear her compression stockings and elevate the legs as much as   possible.  The patient has other reasons for edema.  She has chronic kidney   disease, she has congestive heart failure, diastolic dysfunction, and   hypothyroidism.  The patient was morbidly obese at one point, but obesity  alone   would not account for this significant a discrepancy in size of the right thigh   compared to the left thigh.  We will get the lymphoscintigram first and further   workup will depend on findings of the study.      PA[place]  dd: 05/03/2018 15:23:12 (CDT)  td: 05/04/2018 07:29:44 (CDT)  Doc ID   #[id number]  Job ID #[id number]    CC:

## 2018-05-03 NOTE — LETTER
May 3, 2018      Werner Vargas MD  [address] [facility] [place] [address]           [place] [facility]  [address] [facility] [address] [place] [address]  Phone: [phone]          Patient: Fifi Mcnair   MR Number: [id number]   YOB: 1950   Date of Visit: 5/3/2018       Dear Dr. Werner Vargas:    Thank you for referring Fifi Mcnair to me for evaluation. Attached you will find relevant portions of my assessment and plan of care.    If you have questions, please do not hesitate to call me. I look forward to following Fifi Mcnair along with you.    Sincerely,    Basilio Varela MD    Enclosure  CC:  No Recipients    If you would like to receive this communication electronically, please contact [email or web address] or [phone] to request more information on [facility] access.    For providers and/or their staff who would like to refer a patient to [facility], please contact us through our one-stop-shop provider referral line, Jean-Paul Herman, at [phone].    If you feel you have received this communication in error or would no longer like to receive these types of communications, please e-mail [email or web address]

## 2018-05-07 ENCOUNTER — CLINICAL SUPPORT (OUTPATIENT)
Dept: FAMILY MEDICINE | Facility: CLINIC | Age: 68
End: 2018-05-07
Attending: NURSE PRACTITIONER
Payer: MEDICARE

## 2018-05-07 ENCOUNTER — OFFICE VISIT (OUTPATIENT)
Dept: FAMILY MEDICINE | Facility: CLINIC | Age: 68
End: 2018-05-07
Payer: MEDICARE

## 2018-05-07 ENCOUNTER — HOSPITAL ENCOUNTER (OUTPATIENT)
Dept: RADIOLOGY | Facility: CLINIC | Age: 68
Discharge: HOME | End: 2018-05-07
Attending: NURSE PRACTITIONER
Payer: MEDICARE

## 2018-05-07 ENCOUNTER — TELEPHONE (OUTPATIENT)
Dept: VASCULAR SURGERY | Facility: CLINIC | Age: 68
End: 2018-05-07

## 2018-05-07 VITALS
DIASTOLIC BLOOD PRESSURE: 68 MMHG | HEART RATE: 82 BPM | TEMPERATURE: 99 F | HEIGHT: 62 IN | BODY MASS INDEX: 43.53 KG/M2 | WEIGHT: 236.56 LBS | SYSTOLIC BLOOD PRESSURE: 130 MMHG

## 2018-05-07 DIAGNOSIS — H10.9 BACTERIAL CONJUNCTIVITIS OF BOTH EYES: ICD-10-CM

## 2018-05-07 DIAGNOSIS — I10 ESSENTIAL HYPERTENSION: ICD-10-CM

## 2018-05-07 DIAGNOSIS — R60.0 LOCALIZED EDEMA: Primary | ICD-10-CM

## 2018-05-07 DIAGNOSIS — J44.1 CHRONIC OBSTRUCTIVE PULMONARY DISEASE WITH ACUTE EXACERBATION: ICD-10-CM

## 2018-05-07 DIAGNOSIS — B96.89 BACTERIAL CONJUNCTIVITIS OF BOTH EYES: ICD-10-CM

## 2018-05-07 PROBLEM — K57.32 DIVERTICULITIS OF LARGE INTESTINE WITHOUT PERFORATION OR ABSCESS WITHOUT BLEEDING: Status: RESOLVED | Noted: 2018-02-12 | Resolved: 2018-05-07

## 2018-05-07 PROBLEM — I50.9 CARDIAC FAILURE: Status: RESOLVED | Noted: 2018-02-05 | Resolved: 2018-05-07

## 2018-05-07 PROBLEM — M47.817 OSTEOARTHRITIS OF LUMBOSACRAL SPINE WITHOUT MYELOPATHY: Status: RESOLVED | Noted: 2018-02-05 | Resolved: 2018-05-07

## 2018-05-07 PROBLEM — R93.89 ABNORMAL CT SCAN: Status: RESOLVED | Noted: 2018-04-11 | Resolved: 2018-05-07

## 2018-05-07 PROBLEM — N28.9 RENAL INSUFFICIENCY: Status: RESOLVED | Noted: 2018-02-05 | Resolved: 2018-05-07

## 2018-05-07 PROCEDURE — 99214 OFFICE O/P EST MOD 30 MIN: CPT | Mod: S$GLB,,, | Performed by: NURSE PRACTITIONER

## 2018-05-07 PROCEDURE — 99999 PR PBB SHADOW E&M-EST. PATIENT-LVL V: CPT | Mod: PBBFAC,,, | Performed by: NURSE PRACTITIONER

## 2018-05-07 PROCEDURE — 71046 X-RAY EXAM CHEST 2 VIEWS: CPT | Mod: 26,,, | Performed by: RADIOLOGY

## 2018-05-07 PROCEDURE — 3045F PR MOST RECENT HEMOGLOBIN A1C LEVEL 7.0-9.0%: CPT | Mod: S$GLB,,, | Performed by: NURSE PRACTITIONER

## 2018-05-07 PROCEDURE — 3075F SYST BP GE 130 - 139MM HG: CPT | Mod: S$GLB,,, | Performed by: NURSE PRACTITIONER

## 2018-05-07 PROCEDURE — 3078F DIAST BP <80 MM HG: CPT | Mod: S$GLB,,, | Performed by: NURSE PRACTITIONER

## 2018-05-07 PROCEDURE — 71046 X-RAY EXAM CHEST 2 VIEWS: CPT | Mod: TC,FY,PO

## 2018-05-07 RX ORDER — DOXYCYCLINE HYCLATE 100 MG
100 TABLET ORAL 2 TIMES DAILY
Qty: 10 TABLET | Refills: 0 | Status: SHIPPED | OUTPATIENT
Start: 2018-05-07 | End: 2018-05-12

## 2018-05-07 RX ORDER — IPRATROPIUM BROMIDE AND ALBUTEROL SULFATE 2.5; .5 MG/3ML; MG/3ML
3 SOLUTION RESPIRATORY (INHALATION) EVERY 6 HOURS PRN
Qty: 1 BOX | Refills: 3 | Status: SHIPPED | OUTPATIENT
Start: 2018-05-07 | End: 2020-01-08 | Stop reason: SDUPTHER

## 2018-05-07 RX ORDER — POLYMYXIN B SULFATE AND TRIMETHOPRIM 1; 10000 MG/ML; [USP'U]/ML
1 SOLUTION OPHTHALMIC EVERY 4 HOURS
Qty: 10 ML | Refills: 0 | Status: SHIPPED | OUTPATIENT
Start: 2018-05-07 | End: 2019-04-10

## 2018-05-07 NOTE — TELEPHONE ENCOUNTER
Spoke to patient, lymphoscintigram ordered and scheduled per Dr Varela's office visit note on 5/3/18, scheduled on 6/4/18 12:00 pm @ [facility] radiology 2nd floor, no aspirin 5 days prior to procedure, voices understanding, will call back as needed.

## 2018-05-07 NOTE — PROGRESS NOTES
Fifi Mcnair is a 67 y.o. female here for a diabetic eye screening with non-dilated fundus photos per Dr ASIF Vargas.    Patient cooperative?: yes  Small pupils?:yes  Last eye exam: 01/2017    For exam results, see Encounter Report.

## 2018-05-07 NOTE — PROGRESS NOTES
"Subjective:       Patient ID: Fifi Mcnair is a 67 y.o. female.    Chief Complaint: Hypertension (dm)    Ms. Mcnair presents to the clinic today for routine follow up but she has multiple issues.  She has cough, congestion, mucous production.  She has run out of albuterol.  She has a history of COPD.  She has hypertension which is well controlled.  She has uncontrolled diabetes and she is due for A1c.  She has bilateral eye redness and yellow drainage.  She woke up this morning and her left eye was "glued shut".  She denies eye pain. She does not wear contacts.  She is seeing Dr. Coats for right thigh edema.  She is seeing Orthopedics for bilateral knee pain.  She is due for eye exam.  She had pneumovax at [employer].      Review of Systems   Constitutional: Negative for chills and fever.   HENT: Negative for congestion, ear pain and sinus pressure.    Eyes: Negative for visual disturbance.   Respiratory: Positive for cough, shortness of breath and wheezing.    Cardiovascular: Positive for leg swelling. Negative for chest pain and palpitations.   Gastrointestinal: Negative for abdominal pain, constipation and diarrhea.       Objective:      Physical Exam   Constitutional: She is oriented to person, place, and time. She appears well-nourished. No distress.   HENT:   Head: Normocephalic and atraumatic.   Right Ear: External ear normal.   Left Ear: External ear normal.   Mouth/Throat: Oropharynx is clear and moist. No oropharyngeal exudate.   Eyes: Pupils are equal, round, and reactive to light. Right eye exhibits no discharge. Left eye exhibits no discharge.   Neck: Neck supple. No thyromegaly present.   Cardiovascular: Normal rate and regular rhythm.  Exam reveals distant heart sounds. Exam reveals no gallop and no friction rub.    No murmur heard.  No significant edema noted to legs today   Pulmonary/Chest: Effort normal. No respiratory distress. She has no decreased breath sounds. She has no wheezes. She has " rhonchi. She has no rales.   Abdominal: Soft. She exhibits no distension. There is no tenderness.   Lymphadenopathy:     She has no cervical adenopathy.   Neurological: She is alert and oriented to person, place, and time. Coordination normal.   Skin: Skin is warm and dry.   Psychiatric: She has a normal mood and affect. Her behavior is normal. Thought content normal.   Vitals reviewed.          Current Outpatient Prescriptions:     allopurinol (ZYLOPRIM) 100 MG tablet, TAKE TWO TABLETS BY MOUTH NIGHTLY, Disp: 180 tablet, Rfl: 4    amitriptyline (ELAVIL) 50 MG tablet, Take 1 tablet (50 mg total) by mouth every evening., Disp: 30 tablet, Rfl: 11    atenolol (TENORMIN) 25 MG tablet, Take 1 tablet (25 mg total) by mouth once daily., Disp: 90 tablet, Rfl: 0    calcitRIOL (ROCALTROL) 0.25 MCG Cap, Take 1 capsule by mouth twice a week. Monday Friday, Disp: , Rfl:     CALCIUM CITRATE/VITAMIN D3 (CALCIUM CITRATE + D ORAL), Take 400 mg by mouth 3 (three) times daily., Disp: , Rfl:     cetirizine (ZYRTEC) 10 MG tablet, Take 1 tablet (10 mg total) by mouth once daily., Disp: 1 Bottle, Rfl: 5    clotrimazole-betamethasone 1-0.05% (LOTRISONE) cream, , Disp: , Rfl:     cyanocobalamin, vitamin B-12, (VITAMIN B-12) 5,000 mcg Subl, Place 5,000 mg under the tongue once a week., Disp: , Rfl:     DENOSUMAB (PROLIA SUBQ), Inject into the skin every 6 (six) months. , Disp: , Rfl:     DIABETIC SUPPLIES,MISCELL (MEDTRONIC REMOTE CONTROL MISC), No Sig Provided, Disp: , Rfl:     diphenoxylate-atropine 2.5-0.025 mg (LOMOTIL) 2.5-0.025 mg per tablet, TAKE ONE TABLET BY MOUTH 4 TIMES DAILY AS NEEDED FOR DIARRHEA, Disp: 60 tablet, Rfl: 1    DYMISTA 137-50 mcg/spray [place] nassal spray, , Disp: , Rfl:     fluoxetine (PROZAC) 20 MG capsule, TAKE TWO CAPSULES BY MOUTH ONCE DAILY, Disp: 180 capsule, Rfl: 4    fluticasone (FLONASE) 50 mcg/actuation nasal spray, 2 sprays by Nasal route once daily. , Disp: , Rfl:     fluticasone-vilanterol  (BREO) 100-25 mcg/dose diskus inhaler, Inhale 1 puff into the lungs once daily., Disp: , Rfl:     INSULIN ASPART (NOVOLOG FLEXPEN U-100 INSULIN SUBQ), Inject into the skin., Disp: , Rfl:     KLOR-CON M20 20 mEq tablet, TAKE ONE TABLET BY MOUTH TWICE DAILY, Disp: 180 tablet, Rfl: 3    l-methylfolate-b2-b6-b12 (CEREFOLIN) 6-5-50-1 mg Tab, Take 1 tablet by mouth once daily., Disp: , Rfl:     Lacto.acidophilus-Bif.animalis (PROBIOTIC) 5 billion cell CpSP, Take 1 capsule by mouth once daily., Disp: 30 capsule, Rfl: 3    levothyroxine (SYNTHROID) 25 MCG tablet, TAKE ONE TABLET BY MOUTH ONCE DAILY, Disp: 90 tablet, Rfl: 3    LORazepam (ATIVAN) 1 MG tablet, Take 1 tablet (1 mg total) by mouth every evening., Disp: 30 tablet, Rfl: 2    losartan (COZAAR) 25 MG tablet, Take 1 tablet by mouth once daily., Disp: , Rfl:     melatonin 3 mg Tab, Take 5 mg by mouth every evening. 10 MG NIGHTLY, Disp: , Rfl:     metronidazole (METROGEL) 0.75 % gel, Apply topically 2 (two) times daily., Disp: 45 g, Rfl: 1    montelukast (SINGULAIR) 10 mg tablet, Take 10 mg by mouth once daily. , Disp: , Rfl:     MULTIVIT-IRON-MIN-FOLIC ACID 3,500-18-0.4 UNIT-MG-MG ORAL CHEW, Take by mouth 2 (two) times daily., Disp: , Rfl:     oxyCODONE (ROXICODONE) 15 MG Tab, Take 1 tablet (15 mg total) by mouth every 6 (six) hours as needed for Pain., Disp: 30 tablet, Rfl: 0    oxyMORphone (OPANA ER) 10 MG 12 hr tablet, Take 1 tablet by mouth 2 (two) times daily as needed., Disp: , Rfl:     pantoprazole (PROTONIX) 40 MG tablet, TAKE ONE TABLET BY MOUTH ONCE DAILY, Disp: 90 tablet, Rfl: 3    ranitidine (ZANTAC) 300 MG tablet, TAKE ONE TABLET BY MOUTH IN THE EVENING, Disp: 30 tablet, Rfl: 1    torsemide (DEMADEX) 20 MG Tab, Take 2 tablets (40 mg total) by mouth once daily., Disp: 60 tablet, Rfl: 0    albuterol-ipratropium 2.5mg-0.5mg/3mL (DUO-NEB) 0.5 mg-3 mg(2.5 mg base)/3 mL nebulizer solution, Take 3 mLs by nebulization every 6 (six) hours as  needed for Wheezing. Rescue, Disp: 1 Box, Rfl: 3    doxycycline (VIBRA-TABS) 100 MG tablet, Take 1 tablet (100 mg total) by mouth 2 (two) times daily., Disp: 10 tablet, Rfl: 0    metOLazone (ZAROXOLYN) 5 MG tablet, Take 1 tablet (5 mg total) by mouth daily as needed (swelling)., Disp: 30 tablet, Rfl: 11    polymyxin B sulf-trimethoprim (POLYTRIM) 10,000 unit- 1 mg/mL Drop, Place 1 drop into both eyes every 4 (four) hours., Disp: 10 mL, Rfl: 0  Assessment:       1. Uncontrolled type 2 diabetes mellitus with stage 3 chronic kidney disease, with long-term current use of insulin    2. Bacterial conjunctivitis of both eyes    3. Chronic obstructive pulmonary disease with acute exacerbation    4. Essential hypertension        Plan:       Uncontrolled type 2 diabetes mellitus with stage 3 chronic kidney disease, with long-term current use of insulin  Further recs will be given after labs reviewed.  Will review in 1 week at follow up appointment.  -     Diabetic Eye Screening Photo; Future  -     Hemoglobin A1c; Future; Expected date: 05/07/2018    Bacterial conjunctivitis of both eyes  -     polymyxin B sulf-trimethoprim (POLYTRIM) 10,000 unit- 1 mg/mL Drop; Place 1 drop into both eyes every 4 (four) hours.  Dispense: 10 mL; Refill: 0    Chronic obstructive pulmonary disease with acute exacerbation  Patient declines steroids today due to diabetes.  Will hold off and see how she does on doxycycline.  RTC one week.  -     albuterol-ipratropium 2.5mg-0.5mg/3mL (DUO-NEB) 0.5 mg-3 mg(2.5 mg base)/3 mL nebulizer solution; Take 3 mLs by nebulization every 6 (six) hours as needed for Wheezing. Rescue  Dispense: 1 Box; Refill: 3  -     doxycycline (VIBRA-TABS) 100 MG tablet; Take 1 tablet (100 mg total) by mouth 2 (two) times daily.  Dispense: 10 tablet; Refill: 0  -     X-Ray Chest PA And Lateral; Future; Expected date: 05/07/2018    Essential hypertension  Controlled on current medication.    Patient readiness: acceptance and  barriers:none    During the course of the visit the patient was educated and counseled about the following:     Diabetes:  Discussed general issues about diabetes pathophysiology and management.    Goals: Diabetes: Maintain Hemoglobin A1C below 7    Did patient meet goals/outcomes: No    The following self management tools provided: declined    Patient Instructions (the written plan) was given to the patient/family.     Time spent with patient: 15 minutes    Barriers to medications present (no )    Adverse reactions to current medications (no)    Over the counter medications reviewed (No)

## 2018-05-09 ENCOUNTER — PATIENT MESSAGE (OUTPATIENT)
Dept: FAMILY MEDICINE | Facility: CLINIC | Age: 68
End: 2018-05-09

## 2018-05-09 PROCEDURE — 92250 FUNDUS PHOTOGRAPHY W/I&R: CPT | Mod: S$GLB,,, | Performed by: OPTOMETRIST

## 2018-05-14 ENCOUNTER — OFFICE VISIT (OUTPATIENT)
Dept: FAMILY MEDICINE | Facility: CLINIC | Age: 68
End: 2018-05-14
Payer: MEDICARE

## 2018-05-14 VITALS
DIASTOLIC BLOOD PRESSURE: 52 MMHG | HEART RATE: 84 BPM | WEIGHT: 236.56 LBS | BODY MASS INDEX: 43.53 KG/M2 | SYSTOLIC BLOOD PRESSURE: 127 MMHG | HEIGHT: 62 IN | TEMPERATURE: 99 F | OXYGEN SATURATION: 97 %

## 2018-05-14 DIAGNOSIS — L85.3 XEROSIS OF SKIN: ICD-10-CM

## 2018-05-14 DIAGNOSIS — Z79.4 CONTROLLED TYPE 2 DIABETES MELLITUS WITH COMPLICATION, WITH LONG-TERM CURRENT USE OF INSULIN: ICD-10-CM

## 2018-05-14 DIAGNOSIS — I10 ESSENTIAL HYPERTENSION: ICD-10-CM

## 2018-05-14 DIAGNOSIS — H10.33 ACUTE BACTERIAL CONJUNCTIVITIS OF BOTH EYES: ICD-10-CM

## 2018-05-14 DIAGNOSIS — E11.8 CONTROLLED TYPE 2 DIABETES MELLITUS WITH COMPLICATION, WITH LONG-TERM CURRENT USE OF INSULIN: ICD-10-CM

## 2018-05-14 DIAGNOSIS — J44.1 CHRONIC OBSTRUCTIVE PULMONARY DISEASE WITH ACUTE EXACERBATION: Primary | ICD-10-CM

## 2018-05-14 DIAGNOSIS — E66.01 MORBID OBESITY WITH BMI OF 40.0-44.9, ADULT: ICD-10-CM

## 2018-05-14 PROCEDURE — 99999 PR PBB SHADOW E&M-EST. PATIENT-LVL V: CPT | Mod: PBBFAC,,, | Performed by: NURSE PRACTITIONER

## 2018-05-14 PROCEDURE — 3074F SYST BP LT 130 MM HG: CPT | Mod: S$GLB,,, | Performed by: NURSE PRACTITIONER

## 2018-05-14 PROCEDURE — 3078F DIAST BP <80 MM HG: CPT | Mod: S$GLB,,, | Performed by: NURSE PRACTITIONER

## 2018-05-14 PROCEDURE — 99214 OFFICE O/P EST MOD 30 MIN: CPT | Mod: S$GLB,,, | Performed by: NURSE PRACTITIONER

## 2018-05-14 PROCEDURE — 3045F PR MOST RECENT HEMOGLOBIN A1C LEVEL 7.0-9.0%: CPT | Mod: S$GLB,,, | Performed by: NURSE PRACTITIONER

## 2018-05-14 NOTE — PROGRESS NOTES
"Neurosurgery History & Physical    Patient ID: Fifi Mcnair is a 67 y.o. female.    Chief Complaint   Patient presents with    Lumbar Spine Pain (L-Spine)     pt here for 6 wk f/u with imaging from lumbar radiculopathy and DDD lumbar, had a fall 2 months prior and having pain in RLE and lower back is "catching"       Review of Systems  Constitutional: Negative for chills, diaphoresis, fatigue and fever.   HENT: Negative for congestion, ear pain, rhinorrhea, sneezing, sore throat and tinnitus.    Eyes: Negative for photophobia, pain, redness and visual disturbance.   Respiratory: Negative for cough, chest tightness, shortness of breath and wheezing.    Cardiovascular: Negative for chest pain, palpitations and leg swelling.   Gastrointestinal: Negative for abdominal distention, abdominal pain, constipation, diarrhea, nausea and vomiting.   Genitourinary: Negative for difficulty urinating, dysuria, frequency and urgency.   Musculoskeletal: Positive for back pain. Negative for gait problem, myalgias and neck pain.   Skin: Negative for pallor and rash.   Neurological: Positive for weakness and numbness. Negative for dizziness, seizures, speech difficulty and headaches.   Psychiatric/Behavioral: Negative for confusion and hallucinations.     Past Medical History:   Diagnosis Date    Allergy     multiple antibiotic allergies    Anemia     Anxiety     Arthritis     Asthma     CHF (congestive heart failure)     NYHA class III     Chronic kidney disease     Colon polyp     benign    COPD (chronic obstructive pulmonary disease)     DLCO 37% , and mild pulmonary HTN    Depression     Diabetes mellitus     Diabetic retinopathy     Diastolic dysfunction     Diverticulitis of large intestine without perforation or abscess without bleeding 2/12/2018    Diverticulosis     Former smoker     Fracture of lumbar spine     GERD (gastroesophageal reflux disease)     Hernia of unspecified site of abdominal cavity " without mention of obstruction or gangrene     Hyperlipidemia     Hypertension     hypertensive renal    IBS (irritable bowel syndrome)     Mild nonproliferative diabetic retinopathy(362.04) 11/25/2013    Morbid obesity     Nuclear sclerosis 11/25/2013    Osteoporosis     Peripheral edema     Rash     Recurrent upper respiratory infection (URI)     S/P LASIK (laser assisted in situ keratomileusis)     Sleep apnea     sleep apnea uses bipap.    Stroke     Thyroid disease     on synthroid    TIA (transient ischemic attack)     Urinary tract infection      Social History     Social History    Marital status:      Spouse name: N/A    Number of children: N/A    Years of education: N/A     Occupational History    Not on file.     Social History Main Topics    Smoking status: Former Smoker     Types: Cigarettes    Smokeless tobacco: Never Used      Comment: quit 1990    Alcohol use Yes      Comment: rare    Drug use: No    Sexual activity: Yes     Birth control/ protection: Surgical     Other Topics Concern    Not on file     Social History Narrative    No narrative on file     Family History   Problem Relation Age of Onset    Heart disease Mother 59    Cancer Mother 59        throat    Allergies Mother     Diabetes Mother     Heart disease Father 70        flutter    Allergies Father     Diabetes Father     Cancer Sister 22        22 thyroid,49  breast    Allergies Sister     Breast cancer Sister     Diabetes Sister     Cancer Brother 62        lung    Diabetes Brother     Asthma Daughter     Diabetes Daughter     Depression Daughter     Asthma Grandchild     Eczema Grandchild     Eczema Grandchild     Breast cancer Maternal Grandmother     Ovarian cancer Cousin     Amblyopia Neg Hx     Blindness Neg Hx     Cataracts Neg Hx     Glaucoma Neg Hx     Hypertension Neg Hx     Macular degeneration Neg Hx     Retinal detachment Neg Hx     Strabismus Neg Hx      Stroke Neg Hx     Thyroid disease Neg Hx     Angioedema Neg Hx     Immunodeficiency Neg Hx     Allergic rhinitis Neg Hx     Atopy Neg Hx     Rhinitis Neg Hx     Urticaria Neg Hx     Colon cancer Neg Hx     Colon polyps Neg Hx     Crohn's disease Neg Hx     Ulcerative colitis Neg Hx     Celiac disease Neg Hx      Review of patient's allergies indicates:   Allergen Reactions    Adhesive Other (See Comments)     Blisters    Cleocin [clindamycin hcl] Hives    Ceclor [cefaclor] Hives    Advair diskus [fluticasone-salmeterol]      Dry mouth    Aleve [naproxen sodium]      Unable to take secondary to kidney function.     Erythromycin Hives    Levaquin [levofloxacin] Dermatitis    Macrobid [nitrofurantoin monohyd/m-cryst] Other (See Comments)     Stomach pain/ GI issues    Macrodantin [nitrofurantoin macrocrystalline] Hives    Motrin [ibuprofen]      Unable to take secondary to kidney functions.    Restoril [temazepam]      LIGHTHEADED UPON WAKING.with poor results    Sulfa (sulfonamide antibiotics)      Hold due to renal problems    Trazodone      PALPITATIONS    Remeron [mirtazapine] Palpitations and Other (See Comments)     Jittery    Vancomycin analogues Rash     Feet broke out/inflammed blood vessels         Current Outpatient Prescriptions:     allopurinol (ZYLOPRIM) 100 MG tablet, TAKE TWO TABLETS BY MOUTH NIGHTLY, Disp: 180 tablet, Rfl: 4    amitriptyline (ELAVIL) 50 MG tablet, Take 1 tablet (50 mg total) by mouth every evening., Disp: 30 tablet, Rfl: 11    atenolol (TENORMIN) 25 MG tablet, Take 1 tablet (25 mg total) by mouth once daily., Disp: 90 tablet, Rfl: 0    calcitRIOL (ROCALTROL) 0.25 MCG Cap, Take 1 capsule by mouth twice a week. Monday Friday, Disp: , Rfl:     CALCIUM CITRATE/VITAMIN D3 (CALCIUM CITRATE + D ORAL), Take 400 mg by mouth 3 (three) times daily., Disp: , Rfl:     cetirizine (ZYRTEC) 10 MG tablet, Take 1 tablet (10 mg total) by mouth once daily., Disp: 1 Bottle,  Rfl: 5    clotrimazole-betamethasone 1-0.05% (LOTRISONE) cream, , Disp: , Rfl:     cyanocobalamin, vitamin B-12, (VITAMIN B-12) 5,000 mcg Subl, Place 5,000 mg under the tongue once a week., Disp: , Rfl:     DENOSUMAB (PROLIA SUBQ), Inject into the skin every 6 (six) months. , Disp: , Rfl:     DIABETIC SUPPLIES,MISCELL (MEDTRONIC REMOTE CONTROL MISC), No Sig Provided, Disp: , Rfl:     diphenoxylate-atropine 2.5-0.025 mg (LOMOTIL) 2.5-0.025 mg per tablet, TAKE ONE TABLET BY MOUTH 4 TIMES DAILY AS NEEDED FOR DIARRHEA, Disp: 60 tablet, Rfl: 1    DYMISTA 137-50 mcg/spray [place] nassal spray, , Disp: , Rfl:     fluoxetine (PROZAC) 20 MG capsule, TAKE TWO CAPSULES BY MOUTH ONCE DAILY, Disp: 180 capsule, Rfl: 4    fluticasone (FLONASE) 50 mcg/actuation nasal spray, 2 sprays by Nasal route once daily. , Disp: , Rfl:     fluticasone-vilanterol (BREO) 100-25 mcg/dose diskus inhaler, Inhale 1 puff into the lungs once daily., Disp: , Rfl:     INSULIN ASPART (NOVOLOG FLEXPEN U-100 INSULIN SUBQ), Inject into the skin., Disp: , Rfl:     KLOR-CON M20 20 mEq tablet, TAKE ONE TABLET BY MOUTH TWICE DAILY, Disp: 180 tablet, Rfl: 3    l-methylfolate-b2-b6-b12 (CEREFOLIN) 6-5-50-1 mg Tab, Take 1 tablet by mouth once daily., Disp: , Rfl:     Lacto.acidophilus-Bif.animalis (PROBIOTIC) 5 billion cell CpSP, Take 1 capsule by mouth once daily., Disp: 30 capsule, Rfl: 3    levothyroxine (SYNTHROID) 25 MCG tablet, TAKE ONE TABLET BY MOUTH ONCE DAILY, Disp: 90 tablet, Rfl: 3    LORazepam (ATIVAN) 1 MG tablet, Take 1 tablet (1 mg total) by mouth every evening., Disp: 30 tablet, Rfl: 2    losartan (COZAAR) 25 MG tablet, Take 1 tablet by mouth once daily., Disp: , Rfl:     melatonin 3 mg Tab, Take 5 mg by mouth every evening. 10 MG NIGHTLY, Disp: , Rfl:     metronidazole (METROGEL) 0.75 % gel, Apply topically 2 (two) times daily., Disp: 45 g, Rfl: 1    montelukast (SINGULAIR) 10 mg tablet, Take 10 mg by mouth once daily. , Disp: ,  "Rfl:     MULTIVIT-IRON-MIN-FOLIC ACID 3,500-18-0.4 UNIT-MG-MG ORAL CHEW, Take by mouth 2 (two) times daily., Disp: , Rfl:     oxyCODONE (ROXICODONE) 15 MG Tab, Take 1 tablet (15 mg total) by mouth every 6 (six) hours as needed for Pain., Disp: 30 tablet, Rfl: 0    oxyMORphone (OPANA ER) 10 MG 12 hr tablet, Take 1 tablet by mouth 2 (two) times daily as needed., Disp: , Rfl:     pantoprazole (PROTONIX) 40 MG tablet, TAKE ONE TABLET BY MOUTH ONCE DAILY, Disp: 90 tablet, Rfl: 3    ranitidine (ZANTAC) 300 MG tablet, TAKE ONE TABLET BY MOUTH IN THE EVENING, Disp: 30 tablet, Rfl: 1    torsemide (DEMADEX) 20 MG Tab, Take 2 tablets (40 mg total) by mouth once daily., Disp: 60 tablet, Rfl: 0    albuterol-ipratropium 2.5mg-0.5mg/3mL (DUO-NEB) 0.5 mg-3 mg(2.5 mg base)/3 mL nebulizer solution, Take 3 mLs by nebulization every 6 (six) hours as needed for Wheezing. Rescue, Disp: 1 Box, Rfl: 3    metOLazone (ZAROXOLYN) 5 MG tablet, Take 1 tablet (5 mg total) by mouth daily as needed (swelling)., Disp: 30 tablet, Rfl: 11    polymyxin B sulf-trimethoprim (POLYTRIM) 10,000 unit- 1 mg/mL Drop, Place 1 drop into both eyes every 4 (four) hours., Disp: 10 mL, Rfl: 0  Blood pressure 127/67, pulse 90, height 5' 2" (1.575 m), weight 106.1 kg (233 lb 14.5 oz).      Neurologic Exam  Mental Status   Oriented to person, place, and time.   Oriented to person.   Oriented to place.   Oriented to time.   Follows 3 step commands.   Attention: normal. Concentration: normal.   Speech: speech is normal   Level of consciousness: alert  Knowledge: consistent with education.   Able to name object. Able to read. Able to repeat. Able to write. Normal comprehension.      Cranial Nerves      CN II   Visual acuity: normal  Right visual field deficit: none  Left visual field deficit: none      CN III, IV, VI   Pupils are equal, round, and reactive to light.  Right pupil: Size: 3 mm. Shape: regular. Reactivity: brisk. Consensual response: intact.   Left " pupil: Size: 3 mm. Shape: regular. Reactivity: brisk. Consensual response: intact.   CN III: no CN III palsy  CN VI: no CN VI palsy  Nystagmus: none   Diplopia: none  Ophthalmoparesis: none  Conjugate gaze: present     CN V   Right facial sensation deficit: none  Left facial sensation deficit: none     CN VII   Right facial weakness: none  Left facial weakness: none     CN VIII   Hearing: intact     CN IX, X   CN IX normal.   CN X normal.      CN XI   Right sternocleidomastoid strength: normal  Left sternocleidomastoid strength: normal  Right trapezius strength: normal  Left trapezius strength: normal     CN XII   Fasciculations: absent  Tongue deviation: none     Motor Exam   Muscle bulk: normal  Overall muscle tone: normal  Right arm pronator drift: absent  Left arm pronator drift: absent     Strength   Right neck flexion: 5/5  Left neck flexion: 5/5  Right neck extension: 5/5  Left neck extension: 5/5  Right deltoid: 5/5  Left deltoid: 5/5  Right biceps: 5/5  Left biceps: 5/5  Right triceps: 5/5  Left triceps: 5/5  Right wrist flexion: 5/5  Left wrist flexion: 5/5  Right wrist extension: 5/5  Left wrist extension: 5/5  Right interossei: 5/5  Left interossei: 5/5  Right abdominals: 5/5  Left abdominals: 5/5  Right iliopsoas: 4/5-pain limited  Left iliopsoas: 5/5  Right quadriceps: 5/5  Left quadriceps: 5/5  Right hamstrin/5  Left hamstrin/5  Right glutei: 5/5  Left glutei: 5/5  Right anterior tibial: 5/5  Left anterior tibial: 5/5  Right posterior tibial: 5/5  Left posterior tibial: 5/5  Right peroneal: 5/5  Left peroneal: 5/5  Right gastroc: 5/5  Left gastroc: 5/5     Sensory Exam   Right arm light touch: normal  Left arm light touch: normal  Right leg light touch: decreased in the S1 distribution  Left leg light touch: normal        Gait, Coordination, and Reflexes      Gait  Gait: antalgic     Coordination   Romberg: negative  Finger to nose coordination: normal  Heel to shin coordination:  normal  Tandem walking coordination: normal     Tremor   Resting tremor: absent  Intention tremor: absent  Action tremor: absent     Reflexes   Right brachioradialis: 2+  Left brachioradialis: 2+  Right biceps: 2+  Left biceps: 2+  Right triceps: 2+  Left triceps: 2+  Right patellar: 2+  Left patellar: 2+  Right achilles: 1+  Left achilles: 1+  Right Prakash: absent  Left Prakash: absent  Right ankle clonus: absent  Left ankle clonus: absent     Physical Exam  Constitutional: Oriented to person, place, and time. Appears well-developed and well-nourished.   HENT:   Head: Normocephalic and atraumatic.   Eyes: Pupils are equal, round, and reactive to light.   Neck: Normal range of motion. Neck supple.   Cardiovascular: Normal rate.    Pulmonary/Chest: Effort normal.   Musculoskeletal: Normal range of motion. Exhibits no edema.   Neurological: Alert and oriented to person, place, and time. Normal Finger-Nose-Finger Test, a normal Heel to Shin Test, a normal Romberg Test and a normal Tandem Gait Test. Gait normal.   Reflex Scores:       Tricep reflexes are 2+ on the right side and 2+ on the left side.       Bicep reflexes are 2+ on the right side and 2+ on the left side.       Brachioradialis reflexes are 2+ on the right side and 2+ on the left side.       Patellar reflexes are 2+ on the right side and 2+ on the left side.       Achilles reflexes are 1+ on the right side and 1+ on the left side.  Skin: Skin is warm, dry and intact.   Psychiatric: Normal mood and affect. Speech is normal and behavior is normal. Judgment and thought content normal.   Nursing note and vitals reviewed.     Oswestry Disability Index score: 62    Patient Health Questionnaire score: 19    Provider dictation:  Ms. Mcnair is a 67 year old female who presents for neurosurgical follow-up for back pain and right lower extremity pain. I saw Ms. Mcnair in clinic for initial consultation on 3/19/2018. At that time she reported chronic lower back  pain, but new onset of right lower extremity pain for 2 weeks. She states the pain is in her right posterior leg and travels down into the 3rd-5th digits of her right foot. The leg pain is constant and the back pain is intermittent. She also reports numbness in the right anterior thigh to mid tibia. She denies any bowel/bladder incontinence or retention. She does report right leg weakness and had a fall 1 week prior to initial consultation. Her pain became significantly worse following the fall. Her PCP had ordered PT, but this has not been scheduled at this time. She has seen Dr. Posey with pain management about 3 weeks prior for her back pain and was started on percocet and oxymorphone, but denies any significant improvement in her symptoms. She was informed by Dr. Posey office that her insurance would not cover ESIs at their clinic, therefore I had sent a referral to [facility]. Patient reports she was not seen at [facility] and now Dr. Posey office is stating she can have ESIs done there. She is scheduled for a follow-up on 5/24? She reports her leg pain is now worse than her back pain and is limiting her daily activities. She has borrowed a friend's walker to use since the fall. Since last visit there has been no change in her symptoms. She also reports that she has still not started PT. MRI Lumbar spine was ordered and patent is here today to review.      On exam patient had decreased sensation in the right S1 distribution. She has full strength except pain limited right hip flexion. Antalgic gait.      MRI Lumbar spine was personally reviewed and shows at the L5-S1 level, there is no significant spinal stenosis but there is moderate-to-marked right and mild-to-moderate left foraminal stenosis due to facet hypertrophy, ligamentum flavum thickening and a broad based disc bulge.  Also, there is crowding of the right lateral recess which could irritate the right S1 root.     In conclusion, Ms. Mcnair  is a 67 year old female with back pain and right lower extremity pain/numbness most consistent with right S1 radiculopathy. We will send a recommendation to Dr. Posey office for a right S1 transforaminal LEO. She should also start PT. I would like her to follow-up once she has tried the injection and PT. She was informed to call the clinic with any further questions or concerns.    1. Spinal stenosis of lumbar region, unspecified whether neurogenic claudication present  Ambulatory Referral to Pain Clinic   2. Lumbar radiculopathy

## 2018-05-14 NOTE — PROGRESS NOTES
Subjective:       Patient ID: Fifi Mcnair is a 67 y.o. female.    Chief Complaint: COPD (wk f/u)    Ms. Mcnair presents to the clinic today for follow up for COPD exacerbation and conjunctivitis.  She took a course of doxycycline and was taking albuterol as instructed with good relief of symptoms.  However, last night she started coughing again.  This was productive of mucous.  She states it is not as bad as before the antibiotics.  She is not wheezing or short of breath.  Her conjunctivitis is resolved.  She had A1c done which is 7.4 and this is treated by Dr. Cantu via insulin pump.  She also complains of dry skin to bilateral arms.  She does not use hot water to bathe.  She applies Lubriderm after showers.  She uses Dial soap on advice of a doctor when she had recurrent skin infections.      Review of Systems   Constitutional: Negative for chills and fever.   HENT: Negative for congestion, ear discharge, ear pain, postnasal drip and rhinorrhea.    Eyes: Negative for discharge, redness and itching.   Respiratory: Positive for cough. Negative for shortness of breath and wheezing.    Cardiovascular: Negative for chest pain.   Neurological: Negative for dizziness, light-headedness, numbness and headaches.       Objective:      Physical Exam   Constitutional: She is oriented to person, place, and time. She appears well-nourished. No distress.   HENT:   Head: Normocephalic and atraumatic.   Right Ear: External ear normal.   Left Ear: External ear normal.   Mouth/Throat: Oropharynx is clear and moist. No oropharyngeal exudate.   Eyes: Pupils are equal, round, and reactive to light. Right eye exhibits no discharge. Left eye exhibits no discharge.   Cardiovascular: Normal rate and regular rhythm.  Exam reveals no gallop and no friction rub.    No murmur heard.  Pulmonary/Chest: Effort normal and breath sounds normal. No respiratory distress. She has no wheezes. She has no rales.   Neurological: She is alert and  oriented to person, place, and time. Coordination normal.   Skin: Skin is warm and dry.   Psychiatric: She has a normal mood and affect. Her behavior is normal. Thought content normal.   Vitals reviewed.          Current Outpatient Prescriptions:     albuterol-ipratropium 2.5mg-0.5mg/3mL (DUO-NEB) 0.5 mg-3 mg(2.5 mg base)/3 mL nebulizer solution, Take 3 mLs by nebulization every 6 (six) hours as needed for Wheezing. Rescue, Disp: 1 Box, Rfl: 3    allopurinol (ZYLOPRIM) 100 MG tablet, TAKE TWO TABLETS BY MOUTH NIGHTLY, Disp: 180 tablet, Rfl: 4    amitriptyline (ELAVIL) 50 MG tablet, Take 1 tablet (50 mg total) by mouth every evening., Disp: 30 tablet, Rfl: 11    atenolol (TENORMIN) 25 MG tablet, Take 1 tablet (25 mg total) by mouth once daily., Disp: 90 tablet, Rfl: 0    calcitRIOL (ROCALTROL) 0.25 MCG Cap, Take 1 capsule by mouth twice a week. Monday Friday, Disp: , Rfl:     CALCIUM CITRATE/VITAMIN D3 (CALCIUM CITRATE + D ORAL), Take 400 mg by mouth 3 (three) times daily., Disp: , Rfl:     cetirizine (ZYRTEC) 10 MG tablet, Take 1 tablet (10 mg total) by mouth once daily., Disp: 1 Bottle, Rfl: 5    clotrimazole-betamethasone 1-0.05% (LOTRISONE) cream, , Disp: , Rfl:     cyanocobalamin, vitamin B-12, (VITAMIN B-12) 5,000 mcg Subl, Place 5,000 mg under the tongue once a week., Disp: , Rfl:     DENOSUMAB (PROLIA SUBQ), Inject into the skin every 6 (six) months. , Disp: , Rfl:     DIABETIC SUPPLIES,MISCELL (MEDTRONIC REMOTE CONTROL MISC), No Sig Provided, Disp: , Rfl:     diphenoxylate-atropine 2.5-0.025 mg (LOMOTIL) 2.5-0.025 mg per tablet, TAKE ONE TABLET BY MOUTH 4 TIMES DAILY AS NEEDED FOR DIARRHEA, Disp: 60 tablet, Rfl: 1    DYMISTA 137-50 mcg/spray [place] nassal spray, , Disp: , Rfl:     fluoxetine (PROZAC) 20 MG capsule, TAKE TWO CAPSULES BY MOUTH ONCE DAILY, Disp: 180 capsule, Rfl: 4    fluticasone (FLONASE) 50 mcg/actuation nasal spray, 2 sprays by Nasal route once daily. , Disp: , Rfl:      fluticasone-vilanterol (BREO) 100-25 mcg/dose diskus inhaler, Inhale 1 puff into the lungs once daily., Disp: , Rfl:     INSULIN ASPART (NOVOLOG FLEXPEN U-100 INSULIN SUBQ), Inject into the skin., Disp: , Rfl:     KLOR-CON M20 20 mEq tablet, TAKE ONE TABLET BY MOUTH TWICE DAILY, Disp: 180 tablet, Rfl: 3    l-methylfolate-b2-b6-b12 (CEREFOLIN) 6-5-50-1 mg Tab, Take 1 tablet by mouth once daily., Disp: , Rfl:     Lacto.acidophilus-Bif.animalis (PROBIOTIC) 5 billion cell CpSP, Take 1 capsule by mouth once daily., Disp: 30 capsule, Rfl: 3    levothyroxine (SYNTHROID) 25 MCG tablet, TAKE ONE TABLET BY MOUTH ONCE DAILY, Disp: 90 tablet, Rfl: 3    LORazepam (ATIVAN) 1 MG tablet, Take 1 tablet (1 mg total) by mouth every evening., Disp: 30 tablet, Rfl: 2    losartan (COZAAR) 25 MG tablet, Take 1 tablet by mouth once daily., Disp: , Rfl:     melatonin 3 mg Tab, Take 5 mg by mouth every evening. 10 MG NIGHTLY, Disp: , Rfl:     metOLazone (ZAROXOLYN) 5 MG tablet, Take 1 tablet (5 mg total) by mouth daily as needed (swelling)., Disp: 30 tablet, Rfl: 11    metronidazole (METROGEL) 0.75 % gel, Apply topically 2 (two) times daily., Disp: 45 g, Rfl: 1    montelukast (SINGULAIR) 10 mg tablet, Take 10 mg by mouth once daily. , Disp: , Rfl:     MULTIVIT-IRON-MIN-FOLIC ACID 3,500-18-0.4 UNIT-MG-MG ORAL CHEW, Take by mouth 2 (two) times daily., Disp: , Rfl:     oxyCODONE (ROXICODONE) 15 MG Tab, Take 1 tablet (15 mg total) by mouth every 6 (six) hours as needed for Pain., Disp: 30 tablet, Rfl: 0    oxyMORphone (OPANA ER) 10 MG 12 hr tablet, Take 1 tablet by mouth 2 (two) times daily as needed., Disp: , Rfl:     pantoprazole (PROTONIX) 40 MG tablet, TAKE ONE TABLET BY MOUTH ONCE DAILY, Disp: 90 tablet, Rfl: 3    polymyxin B sulf-trimethoprim (POLYTRIM) 10,000 unit- 1 mg/mL Drop, Place 1 drop into both eyes every 4 (four) hours., Disp: 10 mL, Rfl: 0    ranitidine (ZANTAC) 300 MG tablet, TAKE ONE TABLET BY MOUTH IN THE  EVENING, Disp: 30 tablet, Rfl: 1    torsemide (DEMADEX) 20 MG Tab, Take 2 tablets (40 mg total) by mouth once daily., Disp: 60 tablet, Rfl: 0  Assessment:       1. Chronic obstructive pulmonary disease with acute exacerbation    2. Controlled type 2 diabetes mellitus with complication, with long-term current use of insulin    3. Acute bacterial conjunctivitis of both eyes    4. Essential hypertension    5. Morbid obesity with BMI of 40.0-44.9, adult, 41.8    6. Xerosis of skin        Plan:       Chronic obstructive pulmonary disease with acute exacerbation  Resolved but then started coughing last night.  Would watch and wait x 1-2 days and if worse or not improved can give another round of doxycycline.  Trying to avoid steroids with uncontrolled diabetes however do not feel she needs them right now with no shortness of breath or wheezing.    Controlled type 2 diabetes mellitus with complication, with long-term current use of insulin  Improved.  Insulin pump handled by Dr. Cantu.    Acute bacterial conjunctivitis of both eyes  Resolved.    Essential hypertension  Stable on current medication.    Morbid obesity with BMI of 40.0-44.9, adult, 41.8  See below.  RTC 3 mos with PCP.    Xerosis of skin  Likely caused by Dial soap.  Use Eucerin for dry skin immediately after shower.    Patient readiness: acceptance and barriers:none    During the course of the visit the patient was educated and counseled about the following:     Diabetes:  Discussed general issues about diabetes pathophysiology and management.  Addressed ADA diet.  Encouraged aerobic exercise.  Hypertension:   Medication: no change.  Dietary sodium restriction.  Regular aerobic exercise.  Obesity:   Diet interventions: moderate (500 kCal/d) deficit diet and qualitative changes (increase low-fat,  high-fiber foods).  Regular aerobic exercise program discussed.    Goals: Diabetes: Maintain Hemoglobin A1C below 7, Hypertension: Reduce Blood Pressure and  Obesity: Reduce calorie intake and BMI    Did patient meet goals/outcomes: No    The following self management tools provided: declined    Patient Instructions (the written plan) was given to the patient/family.     Time spent with patient: 15 minutes    Barriers to medications present (no )    Adverse reactions to current medications (no)    Over the counter medications reviewed (Yes)

## 2018-05-15 ENCOUNTER — TELEPHONE (OUTPATIENT)
Dept: OPHTHALMOLOGY | Facility: CLINIC | Age: 68
End: 2018-05-15

## 2018-05-15 NOTE — TELEPHONE ENCOUNTER
----- Message from Joey MARQUEZ Silvia sent at 5/15/2018  2:29 PM CDT -----  Contact: same  Patient called in stated she was supposed to schedule her cataract surgery last year but never gotten around to getting it scheduled.  Patient call back number is [phone]

## 2018-05-21 ENCOUNTER — OFFICE VISIT (OUTPATIENT)
Dept: CARDIOLOGY | Facility: CLINIC | Age: 68
End: 2018-05-21
Payer: MEDICARE

## 2018-05-21 ENCOUNTER — TELEPHONE (OUTPATIENT)
Dept: ORTHOPEDICS | Facility: CLINIC | Age: 68
End: 2018-05-21

## 2018-05-21 VITALS
WEIGHT: 233.94 LBS | BODY MASS INDEX: 43.05 KG/M2 | HEART RATE: 83 BPM | RESPIRATION RATE: 16 BRPM | HEIGHT: 62 IN | DIASTOLIC BLOOD PRESSURE: 67 MMHG | SYSTOLIC BLOOD PRESSURE: 125 MMHG | OXYGEN SATURATION: 96 %

## 2018-05-21 DIAGNOSIS — M17.0 PRIMARY OSTEOARTHRITIS OF BOTH KNEES: Primary | ICD-10-CM

## 2018-05-21 DIAGNOSIS — M71.21 SYNOVIAL CYST OF RIGHT POPLITEAL SPACE: ICD-10-CM

## 2018-05-21 DIAGNOSIS — I50.32 CHRONIC DIASTOLIC HEART FAILURE, NYHA CLASS 1: Primary | ICD-10-CM

## 2018-05-21 DIAGNOSIS — M17.0 PRIMARY OSTEOARTHRITIS OF BOTH KNEES: ICD-10-CM

## 2018-05-21 DIAGNOSIS — I11.9 HYPERTENSIVE LEFT VENTRICULAR HYPERTROPHY, WITHOUT HEART FAILURE: ICD-10-CM

## 2018-05-21 DIAGNOSIS — I44.7 LBBB (LEFT BUNDLE BRANCH BLOCK): Primary | ICD-10-CM

## 2018-05-21 DIAGNOSIS — Z91.89 CARDIOVASCULAR EVENT RISK: ICD-10-CM

## 2018-05-21 DIAGNOSIS — E11.8 CONTROLLED TYPE 2 DIABETES MELLITUS WITH COMPLICATION, WITH LONG-TERM CURRENT USE OF INSULIN: ICD-10-CM

## 2018-05-21 DIAGNOSIS — E66.01 MORBID OBESITY WITH BMI OF 40.0-44.9, ADULT: ICD-10-CM

## 2018-05-21 DIAGNOSIS — Z98.84 BARIATRIC SURGERY STATUS: ICD-10-CM

## 2018-05-21 DIAGNOSIS — G47.33 OBSTRUCTIVE SLEEP APNEA SYNDROME: ICD-10-CM

## 2018-05-21 DIAGNOSIS — N18.30 CKD (CHRONIC KIDNEY DISEASE) STAGE 3, GFR 30-59 ML/MIN: ICD-10-CM

## 2018-05-21 DIAGNOSIS — I44.7 LBBB (LEFT BUNDLE BRANCH BLOCK): ICD-10-CM

## 2018-05-21 DIAGNOSIS — Z91.89 SEDENTARY LIFESTYLE: ICD-10-CM

## 2018-05-21 DIAGNOSIS — Z79.4 CONTROLLED TYPE 2 DIABETES MELLITUS WITH COMPLICATION, WITH LONG-TERM CURRENT USE OF INSULIN: ICD-10-CM

## 2018-05-21 PROCEDURE — 3074F SYST BP LT 130 MM HG: CPT | Mod: S$GLB,,, | Performed by: INTERNAL MEDICINE

## 2018-05-21 PROCEDURE — 3045F PR MOST RECENT HEMOGLOBIN A1C LEVEL 7.0-9.0%: CPT | Mod: S$GLB,,, | Performed by: INTERNAL MEDICINE

## 2018-05-21 PROCEDURE — 3078F DIAST BP <80 MM HG: CPT | Mod: S$GLB,,, | Performed by: INTERNAL MEDICINE

## 2018-05-21 PROCEDURE — 99215 OFFICE O/P EST HI 40 MIN: CPT | Mod: 25,S$GLB,, | Performed by: INTERNAL MEDICINE

## 2018-05-21 PROCEDURE — 99999 PR PBB SHADOW E&M-EST. PATIENT-LVL III: CPT | Mod: PBBFAC,,, | Performed by: INTERNAL MEDICINE

## 2018-05-21 PROCEDURE — 93000 ELECTROCARDIOGRAM COMPLETE: CPT | Mod: S$GLB,,, | Performed by: INTERNAL MEDICINE

## 2018-05-21 NOTE — PROGRESS NOTES
"Subjective:    Patient ID:  Fifi Mcnair is a 67 y.o. female who presents for evaluation of Annual Exam (LOV 10/2017)  For post 100+ lbs loss after bariatric operation, chronic diastolic HF, class I, concern of right thigh "edema"  PCP: Dr. Vargas, see biannually  Plastic surgeon: Dr. Becerril  Cardiologist: Dr. Mcmullen, last seen 11/16/2015  Vascular surgeon: Dr. Varela  Bariatric: Dr. Kwong, [facility]  GI: Dr. Navarro  Orthopedic: Dr. Molina  Renal: Dr. Soto  Lung: Dr. Tian  Eye: Dr. Yap  Retina: Dr. Easton  Back: Rigo Audi  Lives with , Florian, non-smoker  Disabled due to DM and CLBP, 2010, prior convenient     White female here for follow up post medications adjustment due to hypotension with near syncope. Several medication was reduced in dose and now feeling OK. Noted still LOERA especially with walking, limited also by low back pains.      Chart reviewed - abnormal stress test in 11/2012  Nuclear Quantitative Functional Analysis:   LVEF: 52 % (normal is 55 - 69)  LVED Volume: 117 ml (normal is 60 - 98)  LVES Volume: 56 ml (normal is 20 - 42)    Impression: ABNORMAL MYOCARDIAL PERFUSION  1. There is evidence for mild myocardial ischemia in the anterolateral wall of the left ventricle.   2. The perfusion scan is free of evidence for myocardial injury.   3. There is a moderate intensity fixed defect in the anteroseptal wall of the left ventricle, secondary to breast attenuation.   4. Resting wall motion is physiologic.   5. There is resting LV dysfunction with a reduced ejection fraction of 52 %.  (normal is 55 - 69)  6. The ventricular volumes are normal at rest and stress.   7. The extracardiac distribution of radioactivity is normal.   8. [facility] SSS =8, SRS =6, SDS =2, suggest that 2.5% of myocardium may be ischemic.     [facility] HEMODYNAMIC RESULTS:    LVEDP (Pre): 16 mmHg  LVEDP (Post): 16 mmHg  Ejection Fraction: 60%    C. ANGIOGRAPHIC RESULTS:    DIAGNOSTIC:       " Patient has a right dominant coronary artery.        - Left Main Coronary Artery:             The LM has luminal irregularities. There is DAX 3 flow.       - Left Anterior Descending Artery:             The LAD has luminal irregularities. There is DAX 3 flow.       - Left Circumflex Artery:             The LCX has luminal irregularities. There is DAX 3 flow.       - Right Coronary Artery:             The RCA has luminal irregularities. There is DAX 3 flow.       - Left Renal Artery:             The proximal left renal has luminal irregularities.       - Right Renal Artery:             The proximal right renal has luminal irregularities.      D. SUMMARY:    1. Non-obstructive CAD.  2. Non-obstructive atherosclerotic disease of the  right and left renal arteries    E. RECOMMENDATIONS:    1. Maximize medical management.  2. Risk factor reductions.  3. ASA 81mg.    ECHO 4/2015 - CONCLUSIONS     1 - Eccentric hypertrophy.     2 - Normal left ventricular systolic function (EF 60-65%).     3 - Left ventricular diastolic dysfunction. E/e'(lat) is 12    4 - Normal right ventricular systolic function .     5 - Pulmonary hypertension. estimated PA systolic pressure is 45 mmHg.    6 - Mild mitral regurgitation.     7 - Mild tricuspid regurgitation.     Since visit of 1/21/2016, no further problem with low BP, little more active, able to walk longer before SOB. Also limited by chronic back pains with compression fracture with sudden weakness of the right leg. Uses a walking stick.     In 9/2016, here for pre op clearance to remove fatty tissue from abdominal wall, problem with chronic infection, pain, burning. Denies any CP nor SOB. Compliant with medications. Labs reviewed - .8, non- on no medications, ASCVD 10-year event risk is 12.3%. CMP shows CKD stage III, eGFR 48, recent Cr now reported down to 0.95. Continue on Insulin pump, dosage decrease 160 U to now 25 U daily. Only major surgical risk is being on  "Insulin. ECG continue to show LBBB.    In 10/2016, feeling "great", no new problem but request Lomotil for chronic diarrhea from IBS, given medication that cost $500 for 14 days.  Lexiscan - Nuclear Quantitative Functional Analysis:   LVEF: 58 % (normal is 55 - 69)  LVED Volume: 60 ml (normal is 60 - 98)  LVES Volume: 43 ml (normal is 20 - 42)    Impression: NORMAL MYOCARDIAL PERFUSION  1. The perfusion scan is free of evidence for myocardial ischemia or injury.   2. There is a moderate intensity fixed defect in the anteroseptal wall of the left ventricle, secondary to breast attenuation.   3. Resting wall motion is physiologic.   4. Resting LV function is normal.  (normal is 55 - 69)  5. The ventricular volumes are normal at rest and stress.   6. The extracardiac distribution of radioactivity is normal.   7. When compared to the previous study from 11/28/2012, the anterolateral wall now appears normal.    ECHO CONCLUSIONS     1 - Mild left atrial enlargement. measuring 36.24 cc/m2.     2 - Concentric hypertrophy. the septum and the posterior wall each measuring 1.2 cm across.     3 - Normal left ventricular systolic function (EF 55-60%).     4 - Left ventricular diastolic dysfunction. E/e'(lat) is 13    5 - Normal right ventricular systolic function .     6 - The estimated PA systolic pressure is 37 mmHg.     7 - Small pericardial effusion.     8 - No significant change from Echo in [address].     9 - Difficult apical windows.     HPI comments: in 5/2018, here with concern of right thigh "edema" for the last couple of months, right much bigger than the left. Also persistent anterior right knee pain since fall in 3/2018.   Venous US - Impression     1. No evidence of deep vein thrombosis    2.  Venous insufficiency within the distal right greater saphenous vein at the knee.  Reflux times are given above.    3.  Stable complex right Baker's cyst       Leg US - Nonspecific calcific subcentimeter mass at the site of " palpable clinical concern in the anteromedial right leg.  This most likely represents a chronic soft tissue calcification and was present on the 03/22/2018 radiographs.    Moderate-sized, complex Baker cyst in the medial popliteal fossa.    CXR - heart is upper limits of normal-sized.  There is increased thoracic kyphosis with and there is anterior paravertebral osteophyte fight formation.      Review of Systems   Constitution: Positive for diaphoresis, malaise/fatigue and night sweats. Negative for fever, weakness, weight gain (9 lbs since last visit) and weight loss (110 lbs since operation 12/2014).   HENT: Positive for hoarse voice. Negative for nosebleeds and tinnitus.    Eyes: Positive for blurred vision, discharge, redness, vision loss in left eye and vision loss in right eye. Negative for visual disturbance.   Cardiovascular: Positive for dyspnea on exertion and leg swelling. Negative for chest pain, claudication, cyanosis, irregular heartbeat, near-syncope, orthopnea, palpitations and paroxysmal nocturnal dyspnea.   Respiratory: Positive for cough and snoring (on CPAP nightly, 100%). Negative for shortness of breath, sleep disturbances due to breathing and wheezing.         [place] score 2 using CPAP 100%   Endocrine: Positive for cold intolerance and heat intolerance. Negative for polydipsia and polyuria.   Hematologic/Lymphatic: Bruises/bleeds easily.   Skin: Positive for poor wound healing and unusual hair distribution. Negative for color change, flushing, nail changes and suspicious lesions.   Musculoskeletal: Positive for arthritis, back pain, falls, gout, joint pain, joint swelling, muscle cramps, muscle weakness, myalgias, neck pain and stiffness.   Gastrointestinal: Positive for bloating, diarrhea, heartburn and hemorrhoids. Negative for hematemesis, hematochezia, melena and nausea.        Have gastric ulcer at the site of the sleeve   Genitourinary:        CKD due to DM   Neurological: Positive for  "aphonia, light-headedness, numbness and paresthesias (of stomach). Negative for disturbances in coordination, excessive daytime sleepiness, dizziness, focal weakness, headaches, loss of balance and vertigo.   Psychiatric/Behavioral: Positive for depression. Negative for substance abuse. The patient has insomnia and is nervous/anxious.    Allergic/Immunologic: Positive for environmental allergies.        Objective:    Physical Exam   Constitutional: She is oriented to person, place, and time. She appears well-developed and well-nourished.   HENT:   Head: Normocephalic.   Eyes: Conjunctivae and EOM are normal. Pupils are equal, round, and reactive to light.   Neck: Normal range of motion. Neck supple. No JVD present. No thyromegaly present.   Circumference 16.5"   Cardiovascular: Normal rate, regular rhythm, normal heart sounds and intact distal pulses.  Exam reveals no gallop and no friction rub.    No murmur heard.  Mild superficial varicose veins around the ankles.   Pulmonary/Chest: Effort normal and breath sounds normal. She has no wheezes. She has no rales. She exhibits no tenderness.   Abdominal: Soft. Bowel sounds are normal. There is no tenderness.   Waist 47" up to 51", hips 53.25"   Musculoskeletal: Normal range of motion. She exhibits no edema.   Lymphadenopathy:     She has no cervical adenopathy.   Neurological: She is alert and oriented to person, place, and time.   Skin: Skin is warm and dry. No rash noted.         Assessment:       1. Chronic diastolic heart failure, NYHA class 1    2. LBBB (left bundle branch block)    3. Morbid obesity with BMI of 40.0-44.9, adult, today 42.8    4. Controlled type 2 diabetes mellitus with complication, with long-term current use of insulin    5. Bariatric surgery status, 12/2014    6. Primary osteoarthritis of both knees    7. Obstructive sleep apnea syndrome    8. Cardiovascular event risk, ASCVD 10-year risk 12.3%, 12/2015    9. CKD (chronic kidney disease) stage " 3, GFR 30-59 ml/min    10. Hypertensive left ventricular hypertrophy, without heart failure    11. Synovial cyst of right popliteal space    12. Sedentary lifestyle         Plan:       Fifi was seen today for follow-up.    Diagnoses and all orders for this visit:    Chronic diastolic heart failure, NYHA class 1  -     Transthoracic echo (TTE) complete; Future    LBBB (left bundle branch block)  -     EKG 12-lead  -     Transthoracic echo (TTE) complete; Future    Morbid obesity with BMI of 40.0-44.9, adult, today 42.8    Controlled type 2 diabetes mellitus with complication, with long-term current use of insulin    Bariatric surgery status, 12/2014    Primary osteoarthritis of both knees    Obstructive sleep apnea syndrome    Cardiovascular event risk, ASCVD 10-year risk 12.3%, 12/2015    CKD (chronic kidney disease) stage 3, GFR 30-59 ml/min    Hypertensive left ventricular hypertrophy, without heart failure  -     Transthoracic echo (TTE) complete; Future    Synovial cyst of right popliteal space    Sedentary lifestyle    - All medical issues reviewed, continue current Rx.  - Weigh twice weekly, try to lose 1-2 lbs per week  - CV status stable, continue current Rx, all medications reviewed, patient acknowledge good understanding.  - Recommend healthy living: stop smoking, healthy diet and regular exercise, and weight control  - Recommend at least annual cardiovascular evaluation in view of her significant risk factors.    Compression fracture  - Need to work with a   - Highly recommend 30 minutes of exercise daily, can have Sunday off, with 2-3 sessions of muscle strengthening weekly. A  would be very helpful.    Hypotension due to drugs - impreoved    LOERA (dyspnea on exertion) - improved    Chronic bronchitis, unspecified chronic bronchitis type    Patient Active Problem List   Diagnosis    S/P LASIK (laser assisted in situ keratomileusis)    GERD (gastroesophageal reflux  disease)    Acquired hypothyroidism    DM due to underlying condition with diabetic nephropathy, on Insulin    Conjunctival laceration - Left Eye    Leg pain, bilateral    Asthma, severe persistent, poorly-controlled    Chronic allergic rhinitis    Chronic diastolic heart failure, NYHA class 1    Nuclear sclerosis    Dry eye    Myopia with astigmatism and presbyopia    Morbid obesity with BMI of 40.0-44.9, adult, today 42.8    Peripheral edema    Anti-polysaccharide antibody deficiency    Controlled diabetes mellitus type 2 with complications    Insulin pump status    Bariatric surgery status, 12/2014    LBBB (left bundle branch block)    Osteoarthritis    COPD (chronic obstructive pulmonary disease)    Vitreo-retinal adhesions    Vitreous hemorrhage, right eye    Posterior vitreous detachment, right eye    Moderate nonproliferative diabetic retinopathy of both eyes    Osteopenia with high risk of fracture    Symptomatic posterior vitreous detachment of left eye    Chronic low back pain    Sleep apnea, using CPAP 100%    Posterior vitreous detachment, left eye    Vitreous hemorrhage, left eye    Cardiovascular event risk, ASCVD 10-year risk 12.3%, 12/2015    CKD (chronic kidney disease) stage 3, GFR 30-59 ml/min    Hypertensive left ventricular hypertrophy, without heart failure    Irritable bowel syndrome with diarrhea    PUD (peptic ulcer disease)    Essential hypertension    Thrombocytosis    Adjustment insomnia    Other osteoporosis without current pathological fracture    History of pathological fracture due to osteoporosis    Intestinal metaplasia of gastric mucosa    Fibromyalgia    Generalized osteoarthritis    Spinal stenosis of lumbar region    Xerosis of skin    Synovial cyst of right popliteal space    Sedentary lifestyle     Total face-to-face time with the patient was 30 minutes and greater than 50% was spent in counseling and coordination of care. The  above assessment and plan have been discussed at length. Labs and procedure over the last 6 months reviewed. Problem List updated. Asked to bring in all active medications / pills bottles with next visit.

## 2018-05-21 NOTE — TELEPHONE ENCOUNTER
----- Message from Porsche Cedeno sent at 5/21/2018  1:43 PM CDT -----  Type:  Patient Requesting Referral    Who Called: pt  Does the patient already have the specialty appointment scheduled?: na  If yes, what is the date of that appointment?:  na  Referral to What Specialty: na  Reason for Referral  approval  For  injections  Does the patient want the referral with a specific physician?:  Dr Molina   Is the specialist an [facility]?:  na  Patient Requesting a Call Back?:  [phone]  Best Call Back Number:  na  Additional Information:   Calling about  Getting  An  Approval  For injection

## 2018-05-23 ENCOUNTER — OFFICE VISIT (OUTPATIENT)
Dept: OPHTHALMOLOGY | Facility: CLINIC | Age: 68
End: 2018-05-23
Payer: MEDICARE

## 2018-05-23 DIAGNOSIS — E11.9 DIABETES MELLITUS TYPE 2 WITHOUT RETINOPATHY: ICD-10-CM

## 2018-05-23 DIAGNOSIS — H52.7 REFRACTIVE ERROR: ICD-10-CM

## 2018-05-23 DIAGNOSIS — H25.13 NUCLEAR SCLEROSIS, BILATERAL: Primary | ICD-10-CM

## 2018-05-23 DIAGNOSIS — H43.813 PVD (POSTERIOR VITREOUS DETACHMENT), BILATERAL: ICD-10-CM

## 2018-05-23 DIAGNOSIS — H40.039 ANATOMICAL NARROW ANGLE: ICD-10-CM

## 2018-05-23 PROCEDURE — 99999 PR PBB SHADOW E&M-EST. PATIENT-LVL IV: CPT | Mod: PBBFAC,,, | Performed by: OPHTHALMOLOGY

## 2018-05-23 PROCEDURE — 92014 COMPRE OPH EXAM EST PT 1/>: CPT | Mod: S$GLB,,, | Performed by: OPHTHALMOLOGY

## 2018-05-23 NOTE — PROGRESS NOTES
HPI     Here for Diabetic exam, denies eye pain , OTC artifical tears ou TID.   States she was on eye antibiotics recently after having sinus infection.   States she had a diabetic picture taken at PCP  an was told she need to   have right eye cheked out.     S/P Lasik OU 18 years ago    Agree with above. Trouble reading [employer] bulletin without glasses, states   she does not wear glasses to read.     Hemoglobin A1C       Date                     Value               Ref Range             Status                05/07/2018               7.4 (H)             4.0 - 5.6 %           Final                 02/08/2018               8.0 (H)             4.0 - 5.6 %           Final                 07/12/2017               7.9 (H)             4.0 - 5.6 %           Final                 08/30/2013               8.0 (H)             4.8 - 5.6 %           Final                 02/02/2013               8.2 (H)             4.8 - 5.6 %           Final             ----------    Agree with above. No image available for OD. She reports her eyes are red   a lot.     Last edited by Marimar Yap MD on 5/23/2018  2:53 PM.   (History)        ROS     Positive for: Gastrointestinal (sleeve procedure, taking vitamins), Skin   (on steroids for hives), Genitourinary (nephropathy), Musculoskeletal   (recently dx'd with gout), Endocrine (DM - insulin pump), Cardiovascular   (HTN - controlled with meds per pt), Eyes (LASIK OU ~10 years ago),   Respiratory (SOB, COPD), Heme/Lymph (ASA QD d/c'd due to easy bruising on   arms, resumed again the quit again couple weeks ago)    Negative for: Neurological (denies neuropathy)    Last edited by Marimar Yap MD on 5/23/2018  2:17 PM.   (History)        Assessment /Plan     For exam results, see Encounter Report.    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 Pt with DM x 20+ years, now with insulin pump. Will need Ketorolac 1 week prior to CE.    Anatomical narrow angle - open to full TM OD, thin SS OS. Will likely improve when cataract surgery becomes necessary, also discussed possible need for LPI if catarcts not matring. ACG symptoms reviewed.    Vitreo-retinal adhesions - hemorrhagic PVD 2015, hx laser retinopexy OD 10/15. Stable.     Nuclear sclerosis, bilateral - appears to be approaching visually significance, OD>OS. Dilates well. States Versed does not work well on her, but Propofol does.     PVD (posterior vitreous detachment), bilateral - RD precautions    Refractive error      Dry eye, bilateral - continue artificial tears.

## 2018-05-24 ENCOUNTER — TELEPHONE (OUTPATIENT)
Dept: HEMATOLOGY/ONCOLOGY | Facility: CLINIC | Age: 68
End: 2018-05-24

## 2018-05-24 NOTE — TELEPHONE ENCOUNTER
----- Message from Constance Ventura sent at 5/23/2018  4:11 PM CDT -----  Type: Needs Medical Advice    Who Called:  Patient  Best Call Back Number: [phone]  Additional Information: Patient needs to make an appointment to get her Prolia. Please call back to schedule. Also, she wants to make sure that office sends off for her prior authorization for it.

## 2018-05-28 ENCOUNTER — HOSPITAL ENCOUNTER (OUTPATIENT)
Dept: CARDIOLOGY | Facility: HOSPITAL | Age: 68
Discharge: HOME | End: 2018-05-28
Attending: INTERNAL MEDICINE
Payer: MEDICARE

## 2018-05-28 VITALS
HEIGHT: 62 IN | BODY MASS INDEX: 42.88 KG/M2 | DIASTOLIC BLOOD PRESSURE: 65 MMHG | WEIGHT: 233 LBS | HEART RATE: 86 BPM | SYSTOLIC BLOOD PRESSURE: 149 MMHG

## 2018-05-28 DIAGNOSIS — I11.9 HYPERTENSIVE LEFT VENTRICULAR HYPERTROPHY, WITHOUT HEART FAILURE: ICD-10-CM

## 2018-05-28 DIAGNOSIS — I44.7 LBBB (LEFT BUNDLE BRANCH BLOCK): ICD-10-CM

## 2018-05-28 DIAGNOSIS — I50.32 CHRONIC DIASTOLIC HEART FAILURE, NYHA CLASS 1: ICD-10-CM

## 2018-05-28 LAB
AORTIC ROOT ANNULUS: 2.8 CM
AORTIC VALVE CUSP SEPERATION: 1.81 CM
AV MEAN GRADIENT: 4.41 MMHG
AV VALVE AREA: 1.75 CM2
BSA FOR ECHO PROCEDURE: 2.15 M2
CV ECHO LV RWT: 0.46 CM
DOP CALC AO PEAK VEL: 1.36 M/S
DOP CALC AO VTI: 29.39 CM
DOP CALC LVOT AREA: 3.02 CM2
DOP CALC LVOT DIAMETER: 1.96 CM
DOP CALC LVOT STROKE VOLUME: 51.51 CM3
DOP CALCLVOT PEAK VEL VTI: 17.08 CM
E WAVE DECELERATION TIME: 173.58 MSEC
E/A RATIO: 0.64
E/E' RATIO: 11.5
ECHO EF ESTIMATED: 58 %
ECHO LV POSTERIOR WALL: 1.2 CM (ref 0.6–1.1)
FRACTIONAL SHORTENING: 31 % (ref 28–44)
INTERVENTRICULAR SEPTUM: 1.24 CM (ref 0.6–1.1)
IVRT: 0.09 MSEC
LEFT ATRIUM SIZE: 4.7 CM
LEFT INTERNAL DIMENSION IN SYSTOLE: 3.71 CM (ref 2.1–4)
LEFT VENTRICLE MASS INDEX: 124.3 G/M2
LEFT VENTRICULAR INTERNAL DIMENSION IN DIASTOLE: 5.36 CM (ref 3.5–6)
LEFT VENTRICULAR MASS: 267.31 G
LV LATERAL E/E' RATIO: 11.5
LV SEPTAL E/E' RATIO: 11.5
MV PEAK A VEL: 1.07 M/S
MV PEAK E VEL: 0.69 M/S
MV STENOSIS PRESSURE HALF TIME: 50.34 MS
MV VALVE AREA P 1/2 METHOD: 4.37 CM2
PISA TR MAX VEL: 1.41 M/S
PV PEAK GRADIENT: 4.49 MMHG
PV PEAK VELOCITY: 1.06 CM/S
RA PRESSURE: 3 MMHG
RIGHT VENTRICULAR END-DIASTOLIC DIMENSION: 2.98 CM
TDI LATERAL: 0.06
TDI SEPTAL: 0.06
TDI: 0.06
TR MAX PG: 8 MMHG
TRICUSPID ANNULAR PLANE SYSTOLIC EXCURSION: 0.02 CM
TV REST PULMONARY ARTERY PRESSURE: 11 MMHG

## 2018-05-28 PROCEDURE — 93306 TTE W/DOPPLER COMPLETE: CPT | Mod: 26,,, | Performed by: INTERNAL MEDICINE

## 2018-05-28 PROCEDURE — C8929 TTE W OR WO FOL WCON,DOPPLER: HCPCS

## 2018-06-01 DIAGNOSIS — M62.838 MUSCLE SPASM: ICD-10-CM

## 2018-06-01 RX ORDER — LORAZEPAM 1 MG/1
TABLET ORAL
Qty: 45 TABLET | Refills: 0 | Status: SHIPPED | OUTPATIENT
Start: 2018-06-01 | End: 2018-07-18 | Stop reason: SDUPTHER

## 2018-06-04 ENCOUNTER — HOSPITAL ENCOUNTER (OUTPATIENT)
Dept: RADIOLOGY | Facility: HOSPITAL | Age: 68
Discharge: HOME | End: 2018-06-04
Attending: THORACIC SURGERY (CARDIOTHORACIC VASCULAR SURGERY)
Payer: MEDICARE

## 2018-06-04 DIAGNOSIS — R60.0 LOCALIZED EDEMA: ICD-10-CM

## 2018-06-04 PROCEDURE — A9541 TC99M SULFUR COLLOID: HCPCS

## 2018-06-04 PROCEDURE — 78195 LYMPH SYSTEM IMAGING: CPT | Mod: 26,,, | Performed by: RADIOLOGY

## 2018-06-07 ENCOUNTER — OFFICE VISIT (OUTPATIENT)
Dept: ORTHOPEDICS | Facility: CLINIC | Age: 68
End: 2018-06-07
Payer: MEDICARE

## 2018-06-07 DIAGNOSIS — M17.0 PRIMARY OSTEOARTHRITIS OF BOTH KNEES: Primary | ICD-10-CM

## 2018-06-07 PROCEDURE — 99999 PR PBB SHADOW E&M-EST. PATIENT-LVL II: CPT | Mod: PBBFAC,,, | Performed by: ORTHOPAEDIC SURGERY

## 2018-06-07 PROCEDURE — 99499 UNLISTED E&M SERVICE: CPT | Mod: S$GLB,,, | Performed by: ORTHOPAEDIC SURGERY

## 2018-06-07 PROCEDURE — 20610 DRAIN/INJ JOINT/BURSA W/O US: CPT | Mod: 50,S$GLB,, | Performed by: ORTHOPAEDIC SURGERY

## 2018-06-07 NOTE — PROGRESS NOTES
Past Medical History:   Diagnosis Date    Allergy     multiple antibiotic allergies    Anemia     Anxiety     Arthritis     Asthma     CHF (congestive heart failure)     NYHA class III     Chronic kidney disease     Colon polyp     benign    COPD (chronic obstructive pulmonary disease)     DLCO 37% , and mild pulmonary HTN    Depression     Diabetes mellitus     Diabetic retinopathy     Diastolic dysfunction     Diverticulitis of large intestine without perforation or abscess without bleeding 2/12/2018    Diverticulosis     Former smoker     Fracture of lumbar spine     GERD (gastroesophageal reflux disease)     Hernia of unspecified site of abdominal cavity without mention of obstruction or gangrene     Hyperlipidemia     Hypertension     hypertensive renal    IBS (irritable bowel syndrome)     Mild nonproliferative diabetic retinopathy(362.04) 11/25/2013    Morbid obesity     Nuclear sclerosis 11/25/2013    Osteoporosis     Peripheral edema     Rash     Recurrent upper respiratory infection (URI)     S/P LASIK (laser assisted in situ keratomileusis)     Sleep apnea     sleep apnea uses bipap.    Stroke     Thyroid disease     on synthroid    TIA (transient ischemic attack)     Urinary tract infection        Past Surgical History:   Procedure Laterality Date    ABDOMINAL SURGERY      ADENOIDECTOMY      COLONOSCOPY  03/05/2013    repeat in 5 years    COLONOSCOPY N/A 5/31/2017    Procedure: COLONOSCOPY;  Surgeon: Luis Navarro MD;  Location: [facility] ENDO;  Service: Endoscopy;  Laterality: N/A;    COLONOSCOPY N/A 4/11/2018    Procedure: COLONOSCOPY;  Surgeon: Luis Navarro MD;  Location: [facility] ENDO;  Service: Endoscopy;  Laterality: N/A;    COSMETIC SURGERY      CYSTOSCOPY      EYE SURGERY Right     mazzulla    GASTRECTOMY      gastric sleeve      gastric sleeve      HERNIA REPAIR      5 years old    HYSTERECTOMY      ovaries remain    REFRACTIVE SURGERY      mono  va//ou//    RHINOPLASTY TIP      TONSILLECTOMY      TUBAL LIGATION      UPPER GASTROINTESTINAL ENDOSCOPY  03/05/2013    UPPER GASTROINTESTINAL ENDOSCOPY  08/24/2016    Dr. Veras, repeat in 8 weeks    UPPER GASTROINTESTINAL ENDOSCOPY  07/21/2016    Dr. Randall       Current Outpatient Prescriptions   Medication Sig    albuterol-ipratropium 2.5mg-0.5mg/3mL (DUO-NEB) 0.5 mg-3 mg(2.5 mg base)/3 mL nebulizer solution Take 3 mLs by nebulization every 6 (six) hours as needed for Wheezing. Rescue    allopurinol (ZYLOPRIM) 100 MG tablet TAKE TWO TABLETS BY MOUTH NIGHTLY    amitriptyline (ELAVIL) 50 MG tablet Take 1 tablet (50 mg total) by mouth every evening.    atenolol (TENORMIN) 25 MG tablet Take 1 tablet (25 mg total) by mouth once daily.    calcitRIOL (ROCALTROL) 0.25 MCG Cap Take 1 capsule by mouth twice a week. Monday Friday    CALCIUM CITRATE/VITAMIN D3 (CALCIUM CITRATE + D ORAL) Take 400 mg by mouth 3 (three) times daily.    cetirizine (ZYRTEC) 10 MG tablet Take 1 tablet (10 mg total) by mouth once daily.    clotrimazole-betamethasone 1-0.05% (LOTRISONE) cream     cyanocobalamin, vitamin B-12, (VITAMIN B-12) 5,000 mcg Subl Place 5,000 mg under the tongue once a week.    DENOSUMAB (PROLIA SUBQ) Inject into the skin every 6 (six) months.     DIABETIC SUPPLIES,MISCELL (MEDTRONIC REMOTE CONTROL MISC) No Sig Provided    diphenoxylate-atropine 2.5-0.025 mg (LOMOTIL) 2.5-0.025 mg per tablet TAKE ONE TABLET BY MOUTH 4 TIMES DAILY AS NEEDED FOR DIARRHEA    DYMISTA 137-50 mcg/spray Spry nassal spray     fluoxetine (PROZAC) 20 MG capsule TAKE TWO CAPSULES BY MOUTH ONCE DAILY    fluticasone (FLONASE) 50 mcg/actuation nasal spray 2 sprays by Nasal route once daily.     fluticasone-vilanterol (BREO) 100-25 mcg/dose diskus inhaler Inhale 1 puff into the lungs once daily.    INSULIN ASPART (NOVOLOG FLEXPEN U-100 INSULIN SUBQ) Inject into the skin.    KLOR-CON M20 20 mEq tablet TAKE ONE TABLET BY MOUTH  TWICE DAILY    l-methylfolate-b2-b6-b12 (CEREFOLIN) 6-5-50-1 mg Tab Take 1 tablet by mouth once daily.    Lacto.acidophilus-Bif.animalis (PROBIOTIC) 5 billion cell CpSP Take 1 capsule by mouth once daily.    levothyroxine (SYNTHROID) 25 MCG tablet TAKE ONE TABLET BY MOUTH ONCE DAILY    LORazepam (ATIVAN) 1 MG tablet Take 1 tablet (1 mg total) by mouth every evening.    LORazepam (ATIVAN) 1 MG tablet TAKE 1 TABLET BY MOUTH EVERY 12 HOURS AS NEEDED FOR ANXIETY    losartan (COZAAR) 25 MG tablet Take 1 tablet by mouth once daily.    melatonin 3 mg Tab Take 5 mg by mouth every evening. 10 MG NIGHTLY    metOLazone (ZAROXOLYN) 5 MG tablet Take 1 tablet (5 mg total) by mouth daily as needed (swelling).    metronidazole (METROGEL) 0.75 % gel Apply topically 2 (two) times daily.    montelukast (SINGULAIR) 10 mg tablet Take 10 mg by mouth once daily.     MULTIVIT-IRON-MIN-FOLIC ACID 3,500-18-0.4 UNIT-MG-MG ORAL CHEW Take by mouth 2 (two) times daily.    oxyCODONE (ROXICODONE) 15 MG Tab Take 1 tablet (15 mg total) by mouth every 6 (six) hours as needed for Pain.    oxyMORphone (OPANA ER) 10 MG 12 hr tablet Take 1 tablet by mouth 2 (two) times daily as needed.    pantoprazole (PROTONIX) 40 MG tablet TAKE ONE TABLET BY MOUTH ONCE DAILY    polymyxin B sulf-trimethoprim (POLYTRIM) 10,000 unit- 1 mg/mL Drop Place 1 drop into both eyes every 4 (four) hours.    ranitidine (ZANTAC) 300 MG tablet TAKE ONE TABLET BY MOUTH IN THE EVENING    torsemide (DEMADEX) 20 MG Tab Take 2 tablets (40 mg total) by mouth once daily.     No current facility-administered medications for this visit.        Review of patient's allergies indicates:   Allergen Reactions    Adhesive Other (See Comments)     Blisters    Cleocin [clindamycin hcl] Hives    Ceclor [cefaclor] Hives    Morphine Nausea And Vomiting    Advair diskus [fluticasone-salmeterol]      Dry mouth    Aleve [naproxen sodium]      Unable to take secondary to kidney  function.     Levaquin [levofloxacin] Dermatitis    Macrodantin [nitrofurantoin macrocrystalline] Hives    Motrin [ibuprofen]      Unable to take secondary to kidney functions.    Sulfa (sulfonamide antibiotics)      Hold due to renal problems    Remeron [mirtazapine] Palpitations and Other (See Comments)     Jittery    Vancomycin analogues Rash     Feet broke out/inflammed blood vessels         Family History   Problem Relation Age of Onset    Heart disease Mother 59    Cancer Mother 59        throat    Allergies Mother     Diabetes Mother     Heart disease Father 70        flutter    Allergies Father     Diabetes Father     Cancer Sister 22        22 thyroid,49  breast    Allergies Sister     Breast cancer Sister     Diabetes Sister     Cancer Brother 62        lung    Diabetes Brother     Asthma Daughter     Diabetes Daughter     Depression Daughter     Asthma Grandchild     Eczema Grandchild     Eczema Grandchild     Breast cancer Maternal Grandmother     Ovarian cancer Cousin     Amblyopia Neg Hx     Blindness Neg Hx     Cataracts Neg Hx     Glaucoma Neg Hx     Hypertension Neg Hx     Macular degeneration Neg Hx     Retinal detachment Neg Hx     Strabismus Neg Hx     Stroke Neg Hx     Thyroid disease Neg Hx     Angioedema Neg Hx     Immunodeficiency Neg Hx     Allergic rhinitis Neg Hx     Atopy Neg Hx     Rhinitis Neg Hx     Urticaria Neg Hx     Colon cancer Neg Hx     Colon polyps Neg Hx     Crohn's disease Neg Hx     Ulcerative colitis Neg Hx     Celiac disease Neg Hx        Social History     Social History    Marital status:      Spouse name: N/A    Number of children: N/A    Years of education: N/A     Occupational History    Not on file.     Social History Main Topics    Smoking status: Former Smoker     Types: Cigarettes    Smokeless tobacco: Never Used      Comment: quit 1990    Alcohol use Yes      Comment: rare    Drug use: No    Sexual  activity: Yes     Birth control/ protection: Surgical     Other Topics Concern    Not on file     Social History Narrative    No narrative on file       Chief Complaint:   Chief Complaint   Patient presents with    Knee Pain     bilateral synvisc 1/3       History: This is a 67-year-old female with chronic bilateral knee pain.  Left is greater than the right.  Patient has had injections previously.  Cortisone does not work very well.  Has had good success with a Synvisc series.  Synvisc one did not work well for her.  Rates her pain currently as a 7 out of 10.  Symptoms are worsening and moderate to severe.    Present:  Here for Synvisc.    Review of Systems:    Musculoskeletal:  See HPI          Physical Examination:    Vital Signs:    There were no vitals filed for this visit.    This a well-developed, well nourished patient in no acute distress.  They are alert and oriented and cooperative to examination.  Pt. walks without an antalgic gait.      Examination of bilateral knees knee shows no rashes or erythema. There are some enlargement of the right thigh. Patient has full range of motion from 0-130°. Patient is nontender to palpation over lateral joint line and moderately tender to palpation over the medial joint line.  Knee is stable to varus and valgus stress. 5 out of 5 motor strength. Palpable distal pulses. Intact light touch sensation. Negative Patellofemoral crepitus      X-rays: 4 views of right knee is   reviewed which show medial narrowing that is significantly worse on the left.  Severe left knee arthritis  X-rays of the right foot are reviewed which show some degenerative changes particularly around the midfoot.     Assessment:: Bilateral knee arthritis  Right midfoot arthritis     Plan:   I injected both knees with Synvisc 1 of 3.  Follow up next week to continue

## 2018-06-08 ENCOUNTER — TELEPHONE (OUTPATIENT)
Dept: HEMATOLOGY/ONCOLOGY | Facility: CLINIC | Age: 68
End: 2018-06-08

## 2018-06-08 DIAGNOSIS — I10 ESSENTIAL HYPERTENSION: ICD-10-CM

## 2018-06-08 DIAGNOSIS — M81.0 OSTEOPOROSIS, UNSPECIFIED OSTEOPOROSIS TYPE, UNSPECIFIED PATHOLOGICAL FRACTURE PRESENCE: Primary | ICD-10-CM

## 2018-06-08 NOTE — TELEPHONE ENCOUNTER
----- Message from Betsy Leon sent at 6/7/2018  4:33 PM CDT -----  Contact: self   Patient want to speak with a nurse regarding scheduling appointment please call back at [phone]

## 2018-06-14 ENCOUNTER — OFFICE VISIT (OUTPATIENT)
Dept: ORTHOPEDICS | Facility: CLINIC | Age: 68
End: 2018-06-14
Payer: MEDICARE

## 2018-06-14 DIAGNOSIS — M17.0 PRIMARY OSTEOARTHRITIS OF BOTH KNEES: Primary | ICD-10-CM

## 2018-06-14 PROCEDURE — 20610 DRAIN/INJ JOINT/BURSA W/O US: CPT | Mod: 50,S$GLB,, | Performed by: ORTHOPAEDIC SURGERY

## 2018-06-14 PROCEDURE — 99499 UNLISTED E&M SERVICE: CPT | Mod: S$GLB,,, | Performed by: ORTHOPAEDIC SURGERY

## 2018-06-14 PROCEDURE — 99999 PR PBB SHADOW E&M-EST. PATIENT-LVL I: CPT | Mod: PBBFAC,,, | Performed by: ORTHOPAEDIC SURGERY

## 2018-06-14 NOTE — PROCEDURES
Large Joint Aspiration/Injection  Date/Time: 6/14/2018 4:34 PM  Performed by: WALE BENTON  Authorized by: WALE BENTON     Consent Done?:  Yes (Verbal)  Indications:  Pain  Procedure site marked: Yes    Timeout: Prior to procedure the correct patient, procedure, and site was verified      Location:  Knee  Site:  R knee and L knee  Prep: Patient was prepped and draped in usual sterile fashion    Needle size:  20 G  Approach:  Anterolateral  Medications:  16 mg hyaluronate 16 mg/2 mL; 16 mg hyaluronate 16 mg/2 mL  Patient tolerance:  Patient tolerated the procedure well with no immediate complications

## 2018-06-14 NOTE — PROGRESS NOTES
Past Medical History:   Diagnosis Date    Allergy     multiple antibiotic allergies    Anemia     Anxiety     Arthritis     Asthma     CHF (congestive heart failure)     NYHA class III     Chronic kidney disease     Colon polyp     benign    COPD (chronic obstructive pulmonary disease)     DLCO 37% , and mild pulmonary HTN    Depression     Diabetes mellitus     Diabetic retinopathy     Diastolic dysfunction     Diverticulitis of large intestine without perforation or abscess without bleeding 2/12/2018    Diverticulosis     Former smoker     Fracture of lumbar spine     GERD (gastroesophageal reflux disease)     Hernia of unspecified site of abdominal cavity without mention of obstruction or gangrene     Hyperlipidemia     Hypertension     hypertensive renal    IBS (irritable bowel syndrome)     Mild nonproliferative diabetic retinopathy(362.04) 11/25/2013    Morbid obesity     Nuclear sclerosis 11/25/2013    Osteoporosis     Peripheral edema     Rash     Recurrent upper respiratory infection (URI)     S/P LASIK (laser assisted in situ keratomileusis)     Sleep apnea     sleep apnea uses bipap.    Stroke     Thyroid disease     on synthroid    TIA (transient ischemic attack)     Urinary tract infection        Past Surgical History:   Procedure Laterality Date    ABDOMINAL SURGERY      ADENOIDECTOMY      COLONOSCOPY  03/05/2013    repeat in 5 years    COLONOSCOPY N/A 5/31/2017    Procedure: COLONOSCOPY;  Surgeon: Luis Navarro MD;  Location: [facility] ENDO;  Service: Endoscopy;  Laterality: N/A;    COLONOSCOPY N/A 4/11/2018    Procedure: COLONOSCOPY;  Surgeon: Luis Navarro MD;  Location: [facility] ENDO;  Service: Endoscopy;  Laterality: N/A;    COSMETIC SURGERY      CYSTOSCOPY      EYE SURGERY Right     mazzulla    GASTRECTOMY      gastric sleeve      gastric sleeve      HERNIA REPAIR      5 years old    HYSTERECTOMY      ovaries remain    REFRACTIVE SURGERY      mono  va//ou//    RHINOPLASTY TIP      TONSILLECTOMY      TUBAL LIGATION      UPPER GASTROINTESTINAL ENDOSCOPY  03/05/2013    UPPER GASTROINTESTINAL ENDOSCOPY  08/24/2016    Dr. Veras, repeat in 8 weeks    UPPER GASTROINTESTINAL ENDOSCOPY  07/21/2016    Dr. Randall       Current Outpatient Prescriptions   Medication Sig    albuterol-ipratropium 2.5mg-0.5mg/3mL (DUO-NEB) 0.5 mg-3 mg(2.5 mg base)/3 mL nebulizer solution Take 3 mLs by nebulization every 6 (six) hours as needed for Wheezing. Rescue    allopurinol (ZYLOPRIM) 100 MG tablet TAKE TWO TABLETS BY MOUTH NIGHTLY    amitriptyline (ELAVIL) 50 MG tablet Take 1 tablet (50 mg total) by mouth every evening.    atenolol (TENORMIN) 25 MG tablet Take 1 tablet (25 mg total) by mouth once daily.    calcitRIOL (ROCALTROL) 0.25 MCG Cap Take 1 capsule by mouth twice a week. Monday Friday    CALCIUM CITRATE/VITAMIN D3 (CALCIUM CITRATE + D ORAL) Take 400 mg by mouth 3 (three) times daily.    cetirizine (ZYRTEC) 10 MG tablet Take 1 tablet (10 mg total) by mouth once daily.    clotrimazole-betamethasone 1-0.05% (LOTRISONE) cream     cyanocobalamin, vitamin B-12, (VITAMIN B-12) 5,000 mcg Subl Place 5,000 mg under the tongue once a week.    DENOSUMAB (PROLIA SUBQ) Inject into the skin every 6 (six) months.     DIABETIC SUPPLIES,MISCELL (MEDTRONIC REMOTE CONTROL MISC) No Sig Provided    diphenoxylate-atropine 2.5-0.025 mg (LOMOTIL) 2.5-0.025 mg per tablet TAKE ONE TABLET BY MOUTH 4 TIMES DAILY AS NEEDED FOR DIARRHEA    DYMISTA 137-50 mcg/spray Spry nassal spray     fluoxetine (PROZAC) 20 MG capsule TAKE TWO CAPSULES BY MOUTH ONCE DAILY    fluticasone (FLONASE) 50 mcg/actuation nasal spray 2 sprays by Nasal route once daily.     fluticasone-vilanterol (BREO) 100-25 mcg/dose diskus inhaler Inhale 1 puff into the lungs once daily.    INSULIN ASPART (NOVOLOG FLEXPEN U-100 INSULIN SUBQ) Inject into the skin.    KLOR-CON M20 20 mEq tablet TAKE ONE TABLET BY MOUTH  TWICE DAILY    l-methylfolate-b2-b6-b12 (CEREFOLIN) 6-5-50-1 mg Tab Take 1 tablet by mouth once daily.    Lacto.acidophilus-Bif.animalis (PROBIOTIC) 5 billion cell CpSP Take 1 capsule by mouth once daily.    levothyroxine (SYNTHROID) 25 MCG tablet TAKE ONE TABLET BY MOUTH ONCE DAILY    LORazepam (ATIVAN) 1 MG tablet Take 1 tablet (1 mg total) by mouth every evening.    LORazepam (ATIVAN) 1 MG tablet TAKE 1 TABLET BY MOUTH EVERY 12 HOURS AS NEEDED FOR ANXIETY    losartan (COZAAR) 25 MG tablet Take 1 tablet by mouth once daily.    melatonin 3 mg Tab Take 5 mg by mouth every evening. 10 MG NIGHTLY    metOLazone (ZAROXOLYN) 5 MG tablet Take 1 tablet (5 mg total) by mouth daily as needed (swelling).    metronidazole (METROGEL) 0.75 % gel Apply topically 2 (two) times daily.    montelukast (SINGULAIR) 10 mg tablet Take 10 mg by mouth once daily.     MULTIVIT-IRON-MIN-FOLIC ACID 3,500-18-0.4 UNIT-MG-MG ORAL CHEW Take by mouth 2 (two) times daily.    oxyCODONE (ROXICODONE) 15 MG Tab Take 1 tablet (15 mg total) by mouth every 6 (six) hours as needed for Pain.    oxyMORphone (OPANA ER) 10 MG 12 hr tablet Take 1 tablet by mouth 2 (two) times daily as needed.    pantoprazole (PROTONIX) 40 MG tablet TAKE ONE TABLET BY MOUTH ONCE DAILY    polymyxin B sulf-trimethoprim (POLYTRIM) 10,000 unit- 1 mg/mL Drop Place 1 drop into both eyes every 4 (four) hours.    ranitidine (ZANTAC) 300 MG tablet TAKE ONE TABLET BY MOUTH IN THE EVENING    torsemide (DEMADEX) 20 MG Tab Take 2 tablets (40 mg total) by mouth once daily.     No current facility-administered medications for this visit.        Review of patient's allergies indicates:   Allergen Reactions    Adhesive Other (See Comments)     Blisters    Cleocin [clindamycin hcl] Hives    Ceclor [cefaclor] Hives    Morphine Nausea And Vomiting    Advair diskus [fluticasone-salmeterol]      Dry mouth    Aleve [naproxen sodium]      Unable to take secondary to kidney  function.     Levaquin [levofloxacin] Dermatitis    Macrodantin [nitrofurantoin macrocrystalline] Hives    Motrin [ibuprofen]      Unable to take secondary to kidney functions.    Sulfa (sulfonamide antibiotics)      Hold due to renal problems    Remeron [mirtazapine] Palpitations and Other (See Comments)     Jittery    Vancomycin analogues Rash     Feet broke out/inflammed blood vessels         Family History   Problem Relation Age of Onset    Heart disease Mother 59    Cancer Mother 59        throat    Allergies Mother     Diabetes Mother     Heart disease Father 70        flutter    Allergies Father     Diabetes Father     Cancer Sister 22        22 thyroid,49  breast    Allergies Sister     Breast cancer Sister     Diabetes Sister     Cancer Brother 62        lung    Diabetes Brother     Asthma Daughter     Diabetes Daughter     Depression Daughter     Asthma Grandchild     Eczema Grandchild     Eczema Grandchild     Breast cancer Maternal Grandmother     Ovarian cancer Cousin     Amblyopia Neg Hx     Blindness Neg Hx     Cataracts Neg Hx     Glaucoma Neg Hx     Hypertension Neg Hx     Macular degeneration Neg Hx     Retinal detachment Neg Hx     Strabismus Neg Hx     Stroke Neg Hx     Thyroid disease Neg Hx     Angioedema Neg Hx     Immunodeficiency Neg Hx     Allergic rhinitis Neg Hx     Atopy Neg Hx     Rhinitis Neg Hx     Urticaria Neg Hx     Colon cancer Neg Hx     Colon polyps Neg Hx     Crohn's disease Neg Hx     Ulcerative colitis Neg Hx     Celiac disease Neg Hx        Social History     Social History    Marital status:      Spouse name: N/A    Number of children: N/A    Years of education: N/A     Occupational History    Not on file.     Social History Main Topics    Smoking status: Former Smoker     Types: Cigarettes    Smokeless tobacco: Never Used      Comment: quit 1990    Alcohol use Yes      Comment: rare    Drug use: No    Sexual  activity: Yes     Birth control/ protection: Surgical     Other Topics Concern    Not on file     Social History Narrative    No narrative on file       Chief Complaint:   No chief complaint on file.      History: This is a 67-year-old female with chronic bilateral knee pain.  Left is greater than the right.  Patient has had injections previously.  Cortisone does not work very well.  Has had good success with a Synvisc series.  Synvisc one did not work well for her.  Rates her pain currently as a 7 out of 10.  Symptoms are worsening and moderate to severe.    Present:  Here for Synvisc.    Review of Systems:    Musculoskeletal:  See HPI          Physical Examination:    Vital Signs:    There were no vitals filed for this visit.    This a well-developed, well nourished patient in no acute distress.  They are alert and oriented and cooperative to examination.  Pt. walks without an antalgic gait.      Examination of bilateral knees knee shows no rashes or erythema. There are some enlargement of the right thigh. Patient has full range of motion from 0-130°. Patient is nontender to palpation over lateral joint line and moderately tender to palpation over the medial joint line.  Knee is stable to varus and valgus stress. 5 out of 5 motor strength. Palpable distal pulses. Intact light touch sensation. Negative Patellofemoral crepitus      X-rays: 4 views of right knee is   reviewed which show medial narrowing that is significantly worse on the left.  Severe left knee arthritis  X-rays of the right foot are reviewed which show some degenerative changes particularly around the midfoot.     Assessment:: Bilateral knee arthritis       Plan:   I injected both knees with Synvisc 2 of 3.  Follow up next week to continue

## 2018-06-19 ENCOUNTER — OFFICE VISIT (OUTPATIENT)
Dept: FAMILY MEDICINE | Facility: CLINIC | Age: 68
End: 2018-06-19
Payer: MEDICARE

## 2018-06-19 ENCOUNTER — LAB VISIT (OUTPATIENT)
Dept: LAB | Facility: HOSPITAL | Age: 68
End: 2018-06-19
Attending: PHYSICIAN ASSISTANT
Payer: MEDICARE

## 2018-06-19 VITALS
HEIGHT: 62 IN | DIASTOLIC BLOOD PRESSURE: 65 MMHG | HEART RATE: 89 BPM | RESPIRATION RATE: 14 BRPM | WEIGHT: 233.94 LBS | OXYGEN SATURATION: 97 % | BODY MASS INDEX: 43.05 KG/M2 | SYSTOLIC BLOOD PRESSURE: 122 MMHG

## 2018-06-19 DIAGNOSIS — N39.0 URINARY TRACT INFECTION WITHOUT HEMATURIA, SITE UNSPECIFIED: ICD-10-CM

## 2018-06-19 DIAGNOSIS — M79.89 LEG SWELLING: ICD-10-CM

## 2018-06-19 DIAGNOSIS — Z23 NEED FOR PNEUMOCOCCAL VACCINATION: ICD-10-CM

## 2018-06-19 DIAGNOSIS — L60.8 DISCOLORATION OF NAIL: Primary | ICD-10-CM

## 2018-06-19 LAB
BACTERIA #/AREA URNS AUTO: NORMAL /HPF
BILIRUB UR QL STRIP: NEGATIVE
CLARITY UR REFRACT.AUTO: CLEAR
COLOR UR AUTO: YELLOW
GLUCOSE UR QL STRIP: ABNORMAL
HGB UR QL STRIP: NEGATIVE
KETONES UR QL STRIP: NEGATIVE
LEUKOCYTE ESTERASE UR QL STRIP: NEGATIVE
MICROSCOPIC COMMENT: NORMAL
NITRITE UR QL STRIP: NEGATIVE
PH UR STRIP: 7 [PH] (ref 5–8)
PROT UR QL STRIP: NEGATIVE
SP GR UR STRIP: 1 (ref 1–1.03)
SQUAMOUS #/AREA URNS AUTO: 0 /HPF
URN SPEC COLLECT METH UR: ABNORMAL
UROBILINOGEN UR STRIP-ACNC: NEGATIVE EU/DL
WBC #/AREA URNS AUTO: 1 /HPF (ref 0–5)
YEAST UR QL AUTO: NORMAL

## 2018-06-19 PROCEDURE — 81001 URINALYSIS AUTO W/SCOPE: CPT

## 2018-06-19 PROCEDURE — G0009 ADMIN PNEUMOCOCCAL VACCINE: HCPCS | Mod: S$GLB,,, | Performed by: PHYSICIAN ASSISTANT

## 2018-06-19 PROCEDURE — 3078F DIAST BP <80 MM HG: CPT | Mod: S$GLB,,, | Performed by: PHYSICIAN ASSISTANT

## 2018-06-19 PROCEDURE — 99214 OFFICE O/P EST MOD 30 MIN: CPT | Mod: 25,S$GLB,, | Performed by: PHYSICIAN ASSISTANT

## 2018-06-19 PROCEDURE — 90732 PPSV23 VACC 2 YRS+ SUBQ/IM: CPT | Mod: S$GLB,,, | Performed by: PHYSICIAN ASSISTANT

## 2018-06-19 PROCEDURE — 87086 URINE CULTURE/COLONY COUNT: CPT

## 2018-06-19 PROCEDURE — 3074F SYST BP LT 130 MM HG: CPT | Mod: S$GLB,,, | Performed by: PHYSICIAN ASSISTANT

## 2018-06-19 PROCEDURE — 99999 PR PBB SHADOW E&M-EST. PATIENT-LVL V: CPT | Mod: PBBFAC,,, | Performed by: PHYSICIAN ASSISTANT

## 2018-06-19 NOTE — PATIENT INSTRUCTIONS
Diabetes (General Information)  Diabetes is a long-term health problem. It means your body does not make enough insulin. Or it may mean that your body cannot use the insulin it makes. Insulin is a hormone in your body. It lets blood sugar (glucose) reach the cells in your body. All of your cells need glucose for fuel.  When you have diabetes, the glucose in your blood builds up because it cannot get into the cells. This buildup is called high blood sugar (hyperglycemia).  Your blood sugar level depends on several things. It depends on what kind of food you eat and how much of it you eat. It also depends on how much exercise you get, and how much insulin you have in your body. Eating too much of the wrong kinds of food or not taking diabetes medicine on time can cause high blood sugar. Infections can cause high blood sugar even if you are taking medicines correctly.  These things can also cause low blood sugar:  · Missing meals  · Not eating enough food  · Taking too much diabetes medicine  Diabetes can cause serious problems over time if you do not get treated. These problems include heart disease, stroke, kidney failure, and blindness. They also include nerve pain or loss of feeling in your legs and feet, and gangrene of the feet. By keeping your blood sugar under control you can prevent or delay these problems.  Normal blood sugar levels are 80 to 100 before a meal and less than 180 in the 1 to 2 hours after a meal.  Home care  Follow these guidelines when caring for yourself at home:  · Follow the diet your healthcare provider gives you. Take insulin or other diabetes medicine exactly as told to.  · Watch your blood sugar as you are told to. Keep a log of your results. This will help your provider change your medicines to keep your blood sugar under control.  · Try to reach your ideal weight. You may be able to cut back on or not have to take diabetes medicine if you eat the right foods and get exercise.  · Do  not smoke. Smoking worsens the effects of diabetes on your circulation. You are much more likely to have a heart attack if you have diabetes and you smoke.  · Take good care of your feet. If you have lost feeling in your feet, you may not see an injury or infection. Check your feet and between your toes at least once a week.  · Wear a medical alert bracelet or necklace, or carry a card in your wallet that says you have diabetes. This will help healthcare providers give you the right care if you get very ill and cannot tell them that you have diabetes.  Sick day plan  If you get a cold, the flu, or a bacterial or viral infection, take these steps:  · Look at your diabetes sick plan and call your healthcare provider as you were told to. You may need to call your provider right away if:  ¨ Your blood sugar is above 240 while taking your diabetes medicine  ¨ Your urine ketone levels are above normal or high  ¨ You have been vomiting more than 6 hours  ¨ You have trouble breathing or your breath ha s a fruity smell  ¨ You have a high fever  ¨ You have a fever for several days and you are not getting better  ¨ You get light-headed and are sleepier than usual  · Keep taking your diabetes pills (oral medicine) even if you have been vomiting and are feeling sick. Call your provider right away because you may need insulin to lower your blood sugar until you recover from your illness.  · Keep taking your insulin even if you have been vomiting and are feeling sick. Call your provider right away to ask if you need to change your insulin dose. This will depend on your blood sugar results.  · Check your blood sugar every 2 to 4 hours, or at least 4 times a day.  · Check your ketones often. If you are vomiting and having diarrhea, watch them more often.  · Do not skip meals. Try to eat small meals on a regular schedule. Do this even if you do not feel like eating.  · Drink water or other liquids that do not have caffeine or  calories. This will keep you from getting dehydrated. If you are nauseated or vomiting, takes small sips every 5 minutes. To prevent dehydration try to drink a cup (8 ounces) of fluids every hour while you are awake.  General care  Always bring a source of fast-acting sugar with you in case you have symptoms of low blood sugar (below 70). At the first sign of low blood sugar, eat or drink 15 to 20 grams of fast-acting sugar to raise your blood sugar. Examples are:  · 3 to 4 glucose tablets. You can buy these at most [employer].  · 4 ounces (1/2 cup) of regular (not diet) soft drinks  · 4 ounces (1/2 cup) of any fruit juice  · 8 ounces (1 cup) of milk  · 5 to 6 pieces of hard candy  · 1 tablespoon of honey  Check your blood sugar 15 minutes after treating yourself. If it is still below 70, take 15 to 20 more grams of fast-acting sugar. Test again in 15 minutes. If it returns to normal (70 or above), eat a snack or meal to keep your blood sugar in a safe range. If it stays low, call your doctor or go to an emergency room.  Follow-up care  Follow-up with your healthcare provider, or as advised. For more information about diabetes, visit the American Diabetes Association website at www.diabetes.org or call 589-207-8883.  When to seek medical advice  Call your healthcare provider right away if you have any of these symptoms of high blood sugar:  · Frequent urination  · Dizziness  · Drowsiness  · Thirst  · Headache  · Nausea or vomiting  · Abdominal pain  · Eyesight changes  · Fast breathing  · Confusion or loss of consciousness  Also call your provider right away if you have any of these signs of low blood sugar:  · Fatigue  · Headache  · Shakes  · Excess sweating  · Hunger  · Feeling anxious or restless  · Eyesight changes  · Drowsiness  · Weakness  · Confusion or loss of consciousness  Call 911  Call for emergency help right away if any of these occur:  · Chest pain or shortness of breath  · Dizziness or  fainting  · Weakness of an arm or leg or one side of the face  · Trouble speaking or seeing   Date Last Reviewed: 6/1/2016  © 2841-4505 [employer]. [address] [place] [address], [place] [address]. All rights reserved. This information is not intended as a substitute for professional medical care. Always follow your healthcare professional's instructions.          Walking for Fitness  Fitness walking has something for everyone, even people who are already fit. Walking is one of the safest ways to condition your body aerobically. It can boost energy, help you lose weight, and reduce stress.    Physical benefits  · Walking strengthens your heart and lungs, and tones your muscles.  · When walking, your feet land with less impact than in other sports. This reduces chances of muscle, bone, and joint injury.  · Regular walking improves your cholesterol levels and lowers your risk of heart disease. And it helps you control your blood sugar if you have diabetes.  · Walking is a weight-bearing activity, which helps maintain bone density. This can help prevent osteoporosis.  Personal rewards  · Taking walks can help you relax and manage stress. And fitness walking may make you feel better about yourself.  · Walking can help you sleep better at night and make you less likely to be depressed.  · Regular walking may help maintain your memory as you get older.  · Walking is a great way to spend extra time with friends and family members. Be sure to invite your dog along!  Q&A about fitness walking  Q: Will walking keep me fit?  A: Yes. Regular walking at the right pace gives you all the benefits of other aerobic activities, such as jogging and swimming.  Q: Will walking help me lose weight and keep it off?  A: Yes. Per mile, walking can burn as many calories as jogging. Your health care provider can help work walking into your weight-loss plan.  Q: Is walking safe for my health?  A: Yes. Walking is safe if you have high  blood pressure, diabetes, heart disease, or other conditions. Talk to your healthcare provider before you start.  Date Last Reviewed: 4/1/2017 © 2000-2017 [employer] [place]. [address] [place] [address], [place] [address]. All rights reserved. This information is not intended as a substitute for professional medical care. Always follow your healthcare professional's instructions.          Weight Management: Getting Started  Healthy bodies come in all shapes and sizes. Not all bodies are made to be thin. For some people, a healthy weight is higher than the average weight listed on weight charts. Your healthcare provider can help you decide on a healthy weight for you.    Reasons to lose weight  Losing weight can help with some health problems, such as high blood pressure, heart disease, diabetes, sleep apnea, and arthritis. You may also feel more energy.  Set your long-term goal  Your goal doesn't even have to be a specific weight. You may decide on a fitness goal (such as being able to walk 10 miles a week), or a health goal (such as lowering your blood pressure). Choose a goal that is measurable and reasonable, so you know when you've reached it. A goal of reaching a BMI of less than 25 is not always reasonable (or possible).   Make an action plan  Habits dont change overnight. Setting your goals too high can leave you feeling discouraged if you cant reach them. Be realistic. Choose one or two small changes you can make now. Set an action plan for how you are going to make these changes. When you can stick to this plan, keep making a few more small changes. Taking small steps will help you stay on the path to success.  Track your progress  Write down your goals. Then, keep a daily record of your progress. Write down what you eat and how active you are. This record lets you look back on how much youve done. It may also help when youre feeling frustrated. Reward yourself for success. Even if you dont reach  every goal, give yourself credit for what you do get done.  Get support  Encouragement from others can help make losing weight easier. Ask your family members and friends for support. They may even want to join you. Also look to your healthcare provider, registered dietitian, and  for help. Your local hospital can give you more information about nutrition, exercise, and weight loss.  Date Last Reviewed: 1/31/2016 © 2000-2017 [employer]. [address] [place] [address], [place] [address]. All rights reserved. This information is not intended as a substitute for professional medical care. Always follow your healthcare professional's instructions.            Established High Blood Pressure    High blood pressure (hypertension) is a chronic disease. Often, healthcare providers dont know what causes it. But it can be caused by certain health conditions and medicines.  If you have high blood pressure, you may not have any symptoms. If you do have symptoms, they may include headache, dizziness, changes in your vision, chest pain, and shortness of breath. But even without symptoms, high blood pressure thats not treated raises your risk for heart attack and stroke. High blood pressure is a serious health risk and shouldnt be ignored.  A blood pressure reading is made up of two numbers: a higher number over a lower number. The top number is the systolic pressure. The bottom number is the diastolic pressure. A normal blood pressure is a systolic pressure of  less than 120 over a diastolic pressure of less than 80. You will see your blood pressure readings written together. For example, a person with a systolic pressure of 188 and a diastolic pressure of 78 will have 118/78 written in the medical record.  High blood pressure is when either the top number is 140 or higher, or the bottom number is 90 or higher. This must be the result when taking your blood pressure a number of times. The blood  pressures between normal and high are called prehypertension.  Home care  If you have high blood pressure, you should do what is listed below to lower your blood pressure. If you are taking medicines for high blood pressure, these methods may reduce or end your need for medicines in the future.  · Begin a weight-loss program if you are overweight.  · Cut back on how much salt you get in your diet. Heres how to do this:  ¨ Dont eat foods that have a lot of salt. These include olives, pickles, smoked meats, and salted potato chips.  ¨ Dont add salt to your food at the table.  ¨ Use only small amounts of salt when cooking.  · Start an exercise program. Talk with your healthcare provider about the type of exercise program that would be best for you. It doesn't have to be hard. Even brisk walking for 20 minutes 3 times a week is a good form of exercise.  · Dont take medicines that stimulate the heart. This includes many over-the-counter cold and sinus decongestant pills and sprays, as well as diet pills. Check the warnings about hypertension on the label. Before buying any over-the-counter medicines or supplements, always ask the pharmacist about the product's potential interaction with your high blood pressure and your high blood pressure medicines.  · Stimulants such as amphetamine or cocaine could be deadly for someone with high blood pressure. Never take these.  · Limit how much caffeine you get in your diet. Switch to caffeine-free products.  · Stop smoking. If you are a long-time smoker, this can be hard. Talk to your healthcare provider about medicines and nicotine replacement options to help you. Also, enroll in a stop-smoking program to make it more likely that you will quit for good.  · Learn how to handle stress. This is an important part of any program to lower blood pressure. Learn about relaxation methods like meditation, yoga, or biofeedback.  · If your provider prescribed medicines, take them exactly  as directed. Missing doses may cause your blood pressure get out of control.  · If you miss a dose or doses, check with your healthcare provider or pharmacist about what to do.  · Consider buying an automatic blood pressure machine. Ask your provider for a recommendation. You can get one of these at most pharmacies.     The American Heart Association recommends the following guidelines for home blood pressure monitoring:  · Don't smoke or drink coffee for 30 minutes before taking your blood pressure.  · Go to the bathroom before the test.  · Relax for 5 minutes before taking the measurement.  · Sit with your back supported (don't sit on a couch or soft chair); keep your feet on the floor uncrossed. Place your arm on a solid flat surface (like a table) with the upper part of the arm at heart level. Place the middle of the cuff directly above the eye of the elbow. Check the monitor's instruction manual for an illustration.  · Take multiple readings. When you measure, take 2 to 3 readings one minute apart and record all of the results.  · Take your blood pressure at the same time every day, or as your healthcare provider recommends.  · Record the date, time, and blood pressure reading.  · Take the record with you to your next medical appointment. If your blood pressure monitor has a built-in memory, simply take the monitor with you to your next appointment.  · Call your provider if you have several high readings. Don't be frightened by a single high blood pressure reading, but if you get several high readings, check in with your healthcare provider.  · Note: When blood pressure reaches a systolic (top number) of 180 or higher OR diastolic (bottom number) of 110 or higher, seek emergency medical treatment.  Follow-up care  You will need to see your healthcare provider regularly. This is to check your blood pressure and to make changes to your medicines. Make a follow-up appointment as directed. Bring the record of your  home blood pressure readings to the appointment.  When to seek medical advice  Call your healthcare provider right away if any of these occur:  · Blood pressure reaches a systolic (upper number) of 180 or higher OR a diastolic (bottom number) of 110 or higher  · Chest pain or shortness of breath  · Severe headache  · Throbbing or rushing sound in the ears  · Nosebleed  · Sudden severe pain in your belly (abdomen)  · Extreme drowsiness, confusion, or fainting  · Dizziness or spinning sensation (vertigo)  · Weakness of an arm or leg or one side of the face  · You have problems speaking or seeing   Date Last Reviewed: 12/1/2016  © 7459-0119 [employer]. [address] [place] [address], [place] [address]. All rights reserved. This information is not intended as a substitute for professional medical care. Always follow your healthcare professional's instructions.

## 2018-06-19 NOTE — PROGRESS NOTES
Subjective:       Patient ID: Fifi Mcnair is a 67 y.o. female.    Chief Complaint: Right arm swollen    Ms. Mcnair comes to clinic today due to concern about discoloration of her left great toe toenail. The patient is also concerned about swelling of her right leg. She reports that this worsens as the day goes on and resolves after sleeping and elevating the leg. The patient has had several imaging studies evaluating her veins and lymphatic system. These all returned normal. The patient does take torsemide daily. The patient was advised to use compression stockings. She did not get these yet. The patient is due to update her pneumonia shot today. The patient reports that she was diagnosed with a UTI last week at the urgent care. She finished her last of the antibiotics today. She continues to have some burning at this time.       Review of Systems   Constitutional: Negative for activity change, appetite change and fever.   HENT: Negative for postnasal drip, rhinorrhea and sinus pressure.    Eyes: Negative for visual disturbance.   Respiratory: Negative for cough and shortness of breath.    Cardiovascular: Positive for leg swelling. Negative for chest pain.   Gastrointestinal: Negative for abdominal distention and abdominal pain.   Genitourinary: Negative for difficulty urinating and dysuria.   Musculoskeletal: Positive for arthralgias and myalgias.   Neurological: Negative for headaches.   Hematological: Negative for adenopathy.   Psychiatric/Behavioral: The patient is not nervous/anxious.        Objective:      Physical Exam   Constitutional: She is oriented to person, place, and time.   Cardiovascular: Normal rate and regular rhythm.    Pulmonary/Chest: Effort normal and breath sounds normal. She has no wheezes.   Abdominal: Soft. Bowel sounds are normal. There is no tenderness.   Musculoskeletal: She exhibits no edema.   No edema seen on today's exam   Neurological: She is alert and oriented to person,  place, and time.   Skin: No erythema.   Area of dark discoloration of left great toenail.   Psychiatric: Her behavior is normal.       Assessment:       1. Discoloration of nail    2. Need for pneumococcal vaccination    3. Urinary tract infection without hematuria, site unspecified    4. Leg swelling        Plan:       Fifi was seen today for right leg swollen.    Diagnoses and all orders for this visit:    Discoloration of nail  -     Ambulatory referral to Podiatry    Need for pneumococcal vaccination  -     (In Office Administered) Pneumococcal Polysaccharide Vaccine (23 Valent) (SQ/IM)    Urinary tract infection without hematuria, site unspecified  -     Urinalysis; Future  -     Urine culture; Future    Leg swelling  Low salt diet  Elevate leg whenever possible  Compression stockings encouraged

## 2018-06-20 LAB — BACTERIA UR CULT: NORMAL

## 2018-06-21 ENCOUNTER — OFFICE VISIT (OUTPATIENT)
Dept: ORTHOPEDICS | Facility: CLINIC | Age: 68
End: 2018-06-21
Payer: MEDICARE

## 2018-06-21 DIAGNOSIS — M17.0 PRIMARY OSTEOARTHRITIS OF BOTH KNEES: Primary | ICD-10-CM

## 2018-06-21 PROCEDURE — 99499 UNLISTED E&M SERVICE: CPT | Mod: S$GLB,,, | Performed by: ORTHOPAEDIC SURGERY

## 2018-06-21 PROCEDURE — 20610 DRAIN/INJ JOINT/BURSA W/O US: CPT | Mod: 50,S$GLB,, | Performed by: ORTHOPAEDIC SURGERY

## 2018-06-21 PROCEDURE — 99999 PR PBB SHADOW E&M-EST. PATIENT-LVL II: CPT | Mod: PBBFAC,,, | Performed by: ORTHOPAEDIC SURGERY

## 2018-06-21 NOTE — PROGRESS NOTES
Past Medical History:   Diagnosis Date    Allergy     multiple antibiotic allergies    Anemia     Anxiety     Arthritis     Asthma     CHF (congestive heart failure)     NYHA class III     Chronic kidney disease     Colon polyp     benign    COPD (chronic obstructive pulmonary disease)     DLCO 37% , and mild pulmonary HTN    Depression     Diabetes mellitus     Diabetic retinopathy     Diastolic dysfunction     Diverticulitis of large intestine without perforation or abscess without bleeding 2/12/2018    Diverticulosis     Former smoker     Fracture of lumbar spine     GERD (gastroesophageal reflux disease)     Hernia of unspecified site of abdominal cavity without mention of obstruction or gangrene     Hyperlipidemia     Hypertension     hypertensive renal    IBS (irritable bowel syndrome)     Mild nonproliferative diabetic retinopathy(362.04) 11/25/2013    Morbid obesity     Nuclear sclerosis 11/25/2013    Osteoporosis     Peripheral edema     Rash     Recurrent upper respiratory infection (URI)     S/P LASIK (laser assisted in situ keratomileusis)     Sleep apnea     sleep apnea uses bipap.    Stroke     Thyroid disease     on synthroid    TIA (transient ischemic attack)     Urinary tract infection        Past Surgical History:   Procedure Laterality Date    ABDOMINAL SURGERY      ADENOIDECTOMY      COLONOSCOPY  03/05/2013    repeat in 5 years    COLONOSCOPY N/A 5/31/2017    Procedure: COLONOSCOPY;  Surgeon: Luis Navarro MD;  Location: [facility] ENDO;  Service: Endoscopy;  Laterality: N/A;    COLONOSCOPY N/A 4/11/2018    Procedure: COLONOSCOPY;  Surgeon: Luis Navarro MD;  Location: [facility] ENDO;  Service: Endoscopy;  Laterality: N/A;    COSMETIC SURGERY      CYSTOSCOPY      EYE SURGERY Right     mazzulla    GASTRECTOMY      gastric sleeve      gastric sleeve      HERNIA REPAIR      5 years old    HYSTERECTOMY      ovaries remain    REFRACTIVE SURGERY      mono  va//ou//    RHINOPLASTY TIP      TONSILLECTOMY      TUBAL LIGATION      UPPER GASTROINTESTINAL ENDOSCOPY  03/05/2013    UPPER GASTROINTESTINAL ENDOSCOPY  08/24/2016    Dr. Veras, repeat in 8 weeks    UPPER GASTROINTESTINAL ENDOSCOPY  07/21/2016    Dr. Randall       Current Outpatient Prescriptions   Medication Sig    albuterol-ipratropium 2.5mg-0.5mg/3mL (DUO-NEB) 0.5 mg-3 mg(2.5 mg base)/3 mL nebulizer solution Take 3 mLs by nebulization every 6 (six) hours as needed for Wheezing. Rescue    allopurinol (ZYLOPRIM) 100 MG tablet TAKE TWO TABLETS BY MOUTH NIGHTLY    amitriptyline (ELAVIL) 50 MG tablet Take 1 tablet (50 mg total) by mouth every evening.    atenolol (TENORMIN) 25 MG tablet Take 1 tablet (25 mg total) by mouth once daily.    calcitRIOL (ROCALTROL) 0.25 MCG Cap Take 1 capsule by mouth twice a week. Monday Friday    CALCIUM CITRATE/VITAMIN D3 (CALCIUM CITRATE + D ORAL) Take 400 mg by mouth 3 (three) times daily.    cetirizine (ZYRTEC) 10 MG tablet Take 1 tablet (10 mg total) by mouth once daily.    clotrimazole-betamethasone 1-0.05% (LOTRISONE) cream     cyanocobalamin, vitamin B-12, (VITAMIN B-12) 5,000 mcg Subl Place 5,000 mg under the tongue once a week.    DENOSUMAB (PROLIA SUBQ) Inject into the skin every 6 (six) months.     DIABETIC SUPPLIES,MISCELL (MEDTRONIC REMOTE CONTROL MISC) No Sig Provided    diphenoxylate-atropine 2.5-0.025 mg (LOMOTIL) 2.5-0.025 mg per tablet TAKE ONE TABLET BY MOUTH 4 TIMES DAILY AS NEEDED FOR DIARRHEA    DYMISTA 137-50 mcg/spray Spry nassal spray     fluoxetine (PROZAC) 20 MG capsule TAKE TWO CAPSULES BY MOUTH ONCE DAILY    fluticasone (FLONASE) 50 mcg/actuation nasal spray 2 sprays by Nasal route once daily.     fluticasone-vilanterol (BREO) 100-25 mcg/dose diskus inhaler Inhale 1 puff into the lungs once daily.    INSULIN ASPART (NOVOLOG FLEXPEN U-100 INSULIN SUBQ) Inject into the skin.    KLOR-CON M20 20 mEq tablet TAKE ONE TABLET BY MOUTH  TWICE DAILY    l-methylfolate-b2-b6-b12 (CEREFOLIN) 6-5-50-1 mg Tab Take 1 tablet by mouth once daily.    Lacto.acidophilus-Bif.animalis (PROBIOTIC) 5 billion cell CpSP Take 1 capsule by mouth once daily.    levothyroxine (SYNTHROID) 25 MCG tablet TAKE ONE TABLET BY MOUTH ONCE DAILY    LORazepam (ATIVAN) 1 MG tablet Take 1 tablet (1 mg total) by mouth every evening.    LORazepam (ATIVAN) 1 MG tablet TAKE 1 TABLET BY MOUTH EVERY 12 HOURS AS NEEDED FOR ANXIETY    losartan (COZAAR) 25 MG tablet Take 1 tablet by mouth once daily.    melatonin 3 mg Tab Take 5 mg by mouth every evening. 10 MG NIGHTLY    metronidazole (METROGEL) 0.75 % gel Apply topically 2 (two) times daily.    montelukast (SINGULAIR) 10 mg tablet Take 10 mg by mouth once daily.     MULTIVIT-IRON-MIN-FOLIC ACID 3,500-18-0.4 UNIT-MG-MG ORAL CHEW Take by mouth 2 (two) times daily.    oxyCODONE (ROXICODONE) 15 MG Tab Take 1 tablet (15 mg total) by mouth every 6 (six) hours as needed for Pain.    oxyMORphone (OPANA ER) 10 MG 12 hr tablet Take 1 tablet by mouth 2 (two) times daily as needed.    pantoprazole (PROTONIX) 40 MG tablet TAKE ONE TABLET BY MOUTH ONCE DAILY    polymyxin B sulf-trimethoprim (POLYTRIM) 10,000 unit- 1 mg/mL Drop Place 1 drop into both eyes every 4 (four) hours.    ranitidine (ZANTAC) 300 MG tablet TAKE ONE TABLET BY MOUTH IN THE EVENING    torsemide (DEMADEX) 20 MG Tab Take 2 tablets (40 mg total) by mouth once daily.    metOLazone (ZAROXOLYN) 5 MG tablet Take 1 tablet (5 mg total) by mouth daily as needed (swelling).     No current facility-administered medications for this visit.        Review of patient's allergies indicates:   Allergen Reactions    Adhesive Other (See Comments)     Blisters    Cleocin [clindamycin hcl] Hives    Ceclor [cefaclor] Hives    Morphine Nausea And Vomiting    Advair diskus [fluticasone-salmeterol]      Dry mouth    Aleve [naproxen sodium]      Unable to take secondary to kidney  function.     Levaquin [levofloxacin] Dermatitis    Macrodantin [nitrofurantoin macrocrystalline] Hives    Motrin [ibuprofen]      Unable to take secondary to kidney functions.    Sulfa (sulfonamide antibiotics)      Hold due to renal problems    Remeron [mirtazapine] Palpitations and Other (See Comments)     Jittery    Vancomycin analogues Rash     Feet broke out/inflammed blood vessels         Family History   Problem Relation Age of Onset    Heart disease Mother 59    Cancer Mother 59        throat    Allergies Mother     Diabetes Mother     Heart disease Father 70        flutter    Allergies Father     Diabetes Father     Cancer Sister 22        22 thyroid,49  breast    Allergies Sister     Breast cancer Sister     Diabetes Sister     Cancer Brother 62        lung    Diabetes Brother     Asthma Daughter     Diabetes Daughter     Depression Daughter     Asthma Grandchild     Eczema Grandchild     Eczema Grandchild     Breast cancer Maternal Grandmother     Ovarian cancer Cousin     Amblyopia Neg Hx     Blindness Neg Hx     Cataracts Neg Hx     Glaucoma Neg Hx     Hypertension Neg Hx     Macular degeneration Neg Hx     Retinal detachment Neg Hx     Strabismus Neg Hx     Stroke Neg Hx     Thyroid disease Neg Hx     Angioedema Neg Hx     Immunodeficiency Neg Hx     Allergic rhinitis Neg Hx     Atopy Neg Hx     Rhinitis Neg Hx     Urticaria Neg Hx     Colon cancer Neg Hx     Colon polyps Neg Hx     Crohn's disease Neg Hx     Ulcerative colitis Neg Hx     Celiac disease Neg Hx        Social History     Social History    Marital status:      Spouse name: N/A    Number of children: N/A    Years of education: N/A     Occupational History    Not on file.     Social History Main Topics    Smoking status: Former Smoker     Types: Cigarettes    Smokeless tobacco: Never Used      Comment: quit 1990    Alcohol use Yes      Comment: rare    Drug use: No    Sexual  activity: Yes     Birth control/ protection: Surgical     Other Topics Concern    Not on file     Social History Narrative    No narrative on file       Chief Complaint:   Chief Complaint   Patient presents with    Injections     SYNVISC 3/3 BILATERAL KNEE       History: This is a 67-year-old female with chronic bilateral knee pain.  Left is greater than the right.  Patient has had injections previously.  Cortisone does not work very well.  Has had good success with a Synvisc series.  Synvisc one did not work well for her.  Rates her pain currently as a 7 out of 10.  Symptoms are worsening and moderate to severe.    Present:  Here for Synvisc.    Review of Systems:    Musculoskeletal:  See HPI          Physical Examination:    Vital Signs:    There were no vitals filed for this visit.    This a well-developed, well nourished patient in no acute distress.  They are alert and oriented and cooperative to examination.  Pt. walks without an antalgic gait.      Examination of bilateral knees knee shows no rashes or erythema. There are some enlargement of the right thigh. Patient has full range of motion from 0-130°. Patient is nontender to palpation over lateral joint line and moderately tender to palpation over the medial joint line.  Knee is stable to varus and valgus stress. 5 out of 5 motor strength. Palpable distal pulses. Intact light touch sensation. Negative Patellofemoral crepitus      X-rays: 4 views of right knee is   reviewed which show medial narrowing that is significantly worse on the left.  Severe left knee arthritis  X-rays of the right foot are reviewed which show some degenerative changes particularly around the midfoot.     Assessment:: Bilateral knee arthritis       Plan:   I injected both knees with Synvisc 3 of 3.  Follow up in 6 weeks.  Briefly discussed knee replacement.

## 2018-06-24 ENCOUNTER — HOSPITAL ENCOUNTER (EMERGENCY)
Facility: HOSPITAL | Age: 68
Discharge: HOME | End: 2018-06-24
Attending: EMERGENCY MEDICINE
Payer: MEDICARE

## 2018-06-24 VITALS
SYSTOLIC BLOOD PRESSURE: 160 MMHG | TEMPERATURE: 99 F | HEART RATE: 89 BPM | RESPIRATION RATE: 14 BRPM | BODY MASS INDEX: 42.33 KG/M2 | OXYGEN SATURATION: 95 % | HEIGHT: 62 IN | DIASTOLIC BLOOD PRESSURE: 72 MMHG | WEIGHT: 230 LBS

## 2018-06-24 DIAGNOSIS — N30.01 ACUTE CYSTITIS WITH HEMATURIA: Primary | ICD-10-CM

## 2018-06-24 LAB
ALBUMIN SERPL BCP-MCNC: 3.5 G/DL
ALP SERPL-CCNC: 104 U/L
ALT SERPL W/O P-5'-P-CCNC: 18 U/L
ANION GAP SERPL CALC-SCNC: 13 MMOL/L
AST SERPL-CCNC: 23 U/L
BACTERIA #/AREA URNS HPF: ABNORMAL /HPF
BASOPHILS # BLD AUTO: 0 K/UL
BASOPHILS NFR BLD: 0 %
BILIRUB SERPL-MCNC: 0.4 MG/DL
BILIRUB UR QL STRIP: ABNORMAL
BUN SERPL-MCNC: 16 MG/DL
CALCIUM SERPL-MCNC: 9.9 MG/DL
CHLORIDE SERPL-SCNC: 98 MMOL/L
CLARITY UR: ABNORMAL
CO2 SERPL-SCNC: 26 MMOL/L
COLOR UR: ABNORMAL
CREAT SERPL-MCNC: 1.3 MG/DL
DIFFERENTIAL METHOD: ABNORMAL
EOSINOPHIL # BLD AUTO: 0.1 K/UL
EOSINOPHIL NFR BLD: 0.9 %
ERYTHROCYTE [DISTWIDTH] IN BLOOD BY AUTOMATED COUNT: 14.1 %
EST. GFR  (AFRICAN AMERICAN): 49 ML/MIN/1.73 M^2
EST. GFR  (NON AFRICAN AMERICAN): 43 ML/MIN/1.73 M^2
GLUCOSE SERPL-MCNC: 291 MG/DL
GLUCOSE UR QL STRIP: NEGATIVE
HCT VFR BLD AUTO: 38 %
HGB BLD-MCNC: 13 G/DL
HGB UR QL STRIP: ABNORMAL
HYALINE CASTS #/AREA URNS LPF: 0 /LPF
KETONES UR QL STRIP: ABNORMAL
LEUKOCYTE ESTERASE UR QL STRIP: ABNORMAL
LYMPHOCYTES # BLD AUTO: 1.6 K/UL
LYMPHOCYTES NFR BLD: 14.9 %
MCH RBC QN AUTO: 32.5 PG
MCHC RBC AUTO-ENTMCNC: 34.3 G/DL
MCV RBC AUTO: 95 FL
MICROSCOPIC COMMENT: ABNORMAL
MONOCYTES # BLD AUTO: 0.6 K/UL
MONOCYTES NFR BLD: 5.2 %
NEUTROPHILS # BLD AUTO: 8.4 K/UL
NEUTROPHILS NFR BLD: 79 %
NITRITE UR QL STRIP: POSITIVE
PH UR STRIP: 7 [PH] (ref 5–8)
PLATELET # BLD AUTO: 251 K/UL
PMV BLD AUTO: 7.5 FL
POTASSIUM SERPL-SCNC: 3.9 MMOL/L
PROT SERPL-MCNC: 7.2 G/DL
PROT UR QL STRIP: ABNORMAL
RBC # BLD AUTO: 4.01 M/UL
RBC #/AREA URNS HPF: >100 /HPF (ref 0–4)
SODIUM SERPL-SCNC: 137 MMOL/L
SP GR UR STRIP: 1.02 (ref 1–1.03)
SQUAMOUS #/AREA URNS HPF: 2 /HPF
URN SPEC COLLECT METH UR: ABNORMAL
UROBILINOGEN UR STRIP-ACNC: 1 EU/DL
WBC # BLD AUTO: 10.6 K/UL
WBC #/AREA URNS HPF: 40 /HPF (ref 0–5)

## 2018-06-24 PROCEDURE — 63600175 PHARM REV CODE 636 W HCPCS: Performed by: EMERGENCY MEDICINE

## 2018-06-24 PROCEDURE — 96365 THER/PROPH/DIAG IV INF INIT: CPT

## 2018-06-24 PROCEDURE — 36415 COLL VENOUS BLD VENIPUNCTURE: CPT

## 2018-06-24 PROCEDURE — 99283 EMERGENCY DEPT VISIT LOW MDM: CPT | Mod: 25

## 2018-06-24 PROCEDURE — 87086 URINE CULTURE/COLONY COUNT: CPT

## 2018-06-24 PROCEDURE — 85025 COMPLETE CBC W/AUTO DIFF WBC: CPT

## 2018-06-24 PROCEDURE — 25000003 PHARM REV CODE 250: Performed by: EMERGENCY MEDICINE

## 2018-06-24 PROCEDURE — 80053 COMPREHEN METABOLIC PANEL: CPT

## 2018-06-24 PROCEDURE — 81000 URINALYSIS NONAUTO W/SCOPE: CPT

## 2018-06-24 RX ORDER — OXYCODONE HYDROCHLORIDE 5 MG/1
15 TABLET ORAL
Status: COMPLETED | OUTPATIENT
Start: 2018-06-24 | End: 2018-06-24

## 2018-06-24 RX ORDER — CEPHALEXIN 500 MG/1
500 CAPSULE ORAL 4 TIMES DAILY
Qty: 28 CAPSULE | Refills: 0 | Status: SHIPPED | OUTPATIENT
Start: 2018-06-24 | End: 2018-07-01

## 2018-06-24 RX ORDER — CEFTRIAXONE 1 G/50ML
1 INJECTION, SOLUTION INTRAVENOUS
Status: COMPLETED | OUTPATIENT
Start: 2018-06-24 | End: 2018-06-24

## 2018-06-24 RX ADMIN — CEFTRIAXONE 1 G: 1 INJECTION, SOLUTION INTRAVENOUS at 05:06

## 2018-06-24 RX ADMIN — OXYCODONE HYDROCHLORIDE 15 MG: 5 TABLET ORAL at 03:06

## 2018-06-24 NOTE — ED PROVIDER NOTES
"Encounter Date: 6/24/2018    SCRIBE #1 NOTE: I, Carissa Meneses, am scribing for, and in the presence of, Dr. Quinonez .       History     Chief Complaint   Patient presents with    Hematuria     Flank pain this am. Recent UTI       06/24/2018 2:23 PM     Chief complaint: Hematuria       Fifi Mcnair is a 67 y.o. female with a hx of UTI, DM, CKD, and Diverticulitis who presents to the ED with complaints of bilateral flank pain, hematuria, and dysuria. Pt states she is "passing clots." She was seen at Urgent Care and was started on Ciprofloxacin. 5 days ago, she had a UA to r/o blood in urine, result negative. She states her past UTI's presented as hematuria. She also notes urinary urgency but decrease in urinary output. Pt denies vomiting. Allergens include Adhesive and Cleocin.      The history is provided by the patient.     Review of patient's allergies indicates:   Allergen Reactions    Adhesive Other (See Comments)     Blisters    Cleocin [clindamycin hcl] Hives    Ceclor [cefaclor] Hives    Advair diskus [fluticasone-salmeterol]      Dry mouth    Aleve [naproxen sodium]      Unable to take secondary to kidney function.     Erythromycin Hives    Levaquin [levofloxacin] Dermatitis    Macrobid [nitrofurantoin monohyd/m-cryst] Other (See Comments)     Stomach pain/ GI issues    Macrodantin [nitrofurantoin macrocrystalline] Hives    Motrin [ibuprofen]      Unable to take secondary to kidney functions.    Restoril [temazepam]      LIGHTHEADED UPON WAKING.with poor results    Sulfa (sulfonamide antibiotics)      Hold due to renal problems    Trazodone      PALPITATIONS    Remeron [mirtazapine] Palpitations and Other (See Comments)     Jittery    Vancomycin analogues Rash     Feet broke out/inflammed blood vessels       Past Medical History:   Diagnosis Date    Allergy     multiple antibiotic allergies    Anemia     Anxiety     Arthritis     Asthma     CHF (congestive heart failure)     NYHA " class III     Chronic kidney disease     Colon polyp     benign    COPD (chronic obstructive pulmonary disease)     DLCO 37% , and mild pulmonary HTN    Depression     Diabetes mellitus     Diabetic retinopathy     Diastolic dysfunction     Diverticulitis of large intestine without perforation or abscess without bleeding 2/12/2018    Diverticulosis     Former smoker     Fracture of lumbar spine     GERD (gastroesophageal reflux disease)     Hernia of unspecified site of abdominal cavity without mention of obstruction or gangrene     Hyperlipidemia     Hypertension     hypertensive renal    IBS (irritable bowel syndrome)     Mild nonproliferative diabetic retinopathy(362.04) 11/25/2013    Morbid obesity     Nuclear sclerosis 11/25/2013    Osteoporosis     Peripheral edema     Rash     Recurrent upper respiratory infection (URI)     S/P LASIK (laser assisted in situ keratomileusis)     Sleep apnea     sleep apnea uses bipap.    Stroke     Thyroid disease     on synthroid    TIA (transient ischemic attack)     Urinary tract infection      Past Surgical History:   Procedure Laterality Date    ABDOMINAL SURGERY      ADENOIDECTOMY      COLONOSCOPY  03/05/2013    repeat in 5 years    COLONOSCOPY N/A 5/31/2017    Procedure: COLONOSCOPY;  Surgeon: Luis Navarro MD;  Location: [facility] ENDO;  Service: Endoscopy;  Laterality: N/A;    COLONOSCOPY N/A 4/11/2018    Procedure: COLONOSCOPY;  Surgeon: Luis Navarro MD;  Location: [facility] ENDO;  Service: Endoscopy;  Laterality: N/A;    COSMETIC SURGERY      CYSTOSCOPY      EYE SURGERY Right     mazzulla    GASTRECTOMY      gastric sleeve      gastric sleeve      HERNIA REPAIR      5 years old    HYSTERECTOMY      ovaries remain    REFRACTIVE SURGERY      mono va//ou//    RHINOPLASTY TIP      TONSILLECTOMY      TUBAL LIGATION      UPPER GASTROINTESTINAL ENDOSCOPY  03/05/2013    UPPER GASTROINTESTINAL ENDOSCOPY  08/24/2016      Eda, repeat in 8 weeks    UPPER GASTROINTESTINAL ENDOSCOPY  07/21/2016    Dr. Randall     Family History   Problem Relation Age of Onset    Heart disease Mother 59    Cancer Mother 59        throat    Allergies Mother     Diabetes Mother     Heart disease Father 70        flutter    Allergies Father     Diabetes Father     Cancer Sister 22        22 thyroid,49  breast    Allergies Sister     Breast cancer Sister     Diabetes Sister     Cancer Brother 62        lung    Diabetes Brother     Asthma Daughter     Diabetes Daughter     Depression Daughter     Asthma Grandchild     Eczema Grandchild     Eczema Grandchild     Breast cancer Maternal Grandmother     Ovarian cancer Cousin     Amblyopia Neg Hx     Blindness Neg Hx     Cataracts Neg Hx     Glaucoma Neg Hx     Hypertension Neg Hx     Macular degeneration Neg Hx     Retinal detachment Neg Hx     Strabismus Neg Hx     Stroke Neg Hx     Thyroid disease Neg Hx     Angioedema Neg Hx     Immunodeficiency Neg Hx     Allergic rhinitis Neg Hx     Atopy Neg Hx     Rhinitis Neg Hx     Urticaria Neg Hx     Colon cancer Neg Hx     Colon polyps Neg Hx     Crohn's disease Neg Hx     Ulcerative colitis Neg Hx     Celiac disease Neg Hx      Social History   Substance Use Topics    Smoking status: Former Smoker     Types: Cigarettes    Smokeless tobacco: Never Used      Comment: quit 1990    Alcohol use Yes      Comment: rare     Review of Systems   Constitutional: Negative for fever.   HENT: Negative for sore throat.    Eyes: Negative for redness.   Respiratory: Negative for shortness of breath.    Cardiovascular: Negative for chest pain.   Gastrointestinal: Negative for nausea.   Genitourinary: Positive for decreased urine volume, dysuria, hematuria and urgency.   Musculoskeletal: Negative for back pain.   Skin: Negative for rash.   Neurological: Negative for weakness.   Hematological: Does not bruise/bleed easily.       Physical  Exam     Initial Vitals [06/24/18 1355]   BP Pulse Resp Temp SpO2   138/65 93 16 98.7 °F (37.1 °C) (!) 94 %      MAP       --         Physical Exam    Nursing note and vitals reviewed.  Constitutional: She appears well-developed.   HENT:   Head: Normocephalic and atraumatic.   Eyes: EOM are normal. Pupils are equal, round, and reactive to light.   Neck: Neck supple.   Cardiovascular: Normal rate, regular rhythm, normal heart sounds and intact distal pulses. Exam reveals no gallop and no friction rub.    No murmur heard.  Pulmonary/Chest: Breath sounds normal. No respiratory distress. She has no decreased breath sounds. She has no wheezes. She has no rhonchi. She has no rales.   Abdominal: Soft. Bowel sounds are normal. There is no tenderness.   Musculoskeletal: Normal range of motion.   No peripheral edema.   Neurological: She is alert and oriented to person, place, and time.   Cranial nerves III-XII intact.   Skin: Skin is warm and dry.   Psychiatric: She has a normal mood and affect.         ED Course   Procedures  Labs Reviewed   CULTURE, URINE   URINALYSIS   CBC W/ AUTO DIFFERENTIAL   COMPREHENSIVE METABOLIC PANEL          Imaging Results    None          Medical Decision Making:   History:   Old Medical Records: I decided to obtain old medical records.  Clinical Tests:   Lab Tests: Ordered and Reviewed  Patient appears to have acute cystitis with hematuria.  I do not believe she has pyelonephritis at this time without any significant vomiting or fevers or leukocytosis.  She can take Rocephin and Keflex according to the patient despite her allergy to ceclor.  I ordered a urine culture and the patient can follow up with primary care.            Scribe Attestation:   Scribe #1: I performed the above scribed service and the documentation accurately describes the services I performed. I attest to the accuracy of the note.     I, Dr. John Quinonez personally performed the services described in this documentation.  All medical record entries made by the scribe were at my direction and in my presence.  I have reviewed the chart and agree that the record reflects my personal performance and is accurate and complete. John Quinonez MD.  4:43 PM 06/24/2018    DISCLAIMER: This note was prepared with Dragon NaturallySpeaking voice recognition transcription software. Garbled syntax, mangled pronouns, and other bizarre constructions may be attributed to that software system            Clinical Impression:     1. Acute cystitis with hematuria                                 John Quinonez MD  06/24/18 [id number]

## 2018-06-25 ENCOUNTER — TELEPHONE (OUTPATIENT)
Dept: FAMILY MEDICINE | Facility: CLINIC | Age: 68
End: 2018-06-25

## 2018-06-25 NOTE — TELEPHONE ENCOUNTER
Spoke with patient regarding her urine culture results and she stated when she woke up yesterday morning , she was peeing blood, so she went to the ED, she was given Keflex and was told to make an appointment with Dr. Vargas for tomorrow. Patient was concerned that if she was just given the medication on yesterday , should she come in so soon ? Or should she wait a couple days for the medication to work? Please advise

## 2018-06-25 NOTE — TELEPHONE ENCOUNTER
----- Message from MOIZ Maurer sent at 6/21/2018  7:44 AM CDT -----  Urine culture shows no growth (no bacteria).  Please avoid caffeine and acidic drinks such as sodas, orange juice to help with the burning.  If this does not help please let us know.

## 2018-06-25 NOTE — TELEPHONE ENCOUNTER
Urine culture results are not yet available. Please schedule patient an ED follow up visit at the end of this week.

## 2018-06-26 ENCOUNTER — OFFICE VISIT (OUTPATIENT)
Dept: PODIATRY | Facility: CLINIC | Age: 68
End: 2018-06-26
Payer: MEDICARE

## 2018-06-26 VITALS
WEIGHT: 235 LBS | DIASTOLIC BLOOD PRESSURE: 63 MMHG | HEART RATE: 89 BPM | BODY MASS INDEX: 43.24 KG/M2 | HEIGHT: 62 IN | SYSTOLIC BLOOD PRESSURE: 110 MMHG

## 2018-06-26 DIAGNOSIS — E11.49 TYPE II DIABETES MELLITUS WITH NEUROLOGICAL MANIFESTATIONS: Primary | ICD-10-CM

## 2018-06-26 LAB — BACTERIA UR CULT: NO GROWTH

## 2018-06-26 PROCEDURE — 3045F PR MOST RECENT HEMOGLOBIN A1C LEVEL 7.0-9.0%: CPT | Mod: S$GLB,,, | Performed by: PODIATRIST

## 2018-06-26 PROCEDURE — 99202 OFFICE O/P NEW SF 15 MIN: CPT | Mod: S$GLB,,, | Performed by: PODIATRIST

## 2018-06-26 PROCEDURE — 3074F SYST BP LT 130 MM HG: CPT | Mod: S$GLB,,, | Performed by: PODIATRIST

## 2018-06-26 PROCEDURE — 99999 PR PBB SHADOW E&M-EST. PATIENT-LVL III: CPT | Mod: PBBFAC,,, | Performed by: PODIATRIST

## 2018-06-26 PROCEDURE — 3078F DIAST BP <80 MM HG: CPT | Mod: S$GLB,,, | Performed by: PODIATRIST

## 2018-06-26 NOTE — LETTER
June 26, 2018      Cyndy Hannon PA-C  [address] [place] [address]           [facility]  [address]  [place] [address]  Phone: [phone]          Patient: Fifi Mcnair   MR Number: [id number]   YOB: 1950   Date of Visit: 6/26/2018       Dear Cyndy Hannon:    Thank you for referring Fifi Mcnair to me for evaluation. Attached you will find relevant portions of my assessment and plan of care.    If you have questions, please do not hesitate to call me. I look forward to following Fifi Mcnair along with you.    Sincerely,    Ari Armando, TERE    Enclosure  CC:  No Recipients    If you would like to receive this communication electronically, please contact [email or web address] or [phone] to request more information on [employer] access.    For providers and/or their staff who would like to refer a patient to [facility], please contact us through our one-stop-shop provider referral line, [facility] , at [phone].    If you feel you have received this communication in error or would no longer like to receive these types of communications, please e-mail [email or web address]

## 2018-06-26 NOTE — PROGRESS NOTES
Subjective:      Patient ID: Fifi Mcnair is a 67 y.o. female.    Chief Complaint: Nail Problem (Left great toe)    Fifi is a 67 y.o. female who presents to the clinic upon referral from Dr. Hannon  for evaluation and treatment of diabetic feet. Fifi has a past medical history of Allergy; Anemia; Anxiety; Arthritis; Asthma; CHF (congestive heart failure); Chronic kidney disease; Colon polyp; COPD (chronic obstructive pulmonary disease); Depression; Diabetes mellitus; Diabetic retinopathy; Diastolic dysfunction; Diverticulitis of large intestine without perforation or abscess without bleeding (2/12/2018); Diverticulosis; Former smoker; Fracture of lumbar spine; GERD (gastroesophageal reflux disease); Hernia of unspecified site of abdominal cavity without mention of obstruction or gangrene; Hyperlipidemia; Hypertension; IBS (irritable bowel syndrome); Mild nonproliferative diabetic retinopathy(362.04) (11/25/2013); Morbid obesity; Nuclear sclerosis (11/25/2013); Osteoporosis; Peripheral edema; Rash; Recurrent upper respiratory infection (URI); S/P LASIK (laser assisted in situ keratomileusis); Sleep apnea; Stroke; Thyroid disease; TIA (transient ischemic attack); and Urinary tract infection. Patient relates no major problem with feet. Only complaints today consist of Diabetes, increased risk amputation needing evaluation/management/optomization of foot care.  No current symptoms.    PCP: Werner Vargas MD    Date Last Seen by PCP:   Chief Complaint   Patient presents with    Nail Problem     Left great toe         Current shoe gear: Casual shoes    Hemoglobin A1C   Date Value Ref Range Status   05/07/2018 7.4 (H) 4.0 - 5.6 % Final     Comment:     According to ADA guidelines, hemoglobin A1c <7.0% represents  optimal control in non-pregnant diabetic patients. Different  metrics may apply to specific patient populations.   Standards of Medical Care in Diabetes-2016.  For the purpose of screening for the  presence of diabetes:  <5.7%     Consistent with the absence of diabetes  5.7-6.4%  Consistent with increasing risk for diabetes   (prediabetes)  >or=6.5%  Consistent with diabetes  Currently, no consensus exists for use of hemoglobin A1c  for diagnosis of diabetes for children.  This Hemoglobin A1c assay has significant interference with fetal   hemoglobin   (HbF). The results are invalid for patients with abnormal amounts of   HbF,   including those with known Hereditary Persistence   of Fetal Hemoglobin. Heterozygous hemoglobin variants (HbAS, HbAC,   HbAD, HbAE, HbA2) do not significantly interfere with this assay;   however, presence of multiple variants in a sample may impact the %   interference.     02/08/2018 8.0 (H) 4.0 - 5.6 % Final     Comment:     According to ADA guidelines, hemoglobin A1c <7.0% represents  optimal control in non-pregnant diabetic patients. Different  metrics may apply to specific patient populations.   Standards of Medical Care in Diabetes-2016.  For the purpose of screening for the presence of diabetes:  <5.7%     Consistent with the absence of diabetes  5.7-6.4%  Consistent with increasing risk for diabetes   (prediabetes)  >or=6.5%  Consistent with diabetes  Currently, no consensus exists for use of hemoglobin A1c  for diagnosis of diabetes for children.  This Hemoglobin A1c assay has significant interference with fetal   hemoglobin   (HbF). The results are invalid for patients with abnormal amounts of   HbF,   including those with known Hereditary Persistence   of Fetal Hemoglobin. Heterozygous hemoglobin variants (HbAS, HbAC,   HbAD, HbAE, HbA2) do not significantly interfere with this assay;   however, presence of multiple variants in a sample may impact the %   interference.     07/12/2017 7.9 (H) 4.0 - 5.6 % Final     Comment:     According to ADA guidelines, hemoglobin A1c <7.0% represents  optimal control in non-pregnant diabetic patients. Different  metrics may apply to  specific patient populations.   Standards of Medical Care in Diabetes-2016.  For the purpose of screening for the presence of diabetes:  <5.7%     Consistent with the absence of diabetes  5.7-6.4%  Consistent with increasing risk for diabetes   (prediabetes)  >or=6.5%  Consistent with diabetes  Currently, no consensus exists for use of hemoglobin A1c  for diagnosis of diabetes for children.  This Hemoglobin A1c assay has significant interference with fetal   hemoglobin   (HbF). The results are invalid for patients with abnormal amounts of   HbF,   including those with known Hereditary Persistence   of Fetal Hemoglobin. Heterozygous hemoglobin variants (HbAS, HbAC,   HbAD, HbAE, HbA2) do not significantly interfere with this assay;   however, presence of multiple variants in a sample may impact the %   interference.     08/30/2013 8.0 (H) 4.8 - 5.6 % Final     Comment:              Increased risk for diabetes: 5.7 - 6.4           Diabetes: >6.4           Glycemic control for adults with diabetes: <7.0  **In order to standardize %HbA1c results worldwide, as of October 11, 2010,  the %HbA1c is being calculated using the master equation recommended in the  consensus statement adopted by the ADA (American Diabetes Assoc), EASD  (European Assoc for the Study of Diabetes), IFCC (International Federation  of Clinical Chemistry and Laboratory Medicine) and IDF (International  Diabetes Federation). Result units: %HgbA1c (DCCT/NGSP).  In common with other methods, Hb A1C values may not accurately reflect mean  blood glucose in patients with hemoglobin variants (HgbF, HgbS and HgbC).  Any cause of shortened erythrocyte survival will reduce exposure of  erythrocytes to glucose with a consequent decrease in HbA1c (%) values, even  though the time-averaged blood glucose level may be elevated. Causes of  shortened erythrocyte lifetime might be hemolytic anemia or other hemolytic  diseases, homozygous sickle cell trait, pregnancy,  recent significant or  chronic blood loss, etc. Caution should be used when interpreting the HbA1c  results from patients with these conditions.   02/02/2013 8.2 (H) 4.8 - 5.6 % Final     Comment:              Increased risk for diabetes: 5.7 - 6.4           Diabetes: >6.4           Glycemic control for adults with diabetes: <7.0  **In order to standardize %HbA1c results worldwide, as of October 11, 2010,  the %HbA1c is being calculated using the master equation recommended in the  consensus statement adopted by the ADA (American Diabetes Assoc), EASD  (European Assoc for the Study of Diabetes), IFCC (International Federation  of Clinical Chemistry and Laboratory Medicine) and IDF (International  Diabetes Federation). Result units: %HgbA1c (DCCT/NGSP).  In common with other methods, Hb A1C values may not accurately reflect mean  blood glucose in patients with hemoglobin variants (HgbF, HgbS and HgbC).  Any cause of shortened erythrocyte survival will reduce exposure of  erythrocytes to glucose with a consequent decrease in HbA1c (%) values, even  though the time-averaged blood glucose level may be elevated. Causes of  shortened erythrocyte lifetime might be hemolytic anemia or other hemolytic  diseases, homozygous sickle cell trait, pregnancy, recent significant or  chronic blood loss, etc. Caution should be used when interpreting the HbA1c  results from patients with these conditions.           Review of Systems   Constitution: Negative for chills, fever, malaise/fatigue and night sweats.   Cardiovascular: Negative for claudication, cyanosis and leg swelling.   Skin: Positive for nail changes. Negative for color change, itching, poor wound healing, rash and suspicious lesions.   Musculoskeletal: Negative for falls, gout, joint pain and myalgias.   Neurological: Positive for numbness and sensory change.           Objective:      Physical Exam   Constitutional: She is oriented to person, place, and time. She appears  well-developed and well-nourished. She is cooperative. No distress.   Cardiovascular:   Pulses:       Popliteal pulses are 2+ on the right side, and 2+ on the left side.        Dorsalis pedis pulses are 1+ on the right side, and 1+ on the left side.        Posterior tibial pulses are 1+ on the right side, and 1+ on the left side.   Capillary refill 3 seconds all toes/distal feet, all toes/both feet warm to touch.      Negative lymphadenopathy bilateral popliteal fossa and tarsal tunnel.      Negavie lower extremity edema bilateral.     Musculoskeletal:        Right ankle: She exhibits normal range of motion, no swelling, no ecchymosis, no deformity, no laceration and normal pulse. Achilles tendon normal. Achilles tendon exhibits no pain, no defect and normal Zhou's test results.   Normal angle, base, station of gait. All ten toes without clubbing, cyanosis, or signs of ischemia.  No pain to palpation bilateral lower extremities.  Range of motion, stability, muscle strength, and muscle tone normal bilateral feet and legs.     Lymphadenopathy:   Negative lymphadenopathy bilateral popliteal fossa and tarsal tunnel.    Negative lymphangitic streaking bilateral feet/ankles/legs.   Neurological: She is alert and oriented to person, place, and time. She has normal strength. She displays no atrophy and no tremor. A sensory deficit is present. She exhibits normal muscle tone. Gait normal.   Reflex Scores:       Patellar reflexes are 2+ on the right side and 2+ on the left side.       Achilles reflexes are 2+ on the right side and 2+ on the left side.  Diminished/loss of protective sensation all toes bilateral to 10 gram monofilament.    Decreased/absent vibratory sensation bilateral feet to 128Hz tuning fork.     Skin: Skin is warm, dry and intact. Capillary refill takes 2 to 3 seconds. No abrasion, no bruising, no burn, no ecchymosis, no laceration, no lesion and no rash noted. She is not diaphoretic. No cyanosis or  erythema. No pallor. Nails show no clubbing.     Skin is normal age and health appropriate color, turgor, texture, and temperature bilateral lower extremities without ulceration, hyperpigmentation, discoloration, masses nodules or cords palpated.  No ecchymosis, erythema, edema, or cardinal signs of infection bilateral lower extremities.     Psychiatric: She has a normal mood and affect.             Assessment:       Encounter Diagnosis   Name Primary?    Type II diabetes mellitus with neurological manifestations Yes         Plan:       Fifi was seen today for nail problem.    Diagnoses and all orders for this visit:    Type II diabetes mellitus with neurological manifestations      I counseled the patient on her conditions, their implications and medical management.        - Shoe inspection. Diabetic Foot Education. Patient reminded of the importance of good nutrition and blood sugar control to help prevent podiatric complications of diabetes. Patient instructed on proper foot hygeine. We discussed wearing proper shoe gear, daily foot inspections, never walking without protective shoe gear, never putting sharp instruments to feet, routine podiatric visits at least annually.      Discussed conservative treatment with shoes of adequate dimensions, material, and style to alleviate symptoms and delay or prevent surgical intervention.            No Follow-up on file.

## 2018-07-02 ENCOUNTER — TELEPHONE (OUTPATIENT)
Dept: FAMILY MEDICINE | Facility: CLINIC | Age: 68
End: 2018-07-02

## 2018-07-02 NOTE — TELEPHONE ENCOUNTER
----- Message from Anna Ortega sent at 7/2/2018  1:21 PM CDT -----  Contact: patient  Type:  Sooner Apoointment Request    Caller is requesting a sooner appointment.  Caller declined first available appointment listed below.  Caller will not accept being placed on the waitlist and is requesting a message be sent to doctor.    Name of Caller:  patient  When is the first available appointment?  07/25  Symptoms:  ER/FU  Best Call Back Number:  [phone]  Additional Information:  Patient called requesting reschedule 07/03 appointment,i offered the next availaible patient declined.requesting a call back

## 2018-07-02 NOTE — TELEPHONE ENCOUNTER
Please see attached message from patient. ER follow up rescheduled to 7-16-18. Patient agreed to appointment date and time.

## 2018-07-05 ENCOUNTER — LAB VISIT (OUTPATIENT)
Dept: LAB | Facility: HOSPITAL | Age: 68
End: 2018-07-05
Attending: INTERNAL MEDICINE
Payer: MEDICARE

## 2018-07-05 DIAGNOSIS — J30.9 ALLERGIC RHINOCONJUNCTIVITIS: ICD-10-CM

## 2018-07-05 DIAGNOSIS — H10.10 ALLERGIC RHINOCONJUNCTIVITIS: ICD-10-CM

## 2018-07-05 DIAGNOSIS — M81.0 OSTEOPOROSIS, UNSPECIFIED OSTEOPOROSIS TYPE, UNSPECIFIED PATHOLOGICAL FRACTURE PRESENCE: ICD-10-CM

## 2018-07-05 DIAGNOSIS — I10 ESSENTIAL HYPERTENSION: ICD-10-CM

## 2018-07-05 LAB
ALBUMIN SERPL BCP-MCNC: 3.4 G/DL
ALP SERPL-CCNC: 108 U/L
ALT SERPL W/O P-5'-P-CCNC: 26 U/L
ANION GAP SERPL CALC-SCNC: 10 MMOL/L
AST SERPL-CCNC: 26 U/L
BASOPHILS # BLD AUTO: 0 K/UL
BASOPHILS NFR BLD: 0.4 %
BILIRUB SERPL-MCNC: 0.3 MG/DL
BUN SERPL-MCNC: 15 MG/DL
CALCIUM SERPL-MCNC: 9.7 MG/DL
CHLORIDE SERPL-SCNC: 99 MMOL/L
CO2 SERPL-SCNC: 31 MMOL/L
CREAT SERPL-MCNC: 1.1 MG/DL
DIFFERENTIAL METHOD: ABNORMAL
EOSINOPHIL # BLD AUTO: 0.1 K/UL
EOSINOPHIL NFR BLD: 1.4 %
ERYTHROCYTE [DISTWIDTH] IN BLOOD BY AUTOMATED COUNT: 13.8 %
EST. GFR  (AFRICAN AMERICAN): >60 ML/MIN/1.73 M^2
EST. GFR  (NON AFRICAN AMERICAN): 52 ML/MIN/1.73 M^2
GLUCOSE SERPL-MCNC: 216 MG/DL
HCT VFR BLD AUTO: 36.2 %
HGB BLD-MCNC: 12.1 G/DL
LYMPHOCYTES # BLD AUTO: 1.8 K/UL
LYMPHOCYTES NFR BLD: 25.4 %
MCH RBC QN AUTO: 31.6 PG
MCHC RBC AUTO-ENTMCNC: 33.6 G/DL
MCV RBC AUTO: 94 FL
MONOCYTES # BLD AUTO: 0.5 K/UL
MONOCYTES NFR BLD: 6.4 %
NEUTROPHILS # BLD AUTO: 4.8 K/UL
NEUTROPHILS NFR BLD: 66.4 %
PLATELET # BLD AUTO: 253 K/UL
PMV BLD AUTO: 7.2 FL
POTASSIUM SERPL-SCNC: 4 MMOL/L
PROT SERPL-MCNC: 7 G/DL
RBC # BLD AUTO: 3.84 M/UL
SODIUM SERPL-SCNC: 140 MMOL/L
WBC # BLD AUTO: 7.2 K/UL

## 2018-07-05 PROCEDURE — 85025 COMPLETE CBC W/AUTO DIFF WBC: CPT

## 2018-07-05 PROCEDURE — 80053 COMPREHEN METABOLIC PANEL: CPT

## 2018-07-05 PROCEDURE — 36415 COLL VENOUS BLD VENIPUNCTURE: CPT

## 2018-07-05 RX ORDER — FLUTICASONE PROPIONATE 50 MCG
SPRAY, SUSPENSION (ML) NASAL
Qty: 1 BOTTLE | Refills: 1 | Status: SHIPPED | OUTPATIENT
Start: 2018-07-05 | End: 2018-07-18 | Stop reason: SDUPTHER

## 2018-07-06 ENCOUNTER — OFFICE VISIT (OUTPATIENT)
Dept: HEMATOLOGY/ONCOLOGY | Facility: CLINIC | Age: 68
End: 2018-07-06
Payer: MEDICARE

## 2018-07-06 VITALS
WEIGHT: 237 LBS | SYSTOLIC BLOOD PRESSURE: 137 MMHG | DIASTOLIC BLOOD PRESSURE: 64 MMHG | HEIGHT: 62 IN | HEART RATE: 78 BPM | TEMPERATURE: 98 F | BODY MASS INDEX: 43.61 KG/M2 | RESPIRATION RATE: 19 BRPM

## 2018-07-06 DIAGNOSIS — M71.21 BAKER'S CYST OF KNEE, RIGHT: ICD-10-CM

## 2018-07-06 DIAGNOSIS — M25.561 ACUTE PAIN OF RIGHT KNEE: ICD-10-CM

## 2018-07-06 DIAGNOSIS — Z87.898 HISTORY OF GROSS HEMATURIA: ICD-10-CM

## 2018-07-06 DIAGNOSIS — D64.9 ANEMIA, UNSPECIFIED TYPE: ICD-10-CM

## 2018-07-06 DIAGNOSIS — M25.451 SWELLING OF JOINT OF PELVIC REGION OR THIGH, RIGHT: ICD-10-CM

## 2018-07-06 DIAGNOSIS — Z51.81 ENCOUNTER FOR MONITORING BISPHOSPHONATE THERAPY: ICD-10-CM

## 2018-07-06 DIAGNOSIS — Z79.83 ENCOUNTER FOR MONITORING BISPHOSPHONATE THERAPY: ICD-10-CM

## 2018-07-06 DIAGNOSIS — D47.2 MGUS (MONOCLONAL GAMMOPATHY OF UNKNOWN SIGNIFICANCE): Primary | ICD-10-CM

## 2018-07-06 DIAGNOSIS — M85.852 OSTEOPENIA OF LEFT HIP: ICD-10-CM

## 2018-07-06 DIAGNOSIS — N39.0 RECURRENT UTI: ICD-10-CM

## 2018-07-06 PROCEDURE — 3075F SYST BP GE 130 - 139MM HG: CPT | Mod: S$GLB,,, | Performed by: INTERNAL MEDICINE

## 2018-07-06 PROCEDURE — 99999 PR PBB SHADOW E&M-EST. PATIENT-LVL V: CPT | Mod: PBBFAC,,, | Performed by: INTERNAL MEDICINE

## 2018-07-06 PROCEDURE — 99215 OFFICE O/P EST HI 40 MIN: CPT | Mod: S$GLB,,, | Performed by: INTERNAL MEDICINE

## 2018-07-06 PROCEDURE — 3078F DIAST BP <80 MM HG: CPT | Mod: S$GLB,,, | Performed by: INTERNAL MEDICINE

## 2018-07-06 NOTE — PROGRESS NOTES
Subjective:       Patient ID: Fifi Mcnair is a 67 y.o. female.    Chief Complaint: I am here for prolia     HPI    This is a 67 yr old female tolerating prolia for osteoporosis.  She started prolia  for osteoporosis and now she has osteopenia. Pt has had a gastric sleeve in the past. She remains on B12 and iron supplement  Pt is transitioning from one benzodiazepine to another per Dr Vargas   She is tolerating Synthroid for thyroid disorder and neurontin for neuropathy.  Labs revealed an elevated kappa light chain with elevated ratio.  Bone marrow showed no evidence of a plasma cell dyscrasia  Pt with history of sleeve gastrectomy  IgA remains elevated  as with the elevated kappa levels  She is having pain in her right leg with swelling in right thigh       Current Outpatient Prescriptions:     albuterol-ipratropium 2.5mg-0.5mg/3mL (DUO-NEB) 0.5 mg-3 mg(2.5 mg base)/3 mL nebulizer solution, Take 3 mLs by nebulization every 6 (six) hours as needed for Wheezing. Rescue, Disp: 1 Box, Rfl: 3    allopurinol (ZYLOPRIM) 100 MG tablet, TAKE TWO TABLETS BY MOUTH NIGHTLY, Disp: 180 tablet, Rfl: 4    amitriptyline (ELAVIL) 50 MG tablet, Take 1 tablet (50 mg total) by mouth every evening., Disp: 30 tablet, Rfl: 11    atenolol (TENORMIN) 25 MG tablet, Take 1 tablet (25 mg total) by mouth once daily., Disp: 90 tablet, Rfl: 0    calcitRIOL (ROCALTROL) 0.25 MCG Cap, Take 1 capsule by mouth twice a week. Monday Friday, Disp: , Rfl:     CALCIUM CITRATE/VITAMIN D3 (CALCIUM CITRATE + D ORAL), Take 400 mg by mouth 3 (three) times daily., Disp: , Rfl:     cetirizine (ZYRTEC) 10 MG tablet, Take 1 tablet (10 mg total) by mouth once daily., Disp: 1 Bottle, Rfl: 5    clotrimazole-betamethasone 1-0.05% (LOTRISONE) cream, , Disp: , Rfl:     cyanocobalamin, vitamin B-12, (VITAMIN B-12) 5,000 mcg Subl, Place 5,000 mg under the tongue once a week., Disp: , Rfl:     DENOSUMAB (PROLIA SUBQ), Inject into the skin every 6 (six) months. ,  Disp: , Rfl:     DIABETIC SUPPLIES,MISCELL (MEDTRONIC REMOTE CONTROL MISC), No Sig Provided, Disp: , Rfl:     diphenoxylate-atropine 2.5-0.025 mg (LOMOTIL) 2.5-0.025 mg per tablet, TAKE ONE TABLET BY MOUTH 4 TIMES DAILY AS NEEDED FOR DIARRHEA, Disp: 60 tablet, Rfl: 1    DYMISTA 137-50 mcg/spray [place] nassal spray, , Disp: , Rfl:     fluoxetine (PROZAC) 20 MG capsule, TAKE TWO CAPSULES BY MOUTH ONCE DAILY, Disp: 180 capsule, Rfl: 4    fluticasone (FLONASE) 50 mcg/actuation nasal spray, 2 sprays by Nasal route once daily. , Disp: , Rfl:     fluticasone (FLONASE) 50 mcg/actuation nasal spray, USE TWO SPRAY(S) IN EACH NOSTRIL ONCE DAILY, Disp: 1 Bottle, Rfl: 1    fluticasone-vilanterol (BREO) 100-25 mcg/dose diskus inhaler, Inhale 1 puff into the lungs once daily., Disp: , Rfl:     INSULIN ASPART (NOVOLOG FLEXPEN U-100 INSULIN SUBQ), Inject into the skin., Disp: , Rfl:     KLOR-CON M20 20 mEq tablet, TAKE ONE TABLET BY MOUTH TWICE DAILY, Disp: 180 tablet, Rfl: 3    l-methylfolate-b2-b6-b12 (CEREFOLIN) 6-5-50-1 mg Tab, Take 1 tablet by mouth once daily., Disp: , Rfl:     Lacto.acidophilus-Bif.animalis (PROBIOTIC) 5 billion cell CpSP, Take 1 capsule by mouth once daily., Disp: 30 capsule, Rfl: 3    levothyroxine (SYNTHROID) 25 MCG tablet, TAKE ONE TABLET BY MOUTH ONCE DAILY, Disp: 90 tablet, Rfl: 3    LORazepam (ATIVAN) 1 MG tablet, Take 1 tablet (1 mg total) by mouth every evening., Disp: 30 tablet, Rfl: 2    LORazepam (ATIVAN) 1 MG tablet, TAKE 1 TABLET BY MOUTH EVERY 12 HOURS AS NEEDED FOR ANXIETY, Disp: 45 tablet, Rfl: 0    losartan (COZAAR) 25 MG tablet, Take 1 tablet by mouth once daily., Disp: , Rfl:     melatonin 3 mg Tab, Take 5 mg by mouth every evening. 10 MG NIGHTLY, Disp: , Rfl:     metOLazone (ZAROXOLYN) 5 MG tablet, Take 1 tablet (5 mg total) by mouth daily as needed (swelling)., Disp: 30 tablet, Rfl: 11    metronidazole (METROGEL) 0.75 % gel, Apply topically 2 (two) times daily., Disp: 45  g, Rfl: 1    montelukast (SINGULAIR) 10 mg tablet, Take 10 mg by mouth once daily. , Disp: , Rfl:     MULTIVIT-IRON-MIN-FOLIC ACID 3,500-18-0.4 UNIT-MG-MG ORAL CHEW, Take by mouth 2 (two) times daily., Disp: , Rfl:     oxyCODONE (ROXICODONE) 15 MG Tab, Take 1 tablet (15 mg total) by mouth every 6 (six) hours as needed for Pain., Disp: 30 tablet, Rfl: 0    oxyMORphone (OPANA ER) 10 MG 12 hr tablet, Take 1 tablet by mouth 2 (two) times daily as needed., Disp: , Rfl:     pantoprazole (PROTONIX) 40 MG tablet, TAKE ONE TABLET BY MOUTH ONCE DAILY, Disp: 90 tablet, Rfl: 3    polymyxin B sulf-trimethoprim (POLYTRIM) 10,000 unit- 1 mg/mL Drop, Place 1 drop into both eyes every 4 (four) hours., Disp: 10 mL, Rfl: 0    ranitidine (ZANTAC) 300 MG tablet, TAKE ONE TABLET BY MOUTH IN THE EVENING, Disp: 30 tablet, Rfl: 1    torsemide (DEMADEX) 20 MG Tab, Take 2 tablets (40 mg total) by mouth once daily., Disp: 60 tablet, Rfl: 0    All medications and past history have been reviewed.    Review of Systems   Constitutional: Negative for fatigue, fever and unexpected weight change.   HENT: Negative for rhinorrhea and sore throat.    Eyes: Negative for photophobia.   Respiratory: Negative for cough and shortness of breath.    Cardiovascular: Negative for chest pain and leg swelling.   Gastrointestinal: Negative for abdominal pain, constipation, diarrhea, nausea and vomiting.   Endocrine: Negative for cold intolerance and heat intolerance.   Genitourinary: Negative for dysuria, frequency, hematuria and urgency.   Musculoskeletal: Negative for arthralgias, back pain and neck pain.   Skin: Negative for pallor and rash.   Neurological: Negative for weakness, numbness and headaches.   Hematological: Negative for adenopathy. Does not bruise/bleed easily.   Psychiatric/Behavioral: Negative for agitation.   All other systems reviewed and are negative.          Objective:        /64   Pulse 78   Temp 98.1 °F (36.7 °C) (Oral)    "Resp 19   Ht 5' 2" (1.575 m)   Wt 107.5 kg (236 lb 15.9 oz)   LMP  (LMP Unknown)   BMI 43.35 kg/m²     Physical Exam   Constitutional: She is oriented to person, place, and time. She appears well-developed and well-nourished.   HENT:   Head: Normocephalic.   Mouth/Throat: Oropharynx is clear and moist. No oropharyngeal exudate.   Eyes: Conjunctivae are normal. No scleral icterus.   Neck: Normal range of motion. No JVD present. No tracheal deviation present.   Cardiovascular: Normal rate and regular rhythm.    No murmur (2= capillary refill) heard.  Pulmonary/Chest: Effort normal. No respiratory distress. She has no wheezes.   Abdominal: Soft. She exhibits no distension. There is no tenderness (midepigastric).   Musculoskeletal: Normal range of motion. She exhibits no edema.   Neurological: She is alert and oriented to person, place, and time. No cranial nerve deficit.   Steady gait   Skin: Skin is dry. No rash noted. No erythema.   Psychiatric: She has a normal mood and affect.   Vitals reviewed.      Bone marrow with no evidence of plasma cell dyscrasia, no evidence of myelodysplasia.    I explained the meaning of B-cell monoclonality as well as T-cell expression.  I reviewed the percentage of cells noted in the marrow along with flow cytometry and cytogenetics  Skeletal survey negative for any lytic or ostial sclerotic disease  All labs and imaging have been reviewed.   Lab Results   Component Value Date    WBC 7.20 07/05/2018    HGB 12.1 07/05/2018    HCT 36.2 (L) 07/05/2018    MCV 94 07/05/2018     07/05/2018         CMP  Sodium   Date Value Ref Range Status   07/05/2018 140 136 - 145 mmol/L Final     Potassium   Date Value Ref Range Status   07/05/2018 4.0 3.5 - 5.1 mmol/L Final     Chloride   Date Value Ref Range Status   07/05/2018 99 95 - 110 mmol/L Final     CO2   Date Value Ref Range Status   07/05/2018 31 (H) 23 - 29 mmol/L Final     Glucose   Date Value Ref Range Status   07/05/2018 216 (H) 70 " - 110 mg/dL Final     BUN, Bld   Date Value Ref Range Status   07/05/2018 15 8 - 23 mg/dL Final     Creatinine   Date Value Ref Range Status   07/05/2018 1.1 0.5 - 1.4 mg/dL Final   08/31/2013 1.1 0.5 - 1.4 mg/dL Final     Calcium   Date Value Ref Range Status   07/05/2018 9.7 8.7 - 10.5 mg/dL Final   08/31/2013 9.5 8.7 - 10.5 mg/dL Final     Total Protein   Date Value Ref Range Status   07/05/2018 7.0 6.0 - 8.4 g/dL Final     Albumin   Date Value Ref Range Status   07/05/2018 3.4 (L) 3.5 - 5.2 g/dL Final     Total Bilirubin   Date Value Ref Range Status   07/05/2018 0.3 0.1 - 1.0 mg/dL Final     Comment:     For infants and newborns, interpretation of results should be based  on gestational age, weight and in agreement with clinical  observations.  Premature Infant recommended reference ranges:  Up to 24 hours.............<8.0 mg/dL  Up to 48 hours............<12.0 mg/dL  3-5 days..................<15.0 mg/dL  6-29 days.................<15.0 mg/dL       Alkaline Phosphatase   Date Value Ref Range Status   07/05/2018 108 55 - 135 U/L Final   08/30/2013 93 55 - 135 U/L Final     AST   Date Value Ref Range Status   07/05/2018 26 10 - 40 U/L Final   08/30/2013 21 10 - 40 U/L Final     ALT   Date Value Ref Range Status   07/05/2018 26 10 - 44 U/L Final     Anion Gap   Date Value Ref Range Status   07/05/2018 10 8 - 16 mmol/L Final   08/31/2013 12 5 - 15 meq/L Final     eGFR if    Date Value Ref Range Status   07/05/2018 >60 >60 mL/min/1.73 m^2 Final     eGFR if non    Date Value Ref Range Status   07/05/2018 52 (A) >60 mL/min/1.73 m^2 Final     Comment:     Calculation used to obtain the estimated glomerular filtration  rate (eGFR) is the CKD-EPI equation.        (ICD-10-CM)]     Procedure:  Exam Date: Reason for Exam:   CT Abdomen Pelvis W Wo Contrast 02/12/2018 Abd pain, fever, abscess suspected             RESULTS:  Comparison study: 2/9/2018     Axial scans abdomen and pelvis were  obtained without and with IV contrast with 100 cc Omnipaque 350 injected intravenously, and with p.o. contrast, and sagittal and coronal reformatted images were obtained     FINDINGS     There is a small hiatal hernia.  There is diffuse fatty infiltration of the liver.  There is probable cholelithiasis a small faintly opaque stone suggested within the gallbladder without CT findings of acute cholecystitis.  The abdominal aorta tapers without aneurysmal dilatation.  There are postoperative changes consistent with gastric reduction surgery.  Pancreas, adrenal glands, kidneys are unremarkable in appearance.       The postoperative changes anterior abdominal wall.  There is prior hysterectomy and the adnexa region is unremarkable in appearance.     There is diverticulosis.  There is very mild eccentric wall thickening sigmoid colon with mild pericolonic fat stranding most likely representing mild acute diverticulitis.  With the eccentric thickening however other consideration is a mass.  This corresponds the seen on the prior exam appearing decreased on today's exam.        There is severe degenerative change the spine  IMPRESSION:         Diverticulosis and findings most likely representing mild acute diverticulitis sigmoid colon without perforation or abscess and improved compared to prior study 2/9/2018 although not completely resolved.     Please see above discussion.  Additional findings as detailed above.        Electronically signed by: NITA EVANS MD  Date:                                            02/12/18  Time:                                           15:51                                  orifice, and rectum were photographed.  Findings:       The perianal and digital rectal examinations were normal.       Non-bleeding internal hemorrhoids were found during retroflexion.        The hemorrhoids were medium-sized.       Multiple small-mouthed diverticula were found in the sigmoid colon        and  descending colon.       A 3 mm polyp was found in the transverse colon. The polyp was        sessile. The polyp was removed with a cold biopsy forceps. Resection        and retrieval were complete.       Five sessile polyps were found in the ascending colon. The polyps        were 4 to 6 mm in size. These polyps were removed with a cold snare.        Resection and retrieval were complete.       The rectum, cecum, appendiceal orifice and ileocecal valve appeared        normal.       The terminal ileum appeared normal.  Impression:          - Non-bleeding internal hemorrhoids.                       - Diverticulosis in the sigmoid colon and in the                        descending colon.                       - One 3 mm polyp in the transverse colon, removed                        with a cold biopsy forceps. Resected and retrieved.                       - Five 4 to 6 mm polyps in the ascending colon,                        removed with a cold snare. Resected and retrieved.                       - The rectum, cecum, appendiceal orifice and                        ileocecal valve are normal.                       - The examined portion of the ileum was normal.  Recommendation:      - Patient has a contact number available for                        emergencies. The signs and symptoms of potential                        delayed complications were discussed with the                        patient. Return to normal activities tomorrow.                        Written discharge instructions were provided to the                        patient.                       - High fiber diet.     US Extremity Non Vascular Limited Right 03/29/2018 None Specified             RESULTS:  EXAMINATION:  US EXTREMITY NON VASCULAR LIMITED RIGHT     CLINICAL HISTORY:  Contusion of right lower leg, initial encounter     TECHNIQUE:  Transverse and longitudinal grayscale ultrasound images of the right leg at the site of clinical concern.  Limited  color Doppler interrogation.     COMPARISON:  Radiographs dated 03/22/2018     FINDINGS:  There is an approximately 7 mm nonspecific, peripherally calcified hypoechoic mass at the site of palpable concern in the anteromedial soft tissues of the upper calf.     There is also a complex Baker cyst in the medial right popliteal fossa measuring up to approximately 8.3 cm cephalocaudal.  No abnormal soft tissue mass or fluid collection is identified.     IMPRESSION:      Nonspecific calcific subcentimeter mass at the site of palpable clinical concern in the anteromedial right leg.  This most likely represents a chronic soft tissue calcification and was present on the 03/22/2018 radiographs.     Moderate-sized, complex Baker cyst in the medial popliteal fossa.        Electronically signed by: Radha Langley MD  Date:                                            03/29/2018  Time:                                           12:12                    Signed By:  Radha Langley MD on 3/29/2018 12:12 PM                   All labs were reviewed, SPEP and UPEP and immunofixation studies were all negative    Assessment:       MGUS (monoclonal gammopathy of unknown significance)  -     FREE LT CHAIN ANAL; Future; Expected date: 07/06/2018  -     CBC auto differential; Future; Expected date: 07/06/2018  -     OSTEOCALCIN; Future; Expected date: 07/06/2018  -     C TELOPEPTIDE (CTX), SERUM; Future; Expected date: 07/06/2018    History of gross hematuria    Recurrent UTI    Anemia, unspecified type    Osteopenia of left hip  -     OSTEOCALCIN; Future; Expected date: 07/06/2018  -     C TELOPEPTIDE (CTX), SERUM; Future; Expected date: 07/06/2018    Encounter for monitoring bisphosphonate therapy    Baker's cyst of knee, right    Acute pain of right knee    Swelling of joint of pelvic region or thigh, right         elev IGA and elev light chains    Plan:     Anemia not requiring intervention: Pt with recent hematuria ( June 18)  with UTI treated with keflex   Pt no longer meets criteria for prolia due to a change in her density from osteoporosis to osteopenia  She believes she is in need of a knee replacement : cont calcium and vitamin D3  IGA  Elevated , kappa decreasing  May need renal biopsy if counts progress  Cont calcium and vitamin D  Cont prozac for depression  Renal function deteriorating with a decrease in GFR from 58 to 52  She may need to return to urology for evaluation of recurrent UTIs  She cannot take oral bisphosphonate because of GI issues  No need for procrit despite anemia due to renal disease  I have asked her to return to Dr Cantor for eval of the moderate complex cyst behind her right knee since she is presenting with pain and swelling if right thigh ( I am unsure if this can cause her swelling )   RTC 4 months for evaluation    The plan was discussed with the patient and all questions/concerns have been answered to the patient's satisfaction.

## 2018-07-07 ENCOUNTER — HOSPITAL ENCOUNTER (EMERGENCY)
Facility: HOSPITAL | Age: 68
Discharge: HOME | End: 2018-07-07
Attending: EMERGENCY MEDICINE
Payer: MEDICARE

## 2018-07-07 VITALS
WEIGHT: 230 LBS | SYSTOLIC BLOOD PRESSURE: 178 MMHG | HEART RATE: 77 BPM | OXYGEN SATURATION: 97 % | RESPIRATION RATE: 20 BRPM | DIASTOLIC BLOOD PRESSURE: 75 MMHG | BODY MASS INDEX: 42.07 KG/M2

## 2018-07-07 DIAGNOSIS — R31.9 URINARY TRACT INFECTION WITH HEMATURIA, SITE UNSPECIFIED: ICD-10-CM

## 2018-07-07 DIAGNOSIS — N39.0 URINARY TRACT INFECTION WITH HEMATURIA, SITE UNSPECIFIED: ICD-10-CM

## 2018-07-07 DIAGNOSIS — R31.9 HEMATURIA, UNSPECIFIED TYPE: Primary | ICD-10-CM

## 2018-07-07 LAB
BACTERIA #/AREA URNS HPF: ABNORMAL /HPF
BILIRUB UR QL STRIP: NEGATIVE
CLARITY UR: CLEAR
COLOR UR: YELLOW
GLUCOSE UR QL STRIP: NEGATIVE
HGB UR QL STRIP: ABNORMAL
HYALINE CASTS #/AREA URNS LPF: 0 /LPF
KETONES UR QL STRIP: NEGATIVE
LEUKOCYTE ESTERASE UR QL STRIP: ABNORMAL
MICROSCOPIC COMMENT: ABNORMAL
NITRITE UR QL STRIP: POSITIVE
PH UR STRIP: 8 [PH] (ref 5–8)
PROT UR QL STRIP: ABNORMAL
RBC #/AREA URNS HPF: 28 /HPF (ref 0–4)
SP GR UR STRIP: 1.01 (ref 1–1.03)
URN SPEC COLLECT METH UR: ABNORMAL
UROBILINOGEN UR STRIP-ACNC: 1 EU/DL
WBC #/AREA URNS HPF: 85 /HPF (ref 0–5)

## 2018-07-07 PROCEDURE — 81000 URINALYSIS NONAUTO W/SCOPE: CPT

## 2018-07-07 PROCEDURE — 87086 URINE CULTURE/COLONY COUNT: CPT

## 2018-07-07 PROCEDURE — 99283 EMERGENCY DEPT VISIT LOW MDM: CPT

## 2018-07-07 RX ORDER — AMOXICILLIN AND CLAVULANATE POTASSIUM 875; 125 MG/1; MG/1
1 TABLET, FILM COATED ORAL 2 TIMES DAILY
Qty: 14 TABLET | Refills: 0 | Status: SHIPPED | OUTPATIENT
Start: 2018-07-07 | End: 2018-07-18

## 2018-07-07 RX ORDER — PHENAZOPYRIDINE HYDROCHLORIDE 200 MG/1
200 TABLET, FILM COATED ORAL 3 TIMES DAILY
Qty: 6 TABLET | Refills: 0 | Status: SHIPPED | OUTPATIENT
Start: 2018-07-07 | End: 2018-07-18 | Stop reason: ALTCHOICE

## 2018-07-07 NOTE — ED PROVIDER NOTES
Encounter Date: 7/7/2018    SCRIBE #1 NOTE: I, Eduarda Cantu, am scribing for, and in the presence of, Dr. Pierre.       History     Chief Complaint   Patient presents with    Hematuria     C/o hematuria onset today. Also c/o pains to bl flanks when she urinates.        07/07/2018  3:40 PM    Chief Complaint: Blood in Urine      The patient is a 67 y.o. female who presents with sudden onset of blood in her urine today. Pt was diagnosed to recent urinary tract infections and has been placed on Cipro and Keflex, symptoms improved with an return. she noted blood in her urine today.     No fevers or chills. Patient does not have any past history of multiple recurrent UTIs.  No exacerbating or alleviating factors. Denies fever. PMHx of anxiety, UTI, anemia, DM, GERD, HTN, CKD, HLD, CHF, TIA. PSHx of cystoscopy, hysterectomy, colonoscopy.       The history is provided by the patient and the spouse.     Review of patient's allergies indicates:   Allergen Reactions    Adhesive Other (See Comments)     Blisters    Cleocin [clindamycin hcl] Hives    Ceclor [cefaclor] Hives    Advair diskus [fluticasone-salmeterol]      Dry mouth    Aleve [naproxen sodium]      Unable to take secondary to kidney function.     Erythromycin Hives    Levaquin [levofloxacin] Dermatitis    Macrobid [nitrofurantoin monohyd/m-cryst] Other (See Comments)     Stomach pain/ GI issues    Macrodantin [nitrofurantoin macrocrystalline] Hives    Motrin [ibuprofen]      Unable to take secondary to kidney functions.    Restoril [temazepam]      LIGHTHEADED UPON WAKING.with poor results    Sulfa (sulfonamide antibiotics)      Hold due to renal problems    Trazodone      PALPITATIONS    Remeron [mirtazapine] Palpitations and Other (See Comments)     Jittery    Vancomycin analogues Rash     Feet broke out/inflammed blood vessels       Past Medical History:   Diagnosis Date    Allergy     multiple antibiotic allergies    Anemia     Anxiety      Arthritis     Asthma     CHF (congestive heart failure)     NYHA class III     Chronic kidney disease     Colon polyp     benign    COPD (chronic obstructive pulmonary disease)     DLCO 37% , and mild pulmonary HTN    Depression     Diabetes mellitus     Diabetic retinopathy     Diastolic dysfunction     Diverticulitis of large intestine without perforation or abscess without bleeding 2/12/2018    Diverticulosis     Former smoker     Fracture of lumbar spine     GERD (gastroesophageal reflux disease)     Hernia of unspecified site of abdominal cavity without mention of obstruction or gangrene     Hyperlipidemia     Hypertension     hypertensive renal    IBS (irritable bowel syndrome)     Mild nonproliferative diabetic retinopathy(362.04) 11/25/2013    Morbid obesity     Nuclear sclerosis 11/25/2013    Osteoporosis     Peripheral edema     Rash     Recurrent upper respiratory infection (URI)     S/P LASIK (laser assisted in situ keratomileusis)     Sleep apnea     sleep apnea uses bipap.    Stroke     Thyroid disease     on synthroid    TIA (transient ischemic attack)     Urinary tract infection      Past Surgical History:   Procedure Laterality Date    ABDOMINAL SURGERY      ADENOIDECTOMY      COLONOSCOPY  03/05/2013    repeat in 5 years    COLONOSCOPY N/A 5/31/2017    Procedure: COLONOSCOPY;  Surgeon: Luis Navarro MD;  Location: [facility] ENDO;  Service: Endoscopy;  Laterality: N/A;    COLONOSCOPY N/A 4/11/2018    Procedure: COLONOSCOPY;  Surgeon: Luis Navarro MD;  Location: [facility] ENDO;  Service: Endoscopy;  Laterality: N/A;    COSMETIC SURGERY      CYSTOSCOPY      EYE SURGERY Right     mazzulla    GASTRECTOMY      gastric sleeve      gastric sleeve      HERNIA REPAIR      5 years old    HYSTERECTOMY      ovaries remain    REFRACTIVE SURGERY      mono va//ou//    RHINOPLASTY TIP      TONSILLECTOMY      TUBAL LIGATION      UPPER GASTROINTESTINAL ENDOSCOPY   03/05/2013    UPPER GASTROINTESTINAL ENDOSCOPY  08/24/2016    Dr. Veras, repeat in 8 weeks    UPPER GASTROINTESTINAL ENDOSCOPY  07/21/2016    Dr. Randall     Family History   Problem Relation Age of Onset    Heart disease Mother 59    Cancer Mother 59        throat    Allergies Mother     Diabetes Mother     Heart disease Father 70        flutter    Allergies Father     Diabetes Father     Cancer Sister 22        22 thyroid,49  breast    Allergies Sister     Breast cancer Sister     Diabetes Sister     Cancer Brother 62        lung    Diabetes Brother     Asthma Daughter     Diabetes Daughter     Depression Daughter     Asthma Grandchild     Eczema Grandchild     Eczema Grandchild     Breast cancer Maternal Grandmother     Ovarian cancer Cousin     Amblyopia Neg Hx     Blindness Neg Hx     Cataracts Neg Hx     Glaucoma Neg Hx     Hypertension Neg Hx     Macular degeneration Neg Hx     Retinal detachment Neg Hx     Strabismus Neg Hx     Stroke Neg Hx     Thyroid disease Neg Hx     Angioedema Neg Hx     Immunodeficiency Neg Hx     Allergic rhinitis Neg Hx     Atopy Neg Hx     Rhinitis Neg Hx     Urticaria Neg Hx     Colon cancer Neg Hx     Colon polyps Neg Hx     Crohn's disease Neg Hx     Ulcerative colitis Neg Hx     Celiac disease Neg Hx      Social History   Substance Use Topics    Smoking status: Former Smoker     Types: Cigarettes    Smokeless tobacco: Never Used      Comment: quit 1990    Alcohol use Yes      Comment: rare     Review of Systems   Constitutional: Negative for fever.   Gastrointestinal: Negative for abdominal pain.   Genitourinary: Positive for dysuria and hematuria.       Physical Exam     Initial Vitals [07/07/18 1533]   BP Pulse Resp Temp SpO2   (!) 178/75 77 20 -- 97 %      MAP       --         Physical Exam    Nursing note and vitals reviewed.  Constitutional: She appears well-developed and well-nourished.   HENT:   Head: Normocephalic and  atraumatic.   Eyes: EOM are normal.   Neck: Normal range of motion. Neck supple.   Cardiovascular: Normal rate, regular rhythm and normal heart sounds. Exam reveals no gallop and no friction rub.    No murmur heard.  Pulmonary/Chest: Breath sounds normal. No respiratory distress. She has no wheezes. She has no rhonchi. She has no rales.   Musculoskeletal: Normal range of motion.   Neurological: She is alert and oriented to person, place, and time.   Skin: Skin is warm and dry.   Psychiatric: She has a normal mood and affect. Her behavior is normal. Judgment and thought content normal.         ED Course   Procedures  Labs Reviewed   URINALYSIS          Imaging Results    None          Medical Decision Making:   History:   Old Medical Records: I decided to obtain old medical records.  Clinical Tests:   Lab Tests: Ordered and Reviewed            Scribe Attestation:   Scribe #1: I performed the above scribed service and the documentation accurately describes the services I performed. I attest to the accuracy of the note.    I, Dr. Pierre, personally performed the services described in this documentation. All medical record entries made by the scribe were at my direction and in my presence.  I have reviewed the chart and agree that the record reflects my personal performance and is accurate and complete.5:48 PM 07/07/2018            ED Course as of Jul 07 1747   Sat Jul 07, 2018   1624 SpO2: 97 % [EF]   1624 Resp: 20 [EF]   1624 Pulse: 77 [EF]   1624 BP: (!) 178/75 [EF]      ED Course User Index  [EF] Ramón Pierre MD     Clinical Impression:   The primary encounter diagnosis was Hematuria, unspecified type. A diagnosis of Urinary tract infection with hematuria, site unspecified was also pertinent to this visit.          67-year-old female with history of CHF diabetes COPD presents with hematuria frequency and dysuria.  Urinalysis consistent with the UTI.  This seems to be her 3rd UTI in the past month.  Patient  will be placed on Augmentin, she has taken this in the past without any adverse reaction.  Patient referred to Urology for multiple recurrent UTIs.  No sign of pyelonephritis.  No abdominal tenderness no indication for any labs or imaging.  Multiple recent lab studies were reviewed.                   Ramón Pierre MD  07/07/18 7227

## 2018-07-09 LAB — BACTERIA UR CULT: NO GROWTH

## 2018-07-10 ENCOUNTER — TELEPHONE (OUTPATIENT)
Dept: UROLOGY | Facility: CLINIC | Age: 68
End: 2018-07-10

## 2018-07-10 NOTE — TELEPHONE ENCOUNTER
Spoke with patient requesting a sooner appointment for recurrent uti and hematuria. Next available with  is 7/31 patient states she needs to be seen sooner. Appointment scheduled on 7/18 at 2pm with IFEOMA Leo. Patient verbally voiced understanding.

## 2018-07-10 NOTE — TELEPHONE ENCOUNTER
----- Message from Jose Daniel Galindo sent at 7/9/2018  4:55 PM CDT -----  Contact: Patient  Type:  Sooner Apoointment Request    Caller is requesting a sooner appointment.  Caller declined first available appointment listed below.  Caller will not accept being placed on the waitlist and is requesting a message be sent to doctor.    Name of Caller:  Fifi  When is the first available appointment?  7/31  Symptoms:  hospital f/u, constant UTI and blood in urine  Best Call Back Number:  [phone]  Additional Information:  Says as long as she's taking antibiotics she doesn't have the blood in her urine, but as soon as she stops it starts again. Says she's been in the hospital twice in the last 10 days, and is getting hardly no relief.

## 2018-07-18 ENCOUNTER — OFFICE VISIT (OUTPATIENT)
Dept: UROLOGY | Facility: CLINIC | Age: 68
End: 2018-07-18
Payer: MEDICARE

## 2018-07-18 ENCOUNTER — APPOINTMENT (OUTPATIENT)
Dept: LAB | Facility: HOSPITAL | Age: 68
End: 2018-07-18
Attending: NURSE PRACTITIONER
Payer: MEDICARE

## 2018-07-18 VITALS
WEIGHT: 229.94 LBS | SYSTOLIC BLOOD PRESSURE: 147 MMHG | HEART RATE: 80 BPM | RESPIRATION RATE: 18 BRPM | BODY MASS INDEX: 42.31 KG/M2 | TEMPERATURE: 99 F | HEIGHT: 62 IN | DIASTOLIC BLOOD PRESSURE: 62 MMHG

## 2018-07-18 DIAGNOSIS — N39.0 RECURRENT UTI (URINARY TRACT INFECTION): ICD-10-CM

## 2018-07-18 DIAGNOSIS — R39.15 URINARY URGENCY: ICD-10-CM

## 2018-07-18 DIAGNOSIS — R31.0 GROSS HEMATURIA: Primary | ICD-10-CM

## 2018-07-18 LAB
BACTERIA #/AREA URNS HPF: NORMAL /HPF
BILIRUB SERPL-MCNC: NORMAL MG/DL
BLOOD URINE, POC: NORMAL
COLOR, POC UA: CLEAR
GLUCOSE UR QL STRIP: NORMAL
KETONES UR QL STRIP: NORMAL
LEUKOCYTE ESTERASE URINE, POC: NORMAL
MICROSCOPIC COMMENT: NORMAL
NITRITE, POC UA: NORMAL
PH, POC UA: 5
PROTEIN, POC: NORMAL
RBC #/AREA URNS HPF: 1 /HPF (ref 0–4)
SPECIFIC GRAVITY, POC UA: 1
SQUAMOUS #/AREA URNS HPF: 2 /HPF
UROBILINOGEN, POC UA: NORMAL
WBC #/AREA URNS HPF: 1 /HPF (ref 0–5)

## 2018-07-18 PROCEDURE — 3077F SYST BP >= 140 MM HG: CPT | Mod: S$GLB,,, | Performed by: NURSE PRACTITIONER

## 2018-07-18 PROCEDURE — 88112 CYTOPATH CELL ENHANCE TECH: CPT | Mod: 26,,, | Performed by: PATHOLOGY

## 2018-07-18 PROCEDURE — 99204 OFFICE O/P NEW MOD 45 MIN: CPT | Mod: 25,S$GLB,, | Performed by: NURSE PRACTITIONER

## 2018-07-18 PROCEDURE — 88112 CYTOPATH CELL ENHANCE TECH: CPT | Performed by: PATHOLOGY

## 2018-07-18 PROCEDURE — 99999 PR PBB SHADOW E&M-EST. PATIENT-LVL V: CPT | Mod: PBBFAC,,, | Performed by: NURSE PRACTITIONER

## 2018-07-18 PROCEDURE — 81000 URINALYSIS NONAUTO W/SCOPE: CPT

## 2018-07-18 PROCEDURE — 3078F DIAST BP <80 MM HG: CPT | Mod: S$GLB,,, | Performed by: NURSE PRACTITIONER

## 2018-07-18 PROCEDURE — 81001 URINALYSIS AUTO W/SCOPE: CPT | Mod: S$GLB,,, | Performed by: NURSE PRACTITIONER

## 2018-07-18 PROCEDURE — 87086 URINE CULTURE/COLONY COUNT: CPT

## 2018-07-18 RX ORDER — GABAPENTIN 100 MG/1
100 CAPSULE ORAL 2 TIMES DAILY
COMMUNITY
Start: 2018-06-22 | End: 2020-02-10 | Stop reason: ALTCHOICE

## 2018-07-18 NOTE — Clinical Note
Gross hematuria pt, please review and see if she needs a sooner appt. Pt refused to do a repeat CT Urogram as suggested, last CT Urogram was done 02/12/18 where she was diagnosed with diverticulitis.

## 2018-07-18 NOTE — PROGRESS NOTES
"[facility] Note  Staff: AMAURI Turpin-C    PCP: Dr. Werner Vargas    Chief Complaint: Gross hematuria, recurrent uti like symptoms.    Subjective:        HPI: Fifi Mcnair is a 67 y.o. female NEW PATIENT presents with gross hematuria with "recurrent uti like symptoms".  Pt states today she began with gross hematuria in early June of this year.  Her symptoms normally just include gross hematuria and dysuria.  She has no fever, malaise or problems with urination with these other lower urinary tract symptoms.    She was evaluated in 2014 by Dr. Parrish, Urologist for recurrent UTIs along with a cystoscopy procedure done in his office on or around 01/01/2014.  (No records to review during ov today)  Pt states today that cysto results showed normal findings at that time.    Pt did mention today she is currently being evaluated by Dr. Green for blood disorder/poss. blood cancer?  Repeat tests in few months.  She was getting injections done for osteoporosis by her office.    Questions asked pt during office visit today:  DTF: None, NTF: 0x night  Feels like she empties her bladder every time she urinates.  Urgency:Yes, incontinence with urgency? Yes - somtimes;   If yes, how many pads/day? None  G2, P 2, vaginal deliveries with no complications.  Gross Hematuria:  Yes - since 06/10/18  History of UTI: Yes  Constipation issues?:  None    Hx of Kidney stones?:  None    Dr. Soto for "bad kidneys", but pt is poor historian on her kidney history and/or why she sees a Nephrologist.    Past Urine Cultures:  07/07/18:  No growth  06/24/18:  No growth  06/19/18:  No growth  08/04/17:  No growth    HX OF RADIOLOGY REPORTS:  CT ABDOMEN/PELVIS WITH AND WO CONTRAST DONE 02/12/18:  IMPRESSION:  Diverticulosis and findings most likely representing mild acute diverticulitis sigmoid colon without perforation or abscess and improved compared to prior study 2/9/2018 although not completely " resolved.     Please see above discussion.  Additional findings as detailed above.    REVIEW OF SYSTEMS:  Review of Systems   Constitutional: Negative for chills, diaphoresis, fever and weight loss.   HENT: Negative for congestion, hearing loss, nosebleeds and sore throat.    Eyes: Negative for blurred vision and pain.   Respiratory: Negative for cough and wheezing.    Cardiovascular: Positive for leg swelling. Negative for chest pain and palpitations.        HTN   Gastrointestinal: Negative for abdominal pain, heartburn, nausea and vomiting.   Genitourinary: Positive for frequency, hematuria and urgency. Negative for dysuria and flank pain.        Kidney disease-sees Dr. Soto   Musculoskeletal: Negative for back pain, joint pain, myalgias and neck pain.        Arthritis   Skin: Negative for itching and rash.   Neurological: Negative for dizziness, tremors, sensory change, seizures, loss of consciousness, weakness and headaches.   Endo/Heme/Allergies: Does not bruise/bleed easily.        Diabetes Mellitus   Psychiatric/Behavioral: Negative for depression and suicidal ideas. The patient is not nervous/anxious.      PMHx:  Past Medical History:   Diagnosis Date    Allergy     multiple antibiotic allergies    Anemia     Anxiety     Arthritis     Asthma     CHF (congestive heart failure)     NYHA class III     Chronic kidney disease     Colon polyp     benign    COPD (chronic obstructive pulmonary disease)     DLCO 37% , and mild pulmonary HTN    Depression     Diabetes mellitus     Diabetic retinopathy     Diastolic dysfunction     Diverticulitis of large intestine without perforation or abscess without bleeding 2/12/2018    Diverticulosis     Former smoker     Fracture of lumbar spine     GERD (gastroesophageal reflux disease)     Hernia of unspecified site of abdominal cavity without mention of obstruction or gangrene     Hyperlipidemia     Hypertension     hypertensive renal    IBS (irritable  bowel syndrome)     Mild nonproliferative diabetic retinopathy(362.04) 2013    Morbid obesity     Nuclear sclerosis 2013    Osteoporosis     Peripheral edema     Rash     Recurrent upper respiratory infection (URI)     S/P LASIK (laser assisted in situ keratomileusis)     Sleep apnea     sleep apnea uses bipap.    Stroke     Thyroid disease     on synthroid    TIA (transient ischemic attack)     Urinary tract infection      PSHx:  Past Surgical History:   Procedure Laterality Date    ABDOMINAL SURGERY      ADENOIDECTOMY      COLONOSCOPY  2013    repeat in 5 years    COLONOSCOPY N/A 2017    Procedure: COLONOSCOPY;  Surgeon: Luis Navarro MD;  Location[facility] END[facility];  Service: Endoscopy;  Laterality: N/A;    COLONOSCOPY N/A 2018    Procedure: COLONOSCOPY;  Surgeon: Luis Navarro MD;  Location: [facility];  Service: Endoscopy;  Laterality: N/A;    COSMETIC SURGERY      CYSTOSCOPY      EYE SURGERY Right     mazzulla    GASTRECTOMY      gastric sleeve      gastric sleeve      HERNIA REPAIR      5 years old    HYSTERECTOMY      ovaries remain    REFRACTIVE SURGERY      mono va//ou//    RHINOPLASTY TIP      TONSILLECTOMY      TUBAL LIGATION      UPPER GASTROINTESTINAL ENDOSCOPY  2013    UPPER GASTROINTESTINAL ENDOSCOPY  2016    Dr. Navarro, repeat in 8 weeks    UPPER GASTROINTESTINAL ENDOSCOPY  2016    Dr. Randall     Screening Studies  Colonoscopy: Yes, last test was done 1-2 months ago showed benign polyps.    Fam Hx:   malignancies: None, gyn malignancies: Yes - Maternal cousin had ovarian cancer at age late 40s.   kidney stones: No   Mother  of throat cancer.  Paternal grandmother  of kidney cancer.    Social History     Social History    Marital status:      Spouse name: N/A    Number of children: N/A    Years of education: N/A     Social History Main Topics    Smoking status: Former Smoker     Types: Cigarettes     Smokeless tobacco: Never Used      Comment: quit 1990    Alcohol use Yes      Comment: rare    Drug use: No    Sexual activity: Yes     Birth control/ protection: Surgical     Other Topics Concern    None     Social History Narrative    None     Allergies:  Adhesive; Cleocin [clindamycin hcl]; Ceclor [cefaclor]; Advair diskus [fluticasone-salmeterol]; Aleve [naproxen sodium]; Erythromycin; Levaquin [levofloxacin]; Macrobid [nitrofurantoin monohyd/m-cryst]; Macrodantin [nitrofurantoin macrocrystalline]; Motrin [ibuprofen]; Restoril [temazepam]; Sulfa (sulfonamide antibiotics); Trazodone; Remeron [mirtazapine]; and Vancomycin analogues    Medications: reviewed   Anticoagulation: No    Objective:     Vitals:    07/18/18 1349   BP: (!) 147/62   Pulse: 80   Resp: 18   Temp: 98.6 °F (37 °C)     Physical Exam   Vitals reviewed.  Constitutional: She is oriented to person, place, and time. She appears well-developed and well-nourished.   HENT:   Head: Normocephalic and atraumatic.   Eyes: Conjunctivae and EOM are normal. Pupils are equal, round, and reactive to light.   Neck: Normal range of motion. Neck supple.   Cardiovascular: Normal rate, regular rhythm, normal heart sounds and intact distal pulses.    Pulmonary/Chest: Effort normal and breath sounds normal.   Abdominal: Soft. Bowel sounds are normal.   Musculoskeletal: Normal range of motion.   Neurological: She is alert and oriented to person, place, and time. She has normal reflexes.   Skin: Skin is warm and dry.     Psychiatric: She has a normal mood and affect. Her behavior is normal. Judgment and thought content normal.     : Deferred by pt in office today (Had grandson with her)    LABS REVIEW:  UA today:   Color:Clear, Yellow  Spec. Grav.  1.005  PH  5.0  Negative for leukocytes, nitrates, protein, glucose, ketones, urobili, bili, and blood.    Cr:   Lab Results   Component Value Date    CREATININE 1.1 07/05/2018     Assessment:       1. Gross hematuria     2. Recurrent UTI (urinary tract infection)    3. Urinary urgency          Plan:   Gross hematuria with LUTS:    1.  UA micro, culture and cytology to be performed.  2.  Pt would like to hold off for now until she speaks with MD about repeating another CT Urogram since few months ago?  3.  We will obtain records from Dr. Frost's office on her prior treatments.    F/u with Urologist for further evaluation of gross hematuria, luts.    [facility]: Active    Felipa Farrar, AMAURI-C

## 2018-07-20 LAB
BACTERIA UR CULT: NORMAL
BACTERIA UR CULT: NORMAL

## 2018-07-23 ENCOUNTER — TELEPHONE (OUTPATIENT)
Dept: UROLOGY | Facility: CLINIC | Age: 68
End: 2018-07-23

## 2018-07-23 NOTE — TELEPHONE ENCOUNTER
Spoke with patient appointment scheduled for tomorrow at 2:45pm. Patient verbally voiced understanding.

## 2018-07-23 NOTE — TELEPHONE ENCOUNTER
----- Message from Angy Campos MD sent at 7/23/2018  8:37 AM CDT -----  Please schedule for pt to see me tomorrow or thursdsay.   ----- Message -----  From: AMAURI Lawrence  Sent: 7/23/2018   8:12 AM  To: Angy Campos MD    This pt was scheduled to see you in 3-4 weeks for GROSS HEMATURIA.  I don't know if you want to review and see if she can be pushed up sooner?  I am having her come in on nurse visit schedule for in and out cath specimen for repeat culture

## 2018-07-24 ENCOUNTER — APPOINTMENT (OUTPATIENT)
Dept: LAB | Facility: HOSPITAL | Age: 68
End: 2018-07-24
Attending: UROLOGY
Payer: MEDICARE

## 2018-07-24 ENCOUNTER — OFFICE VISIT (OUTPATIENT)
Dept: UROLOGY | Facility: CLINIC | Age: 68
End: 2018-07-24
Payer: MEDICARE

## 2018-07-24 VITALS
BODY MASS INDEX: 43.79 KG/M2 | TEMPERATURE: 98 F | WEIGHT: 238 LBS | HEART RATE: 94 BPM | DIASTOLIC BLOOD PRESSURE: 73 MMHG | HEIGHT: 62 IN | SYSTOLIC BLOOD PRESSURE: 122 MMHG

## 2018-07-24 DIAGNOSIS — N39.0 RECURRENT UTI: ICD-10-CM

## 2018-07-24 DIAGNOSIS — R31.0 GROSS HEMATURIA: Primary | ICD-10-CM

## 2018-07-24 LAB
BILIRUB SERPL-MCNC: NORMAL MG/DL
BLOOD URINE, POC: NORMAL
COLOR, POC UA: NORMAL
GLUCOSE UR QL STRIP: NORMAL
KETONES UR QL STRIP: NORMAL
LEUKOCYTE ESTERASE URINE, POC: NORMAL
NITRITE, POC UA: NORMAL
PH, POC UA: 5
PROTEIN, POC: NORMAL
SPECIFIC GRAVITY, POC UA: 1015
UROBILINOGEN, POC UA: NORMAL

## 2018-07-24 PROCEDURE — 88112 CYTOPATH CELL ENHANCE TECH: CPT | Mod: 26,,, | Performed by: PATHOLOGY

## 2018-07-24 PROCEDURE — 81002 URINALYSIS NONAUTO W/O SCOPE: CPT | Mod: S$GLB,,, | Performed by: UROLOGY

## 2018-07-24 PROCEDURE — 99215 OFFICE O/P EST HI 40 MIN: CPT | Mod: 25,S$GLB,, | Performed by: UROLOGY

## 2018-07-24 PROCEDURE — 3078F DIAST BP <80 MM HG: CPT | Mod: S$GLB,,, | Performed by: UROLOGY

## 2018-07-24 PROCEDURE — 99999 PR PBB SHADOW E&M-EST. PATIENT-LVL V: CPT | Mod: PBBFAC,,, | Performed by: UROLOGY

## 2018-07-24 PROCEDURE — 51701 INSERT BLADDER CATHETER: CPT | Mod: S$GLB,,, | Performed by: UROLOGY

## 2018-07-24 PROCEDURE — 88112 CYTOPATH CELL ENHANCE TECH: CPT | Performed by: PATHOLOGY

## 2018-07-24 PROCEDURE — 81001 URINALYSIS AUTO W/SCOPE: CPT | Mod: S$GLB,,, | Performed by: UROLOGY

## 2018-07-24 PROCEDURE — 3074F SYST BP LT 130 MM HG: CPT | Mod: S$GLB,,, | Performed by: UROLOGY

## 2018-07-24 RX ORDER — PHENAZOPYRIDINE HYDROCHLORIDE 200 MG/1
200 TABLET, FILM COATED ORAL 3 TIMES DAILY PRN
Qty: 30 TABLET | Refills: 1 | Status: SHIPPED | OUTPATIENT
Start: 2018-07-24 | End: 2018-08-03

## 2018-07-24 NOTE — PATIENT INSTRUCTIONS
Gross hematuria  -she continues to have gross hematuria with negative cultures. Pt states she has had uti's before presenting as gross hematuria. It is unclear if she has negative cultures because she has painless gross hematuria from another cause, like bladder cancer, or she is on antibiotics by the time she provides another urine sample and has a negative culture bc of this  -in the future if she has uti symptoms she will either see urology NP or at least come in for a catheterized urine sample. She can take pyridium but needs to avoid any antibiotics until she gives a sample  -most recent imaging was 5 months ago (ct scan). At this point do not recommend repeat imaging.  -we will go ahead and schedule her to do a cystoscopy on august 6th at the [facility] with a catheterized urine sample 1 week prior.  -ua today negative for blood but will send cath urine for repeat cytology.    Recurrent uti  -continue to void every 2 hours  -continue to drink plenty of water and avoid pads  -if she has symptoms of uti recommend to NOT self treat with antibiotics  -consider estrace cream. Last abnormal pap smear over 40 years ago and all since have been negative. If cystoscopy negative will start hormone cream.   -pvr by in and out cath was 30cc today.  -will also write for pyridium to take as needed for pain.   -keep blood sugars under control.     F/u for cystoscopy on august 6th with cath urine 1 week prior.     Extensive review of previous cultures, urinalysis, ER notes, clinic notes   I spent 60 minutes with the patient of which more than half was spent in direct consultation with the patient in regards to our treatment and plan.

## 2018-07-24 NOTE — PROGRESS NOTES
[facility] [place]  Staff: MD Andres    Referring provider and please cc: Felipa Fernandez  PCP: [facility]    Chief Complaint: gross hematuria and recurrent uti    Subjective:        HPI: Fifi Mcnair is a 67 y.o. female presents with     Gross hematuria and recurrent uti  -She was evaluated in 2014 by Dr. Parrish, Urologist for recurrent UTIs along with a cystoscopy procedure done in his office on or around 01/01/2014.  (No records to review during ov today). Pt states today that cysto results showed normal findings at that time.  -in the last 5 years she's had about 3 uti's a year all with gross hematuria. They all start with dysuria, then GH, then urgency, urge incontinence and frequency. This year she has had gross hematuria 3x starting in June and always continues to recur, associate with uti symptoms. Cultures however have been negative but she has also been off and on abx. Otherwise has no OAB except recently has UUI with OAB, but also on lasix.   -gross hematuria: on no anticoagulation. Former tobacco, <10 years in a row. Has h/o recurrent utis for the last 10 years.  -recent ct 2/2018 shows transverse displacement of right kidney but no other abnormalities including no stones. Cytology 7/18/18 was  Negative.   -RF for uti's: diarrhea 2x a week. Diabetes x 30, most recent a1c was 7.4 5/7/18      ua today: negative -on no antibiotics. Started having dysuria, urge incontinence 2d ago and took pyridium.   Urine history:  7/20/18 No cx, void: neg  7/18/18 Multiple org, void: none, 1 rbc/1 wbc, cytology: URINE, VOIDED 7/18: no cancer cells seen  7/7/18  Ng, void: 2 protein/3+blood/nit+/2+leuk - +GH. ua +but no sx. tx with augmentin  6/24/18 Ng, void: red, nit+/3+leuk, 100 rbc, 40 wbc- +GH. ua + on cipro, but sent home with keflex and rocephin, + urgency  6/19/18 Ng, void: 3+glucose  2/12/18 No cx, void: tr wbc/no blood  8/4/17  Ng, void: tr  leuk  2/22/17 Multiple org, void: neg  11/23/14 Ng  1/9/14  Multiple org  9/18/13 Ng  11/13/12 Multiple org  4/5/12  K.pneumoniae      ECOG Status: 0    G2, P 2, vaginal     REVIEW OF SYSTEMS:  General ROS: no fevers, no chills  Psychological ROS: no depression  Endocrine ROS: no heat or cold  Respiratory ROS: no SOB  Cardiovascular ROS: no CP  Gastrointestinal ROS: no abdominal pain, no constipation, no diarrhea, noBRBPR  Musculoskeletal ROS: no muscle pain  Neurological ROS: no headaches  Dermatological ROS: no rashes  HEENT: noglasses, no sinus   ROS: per HPI     PMHx:  Past Medical History:   Diagnosis Date    Allergy     multiple antibiotic allergies    Anemia     Anxiety     Arthritis     Asthma     CHF (congestive heart failure)     NYHA class III     Chronic kidney disease     Colon polyp     benign    COPD (chronic obstructive pulmonary disease)     DLCO 37% , and mild pulmonary HTN    Depression     Diabetes mellitus     Diabetic retinopathy     Diastolic dysfunction     Diverticulitis of large intestine without perforation or abscess without bleeding 2/12/2018    Diverticulosis     Former smoker     Fracture of lumbar spine     GERD (gastroesophageal reflux disease)     Hernia of unspecified site of abdominal cavity without mention of obstruction or gangrene     Hyperlipidemia     Hypertension     hypertensive renal    IBS (irritable bowel syndrome)     Mild nonproliferative diabetic retinopathy(362.04) 11/25/2013    Morbid obesity     Nuclear sclerosis 11/25/2013    Osteoporosis     Peripheral edema     Rash     Recurrent upper respiratory infection (URI)     S/P LASIK (laser assisted in situ keratomileusis)     Sleep apnea     sleep apnea uses bipap.    Stroke     Thyroid disease     on synthroid    TIA (transient ischemic attack)     Urinary tract infection      Kidney stones no     PSHx:  Past Surgical History:   Procedure Laterality Date    ABDOMINAL SURGERY       ADENOIDECTOMY      COLONOSCOPY  2013    repeat in 5 years    COLONOSCOPY N/A 2017    Procedure: COLONOSCOPY;  Surgeon: Luis Navarro MD;  Location: [facility] ENDO;  Service: Endoscopy;  Laterality: N/A;    COLONOSCOPY N/A 2018    Procedure: COLONOSCOPY;  Surgeon: Luis Navarro MD;  Location: [facility] ENDO;  Service: Endoscopy;  Laterality: N/A;    COSMETIC SURGERY      CYSTOSCOPY      EYE SURGERY Right     mazzulla    GASTRECTOMY      gastric sleeve      gastric sleeve      HERNIA REPAIR      5 years old    HYSTERECTOMY      ovaries remain    REFRACTIVE SURGERY      mono va//ou//    RHINOPLASTY TIP      TONSILLECTOMY      TUBAL LIGATION      UPPER GASTROINTESTINAL ENDOSCOPY  2013    UPPER GASTROINTESTINAL ENDOSCOPY  2016    Dr. Navarro, repeat in 8 weeks    UPPER GASTROINTESTINAL ENDOSCOPY  2016    Dr. Randall     Urologic or Gynecologic Surgery: hysterectomy    Stents/Valves/Foreign Bodies: No  Cardiac Evaluation: Yes -     Screening Studies  Colonoscopy: recently    Fam Hx:   malignancies: None, gyn malignancies: Yes - Maternal cousin had ovarian cancer at age late 40s.   kidney stones: No   Mother  of throat cancer.  Paternal grandmother  of kidney cancer.    Soc Hx:  Former tobacco.  Smoked less than 10 yers  No alcohol  Lives in [place]  :yes  Children: 2  Occupation:retired     Allergies:  Adhesive; Cleocin [clindamycin hcl]; Ceclor [cefaclor]; Advair diskus [fluticasone-salmeterol]; Aleve [naproxen sodium]; Erythromycin; Levaquin [levofloxacin]; Macrobid [nitrofurantoin monohyd/m-cryst]; Macrodantin [nitrofurantoin macrocrystalline]; Motrin [ibuprofen]; Restoril [temazepam]; Sulfa (sulfonamide antibiotics); Trazodone; Remeron [mirtazapine]; and Vancomycin analogues   Sulfa- not allergic to sulfa, kd issues  macrobid - hives.stomach pain/GI?  levaquin - dermatitis    Medications: reviewed   Anticoagulation:  No    Objective:     Vitals:    07/24/18 1531   BP: 122/73   Pulse: 94   Temp: 98 °F (36.7 °C)       General:WDWN in NAD  Eyes: PERRLA, normal conjunctiva  Respiratory: no increased work on breathing. No wheezing.   Cardiovascular: No obvious extremity edema. Warm and well perfused.   GI: no palpation of masses. No tenderness. No hepatosplenomegaly to palpation.  Musculoskeletal: normal range of motion of bilateral upper extremities. Normal muscle strength and tone.  Skin: no obvious rashes or lesions. No tightening of skin noted.  Neurologic: CN grossly normal. Normal sensation.   Psychiatric: awake, alert and oriented x 3. Mood and affect normal. Cooperative.    Pelvic exam  External Genitalia: normal hair distribution, no lesions  Urethral meatus: normal without prolapse or caruncle  Urethra: without tenderness or mass  Bladder: without fulness or tenderness  Vagina: normal appearing. No discharge or lesions. no prolapse.   Anus and perineum: appear normal  In and out cath performed with 30cc residual  Levator ani tenderness: none    LABS REVIEW:    Cr:   Lab Results   Component Value Date    CREATININE 1.1 07/05/2018       PATHOLOGY REVIEW:  URINE, VOIDED 7/18/18:  -Negative; numerous benign urothelial cells and squamous cells.  -No dysplastic or malignant cells are present.    RADIOGRAPHIC REVIEW:  ctap w wo 2/12/18  Right kidney transversely displaced  Diverticulosis and findings most likely representing mild acute diverticulitis sigmoid colon without perforation or abscess and improved compared to prior study 2/9/2018 although not completely resolved.  Please see above discussion.  Additional findings as detailed above.    Assessment:       1. Gross hematuria    2. Recurrent UTI          Plan:     Gross hematuria  -she continues to have gross hematuria with negative cultures. Pt states she has had uti's before presenting as gross hematuria. It is unclear if she has negative cultures because she has painless  gross hematuria from another cause, like bladder cancer, or she is on antibiotics by the time she provides another urine sample and has a negative culture bc of this  -in the future if she has uti symptoms she will either see urology NP or at least come in for a catheterized urine sample. She can take pyridium but needs to avoid any antibiotics until she gives a sample  -most recent imaging was 5 months ago (ct scan). At this point do not recommend repeat imaging.  -we will go ahead and schedule her to do a cystoscopy on august 6th at the [facility] with a catheterized urine sample 1 week prior.  -ua today negative for blood but will send cath urine for repeat cytology.    Recurrent uti  -continue to void every 2 hours  -continue to drink plenty of water and avoid pads  -if she has symptoms of uti recommend to NOT self treat with antibiotics  -consider estrace cream. Last abnormal pap smear over 40 years ago and all since have been negative. If cystoscopy negative will start hormone cream.   -pvr by in and out cath was 30cc today.  -will also write for pyridium to take as needed for pain.   -keep blood sugars under control.     F/u for cystoscopy on august 6th with cath urine 1 week prior.     Extensive review of previous cultures, urinalysis, ER notes, clinic notes   I spent 60 minutes with the patient of which more than half was spent in direct consultation with the patient in regards to our treatment and plan.      Angy Campos MD

## 2018-07-30 DIAGNOSIS — M62.838 MUSCLE SPASM: ICD-10-CM

## 2018-07-31 NOTE — TELEPHONE ENCOUNTER
Patient requesting a refill of Lorazepam.  LR--6-1-18  LOV--6-19-18  FOV--8-16-18  Urine Toxicology--NONE NOTED  Pain Contract--NONE NOTED

## 2018-08-01 ENCOUNTER — CLINICAL SUPPORT (OUTPATIENT)
Dept: UROLOGY | Facility: CLINIC | Age: 68
End: 2018-08-01
Payer: MEDICARE

## 2018-08-01 ENCOUNTER — TELEPHONE (OUTPATIENT)
Dept: FAMILY MEDICINE | Facility: CLINIC | Age: 68
End: 2018-08-01

## 2018-08-01 ENCOUNTER — APPOINTMENT (OUTPATIENT)
Dept: LAB | Facility: HOSPITAL | Age: 68
End: 2018-08-01
Attending: NURSE PRACTITIONER
Payer: MEDICARE

## 2018-08-01 DIAGNOSIS — G47.33 OSA ON CPAP: Primary | ICD-10-CM

## 2018-08-01 DIAGNOSIS — R30.0 DYSURIA: Primary | ICD-10-CM

## 2018-08-01 LAB
BACTERIA #/AREA URNS HPF: NORMAL /HPF
MICROSCOPIC COMMENT: NORMAL
RBC #/AREA URNS HPF: 1 /HPF (ref 0–4)
WBC #/AREA URNS HPF: 0 /HPF (ref 0–5)

## 2018-08-01 PROCEDURE — 99999 PR PBB SHADOW E&M-EST. PATIENT-LVL I: CPT | Mod: PBBFAC,,,

## 2018-08-01 PROCEDURE — 99499 UNLISTED E&M SERVICE: CPT | Mod: S$GLB,,, | Performed by: NURSE PRACTITIONER

## 2018-08-01 PROCEDURE — 81000 URINALYSIS NONAUTO W/SCOPE: CPT

## 2018-08-01 PROCEDURE — 51701 INSERT BLADDER CATHETER: CPT | Mod: S$GLB,,, | Performed by: NURSE PRACTITIONER

## 2018-08-01 PROCEDURE — 87086 URINE CULTURE/COLONY COUNT: CPT

## 2018-08-01 RX ORDER — LORAZEPAM 1 MG/1
TABLET ORAL
Qty: 45 TABLET | Refills: 0 | Status: SHIPPED | OUTPATIENT
Start: 2018-08-01 | End: 2018-09-16 | Stop reason: SDUPTHER

## 2018-08-01 NOTE — TELEPHONE ENCOUNTER
Approved narcotics. I have checked [place] prescription monitoring program site. There are no unusual or abormal behavior seen.There is a risk of dependence.Please do not drive under the influence.

## 2018-08-02 ENCOUNTER — LAB VISIT (OUTPATIENT)
Dept: LAB | Facility: HOSPITAL | Age: 68
End: 2018-08-02
Attending: INTERNAL MEDICINE
Payer: MEDICARE

## 2018-08-02 DIAGNOSIS — E78.2 MIXED HYPERLIPIDEMIA: Primary | ICD-10-CM

## 2018-08-02 DIAGNOSIS — E09.00: ICD-10-CM

## 2018-08-02 DIAGNOSIS — E55.9 AVITAMINOSIS D: ICD-10-CM

## 2018-08-02 LAB
ALBUMIN SERPL BCP-MCNC: 3.5 G/DL
ALBUMIN/CREAT UR: 17.2 UG/MG
ALP SERPL-CCNC: 99 U/L
ALT SERPL W/O P-5'-P-CCNC: 20 U/L
ANION GAP SERPL CALC-SCNC: 9 MMOL/L
AST SERPL-CCNC: 20 U/L
BACTERIA UR CULT: NO GROWTH
BASOPHILS # BLD AUTO: 0 K/UL
BASOPHILS NFR BLD: 0.4 %
BILIRUB SERPL-MCNC: 0.4 MG/DL
BUN SERPL-MCNC: 12 MG/DL
CALCIUM SERPL-MCNC: 9.7 MG/DL
CHLORIDE SERPL-SCNC: 102 MMOL/L
CHOLEST SERPL-MCNC: 145 MG/DL
CHOLEST/HDLC SERPL: 3.9 {RATIO}
CO2 SERPL-SCNC: 29 MMOL/L
CREAT SERPL-MCNC: 1.4 MG/DL
CREAT UR-MCNC: 64 MG/DL
DIFFERENTIAL METHOD: ABNORMAL
EOSINOPHIL # BLD AUTO: 0.1 K/UL
EOSINOPHIL NFR BLD: 1.4 %
ERYTHROCYTE [DISTWIDTH] IN BLOOD BY AUTOMATED COUNT: 13.7 %
EST. GFR  (AFRICAN AMERICAN): 45 ML/MIN/1.73 M^2
EST. GFR  (NON AFRICAN AMERICAN): 39 ML/MIN/1.73 M^2
ESTIMATED AVG GLUCOSE: 192 MG/DL
GLUCOSE SERPL-MCNC: 198 MG/DL
HBA1C MFR BLD HPLC: 8.3 %
HCT VFR BLD AUTO: 38 %
HDLC SERPL-MCNC: 37 MG/DL
HDLC SERPL: 25.5 %
HGB BLD-MCNC: 12.6 G/DL
LDLC SERPL CALC-MCNC: 41.4 MG/DL
LYMPHOCYTES # BLD AUTO: 2.1 K/UL
LYMPHOCYTES NFR BLD: 28.4 %
MCH RBC QN AUTO: 31.5 PG
MCHC RBC AUTO-ENTMCNC: 33.1 G/DL
MCV RBC AUTO: 95 FL
MICROALBUMIN UR DL<=1MG/L-MCNC: 11 UG/ML
MONOCYTES # BLD AUTO: 0.5 K/UL
MONOCYTES NFR BLD: 6.7 %
NEUTROPHILS # BLD AUTO: 4.6 K/UL
NEUTROPHILS NFR BLD: 63.1 %
NONHDLC SERPL-MCNC: 108 MG/DL
PLATELET # BLD AUTO: 248 K/UL
PMV BLD AUTO: 7.7 FL
POTASSIUM SERPL-SCNC: 4.2 MMOL/L
PROT SERPL-MCNC: 7 G/DL
RBC # BLD AUTO: 4 M/UL
SODIUM SERPL-SCNC: 140 MMOL/L
TRIGL SERPL-MCNC: 333 MG/DL
TSH SERPL DL<=0.005 MIU/L-ACNC: 1.79 UIU/ML
WBC # BLD AUTO: 7.4 K/UL

## 2018-08-02 PROCEDURE — 80061 LIPID PANEL: CPT

## 2018-08-02 PROCEDURE — 85025 COMPLETE CBC W/AUTO DIFF WBC: CPT

## 2018-08-02 PROCEDURE — 80053 COMPREHEN METABOLIC PANEL: CPT

## 2018-08-02 PROCEDURE — 82043 UR ALBUMIN QUANTITATIVE: CPT

## 2018-08-02 PROCEDURE — 84443 ASSAY THYROID STIM HORMONE: CPT

## 2018-08-02 PROCEDURE — 36415 COLL VENOUS BLD VENIPUNCTURE: CPT

## 2018-08-02 PROCEDURE — 83036 HEMOGLOBIN GLYCOSYLATED A1C: CPT

## 2018-08-03 ENCOUNTER — TELEPHONE (OUTPATIENT)
Dept: FAMILY MEDICINE | Facility: CLINIC | Age: 68
End: 2018-08-03

## 2018-08-03 NOTE — TELEPHONE ENCOUNTER
----- Message from Werner Vargas MD sent at 2018  9:42 PM CDT -----  Regarding: FW: Rx Request  Please fax the CPap orders supply to [facility] at [phone]  Thanks  ----- Message -----  From: Armida Casas  Sent: 2018   1:57 PM  To: Werner Vargas MD  Subject: Rx Request                                       Hey Dr Vargas!    You normally treat ms zamora for her sleep apnea. She is in need of cpap supplies and her current rx is . She said she saw you not that long ago and is scheduled to see you again in August. Can you please write a rx for ffm, headgear, tubing, filters, chin strap, and water chamber with diagnosis code g47.33 and established length of need=99 months? Rx can be faxed to [facility] at [phone].    Thanks,  Armida Casas, CRT-SDS

## 2018-08-06 ENCOUNTER — HOSPITAL ENCOUNTER (OUTPATIENT)
Facility: AMBULARY SURGERY CENTER | Age: 68
Discharge: HOME | End: 2018-08-06
Attending: UROLOGY | Admitting: UROLOGY
Payer: MEDICARE

## 2018-08-06 ENCOUNTER — SURGERY (OUTPATIENT)
Age: 68
End: 2018-08-06

## 2018-08-06 DIAGNOSIS — N39.0 RECURRENT UTI: ICD-10-CM

## 2018-08-06 DIAGNOSIS — R31.0 GROSS HEMATURIA: ICD-10-CM

## 2018-08-06 DIAGNOSIS — I50.32 CHF (CONGESTIVE HEART FAILURE), NYHA CLASS III, CHRONIC, DIASTOLIC: ICD-10-CM

## 2018-08-06 DIAGNOSIS — I51.89 DIASTOLIC DYSFUNCTION: ICD-10-CM

## 2018-08-06 LAB
BACTERIA SPEC CULT: NORMAL
BILIRUB SERPL-MCNC: NORMAL MG/DL
BLOOD URINE, POC: NORMAL
CASTS: NORMAL
COLOR, POC UA: NORMAL
CRYSTALS: NORMAL
GLUCOSE UR QL STRIP: 50
KETONES UR QL STRIP: NORMAL
LEUKOCYTE ESTERASE URINE, POC: NORMAL
NITRITE, POC UA: NORMAL
PH, POC UA: 6
PROTEIN, POC: NORMAL
RBC CELLS COUNTED: NORMAL
SPECIFIC GRAVITY, POC UA: 1.01
UROBILINOGEN, POC UA: NORMAL
WHITE BLOOD CELLS: NORMAL

## 2018-08-06 PROCEDURE — 88305 TISSUE EXAM BY PATHOLOGIST: CPT | Performed by: PATHOLOGY

## 2018-08-06 PROCEDURE — 52204 CYSTOSCOPY W/BIOPSY(S): CPT | Mod: ,,, | Performed by: UROLOGY

## 2018-08-06 PROCEDURE — 52204 CYSTOSCOPY W/BIOPSY(S): CPT | Performed by: UROLOGY

## 2018-08-06 PROCEDURE — 88305 TISSUE EXAM BY PATHOLOGIST: CPT | Mod: 26,,, | Performed by: PATHOLOGY

## 2018-08-06 RX ORDER — GENTAMICIN SULFATE 40 MG/ML
INJECTION, SOLUTION INTRAMUSCULAR; INTRAVENOUS
Status: DISCONTINUED
Start: 2018-08-06 | End: 2018-08-06 | Stop reason: HOSPADM

## 2018-08-06 RX ORDER — ESTRADIOL 0.1 MG/G
1 CREAM VAGINAL
Qty: 42.5 G | Refills: 6 | Status: SHIPPED | OUTPATIENT
Start: 2018-08-06 | End: 2019-01-17

## 2018-08-06 RX ORDER — WATER 1000 ML/1000ML
INJECTION, SOLUTION INTRAVENOUS
Status: DISCONTINUED | OUTPATIENT
Start: 2018-08-06 | End: 2018-08-06 | Stop reason: HOSPADM

## 2018-08-06 RX ORDER — GENTAMICIN SULFATE 40 MG/ML
80 INJECTION, SOLUTION INTRAMUSCULAR; INTRAVENOUS
Status: DISCONTINUED | OUTPATIENT
Start: 2018-08-06 | End: 2021-02-09 | Stop reason: ALTCHOICE

## 2018-08-06 RX ORDER — GENTAMICIN SULFATE 40 MG/ML
INJECTION, SOLUTION INTRAMUSCULAR; INTRAVENOUS
Status: DISCONTINUED | OUTPATIENT
Start: 2018-08-06 | End: 2018-08-06 | Stop reason: HOSPADM

## 2018-08-06 RX ORDER — LIDOCAINE HYDROCHLORIDE 20 MG/ML
JELLY TOPICAL
Status: DISCONTINUED | OUTPATIENT
Start: 2018-08-06 | End: 2018-08-06 | Stop reason: HOSPADM

## 2018-08-06 RX ORDER — LIDOCAINE HYDROCHLORIDE 20 MG/ML
JELLY TOPICAL
Status: DISCONTINUED
Start: 2018-08-06 | End: 2018-08-06 | Stop reason: HOSPADM

## 2018-08-06 RX ADMIN — WATER 500 ML: 1000 INJECTION, SOLUTION INTRAVENOUS at 04:08

## 2018-08-06 RX ADMIN — LIDOCAINE HYDROCHLORIDE 5 ML: 20 JELLY TOPICAL at 04:08

## 2018-08-06 RX ADMIN — GENTAMICIN SULFATE 80 MG: 40 INJECTION, SOLUTION INTRAMUSCULAR; INTRAVENOUS at 04:08

## 2018-08-06 NOTE — H&P
[facility] Urology H&P Note - [place]  Staff: MD Andres  PCP: Dr.Raymond Baez [facility] active     Chief Complaint: gross hematuria and recurrent uti     Subjective:        HPI: Fifi Mcnair is a 67 y.o. female presents with      Gross hematuria and recurrent uti  -She was evaluated in 2014 by Dr. Parrish, Urologist for recurrent UTIs along with a cystoscopy procedure done in his office on or around 01/01/2014.  (No records to review during ov today). Pt states today that cysto results showed normal findings at that time.  -in the last 5 years she's had about 3 uti's a year all with gross hematuria. They all start with dysuria, then GH, then urgency, urge incontinence and frequency. This year she has had gross hematuria 3x starting in June and always continues to recur, associate with uti symptoms. Cultures however have been negative but she has also been off and on abx. Otherwise has no OAB except recently has UUI with OAB, but also on lasix.   -gross hematuria: on no anticoagulation. Former tobacco, <10 years in a row. Has h/o recurrent utis for the last 10 years.  -recent ct 2/2018 shows transverse displacement of right kidney but no other abnormalities including no stones. Cytology 7/18/18 was  Negative.   -RF for uti's: diarrhea 2x a week. Diabetes x 30, most recent a1c was 7.4 5/7/18  Here today for cysto     ua today void: blood and leuk  ua today cath: blood only   Urine history:  8/1/18  Ng, cath: ua not sent, 1 rbc  7/20/18            No cx, void: neg - Started having dysuria, urge incontinence 2d ago and took pyridium, cytology: negative  7/18/18            Multiple org, void: none, 1 rbc/1 wbc, cytology: URINE, VOIDED 7/18: no cancer cells seen  7/7/18              Ng, void: 2 protein/3+blood/nit+/2+leuk - +GH. ua +but no sx. tx with augmentin  6/24/18            Ng, void: red, nit+/3+leuk, 100 rbc, 40 wbc- +GH. ua + on cipro, but sent home with keflex and rocephin, +  urgency  6/19/18            Ng, void: 3+glucose  2/12/18            No cx, void: tr wbc/no blood  8/4/17              Ng, void: tr leuk  2/22/17            Multiple org, void: neg  11/23/14          Ng  1/9/14              Multiple org  9/18/13            Ng  11/13/12          Multiple org  4/5/12              K.pneumoniae        ECOG Status: 0     G2, P 2, vaginal      REVIEW OF SYSTEMS:  General ROS: no fevers, no chills  Psychological ROS: no depression  Endocrine ROS: no heat or cold  Respiratory ROS: no SOB  Cardiovascular ROS: no CP  Gastrointestinal ROS: no abdominal pain, no constipation, no diarrhea, noBRBPR  Musculoskeletal ROS: no muscle pain  Neurological ROS: no headaches  Dermatological ROS: no rashes  HEENT: noglasses, no sinus   ROS: per HPI     PMHx:       Past Medical History:   Diagnosis Date    Allergy       multiple antibiotic allergies    Anemia      Anxiety      Arthritis      Asthma      CHF (congestive heart failure)       NYHA class III     Chronic kidney disease      Colon polyp       benign    COPD (chronic obstructive pulmonary disease)       DLCO 37% , and mild pulmonary HTN    Depression      Diabetes mellitus      Diabetic retinopathy      Diastolic dysfunction      Diverticulitis of large intestine without perforation or abscess without bleeding 2/12/2018    Diverticulosis      Former smoker      Fracture of lumbar spine      GERD (gastroesophageal reflux disease)      Hernia of unspecified site of abdominal cavity without mention of obstruction or gangrene      Hyperlipidemia      Hypertension       hypertensive renal    IBS (irritable bowel syndrome)      Mild nonproliferative diabetic retinopathy(362.04) 11/25/2013    Morbid obesity      Nuclear sclerosis 11/25/2013    Osteoporosis      Peripheral edema      Rash      Recurrent upper respiratory infection (URI)      S/P LASIK (laser assisted in situ keratomileusis)      Sleep apnea       sleep  apnea uses bipap.    Stroke      Thyroid disease       on synthroid    TIA (transient ischemic attack)      Urinary tract infection        Kidney stones no      PSHx:        Past Surgical History:   Procedure Laterality Date    ABDOMINAL SURGERY        ADENOIDECTOMY        COLONOSCOPY   2013     repeat in 5 years    COLONOSCOPY N/A 2017     Procedure: COLONOSCOPY;  Surgeon: Luis Navarro MD;  Location: [facility] ENDO;  Service: Endoscopy;  Laterality: N/A;    COLONOSCOPY N/A 2018     Procedure: COLONOSCOPY;  Surgeon: Luis Navarro MD;  Location: [facility] ENDO;  Service: Endoscopy;  Laterality: N/A;    COSMETIC SURGERY        CYSTOSCOPY        EYE SURGERY Right       mazzulla    GASTRECTOMY        gastric sleeve        gastric sleeve        HERNIA REPAIR         5 years old    HYSTERECTOMY         ovaries remain    REFRACTIVE SURGERY         mono va//ou//    RHINOPLASTY TIP        TONSILLECTOMY        TUBAL LIGATION        UPPER GASTROINTESTINAL ENDOSCOPY   2013    UPPER GASTROINTESTINAL ENDOSCOPY   2016     Dr. Navarro, repeat in 8 weeks    UPPER GASTROINTESTINAL ENDOSCOPY   2016     Dr. Randall      Urologic or Gynecologic Surgery: hysterectomy     Stents/Valves/Foreign Bodies: No  Cardiac Evaluation: Yes -      Screening Studies  Colonoscopy: recently     Fam Hx:   malignancies: None, gyn malignancies: Yes - Maternal cousin had ovarian cancer at age late 40s.   kidney stones: No   Mother  of throat cancer.  Paternal grandmother  of kidney cancer.     Soc Hx:  Former tobacco.  Smoked less than 10 yers  No alcohol  Lives in [place]  :yes  Children: 2  Occupation:retired      Allergies:  Adhesive; Cleocin [clindamycin hcl]; Ceclor [cefaclor]; Advair diskus [fluticasone-salmeterol]; Aleve [naproxen sodium]; Erythromycin; Levaquin [levofloxacin]; Macrobid [nitrofurantoin monohyd/m-cryst]; Macrodantin [nitrofurantoin  macrocrystalline]; Motrin [ibuprofen]; Restoril [temazepam]; Sulfa (sulfonamide antibiotics); Trazodone; Remeron [mirtazapine]; and Vancomycin analogues   Sulfa- not allergic to sulfa, kd issues  macrobid - hives.stomach pain/GI?  levaquin - dermatitis     Medications: reviewed   Anticoagulation: No     Objective:      Vitals:    08/06/18 1635   BP: (!) 154/66   Pulse: 73   Resp: 20   Temp:           General:WDWN in NAD  Eyes: PERRLA, normal conjunctiva  Respiratory: no increased work on breathing. No wheezing.   Cardiovascular: No obvious extremity edema. Warm and well perfused.   GI: no palpation of masses. No tenderness. No hepatosplenomegaly to palpation.  Musculoskeletal: normal range of motion of bilateral upper extremities. Normal muscle strength and tone.  Skin: no obvious rashes or lesions. No tightening of skin noted.  Neurologic: CN grossly normal. Normal sensation.   Psychiatric: awake, alert and oriented x 3. Mood and affect normal. Cooperative.     Pelvic exam 7/24/18  External Genitalia: normal hair distribution, no lesions  Urethral meatus: normal without prolapse or caruncle  Urethra: without tenderness or mass  Bladder: without fulness or tenderness  Vagina: normal appearing. No discharge or lesions. no prolapse.   Anus and perineum: appear normal  In and out cath performed with 30cc residual  Levator ani tenderness: none     LABS REVIEW:     Cr:         Lab Results   Component Value Date     CREATININE 1.1 07/05/2018         PATHOLOGY REVIEW:  URINE, CATHETERIZED 7/24/18:  -Negative; cellular specimen with numerous benign urothelial cells and occasional reactive urothelial cells.  -No dysplastic or malignant cells are present.    URINE, VOIDED 7/18/18:  -Negative; numerous benign urothelial cells and squamous cells.  -No dysplastic or malignant cells are present.     RADIOGRAPHIC REVIEW:  ctap w wo 2/12/18  Right kidney transversely displaced  Diverticulosis and findings most likely representing  mild acute diverticulitis sigmoid colon without perforation or abscess and improved compared to prior study 2/9/2018 although not completely resolved.  Please see above discussion.  Additional findings as detailed above.     Assessment:       1. Gross hematuria    2. Recurrent UTI           Plan:      Gross hematuria  -she continues to have gross hematuria with negative cultures. Pt states she has had uti's before presenting as gross hematuria. It is unclear if she has negative cultures because she has painless gross hematuria from another cause, like bladder cancer, or she is on antibiotics by the time she provides another urine sample and has a negative culture bc of this  -in the future if she has uti symptoms she will either see urology NP or at least come in for a catheterized urine sample. She can take pyridium but needs to avoid any antibiotics until she gives a sample  -most recent imaging was 5 months ago (ct scan). At this point do not recommend repeat imaging.  -cytology negative x 2   -here today for cystoscopy      Recurrent uti  -continue to void every 2 hours  -continue to drink plenty of water and avoid pads  -if she has symptoms of uti recommend to NOT self treat with antibiotics  -consider estrace cream. Last abnormal pap smear over 40 years ago and all since have been negative. If cystoscopy negative will start hormone cream.   -pvr by in and out cath was 30cc .  -will also write for pyridium to take as needed for pain.   -keep blood sugars under control.     Here today for cysto      This patient has been cleared for surgery in ambulatory surgical facility.

## 2018-08-06 NOTE — DISCHARGE INSTRUCTIONS
Come in for symptomatic uti's for CATHETERIZED urine samples    Start estrace- apply to 2nd knuckle and apply nightly for 2 weeks and then every other night into vagina  If too expensive call us for prescription from [facility] instead ($60).  Start a probiotic with d mannose and cranberyy    F/u in 3 months with nurse practitioner  F/u in 6 months with me        After the procedure    · Drink plenty of fluids.  · You may have burning or light bleeding when you urinate--this is normal.  · Medications may be prescribed to ease any discomfort or prevent infection. Take these as directed.  · Call your doctor if you have heavy bleeding or blood clots, burning that lasts more than a day, a fever over 100°F  (38° C), or trouble urinating.

## 2018-08-06 NOTE — DISCHARGE SUMMARY
[facility]  Urology  Discharge Note - Short Stay      Patient Name: Fifi Mcnair  MRN: [id number]  Discharge Date and Time:  08/06/2018 4:56 PM  Attending Physician: Angy Campos,*   Discharging Provider: Angy Campos MD  Primary Care Physician: Werner Vargas MD    Final Active Diagnoses:    Diagnosis Date Noted POA    Gross hematuria [R31.0] 08/06/2018 Yes      Problems Resolved During this Admission:    Diagnosis Date Noted Date Resolved POA       Final Diagnoses: Same as principal problem.    Hospital Course: Patient was admitted for an outpatient procedure and tolerated the procedure well with no complications.*    Procedure(s) (LRB):  CYSTOSCOPY (N/A)     Indwelling Lines/Drains at time of discharge:   Lines/Drains/Airways     Drain                 Closed/Suction Drain 11/28/16 0901 Left Abdomen Bulb 15 Fr. 616 days         Closed/Suction Drain 11/28/16 0902 Right Abdomen 15 Fr. 616 days         Closed/Suction Drain 12/29/16 2257 RLQ Accordion 584 days         Closed/Suction Drain 12/30/16 1430 Right Abdomen Accordion 584 days         Closed/Suction Drain 01/10/17 1439 Left Abdomen 573 days                Discharged Condition: good    Disposition: home    Follow Up:      Patient Instructions:   No discharge procedures on file.    Medications:  Reconciled Home Medications:      Medication List      ASK your doctor about these medications    albuterol-ipratropium 2.5 mg-0.5 mg/3 mL nebulizer solution  Commonly known as:  DUO-NEB  Take 3 mLs by nebulization every 6 (six) hours as needed for Wheezing. Rescue     allopurinol 100 MG tablet  Commonly known as:  ZYLOPRIM  TAKE TWO TABLETS BY MOUTH NIGHTLY     amitriptyline 50 MG tablet  Commonly known as:  ELAVIL  Take 1 tablet (50 mg total) by mouth every evening.     atenolol 25 MG tablet  Commonly known as:  TENORMIN  Take 1 tablet (25 mg total) by mouth once daily.     calcitRIOL 0.25 MCG Cap  Commonly known as:   ROCALTROL  Take 1 capsule by mouth twice a week. Monday Friday     CALCIUM CITRATE + D ORAL  Take 400 mg by mouth 3 (three) times daily.     CENTRUM 3,500-18-0.4 unit-mg-mg Chew  Generic drug:  multivit-iron-min-folic acid  Take by mouth 2 (two) times daily.     cetirizine 10 MG tablet  Commonly known as:  ZYRTEC  Take 1 tablet (10 mg total) by mouth once daily.     clotrimazole-betamethasone 1-0.05% cream  Commonly known as:  LOTRISONE     diphenoxylate-atropine 2.5-0.025 mg 2.5-0.025 mg per tablet  Commonly known as:  LOMOTIL  TAKE ONE TABLET BY MOUTH 4 TIMES DAILY AS NEEDED FOR DIARRHEA     DYMISTA 137-50 mcg/spray Spry nassal spray  Generic drug:  azelastine-fluticasone     FLUoxetine 20 MG capsule  Commonly known as:  PROZAC  TAKE TWO CAPSULES BY MOUTH ONCE DAILY     fluticasone 50 mcg/actuation nasal spray  Commonly known as:  FLONASE  2 sprays by Nasal route once daily.     fluticasone-vilanterol 100-25 mcg/dose diskus inhaler  Commonly known as:  BREO  Inhale 1 puff into the lungs once daily.     gabapentin 100 MG capsule  Commonly known as:  NEURONTIN     KLOR-CON M20 20 MEQ tablet  Generic drug:  potassium chloride SA  TAKE ONE TABLET BY MOUTH TWICE DAILY     l-methylfolate-b2-b6-b12 6-5-50-1 mg Tab  Commonly known as:  CEREFOLIN  Take 1 tablet by mouth once daily.     Lacto.acidophilus-Bif.animalis 5 billion cell Cpsp  Commonly known as:  PROBIOTIC  Take 1 capsule by mouth once daily.     levothyroxine 25 MCG tablet  Commonly known as:  SYNTHROID  TAKE ONE TABLET BY MOUTH ONCE DAILY     * LORazepam 1 MG tablet  Commonly known as:  ATIVAN  Take 1 tablet (1 mg total) by mouth every evening.     * LORazepam 1 MG tablet  Commonly known as:  ATIVAN  TAKE 1 TABLET BY MOUTH EVERY 12 HOURS AS NEEDED FOR ANXIETY     losartan 25 MG tablet  Commonly known as:  COZAAR  Take 1 tablet by mouth once daily.     Poxel REMOTE CONTROL MISC  No Sig Provided     melatonin 3 mg Tab  Take 5 mg by mouth every evening. 10 MG  NIGHTLY     metOLazone 5 MG tablet  Commonly known as:  ZAROXOLYN  Take 1 tablet (5 mg total) by mouth daily as needed (swelling).     metroNIDAZOLE 0.75 % gel  Commonly known as:  METROGEL  Apply topically 2 (two) times daily.     montelukast 10 mg tablet  Commonly known as:  SINGULAIR  Take 10 mg by mouth once daily.     NOVOLOG FLEXPEN U-100 INSULIN SUBQ  Inject into the skin.     oxyCODONE 15 MG Tab  Commonly known as:  ROXICODONE  Take 1 tablet (15 mg total) by mouth every 6 (six) hours as needed for Pain.     oxyMORphone 10 MG 12 hr tablet  Commonly known as:  OPANA ER  Take 1 tablet by mouth 2 (two) times daily as needed.     pantoprazole 40 MG tablet  Commonly known as:  PROTONIX  TAKE ONE TABLET BY MOUTH ONCE DAILY     polymyxin B sulf-trimethoprim 10,000 unit- 1 mg/mL Drop  Commonly known as:  POLYTRIM  Place 1 drop into both eyes every 4 (four) hours.     PROLIA SUBQ  Inject into the skin every 6 (six) months.     ranitidine 300 MG tablet  Commonly known as:  ZANTAC  TAKE ONE TABLET BY MOUTH IN THE EVENING     torsemide 20 MG Tab  Commonly known as:  DEMADEX  Take 2 tablets (40 mg total) by mouth once daily.     VITAMIN B-12 5,000 mcg Subl  Generic drug:  cyanocobalamin (vitamin B-12)  Place 5,000 mg under the tongue once a week.        * This list has 2 medication(s) that are the same as other medications prescribed for you. Read the directions carefully, and ask your doctor or other care provider to review them with you.                Discharge Procedure Orders (must include Diet, Follow-up, Activity):  No discharge procedures on file.         Angy Campos MD  Urology  [facility]

## 2018-08-06 NOTE — OR NURSING
Pt voided urine specimen showed trace leukocytes, Dr. Campos aware, order for I/O catheter and do urine dipstick on that specimen. Catheterized urine was negative for leukocytes

## 2018-08-06 NOTE — OP NOTE
[facility] Procedure Note  Date: 08/06/2018    Procedure: Flexible cysto-uretheroscopy, bladder biopsy and fulguration    Pre Procedure Diagnosis:gross hematuria    Post Procedure Diagnosis: same    UA: leukocytes and blood  Cath urine: blood only    Surgeon: Angy Campos MD    Specimen: none    Anesthesia: 2% uro-jet lidocaine jelly for local analgesia    Flexible cysto-urethroscopy was performed after consent was obtained.  The risks and benefits were explained.    2% lidocaine urojet was used for local analgesia.  The genitalia was prepped and draped in the sterile fashion with betadine.    The flexible scope was advanced into the urethra and into the bladder.  Bilateral ureteral orifice were evaluated and noted to be normal with clear efflux.  The bladder was completely surveyed in a systematic fashion and the scope was retroflexed.   Cystoscopy findings as below in findings.   No strictures were noted.     On posterior right she had an abnormal patch of urothelium that appeared to likley be from recent uti (except culture was negative). One area was slightly more red than other areas. Biopsied in 2 spots and sent for pathology. Then fulgurated with bugbee for hemostasis.       The patient tolerated the procedure well without complication.    HPI: Fifi Mcnair is a 67 y.o. female who presents for cystoscopy today for gross hematuria    Findings: (pictures were  uploaded into media)  On posterior right she had an abnormal patch of urothelium that appeared to likley be from recent uti (except culture was negative). One area was slightly more red than other areas. Biopsied in 2 spots and sent for pathology. Then fulgurated with bugbee for hemostasis.   Remainder of bladder normal    Plan:  Come in for symptomatic uti's for CATHETERIZED urine samples  Follow-up biopsy, will return sooner if abnormal to discuss in person    Start estrace- apply to [address] [place] [address] and apply nightly for 2 weeks and  then every other night into vagina  If too expensive call us for prescription from [facility] instead ($60).  Start probiotic  Gent 80mg IM x 1    F/u in 3 months with nurse practitioner  F/u in 6 months with me

## 2018-08-07 ENCOUNTER — TELEPHONE (OUTPATIENT)
Dept: UROLOGY | Facility: CLINIC | Age: 68
End: 2018-08-07

## 2018-08-07 VITALS
RESPIRATION RATE: 20 BRPM | BODY MASS INDEX: 43.82 KG/M2 | SYSTOLIC BLOOD PRESSURE: 152 MMHG | OXYGEN SATURATION: 100 % | DIASTOLIC BLOOD PRESSURE: 53 MMHG | HEART RATE: 67 BPM | TEMPERATURE: 98 F | HEIGHT: 62 IN | WEIGHT: 238.13 LBS

## 2018-08-07 RX ORDER — ATENOLOL 25 MG/1
TABLET ORAL
Qty: 90 TABLET | Refills: 0 | Status: SHIPPED | OUTPATIENT
Start: 2018-08-07 | End: 2018-08-16

## 2018-08-07 NOTE — TELEPHONE ENCOUNTER
----- Message from Angy Campos MD sent at 8/6/2018  4:56 PM CDT -----  Start estrace- apply to 2nd knuckle and apply nightly for 2 weeks and then every other night into vagina  If too expensive call us for prescription from [facility] instead ($60).  F/u in 3 months with nurse practitioner  F/u in 6 months with me

## 2018-08-16 ENCOUNTER — TELEPHONE (OUTPATIENT)
Dept: PHARMACY | Facility: CLINIC | Age: 68
End: 2018-08-16

## 2018-08-16 ENCOUNTER — OFFICE VISIT (OUTPATIENT)
Dept: FAMILY MEDICINE | Facility: CLINIC | Age: 68
End: 2018-08-16
Payer: MEDICARE

## 2018-08-16 ENCOUNTER — TELEPHONE (OUTPATIENT)
Dept: HEMATOLOGY/ONCOLOGY | Facility: CLINIC | Age: 68
End: 2018-08-16

## 2018-08-16 VITALS
SYSTOLIC BLOOD PRESSURE: 150 MMHG | WEIGHT: 239 LBS | DIASTOLIC BLOOD PRESSURE: 74 MMHG | HEART RATE: 77 BPM | BODY MASS INDEX: 43.71 KG/M2 | TEMPERATURE: 99 F

## 2018-08-16 DIAGNOSIS — D47.2 MONOCLONAL GAMMOPATHY ASSOCIATED WITH LYMPHOPLASMACYTIC DYSCRASIAS: Chronic | ICD-10-CM

## 2018-08-16 DIAGNOSIS — I50.32 CHF (CONGESTIVE HEART FAILURE), NYHA CLASS III, CHRONIC, DIASTOLIC: ICD-10-CM

## 2018-08-16 DIAGNOSIS — Z79.4 DIABETES MELLITUS DUE TO UNDERLYING CONDITION WITH DIABETIC NEPHROPATHY, WITH LONG-TERM CURRENT USE OF INSULIN: Chronic | ICD-10-CM

## 2018-08-16 DIAGNOSIS — I10 ESSENTIAL HYPERTENSION: ICD-10-CM

## 2018-08-16 DIAGNOSIS — E08.21 DIABETES MELLITUS DUE TO UNDERLYING CONDITION WITH DIABETIC NEPHROPATHY, WITH LONG-TERM CURRENT USE OF INSULIN: Chronic | ICD-10-CM

## 2018-08-16 DIAGNOSIS — Z91.89 SEDENTARY LIFESTYLE: ICD-10-CM

## 2018-08-16 DIAGNOSIS — J45.50 POORLY CONTROLLED SEVERE PERSISTENT ASTHMA WITHOUT COMPLICATION: ICD-10-CM

## 2018-08-16 DIAGNOSIS — M17.11 PRIMARY OSTEOARTHRITIS OF RIGHT KNEE: Chronic | ICD-10-CM

## 2018-08-16 DIAGNOSIS — N18.30 CKD (CHRONIC KIDNEY DISEASE) STAGE 3, GFR 30-59 ML/MIN: ICD-10-CM

## 2018-08-16 DIAGNOSIS — M48.061 SPINAL STENOSIS OF LUMBAR REGION WITHOUT NEUROGENIC CLAUDICATION: ICD-10-CM

## 2018-08-16 DIAGNOSIS — M15.9 GENERALIZED OSTEOARTHRITIS: ICD-10-CM

## 2018-08-16 DIAGNOSIS — M71.21 SYNOVIAL CYST OF RIGHT POPLITEAL SPACE: ICD-10-CM

## 2018-08-16 DIAGNOSIS — E66.01 MORBID OBESITY WITH BMI OF 40.0-44.9, ADULT: ICD-10-CM

## 2018-08-16 DIAGNOSIS — R60.0 EDEMA EXTREMITIES: ICD-10-CM

## 2018-08-16 DIAGNOSIS — I51.89 DIASTOLIC DYSFUNCTION: ICD-10-CM

## 2018-08-16 DIAGNOSIS — E03.9 ACQUIRED HYPOTHYROIDISM: Primary | ICD-10-CM

## 2018-08-16 PROCEDURE — 99214 OFFICE O/P EST MOD 30 MIN: CPT | Mod: S$GLB,,, | Performed by: FAMILY MEDICINE

## 2018-08-16 PROCEDURE — 3078F DIAST BP <80 MM HG: CPT | Mod: S$GLB,,, | Performed by: FAMILY MEDICINE

## 2018-08-16 PROCEDURE — 3077F SYST BP >= 140 MM HG: CPT | Mod: S$GLB,,, | Performed by: FAMILY MEDICINE

## 2018-08-16 PROCEDURE — 99999 PR PBB SHADOW E&M-EST. PATIENT-LVL V: CPT | Mod: PBBFAC,,, | Performed by: FAMILY MEDICINE

## 2018-08-16 RX ORDER — ATENOLOL 25 MG/1
25 TABLET ORAL 2 TIMES DAILY
Qty: 180 TABLET | Refills: 4 | Status: SHIPPED | OUTPATIENT
Start: 2018-08-16 | End: 2019-09-09 | Stop reason: SDUPTHER

## 2018-08-16 RX ORDER — TORSEMIDE 20 MG/1
TABLET ORAL
Qty: 60 TABLET | Refills: 2 | Status: SHIPPED | OUTPATIENT
Start: 2018-08-16 | End: 2019-01-17 | Stop reason: SDUPTHER

## 2018-08-16 RX ORDER — LOSARTAN POTASSIUM 50 MG/1
50 TABLET ORAL DAILY
Qty: 90 TABLET | Refills: 3 | Status: SHIPPED | OUTPATIENT
Start: 2018-08-16 | End: 2019-09-09 | Stop reason: SDUPTHER

## 2018-08-16 RX ORDER — INSULIN ASPART 100 [IU]/ML
INJECTION, SOLUTION INTRAVENOUS; SUBCUTANEOUS
Qty: 40 ML | Refills: 0 | Status: SHIPPED | OUTPATIENT
Start: 2018-08-16 | End: 2020-09-02 | Stop reason: CLARIF

## 2018-08-16 NOTE — TELEPHONE ENCOUNTER
----- Message from Christian Moser sent at 8/14/2018  4:44 PM CDT -----  Type: Needs Medical Advice    Who Called:  Patient  Best Call Back Number: [phone]  Additional Information: Patient needs to schedule appointment - please call.

## 2018-08-16 NOTE — PATIENT INSTRUCTIONS
Arthritis: Exercise     Look for exercise classes for arthritis in your community.     Exercise is important to your overall health. It is especially important in people with arthritis. Regular exercise can:  · Keep your heart and blood vessels healthy  · Help with weight management, or weight loss  · Improve your mood  · Help prevent and manage health problems such as:  ¨ Diabetes  ¨ High blood pressure  ¨ High cholesterol  ¨ Depression  In people with arthritis, it offers all of those benefits and it can:  · Lessen pain and stiffness  · Strengthen muscles that support your joints  · Help you to be able to do the things you enjoy  Exercise and arthritis  Exercise is an important part of any arthritis treatment plan. A complete program consists of the following three types of exercises:  · Aerobic exercises for cardiovascular health and overall fitness.   · Strengthening exercises to build up muscles to help prevent injury and keep joints stable.  · Range-of-motion exercises to keep muscles and joints flexible.  Getting started  Talk with your healthcare provider about what is safe for you. Make sure you:  · Learn how to do exercises properly and safely. Consider talking with a physical therapist or  used to working with people with arthritis.  · Start gradually and build. If you haven't been exercising, start slowly. Don't exercise too hard or too long.  · Create a routine. Set aside specific times for exercise every day.  · Warm up carefully. Take 5 to 10 minutes at the beginning and end of exercising to warm up and cool down. Just do the same exercises at a slower pace for 5 to 10 minutes.  · Work at a comfortable, smooth pace. Move your joints gently to prevent injury.  · Pay attention to your body. Don't exercise a painful or swollen joint; switch to another activity. Follow the 2-hour pain rule: You did too much if your joint or muscle pain lasts 2 hours or more after exercising, or is worse the next  day. This doesn't mean you should stop exercising. Just do less.  Aerobic exercise  Aerobic exercise improves overall health and helps control weight. Choose those that don't add extra stress to your joints. For example, walking, swimming, or bicycling.  Most people should exercise for at least 30 minutes. most days of the week. You don't have to exercise all at once. Try exercising for 10 minutes, 3 times a day, for example.  Strengthening exercises  Strengthening your muscles help to protect your joints and prevent injuries. Try to do strengthening exercises 2 to 3 times a week:  · These exercises can be done with exercise or resistance bands (inexpensive exercise aids that add resistance), or with light weights. Some people use soup cans as weights.  · Isometric exercises are done by tightening the muscles without moving the joint. This may be a good way to strengthen the muscles around a stiff joint.  A physical therapist or  can teach you how to do these exercises.  Range-of-motion (ROM) exercises  Range-of-motion (ROM) exercises allow you to move each of your joints in every way they are intended to move. You should do ROM exercises for each joint 2 to 3 times a day. This will help you maintain full use of all of your joints.  Sample ROM exercises  The following are just a sample of ROM exercises--one for your neck, shoulders, elbows, hips, knees, and ankles. To completely move each joint through its full range of motion, you will have to do a few exercises for each joint. A physical therapist or  can teach you how to do full ROM exercises for each joint.   Repeat each for these exercises 5 to 10 times. Make sure you move slowly:  1. Neck turns. Sit in a straight-backed chair. Look straight ahead. Slowly turn your head to the right, then return it to center. Repeat. Do the same thing, turning your head to the left. Repeat.  2. Shoulder raise. Lie on your back or sit in a chair. Raise one arm over  your head, keeping your elbow straight. Keep the arm close to your ear. Return it slowly to your side. Repeat with your other arm.  3. Elbow stretch. Sit in a chair. If you are able, put both arms out to your sides to form a T. Slowly touch your shoulders with the tips of your fingers. Then return to the T-position. Repeat.  4. Hip stretch. Lie on your back with your legs straight and about 6 inches apart. With your foot flexed, slide your leg out to the side, then slide it back to the starting position. Repeat with your other leg.  5. Knee bend. Sit in a chair with your legs bent at the knees in front of you. Straighten one leg as much as you can, then bring it back to the floor. Repeat this 5 to 10 times. Then do the same thing with the other leg.   6. Ankle stretch. Sit with your feet flat on the floor. Lift your toes off of the floor while your heels stay down. Repeat. Then lift your heels off the floor while your toes stay down. Repeat.  Other exercise  Many other exercise and activities benefit people with arthritis. It is most important to find exercise and activities that you enjoy. You might try:  · Yoga, including chair yoga, helps to keep your joints strong and flexible.   · Madhav Chi, an ancient type of exercise with slow, gentle movements  · Water exercise, including water walking  For more information on exercise for arthritis go to the Arth[employer] website: www.arthritis.org.  Date Last Reviewed: 2/14/2016  © 5446-4807 [employer]. [address] [place] [address], [place] [address]. All rights reserved. This information is not intended as a substitute for professional medical care. Always follow your healthcare professional's instructions.        Knee Arthroscopy for a Ligament Tear  Ligaments are strong bands of tissue that help support the knee. A ligament tear can cause pain or swelling. It can also cause the knee to feel unstable. Knee arthroscopy is a procedure that can diagnose and  treat a ligament tear. It is done with a device called an arthroscope (scope). The scope is a thin tube containing a light and camera. It allows the doctor to see and work inside the knee joint. This sheet tells you more about the procedure and what to expect.    Preparing for surgery  Prepare as you have been told. Tell your doctor about all medicines you take. This includes over-the-counter drugs. It also includes herbs and other supplements. You may need to stop taking some or all of them before surgery. Also, follow any directions youre given for not eating or drinking before surgery.  The day of surgery  The surgery takes about 1 to 2 hours. You will likely go home the same day.  Before the surgery begins:  · An IV line is put into a vein in your arm or hand. This line supplies fluids and medicines.  · You will be given medicine to keep you free of pain during the surgery. This is called anesthesia. Depending on what type of anesthesia is used, you may be awake, drowsy, or asleep during the surgery. You will also be given medicine in the area of your leg that will be worked on.  · The knee to be worked on is marked.  During the surgery:  · Two or 3 small incisions (portals) are made in the knee. In some cases, a slightly larger incision may also be needed.  · The scope is inserted through one of the portals. It sends pictures from inside the knee to a video screen. Sterile fluid is sent through the scope to expand the knee joint. The fluid makes it easier to see the knee joint. The doctor looks closely at the tear and determines how best to treat it.  · Surgical tools are inserted through the other portals.  · The scope and tools are used to reconstruct a torn ligament. Damaged tissue is replaced with a piece of healthy tissue (a graft). The graft tissue may come from your own body or from a tissue donor.  · When the procedure is done, the sterile fluid is drained from the knee. The scope and other tools are  removed.  · Incisions are closed with stitches (sutures). The knee is then bandaged.  After the surgery  Youll be taken to a recovery room. Medicines and cold packs are used to manage pain and swelling. Your leg is also raised to reduce swelling. You may be given a special brace to wear. This brace helps support the knee while it heals. When its time to go home, have an adult friend or family member drive you.  Recovering at home  Once home, follow any instructions you are given. These include:  · Take pain medicine and any other medicine as directed.  · Care for your incisions as instructed. This includes keeping the incisions dry when bathing or showering.  · Elevate your leg to reduce pain and swelling. This means keeping your knee at a level higher than your heart.  · Apply an ice pack wrapped in a thin towel to your knee to reduce swelling a few times a day. Do this for 20 minutes at a time. Or use a continuous icing system if recommended by your doctor. Icing helps reduce swelling.  · Expect some numbness in the leg for 24 to 28 hours after surgery.  · Perform exercises to help with healing as instructed. You may be told to walk a few times daily.  · Use any aids, such as a brace, splint, or crutches, as instructed.  · Limit sports or activities as directed.  · Do not drive until your doctor says its OK.  · Do physical therapy (PT) for your knee as prescribed. PT is a program of guided exercise that can help you regain movement and strength in your knee.  When to call your doctor  Call the doctor if you have any of the following:  · Chest pain or trouble breathing  · Fever of 100.4°F (38°C) or higher, or as directed by your healthcare provider  · Pain that isnt helped by medication and rest  · Increased swelling not helped by elevation and cold packs  · Signs of infection at any incision site such as increased redness or swelling, warmth, worsening pain, or foul-smelling drainage  · Bleeding through the  bandages  · Worsening numbness in the leg that had surgery  · Severe nausea  · Any other signs or symptoms indicated by your doctor   Follow-up care  Keep all follow-up appointments with your doctor. Sutures will likely need to be removed about 7 to 10 days after surgery. If physical therapy is prescribed, keep all appointments with your physical therapist. He or she can help you set goals for recovery and a plan to work toward them. Be patient with your progress. It takes about 6 months for the knee to fully heal.  Risks and complications  · Infection  · Bleeding  · Blood clots  · Stiffness or ongoing pain in the knee  · Damage to the blood vessels, nerves, or skin around the knee  · Damage to the cartilage, meniscus, or ligaments in the knee  · Increased pressure in the leg due to swelling  · Need for further surgery  · Risks of anesthesia (the anesthesiologist will discuss these with you)   Date Last Reviewed: 7/28/2015  © 0868-9867 [employer]. [address] [place] [address]. All rights reserved. This information is not intended as a substitute for professional medical care. Always follow your healthcare professional's instructions.        Arthroscopy  Whether youre taking a step or raising your hand, your joints help you move freely. But living with a worn or injured joint can make an active lifestyle painful. Arthroscopy can be used to visualize, diagnose and, in many cases, treat your joint problem. After arthroscopy, you may be able to return to many of the activities you once enjoyed.     During arthroscopy,  sterile fluid flows through one of the portals. This expands the joint,  giving your surgeon the ability to see and work.   Why arthroscopy?  · The surgeon can often find and treat the problem during one procedure.  · The surgeon can often see the joint better than with open surgery.  · Smaller incisions are used than with open surgery. As a result, you may recover faster and  have less scarring.  How arthroscopy works  To look inside your joint, your surgeon will use an arthroscope. This is a slender instrument about the size of a pencil that contains a lens and a light source. The arthroscope and other special tools are inserted into the joint through tiny incisions. Using a camera, the arthroscope sends an image of your joint to a monitor. This lets your surgeon see your joint more clearly.  Risks of arthroscopy  As with any surgery, arthroscopy involves some risks. These are rare, but include:  · Excess bleeding  · Blood clots  · Infection  · Instrument failure in surgery  · Damage to nerves and blood vessels  · A shift to open surgery that would require a larger incision   Date Last Reviewed: 9/10/2015  © 9697-3449 [employer]. [address] [place] [address], [place] [address]. All rights reserved. This information is not intended as a substitute for professional medical care. Always follow your healthcare professional's instructions.

## 2018-08-20 ENCOUNTER — TELEPHONE (OUTPATIENT)
Dept: HEMATOLOGY/ONCOLOGY | Facility: CLINIC | Age: 68
End: 2018-08-20

## 2018-08-20 NOTE — TELEPHONE ENCOUNTER
----- Message from Betsy Leon sent at 8/20/2018  2:53 PM CDT -----  Contact: self  Patient miss call from your office please call back at [phone] ([phone])

## 2018-08-23 ENCOUNTER — OFFICE VISIT (OUTPATIENT)
Dept: ORTHOPEDICS | Facility: CLINIC | Age: 68
End: 2018-08-23
Payer: MEDICARE

## 2018-08-23 VITALS
HEART RATE: 67 BPM | DIASTOLIC BLOOD PRESSURE: 72 MMHG | BODY MASS INDEX: 43.98 KG/M2 | SYSTOLIC BLOOD PRESSURE: 162 MMHG | HEIGHT: 62 IN | WEIGHT: 239 LBS

## 2018-08-23 DIAGNOSIS — M17.0 PRIMARY OSTEOARTHRITIS OF BOTH KNEES: Primary | ICD-10-CM

## 2018-08-23 DIAGNOSIS — S83.241A ACUTE MEDIAL MENISCAL TEAR, RIGHT, INITIAL ENCOUNTER: ICD-10-CM

## 2018-08-23 PROCEDURE — 3078F DIAST BP <80 MM HG: CPT | Mod: S$GLB,,, | Performed by: ORTHOPAEDIC SURGERY

## 2018-08-23 PROCEDURE — 99999 PR PBB SHADOW E&M-EST. PATIENT-LVL III: CPT | Mod: PBBFAC,,, | Performed by: ORTHOPAEDIC SURGERY

## 2018-08-23 PROCEDURE — 99214 OFFICE O/P EST MOD 30 MIN: CPT | Mod: 57,S$GLB,, | Performed by: ORTHOPAEDIC SURGERY

## 2018-08-23 PROCEDURE — 3077F SYST BP >= 140 MM HG: CPT | Mod: S$GLB,,, | Performed by: ORTHOPAEDIC SURGERY

## 2018-08-23 NOTE — PROGRESS NOTES
Past Medical History:   Diagnosis Date    Allergy     multiple antibiotic allergies    Anemia     Anxiety     Arthritis     Asthma     CHF (congestive heart failure)     NYHA class III     Chronic kidney disease     Colon polyp     benign    COPD (chronic obstructive pulmonary disease)     DLCO 37% , and mild pulmonary HTN    Depression     Diabetes mellitus     Diabetic retinopathy     Diastolic dysfunction     Diverticulitis of large intestine without perforation or abscess without bleeding 2/12/2018    Diverticulosis     Former smoker     Fracture of lumbar spine     GERD (gastroesophageal reflux disease)     Hernia of unspecified site of abdominal cavity without mention of obstruction or gangrene     Hyperlipidemia     Hypertension     hypertensive renal    IBS (irritable bowel syndrome)     Mild nonproliferative diabetic retinopathy(362.04) 11/25/2013    Morbid obesity     Nuclear sclerosis 11/25/2013    Osteoporosis     Peripheral edema     Rash     Recurrent upper respiratory infection (URI)     S/P LASIK (laser assisted in situ keratomileusis)     Sleep apnea     sleep apnea uses bipap.    Stroke     Thyroid disease     on synthroid    TIA (transient ischemic attack)     Urinary tract infection        Past Surgical History:   Procedure Laterality Date    ABDOMINAL SURGERY      ADENOIDECTOMY      COLONOSCOPY  03/05/2013    repeat in 5 years    COSMETIC SURGERY      CYSTOSCOPY      EYE SURGERY Right     mazzulla    GASTRECTOMY      gastric sleeve      gastric sleeve      HERNIA REPAIR      5 years old    HYSTERECTOMY      ovaries remain    REFRACTIVE SURGERY      mono va//ou//    RHINOPLASTY TIP      TONSILLECTOMY      TUBAL LIGATION      UPPER GASTROINTESTINAL ENDOSCOPY  03/05/2013    UPPER GASTROINTESTINAL ENDOSCOPY  08/24/2016    Dr. Veras, repeat in 8 weeks    UPPER GASTROINTESTINAL ENDOSCOPY  07/21/2016    Dr. Randall       Current Outpatient  Medications   Medication Sig    albuterol-ipratropium 2.5mg-0.5mg/3mL (DUO-NEB) 0.5 mg-3 mg(2.5 mg base)/3 mL nebulizer solution Take 3 mLs by nebulization every 6 (six) hours as needed for Wheezing. Rescue    allopurinol (ZYLOPRIM) 100 MG tablet TAKE TWO TABLETS BY MOUTH NIGHTLY    amitriptyline (ELAVIL) 50 MG tablet Take 1 tablet (50 mg total) by mouth every evening.    atenolol (TENORMIN) 25 MG tablet Take 1 tablet (25 mg total) by mouth 2 (two) times daily.    calcitRIOL (ROCALTROL) 0.25 MCG Cap Take 1 capsule by mouth twice a week. Monday Friday    CALCIUM CITRATE/VITAMIN D3 (CALCIUM CITRATE + D ORAL) Take 400 mg by mouth 3 (three) times daily.    cetirizine (ZYRTEC) 10 MG tablet Take 1 tablet (10 mg total) by mouth once daily.    clotrimazole-betamethasone 1-0.05% (LOTRISONE) cream     cyanocobalamin, vitamin B-12, (VITAMIN B-12) 5,000 mcg Subl Place 5,000 mg under the tongue once a week.    DENOSUMAB (PROLIA SUBQ) Inject into the skin every 6 (six) months.     DIABETIC SUPPLIES,MISCELL (MEDTRONIC REMOTE CONTROL MISC) No Sig Provided    diphenoxylate-atropine 2.5-0.025 mg (LOMOTIL) 2.5-0.025 mg per tablet TAKE ONE TABLET BY MOUTH 4 TIMES DAILY AS NEEDED FOR DIARRHEA    DYMISTA 137-50 mcg/spray Spry nassal spray     estradiol (ESTRACE) 0.01 % (0.1 mg/gram) vaginal cream Place 1 g vaginally 3 (three) times a week. Once a day for first two weeks then 3x a week after    fluoxetine (PROZAC) 20 MG capsule TAKE TWO CAPSULES BY MOUTH ONCE DAILY    fluticasone (FLONASE) 50 mcg/actuation nasal spray 2 sprays by Nasal route once daily.     fluticasone-vilanterol (BREO) 100-25 mcg/dose diskus inhaler Inhale 1 puff into the lungs once daily.    gabapentin (NEURONTIN) 100 MG capsule     insulin aspart U-100 (NOVOLOG) 100 unit/mL injection Use per insulin pump, 35-40 units daily.    KLOR-CON M20 20 mEq tablet TAKE ONE TABLET BY MOUTH TWICE DAILY    l-methylfolate-b2-b6-b12 (CEREFOLIN) 6-5-50-1 mg Tab  Take 1 tablet by mouth once daily.    Lacto.acidophilus-Bif.animalis (PROBIOTIC) 5 billion cell CpSP Take 1 capsule by mouth once daily.    levothyroxine (SYNTHROID) 25 MCG tablet TAKE ONE TABLET BY MOUTH ONCE DAILY    LORazepam (ATIVAN) 1 MG tablet Take 1 tablet (1 mg total) by mouth every evening.    LORazepam (ATIVAN) 1 MG tablet TAKE 1 TABLET BY MOUTH EVERY 12 HOURS AS NEEDED FOR ANXIETY    losartan (COZAAR) 50 MG tablet Take 1 tablet (50 mg total) by mouth once daily.    melatonin 3 mg Tab Take 5 mg by mouth every evening. 10 MG NIGHTLY    MULTIVIT-IRON-MIN-FOLIC ACID 3,500-18-0.4 UNIT-MG-MG ORAL CHEW Take by mouth 2 (two) times daily.    oxyCODONE (ROXICODONE) 15 MG Tab Take 1 tablet (15 mg total) by mouth every 6 (six) hours as needed for Pain.    pantoprazole (PROTONIX) 40 MG tablet TAKE ONE TABLET BY MOUTH ONCE DAILY    polymyxin B sulf-trimethoprim (POLYTRIM) 10,000 unit- 1 mg/mL Drop Place 1 drop into both eyes every 4 (four) hours.    ranitidine (ZANTAC) 300 MG tablet TAKE ONE TABLET BY MOUTH IN THE EVENING    torsemide (DEMADEX) 20 MG Tab 1 tab every other day then 2 tab every day     Current Facility-Administered Medications   Medication    gentamicin injection 80 mg       Review of patient's allergies indicates:   Allergen Reactions    Adhesive Other (See Comments)     Blisters    Cleocin [clindamycin hcl] Hives    Ceclor [cefaclor] Hives    Morphine Nausea And Vomiting    Advair diskus [fluticasone-salmeterol]      Dry mouth    Aleve [naproxen sodium]      Unable to take secondary to kidney function.     Levaquin [levofloxacin] Dermatitis    Macrodantin [nitrofurantoin macrocrystalline] Hives    Motrin [ibuprofen]      Unable to take secondary to kidney functions.    Sulfa (sulfonamide antibiotics)      Hold due to renal problems    Remeron [mirtazapine] Palpitations and Other (See Comments)     Jittery    Vancomycin analogues Rash     Feet broke out/inflammed blood vessels          Family History   Problem Relation Age of Onset    Heart disease Mother 59    Cancer Mother 59        throat    Allergies Mother     Diabetes Mother     Heart disease Father 70        flutter    Allergies Father     Diabetes Father     Cancer Sister 22        22 thyroid,49  breast    Allergies Sister     Breast cancer Sister     Diabetes Sister     Cancer Brother 62        lung    Diabetes Brother     Asthma Daughter     Diabetes Daughter     Depression Daughter     Asthma Grandchild     Eczema Grandchild     Eczema Grandchild     Breast cancer Maternal Grandmother     Ovarian cancer Cousin     Amblyopia Neg Hx     Blindness Neg Hx     Cataracts Neg Hx     Glaucoma Neg Hx     Hypertension Neg Hx     Macular degeneration Neg Hx     Retinal detachment Neg Hx     Strabismus Neg Hx     Stroke Neg Hx     Thyroid disease Neg Hx     Angioedema Neg Hx     Immunodeficiency Neg Hx     Allergic rhinitis Neg Hx     Atopy Neg Hx     Rhinitis Neg Hx     Urticaria Neg Hx     Colon cancer Neg Hx     Colon polyps Neg Hx     Crohn's disease Neg Hx     Ulcerative colitis Neg Hx     Celiac disease Neg Hx        Social History     Socioeconomic History    Marital status:      Spouse name: Not on file    Number of children: Not on file    Years of education: Not on file    Highest education level: Not on file   Social Needs    Financial resource strain: Not on file    Food insecurity - worry: Not on file    Food insecurity - inability: Not on file    Transportation needs - medical: Not on file    Transportation needs - non-medical: Not on file   Occupational History    Not on file   Tobacco Use    Smoking status: Former Smoker     Types: Cigarettes    Smokeless tobacco: Never Used    Tobacco comment: quit 1990   Substance and Sexual Activity    Alcohol use: Yes     Comment: rare    Drug use: No    Sexual activity: Yes     Birth control/protection: Surgical   Other  Topics Concern    Not on file   Social History Narrative    Not on file       Chief Complaint:   Chief Complaint   Patient presents with    Left Knee - Pain    Right Knee - Pain       History: This is a 67-year-old female with chronic bilateral knee pain.  Left is greater than the right.  Patient has had injections previously.  Cortisone does not work very well.  Has had good success with a Synvisc series.  Synvisc one did not work well for her.  Rates her pain currently as a 7 out of 10.  Symptoms are worsening and moderate to severe.    Present:  The Synvisc did not help.  Having a lot of medial knee pain of the right knee. Pain is up to a 10/10 at times.    Answers for HPI/ROS submitted by the patient on 8/21/2018   Leg pain  unexpected weight change: No  appetite change : No  sleep disturbance: No  IMMUNOCOMPROMISED: No  nervous/ anxious: Yes  dysphoric mood: No  rash: No  visual disturbance: No  eye redness: Yes  eye pain: No  ear pain: No  tinnitus: No  hearing loss: No  sinus pressure : No  nosebleeds: No  enviro allergies: Yes  food allergies: No  cough: No  shortness of breath: No  sweating: No  dysuria: No  frequency: No  difficulty urinating: No  hematuria: Yes  painful intercourse: No  chest pain: No  palpitations: No  nausea: No  vomiting: No  diarrhea: Yes  blood in stool: No  constipation: No  headaches: No  dizziness: No  numbness: No  seizures: No  joint swelling: Yes  myalgia: Yes  weakness: No  back pain: Yes  Pain Chronicity: chronic  History of trauma: Yes  Onset: more than 1 month ago  Frequency: constantly  Progression since onset: unchanged  Injury mechanism: falling  injury location: at home  pain- numeric: 8/10  pain location: right knee  pain quality: shooting  Radiating Pain: No  If your pain is radiating, to what part of the body?: right knee  Aggravating factors: walking  fever: Yes  inability to bear weight: Yes  itching: No  joint locking: Yes  limited range of motion:  Yes  stiffness: No  tingling: Yes  Treatments tried: brace/corset, cold, heat, exercise, OTC ointments, OTC pain meds, rest  physical therapy: not tried  Improvement on treatment: no relief        Physical Examination:    Vital Signs:    Vitals:    08/23/18 1324   BP: (!) 162/72   Pulse: 67       This a well-developed, well nourished patient in no acute distress.  They are alert and oriented and cooperative to examination.  Pt. walks without an antalgic gait.      Examination of bilateral knees knee shows no rashes or erythema. There are some enlargement of the right thigh. Patient has full range of motion from 0-130°. Patient is nontender to palpation over lateral joint line and moderately tender to palpation over the medial joint line.  Knee is stable to varus and valgus stress. 5 out of 5 motor strength. Palpable distal pulses. Intact light touch sensation. Negative Patellofemoral crepitus    Heart is regular rate without obvious murmurs   Normal respiratory effort without audible wheezing  Abdomen is soft and nontender       X-rays: 4 views of right knee is   reviewed which show medial narrowing that is significantly worse on the left.  Severe left knee arthritis  X-rays of the right foot are reviewed which show some degenerative changes particularly around the midfoot.     Assessment::  Right medial meniscal tear       Plan:  I reviewed the findings with her today. We talked about treatment options.  She has tried all forms of non operative treatment.  The plan is for right knee arthroscopy with partial medial meniscectomy.  Risks, benefits, and alternatives to the procedure were explained to the patient including but not limited to damage to nerves, arteries, blood vessels, bones, tendons, ligaments, stiffness, instability, infection, DVT, PE, as well as general anesthetic complications including seizure, stroke, heart attack and even death. The patient understood these risks and wished to proceed and signed  the informed consent.

## 2018-08-27 ENCOUNTER — HOSPITAL ENCOUNTER (OUTPATIENT)
Dept: PREADMISSION TESTING | Facility: HOSPITAL | Age: 68
Discharge: HOME | End: 2018-08-27

## 2018-08-27 VITALS — WEIGHT: 230 LBS | HEIGHT: 62 IN | BODY MASS INDEX: 42.33 KG/M2

## 2018-08-28 DIAGNOSIS — M17.0 PRIMARY OSTEOARTHRITIS OF BOTH KNEES: Primary | ICD-10-CM

## 2018-08-28 DIAGNOSIS — M17.0 ARTHRITIS OF BOTH KNEES: ICD-10-CM

## 2018-08-28 RX ORDER — SODIUM CHLORIDE 9 MG/ML
INJECTION, SOLUTION INTRAVENOUS CONTINUOUS
Status: CANCELLED | OUTPATIENT
Start: 2018-08-28

## 2018-08-28 RX ORDER — MUPIROCIN 20 MG/G
OINTMENT TOPICAL
Status: CANCELLED | OUTPATIENT
Start: 2018-08-28

## 2018-08-30 ENCOUNTER — ANESTHESIA EVENT (OUTPATIENT)
Dept: SURGERY | Facility: HOSPITAL | Age: 68
End: 2018-08-30
Payer: MEDICARE

## 2018-08-31 ENCOUNTER — ANESTHESIA (OUTPATIENT)
Dept: SURGERY | Facility: HOSPITAL | Age: 68
End: 2018-08-31
Payer: MEDICARE

## 2018-08-31 ENCOUNTER — HOSPITAL ENCOUNTER (OUTPATIENT)
Facility: HOSPITAL | Age: 68
Discharge: HOME | End: 2018-08-31
Attending: ORTHOPAEDIC SURGERY | Admitting: ORTHOPAEDIC SURGERY
Payer: MEDICARE

## 2018-08-31 VITALS
TEMPERATURE: 98 F | SYSTOLIC BLOOD PRESSURE: 137 MMHG | OXYGEN SATURATION: 95 % | RESPIRATION RATE: 18 BRPM | BODY MASS INDEX: 42.33 KG/M2 | WEIGHT: 230 LBS | DIASTOLIC BLOOD PRESSURE: 65 MMHG | HEIGHT: 62 IN | HEART RATE: 85 BPM

## 2018-08-31 DIAGNOSIS — M17.0 PRIMARY OSTEOARTHRITIS OF BOTH KNEES: ICD-10-CM

## 2018-08-31 DIAGNOSIS — M17.0 ARTHRITIS OF BOTH KNEES: ICD-10-CM

## 2018-08-31 PROCEDURE — 99900104 DSU ONLY-NO CHARGE-EA ADD'L HR (STAT): Performed by: ORTHOPAEDIC SURGERY

## 2018-08-31 PROCEDURE — D9220A PRA ANESTHESIA: Mod: ANES,,, | Performed by: ANESTHESIOLOGY

## 2018-08-31 PROCEDURE — 25000003 PHARM REV CODE 250: Performed by: ANESTHESIOLOGY

## 2018-08-31 PROCEDURE — 36000711: Performed by: ORTHOPAEDIC SURGERY

## 2018-08-31 PROCEDURE — 36000710: Performed by: ORTHOPAEDIC SURGERY

## 2018-08-31 PROCEDURE — 25000003 PHARM REV CODE 250: Performed by: ORTHOPAEDIC SURGERY

## 2018-08-31 PROCEDURE — 63600175 PHARM REV CODE 636 W HCPCS: Performed by: NURSE ANESTHETIST, CERTIFIED REGISTERED

## 2018-08-31 PROCEDURE — 71000015 HC POSTOP RECOV 1ST HR: Performed by: ORTHOPAEDIC SURGERY

## 2018-08-31 PROCEDURE — 27201423 OPTIME MED/SURG SUP & DEVICES STERILE SUPPLY: Performed by: ORTHOPAEDIC SURGERY

## 2018-08-31 PROCEDURE — 29880 ARTHRS KNE SRG MNISECTMY M&L: CPT | Mod: RT,,, | Performed by: ORTHOPAEDIC SURGERY

## 2018-08-31 PROCEDURE — 99900103 DSU ONLY-NO CHARGE-INITIAL HR (STAT): Performed by: ORTHOPAEDIC SURGERY

## 2018-08-31 PROCEDURE — 71000016 HC POSTOP RECOV ADDL HR: Performed by: ORTHOPAEDIC SURGERY

## 2018-08-31 PROCEDURE — D9220A PRA ANESTHESIA: Mod: CRNA,,, | Performed by: NURSE ANESTHETIST, CERTIFIED REGISTERED

## 2018-08-31 PROCEDURE — 63600175 PHARM REV CODE 636 W HCPCS: Performed by: ANESTHESIOLOGY

## 2018-08-31 PROCEDURE — 63600175 PHARM REV CODE 636 W HCPCS: Performed by: ORTHOPAEDIC SURGERY

## 2018-08-31 PROCEDURE — 71000033 HC RECOVERY, INTIAL HOUR: Performed by: ORTHOPAEDIC SURGERY

## 2018-08-31 PROCEDURE — 71000039 HC RECOVERY, EACH ADD'L HOUR: Performed by: ORTHOPAEDIC SURGERY

## 2018-08-31 PROCEDURE — 37000009 HC ANESTHESIA EA ADD 15 MINS: Performed by: ORTHOPAEDIC SURGERY

## 2018-08-31 PROCEDURE — 25000003 PHARM REV CODE 250: Performed by: NURSE ANESTHETIST, CERTIFIED REGISTERED

## 2018-08-31 PROCEDURE — 37000008 HC ANESTHESIA 1ST 15 MINUTES: Performed by: ORTHOPAEDIC SURGERY

## 2018-08-31 RX ORDER — FENTANYL CITRATE 50 UG/ML
25 INJECTION, SOLUTION INTRAMUSCULAR; INTRAVENOUS EVERY 5 MIN PRN
Status: COMPLETED | OUTPATIENT
Start: 2018-08-31 | End: 2018-08-31

## 2018-08-31 RX ORDER — SODIUM CHLORIDE, SODIUM LACTATE, POTASSIUM CHLORIDE, CALCIUM CHLORIDE 600; 310; 30; 20 MG/100ML; MG/100ML; MG/100ML; MG/100ML
INJECTION, SOLUTION INTRAVENOUS CONTINUOUS
Status: DISCONTINUED | OUTPATIENT
Start: 2018-08-31 | End: 2021-03-03

## 2018-08-31 RX ORDER — DIPHENHYDRAMINE HYDROCHLORIDE 50 MG/ML
25 INJECTION INTRAMUSCULAR; INTRAVENOUS EVERY 6 HOURS PRN
Status: DISCONTINUED | OUTPATIENT
Start: 2018-08-31 | End: 2018-08-31 | Stop reason: HOSPADM

## 2018-08-31 RX ORDER — CEFAZOLIN SODIUM 2 G/50ML
2 SOLUTION INTRAVENOUS
Status: COMPLETED | OUTPATIENT
Start: 2018-08-31 | End: 2018-08-31

## 2018-08-31 RX ORDER — OXYCODONE HYDROCHLORIDE 5 MG/1
5 TABLET ORAL ONCE AS NEEDED
Status: COMPLETED | OUTPATIENT
Start: 2018-08-31 | End: 2018-08-31

## 2018-08-31 RX ORDER — MUPIROCIN 20 MG/G
OINTMENT TOPICAL
Status: DISCONTINUED | OUTPATIENT
Start: 2018-08-31 | End: 2018-08-31 | Stop reason: HOSPADM

## 2018-08-31 RX ORDER — SUCCINYLCHOLINE CHLORIDE 20 MG/ML
INJECTION INTRAMUSCULAR; INTRAVENOUS
Status: DISCONTINUED | OUTPATIENT
Start: 2018-08-31 | End: 2018-08-31

## 2018-08-31 RX ORDER — BUPIVACAINE HCL/EPINEPHRINE 0.25-.0005
VIAL (ML) INJECTION
Status: DISCONTINUED | OUTPATIENT
Start: 2018-08-31 | End: 2018-08-31 | Stop reason: HOSPADM

## 2018-08-31 RX ORDER — PROPOFOL 10 MG/ML
VIAL (ML) INTRAVENOUS
Status: DISCONTINUED | OUTPATIENT
Start: 2018-08-31 | End: 2018-08-31

## 2018-08-31 RX ORDER — MIDAZOLAM HYDROCHLORIDE 1 MG/ML
INJECTION, SOLUTION INTRAMUSCULAR; INTRAVENOUS
Status: DISCONTINUED | OUTPATIENT
Start: 2018-08-31 | End: 2018-08-31

## 2018-08-31 RX ORDER — FENTANYL CITRATE 50 UG/ML
INJECTION, SOLUTION INTRAMUSCULAR; INTRAVENOUS
Status: DISCONTINUED | OUTPATIENT
Start: 2018-08-31 | End: 2018-08-31

## 2018-08-31 RX ORDER — MEPERIDINE HYDROCHLORIDE 50 MG/ML
12.5 INJECTION INTRAMUSCULAR; INTRAVENOUS; SUBCUTANEOUS ONCE AS NEEDED
Status: DISCONTINUED | OUTPATIENT
Start: 2018-08-31 | End: 2018-08-31 | Stop reason: HOSPADM

## 2018-08-31 RX ORDER — ROCURONIUM BROMIDE 10 MG/ML
INJECTION, SOLUTION INTRAVENOUS
Status: DISCONTINUED | OUTPATIENT
Start: 2018-08-31 | End: 2018-08-31

## 2018-08-31 RX ORDER — LIDOCAINE HCL/PF 100 MG/5ML
SYRINGE (ML) INTRAVENOUS
Status: DISCONTINUED | OUTPATIENT
Start: 2018-08-31 | End: 2018-08-31

## 2018-08-31 RX ORDER — SODIUM CHLORIDE 0.9 % (FLUSH) 0.9 %
3 SYRINGE (ML) INJECTION
Status: DISCONTINUED | OUTPATIENT
Start: 2018-08-31 | End: 2018-08-31 | Stop reason: HOSPADM

## 2018-08-31 RX ORDER — HYDROMORPHONE HYDROCHLORIDE 2 MG/ML
0.2 INJECTION, SOLUTION INTRAMUSCULAR; INTRAVENOUS; SUBCUTANEOUS EVERY 5 MIN PRN
Status: DISCONTINUED | OUTPATIENT
Start: 2018-08-31 | End: 2018-08-31 | Stop reason: HOSPADM

## 2018-08-31 RX ORDER — LIDOCAINE HYDROCHLORIDE 10 MG/ML
1 INJECTION, SOLUTION EPIDURAL; INFILTRATION; INTRACAUDAL; PERINEURAL ONCE
Status: COMPLETED | OUTPATIENT
Start: 2018-08-31 | End: 2018-08-31

## 2018-08-31 RX ORDER — ONDANSETRON 2 MG/ML
4 INJECTION INTRAMUSCULAR; INTRAVENOUS ONCE
Status: DISCONTINUED | OUTPATIENT
Start: 2018-08-31 | End: 2018-08-31 | Stop reason: HOSPADM

## 2018-08-31 RX ORDER — OXYCODONE HYDROCHLORIDE 5 MG/1
5 TABLET ORAL
Status: COMPLETED | OUTPATIENT
Start: 2018-08-31 | End: 2018-08-31

## 2018-08-31 RX ORDER — SODIUM CHLORIDE, SODIUM LACTATE, POTASSIUM CHLORIDE, CALCIUM CHLORIDE 600; 310; 30; 20 MG/100ML; MG/100ML; MG/100ML; MG/100ML
75 INJECTION, SOLUTION INTRAVENOUS CONTINUOUS
Status: DISCONTINUED | OUTPATIENT
Start: 2018-08-31 | End: 2018-08-31 | Stop reason: HOSPADM

## 2018-08-31 RX ADMIN — SUCCINYLCHOLINE CHLORIDE 140 MG: 20 INJECTION, SOLUTION INTRAMUSCULAR; INTRAVENOUS at 08:08

## 2018-08-31 RX ADMIN — OXYCODONE HYDROCHLORIDE 5 MG: 5 TABLET ORAL at 09:08

## 2018-08-31 RX ADMIN — FENTANYL CITRATE 50 MCG: 50 INJECTION, SOLUTION INTRAMUSCULAR; INTRAVENOUS at 08:08

## 2018-08-31 RX ADMIN — SODIUM CHLORIDE, SODIUM LACTATE, POTASSIUM CHLORIDE, AND CALCIUM CHLORIDE: .6; .31; .03; .02 INJECTION, SOLUTION INTRAVENOUS at 07:08

## 2018-08-31 RX ADMIN — FENTANYL CITRATE 25 MCG: 50 INJECTION INTRAMUSCULAR; INTRAVENOUS at 10:08

## 2018-08-31 RX ADMIN — LIDOCAINE HYDROCHLORIDE 10 MG: 10 INJECTION, SOLUTION EPIDURAL; INFILTRATION; INTRACAUDAL; PERINEURAL at 07:08

## 2018-08-31 RX ADMIN — MUPIROCIN: 20 OINTMENT TOPICAL at 08:08

## 2018-08-31 RX ADMIN — LIDOCAINE HYDROCHLORIDE 75 MG: 20 INJECTION, SOLUTION INTRAVENOUS at 08:08

## 2018-08-31 RX ADMIN — PROPOFOL 150 MG: 10 INJECTION, EMULSION INTRAVENOUS at 08:08

## 2018-08-31 RX ADMIN — FENTANYL CITRATE 25 MCG: 50 INJECTION INTRAMUSCULAR; INTRAVENOUS at 09:08

## 2018-08-31 RX ADMIN — ROCURONIUM BROMIDE 5 MG: 10 INJECTION, SOLUTION INTRAVENOUS at 08:08

## 2018-08-31 RX ADMIN — MIDAZOLAM 2 MG: 1 INJECTION INTRAMUSCULAR; INTRAVENOUS at 08:08

## 2018-08-31 RX ADMIN — CEFAZOLIN SODIUM 2 G: 2 SOLUTION INTRAVENOUS at 08:08

## 2018-08-31 NOTE — PROGRESS NOTES
Discussed patient's allergies and order for ancef with charisma chatman crna, along with her stating she has taken keflex in the past without problems.  Discussed patient has an insulin pump which she turned off at 0600 this am.  Informed him I did not have time to do an accucheck yet but he was ok taking surgery to patient without it.

## 2018-08-31 NOTE — OP NOTE
[facility]  Orthopedic Surgery  Operative Note    SUMMARY     Date of Procedure: 8/31/2018     Procedure: Procedure(s) (LRB):  ARTHROSCOPY, KNEE, WITH MENISCECTOMY (Right)   of the posterior horn medial meniscus and  the anterior horn lateral meniscus  Tricompartmental chondroplasty    Surgeon(s) and Role:     * Larry Molina MD - Primary    Assistant: Shmuel Chiang    Pre-Operative Diagnosis: Primary osteoarthritis of both knees [M17.0]    Post-Operative Diagnosis: Post-Op Diagnosis Codes:     * Primary osteoarthritis of both knees [M17.0]    Anesthesia: General    Diagnostic arthroscopy findings: Diagnostic arthroscopy findings, the patient's medial compartment was thoroughly examined. The patient had diffuse grade 2/Iii cartilage wear and complex tear of the posterior horn medial meniscus. ACL and PCL were both intact with   good tension. Lateral compartment showed anterior horn meniscal tearing as well with some grade 2 posterior tibial articular   wear. In the patellofemoral joint, the patient had good central tracking without tilt and mild to moderate undersurface patella and trochlear chondromalacia    Complications: No    Estimated Blood Loss (EBL):  30 milliliters    Tourniquet Time:  15 minutes at 300mmHg           Implants: * No implants in log *    Specimens:   Specimen (12h ago, onward)    None                  Condition: Good    Disposition: PACU - hemodynamically stable.    Attestation: I was present and scrubbed for the entire procedure.    INDICATIONS FOR THE PROCEDURE:  67-year-old female with worsening right medial knee pain.  Patient tried all forms of conservative measures and continued to have significant pain.  Patient does not have end-stage arthritis so we discussed doing the arthroscopy procedure.    PROCEDURE IN DETAIL: Risks, benefits and alternatives of the procedure were   explained to the patient including, but not limited to damage to nerves,   arteries,  blood vessels. Also explained risk of infection, DVT, PE, continued pain due to arthritis,  as well as   anesthetic complications including seizure, stroke, heart attack and death. They  understood this and signed informed consent. The patient's Right knee was marked prior to coming to the Operating Room. Once there a formal   timeout was done in which correct patient, procedure and op site were all   correctly identified and confirmed by the entire operating team. Ancef 2 g was   given prior to surgical incision. Laryngeal mask anesthesia was induced. The   patient's Right lower extremity was prepped and draped in normal sterile fashion.   Leg was then exsanguinated with a tourniquet and tourniquet was inflated up   300 mmHg. Standard inferior lateral portal was then made. A spinal needle was   used to localize an inferior medial portal and this was made under direct   arthroscopic visualization. Diagnostic arthroscopy was then performed with the   findings listed above. Shaver was used to remove redundant fat pad and   Synovium within the notch. A combination of arthroscopic biters and 3.5mm full radius shaver was used to perform a partial menisectomy back to stable healthy appearing tissue of the posterior horn medial meniscus as well as the anterior horn lateral meniscus.  Ablator was used to stabilize tissue as well on both the chondral surface and the meniscus.  Chondroplasty of all 3 compartments was performed using the shaver and ablator device.      Proceeded with closing. All   excess water was removed from the knee joint. Portals were closed using   nylons. Portal was then injected with 0.25% Marcaine with epinephrine. Sterile   dressing was then applied. They were then extubated and awakened and transferred   from the Operating Room to the Recovery Room in stable condition.     Postop course is for an arthroscopic medial meniscectomy

## 2018-08-31 NOTE — DISCHARGE INSTRUCTIONS
General Information:    1.  Do not drink alcoholic beverages including beer for 24 hours or as long as you are on pain medication..  2.  Do not drive a motor vehicle, operate machinery or power tools, or signs legal papers for 24 hours or as long as you are on pain medication.   3.  You may experience light-headedness, dizziness, and sleepiness following surgery. Please do not stay alone. A responsible adult should be with you for this 24 hour period.  4.  Go home and rest.    5. Progress slowly to a normal diet unless instructed.  Otherwise, begin with liquids such as soft drinks, then soup and crackers working up to solid foods. Drink plenty of nonalcoholic fluids.  6.  Certain anesthetics and pain medications produce nausea and vomiting in certain       individuals. If nausea becomes a problem at home, call you doctor.    7. A nurse will be calling you sometime after surgery. Do not be alarmed. This is our way of finding out how you are doing.    8. Several times every hour while you are awake, take 2-3 deep breaths and cough. If you had stomach surgery hold a pillow or rolled towel firmly against your stomach before you cough. This will help with any pain the cough might cause.  9. Several times every hour while you are awake, pump and flex your feet 5-6 times and do foot circles. This will help prevent blood clots.    10.Call your doctor for severe pain, bleeding, fever, or signs or symptoms of infection (pain, swelling, redness, foul odor, drainage).    Discharge Instructions: After Your Surgery/Procedure  Youve just had surgery. During surgery you were given medicine called anesthesia to keep you relaxed and free of pain. After surgery you may have some pain or nausea. This is common. Here are some tips for feeling better and getting well after surgery.     Stay on schedule with your medication.   Going home  Your doctor or nurse will show you how to take care of yourself when you go home. He or she will  "also answer your questions. Have an adult family member or friend drive you home.      For your safety we recommend these precaution for the first 24 hours after your procedure:  · Do not drive or use heavy equipment.  · Do not make important decisions or sign legal papers.  · Do not drink alcohol.  · Have someone stay with you, if needed. He or she can watch for problems and help keep you safe.  · Your concentration, balance, coordination, and judgement may be impaired for many hours after anesthesia.  Use caution when ambulating or standing up.     · You may feel weak and "washed out" after anesthesia and surgery.      Subtle residual effects of general anesthesia or sedation with regional / local anesthesia can last more than 24 hours.  Rest for the remainder of the day or longer if your Doctor/Surgeon has advised you to do so.  Although you may feel normal within the first 24 hours, your reflexes and mental ability may be impaired without you realizing it.  You may feel dizzy, lightheaded or sleepy for 24 hours or longer.      Be sure to go to all follow-up visits with your doctor. And rest after your surgery for as long as your doctor tells you to.  Coping with pain  If you have pain after surgery, pain medicine will help you feel better. Take it as told, before pain becomes severe. Also, ask your doctor or pharmacist about other ways to control pain. This might be with heat, ice, or relaxation. And follow any other instructions your surgeon or nurse gives you.  Tips for taking pain medicine  To get the best relief possible, remember these points:  · Pain medicines can upset your stomach. Taking them with a little food may help.  · Most pain relievers taken by mouth need at least 20 to 30 minutes to start to work.  · Taking medicine on a schedule can help you remember to take it. Try to time your medicine so that you can take it before starting an activity. This might be before you get dressed, go for a walk, " or sit down for dinner.  · Constipation is a common side effect of pain medicines. Call your doctor before taking any medicines such as laxatives or stool softeners to help ease constipation. Also ask if you should skip any foods. Drinking lots of fluids and eating foods such as fruits and vegetables that are high in fiber can also help. Remember, do not take laxatives unless your surgeon has prescribed them.  · Drinking alcohol and taking pain medicine can cause dizziness and slow your breathing. It can even be deadly. Do not drink alcohol while taking pain medicine.  · Pain medicine can make you react more slowly to things. Do not drive or run machinery while taking pain medicine.  Your health care provider may tell you to take acetaminophen to help ease your pain. Ask him or her how much you are supposed to take each day. Acetaminophen or other pain relievers may interact with your prescription medicines or other over-the-counter (OTC) drugs. Some prescription medicines have acetaminophen and other ingredients. Using both prescription and OTC acetaminophen for pain can cause you to overdose. Read the labels on your OTC medicines with care. This will help you to clearly know the list of ingredients, how much to take, and any warnings. It may also help you not take too much acetaminophen. If you have questions or do not understand the information, ask your pharmacist or health care provider to explain it to you before you take the OTC medicine.  Managing nausea  Some people have an upset stomach after surgery. This is often because of anesthesia, pain, or pain medicine, or the stress of surgery. These tips will help you handle nausea and eat healthy foods as you get better. If you were on a special food plan before surgery, ask your doctor if you should follow it while you get better. These tips may help:  · Do not push yourself to eat. Your body will tell you when to eat and how much.  · Start off with clear  liquids and soup. They are easier to digest.  · Next try semi-solid foods, such as mashed potatoes, applesauce, and gelatin, as you feel ready.  · Slowly move to solid foods. Dont eat fatty, rich, or spicy foods at first.  · Do not force yourself to have 3 large meals a day. Instead eat smaller amounts more often.  · Take pain medicines with a small amount of solid food, such as crackers or toast, to avoid nausea.     Call your surgeon if  · You still have pain an hour after taking medicine. The medicine may not be strong enough.  · You feel too sleepy, dizzy, or groggy. The medicine may be too strong.  · You have side effects like nausea, vomiting, or skin changes, such as rash, itching, or hives.       If you have obstructive sleep apnea  You were given anesthesia medicine during surgery to keep you comfortable and free of pain. After surgery, you may have more apnea spells because of this medicine and other medicines you were given. The spells may last longer than usual.   At home:  · Keep using the continuous positive airway pressure (CPAP) device when you sleep. Unless your health care provider tells you not to, use it when you sleep, day or night. CPAP is a common device used to treat obstructive sleep apnea.  · Talk with your provider before taking any pain medicine, muscle relaxants, or sedatives. Your provider will tell you about the possible dangers of taking these medicines.  © 8675-2446 [employer]. [address] [place] [address]. All rights reserved. This information is not intended as a substitute for professional medical care. Always follow your healthcare professional's instructions.  Discharge Instructions: After Your Surgery/Procedure  Youve just had surgery. During surgery you were given medicine called anesthesia to keep you relaxed and free of pain. After surgery you may have some pain or nausea. This is common. Here are some tips for feeling better and getting well  "after surgery.     Stay on schedule with your medication.   Going home  Your doctor or nurse will show you how to take care of yourself when you go home. He or she will also answer your questions. Have an adult family member or friend drive you home.      For your safety we recommend these precaution for the first 24 hours after your procedure:  · Do not drive or use heavy equipment.  · Do not make important decisions or sign legal papers.  · Do not drink alcohol.  · Have someone stay with you, if needed. He or she can watch for problems and help keep you safe.  · Your concentration, balance, coordination, and judgement may be impaired for many hours after anesthesia.  Use caution when ambulating or standing up.     · You may feel weak and "washed out" after anesthesia and surgery.      Subtle residual effects of general anesthesia or sedation with regional / local anesthesia can last more than 24 hours.  Rest for the remainder of the day or longer if your Doctor/Surgeon has advised you to do so.  Although you may feel normal within the first 24 hours, your reflexes and mental ability may be impaired without you realizing it.  You may feel dizzy, lightheaded or sleepy for 24 hours or longer.      Be sure to go to all follow-up visits with your doctor. And rest after your surgery for as long as your doctor tells you to.  Coping with pain  If you have pain after surgery, pain medicine will help you feel better. Take it as told, before pain becomes severe. Also, ask your doctor or pharmacist about other ways to control pain. This might be with heat, ice, or relaxation. And follow any other instructions your surgeon or nurse gives you.  Tips for taking pain medicine  To get the best relief possible, remember these points:  · Pain medicines can upset your stomach. Taking them with a little food may help.  · Most pain relievers taken by mouth need at least 20 to 30 minutes to start to work.  · Taking medicine on a " schedule can help you remember to take it. Try to time your medicine so that you can take it before starting an activity. This might be before you get dressed, go for a walk, or sit down for dinner.  · Constipation is a common side effect of pain medicines. Call your doctor before taking any medicines such as laxatives or stool softeners to help ease constipation. Also ask if you should skip any foods. Drinking lots of fluids and eating foods such as fruits and vegetables that are high in fiber can also help. Remember, do not take laxatives unless your surgeon has prescribed them.  · Drinking alcohol and taking pain medicine can cause dizziness and slow your breathing. It can even be deadly. Do not drink alcohol while taking pain medicine.  · Pain medicine can make you react more slowly to things. Do not drive or run machinery while taking pain medicine.  Your health care provider may tell you to take acetaminophen to help ease your pain. Ask him or her how much you are supposed to take each day. Acetaminophen or other pain relievers may interact with your prescription medicines or other over-the-counter (OTC) drugs. Some prescription medicines have acetaminophen and other ingredients. Using both prescription and OTC acetaminophen for pain can cause you to overdose. Read the labels on your OTC medicines with care. This will help you to clearly know the list of ingredients, how much to take, and any warnings. It may also help you not take too much acetaminophen. If you have questions or do not understand the information, ask your pharmacist or health care provider to explain it to you before you take the OTC medicine.  Managing nausea  Some people have an upset stomach after surgery. This is often because of anesthesia, pain, or pain medicine, or the stress of surgery. These tips will help you handle nausea and eat healthy foods as you get better. If you were on a special food plan before surgery, ask your doctor if  you should follow it while you get better. These tips may help:  · Do not push yourself to eat. Your body will tell you when to eat and how much.  · Start off with clear liquids and soup. They are easier to digest.  · Next try semi-solid foods, such as mashed potatoes, applesauce, and gelatin, as you feel ready.  · Slowly move to solid foods. Dont eat fatty, rich, or spicy foods at first.  · Do not force yourself to have 3 large meals a day. Instead eat smaller amounts more often.  · Take pain medicines with a small amount of solid food, such as crackers or toast, to avoid nausea.     Call your surgeon if  · You still have pain an hour after taking medicine. The medicine may not be strong enough.  · You feel too sleepy, dizzy, or groggy. The medicine may be too strong.  · You have side effects like nausea, vomiting, or skin changes, such as rash, itching, or hives.       If you have obstructive sleep apnea  You were given anesthesia medicine during surgery to keep you comfortable and free of pain. After surgery, you may have more apnea spells because of this medicine and other medicines you were given. The spells may last longer than usual.   At home:  · Keep using the continuous positive airway pressure (CPAP) device when you sleep. Unless your health care provider tells you not to, use it when you sleep, day or night. CPAP is a common device used to treat obstructive sleep apnea.  · Talk with your provider before taking any pain medicine, muscle relaxants, or sedatives. Your provider will tell you about the possible dangers of taking these medicines.  © 1848-4851 [employer]. [address] [place] [address]. All rights reserved. This information is not intended as a substitute for professional medical care. Always follow your healthcare professional's instructions.  Post op instructions for prevention of DVT  What is deep vein thrombosis?  Deep vein thrombosis (DVT) is the medical term  for blood clots in the deep veins of the leg.  These blood clots can be dangerous.  A DVT can block a blood vessel and keep blood from getting where it needs to go.  Another problem is that the clot can travel to other parts of the body such as the lungs.  A clot that travels to the lungs is called a pulmonary embolus (PE) and can cause serious problems with breathing which can lead to death.  Am I at risk for DVT/PE?  If you are not very active, you are at risk of DVT.  Anyone confined to bed, sitting for long periods of time, recovering from surgery, etc. increases the risk of DVT.  Other risk factors are cancer diagnosis, certain medications, estrogen replacement in any form,older age, obesity, pregnancy, smoking, history of clotting disorders, and dehydration.  How will I know if I have a DVT?   Swelling in the lower leg   Pain   Warmth, redness, hardness or bulging of the vein  If you have any of these symptoms, call your doctors office right away.  Some people will not have any symptoms until the clot moves to the lungs.  What are the symptoms of a PE?   Panting, shortness of breath, or trouble breathing   Sharp, knife-like chest pain when you breathe   Coughing or coughing up blood   Rapid heartbeat  If you have any of these symptoms or get worse quickly, call 911 for emergency treatment.  How can I prevent a DVT?   Avoid long periods of inactivity and dont cross your legs--get up and walk around every hour or so.   Stay active--walking after surgery is highly encouraged.  This means you should get out of the house and walk in the neighborhood.  Going up and down stairs will not impair healing (unless advised against such activity by your doctor).     Drink plenty of noncaffeinated, nonalcoholic fluids each day to prevent dehydration.   Wear special support stockings, if they have been advised by your doctor.   If you travel, stop at least once an hour and walk around.   Avoid smoking  (assistance with stopping is available through your healthcare provider)  Always notify your doctor if you are not able to follow the post operative instructions that are given to you at the time of discharge.  It may be necessary to prescribe one of the medications available to prevent DVT.Using Opioids for Pain Management     Your doctor has given instructions for you to take an opioid.  This is a drug for bad pain.  It helps control pain without causing bleeding and kidney problems.  Common opioid names are morphine, hydromorphone, oxycodone, and methadone. These drugs are called narcotics.    There are several safety concerns you need to know.     · It is against the law to give or sell this drug to another person.  You must keep this medicine safely locked.    · You may have side effects from taking this medication.  These include nausea, itching, sweating, sleepiness, a change in your ability to breathe, and depression.  · Do not take alcohol or sleeping pills opioids.    · Long-term opoid use may no longer giver you relief from pain.  It can cause you stomach pain, mental anxiety, and headaches.  Long-term opoid use can potentially lead to unlawful street drug abuse and reduce your ability to stay employed.    · Your body may become opioid tolerant if you need to take more to get relief.    · You must stop taking opioids if you begin having more pain as a result of the medicine.    · Opioid withdrawal occurs when you have to stop taking the drug.  It can cause you to have nausea, vomiting, diarrhea, stomach pain, anxiety, and dilated pupils in your eyes. This condition means you are opioid dependent.    · Addiction is a drug induced brain disease. It means there are changes in how your brain is working.  Children, teens, and young adults under 25 years old are more likely to get addicted to opioids.      · Addiction can happen with repeated opioid use.  It does not happen with short-term use of two weeks or  less.       For more information, please speak with your doctor or pharmacist.  We hope your stay was comfortable as you heal now, mend and rest.    For we have enjoyed taking care of you by giving your our best.    And as you get better, by regaining your health and strength;   We count it as a privilege to have served you and hope your time at [facility] was well spent.      Thank  You!!!    1.Diet: Ice chips, clear liquids, and then diet as tolerated. Drink plenty of liquids.  2.Ice the area at least three times a day (20 minutes per session).  3.Elevate the extremity above the level of the heart to help reduce swelling.  4.Pain medication can be taken every four to six hours as needed. It is helpful to take pain medication prior to physical therapy.  5.Any activity that requires precise thinking or accuracy should be avoided for a minimum of 72 hours after surgery and while on narcotic pain medication. This includes operating machinery and/or driving a vehicle.  6.All sutures/staples will be removed approximately 14 days from the time of surgery. Leave steri-strips (skin tapes) in place until sutures are removed.  7. If skin glue is used instead of stitches, do not apply ointments or solutions to the incision. Keep the incision dry. The skin glue will peel off in 3-4 weeks.  8. Change dressing on the first post-op day. Use gauze for the first 3 days, then start using Band-Aids over the incision sites.   9. All casts, splints, braces, slings, crutches, abduction pillows, etc... Are to be worn as instructed. Crutches are just to be used as needed. If not needed for soreness or balance, may weight bear as tolerated without the crutches.  10. Keep the incision dry for 10-14 days. A waterproof dressing (purchase at [employer], [employer], etc) can be used to shower. No bath, pool, hot tub until instructed.  11. Call [phone] with any questions or concerns.

## 2018-08-31 NOTE — PROGRESS NOTES
Dr. Molina spoke with patient about antibiotic allergies.  Patient states she has taken keflex and augmentin without reactions.  Order received for ancef 2 gram ivpb.

## 2018-08-31 NOTE — ANESTHESIA PREPROCEDURE EVALUATION
08/31/2018  Fifi Mcnair is a 67 y.o., female.    Anesthesia Evaluation    I have reviewed the Patient Summary Reports.    I have reviewed the Nursing Notes.   I have reviewed the Medications.     Review of Systems  Social:  Smoking Status: Former Smoker  Quit Smoking:   Smokeless Tobacco Status: Never Used  Alcohol use: Yes, unspecified volume  Drug use: No       Cardiovascular:   Hypertension Dysrhythmias CHF    Pulmonary:   COPD Asthma Recent URI Sleep Apnea    Renal/:   Chronic Renal Disease    Hepatic/GI:   PUD, GERD    Musculoskeletal:   Arthritis     Neurological:   TIA, CVA    Endocrine:   Diabetes, poorly controlled, type 2 Hypothyroidism    Psych:   Psychiatric History             Anesthesia Plan  Type of Anesthesia, risks & benefits discussed:  Anesthesia Type:  general  Patient's Preference:   Intra-op Monitoring Plan: standard ASA monitors  Intra-op Monitoring Plan Comments:   Post Op Pain Control Plan:   Post Op Pain Control Plan Comments:   Induction:   IV  Beta Blocker:  Patient is on a Beta-Blocker and has received one dose within the past 24 hours (No further documentation required).       Informed Consent: Patient understands risks and agrees with Anesthesia plan.  Questions answered.   ASA Score: 3     Day of Surgery Review of History & Physical: I have interviewed and examined the patient. I have reviewed the patient's H&P dated:  There are no significant changes.          Ready For Surgery From Anesthesia Perspective.                                                                                                                  08/31/2018  Fifi Mcnair is a 67 y.o., female.    [facility] Anesthesia Evaluation    I have reviewed the Patient Summary Reports.    I have reviewed the Nursing Notes.   I have reviewed the Medications.     Review of Systems         Anesthesia Plan  Type of  Anesthesia, risks & benefits discussed:  Anesthesia Type:  general  Patient's Preference:   Post Op Pain Control Plan:   Induction:    Beta Blocker:  Patient is on a Beta-Blocker and has received one dose within the past 24 hours (No further documentation required).       Informed Consent: Patient understands risks and agrees with Anesthesia plan.  Questions answered. Anesthesia consent signed with patient.  ASA Score: 3     Day of Surgery Review of History & Physical: I have interviewed and examined the patient. I have reviewed the patient's H&P dated:  There are no significant changes.          Ready For Surgery From Anesthesia Perspective.

## 2018-08-31 NOTE — PROGRESS NOTES
Had a prolonged conversation with patient about valuables (jewlery, money, credit cards etc) since nobody was with her at the moment.  She wanted to take her purse and valuables with her to surgery.  Explained this wasn't an option.  She had a very prolonged phone call with her sister who was on route to the hospital before the preop process could begin.

## 2018-08-31 NOTE — ANESTHESIA POSTPROCEDURE EVALUATION
"Anesthesia Post Evaluation    Patient: Fifi Mcnair    Procedure(s) Performed: Procedure(s) (LRB):  ARTHROSCOPY, KNEE, WITH MENISCECTOMY (Right)    Final Anesthesia Type: general  Patient location during evaluation: PACU  Patient participation: Yes- Able to Participate  Level of consciousness: awake and alert and oriented  Post-procedure vital signs: reviewed and stable  Pain management: adequate  Airway patency: patent  PONV status at discharge: No PONV  Anesthetic complications: no      Cardiovascular status: blood pressure returned to baseline  Respiratory status: unassisted, spontaneous ventilation and room air  Hydration status: euvolemic  Follow-up not needed.        Visit Vitals  BP (!) 145/66 (BP Location: Left arm, Patient Position: Lying)   Pulse 74   Temp 36.5 °C (97.7 °F) (Skin)   Resp 20   Ht 5' 2" (1.575 m)   Wt 104.3 kg (230 lb)   LMP  (LMP Unknown)   SpO2 96%   Breastfeeding? No   BMI 42.07 kg/m²       Pain/Mohamud Score: Pain Assessment Performed: Yes (8/31/2018  7:49 AM)  Presence of Pain: complains of pain/discomfort (8/31/2018  7:49 AM)  Pain Rating Prior to Med Admin: 6 (8/31/2018  7:49 AM)        "

## 2018-08-31 NOTE — PROGRESS NOTES
Pt stated that she takes Oxycodone 15 mg at home for pain. Ok per Dr. Subramanian to give pt Oxycodone 15 mg.

## 2018-08-31 NOTE — DISCHARGE SUMMARY
[facility]  Discharge Note  Short Stay    Admit Date: 8/31/2018    Discharge Date and Time: 8/31/2018    Attending Physician: Larry Molina MD     Discharge Provider: Larry Molina    Diagnoses:  Active Hospital Problems    Diagnosis  POA    *Arthritis of both knees [M17.0]  Yes      Resolved Hospital Problems   No resolved problems to display.       Discharged Condition: good    Hospital Course: Patient was admitted for an outpatient procedure and tolerated the procedure well with no complications.    Final Diagnoses: Same as principal problem.    Disposition: Home or Self Care    Follow up/Patient Instructions:    Medications:  Reconciled Home Medications:      Medication List      CONTINUE taking these medications    albuterol-ipratropium 2.5 mg-0.5 mg/3 mL nebulizer solution  Commonly known as:  DUO-NEB  Take 3 mLs by nebulization every 6 (six) hours as needed for Wheezing. Rescue     allopurinol 100 MG tablet  Commonly known as:  ZYLOPRIM  TAKE TWO TABLETS BY MOUTH NIGHTLY     amitriptyline 50 MG tablet  Commonly known as:  ELAVIL  Take 1 tablet (50 mg total) by mouth every evening.     atenolol 25 MG tablet  Commonly known as:  TENORMIN  Take 1 tablet (25 mg total) by mouth 2 (two) times daily.     calcitRIOL 0.25 MCG Cap  Commonly known as:  ROCALTROL  Take 1 capsule by mouth twice a week. Monday Friday     CALCIUM CITRATE + D ORAL  Take 400 mg by mouth 3 (three) times daily.     CENTRUM 3,500-18-0.4 unit-mg-mg Chew  Generic drug:  multivit-iron-min-folic acid  Take by mouth 2 (two) times daily.     cetirizine 10 MG tablet  Commonly known as:  ZYRTEC  Take 1 tablet (10 mg total) by mouth once daily.     clotrimazole-betamethasone 1-0.05% cream  Commonly known as:  LOTRISONE     diphenoxylate-atropine 2.5-0.025 mg 2.5-0.025 mg per tablet  Commonly known as:  LOMOTIL  TAKE ONE TABLET BY MOUTH 4 TIMES DAILY AS NEEDED FOR DIARRHEA     DYMISTA 137-50 mcg/spray Spry nassal  spray  Generic drug:  azelastine-fluticasone     estradiol 0.01 % (0.1 mg/gram) vaginal cream  Commonly known as:  ESTRACE  Place 1 g vaginally 3 (three) times a week. Once a day for first two weeks then 3x a week after     FLUoxetine 20 MG capsule  Commonly known as:  PROZAC  TAKE TWO CAPSULES BY MOUTH ONCE DAILY     fluticasone 50 mcg/actuation nasal spray  Commonly known as:  FLONASE  2 sprays by Nasal route once daily.     fluticasone-vilanterol 100-25 mcg/dose diskus inhaler  Commonly known as:  BREO  Inhale 1 puff into the lungs once daily.     gabapentin 100 MG capsule  Commonly known as:  NEURONTIN     insulin aspart U-100 100 unit/mL injection  Commonly known as:  NOVOLOG  Use per insulin pump, 35-40 units daily.     KLOR-CON M20 20 MEQ tablet  Generic drug:  potassium chloride SA  TAKE ONE TABLET BY MOUTH TWICE DAILY     l-methylfolate-b2-b6-b12 6-5-50-1 mg Tab  Commonly known as:  CEREFOLIN  Take 1 tablet by mouth once daily.     Lacto.acidophilus-Bif.animalis 5 billion cell Cpsp  Commonly known as:  PROBIOTIC  Take 1 capsule by mouth once daily.     levothyroxine 25 MCG tablet  Commonly known as:  SYNTHROID  TAKE ONE TABLET BY MOUTH ONCE DAILY     * LORazepam 1 MG tablet  Commonly known as:  ATIVAN  Take 1 tablet (1 mg total) by mouth every evening.     * LORazepam 1 MG tablet  Commonly known as:  ATIVAN  TAKE 1 TABLET BY MOUTH EVERY 12 HOURS AS NEEDED FOR ANXIETY     losartan 50 MG tablet  Commonly known as:  COZAAR  Take 1 tablet (50 mg total) by mouth once daily.     SOAK ([employer] tool[employer]) REMOTE CONTROL MISC  No Sig Provided     melatonin 3 mg Tab  Take 5 mg by mouth every evening. 10 MG NIGHTLY     oxyCODONE 15 MG Tab  Commonly known as:  ROXICODONE  Take 1 tablet (15 mg total) by mouth every 6 (six) hours as needed for Pain.     pantoprazole 40 MG tablet  Commonly known as:  PROTONIX  TAKE ONE TABLET BY MOUTH ONCE DAILY     polymyxin B sulf-trimethoprim 10,000 unit- 1 mg/mL Drop  Commonly known as:   POLYTRIM  Place 1 drop into both eyes every 4 (four) hours.     PROLIA SUBQ  Inject into the skin every 6 (six) months.     ranitidine 300 MG tablet  Commonly known as:  ZANTAC  TAKE ONE TABLET BY MOUTH IN THE EVENING     torsemide 20 MG Tab  Commonly known as:  DEMADEX  1 tab every other day then 2 tab every day     VITAMIN B-12 5,000 mcg Subl  Generic drug:  cyanocobalamin (vitamin B-12)  Place 5,000 mg under the tongue once a week.         * This list has 2 medication(s) that are the same as other medications prescribed for you. Read the directions carefully, and ask your doctor or other care provider to review them with you.              Discharge Procedure Orders   Ice to affected area     Call MD for:  temperature >100.4     Call MD for:  severe uncontrolled pain     Call MD for:  redness, tenderness, or signs of infection (pain, swelling, redness, odor or green/yellow discharge around incision site)     Remove dressing in 48 hours     Change dressing (specify)   Order Comments: Dressing change: One time per day using Waterproof Bandaids.     Follow-up Information     Larry Molina MD In 2 weeks.    Specialties:  Sports Medicine, Orthopedic Surgery  Contact information:  [address] [place] [address] DR Neeta MEADOWS [id number] [phone]                   Discharge Procedure Orders (must include Diet, Follow-up, Activity):   Discharge Procedure Orders (must include Diet, Follow-up, Activity)   Ice to affected area     Call MD for:  temperature >100.4     Call MD for:  severe uncontrolled pain     Call MD for:  redness, tenderness, or signs of infection (pain, swelling, redness, odor or green/yellow discharge around incision site)     Remove dressing in 48 hours     Change dressing (specify)   Order Comments: Dressing change: One time per day using Waterproof Bandaids.

## 2018-09-03 NOTE — PROGRESS NOTES
Subjective:       Patient ID: Fifi Mcnair is a 67 y.o. female.    Chief Complaint: Chronic Kidney Disease; Diabetes; and Knee Pain    OA generalized.  Knee Arthroscopy Follow-up: Patient here for Pre Op for right knee arthroscopic surgery. Findings for surgery: medial meniscus tear. Pain is controlled with current analgesics. Medications being used: narcotic analgesics including hydrocodone/acetaminophen (Lorcet, Lortab, Norco, Vicodin). The patient denies  fever, wound drainage, increasing redness, pus, increasing pain, increasing swelling.   She has well controlled diabetes.  Copd stable.  No hx of Gi bleeding  Charles hx allergies of anesthesia  No blood transfution.      Review of Systems   Constitutional: Negative for fatigue and unexpected weight change.   Respiratory: Negative for chest tightness and shortness of breath.    Cardiovascular: Negative for chest pain.   Gastrointestinal: Negative for abdominal pain.   Genitourinary: Negative for menstrual problem.   Musculoskeletal: Positive for arthralgias, back pain and joint swelling.   Psychiatric/Behavioral: Negative for dysphoric mood.       Patient Active Problem List   Diagnosis    S/P LASIK (laser assisted in situ keratomileusis)    GERD (gastroesophageal reflux disease)    Acquired hypothyroidism    DM due to underlying condition with diabetic nephropathy, on Insulin    Conjunctival laceration - Left Eye    Leg pain, bilateral    Asthma, severe persistent, poorly-controlled    Chronic allergic rhinitis    Chronic diastolic heart failure, NYHA class 1    Nuclear sclerosis    Dry eye    Myopia with astigmatism and presbyopia    Morbid obesity with BMI of 40.0-44.9, adult, today 42.8    Peripheral edema    Anti-polysaccharide antibody deficiency    Controlled diabetes mellitus type 2 with complications    Insulin pump status    Bariatric surgery status, 12/2014    LBBB (left bundle branch block)    Osteoarthritis    COPD (chronic  obstructive pulmonary disease)    Vitreo-retinal adhesions    Vitreous hemorrhage, right eye    Posterior vitreous detachment, right eye    Moderate nonproliferative diabetic retinopathy of both eyes    Osteopenia with high risk of fracture    Symptomatic posterior vitreous detachment of left eye    Chronic low back pain    Sleep apnea, using CPAP 100%    Posterior vitreous detachment, left eye    Vitreous hemorrhage, left eye    Cardiovascular event risk, ASCVD 10-year risk 12.3%, 12/2015    CKD (chronic kidney disease) stage 3, GFR 30-59 ml/min    Hypertensive left ventricular hypertrophy, without heart failure    Irritable bowel syndrome with diarrhea    PUD (peptic ulcer disease)    Essential hypertension    Thrombocytosis    Adjustment insomnia    Other osteoporosis without current pathological fracture    History of pathological fracture due to osteoporosis    Intestinal metaplasia of gastric mucosa    Fibromyalgia    Generalized osteoarthritis    Spinal stenosis of lumbar region    Xerosis of skin    Synovial cyst of right popliteal space    Sedentary lifestyle    Gross hematuria    Primary osteoarthritis of right knee    Monoclonal gammopathy associated with lymphoplasmacytic dyscrasias    Arthritis of both knees       Objective:      Physical Exam   Constitutional: She is oriented to person, place, and time. She appears well-developed and well-nourished.   Cardiovascular: Normal rate, regular rhythm and normal heart sounds.   Pulses:       Dorsalis pedis pulses are 3+ on the right side, and 3+ on the left side.        Posterior tibial pulses are 3+ on the right side, and 3+ on the left side.   Pulmonary/Chest: Effort normal and breath sounds normal.   Musculoskeletal: She exhibits no edema.        Right knee: Tenderness found. Medial joint line tenderness noted.        Left knee: Tenderness found. Medial joint line tenderness noted.   Feet:   Right Foot:   Protective  Sensation: 6 sites tested. 3 sites sensed.   Skin Integrity: Negative for ulcer, blister or skin breakdown.   Left Foot:   Protective Sensation: 6 sites tested. 3 sites sensed.   Skin Integrity: Negative for ulcer, blister or skin breakdown.   Neurological: She is alert and oriented to person, place, and time.   Skin: Skin is warm and dry.   Psychiatric: She has a normal mood and affect.   Nursing note and vitals reviewed.    medical clear for surgery and anesthesia. Low risk for cardiac or pulmonary.  Lab Results   Component Value Date    WBC 7.40 08/02/2018    HGB 12.6 08/02/2018    HCT 38.0 08/02/2018     08/02/2018    CHOL 145 08/02/2018    TRIG 333 (H) 08/02/2018    HDL 37 (L) 08/02/2018    ALT 20 08/02/2018    AST 20 08/02/2018     08/02/2018    K 4.2 08/02/2018     08/02/2018    CREATININE 1.4 08/02/2018    BUN 12 08/02/2018    CO2 29 08/02/2018    TSH 1.789 08/02/2018    INR 1.1 12/30/2016    HGBA1C 8.3 (H) 08/02/2018     The 10-year ASCVD risk score (Radha JOSE Jr., et al., 2013) is: 25.8%    Values used to calculate the score:      Age: 67 years      Sex: Female      Is Non- : No      Diabetic: Yes      Tobacco smoker: No      Systolic Blood Pressure: 162 mmHg      Is BP treated: Yes      HDL Cholesterol: 37 mg/dL      Total Cholesterol: 145 mg/dL  CXR in May REBEKAH, ECHO in May EF normal LVH, Non sys dysfunction.  Assessment:       1. Acquired hypothyroidism    2. Edema extremities    3. Primary osteoarthritis of right knee    4. CKD (chronic kidney disease) stage 3, GFR 30-59 ml/min    5. Spinal stenosis of lumbar region without neurogenic claudication    6. Monoclonal gammopathy associated with lymphoplasmacytic dyscrasias    7. Diastolic dysfunction    8. CHF (congestive heart failure), NYHA class III, chronic, diastolic        Plan:       Acquired hypothyroidism  -     Ambulatory Referral to Pharmacy Assistance    Edema extremities  -     torsemide (DEMADEX) 20 MG  Tab; 1 tab every other day then 2 tab every day  Dispense: 60 tablet; Refill: 2  -     Ambulatory Referral to Pharmacy Assistance    Primary osteoarthritis of right knee  -     Ambulatory Referral to Pharmacy Assistance    CKD (chronic kidney disease) stage 3, GFR 30-59 ml/min  -     Ambulatory Referral to Pharmacy Assistance    Spinal stenosis of lumbar region without neurogenic claudication  -     Ambulatory Referral to Pharmacy Assistance    Monoclonal gammopathy associated with lymphoplasmacytic dyscrasias  -     Ambulatory Referral to Pharmacy Assistance    Diastolic dysfunction  -     Ambulatory Referral to Pharmacy Assistance  -     atenolol (TENORMIN) 25 MG tablet; Take 1 tablet (25 mg total) by mouth 2 (two) times daily.  Dispense: 180 tablet; Refill: 4    CHF (congestive heart failure), NYHA class III, chronic, diastolic  -     Ambulatory Referral to Pharmacy Assistance  -     atenolol (TENORMIN) 25 MG tablet; Take 1 tablet (25 mg total) by mouth 2 (two) times daily.  Dispense: 180 tablet; Refill: 4    Other orders  -     losartan (COZAAR) 50 MG tablet; Take 1 tablet (50 mg total) by mouth once daily.  Dispense: 90 tablet; Refill: 3  -     insulin aspart U-100 (NOVOLOG) 100 unit/mL injection; Use per insulin pump, 35-40 units daily.  Dispense: 40 mL; Refill: 0      Patient readiness: acceptance and barriers:social stressors    During the course of the visit the patient was educated and counseled about the following:     Diabetes:  Discussed general issues about diabetes pathophysiology and management.  Addressed ADA diet.  Suggested low cholesterol diet.  Encouraged aerobic exercise.  Discussed foot care.  Reminded to get yearly retinal exam.  Discussed ways to avoid symptomatic hypoglycemia.  Hypertension:   Regular aerobic exercise.  Obesity:   Regular aerobic exercise program discussed.  Medication: bulk-forming agents.    Goals: Diabetes: Maintain Hemoglobin A1C below 7, Hypertension: Reduce Blood  Pressure and Obesity: Reduce calorie intake and BMI    Did patient meet goals/outcomes: Yes    The following self management tools provided: blood pressure log  blood glucose log  excercise log    Patient Instructions (the written plan) was given to the patient/family.     Time spent with patient: 30 minutes    Barriers to medications present (yes )    Adverse reactions to current medications (yes)    Over the counter medications reviewed (Yes)        30-minute visit. 20 minutes spent counseling patient on diet, exercise, and weight loss.  This has been fully explained to the patient, who indicates understanding.

## 2018-09-05 RX ORDER — ALLOPURINOL 100 MG/1
TABLET ORAL
Qty: 180 TABLET | Refills: 4 | Status: SHIPPED | OUTPATIENT
Start: 2018-09-05 | End: 2019-09-08 | Stop reason: SDUPTHER

## 2018-09-06 NOTE — TELEPHONE ENCOUNTER
----- Message from Justina Shabazz sent at 9/6/2018  4:56 PM CDT -----  Contact: self [phone]  She is requesting an appt for tomorrow after 3 pm.  She has a blistery rash on her left heel that on 9/1, she went to an urgent care, but it is not getting any better.  Please call her about fitting her in with someone.  Thank you!

## 2018-09-10 ENCOUNTER — TELEPHONE (OUTPATIENT)
Dept: OPHTHALMOLOGY | Facility: CLINIC | Age: 68
End: 2018-09-10

## 2018-09-10 NOTE — TELEPHONE ENCOUNTER
----- Message from Joseph Avila, OD sent at 5/9/2018  1:28 PM CDT -----  No pic taken on right eye, left eye normal. RTc 2-4 mos for dilated eye exam.

## 2018-09-10 NOTE — TELEPHONE ENCOUNTER
Called patient to infrom her about her results and to schedule her appointment with MD for her next eye exam appointment she stated she was on the phone and will call back.

## 2018-09-16 DIAGNOSIS — M62.838 MUSCLE SPASM: ICD-10-CM

## 2018-09-17 ENCOUNTER — OFFICE VISIT (OUTPATIENT)
Dept: ORTHOPEDICS | Facility: CLINIC | Age: 68
End: 2018-09-17
Payer: MEDICARE

## 2018-09-17 ENCOUNTER — TELEPHONE (OUTPATIENT)
Dept: FAMILY MEDICINE | Facility: CLINIC | Age: 68
End: 2018-09-17

## 2018-09-17 VITALS
HEART RATE: 73 BPM | WEIGHT: 230 LBS | DIASTOLIC BLOOD PRESSURE: 63 MMHG | BODY MASS INDEX: 42.33 KG/M2 | SYSTOLIC BLOOD PRESSURE: 136 MMHG | HEIGHT: 62 IN

## 2018-09-17 DIAGNOSIS — M17.11 PRIMARY OSTEOARTHRITIS OF RIGHT KNEE: Primary | Chronic | ICD-10-CM

## 2018-09-17 PROCEDURE — 99024 POSTOP FOLLOW-UP VISIT: CPT | Mod: ,,, | Performed by: ORTHOPAEDIC SURGERY

## 2018-09-17 PROCEDURE — 99999 PR PBB SHADOW E&M-EST. PATIENT-LVL III: CPT | Mod: PBBFAC,,, | Performed by: ORTHOPAEDIC SURGERY

## 2018-09-17 PROCEDURE — 99213 OFFICE O/P EST LOW 20 MIN: CPT | Mod: PBBFAC,PN | Performed by: ORTHOPAEDIC SURGERY

## 2018-09-17 RX ORDER — LORAZEPAM 1 MG/1
TABLET ORAL
Qty: 45 TABLET | Refills: 2 | Status: SHIPPED | OUTPATIENT
Start: 2018-09-17 | End: 2019-04-05 | Stop reason: SDUPTHER

## 2018-09-17 NOTE — PROGRESS NOTES
Past Medical History:   Diagnosis Date    Allergy     multiple antibiotic allergies    Anemia     Anxiety     Arthritis     Asthma     CHF (congestive heart failure)     NYHA class III     Chronic kidney disease     Colon polyp     benign    COPD (chronic obstructive pulmonary disease)     DLCO 37% , and mild pulmonary HTN    Depression     Diabetes mellitus     Diabetic retinopathy     Diastolic dysfunction     Diverticulitis of large intestine without perforation or abscess without bleeding 2/12/2018    Diverticulosis     Former smoker     Fracture of lumbar spine     GERD (gastroesophageal reflux disease)     Hernia of unspecified site of abdominal cavity without mention of obstruction or gangrene     Hyperlipidemia     Hypertension     hypertensive renal    IBS (irritable bowel syndrome)     Mild nonproliferative diabetic retinopathy(362.04) 11/25/2013    Morbid obesity     Nuclear sclerosis 11/25/2013    Osteoporosis     Peripheral edema     Rash     Recurrent upper respiratory infection (URI)     S/P LASIK (laser assisted in situ keratomileusis)     Sleep apnea     sleep apnea uses bipap.    Stroke     Thyroid disease     on synthroid    TIA (transient ischemic attack)     Urinary tract infection        Past Surgical History:   Procedure Laterality Date    ABDOMINAL SURGERY      ADENOIDECTOMY      ARTHROSCOPIC CHONDROPLASTY OF KNEE JOINT Right 8/31/2018    Procedure: ARTHROSCOPY, KNEE, WITH CHONDROPLASTY;  Surgeon: Larry Molina MD;  Location: [facility] OR;  Service: Orthopedics;  Laterality: Right;  Tricompartmental chondroplasty    ARTHROSCOPY, KNEE, WITH CHONDROPLASTY Right 8/31/2018    Performed by Larry Molina MD at [facility] OR    ARTHROSCOPY, KNEE, WITH MENISCECTOMY Right 8/31/2018    Performed by Larry [facility]ina MD at [facility] OR    COLONOSCOPY  03/05/2013    repeat in 5 years    COLONOSCOPY N/A 5/31/2017    Procedure: COLONOSCOPY;  Surgeon: Luis  MU Navarro MD;  Location: [facility];  Service: Endoscopy;  Laterality: N/A;    COLONOSCOPY N/A 4/11/2018    Procedure: COLONOSCOPY;  Surgeon: Luis Navarro MD;  Location: [facility];  Service: Endoscopy;  Laterality: N/A;    COLONOSCOPY N/A 4/11/2018    [facility] by Luis [facility] at [facility]    COLONOSCOPY N/A 5/31/2017    [facility] by Luis [facility] at [facility]    COLONOSCOPY N/A 3/5/2013    [facility] by Florian Randall [facility] at [facility]    COSMETIC SURGERY      belt abdominoplasty    CYSTOSCOPY      CYSTOSCOPY N/A 8/6/2018    Procedure: CYSTOSCOPY;  Surgeon: Angy Campos MD;  Location: [facility] OR;  Service: Urology;  Laterality: N/A;    CYSTOSCOPY N/A 8/6/2018    Performed by Angy Campos [facility] at [facility]    EGD (ESOPHAGOGASTRODUODENOSCOPY) N/A 3/5/2013    Per[facility] by Floiran Randall [facility] at [facility] ENDO    ESOPHAGOGASTRODUODENOSCOPY (EGD) N/A 1/11/2018    Performed by Luis [facility]arro [facility] at [facility] END[facility]    ESOPHAGOGASTRODUODENOSCOPY (EGD) N/A 12/27/2016    Performed by Luis Navarro MD at [facility]    ESOPHAGOGASTRODUODENOSCOPY (EGD) N/A 10/25/2016    Performed by Luis [facility]arro [facility] at [facility] END[facility]    ESOPHAGOGASTRODUODENOSCOPY (EGD) N/A 8/24/2016    Performed by Luis Navarro MD at [facility] END[facility]    ESOPHAGOGASTRODUODENOSCOPY (EGD) N/A 7/21/2016    Performed by Florian Randall MD at [facility] END[facility]    ESOPHAGOGASTRODUODENOSCOPY (EGD)-07467 N/A 12/12/2014    [facility]formed by [facility] at [facility] OR 2ND FLR    EYE SURGERY Right     mazzulla    GASTRECTOMY      GASTRECTOMY-SLEEVE-LAPAROSCOPIC-41201; EGD-[id number] N/A 12/12/2014    Performed by [facility] at [facility] OR 2ND FLR    gastric sleeve      gastric sleeve      HERNIA REPAIR      5 years old    HYSTERECTOMY      ovaries remain    KNEE ARTHROSCOPY W/ MENISCECTOMY Right 8/31/2018    Procedure: ARTHROSCOPY, KNEE, WITH MENISCECTOMY;  Surgeon: Larry Molina MD;  Location: [facility]  OR;  Service: Orthopedics;  Laterality: Right;    PANNICULECTOMY N/A 11/28/2016    [facility] by [facility] at [facility] 2ND [facility]    REFRACTIVE SURGERY      mono va//ou//    REPAIR-HERNIA  11/28/2016    [facility] by [facility] at [facility] OR 2ND FLR    RHINOPLASTY TIP      TONSILLECTOMY      TUBAL LIGATION      UPPER GASTROINTESTINAL ENDOSCOPY  03/05/2013    UPPER GASTROINTESTINAL ENDOSCOPY  08/24/2016    Dr. Veras, repeat in 8 weeks    UPPER GASTROINTESTINAL ENDOSCOPY  07/21/2016    Dr. Randall       Current Outpatient Medications   Medication Sig    albuterol-ipratropium 2.5mg-0.5mg/3mL (DUO-NEB) 0.5 mg-3 mg(2.5 mg base)/3 mL nebulizer solution Take 3 mLs by nebulization every 6 (six) hours as needed for Wheezing. Rescue    allopurinol (ZYLOPRIM) 100 MG tablet TAKE TWO TABLETS BY MOUTH AT BEDTIME    amitriptyline (ELAVIL) 50 MG tablet Take 1 tablet (50 mg total) by mouth every evening.    atenolol (TENORMIN) 25 MG tablet Take 1 tablet (25 mg total) by mouth 2 (two) times daily.    calcitRIOL (ROCALTROL) 0.25 MCG Cap Take 1 capsule by mouth twice a week. Monday Friday    CALCIUM CITRATE/VITAMIN D3 (CALCIUM CITRATE + D ORAL) Take 400 mg by mouth 3 (three) times daily.    cetirizine (ZYRTEC) 10 MG tablet Take 1 tablet (10 mg total) by mouth once daily.    clotrimazole-betamethasone 1-0.05% (LOTRISONE) cream     cyanocobalamin, vitamin B-12, (VITAMIN B-12) 5,000 mcg Subl Place 5,000 mg under the tongue once a week.    DENOSUMAB (PROLIA SUBQ) Inject into the skin every 6 (six) months.     DIABETIC SUPPLIES,MISCELL (MEDTRONIC REMOTE CONTROL MISC) No Sig Provided    diphenoxylate-atropine 2.5-0.025 mg (LOMOTIL) 2.5-0.025 mg per tablet TAKE ONE TABLET BY MOUTH 4 TIMES DAILY AS NEEDED FOR DIARRHEA    DYMISTA 137-50 mcg/spray Spry nassal spray     fluoxetine (PROZAC) 20 MG capsule TAKE TWO CAPSULES BY MOUTH ONCE DAILY    fluticasone (FLONASE) 50 mcg/actuation nasal spray 2  sprays by Nasal route once daily.     fluticasone-vilanterol (BREO) 100-25 mcg/dose diskus inhaler Inhale 1 puff into the lungs once daily.    gabapentin (NEURONTIN) 100 MG capsule     insulin aspart U-100 (NOVOLOG) 100 unit/mL injection Use per insulin pump, 35-40 units daily.    KLOR-CON M20 20 mEq tablet TAKE ONE TABLET BY MOUTH TWICE DAILY    l-methylfolate-b2-b6-b12 (CEREFOLIN) 6-5-50-1 mg Tab Take 1 tablet by mouth once daily.    Lacto.acidophilus-Bif.animalis (PROBIOTIC) 5 billion cell CpSP Take 1 capsule by mouth once daily.    levothyroxine (SYNTHROID) 25 MCG tablet TAKE ONE TABLET BY MOUTH ONCE DAILY    LORazepam (ATIVAN) 1 MG tablet Take 1 tablet (1 mg total) by mouth every evening.    LORazepam (ATIVAN) 1 MG tablet TAKE 1 TABLET BY MOUTH EVERY 12 HOURS AS NEEDED FOR ANXIETY    losartan (COZAAR) 50 MG tablet Take 1 tablet (50 mg total) by mouth once daily.    melatonin 3 mg Tab Take 5 mg by mouth every evening. 10 MG NIGHTLY    MULTIVIT-IRON-MIN-FOLIC ACID 3,500-18-0.4 UNIT-MG-MG ORAL CHEW Take by mouth 2 (two) times daily.    oxyCODONE (ROXICODONE) 15 MG Tab Take 1 tablet (15 mg total) by mouth every 6 (six) hours as needed for Pain.    pantoprazole (PROTONIX) 40 MG tablet TAKE ONE TABLET BY MOUTH ONCE DAILY    polymyxin B sulf-trimethoprim (POLYTRIM) 10,000 unit- 1 mg/mL Drop Place 1 drop into both eyes every 4 (four) hours.    ranitidine (ZANTAC) 300 MG tablet TAKE ONE TABLET BY MOUTH IN THE EVENING    torsemide (DEMADEX) 20 MG Tab 1 tab every other day then 2 tab every day    estradiol (ESTRACE) 0.01 % (0.1 mg/gram) vaginal cream Place 1 g vaginally 3 (three) times a week. Once a day for first two weeks then 3x a week after     Current Facility-Administered Medications   Medication    gentamicin injection 80 mg     Facility-Administered Medications Ordered in Other Visits   Medication    lactated ringers infusion       Review of patient's allergies indicates:   Allergen Reactions     Adhesive Other (See Comments)     Blisters    Cleocin [clindamycin hcl] Hives    Ceclor [cefaclor] Hives    Advair diskus [fluticasone-salmeterol]      Dry mouth    Aleve [naproxen sodium]      Unable to take secondary to kidney function.     Erythromycin Hives    Levaquin [levofloxacin] Dermatitis    Macrobid [nitrofurantoin monohyd/m-cryst] Other (See Comments)     Stomach pain/ GI issues    Macrodantin [nitrofurantoin macrocrystalline] Hives    Motrin [ibuprofen]      Unable to take secondary to kidney functions.    Restoril [temazepam]      LIGHTHEADED UPON WAKING.with poor results    Sulfa (sulfonamide antibiotics)      Hold due to renal problems    Trazodone      PALPITATIONS    Remeron [mirtazapine] Palpitations and Other (See Comments)     Jittery    Vancomycin analogues Rash     Feet broke out/inflammed blood vessels         Family History   Problem Relation Age of Onset    Heart disease Mother 59    Cancer Mother 59        throat    Allergies Mother     Diabetes Mother     Heart disease Father 70        flutter    Allergies Father     Diabetes Father     Cancer Sister 22        22 thyroid,49  breast    Allergies Sister     Breast cancer Sister     Diabetes Sister     Cancer Brother 62        lung    Diabetes Brother     Asthma Daughter     Diabetes Daughter     Depression Daughter     Asthma Grandchild     Eczema Grandchild     Eczema Grandchild     Breast cancer Maternal Grandmother     Ovarian cancer Cousin     Amblyopia Neg Hx     Blindness Neg Hx     Cataracts Neg Hx     Glaucoma Neg Hx     Hypertension Neg Hx     Macular degeneration Neg Hx     Retinal detachment Neg Hx     Strabismus Neg Hx     Stroke Neg Hx     Thyroid disease Neg Hx     Angioedema Neg Hx     Immunodeficiency Neg Hx     Allergic rhinitis Neg Hx     Atopy Neg Hx     Rhinitis Neg Hx     Urticaria Neg Hx     Colon cancer Neg Hx     Colon polyps Neg Hx     Crohn's disease Neg Hx      Ulcerative colitis Neg Hx     Celiac disease Neg Hx        Social History     Socioeconomic History    Marital status:      Spouse name: Not on file    Number of children: Not on file    Years of education: Not on file    Highest education level: Not on file   Social Needs    Financial resource strain: Not on file    Food insecurity - worry: Not on file    Food insecurity - inability: Not on file    Transportation needs - medical: Not on file    Transportation needs - non-medical: Not on file   Occupational History    Not on file   Tobacco Use    Smoking status: Former Smoker     Types: Cigarettes    Smokeless tobacco: Never Used    Tobacco comment: quit 1990   Substance and Sexual Activity    Alcohol use: Yes     Comment: rare    Drug use: No    Sexual activity: Yes     Birth control/protection: Surgical   Other Topics Concern    Not on file   Social History Narrative    Not on file       Chief Complaint:   Chief Complaint   Patient presents with    Knee Pain     R knee s/p scope 8/31/18        Date of surgery:  August 31, 2018    History of present illness:  67-year-old who underwent right knee arthroscopy with partial medial meniscectomy.  Patient had moderate medial compartment arthritic changes.  Pain is a 5/10.  Feels better after the surgery. No wound issues.      Review of Systems:    Musculoskeletal:  See HPI        Physical Examination:    Vital Signs:    Vitals:    09/17/18 1052   BP: 136/63   Pulse: 73       Body mass index is 42.07 kg/m².    This a well-developed, well nourished patient in no acute distress.  They are alert and oriented and cooperative to examination.  Pt. walks without an antalgic gait.      Examination of the right knee shows well-healing surgical portals.  No erythema or drainage. No swelling. No calf pain. Negative Homans sign.    X-rays:  None     Assessment::  Status post right knee arthroscopy with partial medial meniscectomy    Plan:  I reviewed  the findings with her today. Gave her a post arthroscopy exercise guide.  I will see her back in 4 weeks.    This note was created using [employer] voice recognition software that occasionally misinterpreted phrases or words.

## 2018-09-17 NOTE — TELEPHONE ENCOUNTER
Please see attached message from patient. Appointment scheduled with podiatry on tomorrow's date (9-18-18). Patient agreed to appointment date and time.         ----- Message from Tanika Srivastava LPN sent at 9/14/2018  5:06 PM CDT -----      ----- Message -----  From: Christian Moser  Sent: 9/14/2018   1:45 PM  To: [facility] Staff    Type:  Sooner Apoointment Request    Caller is requesting a sooner appointment.  Caller declined first available appointment listed below.  Caller will not accept being placed on the waitlist and is requesting a message be sent to doctor.    Name of Caller:  Patient  When is the first available appointment?  10.1.18 - with anyone in [place]  Symptoms:  A reoccurring blister on foot, patient is diabetic  Best Call Back Number:  [phone], [phone]

## 2018-09-18 ENCOUNTER — HOSPITAL ENCOUNTER (OUTPATIENT)
Dept: RADIOLOGY | Facility: CLINIC | Age: 68
Discharge: HOME | End: 2018-09-18
Attending: PODIATRIST
Payer: MEDICARE

## 2018-09-18 ENCOUNTER — OFFICE VISIT (OUTPATIENT)
Dept: PODIATRY | Facility: CLINIC | Age: 68
End: 2018-09-18
Payer: MEDICARE

## 2018-09-18 VITALS — RESPIRATION RATE: 14 BRPM | WEIGHT: 239.88 LBS | BODY MASS INDEX: 44.14 KG/M2 | HEIGHT: 62 IN

## 2018-09-18 DIAGNOSIS — G89.29 CHRONIC HEEL PAIN, LEFT: ICD-10-CM

## 2018-09-18 DIAGNOSIS — M79.672 CHRONIC HEEL PAIN, LEFT: Primary | ICD-10-CM

## 2018-09-18 DIAGNOSIS — G89.29 CHRONIC HEEL PAIN, LEFT: Primary | ICD-10-CM

## 2018-09-18 DIAGNOSIS — M79.672 CHRONIC HEEL PAIN, LEFT: ICD-10-CM

## 2018-09-18 PROCEDURE — 73650 X-RAY EXAM OF HEEL: CPT | Mod: TC,FY,PO,LT

## 2018-09-18 PROCEDURE — 73650 X-RAY EXAM OF HEEL: CPT | Mod: 26,LT,S$GLB, | Performed by: RADIOLOGY

## 2018-09-18 PROCEDURE — 99213 OFFICE O/P EST LOW 20 MIN: CPT | Mod: PBBFAC,PO,25 | Performed by: PODIATRIST

## 2018-09-18 PROCEDURE — 3288F FALL RISK ASSESSMENT DOCD: CPT | Mod: ,,, | Performed by: PODIATRIST

## 2018-09-18 PROCEDURE — 99999 PR PBB SHADOW E&M-EST. PATIENT-LVL III: CPT | Mod: PBBFAC,,, | Performed by: PODIATRIST

## 2018-09-18 PROCEDURE — 99213 OFFICE O/P EST LOW 20 MIN: CPT | Mod: S$PBB,,, | Performed by: PODIATRIST

## 2018-09-18 PROCEDURE — 1100F PTFALLS ASSESS-DOCD GE2>/YR: CPT | Mod: ,,, | Performed by: PODIATRIST

## 2018-09-18 RX ORDER — SILVER SULFADIAZINE 10 G/1000G
CREAM TOPICAL
COMMUNITY
Start: 2018-09-08

## 2018-09-18 RX ORDER — MUPIROCIN 20 MG/G
OINTMENT TOPICAL
COMMUNITY
Start: 2018-09-03 | End: 2019-01-16 | Stop reason: SDUPTHER

## 2018-09-18 NOTE — LETTER
September 18, 2018      Werner Vargas MD  [address]  [place] [address]           [place] [facility]  [address]  [place] [address]  Phone: [phone]          Patient: Fifi Mcnair   MR Number: [id number]   YOB: 1950   Date of Visit: 9/18/2018       Dear Dr. Werner Vargas:    Thank you for referring Fifi Mcnair to me for evaluation. Attached you will find relevant portions of my assessment and plan of care.    If you have questions, please do not hesitate to call me. I look forward to following Fifi Mcnair along with you.    Sincerely,    Ari Armando, DPALONSO    Enclosure  CC:  No Recipients    If you would like to receive this communication electronically, please contact [email or web address] or [phone] to request more information on [employer] access.    For providers and/or their staff who would like to refer a patient to [facility], please contact us through our one-stop-shop provider referral line, [facility], at [phone].    If you feel you have received this communication in error or would no longer like to receive these types of communications, please e-mail [email or web address]

## 2018-09-18 NOTE — PROGRESS NOTES
Subjective:      Patient ID: Fifi Mcnair is a 67 y.o. female.    Chief Complaint: Diabetes Mellitus and Foot Pain (LEFT FOOT )    Pain, redness, blister left heel.  Gradual onset, worsening over past few weeks, aggravated by increased weight bearing, shoe gear, pressure.  Keflex at urgent care has improved the condition.  OTC pain med not helping.  Denies trauma, signs infection.    Review of Systems   Constitution: Negative for chills, diaphoresis, fever, malaise/fatigue and night sweats.   Cardiovascular: Negative for claudication, cyanosis, leg swelling and syncope.   Skin: Positive for poor wound healing and suspicious lesions. Negative for color change, dry skin, nail changes, rash and unusual hair distribution.   Musculoskeletal: Negative for falls, joint pain, joint swelling, muscle cramps, muscle weakness and stiffness.   Gastrointestinal: Negative for constipation, diarrhea, nausea and vomiting.   Neurological: Negative for brief paralysis, disturbances in coordination, focal weakness, numbness, paresthesias, sensory change and tremors.           Objective:      Physical Exam   Constitutional: She is oriented to person, place, and time. She appears well-developed and well-nourished. She is cooperative. No distress.   Cardiovascular:   Pulses:       Popliteal pulses are 2+ on the right side, and 2+ on the left side.        Dorsalis pedis pulses are 2+ on the right side, and 2+ on the left side.        Posterior tibial pulses are 2+ on the right side, and 2+ on the left side.   Capillary refill 3 seconds all toes/distal feet, all toes/both feet warm to touch.      Negative lymphadenopathy bilateral popliteal fossa and tarsal tunnel.      Negavie lower extremity edema bilateral.     Musculoskeletal:        Right ankle: She exhibits normal range of motion, no swelling, no ecchymosis, no deformity, no laceration and normal pulse. Achilles tendon normal. Achilles tendon exhibits no pain, no defect and normal  Zhou's test results.   Normal angle, base, station of gait. All ten toes without clubbing, cyanosis, or signs of ischemia.  No pain to palpation bilateral lower extremities.  Range of motion, stability, muscle strength, and muscle tone normal bilateral feet and legs.     Lymphadenopathy: No inguinal adenopathy noted on the right or left side.   Negative lymphadenopathy bilateral popliteal fossa and tarsal tunnel.    Negative lymphangitic streaking bilateral feet/ankles/legs.   Neurological: She is alert and oriented to person, place, and time. She has normal strength. She displays no atrophy and no tremor. A sensory deficit is present. She exhibits normal muscle tone. Gait normal.   Reflex Scores:       Patellar reflexes are 2+ on the right side and 2+ on the left side.       Achilles reflexes are 2+ on the right side and 2+ on the left side.  Diminished/loss of protective sensation all toes bilateral to 10 gram monofilament.    Decreased/absent vibratory sensation bilateral feet to 128Hz tuning fork.     Skin: Skin is warm, dry and intact. Capillary refill takes 2 to 3 seconds. No abrasion, no bruising, no burn, no ecchymosis, no laceration, no lesion and no rash noted. She is not diaphoretic. No cyanosis or erythema. No pallor. Nails show no clubbing.     Intact skin posterior left heel with mild resolving pressure erythema,  without ulceration, drainage, pus, tracking, fluctuance, malodor, or cardinal signs infection.    Otherwise, Skin is normal age and health appropriate color, turgor, texture, and temperature bilateral lower extremities without ulceration, hyperpigmentation, discoloration, masses nodules or cords palpated.  No ecchymosis, erythema, edema, or cardinal signs of infection bilateral lower extremities.     Psychiatric: She has a normal mood and affect.             Assessment:       No diagnosis found.      Plan:       There are no diagnoses linked to this encounter.  I counseled the patient on  her conditions, their implications and medical management.        Swabbed left heel with alcohol.    Offload all times nwb with multipodis boot -dispensed today- to prevent posterior pressure.    Discussed conservative treatment with shoes of adequate dimensions, material, and style to alleviate symptoms and delay or prevent surgical intervention.    Inspect feet multiple times daily for signs of occurrence/recurrence ulceration.    Finish keflex.    xrays          Follow-up in about 2 weeks (around 10/2/2018).

## 2018-09-19 ENCOUNTER — TELEPHONE (OUTPATIENT)
Dept: FAMILY MEDICINE | Facility: CLINIC | Age: 68
End: 2018-09-19

## 2018-09-19 NOTE — TELEPHONE ENCOUNTER
Spoke to Geoffrey with [facility] who states pharmacy received prescription for Lorazepam for patient from Dr. Vargas. Mr. Hale wanted to verify if office was aware patient is also receiving Oxycodone from Dr. Molina. Writer confirmed office has record that patient is taking both medications.

## 2018-09-19 NOTE — TELEPHONE ENCOUNTER
----- Message from Jenny Schmidt sent at 9/19/2018  1:01 PM CDT -----  Contact: [employer]  [employer] calling they have question about LORazepam (ATIVAN) 1 MG tablet that was just called in,would like a call back today at ...  [id number] - [place] - [address] [place] [address]  [address] [place] [address]  Phone: [phone] Fax: [phone]

## 2018-09-21 ENCOUNTER — LAB VISIT (OUTPATIENT)
Dept: LAB | Facility: HOSPITAL | Age: 68
End: 2018-09-21
Attending: INTERNAL MEDICINE
Payer: MEDICARE

## 2018-09-21 DIAGNOSIS — N39.0 URINARY TRACT INFECTION, SITE NOT SPECIFIED: ICD-10-CM

## 2018-09-21 DIAGNOSIS — E11.9 DIABETES MELLITUS WITHOUT COMPLICATION: ICD-10-CM

## 2018-09-21 DIAGNOSIS — R05.9 COUGH: ICD-10-CM

## 2018-09-21 DIAGNOSIS — I10 ESSENTIAL HYPERTENSION, MALIGNANT: ICD-10-CM

## 2018-09-21 DIAGNOSIS — I12.9 PARENCHYMAL RENAL HYPERTENSION: ICD-10-CM

## 2018-09-21 DIAGNOSIS — R60.9 EDEMA: ICD-10-CM

## 2018-09-21 DIAGNOSIS — E66.01 MORBID OBESITY: ICD-10-CM

## 2018-09-21 DIAGNOSIS — N18.30 CHRONIC KIDNEY DISEASE, STAGE III (MODERATE): Primary | ICD-10-CM

## 2018-09-21 LAB
ALBUMIN SERPL BCP-MCNC: 3.6 G/DL
ALBUMIN SERPL BCP-MCNC: 3.6 G/DL
ALP SERPL-CCNC: 108 U/L
ALT SERPL W/O P-5'-P-CCNC: 17 U/L
ANION GAP SERPL CALC-SCNC: 12 MMOL/L
ANION GAP SERPL CALC-SCNC: 12 MMOL/L
AST SERPL-CCNC: 22 U/L
BASOPHILS # BLD AUTO: 0 K/UL
BASOPHILS NFR BLD: 0.3 %
BILIRUB SERPL-MCNC: 0.5 MG/DL
BUN SERPL-MCNC: 16 MG/DL
BUN SERPL-MCNC: 16 MG/DL
CALCIUM SERPL-MCNC: 10.1 MG/DL
CALCIUM SERPL-MCNC: 10.1 MG/DL
CHLORIDE SERPL-SCNC: 99 MMOL/L
CHLORIDE SERPL-SCNC: 99 MMOL/L
CO2 SERPL-SCNC: 29 MMOL/L
CO2 SERPL-SCNC: 29 MMOL/L
CREAT SERPL-MCNC: 1.2 MG/DL
CREAT SERPL-MCNC: 1.2 MG/DL
DIFFERENTIAL METHOD: ABNORMAL
EOSINOPHIL # BLD AUTO: 0.2 K/UL
EOSINOPHIL NFR BLD: 3 %
ERYTHROCYTE [DISTWIDTH] IN BLOOD BY AUTOMATED COUNT: 13.8 %
EST. GFR  (AFRICAN AMERICAN): 54 ML/MIN/1.73 M^2
EST. GFR  (AFRICAN AMERICAN): 54 ML/MIN/1.73 M^2
EST. GFR  (NON AFRICAN AMERICAN): 47 ML/MIN/1.73 M^2
EST. GFR  (NON AFRICAN AMERICAN): 47 ML/MIN/1.73 M^2
GLUCOSE SERPL-MCNC: 178 MG/DL
GLUCOSE SERPL-MCNC: 178 MG/DL
HCT VFR BLD AUTO: 37.4 %
HGB BLD-MCNC: 12.6 G/DL
LYMPHOCYTES # BLD AUTO: 2 K/UL
LYMPHOCYTES NFR BLD: 30.9 %
MCH RBC QN AUTO: 31.4 PG
MCHC RBC AUTO-ENTMCNC: 33.6 G/DL
MCV RBC AUTO: 93 FL
MONOCYTES # BLD AUTO: 0.4 K/UL
MONOCYTES NFR BLD: 6.1 %
NEUTROPHILS # BLD AUTO: 3.9 K/UL
NEUTROPHILS NFR BLD: 59.7 %
PHOSPHATE SERPL-MCNC: 3.2 MG/DL
PLATELET # BLD AUTO: 255 K/UL
PMV BLD AUTO: 7.9 FL
POTASSIUM SERPL-SCNC: 4.3 MMOL/L
POTASSIUM SERPL-SCNC: 4.3 MMOL/L
PROT SERPL-MCNC: 7.3 G/DL
PTH-INTACT SERPL-MCNC: 43.7 PG/ML
RBC # BLD AUTO: 4.01 M/UL
SODIUM SERPL-SCNC: 140 MMOL/L
SODIUM SERPL-SCNC: 140 MMOL/L
WBC # BLD AUTO: 6.6 K/UL

## 2018-09-21 PROCEDURE — 36415 COLL VENOUS BLD VENIPUNCTURE: CPT

## 2018-09-21 PROCEDURE — 85025 COMPLETE CBC W/AUTO DIFF WBC: CPT

## 2018-09-21 PROCEDURE — 80053 COMPREHEN METABOLIC PANEL: CPT

## 2018-09-21 PROCEDURE — 83970 ASSAY OF PARATHORMONE: CPT

## 2018-09-21 PROCEDURE — 84100 ASSAY OF PHOSPHORUS: CPT

## 2018-10-09 ENCOUNTER — OFFICE VISIT (OUTPATIENT)
Dept: PODIATRY | Facility: CLINIC | Age: 68
End: 2018-10-09
Payer: MEDICARE

## 2018-10-09 VITALS
HEIGHT: 62 IN | SYSTOLIC BLOOD PRESSURE: 137 MMHG | DIASTOLIC BLOOD PRESSURE: 73 MMHG | HEART RATE: 73 BPM | WEIGHT: 237.63 LBS | BODY MASS INDEX: 43.73 KG/M2

## 2018-10-09 DIAGNOSIS — M79.672 CHRONIC HEEL PAIN, LEFT: Primary | ICD-10-CM

## 2018-10-09 DIAGNOSIS — L97.429 HEEL ULCER, LEFT, WITH UNSPECIFIED SEVERITY: ICD-10-CM

## 2018-10-09 DIAGNOSIS — E11.49 TYPE II DIABETES MELLITUS WITH NEUROLOGICAL MANIFESTATIONS: ICD-10-CM

## 2018-10-09 DIAGNOSIS — G89.29 CHRONIC HEEL PAIN, LEFT: Primary | ICD-10-CM

## 2018-10-09 PROCEDURE — 99999 PR PBB SHADOW E&M-EST. PATIENT-LVL III: CPT | Mod: PBBFAC,,, | Performed by: PODIATRIST

## 2018-10-09 PROCEDURE — 99213 OFFICE O/P EST LOW 20 MIN: CPT | Mod: PBBFAC,PO | Performed by: PODIATRIST

## 2018-10-09 PROCEDURE — 3078F DIAST BP <80 MM HG: CPT | Mod: ,,, | Performed by: PODIATRIST

## 2018-10-09 PROCEDURE — 99212 OFFICE O/P EST SF 10 MIN: CPT | Mod: S$PBB,,, | Performed by: PODIATRIST

## 2018-10-09 PROCEDURE — 1100F PTFALLS ASSESS-DOCD GE2>/YR: CPT | Mod: ,,, | Performed by: PODIATRIST

## 2018-10-09 PROCEDURE — 3075F SYST BP GE 130 - 139MM HG: CPT | Mod: ,,, | Performed by: PODIATRIST

## 2018-10-09 PROCEDURE — 3045F PR MOST RECENT HEMOGLOBIN A1C LEVEL 7.0-9.0%: CPT | Mod: ,,, | Performed by: PODIATRIST

## 2018-10-09 PROCEDURE — 3288F FALL RISK ASSESSMENT DOCD: CPT | Mod: ,,, | Performed by: PODIATRIST

## 2018-10-09 NOTE — PROGRESS NOTES
Reviewed resident note, exam and procedures were performed under my direct supervision.  Agree with note and care.  Discrepancies discussed.    No callus - only removed loose keratotic skin to prevent tears - no blood/open skin    Wearing boot only at night - recommend wear it all the time.    multipodis dispensed last week.

## 2018-10-10 ENCOUNTER — APPOINTMENT (OUTPATIENT)
Dept: LAB | Facility: HOSPITAL | Age: 68
End: 2018-10-10
Attending: UROLOGY
Payer: MEDICARE

## 2018-10-10 ENCOUNTER — TELEPHONE (OUTPATIENT)
Dept: UROLOGY | Facility: CLINIC | Age: 68
End: 2018-10-10

## 2018-10-10 ENCOUNTER — CLINICAL SUPPORT (OUTPATIENT)
Dept: UROLOGY | Facility: CLINIC | Age: 68
End: 2018-10-10
Payer: MEDICARE

## 2018-10-10 DIAGNOSIS — R31.0 GROSS HEMATURIA: Primary | ICD-10-CM

## 2018-10-10 DIAGNOSIS — R82.998 CELLS AND CASTS IN URINE: Primary | ICD-10-CM

## 2018-10-10 LAB
BACTERIA #/AREA URNS HPF: NORMAL /HPF
BILIRUB UR QL STRIP: NEGATIVE
CLARITY UR: CLEAR
COLOR UR: YELLOW
GLUCOSE UR QL STRIP: ABNORMAL
HGB UR QL STRIP: NEGATIVE
KETONES UR QL STRIP: ABNORMAL
LEUKOCYTE ESTERASE UR QL STRIP: NEGATIVE
MICROSCOPIC COMMENT: NORMAL
NITRITE UR QL STRIP: POSITIVE
PH UR STRIP: 7 [PH] (ref 5–8)
PROT UR QL STRIP: NEGATIVE
SP GR UR STRIP: 1.01 (ref 1–1.03)
URN SPEC COLLECT METH UR: ABNORMAL
UROBILINOGEN UR STRIP-ACNC: 1 EU/DL
WBC #/AREA URNS HPF: 2 /HPF (ref 0–5)

## 2018-10-10 PROCEDURE — 99211 OFF/OP EST MAY X REQ PHY/QHP: CPT | Mod: PBBFAC,PN

## 2018-10-10 PROCEDURE — 88112 CYTOPATH CELL ENHANCE TECH: CPT | Performed by: PATHOLOGY

## 2018-10-10 PROCEDURE — 99499 UNLISTED E&M SERVICE: CPT | Mod: S$PBB,,, | Performed by: UROLOGY

## 2018-10-10 PROCEDURE — 87086 URINE CULTURE/COLONY COUNT: CPT

## 2018-10-10 PROCEDURE — 99999 PR PBB SHADOW E&M-EST. PATIENT-LVL I: CPT | Mod: PBBFAC,,,

## 2018-10-10 PROCEDURE — 81000 URINALYSIS NONAUTO W/SCOPE: CPT

## 2018-10-10 NOTE — PROGRESS NOTES
Patient in clinic today with complaints of blood in the urine and possible uti. Patient states she was told by  if she seen blood in urine to come to clinic for catheterized urine sample.  previous note confirms this information. Urine sent for u/a and urine culture.

## 2018-10-10 NOTE — TELEPHONE ENCOUNTER
Patient walked in clinic today with complaints of blood in the urine and uti symptoms. Per Kristina note catheterized urine sample collected sent for u/a and urine culture  
Please see if they can add urine cytology   
Urine cytology added  
- - -

## 2018-10-11 ENCOUNTER — OFFICE VISIT (OUTPATIENT)
Dept: PULMONOLOGY | Facility: CLINIC | Age: 68
End: 2018-10-11
Payer: MEDICARE

## 2018-10-11 VITALS
HEART RATE: 74 BPM | WEIGHT: 239 LBS | BODY MASS INDEX: 43.98 KG/M2 | DIASTOLIC BLOOD PRESSURE: 80 MMHG | HEIGHT: 62 IN | SYSTOLIC BLOOD PRESSURE: 130 MMHG | OXYGEN SATURATION: 94 %

## 2018-10-11 DIAGNOSIS — E66.01 CLASS 3 SEVERE OBESITY WITH BODY MASS INDEX (BMI) OF 40.0 TO 44.9 IN ADULT, UNSPECIFIED OBESITY TYPE, UNSPECIFIED WHETHER SERIOUS COMORBIDITY PRESENT: ICD-10-CM

## 2018-10-11 DIAGNOSIS — J30.9 CHRONIC ALLERGIC RHINITIS: Primary | ICD-10-CM

## 2018-10-11 DIAGNOSIS — J45.40 MODERATE PERSISTENT ASTHMA WITHOUT COMPLICATION: ICD-10-CM

## 2018-10-11 PROBLEM — E66.9 OBESITY: Status: ACTIVE | Noted: 2018-10-11

## 2018-10-11 LAB — BACTERIA UR CULT: NORMAL

## 2018-10-11 PROCEDURE — 1101F PT FALLS ASSESS-DOCD LE1/YR: CPT | Mod: ,,, | Performed by: NURSE PRACTITIONER

## 2018-10-11 PROCEDURE — 99214 OFFICE O/P EST MOD 30 MIN: CPT | Mod: ,,, | Performed by: NURSE PRACTITIONER

## 2018-10-11 PROCEDURE — 3079F DIAST BP 80-89 MM HG: CPT | Mod: ,,, | Performed by: NURSE PRACTITIONER

## 2018-10-11 PROCEDURE — 3075F SYST BP GE 130 - 139MM HG: CPT | Mod: ,,, | Performed by: NURSE PRACTITIONER

## 2018-10-11 NOTE — PROGRESS NOTES
SUBJECTIVE:    Patient ID: Fifi Mcnair is a 67 y.o. female.    Chief Complaint: Follow-up and Asthma    HPI     Patient here today feeling well as far as breathing goes.  She is using Breo daily.  She has not had to use her rescue medication in a while.  She is using Dymista occasionally.  She sleeps on a CPAP every night faithfully but follows with another provider for it.  She has gained weight.    Past Medical History:   Diagnosis Date    Allergy     multiple antibiotic allergies    Anemia     Anxiety     Arthritis     Asthma     CHF (congestive heart failure)     NYHA class III     Chronic kidney disease     Colon polyp     benign    COPD (chronic obstructive pulmonary disease)     DLCO 37% , and mild pulmonary HTN    Depression     Diabetes mellitus     Diabetic retinopathy     Diastolic dysfunction     Diverticulitis of large intestine without perforation or abscess without bleeding 2/12/2018    Diverticulosis     Former smoker     Fracture of lumbar spine     GERD (gastroesophageal reflux disease)     Hernia of unspecified site of abdominal cavity without mention of obstruction or gangrene     Hyperlipidemia     Hypertension     hypertensive renal    IBS (irritable bowel syndrome)     Mild nonproliferative diabetic retinopathy(362.04) 11/25/2013    Morbid obesity     Nuclear sclerosis 11/25/2013    Osteoporosis     Peripheral edema     Rash     Recurrent upper respiratory infection (URI)     S/P LASIK (laser assisted in situ keratomileusis)     Sleep apnea     sleep apnea uses bipap.    Stroke     Thyroid disease     on synthroid    TIA (transient ischemic attack)     Urinary tract infection      Past Surgical History:   Procedure Laterality Date    ABDOMINAL SURGERY      ADENOIDECTOMY      ARTHROSCOPIC CHONDROPLASTY OF KNEE JOINT Right 8/31/2018    Procedure: ARTHROSCOPY, KNEE, WITH CHONDROPLASTY;  Surgeon: Larry Molina MD;  Location: [facility];  Service:  Orthopedics;  Laterality: Right;  Tricompartmental chondroplasty    ARTHROSCOPY, KNEE, WITH CHONDROPLASTY Right 8/31/2018    Performed by [facility] at [facility] OR    ARTHROSCOPY, KNEE, WITH MENISCECTOMY Right 8/31/2018    Performed by [facility]ina MD at [facility] OR    COLONOSCOPY  03/05/2013    repeat in 5 years    COLONOSCOPY N/A 5/31/2017    Procedure: COLONOSCOPY;  Surgeon: Luis Navarro MD;  Location: [facility];  Service: Endoscopy;  Laterality: N/A;    COLONOSCOPY N/A 4/11/2018    Procedure: COLONOSCOPY;  Surgeon: Luis Navarro MD;  Location: [facility];  Service: Endoscopy;  Laterality: N/A;    COLONOSCOPY N/A 4/11/2018    Performed by [facility] at [facility]    COLONOSCOPY N/A 5/31/2017    [facility] at [facility]    COLONOSCOPY N/A 3/5/2013    Performed by Florian Randall MD at [facility] ENDO    COSMETIC SURGERY      belt abdominoplasty    CYSTOSCOPY      CYSTOSCOPY N/A 8/6/2018    Procedure: CYSTOSCOPY;  Surgeon: Angy Campos MD;  Location: [facility] OR;  Service: Urology;  Laterality: N/A;    CYSTOSCOPY N/A 8/6/2018    Performed by Angy Campos MD at [facility] OR    EGD (ESOPHAGOGASTRODUODENOSCOPY) N/A 3/5/2013    Performed by Florian Randall MD at [facility] ENDO    ESOPHAGOGASTRODUODENOSCOPY (EGD) N/A 1/11/2018    Performed by [facility]arro [facility] at [facility] ENDO    ESOPHAGOGASTRODUODENOSCOPY (EGD) N/A 12/27/2016    Performed by [facility] at [facility] ENDO    ESOPHAGOGASTRODUODENOSCOPY (EGD) N/A 10/25/2016    Performed by [facility] at [facility] ENDO    ESOPHAGOGASTRODUODENOSCOPY (EGD) N/A 8/24/2016    Performed by [facility]arro MD at [facility] ENDO    ESOPHAGOGASTRODUODENOSCOPY (EGD) N/A 7/21/2016    Performed by Florian Randall MD at [facility] ENDO    ESOPHAGOGASTRODUODENOSCOPY (EGD)-[id number] N/A 12/12/2014    Performed by [facility]ong [facility] at [facility] OR [address] [place] [address]    EYE SURGERY Right     chris     GASTRECTOMY      GASTRECTOMY-SLEEVE-LAPAROSCOPIC-[id number]; EGD-[id number] N/A 12/12/2014    Performed by [facility] at [facility] OR 2ND FLR    gastric sleeve      HERNIA REPAIR      5 years old    HYSTERECTOMY      ovaries remain    KNEE ARTHROSCOPY W/ MENISCECTOMY Right 8/31/2018    Procedure: ARTHROSCOPY, KNEE, WITH MENISCECTOMY;  Surgeon: Larry Molina MD;  Location: [facility] OR;  Service: Orthopedics;  Laterality: Right;    PANNICULECTOMY N/A 11/28/2016    Performed by Christiano Becerril MD at [facility] OR 2ND FLR    REFRACTIVE SURGERY      mono va//ou//    REPAIR-HERNIA  11/28/2016    Performed by [facility]o [facility]erril [facility] at [facility] OR 2ND FLR    RHINOPLASTY TIP      TONSILLECTOMY      TUBAL LIGATION      UPPER GASTROINTESTINAL ENDOSCOPY  03/05/2013    UPPER GASTROINTESTINAL ENDOSCOPY  08/24/2016    Dr. Veras, repeat in 8 weeks    UPPER GASTROINTESTINAL ENDOSCOPY  07/21/2016    Dr. Randall     Social History     Tobacco Use    Smoking status: Former Smoker     Types: Cigarettes    Smokeless tobacco: Never Used    Tobacco comment: quit 1990   Substance Use Topics    Alcohol use: Yes     Comment: rare     Family History   Problem Relation Age of Onset    Heart disease Mother 59    Cancer Mother 59        throat    Allergies Mother     Diabetes Mother     Heart disease Father 70        flutter    Allergies Father     Diabetes Father     Cancer Sister 22        22 thyroid,49  breast    Allergies Sister     Breast cancer Sister     Diabetes Sister     Cancer Brother 62        lung    Diabetes Brother     Asthma Daughter     Diabetes Daughter     Depression Daughter     Asthma Grandchild     Eczema Grandchild     Eczema Grandchild     Breast cancer Maternal Grandmother     Ovarian cancer Cousin     Amblyopia Neg Hx     Blindness Neg Hx     Cataracts Neg Hx     Glaucoma Neg Hx     Hypertension Neg Hx     Macular degeneration Neg Hx     Retinal detachment Neg Hx      Strabismus Neg Hx     Stroke Neg Hx     Thyroid disease Neg Hx     Angioedema Neg Hx     Immunodeficiency Neg Hx     Allergic rhinitis Neg Hx     Atopy Neg Hx     Rhinitis Neg Hx     Urticaria Neg Hx     Colon cancer Neg Hx     Colon polyps Neg Hx     Crohn's disease Neg Hx     Ulcerative colitis Neg Hx     Celiac disease Neg Hx       Review of patient's allergies indicates:   Allergen Reactions    Adhesive Other (See Comments)     Blisters    Cleocin [clindamycin hcl] Hives    Ceclor [cefaclor] Hives    Advair diskus [fluticasone-salmeterol]      Dry mouth    Aleve [naproxen sodium]      Unable to take secondary to kidney function.     Erythromycin Hives    Levaquin [levofloxacin] Dermatitis    Macrobid [nitrofurantoin monohyd/m-cryst] Other (See Comments)     Stomach pain/ GI issues    Macrodantin [nitrofurantoin macrocrystalline] Hives    Motrin [ibuprofen]      Unable to take secondary to kidney functions.    Restoril [temazepam]      LIGHTHEADED UPON WAKING.with poor results    Sulfa (sulfonamide antibiotics)      Hold due to renal problems    Trazodone      PALPITATIONS    Remeron [mirtazapine] Palpitations and Other (See Comments)     Jittery    Vancomycin analogues Rash     Feet broke out/inflammed blood vessels         Current Outpatient Medications   Medication Sig Dispense Refill    albuterol-ipratropium 2.5mg-0.5mg/3mL (DUO-NEB) 0.5 mg-3 mg(2.5 mg base)/3 mL nebulizer solution Take 3 mLs by nebulization every 6 (six) hours as needed for Wheezing. Rescue 1 Box 3    allopurinol (ZYLOPRIM) 100 MG tablet TAKE TWO TABLETS BY MOUTH AT BEDTIME 180 tablet 4    amitriptyline (ELAVIL) 50 MG tablet Take 1 tablet (50 mg total) by mouth every evening. 30 tablet 11    atenolol (TENORMIN) 25 MG tablet Take 1 tablet (25 mg total) by mouth 2 (two) times daily. 180 tablet 4    calcitRIOL (ROCALTROL) 0.25 MCG Cap Take 1 capsule by mouth twice a week. Monday Friday      CALCIUM  CITRATE/VITAMIN D3 (CALCIUM CITRATE + D ORAL) Take 400 mg by mouth 3 (three) times daily.      cetirizine (ZYRTEC) 10 MG tablet Take 1 tablet (10 mg total) by mouth once daily. 1 Bottle 5    clotrimazole-betamethasone 1-0.05% (LOTRISONE) cream       cyanocobalamin, vitamin B-12, (VITAMIN B-12) 5,000 mcg Subl Place 5,000 mg under the tongue once a week.      DIABETIC SUPPLIES,MISCELL (MEDTRONIC REMOTE CONTROL MISC) No Sig Provided      diphenoxylate-atropine 2.5-0.025 mg (LOMOTIL) 2.5-0.025 mg per tablet TAKE ONE TABLET BY MOUTH 4 TIMES DAILY AS NEEDED FOR DIARRHEA 60 tablet 1    DYMISTA 137-50 mcg/spray Spry nassal spray       fluoxetine (PROZAC) 20 MG capsule TAKE TWO CAPSULES BY MOUTH ONCE DAILY 180 capsule 4    fluticasone (FLONASE) 50 mcg/actuation nasal spray 2 sprays by Nasal route once daily.       fluticasone-vilanterol (BREO) 100-25 mcg/dose diskus inhaler Inhale 1 puff into the lungs once daily.      gabapentin (NEURONTIN) 100 MG capsule       insulin aspart U-100 (NOVOLOG) 100 unit/mL injection Use per insulin pump, 35-40 units daily. 40 mL 0    KLOR-CON M20 20 mEq tablet TAKE ONE TABLET BY MOUTH TWICE DAILY 180 tablet 3    l-methylfolate-b2-b6-b12 (CEREFOLIN) 6-5-50-1 mg Tab Take 1 tablet by mouth once daily.      Lacto.acidophilus-Bif.animalis (PROBIOTIC) 5 billion cell CpSP Take 1 capsule by mouth once daily. 30 capsule 3    levothyroxine (SYNTHROID) 25 MCG tablet TAKE ONE TABLET BY MOUTH ONCE DAILY 90 tablet 3    LORazepam (ATIVAN) 1 MG tablet Take 1 tablet (1 mg total) by mouth every evening. 30 tablet 2    LORazepam (ATIVAN) 1 MG tablet TAKE 1 TABLET BY MOUTH EVERY 12 HOURS AS NEEDED FOR ANXIETY 45 tablet 2    losartan (COZAAR) 50 MG tablet Take 1 tablet (50 mg total) by mouth once daily. 90 tablet 3    MULTIVIT-IRON-MIN-FOLIC ACID 3,500-18-0.4 UNIT-MG-MG ORAL CHEW Take by mouth 2 (two) times daily.      mupirocin (BACTROBAN) 2 % ointment       oxyCODONE (ROXICODONE) 15 MG Tab  "Take 1 tablet (15 mg total) by mouth every 6 (six) hours as needed for Pain. 30 tablet 0    pantoprazole (PROTONIX) 40 MG tablet TAKE ONE TABLET BY MOUTH ONCE DAILY 90 tablet 3    polymyxin B sulf-trimethoprim (POLYTRIM) 10,000 unit- 1 mg/mL Drop Place 1 drop into both eyes every 4 (four) hours. 10 mL 0    ranitidine (ZANTAC) 300 MG tablet TAKE ONE TABLET BY MOUTH IN THE EVENING 30 tablet 1    SSD 1 % cream       torsemide (DEMADEX) 20 MG Tab 1 tab every other day then 2 tab every day 60 tablet 2    DENOSUMAB (PROLIA SUBQ) Inject into the skin every 6 (six) months.       estradiol (ESTRACE) 0.01 % (0.1 mg/gram) vaginal cream Place 1 g vaginally 3 (three) times a week. Once a day for first two weeks then 3x a week after 42.5 g 6    melatonin 3 mg Tab Take 5 mg by mouth every evening. 10 MG NIGHTLY       Current Facility-Administered Medications   Medication Dose Route Frequency Provider Last Rate Last Dose    gentamicin injection 80 mg  80 mg Intramuscular 1 time in Clinic/HOD Angy Campos MD         Facility-Administered Medications Ordered in Other Visits   Medication Dose Route Frequency Provider Last Rate Last Dose    lactated ringers infusion   Intravenous Continuous Shaheen Hanson MD 10 mL/hr at 08/31/18 0739           Last PFT: 06/05/2017  Last CT: 09/07/2017- pulmonary nodules have been stable since 2015    Review of Systems  General: Feeling Well.  Eyes: dry eyes  ENT:  Sinus congestion at times   Heart:: No chest pain or palpitations.  Lungs: No cough, sputum, or wheezing.  GI: No Nausea, vomiting, constipation, diarrhea, or reflux.  : No dysuria, hesitancy, or nocturia.  Skin: No lesions or rashes.  Musculoskeletal: knee pain   Neuro: headaches over the last   Lymph: No edema or adenopathy.  Psych: No anxiety or depression.  Endo: weight gain     OBJECTIVE:      /80 (BP Location: Left arm, Patient Position: Sitting)   Pulse 74   Ht 5' 2" (1.575 m)   Wt 108.4 kg (239 lb)  "  LMP  (LMP Unknown)   SpO2 (!) 94%   BMI 43.71 kg/m²     Physical Exam  GENERAL: Older patient in no distress.  HEENT: Pupils equal and reactive. Extraocular movements intact. Nose intact.      Pharynx moist.  NECK: Supple.   HEART: Regular rate and rhythm. No murmur or gallop auscultated.  LUNGS: Clear to auscultation and percussion. Lung excursion symmetrical. No change in fremitus. No adventitial noises.  ABDOMEN: Bowel sounds present. Non-tender, no masses palpated. Obese   : Normal anatomy.  EXTREMITIES: Normal muscle tone and joint movement, no cyanosis or clubbing.   LYMPHATICS: No adenopathy palpated, no edema.  SKIN: Dry, intact, no lesions.   NEURO: Cranial nerves II-XII intact. Motor strength 5/5 bilaterally, upper and lower extremities.  PSYCH: Appropriate affect.    Assessment:       1. Chronic allergic rhinitis    2. Moderate persistent asthma without complication    3. Class 3 severe obesity with body mass index (BMI) of 40.0 to 44.9 in adult, unspecified obesity type, unspecified whether serious comorbidity present          Plan:       Chronic allergic rhinitis    Moderate persistent asthma without complication    Class 3 severe obesity with body mass index (BMI) of 40.0 to 44.9 in adult, unspecified obesity type, unspecified whether serious comorbidity present    Continue your Breo (script given to send for [employer] assistance)  Dymista 1 puff to each nostril twice a day  Keep sleeping on your CPAP  Get your high dose flu shot  Try to lose weight  Call if you get sick      Follow-up in about 6 months (around 4/11/2019).

## 2018-10-11 NOTE — PATIENT INSTRUCTIONS
Asthma Trigger Checklist  Allergens, irritants, and other things may trigger your asthma. Check the box next to each of your triggers. After each trigger is a list of ways to avoid it.     Dust mites. Dust mites live in mattresses, bedding, carpets, curtains, and indoor dust.  · To kill dust mites, wash bedding in hot water (130°F) each week.  · Cover mattress and pillows with special dust-mite-proof cases.  · Dont use upholstered furniture like sofas or chairs in the bedroom.  · Use allergy-proof filters for air conditioners and furnaces. Replace or clean them as instructed.  · If you can, replace carpeting with wood or tile caleb, especially in the bedroom.    Animals. Animals with fur or feathers shed dander (allergens).  · Its best to choose a pet that doesnt have fur or feathers, such as a fish or a reptile.  · If you have pets, keep them off of your bed and out of your bedroom.  · Wash your hands and clothes after handling pets.      Mold. Mold grows in damp places, such as bathrooms, basements, and closets.  · Ask someone to clean damp areas in your home every week. Or try wearing a face mask while you clean.  · Run an exhaust fan while bathing. Or leave a window open in the bathroom.  · Repair water leaks in or around your home.  · Have someone else cut grass or rake leaves, if possible.  · Dont use vaporizers or humidifiers. They encourage mold growth.      Pollen. Pollen from trees, grasses, and weeds is a common allergen. (Flower pollens are generally not a problem).  · Try to learn what types of pollen affect you most. Pollen levels vary depending on the plant, the season, and the time of day.  · If possible, use air conditioning instead of opening the windows in your home or car.  · Have someone else do yard work, if possible.      Cockroaches. Roaches are found in many homes and produce allergens.  · Keep your kitchen clean and dry. A leaky faucet or drain can attract roaches.  · Remove  garbage from your home daily.  · Store food in tightly sealed containers. Wash dishes as soon as they are used.  · Use bait stations or traps to control roaches. Avoid using chemical sprays.      Smoke. Smoke may be from cigarettes, cigars, pipes, incense or candles, barbecues or grills, and fireplaces.  · Dont smoke. And dont let people smoke in your home or car.  · When you travel, ask for nonsmoking rental cars and hotel rooms.  · Avoid fireplaces and wood stoves. If you cant, sit away from them. Make sure the smoke is directed outside.  · Dont burn incense or use candles.  · Move away from smoky outdoor cooking grills.      Smog.  Smog is from car exhaust and other pollution.  · Read or listen to local air-quality reports. These let you know when air quality is poor.  · Stay indoors as much as you can on smoggy days. If possible, use air conditioning instead of opening the windows.  · In your car, set air conditioning to recirculate air, so less pollution gets in.      Strong odors. These include air fresheners, deodorizers, and cleaning products; perfume, deodorant, and other beauty products; incense and candles; and insect sprays and other sprays.  · Use scent-free products like deodorant or body lotion.  · Avoid using cleaning products with bleach and ammonia. Make your own cleaning solution with white vinegar, baking soda, or mild dish soap.  · Use exhaust fans while cooking. Or open a window, if possible.   · Avoid perfumes, air fresheners, potpourri, and other scented products.      Other irritants. These include dust, aerosol sprays, and powders.  · Wear a face mask while doing tasks like sanding, dusting, sweeping, and yard work. Open doors and windows if working indoors.  · Use pump spray bottles instead of aerosols.  · Pour liquid  onto a rag or cloth instead of spraying them.      Weather. Weather conditions can trigger symptoms or make them worse.  · Watch for very high or low  temperatures, very humid conditions, or a lot of wind, as these conditions can make symptoms worse.  · Limit outdoor activity during the type of weather that affects you.  · Wear a scarf over your mouth and nose in cold weather.      Colds, flu, and sinus infections. Upper respiratory infections can trigger asthma.  · Wash your hands often with soap and warm water or use a hand  containing alcohol.  · Get a yearly flu shot. And ask if you should get a pneumonia vaccine.  · Take care of your general health. Get plenty of sleep. And eat a variety of healthy foods.      Food additives. Food additives can trigger asthma flare-ups in some people.  · Check food labels for sulfites or other similar ingredients. These are often found in foods such as wine, beer, and dried fruits.  · Avoid foods that contain these additives.      Medicine. Aspirin, NSAIDS like ibuprofen and naproxen, and heart medicines like beta-blockers may be triggers.  · Tell your health care provider if you think certain medicines trigger symptoms.   · Be sure to read the labels on over-the-counter medicines. They may have ingredients that cause symptoms for you.     , Emotions. Laughing, crying, or feeling excited are triggers for some people.   · To help you stay calm: Try breathing in slowly through your nose for a count of 2 seconds. Close your lips and breathe out for 4 seconds. Repeat.  · Try to focus on a soothing image in your mind. This will help relax you and calm your breathing.  · Remember to take your daily controller medicines. When youre upset or under stress, its easy to forget.      Exercise. For some people, exercise can trigger symptoms. Dont let this keep you from being active.   · If you have not been exercising regularly, start slow and work up gradually.  · Take all of your medicines as prescribed.  · If you use quick-relief medicine, make sure you have it with you when you exercise.  · Stop if you have any  symptoms. Make sure you talk with your provider about these symptoms.  Date Last Reviewed: 1/1/2017  © 9397-9256 [employer] [address] [place] [address], [place] [address]. All rights reserved. This information is not intended as a substitute for professional medical care. Always follow your healthcare professional's instructions.      Continue your Breo (script given to send for [employer] assistance)  Dymista 1 puff to each nostril twice a day  Keep sleeping on your CPAP  Get your high dose flu shot  Try to lose weight  Call if you get sick

## 2018-10-15 ENCOUNTER — TELEPHONE (OUTPATIENT)
Dept: UROLOGY | Facility: CLINIC | Age: 68
End: 2018-10-15

## 2018-10-15 NOTE — TELEPHONE ENCOUNTER
Spoke with patient informed her urine culture is negative awaiting the rest of test results will call once available. Patient verbally voiced understanding.

## 2018-10-15 NOTE — TELEPHONE ENCOUNTER
----- Message from Constance Ventura sent at 10/15/2018  4:42 PM CDT -----  Type: Needs Medical Advice    Who Called:  Patient  Best Call Back Number: [phone]  Additional Information: Calling to get office to explain her test results for her urine. She does not understand. Please call with details.

## 2018-10-17 ENCOUNTER — LAB VISIT (OUTPATIENT)
Dept: LAB | Facility: HOSPITAL | Age: 68
End: 2018-10-17
Attending: INTERNAL MEDICINE
Payer: MEDICARE

## 2018-10-17 DIAGNOSIS — N17.9 ACUTE KIDNEY FAILURE, UNSPECIFIED: Primary | ICD-10-CM

## 2018-10-17 LAB
ALBUMIN SERPL BCP-MCNC: 3.6 G/DL
ANION GAP SERPL CALC-SCNC: 10 MMOL/L
BUN SERPL-MCNC: 18 MG/DL
CALCIUM SERPL-MCNC: 10.2 MG/DL
CHLORIDE SERPL-SCNC: 99 MMOL/L
CO2 SERPL-SCNC: 29 MMOL/L
CREAT SERPL-MCNC: 1.3 MG/DL
EST. GFR  (AFRICAN AMERICAN): 49 ML/MIN/1.73 M^2
EST. GFR  (NON AFRICAN AMERICAN): 43 ML/MIN/1.73 M^2
GLUCOSE SERPL-MCNC: 287 MG/DL
PHOSPHATE SERPL-MCNC: 2.9 MG/DL
POTASSIUM SERPL-SCNC: 4.4 MMOL/L
SODIUM SERPL-SCNC: 138 MMOL/L

## 2018-10-17 PROCEDURE — 80069 RENAL FUNCTION PANEL: CPT

## 2018-10-17 PROCEDURE — 36415 COLL VENOUS BLD VENIPUNCTURE: CPT

## 2018-10-18 ENCOUNTER — OFFICE VISIT (OUTPATIENT)
Dept: ORTHOPEDICS | Facility: CLINIC | Age: 68
End: 2018-10-18
Payer: MEDICARE

## 2018-10-18 ENCOUNTER — TELEPHONE (OUTPATIENT)
Dept: UROLOGY | Facility: CLINIC | Age: 68
End: 2018-10-18

## 2018-10-18 VITALS
SYSTOLIC BLOOD PRESSURE: 159 MMHG | WEIGHT: 239 LBS | HEART RATE: 81 BPM | DIASTOLIC BLOOD PRESSURE: 70 MMHG | HEIGHT: 62 IN | BODY MASS INDEX: 43.98 KG/M2

## 2018-10-18 DIAGNOSIS — M17.11 PRIMARY OSTEOARTHRITIS OF RIGHT KNEE: Primary | Chronic | ICD-10-CM

## 2018-10-18 PROCEDURE — 99213 OFFICE O/P EST LOW 20 MIN: CPT | Mod: PBBFAC,PN | Performed by: ORTHOPAEDIC SURGERY

## 2018-10-18 PROCEDURE — 99024 POSTOP FOLLOW-UP VISIT: CPT | Mod: ,,, | Performed by: ORTHOPAEDIC SURGERY

## 2018-10-18 PROCEDURE — 99999 PR PBB SHADOW E&M-EST. PATIENT-LVL III: CPT | Mod: PBBFAC,,, | Performed by: ORTHOPAEDIC SURGERY

## 2018-10-18 NOTE — TELEPHONE ENCOUNTER
Please ask her to make f/u with her gynecologist and also have her f/u with urology np for a vaginal exam with speculum and catheterized urine to see if she has blood In her cathterized urine     ===View-only below this line===    ----- Message -----  From: Magalie Roberson LPN  Sent: 10/18/2018   2:45 PM  To: Angy Campos MD    Patient informed of results. Please advise if all test are negative. Patient still having blood and she is not sure if its coming from bladder or vaginal area

## 2018-10-18 NOTE — PROGRESS NOTES
Past Medical History:   Diagnosis Date    Allergy     multiple antibiotic allergies    Anemia     Anxiety     Arthritis     Asthma     CHF (congestive heart failure)     NYHA class III     Chronic kidney disease     Colon polyp     benign    COPD (chronic obstructive pulmonary disease)     DLCO 37% , and mild pulmonary HTN    Depression     Diabetes mellitus     Diabetic retinopathy     Diastolic dysfunction     Diverticulitis of large intestine without perforation or abscess without bleeding 2/12/2018    Diverticulosis     Former smoker     Fracture of lumbar spine     GERD (gastroesophageal reflux disease)     Hernia of unspecified site of abdominal cavity without mention of obstruction or gangrene     Hyperlipidemia     Hypertension     hypertensive renal    IBS (irritable bowel syndrome)     Mild nonproliferative diabetic retinopathy(362.04) 11/25/2013    Morbid obesity     Nuclear sclerosis 11/25/2013    Osteoporosis     Peripheral edema     Rash     Recurrent upper respiratory infection (URI)     S/P LASIK (laser assisted in situ keratomileusis)     Sleep apnea     sleep apnea uses bipap.    Stroke     Thyroid disease     on synthroid    TIA (transient ischemic attack)     Urinary tract infection        Past Surgical History:   Procedure Laterality Date    ABDOMINAL SURGERY      ADENOIDECTOMY      ARTHROSCOPIC CHONDROPLASTY OF KNEE JOINT Right 8/31/2018    Procedure: ARTHROSCOPY, KNEE, WITH CHONDROPLASTY;  Surgeon: Larry Molina MD;  Location: [facility] OR;  Service: Orthopedics;  Laterality: Right;  Tricompartmental chondroplasty    ARTHROSCOPY, KNEE, WITH CHONDROPLASTY Right 8/31/2018    Performed by Larry [facility]ina MD at [facility] OR    ARTHROSCOPY, KNEE, WITH MENISCECTOMY Right 8/31/2018    Performed by Larry [facility] at [facility] OR    COLONOSCOPY  03/05/2013    repeat in 5 years    COLONOSCOPY N/A 5/31/2017    Procedure: COLONOSCOPY;  Surgeon: Luis  MU Navarro MD;  Location: [facility];  Service: Endoscopy;  Laterality: N/A;    COLONOSCOPY N/A 4/11/2018    Procedure: COLONOSCOPY;  Surgeon: Luis Navarro MD;  Location: [facility];  Service: Endoscopy;  Laterality: N/A;    COLONOSCOPY N/A 4/11/2018    Performed by [facility] at [facility]    COLONOSCOPY N/A 5/31/2017    [facility] by [facility] at [facility]    COLONOSCOPY N/A 3/5/2013    [facility] by Florian Randall [facility] at [facility]    COSMETIC SURGERY      belt abdominoplasty    CYSTOSCOPY      CYSTOSCOPY N/A 8/6/2018    Procedure: CYSTOSCOPY;  Surgeon: Angy Campos MD;  Location: [facility] OR;  Service: Urology;  Laterality: N/A;    CYSTOSCOPY N/A 8/6/2018    Performed by Angy Campos MD at [facility] OR    EGD (ESOPHAGOGASTRODUODENOSCOPY) N/A 3/5/2013    [facility]    ESOPHAGOGASTRODUODENOSCOPY (EGD) N/A 1/11/2018    [facility]    ESOPHAGOGASTRODUODENOSCOPY (EGD) N/A 12/27/2016    [facility]    ESOPHAGOGASTRODUODENOSCOPY (EGD) N/A 10/25/2016    [facility] END[facility]    ESOPHAGOGASTRODUODENOSCOPY (EGD) N/A 8/24/2016    [facility] END[facility]    ESOPHAGOGASTRODUODENOSCOPY (EGD) N/A 7/21/2016    [facility]    ESOPHAGOGASTRODUODENOSCOPY (EGD)-[id number] N/A 12/12/2014    [facility]    EYE SURGERY Right     mazzulla    GASTRECTOMY      GASTRECTOMY-SLEEVE-LAPAROSCOPIC-46252; EGD-[id number] N/A 12/12/2014    [facility]    gastric sleeve      HERNIA REPAIR      5 years old    HYSTERECTOMY      ovaries remain    KNEE ARTHROSCOPY W/ MENISCECTOMY Right 8/31/2018    Procedure: ARTHROSCOPY, KNEE, WITH MENISCECTOMY;  Surgeon: Larry Molina MD;  Location: [facility];  Service:  Orthopedics;  Laterality: Right;    PANNICULECTOMY N/A 11/28/2016    [facility]erril [facility]    REFRACTIVE SURGERY      mono va//ou//    REPAIR-HERNIA  11/28/2016    [facility]    RHINOPLASTY TIP      TONSILLECTOMY      TUBAL LIGATION      UPPER GASTROINTESTINAL ENDOSCOPY  03/05/2013    UPPER GASTROINTESTINAL ENDOSCOPY  08/24/2016    Dr. Veras, repeat in 8 weeks    UPPER GASTROINTESTINAL ENDOSCOPY  07/21/2016    Dr. Randall       Current Outpatient Medications   Medication Sig    albuterol-ipratropium 2.5mg-0.5mg/3mL (DUO-NEB) 0.5 mg-3 mg(2.5 mg base)/3 mL nebulizer solution Take 3 mLs by nebulization every 6 (six) hours as needed for Wheezing. Rescue    allopurinol (ZYLOPRIM) 100 MG tablet TAKE TWO TABLETS BY MOUTH AT BEDTIME    amitriptyline (ELAVIL) 50 MG tablet Take 1 tablet (50 mg total) by mouth every evening.    atenolol (TENORMIN) 25 MG tablet Take 1 tablet (25 mg total) by mouth 2 (two) times daily.    calcitRIOL (ROCALTROL) 0.25 MCG Cap Take 1 capsule by mouth twice a week. Monday Friday    CALCIUM CITRATE/VITAMIN D3 (CALCIUM CITRATE + D ORAL) Take 400 mg by mouth 3 (three) times daily.    cetirizine (ZYRTEC) 10 MG tablet Take 1 tablet (10 mg total) by mouth once daily.    clotrimazole-betamethasone 1-0.05% (LOTRISONE) cream     cyanocobalamin, vitamin B-12, (VITAMIN B-12) 5,000 mcg Subl Place 5,000 mg under the tongue once a week.    DENOSUMAB (PROLIA SUBQ) Inject into the skin every 6 (six) months.     DIABETIC SUPPLIES,MISCELL (MEDTRONIC REMOTE CONTROL MISC) No Sig Provided    diphenoxylate-atropine 2.5-0.025 mg (LOMOTIL) 2.5-0.025 mg per tablet TAKE ONE TABLET BY MOUTH 4 TIMES DAILY AS NEEDED FOR DIARRHEA    DYMISTA 137-50 mcg/spray Spry nassal spray     fluoxetine (PROZAC) 20 MG capsule TAKE TWO CAPSULES BY MOUTH ONCE DAILY    fluticasone (FLONASE) 50 mcg/actuation nasal spray 2 sprays by Nasal  route once daily.     fluticasone-vilanterol (BREO) 100-25 mcg/dose diskus inhaler Inhale 1 puff into the lungs once daily.    gabapentin (NEURONTIN) 100 MG capsule     insulin aspart U-100 (NOVOLOG) 100 unit/mL injection Use per insulin pump, 35-40 units daily.    KLOR-CON M20 20 mEq tablet TAKE ONE TABLET BY MOUTH TWICE DAILY    l-methylfolate-b2-b6-b12 (CEREFOLIN) 6-5-50-1 mg Tab Take 1 tablet by mouth once daily.    Lacto.acidophilus-Bif.animalis (PROBIOTIC) 5 billion cell CpSP Take 1 capsule by mouth once daily.    levothyroxine (SYNTHROID) 25 MCG tablet TAKE ONE TABLET BY MOUTH ONCE DAILY    LORazepam (ATIVAN) 1 MG tablet Take 1 tablet (1 mg total) by mouth every evening.    LORazepam (ATIVAN) 1 MG tablet TAKE 1 TABLET BY MOUTH EVERY 12 HOURS AS NEEDED FOR ANXIETY    losartan (COZAAR) 50 MG tablet Take 1 tablet (50 mg total) by mouth once daily.    melatonin 3 mg Tab Take 5 mg by mouth every evening. 10 MG NIGHTLY    MULTIVIT-IRON-MIN-FOLIC ACID 3,500-18-0.4 UNIT-MG-MG ORAL CHEW Take by mouth 2 (two) times daily.    mupirocin (BACTROBAN) 2 % ointment     oxyCODONE (ROXICODONE) 15 MG Tab Take 1 tablet (15 mg total) by mouth every 6 (six) hours as needed for Pain.    pantoprazole (PROTONIX) 40 MG tablet TAKE ONE TABLET BY MOUTH ONCE DAILY    polymyxin B sulf-trimethoprim (POLYTRIM) 10,000 unit- 1 mg/mL Drop Place 1 drop into both eyes every 4 (four) hours.    ranitidine (ZANTAC) 300 MG tablet TAKE ONE TABLET BY MOUTH IN THE EVENING    SSD 1 % cream     torsemide (DEMADEX) 20 MG Tab 1 tab every other day then 2 tab every day    estradiol (ESTRACE) 0.01 % (0.1 mg/gram) vaginal cream Place 1 g vaginally 3 (three) times a week. Once a day for first two weeks then 3x a week after     Current Facility-Administered Medications   Medication    gentamicin injection 80 mg     Facility-Administered Medications Ordered in Other Visits   Medication    lactated ringers infusion       Review of patient's  allergies indicates:   Allergen Reactions    Adhesive Other (See Comments)     Blisters    Cleocin [clindamycin hcl] Hives    Ceclor [cefaclor] Hives    Advair diskus [fluticasone-salmeterol]      Dry mouth    Aleve [naproxen sodium]      Unable to take secondary to kidney function.     Erythromycin Hives    Levaquin [levofloxacin] Dermatitis    Macrobid [nitrofurantoin monohyd/m-cryst] Other (See Comments)     Stomach pain/ GI issues    Macrodantin [nitrofurantoin macrocrystalline] Hives    Motrin [ibuprofen]      Unable to take secondary to kidney functions.    Restoril [temazepam]      LIGHTHEADED UPON WAKING.with poor results    Sulfa (sulfonamide antibiotics)      Hold due to renal problems    Trazodone      PALPITATIONS    Remeron [mirtazapine] Palpitations and Other (See Comments)     Jittery    Vancomycin analogues Rash     Feet broke out/inflammed blood vessels         Family History   Problem Relation Age of Onset    Heart disease Mother 59    Cancer Mother 59        throat    Allergies Mother     Diabetes Mother     Heart disease Father 70        flutter    Allergies Father     Diabetes Father     Cancer Sister 22        22 thyroid,49  breast    Allergies Sister     Breast cancer Sister     Diabetes Sister     Cancer Brother 62        lung    Diabetes Brother     Asthma Daughter     Diabetes Daughter     Depression Daughter     Asthma Grandchild     Eczema Grandchild     Eczema Grandchild     Breast cancer Maternal Grandmother     Ovarian cancer Cousin     Amblyopia Neg Hx     Blindness Neg Hx     Cataracts Neg Hx     Glaucoma Neg Hx     Hypertension Neg Hx     Macular degeneration Neg Hx     Retinal detachment Neg Hx     Strabismus Neg Hx     Stroke Neg Hx     Thyroid disease Neg Hx     Angioedema Neg Hx     Immunodeficiency Neg Hx     Allergic rhinitis Neg Hx     Atopy Neg Hx     Rhinitis Neg Hx     Urticaria Neg Hx     Colon cancer Neg Hx     Colon  polyps Neg Hx     Crohn's disease Neg Hx     Ulcerative colitis Neg Hx     Celiac disease Neg Hx        Social History     Socioeconomic History    Marital status:      Spouse name: Not on file    Number of children: Not on file    Years of education: Not on file    Highest education level: Not on file   Social Needs    Financial resource strain: Not on file    Food insecurity - worry: Not on file    Food insecurity - inability: Not on file    Transportation needs - medical: Not on file    Transportation needs - non-medical: Not on file   Occupational History    Not on file   Tobacco Use    Smoking status: Former Smoker     Types: Cigarettes    Smokeless tobacco: Never Used    Tobacco comment: quit 1990   Substance and Sexual Activity    Alcohol use: Yes     Comment: rare    Drug use: No    Sexual activity: Yes     Birth control/protection: Surgical   Other Topics Concern    Not on file   Social History Narrative    Not on file       Chief Complaint:   Chief Complaint   Patient presents with    Post-op Evaluation     s/p right knee scope 8/31/18        Date of surgery:  August 31, 2018    History of present illness:  67-year-old who underwent right knee arthroscopy with partial medial meniscectomy.  Patient had moderate medial compartment arthritic changes.  Pain is a 7/10.  Feels better after the surgery. She is able to walk with less pain but still having some issues.  Did have a couple falls.  Wearing a patellar tendon strap.      Review of Systems:    Musculoskeletal:  See HPI        Physical Examination:    Vital Signs:    Vitals:    10/18/18 1339   BP: (!) 159/70   Pulse: 81       Body mass index is 43.71 kg/m².    This a well-developed, well nourished patient in no acute distress.  They are alert and oriented and cooperative to examination.  Pt. walks without an antalgic gait.      Examination of the right knee shows healed surgical portals.  No erythema or drainage. No swelling.  No calf pain. Negative Homans sign.    X-rays:  None     Assessment::  Status post right knee arthroscopy with partial medial meniscectomy    Plan:  I recommended a compound pain cream.  She is wearing a patellar tendon strap.  I will see her back in 6 weeks.    This note was created using [employer] voice recognition software that occasionally misinterpreted phrases or words.

## 2018-10-18 NOTE — TELEPHONE ENCOUNTER
----- Message from Magalie Roberson LPN sent at 10/18/2018  2:45 PM CDT -----  Patient informed of results. Please advise if all test are negative. Patient still having blood and she is not sure if its coming from bladder or vaginal area

## 2018-10-19 NOTE — TELEPHONE ENCOUNTER
Spoke with patient informed her recommendations. Patient verbally voiced understanding and states she will call gyn doctor and keep appointment on 11/7 with urology np. Does not want sooner appointment with np

## 2018-10-23 ENCOUNTER — OFFICE VISIT (OUTPATIENT)
Dept: PODIATRY | Facility: CLINIC | Age: 68
End: 2018-10-23
Payer: MEDICARE

## 2018-10-23 VITALS
HEIGHT: 62 IN | SYSTOLIC BLOOD PRESSURE: 159 MMHG | DIASTOLIC BLOOD PRESSURE: 79 MMHG | RESPIRATION RATE: 13 BRPM | BODY MASS INDEX: 43.32 KG/M2 | WEIGHT: 235.44 LBS

## 2018-10-23 DIAGNOSIS — Z87.898 HISTORY OF ULCERATION: Primary | ICD-10-CM

## 2018-10-23 DIAGNOSIS — E11.49 TYPE II DIABETES MELLITUS WITH NEUROLOGICAL MANIFESTATIONS: ICD-10-CM

## 2018-10-23 PROCEDURE — 1101F PT FALLS ASSESS-DOCD LE1/YR: CPT | Mod: ,,, | Performed by: PODIATRIST

## 2018-10-23 PROCEDURE — 99212 OFFICE O/P EST SF 10 MIN: CPT | Mod: S$PBB,,, | Performed by: PODIATRIST

## 2018-10-23 PROCEDURE — 3078F DIAST BP <80 MM HG: CPT | Mod: ,,, | Performed by: PODIATRIST

## 2018-10-23 PROCEDURE — 3045F PR MOST RECENT HEMOGLOBIN A1C LEVEL 7.0-9.0%: CPT | Mod: ,,, | Performed by: PODIATRIST

## 2018-10-23 PROCEDURE — 99999 PR PBB SHADOW E&M-EST. PATIENT-LVL III: CPT | Mod: PBBFAC,,, | Performed by: PODIATRIST

## 2018-10-23 PROCEDURE — 3077F SYST BP >= 140 MM HG: CPT | Mod: ,,, | Performed by: PODIATRIST

## 2018-10-23 PROCEDURE — 99213 OFFICE O/P EST LOW 20 MIN: CPT | Mod: PBBFAC,PO | Performed by: PODIATRIST

## 2018-10-23 NOTE — PROGRESS NOTES
Subjective:      Patient ID: Fifi Mcnair is a 67 y.o. female.    Chief Complaint: Heel Pain (left foot heel )    Ulcer left heel closed last visit.  Inspecting daily without opening presently.  Offloading at rest in multipodis boot.  Ambulating today in slip on ked type shoes.    Review of Systems   Constitution: Negative for chills, diaphoresis, fever, malaise/fatigue and night sweats.   Cardiovascular: Negative for claudication, cyanosis, leg swelling and syncope.   Skin: Positive for poor wound healing. Negative for color change, dry skin, nail changes, rash, suspicious lesions and unusual hair distribution.   Musculoskeletal: Negative for falls, joint pain, joint swelling, muscle cramps, muscle weakness and stiffness.   Gastrointestinal: Negative for constipation, diarrhea, nausea and vomiting.   Neurological: Positive for sensory change. Negative for brief paralysis, disturbances in coordination, focal weakness, numbness, paresthesias and tremors.           Objective:      Physical Exam   Constitutional: She is oriented to person, place, and time. She appears well-developed and well-nourished. She is cooperative. No distress.   Cardiovascular:   Pulses:       Popliteal pulses are 2+ on the right side, and 2+ on the left side.        Dorsalis pedis pulses are 1+ on the right side, and 1+ on the left side.        Posterior tibial pulses are 1+ on the right side, and 1+ on the left side.   Capillary refill 3 seconds all toes/distal feet, all toes/both feet warm to touch.      Negative lymphadenopathy bilateral popliteal fossa and tarsal tunnel.      Negavie lower extremity edema bilateral.     Musculoskeletal:        Right ankle: She exhibits normal range of motion, no swelling, no ecchymosis, no deformity, no laceration and normal pulse. Achilles tendon normal. Achilles tendon exhibits no pain, no defect and normal Zhou's test results.   Normal angle, base, station of gait. All ten toes without clubbing,  cyanosis, or signs of ischemia.  No pain to palpation bilateral lower extremities.  Range of motion, stability, muscle strength, and muscle tone normal bilateral feet and legs.     Lymphadenopathy:   Negative lymphadenopathy bilateral popliteal fossa and tarsal tunnel.    Negative lymphangitic streaking bilateral feet/ankles/legs.   Neurological: She is alert and oriented to person, place, and time. She has normal strength. She displays no atrophy and no tremor. A sensory deficit is present. She exhibits normal muscle tone. Gait normal.   Reflex Scores:       Patellar reflexes are 2+ on the right side and 2+ on the left side.       Achilles reflexes are 2+ on the right side and 2+ on the left side.  Diminished/loss of protective sensation all toes bilateral to 10 gram monofilament.    Decreased/absent vibratory sensation bilateral feet to 128Hz tuning fork.     Skin: Skin is warm, dry and intact. Capillary refill takes 2 to 3 seconds. No abrasion, no bruising, no burn, no ecchymosis, no laceration, no lesion and no rash noted. She is not diaphoretic. No cyanosis or erythema. No pallor. Nails show no clubbing.   Wound posterior inferior left heel remains closed today, epithelialized  without ulceration, drainage, pus, tracking, fluctuance, malodor, or cardinal signs infection.    Otherwise, Skin is normal age and health appropriate color, turgor, texture, and temperature bilateral lower extremities without ulceration, hyperpigmentation, discoloration, masses nodules or cords palpated.  No ecchymosis, erythema, edema, or cardinal signs of infection bilateral lower extremities.     Psychiatric: She has a normal mood and affect.             Assessment:       Encounter Diagnoses   Name Primary?    History of ulceration Yes    Type II diabetes mellitus with neurological manifestations          Plan:       Fifi was seen today for heel pain.    Diagnoses and all orders for this visit:    History of ulceration    Type II  diabetes mellitus with neurological manifestations      I counseled the patient on her conditions, their implications and medical management.        Continue offloading with multipodis boot at rest.    Discussed conservative treatment with shoes of adequate dimensions, material, and style to alleviate symptoms and delay or prevent surgical intervention.    Inspect feet multiple times daily for signs of occurrence/recurrence ulceration.            Follow-up in about 1 year (around 10/23/2019).

## 2018-11-01 ENCOUNTER — LAB VISIT (OUTPATIENT)
Dept: LAB | Facility: HOSPITAL | Age: 68
End: 2018-11-01
Attending: INTERNAL MEDICINE
Payer: MEDICARE

## 2018-11-01 DIAGNOSIS — M85.852 OSTEOPENIA OF LEFT HIP: ICD-10-CM

## 2018-11-01 DIAGNOSIS — D47.2 MGUS (MONOCLONAL GAMMOPATHY OF UNKNOWN SIGNIFICANCE): ICD-10-CM

## 2018-11-01 LAB
BASOPHILS # BLD AUTO: 0.1 K/UL
BASOPHILS NFR BLD: 0.8 %
DIFFERENTIAL METHOD: ABNORMAL
EOSINOPHIL # BLD AUTO: 0.2 K/UL
EOSINOPHIL NFR BLD: 3 %
ERYTHROCYTE [DISTWIDTH] IN BLOOD BY AUTOMATED COUNT: 14.1 %
HCT VFR BLD AUTO: 36.7 %
HGB BLD-MCNC: 12.4 G/DL
LYMPHOCYTES # BLD AUTO: 2.1 K/UL
LYMPHOCYTES NFR BLD: 31.8 %
MCH RBC QN AUTO: 31.9 PG
MCHC RBC AUTO-ENTMCNC: 33.9 G/DL
MCV RBC AUTO: 94 FL
MONOCYTES # BLD AUTO: 0.4 K/UL
MONOCYTES NFR BLD: 5.9 %
NEUTROPHILS # BLD AUTO: 3.8 K/UL
NEUTROPHILS NFR BLD: 58.5 %
PLATELET # BLD AUTO: 237 K/UL
PMV BLD AUTO: 7.6 FL
RBC # BLD AUTO: 3.91 M/UL
WBC # BLD AUTO: 6.5 K/UL

## 2018-11-01 PROCEDURE — 83520 IMMUNOASSAY QUANT NOS NONAB: CPT

## 2018-11-01 PROCEDURE — 85025 COMPLETE CBC W/AUTO DIFF WBC: CPT

## 2018-11-01 PROCEDURE — 82523 COLLAGEN CROSSLINKS: CPT

## 2018-11-01 PROCEDURE — 83937 ASSAY OF OSTEOCALCIN: CPT

## 2018-11-02 LAB
COLLAGEN CTX SERPL-MCNC: 240 PG/ML
KAPPA LC SER QL IA: 4.01 MG/DL
KAPPA LC/LAMBDA SER IA: 1.64
LAMBDA LC SER QL IA: 2.44 MG/DL

## 2018-11-06 ENCOUNTER — OFFICE VISIT (OUTPATIENT)
Dept: HEMATOLOGY/ONCOLOGY | Facility: CLINIC | Age: 68
End: 2018-11-06
Payer: MEDICARE

## 2018-11-06 VITALS
SYSTOLIC BLOOD PRESSURE: 135 MMHG | DIASTOLIC BLOOD PRESSURE: 69 MMHG | BODY MASS INDEX: 43.37 KG/M2 | TEMPERATURE: 99 F | RESPIRATION RATE: 22 BRPM | HEIGHT: 62 IN | HEART RATE: 77 BPM | WEIGHT: 235.69 LBS

## 2018-11-06 DIAGNOSIS — Z79.4 CONTROLLED TYPE 2 DIABETES MELLITUS WITH COMPLICATION, WITH LONG-TERM CURRENT USE OF INSULIN: ICD-10-CM

## 2018-11-06 DIAGNOSIS — D47.2 MGUS (MONOCLONAL GAMMOPATHY OF UNKNOWN SIGNIFICANCE): Primary | ICD-10-CM

## 2018-11-06 DIAGNOSIS — N18.30 CKD (CHRONIC KIDNEY DISEASE) STAGE 3, GFR 30-59 ML/MIN: ICD-10-CM

## 2018-11-06 DIAGNOSIS — M85.80 OSTEOPENIA WITH HIGH RISK OF FRACTURE: ICD-10-CM

## 2018-11-06 DIAGNOSIS — D47.2 MONOCLONAL GAMMOPATHY ASSOCIATED WITH LYMPHOPLASMACYTIC DYSCRASIAS: Chronic | ICD-10-CM

## 2018-11-06 DIAGNOSIS — Z98.84 BARIATRIC SURGERY STATUS: ICD-10-CM

## 2018-11-06 DIAGNOSIS — E11.8 CONTROLLED TYPE 2 DIABETES MELLITUS WITH COMPLICATION, WITH LONG-TERM CURRENT USE OF INSULIN: ICD-10-CM

## 2018-11-06 PROCEDURE — 99214 OFFICE O/P EST MOD 30 MIN: CPT | Mod: S$GLB,,, | Performed by: INTERNAL MEDICINE

## 2018-11-06 PROCEDURE — 3075F SYST BP GE 130 - 139MM HG: CPT | Mod: S$GLB,,, | Performed by: INTERNAL MEDICINE

## 2018-11-06 PROCEDURE — 3078F DIAST BP <80 MM HG: CPT | Mod: S$GLB,,, | Performed by: INTERNAL MEDICINE

## 2018-11-06 PROCEDURE — 99999 PR PBB SHADOW E&M-EST. PATIENT-LVL III: CPT | Mod: PBBFAC,,, | Performed by: INTERNAL MEDICINE

## 2018-11-06 PROCEDURE — 3045F PR MOST RECENT HEMOGLOBIN A1C LEVEL 7.0-9.0%: CPT | Mod: S$GLB,,, | Performed by: INTERNAL MEDICINE

## 2018-11-06 PROCEDURE — 1101F PT FALLS ASSESS-DOCD LE1/YR: CPT | Mod: S$GLB,,, | Performed by: INTERNAL MEDICINE

## 2018-11-06 NOTE — PROGRESS NOTES
Subjective:       Patient ID: Fifi Mcnair is a 67 y.o. female.    Chief Complaint: I am here for lab results     HPI    This is a 67 yr old female tolerating prolia for osteoporosis.  She started prolia  for osteoporosis and now she has osteopenia. Pt has had a gastric sleeve in the past. She remains on B12 and iron supplement   Not on prolia started K 2 She is tolerating Synthroid for thyroid disorder and neurontin for neuropathy.  Past Labs revealed an elevated kappa light chain with elevated ratio.  Bone marrow showed no evidence of a plasma cell dyscrasia  Pt with history of sleeve gastrectomy contributing to malabsorption of certain vitamins  IgA remains elevated  as with the elevated kappa levels   She is tolerating allopurinol for gout prevention  She is tolerating Prozac for depression    Current Outpatient Medications:     albuterol-ipratropium 2.5mg-0.5mg/3mL (DUO-NEB) 0.5 mg-3 mg(2.5 mg base)/3 mL nebulizer solution, Take 3 mLs by nebulization every 6 (six) hours as needed for Wheezing. Rescue, Disp: 1 Box, Rfl: 3    allopurinol (ZYLOPRIM) 100 MG tablet, TAKE TWO TABLETS BY MOUTH AT BEDTIME, Disp: 180 tablet, Rfl: 4    amitriptyline (ELAVIL) 50 MG tablet, Take 1 tablet (50 mg total) by mouth every evening., Disp: 30 tablet, Rfl: 11    atenolol (TENORMIN) 25 MG tablet, Take 1 tablet (25 mg total) by mouth 2 (two) times daily., Disp: 180 tablet, Rfl: 4    calcitRIOL (ROCALTROL) 0.25 MCG Cap, Take 1 capsule by mouth twice a week. Monday Friday, Disp: , Rfl:     CALCIUM CITRATE/VITAMIN D3 (CALCIUM CITRATE + D ORAL), Take 400 mg by mouth 3 (three) times daily., Disp: , Rfl:     cetirizine (ZYRTEC) 10 MG tablet, Take 1 tablet (10 mg total) by mouth once daily., Disp: 1 Bottle, Rfl: 5    clotrimazole-betamethasone 1-0.05% (LOTRISONE) cream, , Disp: , Rfl:     cyanocobalamin, vitamin B-12, (VITAMIN B-12) 5,000 mcg Subl, Place 5,000 mg under the tongue once a week., Disp: , Rfl:     DENOSUMAB  (PROLIA SUBQ), Inject into the skin every 6 (six) months. , Disp: , Rfl:     DIABETIC SUPPLIES,MISCELL (MEDTRONIC REMOTE CONTROL MISC), No Sig Provided, Disp: , Rfl:     diphenoxylate-atropine 2.5-0.025 mg (LOMOTIL) 2.5-0.025 mg per tablet, TAKE ONE TABLET BY MOUTH 4 TIMES DAILY AS NEEDED FOR DIARRHEA, Disp: 60 tablet, Rfl: 1    DYMISTA 137-50 mcg/spray Collins nassal spray, , Disp: , Rfl:     fluoxetine (PROZAC) 20 MG capsule, TAKE TWO CAPSULES BY MOUTH ONCE DAILY, Disp: 180 capsule, Rfl: 4    fluticasone (FLONASE) 50 mcg/actuation nasal spray, 2 sprays by Nasal route once daily. , Disp: , Rfl:     estradiol (ESTRACE) 0.01 % (0.1 mg/gram) vaginal cream, Place 1 g vaginally 3 (three) times a week. Once a day for first two weeks then 3x a week after, Disp: 42.5 g, Rfl: 6    fluticasone-vilanterol (BREO) 100-25 mcg/dose diskus inhaler, Inhale 1 puff into the lungs once daily., Disp: , Rfl:     gabapentin (NEURONTIN) 100 MG capsule, , Disp: , Rfl:     insulin aspart U-100 (NOVOLOG) 100 unit/mL injection, Use per insulin pump, 35-40 units daily., Disp: 40 mL, Rfl: 0    KLOR-CON M20 20 mEq tablet, TAKE ONE TABLET BY MOUTH TWICE DAILY, Disp: 180 tablet, Rfl: 3    l-methylfolate-b2-b6-b12 (CEREFOLIN) 6-5-50-1 mg Tab, Take 1 tablet by mouth once daily., Disp: , Rfl:     Lacto.acidophilus-Bif.animalis (PROBIOTIC) 5 billion cell CpSP, Take 1 capsule by mouth once daily., Disp: 30 capsule, Rfl: 3    levothyroxine (SYNTHROID) 25 MCG tablet, TAKE ONE TABLET BY MOUTH ONCE DAILY, Disp: 90 tablet, Rfl: 3    LORazepam (ATIVAN) 1 MG tablet, Take 1 tablet (1 mg total) by mouth every evening., Disp: 30 tablet, Rfl: 2    LORazepam (ATIVAN) 1 MG tablet, TAKE 1 TABLET BY MOUTH EVERY 12 HOURS AS NEEDED FOR ANXIETY, Disp: 45 tablet, Rfl: 2    losartan (COZAAR) 50 MG tablet, Take 1 tablet (50 mg total) by mouth once daily., Disp: 90 tablet, Rfl: 3    melatonin 3 mg Tab, Take 5 mg by mouth every evening. 10 MG NIGHTLY, Disp: ,  Rfl:     MULTIVIT-IRON-MIN-FOLIC ACID 3,500-18-0.4 UNIT-MG-MG ORAL CHEW, Take by mouth 2 (two) times daily., Disp: , Rfl:     mupirocin (BACTROBAN) 2 % ointment, , Disp: , Rfl:     oxyCODONE (ROXICODONE) 15 MG Tab, Take 1 tablet (15 mg total) by mouth every 6 (six) hours as needed for Pain., Disp: 30 tablet, Rfl: 0    pantoprazole (PROTONIX) 40 MG tablet, TAKE ONE TABLET BY MOUTH ONCE DAILY, Disp: 90 tablet, Rfl: 3    polymyxin B sulf-trimethoprim (POLYTRIM) 10,000 unit- 1 mg/mL Drop, Place 1 drop into both eyes every 4 (four) hours., Disp: 10 mL, Rfl: 0    ranitidine (ZANTAC) 300 MG tablet, TAKE ONE TABLET BY MOUTH IN THE EVENING, Disp: 30 tablet, Rfl: 1    SSD 1 % cream, , Disp: , Rfl:     torsemide (DEMADEX) 20 MG Tab, 1 tab every other day then 2 tab every day, Disp: 60 tablet, Rfl: 2    Current Facility-Administered Medications:     gentamicin injection 80 mg, 80 mg, Intramuscular, 1 time in Clinic/HOD, Angy Campos MD    Facility-Administered Medications Ordered in Other Visits:     lactated ringers infusion, , Intravenous, Continuous, Shaheen Hanson MD, Last Rate: 10 mL/hr at 08/31/18 0739    No prolia at this time   All medications and past history have been reviewed.    Review of Systems   Constitutional: Negative for fatigue, fever and unexpected weight change.   HENT: Negative for ear discharge, mouth sores, rhinorrhea and sore throat.    Eyes: Negative for photophobia.   Respiratory: Negative for cough and shortness of breath.    Cardiovascular: Negative for chest pain and leg swelling.   Gastrointestinal: Negative for abdominal pain, constipation, diarrhea, nausea and vomiting.   Endocrine: Negative for cold intolerance and heat intolerance.   Genitourinary: Negative for dysuria, frequency, hematuria and urgency.   Musculoskeletal: Negative for arthralgias, back pain and neck pain.   Skin: Negative for pallor and rash.   Neurological: Negative for weakness, numbness and  "headaches.   Hematological: Negative for adenopathy. Does not bruise/bleed easily.   Psychiatric/Behavioral: Negative for agitation and confusion.   All other systems reviewed and are negative.       Pertinent positives are intermittent knee pain and fatigue  Depression screening negative on medication  Positive intermittent dysuria pelvic bloating and pain  Objective:        /69   Pulse 77   Temp 98.7 °F (37.1 °C)   Resp (!) 22   Ht 5' 2" (1.575 m)   Wt 106.9 kg (235 lb 10.8 oz)   LMP  (LMP Unknown)   BMI 43.10 kg/m²     Physical Exam   Constitutional: She is oriented to person, place, and time. She appears well-developed and well-nourished.   HENT:   Head: Normocephalic.   Mouth/Throat: Oropharynx is clear and moist. No oropharyngeal exudate.   Eyes: Conjunctivae are normal. No scleral icterus.   Neck: Normal range of motion. No JVD present. No tracheal deviation present.   Cardiovascular: Normal rate and regular rhythm.   No murmur (2= capillary refill) heard.  Pulmonary/Chest: Effort normal. No respiratory distress. She has no wheezes.   Abdominal: Soft. She exhibits no distension. Tenderness: midepigastric.   Musculoskeletal: Normal range of motion. She exhibits no edema.   Neurological: She is alert and oriented to person, place, and time. No cranial nerve deficit.   Steady gait   Skin: Skin is dry. No rash noted. No erythema.   Psychiatric: She has a normal mood and affect.   Vitals reviewed.    pain in knee better  no midepigastric tenderness although indicated above in the physical exam I cannot find where removed this from the template  Bone marrow with no evidence of plasma cell dyscrasia, no evidence of myelodysplasia.    I explained the meaning of B-cell monoclonality as well as T-cell expression.  I reviewed the percentage of cells noted in the marrow along with flow cytometry and cytogenetics  Skeletal survey negative for any lytic or ostial sclerotic disease  All labs and imaging have " been reviewed.   Lab Results   Component Value Date    WBC 6.50 11/01/2018    HGB 12.4 11/01/2018    HCT 36.7 (L) 11/01/2018    MCV 94 11/01/2018     11/01/2018         CMP  Sodium   Date Value Ref Range Status   10/17/2018 138 136 - 145 mmol/L Final     Potassium   Date Value Ref Range Status   10/17/2018 4.4 3.5 - 5.1 mmol/L Final     Chloride   Date Value Ref Range Status   10/17/2018 99 95 - 110 mmol/L Final     CO2   Date Value Ref Range Status   10/17/2018 29 23 - 29 mmol/L Final     Glucose   Date Value Ref Range Status   10/17/2018 287 (H) 70 - 110 mg/dL Final     BUN, Bld   Date Value Ref Range Status   10/17/2018 18 8 - 23 mg/dL Final     Creatinine   Date Value Ref Range Status   10/17/2018 1.3 0.5 - 1.4 mg/dL Final   08/31/2013 1.1 0.5 - 1.4 mg/dL Final     Calcium   Date Value Ref Range Status   10/17/2018 10.2 8.7 - 10.5 mg/dL Final   08/31/2013 9.5 8.7 - 10.5 mg/dL Final     Total Protein   Date Value Ref Range Status   09/21/2018 7.3 6.0 - 8.4 g/dL Final     Albumin   Date Value Ref Range Status   10/17/2018 3.6 3.5 - 5.2 g/dL Final     Total Bilirubin   Date Value Ref Range Status   09/21/2018 0.5 0.1 - 1.0 mg/dL Final     Comment:     For infants and newborns, interpretation of results should be based  on gestational age, weight and in agreement with clinical  observations.  Premature Infant recommended reference ranges:  Up to 24 hours.............<8.0 mg/dL  Up to 48 hours............<12.0 mg/dL  3-5 days..................<15.0 mg/dL  6-29 days.................<15.0 mg/dL       Alkaline Phosphatase   Date Value Ref Range Status   09/21/2018 108 55 - 135 U/L Final   08/30/2013 93 55 - 135 U/L Final     AST   Date Value Ref Range Status   09/21/2018 22 10 - 40 U/L Final   08/30/2013 21 10 - 40 U/L Final     ALT   Date Value Ref Range Status   09/21/2018 17 10 - 44 U/L Final     Anion Gap   Date Value Ref Range Status   10/17/2018 10 8 - 16 mmol/L Final   08/31/2013 12 5 - 15 meq/L Final      eGFR if    Date Value Ref Range Status   10/17/2018 49 (A) >60 mL/min/1.73 m^2 Final     eGFR if non    Date Value Ref Range Status   10/17/2018 43 (A) >60 mL/min/1.73 m^2 Final     Comment:     Calculation used to obtain the estimated glomerular filtration  rate (eGFR) is the CKD-EPI equation.          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  CKD stable   Ostia calcitonin and CT low peptide are stable  Pt no longer meets criteria for prolia due to a change in her density from osteoporosis to osteopenia  She believes she is in need of a knee replacement : cont calcium and vitamin D3  IGA  Elevated , kappa 4.01  Next dexa in Dec of 2019  Cont K 2     Sounds as if she is having interstitial cystitis  Increase elavil to twice a day wean up   Cont calcium and vitamin D  Cont prozac for depression  GFR stable for now and followed by Nephrology  Explained MGUS and the transition to myeloma is rare and less than 2% I also explained this could be a component of her low GFR  She cannot take oral bisphosphonate because of GI issues  No need for procrit despite anemia due to renal disease    RTC 3 months for evaluation.  She will call if she has side effects related to Elavil    The plan was discussed with the patient and all questions/concerns have been answered to the  patient's satisfaction.

## 2018-11-07 LAB — OSTEOCALCIN SERPL-MCNC: 15 NG/ML

## 2018-11-08 ENCOUNTER — PES CALL (OUTPATIENT)
Dept: ADMINISTRATIVE | Facility: CLINIC | Age: 68
End: 2018-11-08

## 2018-11-14 DIAGNOSIS — G57.01 NEUROPATHY OF RIGHT SCIATIC NERVE: ICD-10-CM

## 2018-11-14 DIAGNOSIS — M54.6 CHRONIC LEFT-SIDED THORACIC BACK PAIN: ICD-10-CM

## 2018-11-14 DIAGNOSIS — I89.0 LYMPHEDEMA OF RIGHT LOWER EXTREMITY: ICD-10-CM

## 2018-11-14 DIAGNOSIS — G89.29 CHRONIC LEFT-SIDED THORACIC BACK PAIN: ICD-10-CM

## 2018-11-14 NOTE — TELEPHONE ENCOUNTER
Patient notified that refill will be sent to Dr Green for 2 times a day as she stated in her note to [facility]

## 2018-11-14 NOTE — TELEPHONE ENCOUNTER
----- Message from Steff Norton sent at 11/14/2018  2:56 PM CST -----  Contact: Self  Type:  RX Refill Request    Who Called:  patient  Refill or New Rx:new script  RX Name and Strength:  amitriptyline (ELAVIL) 50 MG tablet   How is the patient currently taking it? (ex. 1XDay):  2XDay  Is this a 30 day or 90 day RX:  30  Preferred Pharmacy with phone number:    [facility] [address] [place], [place] - [address] [place] [address]  [place] [address]  Phone: [phone] Fax: [phone]  Local or Mail Order:  Local  Ordering Provider:  Dr Peter Paz Call Back Number: [phone] (home)   Additional Information:  Patient saw the doctor last week and she was suppose to send over the new script since she increase the dosage to 2XDay.  Thanks

## 2018-11-15 DIAGNOSIS — E11.9 TYPE 2 DIABETES MELLITUS WITHOUT COMPLICATION: ICD-10-CM

## 2018-11-15 RX ORDER — AMITRIPTYLINE HYDROCHLORIDE 50 MG/1
100 TABLET, FILM COATED ORAL NIGHTLY
Qty: 60 TABLET | Refills: 0 | Status: SHIPPED | OUTPATIENT
Start: 2018-11-15 | End: 2018-12-12 | Stop reason: SDUPTHER

## 2018-11-16 ENCOUNTER — PES CALL (OUTPATIENT)
Dept: ADMINISTRATIVE | Facility: CLINIC | Age: 68
End: 2018-11-16

## 2018-11-20 DIAGNOSIS — H10.10 ALLERGIC RHINOCONJUNCTIVITIS: ICD-10-CM

## 2018-11-20 DIAGNOSIS — J30.9 ALLERGIC RHINOCONJUNCTIVITIS: ICD-10-CM

## 2018-11-21 ENCOUNTER — LAB VISIT (OUTPATIENT)
Dept: LAB | Facility: HOSPITAL | Age: 68
End: 2018-11-21
Attending: INTERNAL MEDICINE
Payer: MEDICARE

## 2018-11-21 DIAGNOSIS — E55.9 AVITAMINOSIS D: ICD-10-CM

## 2018-11-21 DIAGNOSIS — E03.9 MYXEDEMA HEART DISEASE: ICD-10-CM

## 2018-11-21 DIAGNOSIS — I51.9 MYXEDEMA HEART DISEASE: ICD-10-CM

## 2018-11-21 DIAGNOSIS — E11.40 DIABETIC NEUROPATHY: Primary | ICD-10-CM

## 2018-11-21 LAB
25(OH)D3+25(OH)D2 SERPL-MCNC: 60 NG/ML
ALBUMIN SERPL BCP-MCNC: 3.4 G/DL
ALBUMIN/CREAT UR: 195.2 UG/MG
ALP SERPL-CCNC: 123 U/L
ALT SERPL W/O P-5'-P-CCNC: 24 U/L
ANION GAP SERPL CALC-SCNC: 11 MMOL/L
AST SERPL-CCNC: 29 U/L
BASOPHILS # BLD AUTO: 0 K/UL
BASOPHILS NFR BLD: 0.4 %
BILIRUB SERPL-MCNC: 0.4 MG/DL
BUN SERPL-MCNC: 11 MG/DL
CALCIUM SERPL-MCNC: 9.9 MG/DL
CHLORIDE SERPL-SCNC: 97 MMOL/L
CHOLEST SERPL-MCNC: 132 MG/DL
CHOLEST/HDLC SERPL: 3.6 {RATIO}
CO2 SERPL-SCNC: 29 MMOL/L
CREAT SERPL-MCNC: 1.3 MG/DL
CREAT UR-MCNC: 21 MG/DL
DIFFERENTIAL METHOD: ABNORMAL
EOSINOPHIL # BLD AUTO: 0.1 K/UL
EOSINOPHIL NFR BLD: 1.7 %
ERYTHROCYTE [DISTWIDTH] IN BLOOD BY AUTOMATED COUNT: 13.8 %
EST. GFR  (AFRICAN AMERICAN): 49 ML/MIN/1.73 M^2
EST. GFR  (NON AFRICAN AMERICAN): 43 ML/MIN/1.73 M^2
ESTIMATED AVG GLUCOSE: 223 MG/DL
GLUCOSE SERPL-MCNC: 162 MG/DL
HBA1C MFR BLD HPLC: 9.4 %
HCT VFR BLD AUTO: 35.7 %
HDLC SERPL-MCNC: 37 MG/DL
HDLC SERPL: 28 %
HGB BLD-MCNC: 12.2 G/DL
LDLC SERPL CALC-MCNC: 35.2 MG/DL
LYMPHOCYTES # BLD AUTO: 2 K/UL
LYMPHOCYTES NFR BLD: 27.3 %
MCH RBC QN AUTO: 32 PG
MCHC RBC AUTO-ENTMCNC: 34.1 G/DL
MCV RBC AUTO: 94 FL
MICROALBUMIN UR DL<=1MG/L-MCNC: 41 UG/ML
MONOCYTES # BLD AUTO: 0.4 K/UL
MONOCYTES NFR BLD: 5 %
NEUTROPHILS # BLD AUTO: 4.8 K/UL
NEUTROPHILS NFR BLD: 65.6 %
NONHDLC SERPL-MCNC: 95 MG/DL
PLATELET # BLD AUTO: 253 K/UL
PMV BLD AUTO: 7.4 FL
POTASSIUM SERPL-SCNC: 4 MMOL/L
PROT SERPL-MCNC: 6.9 G/DL
RBC # BLD AUTO: 3.8 M/UL
SODIUM SERPL-SCNC: 137 MMOL/L
T3 SERPL-MCNC: 80 NG/DL
T4 FREE SERPL-MCNC: 0.94 NG/DL
TRIGL SERPL-MCNC: 299 MG/DL
TSH SERPL DL<=0.005 MIU/L-ACNC: 2.93 UIU/ML
WBC # BLD AUTO: 7.3 K/UL

## 2018-11-21 PROCEDURE — 82043 UR ALBUMIN QUANTITATIVE: CPT

## 2018-11-21 PROCEDURE — 80061 LIPID PANEL: CPT

## 2018-11-21 PROCEDURE — 84480 ASSAY TRIIODOTHYRONINE (T3): CPT

## 2018-11-21 PROCEDURE — 84439 ASSAY OF FREE THYROXINE: CPT

## 2018-11-21 PROCEDURE — 82306 VITAMIN D 25 HYDROXY: CPT

## 2018-11-21 PROCEDURE — 84443 ASSAY THYROID STIM HORMONE: CPT

## 2018-11-21 PROCEDURE — 83036 HEMOGLOBIN GLYCOSYLATED A1C: CPT

## 2018-11-21 PROCEDURE — 85025 COMPLETE CBC W/AUTO DIFF WBC: CPT

## 2018-11-21 PROCEDURE — 80053 COMPREHEN METABOLIC PANEL: CPT

## 2018-11-21 PROCEDURE — 36415 COLL VENOUS BLD VENIPUNCTURE: CPT

## 2018-11-22 RX ORDER — PANTOPRAZOLE SODIUM 40 MG/1
TABLET, DELAYED RELEASE ORAL
Qty: 90 TABLET | Refills: 3 | Status: SHIPPED | OUTPATIENT
Start: 2018-11-22 | End: 2019-11-27 | Stop reason: SDUPTHER

## 2018-11-22 RX ORDER — LEVOTHYROXINE SODIUM 25 UG/1
TABLET ORAL
Qty: 90 TABLET | Refills: 3 | Status: SHIPPED | OUTPATIENT
Start: 2018-11-22 | End: 2019-11-11 | Stop reason: SDUPTHER

## 2018-11-22 RX ORDER — FLUTICASONE PROPIONATE 50 MCG
SPRAY, SUSPENSION (ML) NASAL
Qty: 1 BOTTLE | Refills: 1 | Status: SHIPPED | OUTPATIENT
Start: 2018-11-22 | End: 2019-04-10 | Stop reason: SDUPTHER

## 2018-11-29 ENCOUNTER — OFFICE VISIT (OUTPATIENT)
Dept: ORTHOPEDICS | Facility: CLINIC | Age: 68
End: 2018-11-29
Payer: MEDICARE

## 2018-11-29 VITALS
DIASTOLIC BLOOD PRESSURE: 58 MMHG | HEIGHT: 62 IN | WEIGHT: 235 LBS | HEART RATE: 83 BPM | SYSTOLIC BLOOD PRESSURE: 130 MMHG | BODY MASS INDEX: 43.24 KG/M2

## 2018-11-29 DIAGNOSIS — M17.11 PRIMARY OSTEOARTHRITIS OF RIGHT KNEE: Primary | Chronic | ICD-10-CM

## 2018-11-29 DIAGNOSIS — M17.0 ARTHRITIS OF BOTH KNEES: ICD-10-CM

## 2018-11-29 PROCEDURE — 1101F PT FALLS ASSESS-DOCD LE1/YR: CPT | Mod: S$GLB,,, | Performed by: ORTHOPAEDIC SURGERY

## 2018-11-29 PROCEDURE — 99999 PR PBB SHADOW E&M-EST. PATIENT-LVL III: CPT | Mod: PBBFAC,,, | Performed by: ORTHOPAEDIC SURGERY

## 2018-11-29 PROCEDURE — 99024 POSTOP FOLLOW-UP VISIT: CPT | Mod: S$GLB,,, | Performed by: ORTHOPAEDIC SURGERY

## 2018-11-29 PROCEDURE — 3075F SYST BP GE 130 - 139MM HG: CPT | Mod: S$GLB,,, | Performed by: ORTHOPAEDIC SURGERY

## 2018-11-29 PROCEDURE — 3078F DIAST BP <80 MM HG: CPT | Mod: S$GLB,,, | Performed by: ORTHOPAEDIC SURGERY

## 2018-11-29 NOTE — PROGRESS NOTES
Past Medical History:   Diagnosis Date    Allergy     multiple antibiotic allergies    Anemia     Anxiety     Arthritis     Asthma     CHF (congestive heart failure)     NYHA class III     Chronic kidney disease     Colon polyp     benign    COPD (chronic obstructive pulmonary disease)     DLCO 37% , and mild pulmonary HTN    Depression     Diabetes mellitus     Diabetic retinopathy     Diastolic dysfunction     Diverticulitis of large intestine without perforation or abscess without bleeding 2/12/2018    Diverticulosis     Former smoker     Fracture of lumbar spine     GERD (gastroesophageal reflux disease)     Hernia of unspecified site of abdominal cavity without mention of obstruction or gangrene     Hyperlipidemia     Hypertension     hypertensive renal    IBS (irritable bowel syndrome)     Mild nonproliferative diabetic retinopathy(362.04) 11/25/2013    Morbid obesity     Nuclear sclerosis 11/25/2013    Osteoporosis     Peripheral edema     Rash     Recurrent upper respiratory infection (URI)     S/P LASIK (laser assisted in situ keratomileusis)     Sleep apnea     sleep apnea uses bipap.    Stroke     Thyroid disease     on synthroid    TIA (transient ischemic attack)     Urinary tract infection        Past Surgical History:   Procedure Laterality Date    ABDOMINAL SURGERY      ADENOIDECTOMY      ARTHROSCOPIC CHONDROPLASTY OF KNEE JOINT Right 8/31/2018    Procedure: ARTHROSCOPY, KNEE, WITH CHONDROPLASTY;  Surgeon: Larry Molina MD;  Location: [facility] OR;  Service: Orthopedics;  Laterality: Right;  Tricompartmental chondroplasty    ARTHROSCOPY, KNEE, WITH CHONDROPLASTY Right 8/31/2018    Performed by Larry Molina MD at [facility] OR    ARTHROSCOPY, KNEE, WITH MENISCECTOMY Right 8/31/2018    Performed by Larry [facility]ina MD at [facility] OR    COLONOSCOPY  03/05/2013    repeat in 5 years    COLONOSCOPY N/A 5/31/2017    Procedure: COLONOSCOPY;  Surgeon: Luis  MU Navaror MD;  Location: [facility];  Service: Endoscopy;  Laterality: N/A;    COLONOSCOPY N/A 4/11/2018    Procedure: COLONOSCOPY;  Surgeon: Luis Navarro MD;  Location: [facility];  Service: Endoscopy;  Laterality: N/A;    COLONOSCOPY N/A 4/11/2018    [facility]formed [facility] at [facility]    COLONOSCOPY N/A 5/31/2017    [facility] at [facility]    COLONOSCOPY N/A 3/5/2013    [facility] by Florian Randall [facility] at [facility]    COSMETIC SURGERY      belt abdominoplasty    CYSTOSCOPY      CYSTOSCOPY N/A 8/6/2018    Procedure: CYSTOSCOPY;  Surgeon: Angy Campos MD;  Location: [facility] OR;  Service: Urology;  Laterality: N/A;    CYSTOSCOPY N/A 8/6/2018    [facility]formed by [facility]pos [facility] at [facility] OR    EGD (ESOPHAGOGASTRODUODENOSCOPY) N/A 3/5/2013    [facility] ENDO    ESOPHAGOGASTRODUODENOSCOPY (EGD) N/A 1/11/2018    [facility] Luis [facility] at [facility] ENDO    ESOPHAGOGASTRODUODENOSCOPY (EGD) N/A 12/27/2016    [facility] at [facility] ENDO    ESOPHAGOGASTRODUODENOSCOPY (EGD) N/A 10/25/2016    [facility]formed by [facility] at [facility] ENDO    ESOPHAGOGASTRODUODENOSCOPY (EGD) N/A 8/24/2016    [facility]formed by Luis Naavrro [facility] at [facility] ENDO    ESOPHAGOGASTRODUODENOSCOPY (EGD) N/A 7/21/2016    [facility]formed by [facility]ian [facility] at [facility] ENDO    ESOPHAGOGASTRODUODENOSCOPY (EGD)-40879 N/A 12/12/2014    [facility] by [facility] at [facility] OR 2ND FLR    EYE SURGERY Right     mazzulla    GASTRECTOMY      GASTRECTOMY-SLEEVE-LAPAROSCOPIC-25093; EGD-14226 N/A 12/12/2014    [facility] at [facility] 2ND FLR    gastric sleeve      HERNIA REPAIR      5 years old    HYSTERECTOMY      ovaries remain    KNEE ARTHROSCOPY W/ MENISCECTOMY Right 8/31/2018    Procedure: ARTHROSCOPY, KNEE, WITH MENISCECTOMY;  Surgeon: Larry Molina MD;  Location: [facility];  Service:  Orthopedics;  Laterality: Right;    PANNICULECTOMY N/A 11/28/2016    [facility] 2ND FLR    REFRACTIVE SURGERY      mono va//ou//    REPAIR-HERNIA  11/28/2016    Performed by [facility] at [facility] OR 2ND FLR    RHINOPLASTY TIP      TONSILLECTOMY      TUBAL LIGATION      UPPER GASTROINTESTINAL ENDOSCOPY  03/05/2013    UPPER GASTROINTESTINAL ENDOSCOPY  08/24/2016    Dr. Veras, repeat in 8 weeks    UPPER GASTROINTESTINAL ENDOSCOPY  07/21/2016    Dr. Randall       Current Outpatient Medications   Medication Sig    albuterol-ipratropium 2.5mg-0.5mg/3mL (DUO-NEB) 0.5 mg-3 mg(2.5 mg base)/3 mL nebulizer solution Take 3 mLs by nebulization every 6 (six) hours as needed for Wheezing. Rescue    allopurinol (ZYLOPRIM) 100 MG tablet TAKE TWO TABLETS BY MOUTH AT BEDTIME    amitriptyline (ELAVIL) 50 MG tablet Take 2 tablets (100 mg total) by mouth every evening.    atenolol (TENORMIN) 25 MG tablet Take 1 tablet (25 mg total) by mouth 2 (two) times daily.    calcitRIOL (ROCALTROL) 0.25 MCG Cap Take 1 capsule by mouth twice a week. Monday Friday    CALCIUM CITRATE/VITAMIN D3 (CALCIUM CITRATE + D ORAL) Take 400 mg by mouth 3 (three) times daily.    cetirizine (ZYRTEC) 10 MG tablet Take 1 tablet (10 mg total) by mouth once daily.    clotrimazole-betamethasone 1-0.05% (LOTRISONE) cream     cyanocobalamin, vitamin B-12, (VITAMIN B-12) 5,000 mcg Subl Place 5,000 mg under the tongue once a week.    DENOSUMAB (PROLIA SUBQ) Inject into the skin every 6 (six) months.     DIABETIC SUPPLIES,MISCELL (MEDTRONIC REMOTE CONTROL MISC) No Sig Provided    diphenoxylate-atropine 2.5-0.025 mg (LOMOTIL) 2.5-0.025 mg per tablet TAKE ONE TABLET BY MOUTH 4 TIMES DAILY AS NEEDED FOR DIARRHEA    DYMISTA 137-50 mcg/spray Spry nassal spray     fluoxetine (PROZAC) 20 MG capsule TAKE TWO CAPSULES BY MOUTH ONCE DAILY    fluticasone (FLONASE) 50 mcg/actuation nasal spray 2 sprays by  Nasal route once daily.     fluticasone (FLONASE) 50 mcg/actuation nasal spray USE 2 SPRAY(S) IN EACH NOSTRIL ONCE DAILY    fluticasone-vilanterol (BREO) 100-25 mcg/dose diskus inhaler Inhale 1 puff into the lungs once daily.    gabapentin (NEURONTIN) 100 MG capsule     insulin aspart U-100 (NOVOLOG) 100 unit/mL injection Use per insulin pump, 35-40 units daily.    KLOR-CON M20 20 mEq tablet TAKE ONE TABLET BY MOUTH TWICE DAILY    l-methylfolate-b2-b6-b12 (CEREFOLIN) 6-5-50-1 mg Tab Take 1 tablet by mouth once daily.    Lacto.acidophilus-Bif.animalis (PROBIOTIC) 5 billion cell CpSP Take 1 capsule by mouth once daily.    levothyroxine (SYNTHROID) 25 MCG tablet TAKE ONE TABLET BY MOUTH ONCE DAILY    LORazepam (ATIVAN) 1 MG tablet Take 1 tablet (1 mg total) by mouth every evening.    LORazepam (ATIVAN) 1 MG tablet TAKE 1 TABLET BY MOUTH EVERY 12 HOURS AS NEEDED FOR ANXIETY    losartan (COZAAR) 50 MG tablet Take 1 tablet (50 mg total) by mouth once daily.    melatonin 3 mg Tab Take 5 mg by mouth every evening. 10 MG NIGHTLY    MULTIVIT-IRON-MIN-FOLIC ACID 3,500-18-0.4 UNIT-MG-MG ORAL CHEW Take by mouth 2 (two) times daily.    mupirocin (BACTROBAN) 2 % ointment     oxyCODONE (ROXICODONE) 15 MG Tab Take 1 tablet (15 mg total) by mouth every 6 (six) hours as needed for Pain.    pantoprazole (PROTONIX) 40 MG tablet TAKE ONE TABLET BY MOUTH ONCE DAILY    polymyxin B sulf-trimethoprim (POLYTRIM) 10,000 unit- 1 mg/mL Drop Place 1 drop into both eyes every 4 (four) hours.    ranitidine (ZANTAC) 300 MG tablet TAKE ONE TABLET BY MOUTH IN THE EVENING    SSD 1 % cream     torsemide (DEMADEX) 20 MG Tab 1 tab every other day then 2 tab every day    estradiol (ESTRACE) 0.01 % (0.1 mg/gram) vaginal cream Place 1 g vaginally 3 (three) times a week. Once a day for first two weeks then 3x a week after     Current Facility-Administered Medications   Medication    gentamicin injection 80 mg     Facility-Administered  Medications Ordered in Other Visits   Medication    lactated ringers infusion       Review of patient's allergies indicates:   Allergen Reactions    Adhesive Other (See Comments)     Blisters    Cleocin [clindamycin hcl] Hives    Ceclor [cefaclor] Hives    Advair diskus [fluticasone-salmeterol]      Dry mouth    Aleve [naproxen sodium]      Unable to take secondary to kidney function.     Erythromycin Hives    Levaquin [levofloxacin] Dermatitis    Macrobid [nitrofurantoin monohyd/m-cryst] Other (See Comments)     Stomach pain/ GI issues    Macrodantin [nitrofurantoin macrocrystalline] Hives    Motrin [ibuprofen]      Unable to take secondary to kidney functions.    Restoril [temazepam]      LIGHTHEADED UPON WAKING.with poor results    Sulfa (sulfonamide antibiotics)      Hold due to renal problems    Trazodone      PALPITATIONS    Remeron [mirtazapine] Palpitations and Other (See Comments)     Jittery    Vancomycin analogues Rash     Feet broke out/inflammed blood vessels         Family History   Problem Relation Age of Onset    Heart disease Mother 59    Cancer Mother 59        throat    Allergies Mother     Diabetes Mother     Heart disease Father 70        flutter    Allergies Father     Diabetes Father     Cancer Sister 22        22 thyroid,49  breast    Allergies Sister     Breast cancer Sister     Diabetes Sister     Cancer Brother 62        lung    Diabetes Brother     Asthma Daughter     Diabetes Daughter     Depression Daughter     Asthma Grandchild     Eczema Grandchild     Eczema Grandchild     Breast cancer Maternal Grandmother     Ovarian cancer Cousin     Amblyopia Neg Hx     Blindness Neg Hx     Cataracts Neg Hx     Glaucoma Neg Hx     Hypertension Neg Hx     Macular degeneration Neg Hx     Retinal detachment Neg Hx     Strabismus Neg Hx     Stroke Neg Hx     Thyroid disease Neg Hx     Angioedema Neg Hx     Immunodeficiency Neg Hx     Allergic rhinitis  Neg Hx     Atopy Neg Hx     Rhinitis Neg Hx     Urticaria Neg Hx     Colon cancer Neg Hx     Colon polyps Neg Hx     Crohn's disease Neg Hx     Ulcerative colitis Neg Hx     Celiac disease Neg Hx        Social History     Socioeconomic History    Marital status:      Spouse name: Not on file    Number of children: Not on file    Years of education: Not on file    Highest education level: Not on file   Social Needs    Financial resource strain: Not on file    Food insecurity - worry: Not on file    Food insecurity - inability: Not on file    Transportation needs - medical: Not on file    Transportation needs - non-medical: Not on file   Occupational History    Not on file   Tobacco Use    Smoking status: Former Smoker     Types: Cigarettes    Smokeless tobacco: Never Used    Tobacco comment: quit 1990   Substance and Sexual Activity    Alcohol use: Yes     Comment: rare    Drug use: No    Sexual activity: Yes     Birth control/protection: Surgical   Other Topics Concern    Not on file   Social History Narrative    Not on file       Chief Complaint:   Chief Complaint   Patient presents with    Knee Pain     R knee 6wk f/u       Date of surgery:  August 31, 2018    History of present illness:  67-year-old with a history of bilateral knee arthritis.  Patient had moderate medial compartment arthritic changes.  Pain is a 7/10.  Feels better after the surgery. She is able to walk with less pain but still having some issues.  Did have a couple falls.  Wearing a patellar tendon strap.      Review of Systems:    Musculoskeletal:  See HPI        Physical Examination:    Vital Signs:    There were no vitals filed for this visit.    Body mass index is 42.98 kg/m².    This a well-developed, well nourished patient in no acute distress.  They are alert and oriented and cooperative to examination.  Pt. walks without an antalgic gait.      Examination of both knees shows no rashes or erythema. There  are no masses ecchymosis or effusion. Patient has full range of motion from 0-130°. Patient is nontender to palpation over lateral joint line and moderately tender to palpation over the medial joint line. Patient has a - Lachman exam, - anterior drawer exam, and - posterior drawer exam. - Mervin's exam. Knee is stable to varus and valgus stress. 5 out of 5 motor strength. Palpable distal pulses. Intact light touch sensation. Negative Patellofemoral crepitus      X-rays:  None     Assessment::  Bilateral knee arthritis    Plan:  I recommended repeating her Synvisc series in both knees.  We will get authorization start that in 2 weeks.    This note was created using M Modal voice recognition software that occasionally misinterpreted phrases or words.

## 2018-11-30 DIAGNOSIS — M17.0 BILATERAL PRIMARY OSTEOARTHRITIS OF KNEE: Primary | ICD-10-CM

## 2018-12-12 DIAGNOSIS — I89.0 LYMPHEDEMA OF RIGHT LOWER EXTREMITY: ICD-10-CM

## 2018-12-12 DIAGNOSIS — G89.29 CHRONIC LEFT-SIDED THORACIC BACK PAIN: ICD-10-CM

## 2018-12-12 DIAGNOSIS — M54.6 CHRONIC LEFT-SIDED THORACIC BACK PAIN: ICD-10-CM

## 2018-12-12 DIAGNOSIS — G57.01 NEUROPATHY OF RIGHT SCIATIC NERVE: ICD-10-CM

## 2018-12-13 ENCOUNTER — OFFICE VISIT (OUTPATIENT)
Dept: ORTHOPEDICS | Facility: CLINIC | Age: 68
End: 2018-12-13
Payer: MEDICARE

## 2018-12-13 VITALS — WEIGHT: 235 LBS | HEIGHT: 62 IN | BODY MASS INDEX: 43.24 KG/M2

## 2018-12-13 DIAGNOSIS — M17.11 PRIMARY OSTEOARTHRITIS OF RIGHT KNEE: Primary | Chronic | ICD-10-CM

## 2018-12-13 DIAGNOSIS — M17.0 ARTHRITIS OF BOTH KNEES: ICD-10-CM

## 2018-12-13 PROCEDURE — 99499 UNLISTED E&M SERVICE: CPT | Mod: S$GLB,,, | Performed by: ORTHOPAEDIC SURGERY

## 2018-12-13 PROCEDURE — 20610 DRAIN/INJ JOINT/BURSA W/O US: CPT | Mod: 50,S$GLB,, | Performed by: ORTHOPAEDIC SURGERY

## 2018-12-13 PROCEDURE — 99999 PR PBB SHADOW E&M-EST. PATIENT-LVL III: CPT | Mod: PBBFAC,,, | Performed by: ORTHOPAEDIC SURGERY

## 2018-12-13 RX ORDER — AMITRIPTYLINE HYDROCHLORIDE 50 MG/1
TABLET, FILM COATED ORAL
Qty: 60 TABLET | Refills: 0 | Status: SHIPPED | OUTPATIENT
Start: 2018-12-13 | End: 2019-02-16 | Stop reason: SDUPTHER

## 2018-12-13 NOTE — PROGRESS NOTES
Past Medical History:   Diagnosis Date    Allergy     multiple antibiotic allergies    Anemia     Anxiety     Arthritis     Asthma     CHF (congestive heart failure)     NYHA class III     Chronic kidney disease     Colon polyp     benign    COPD (chronic obstructive pulmonary disease)     DLCO 37% , and mild pulmonary HTN    Depression     Diabetes mellitus     Diabetic retinopathy     Diastolic dysfunction     Diverticulitis of large intestine without perforation or abscess without bleeding 2/12/2018    Diverticulosis     Former smoker     Fracture of lumbar spine     GERD (gastroesophageal reflux disease)     Hernia of unspecified site of abdominal cavity without mention of obstruction or gangrene     Hyperlipidemia     Hypertension     hypertensive renal    IBS (irritable bowel syndrome)     Mild nonproliferative diabetic retinopathy(362.04) 11/25/2013    Morbid obesity     Nuclear sclerosis 11/25/2013    Osteoporosis     Peripheral edema     Rash     Recurrent upper respiratory infection (URI)     S/P LASIK (laser assisted in situ keratomileusis)     Sleep apnea     sleep apnea uses bipap.    Stroke     Thyroid disease     on synthroid    TIA (transient ischemic attack)     Urinary tract infection        Past Surgical History:   Procedure Laterality Date    ABDOMINAL SURGERY      ADENOIDECTOMY      ARTHROSCOPIC CHONDROPLASTY OF KNEE JOINT Right 8/31/2018    Procedure: ARTHROSCOPY, KNEE, WITH CHONDROPLASTY;  Surgeon: Larry Molina MD;  Location: [facility] OR;  Service: Orthopedics;  Laterality: Right;  Tricompartmental chondroplasty    ARTHROSCOPY, KNEE, WITH CHONDROPLASTY Right 8/31/2018    Performed by Larry Molina MD at [facility] OR    ARTHROSCOPY, KNEE, WITH MENISCECTOMY Right 8/31/2018    Performed by Larry Molina MD at [facility] OR    COLONOSCOPY  03/05/2013    repeat in 5 years    COLONOSCOPY N/A 5/31/2017    Procedure: COLONOSCOPY;  Surgeon: Luis  MU Navarro MD;  Location: [facility];  Service: Endoscopy;  Laterality: N/A;    COLONOSCOPY N/A 4/11/2018    Procedure: COLONOSCOPY;  Surgeon: Luis Navarro MD;  Location: [facility];  Service: Endoscopy;  Laterality: N/A;    COLONOSCOPY N/A 4/11/2018    Performed by Luis [facility]arro [facility] at [facility]    COLONOSCOPY N/A 5/31/2017    [facility] by [facility] at [facility]    COLONOSCOPY N/A 3/5/2013    Performed by Florian Randall MD at [facility]    COSMETIC SURGERY      belt abdominoplasty    CYSTOSCOPY      CYSTOSCOPY N/A 8/6/2018    Procedure: CYSTOSCOPY;  Surgeon: Angy Campos MD;  Location: [facility] OR;  Service: Urology;  Laterality: N/A;    CYSTOSCOPY N/A 8/6/2018    Performed by Angy Campos MD at [facility] OR    EGD (ESOPHAGOGASTRODUODENOSCOPY) N/A 3/5/2013    [facility] at [facility]    ESOPHAGOGASTRODUODENOSCOPY (EGD) N/A 1/11/2018    [facility] at [facility] ENDO    ESOPHAGOGASTRODUODENOSCOPY (EGD) N/A 12/27/2016    [facility] at [facility] ENDO    ESOPHAGOGASTRODUODENOSCOPY (EGD) N/A 10/25/2016    [facility] at [facility] ENDO    ESOPHAGOGASTRODUODENOSCOPY (EGD) N/A 8/24/2016    [facility] at [facility] ENDO    ESOPHAGOGASTRODUODENOSCOPY (EGD) N/A 7/21/2016    [facility] by [facility]ian [facility] at [facility] ENDO    ESOPHAGOGASTRODUODENOSCOPY (EGD)-25064 N/A 12/12/2014    Performed by [facility] at [facility] OR 2ND FLR    EYE SURGERY Right     mazzulla    GASTRECTOMY      GASTRECTOMY-SLEEVE-LAPAROSCOPIC-30869; EGD-[id number] N/A 12/12/2014    [facility] at [facility] OR 2ND FLR    gastric sleeve      HERNIA REPAIR      5 years old    HYSTERECTOMY      ovaries remain    KNEE ARTHROSCOPY W/ MENISCECTOMY Right 8/31/2018    Procedure: ARTHROSCOPY, KNEE, WITH MENISCECTOMY;  Surgeon: Larry Molina MD;  [facility]: [facility];  Service:  Orthopedics;  Laterality: Right;    PANNICULECTOMY N/A 11/28/2016    Performed by [facility]o [facility]erril [facility] at [facility] OR 2ND FLR    REFRACTIVE SURGERY      mono va//ou//    REPAIR-HERNIA  11/28/2016    Performed by [facility]il [facility] at [facility] OR 2ND FLR    RHINOPLASTY TIP      TONSILLECTOMY      TUBAL LIGATION      UPPER GASTROINTESTINAL ENDOSCOPY  03/05/2013    UPPER GASTROINTESTINAL ENDOSCOPY  08/24/2016    Dr. Veras, repeat in 8 weeks    UPPER GASTROINTESTINAL ENDOSCOPY  07/21/2016    Dr. Randall       Current Outpatient Medications   Medication Sig    albuterol-ipratropium 2.5mg-0.5mg/3mL (DUO-NEB) 0.5 mg-3 mg(2.5 mg base)/3 mL nebulizer solution Take 3 mLs by nebulization every 6 (six) hours as needed for Wheezing. Rescue    allopurinol (ZYLOPRIM) 100 MG tablet TAKE TWO TABLETS BY MOUTH AT BEDTIME    amitriptyline (ELAVIL) 50 MG tablet TAKE 2 TABLETS BY MOUTH IN THE EVENING    atenolol (TENORMIN) 25 MG tablet Take 1 tablet (25 mg total) by mouth 2 (two) times daily.    calcitRIOL (ROCALTROL) 0.25 MCG Cap Take 1 capsule by mouth twice a week. Monday Friday    CALCIUM CITRATE/VITAMIN D3 (CALCIUM CITRATE + D ORAL) Take 400 mg by mouth 3 (three) times daily.    cetirizine (ZYRTEC) 10 MG tablet Take 1 tablet (10 mg total) by mouth once daily.    clotrimazole-betamethasone 1-0.05% (LOTRISONE) cream     cyanocobalamin, vitamin B-12, (VITAMIN B-12) 5,000 mcg Subl Place 5,000 mg under the tongue once a week.    DENOSUMAB (PROLIA SUBQ) Inject into the skin every 6 (six) months.     DIABETIC SUPPLIES,MISCELL (MEDTRONIC REMOTE CONTROL MISC) No Sig Provided    diphenoxylate-atropine 2.5-0.025 mg (LOMOTIL) 2.5-0.025 mg per tablet TAKE ONE TABLET BY MOUTH 4 TIMES DAILY AS NEEDED FOR DIARRHEA    DYMISTA 137-50 mcg/spray Spry nassal spray     fluoxetine (PROZAC) 20 MG capsule TAKE TWO CAPSULES BY MOUTH ONCE DAILY    fluticasone (FLONASE) 50 mcg/actuation nasal spray 2 sprays by Nasal route once  daily.     fluticasone (FLONASE) 50 mcg/actuation nasal spray USE 2 SPRAY(S) IN EACH NOSTRIL ONCE DAILY    fluticasone-vilanterol (BREO) 100-25 mcg/dose diskus inhaler Inhale 1 puff into the lungs once daily.    gabapentin (NEURONTIN) 100 MG capsule     insulin aspart U-100 (NOVOLOG) 100 unit/mL injection Use per insulin pump, 35-40 units daily.    KLOR-CON M20 20 mEq tablet TAKE ONE TABLET BY MOUTH TWICE DAILY    l-methylfolate-b2-b6-b12 (CEREFOLIN) 6-5-50-1 mg Tab Take 1 tablet by mouth once daily.    Lacto.acidophilus-Bif.animalis (PROBIOTIC) 5 billion cell CpSP Take 1 capsule by mouth once daily.    levothyroxine (SYNTHROID) 25 MCG tablet TAKE ONE TABLET BY MOUTH ONCE DAILY    LORazepam (ATIVAN) 1 MG tablet Take 1 tablet (1 mg total) by mouth every evening.    LORazepam (ATIVAN) 1 MG tablet TAKE 1 TABLET BY MOUTH EVERY 12 HOURS AS NEEDED FOR ANXIETY    losartan (COZAAR) 50 MG tablet Take 1 tablet (50 mg total) by mouth once daily.    melatonin 3 mg Tab Take 5 mg by mouth every evening. 10 MG NIGHTLY    MULTIVIT-IRON-MIN-FOLIC ACID 3,500-18-0.4 UNIT-MG-MG ORAL CHEW Take by mouth 2 (two) times daily.    mupirocin (BACTROBAN) 2 % ointment     oxyCODONE (ROXICODONE) 15 MG Tab Take 1 tablet (15 mg total) by mouth every 6 (six) hours as needed for Pain.    pantoprazole (PROTONIX) 40 MG tablet TAKE ONE TABLET BY MOUTH ONCE DAILY    polymyxin B sulf-trimethoprim (POLYTRIM) 10,000 unit- 1 mg/mL Drop Place 1 drop into both eyes every 4 (four) hours.    ranitidine (ZANTAC) 300 MG tablet TAKE ONE TABLET BY MOUTH IN THE EVENING    SSD 1 % cream     torsemide (DEMADEX) 20 MG Tab 1 tab every other day then 2 tab every day    estradiol (ESTRACE) 0.01 % (0.1 mg/gram) vaginal cream Place 1 g vaginally 3 (three) times a week. Once a day for first two weeks then 3x a week after     Current Facility-Administered Medications   Medication    gentamicin injection 80 mg     Facility-Administered Medications Ordered  in Other Visits   Medication    lactated ringers infusion       Review of patient's allergies indicates:   Allergen Reactions    Adhesive Other (See Comments)     Blisters    Cleocin [clindamycin hcl] Hives    Ceclor [cefaclor] Hives    Advair diskus [fluticasone-salmeterol]      Dry mouth    Aleve [naproxen sodium]      Unable to take secondary to kidney function.     Erythromycin Hives    Levaquin [levofloxacin] Dermatitis    Macrobid [nitrofurantoin monohyd/m-cryst] Other (See Comments)     Stomach pain/ GI issues    Macrodantin [nitrofurantoin macrocrystalline] Hives    Motrin [ibuprofen]      Unable to take secondary to kidney functions.    Restoril [temazepam]      LIGHTHEADED UPON WAKING.with poor results    Sulfa (sulfonamide antibiotics)      Hold due to renal problems    Trazodone      PALPITATIONS    Remeron [mirtazapine] Palpitations and Other (See Comments)     Jittery    Vancomycin analogues Rash     Feet broke out/inflammed blood vessels         Family History   Problem Relation Age of Onset    Heart disease Mother 59    Cancer Mother 59        throat    Allergies Mother     Diabetes Mother     Heart disease Father 70        flutter    Allergies Father     Diabetes Father     Cancer Sister 22        22 thyroid,49  breast    Allergies Sister     Breast cancer Sister     Diabetes Sister     Cancer Brother 62        lung    Diabetes Brother     Asthma Daughter     Diabetes Daughter     Depression Daughter     Asthma Grandchild     Eczema Grandchild     Eczema Grandchild     Breast cancer Maternal Grandmother     Ovarian cancer Cousin     Amblyopia Neg Hx     Blindness Neg Hx     Cataracts Neg Hx     Glaucoma Neg Hx     Hypertension Neg Hx     Macular degeneration Neg Hx     Retinal detachment Neg Hx     Strabismus Neg Hx     Stroke Neg Hx     Thyroid disease Neg Hx     Angioedema Neg Hx     Immunodeficiency Neg Hx     Allergic rhinitis Neg Hx     Atopy  Neg Hx     Rhinitis Neg Hx     Urticaria Neg Hx     Colon cancer Neg Hx     Colon polyps Neg Hx     Crohn's disease Neg Hx     Ulcerative colitis Neg Hx     Celiac disease Neg Hx        Social History     Socioeconomic History    Marital status:      Spouse name: Not on file    Number of children: Not on file    Years of education: Not on file    Highest education level: Not on file   Social Needs    Financial resource strain: Not on file    Food insecurity - worry: Not on file    Food insecurity - inability: Not on file    Transportation needs - medical: Not on file    Transportation needs - non-medical: Not on file   Occupational History    Not on file   Tobacco Use    Smoking status: Former Smoker     Types: Cigarettes    Smokeless tobacco: Never Used    Tobacco comment: quit 1990   Substance and Sexual Activity    Alcohol use: Yes     Comment: rare    Drug use: No    Sexual activity: Yes     Birth control/protection: Surgical   Other Topics Concern    Not on file   Social History Narrative    Not on file       Chief Complaint:   Chief Complaint   Patient presents with    Knee Pain     bilateral knee-Synvisc 1/3        Date of surgery:  August 31, 2018    History of present illness:  67-year-old with a history of bilateral knee arthritis.  Patient had moderate medial compartment arthritic changes.  Pain is a 7/10.  Feels better after the surgery. She is able to walk with less pain but still having some issues.  Did have a couple falls.  Wearing a patellar tendon strap.      Review of Systems:    Musculoskeletal:  See HPI        Physical Examination:    Vital Signs:    There were no vitals filed for this visit.    Body mass index is 42.98 kg/m².    This a well-developed, well nourished patient in no acute distress.  They are alert and oriented and cooperative to examination.  Pt. walks without an antalgic gait.      Examination of both knees shows no rashes or erythema. There are  no masses ecchymosis or effusion. Patient has full range of motion from 0-130°. Patient is nontender to palpation over lateral joint line and moderately tender to palpation over the medial joint line. Patient has a - Lachman exam, - anterior drawer exam, and - posterior drawer exam. - Mervin's exam. Knee is stable to varus and valgus stress. 5 out of 5 motor strength. Palpable distal pulses. Intact light touch sensation. Negative Patellofemoral crepitus      X-rays:  None     Assessment::  Bilateral knee arthritis    Plan:  I injected both knees with Synvisc 1 of 3.  Follow up next week.    This note was created using M Modal voice recognition software that occasionally misinterpreted phrases or words.

## 2018-12-13 NOTE — PROCEDURES
Large Joint Aspiration/Injection: R knee, L knee  Date/Time: 12/13/2018 11:33 AM  Performed by: Larry Molina MD  Authorized by: Larry Molina MD     Consent Done?:  Yes (Verbal)  Indications:  Pain  Procedure site marked: Yes    Timeout: Prior to procedure the correct patient, procedure, and site was verified      Location:  Knee  Site:  R knee and L knee  Prep: Patient was prepped and draped in usual sterile fashion    Needle size:  20 G  Approach:  Anterolateral  Medications:  16 mg hyaluronate 16 mg/2 mL; 16 mg hyaluronate 16 mg/2 mL  Patient tolerance:  Patient tolerated the procedure well with no immediate complications

## 2018-12-17 ENCOUNTER — OFFICE VISIT (OUTPATIENT)
Dept: CARDIOLOGY | Facility: CLINIC | Age: 68
End: 2018-12-17
Payer: MEDICARE

## 2018-12-17 VITALS
SYSTOLIC BLOOD PRESSURE: 122 MMHG | DIASTOLIC BLOOD PRESSURE: 64 MMHG | OXYGEN SATURATION: 90 % | HEART RATE: 79 BPM | WEIGHT: 232.38 LBS | BODY MASS INDEX: 42.76 KG/M2 | HEIGHT: 62 IN

## 2018-12-17 DIAGNOSIS — Z98.84 BARIATRIC SURGERY STATUS: ICD-10-CM

## 2018-12-17 DIAGNOSIS — I44.7 LBBB (LEFT BUNDLE BRANCH BLOCK): ICD-10-CM

## 2018-12-17 DIAGNOSIS — Z79.4 CONTROLLED TYPE 2 DIABETES MELLITUS WITH COMPLICATION, WITH LONG-TERM CURRENT USE OF INSULIN: ICD-10-CM

## 2018-12-17 DIAGNOSIS — E11.8 CONTROLLED TYPE 2 DIABETES MELLITUS WITH COMPLICATION, WITH LONG-TERM CURRENT USE OF INSULIN: ICD-10-CM

## 2018-12-17 DIAGNOSIS — Z91.89 SEDENTARY LIFESTYLE: ICD-10-CM

## 2018-12-17 DIAGNOSIS — E66.01 MORBID OBESITY WITH BMI OF 40.0-44.9, ADULT: ICD-10-CM

## 2018-12-17 DIAGNOSIS — R00.0 FAST HEART BEAT: Primary | ICD-10-CM

## 2018-12-17 DIAGNOSIS — I11.9 HYPERTENSIVE LEFT VENTRICULAR HYPERTROPHY, WITHOUT HEART FAILURE: ICD-10-CM

## 2018-12-17 DIAGNOSIS — I50.32 CHRONIC DIASTOLIC HEART FAILURE, NYHA CLASS 1: ICD-10-CM

## 2018-12-17 DIAGNOSIS — N18.30 CKD (CHRONIC KIDNEY DISEASE) STAGE 3, GFR 30-59 ML/MIN: ICD-10-CM

## 2018-12-17 DIAGNOSIS — Z91.89 CARDIOVASCULAR EVENT RISK: ICD-10-CM

## 2018-12-17 DIAGNOSIS — I87.2 VENOUS INSUFFICIENCY OF BOTH LOWER EXTREMITIES: ICD-10-CM

## 2018-12-17 DIAGNOSIS — G47.33 OBSTRUCTIVE SLEEP APNEA SYNDROME: ICD-10-CM

## 2018-12-17 PROCEDURE — 1100F PTFALLS ASSESS-DOCD GE2>/YR: CPT | Mod: S$GLB,,, | Performed by: INTERNAL MEDICINE

## 2018-12-17 PROCEDURE — 3074F SYST BP LT 130 MM HG: CPT | Mod: S$GLB,,, | Performed by: INTERNAL MEDICINE

## 2018-12-17 PROCEDURE — 3078F DIAST BP <80 MM HG: CPT | Mod: S$GLB,,, | Performed by: INTERNAL MEDICINE

## 2018-12-17 PROCEDURE — 99215 OFFICE O/P EST HI 40 MIN: CPT | Mod: S$GLB,,, | Performed by: INTERNAL MEDICINE

## 2018-12-17 PROCEDURE — 3046F HEMOGLOBIN A1C LEVEL >9.0%: CPT | Mod: S$GLB,,, | Performed by: INTERNAL MEDICINE

## 2018-12-17 PROCEDURE — 3288F FALL RISK ASSESSMENT DOCD: CPT | Mod: S$GLB,,, | Performed by: INTERNAL MEDICINE

## 2018-12-17 PROCEDURE — 99999 PR PBB SHADOW E&M-EST. PATIENT-LVL V: CPT | Mod: PBBFAC,,, | Performed by: INTERNAL MEDICINE

## 2018-12-17 NOTE — PROGRESS NOTES
Subjective:    Patient ID:  Fifi Mcnair is a 68 y.o. female who presents for evaluation of Follow-up  For post 100+ lbs loss after bariatric operation, chronic diastolic HF, class I, fell in 2/2018 and chronic infections, so very sedentary  PCP: Dr. Vargas, see biannually  Plastic surgeon: Dr. Becerril  Cardiologist: Dr. Mcmullen, last seen 11/16/2015  Vascular surgeon: Dr. Varela  Bariatric: Dr. Kwong, [facility]  GI: Dr. Navarro  Orthopedic: Dr. Molina  Renal: Dr. Soto  Lung: Dr. Tian  Eye: Dr. Yap  Retina: Dr. Easton  Back: Dr. Huntley  Urologist: Dr. Campos  Lives with , Florian, non-smoker  Disabled due to DM and CLBP, 2010, prior convenient     White female here for follow up post medications adjustment due to hypotension with near syncope. Several medication was reduced in dose and now feeling OK. Noted still LOERA especially with walking, limited also by low back pains.      Chart reviewed - abnormal stress test in 11/2012  Nuclear Quantitative Functional Analysis:   LVEF: 52 % (normal is 55 - 69)  LVED Volume: 117 ml (normal is 60 - 98)  LVES Volume: 56 ml (normal is 20 - 42)    Impression: ABNORMAL MYOCARDIAL PERFUSION  1. There is evidence for mild myocardial ischemia in the anterolateral wall of the left ventricle.   2. The perfusion scan is free of evidence for myocardial injury.   3. There is a moderate intensity fixed defect in the anteroseptal wall of the left ventricle, secondary to breast attenuation.   4. Resting wall motion is physiologic.   5. There is resting LV dysfunction with a reduced ejection fraction of 52 %.  (normal is 55 - 69)  6. The ventricular volumes are normal at rest and stress.   7. The extracardiac distribution of radioactivity is normal.   8. [facility] SSS =8, SRS =6, SDS =2, suggest that 2.5% of myocardium may be ischemic.     [facility] HEMODYNAMIC RESULTS:    LVEDP (Pre): 16 mmHg  LVEDP (Post): 16 mmHg  Ejection Fraction: 60%    C.  ANGIOGRAPHIC RESULTS:    DIAGNOSTIC:       Patient has a right dominant coronary artery.        - Left Main Coronary Artery:             The LM has luminal irregularities. There is DAX 3 flow.       - Left Anterior Descending Artery:             The LAD has luminal irregularities. There is DAX 3 flow.       - Left Circumflex Artery:             The LCX has luminal irregularities. There is DAX 3 flow.       - Right Coronary Artery:             The RCA has luminal irregularities. There is DAX 3 flow.       - Left Renal Artery:             The proximal left renal has luminal irregularities.       - Right Renal Artery:             The proximal right renal has luminal irregularities.      D. SUMMARY:    1. Non-obstructive CAD.  2. Non-obstructive atherosclerotic disease of the  right and left renal arteries    E. RECOMMENDATIONS:    1. Maximize medical management.  2. Risk factor reductions.  3. ASA 81mg.    ECHO 4/2015 - CONCLUSIONS     1 - Eccentric hypertrophy.     2 - Normal left ventricular systolic function (EF 60-65%).     3 - Left ventricular diastolic dysfunction. E/e'(lat) is 12    4 - Normal right ventricular systolic function .     5 - Pulmonary hypertension. estimated PA systolic pressure is 45 mmHg.    6 - Mild mitral regurgitation.     7 - Mild tricuspid regurgitation.     Since visit of 1/21/2016, no further problem with low BP, little more active, able to walk longer before SOB. Also limited by chronic back pains with compression fracture with sudden weakness of the right leg. Uses a walking stick.     In 9/2016, here for pre op clearance to remove fatty tissue from abdominal wall, problem with chronic infection, pain, burning. Denies any CP nor SOB. Compliant with medications. Labs reviewed - .8, non- on no medications, ASCVD 10-year event risk is 12.3%. CMP shows CKD stage III, eGFR 48, recent Cr now reported down to 0.95. Continue on Insulin pump, dosage decrease 160 U to now 25 U  "daily. Only major surgical risk is being on Insulin. ECG continue to show LBBB.    In 10/2016, feeling "great", no new problem but request Lomotil for chronic diarrhea from IBS, given medication that cost $500 for 14 days.  Lexiscan - Nuclear Quantitative Functional Analysis:   LVEF: 58 % (normal is 55 - 69)  LVED Volume: 60 ml (normal is 60 - 98)  LVES Volume: 43 ml (normal is 20 - 42)    Impression: NORMAL MYOCARDIAL PERFUSION  1. The perfusion scan is free of evidence for myocardial ischemia or injury.   2. There is a moderate intensity fixed defect in the anteroseptal wall of the left ventricle, secondary to breast attenuation.   3. Resting wall motion is physiologic.   4. Resting LV function is normal.  (normal is 55 - 69)  5. The ventricular volumes are normal at rest and stress.   6. The extracardiac distribution of radioactivity is normal.   7. When compared to the previous study from 11/28/2012, the anterolateral wall now appears normal.    ECHO CONCLUSIONS     1 - Mild left atrial enlargement. measuring 36.24 cc/m2.     2 - Concentric hypertrophy. the septum and the posterior wall each measuring 1.2 cm across.     3 - Normal left ventricular systolic function (EF 55-60%).     4 - Left ventricular diastolic dysfunction. E/e'(lat) is 13    5 - Normal right ventricular systolic function .     6 - The estimated PA systolic pressure is 37 mmHg.     7 - Small pericardial effusion.     8 - No significant change from Echo in [id number].     9 - Difficult apical windows.     In 5/2018, here with concern of right thigh "edema" for the last couple of months, right much bigger than the left. Also persistent anterior right knee pain since fall in 3/2018.   Venous US - Impression     1. No evidence of deep vein thrombosis    2.  Venous insufficiency within the distal right greater saphenous vein at the knee.  Reflux times are given above.    3.  Stable complex right Baker's cyst       Leg US - Nonspecific calcific " subcentimeter mass at the site of palpable clinical concern in the anteromedial right leg.  This most likely represents a chronic soft tissue calcification and was present on the 03/22/2018 radiographs.    Moderate-sized, complex Baker cyst in the medial popliteal fossa.    CXR - heart is upper limits of normal-sized.  There is increased thoracic kyphosis with and there is anterior paravertebral osteophyte fight formation.    HPI comments: in 12/2018, here for follow up, had recommended seeing annually. At this time problem mostly non-cardiac, have noted fast heart beats over the last month, last up to 2 minutes, 4-5 times weekly, makes patient somewhat anxious. ECG in 5/2018, in NSR, chronic LBBB.    Review of Systems   Constitution: Positive for fever (with repeated infections.), weakness and malaise/fatigue. Negative for diaphoresis, night sweats, weight gain (9 lbs since last visit) and weight loss (110 lbs since operation 12/2014).        Quit sedentary due to chronic knees problem.   HENT: Positive for hoarse voice. Negative for nosebleeds and tinnitus.    Eyes: Positive for redness. Negative for visual disturbance.   Cardiovascular: Positive for dyspnea on exertion, leg swelling and palpitations. Negative for chest pain, claudication, cyanosis, irregular heartbeat, near-syncope, orthopnea and paroxysmal nocturnal dyspnea.   Respiratory: Positive for cough and snoring (on CPAP nightly, 100%). Negative for shortness of breath, sleep disturbances due to breathing and wheezing.         [place] score 2 using CPAP 100%, stable   Endocrine: Positive for cold intolerance and heat intolerance. Negative for polydipsia and polyuria.   Hematologic/Lymphatic: Bruises/bleeds easily.   Skin: Positive for poor wound healing, suspicious lesions and unusual hair distribution. Negative for color change, flushing and nail changes.   Musculoskeletal: Positive for arthritis, back pain, falls, gout, joint pain, joint swelling,  "muscle cramps, neck pain and stiffness. Negative for muscle weakness and myalgias.   Gastrointestinal: Positive for bloating, diarrhea, dysphagia, heartburn, hemorrhoids and nausea. Negative for hematemesis, hematochezia and melena.        Have gastric ulcer at the site of the sleeve   Genitourinary: Positive for hematuria.        CKD due to DM   Neurological: Positive for aphonia, light-headedness, numbness and paresthesias (of stomach). Negative for disturbances in coordination, excessive daytime sleepiness, dizziness, focal weakness, headaches, loss of balance and vertigo.   Psychiatric/Behavioral: Positive for depression. Negative for substance abuse. The patient has insomnia and is nervous/anxious.    Allergic/Immunologic: Positive for environmental allergies, hives and persistent infections.        Objective:    Physical Exam   Constitutional: She is oriented to person, place, and time. She appears well-developed and well-nourished.   HENT:   Head: Normocephalic.   Eyes: Conjunctivae and EOM are normal. Pupils are equal, round, and reactive to light.   Neck: Normal range of motion. Neck supple. No JVD present. No thyromegaly present.   Circumference 16.5"   Cardiovascular: Normal rate, regular rhythm, normal heart sounds and intact distal pulses. Exam reveals no gallop and no friction rub.   No murmur heard.  Mild superficial varicose veins around the ankles.   Pulmonary/Chest: Effort normal and breath sounds normal. She has no wheezes. She has no rales. She exhibits no tenderness.   Abdominal: Soft. Bowel sounds are normal. There is no tenderness.   Waist 47" up to 51", hips 53.25"   Musculoskeletal: Normal range of motion. She exhibits no edema.   Lymphadenopathy:     She has no cervical adenopathy.   Neurological: She is alert and oriented to person, place, and time.   Skin: Skin is warm and dry. No rash noted.         Assessment:       1. Fast heart beat, onset 11/2018    2. Morbid obesity with BMI of " 40.0-44.9, adult, today 42.5    3. Venous insufficiency of both lower extremities    4. Chronic diastolic heart failure, NYHA class 1    5. Controlled type 2 diabetes mellitus with complication, with long-term current use of insulin    6. Bariatric surgery status, 12/2014    7. LBBB (left bundle branch block)    8. Obstructive sleep apnea syndrome    9. Cardiovascular event risk, ASCVD 10-year risk 12.3%, 12/2015    10. CKD (chronic kidney disease) stage 3, GFR 30-59 ml/min    11. Hypertensive left ventricular hypertrophy, without heart failure    12. Sedentary lifestyle         Plan:       Fifi was seen today for follow-up.    Diagnoses and all orders for this visit:    Fast heart beat, onset 11/2018  -     Holter Monitor - 3-14 Day Adult (Cupid Only); Future    Morbid obesity with BMI of 40.0-44.9, adult, today 42.5    Venous insufficiency of both lower extremities    Chronic diastolic heart failure, NYHA class 1    Controlled type 2 diabetes mellitus with complication, with long-term current use of insulin    Bariatric surgery status, 12/2014    LBBB (left bundle branch block)    Obstructive sleep apnea syndrome    Cardiovascular event risk, ASCVD 10-year risk 12.3%, 12/2015    CKD (chronic kidney disease) stage 3, GFR 30-59 ml/min    Hypertensive left ventricular hypertrophy, without heart failure    Sedentary lifestyle    - All medical issues reviewed, continue current Rx.  - Weigh twice weekly, try to lose 1-2 lbs per week  - CV status stable, continue current Rx, all medications reviewed, patient acknowledge good understanding.  - Recommend healthy living: stop smoking, healthy diet and regular exercise, and weight control  - Highly recommend 30 minutes of exercise daily, can have Sunday off, with 2-3 sessions of muscle strengthening weekly. A  would be very helpful.  - Recommend at least annual cardiovascular evaluation in view of her significant risk factors.  - Phone review / encourage  use of [facility]  - Need to work with a   - Highly recommend 30 minutes of exercise daily, can have Sunday off, with 2-3 sessions of muscle strengthening weekly. A  would be very helpful.    Hypotension due to drugs - improved    LOERA (dyspnea on exertion) - improved    Chronic bronchitis, unspecified chronic bronchitis type    Patient Active Problem List   Diagnosis    S/P LASIK (laser assisted in situ keratomileusis)    GERD (gastroesophageal reflux disease)    Acquired hypothyroidism    DM due to underlying condition with diabetic nephropathy, on Insulin    Conjunctival laceration - Left Eye    Leg pain, bilateral    Chronic allergic rhinitis    Chronic diastolic heart failure, NYHA class 1    Nuclear sclerosis    Dry eye    Myopia with astigmatism and presbyopia    Morbid obesity with BMI of 40.0-44.9, adult, today 42.5    Peripheral edema    Anti-polysaccharide antibody deficiency    Controlled diabetes mellitus type 2 with complications    Insulin pump status    Bariatric surgery status, 12/2014    LBBB (left bundle branch block)    Osteoarthritis    Vitreo-retinal adhesions    Vitreous hemorrhage, right eye    Posterior vitreous detachment, right eye    Moderate nonproliferative diabetic retinopathy of both eyes    Osteopenia with high risk of fracture    Symptomatic posterior vitreous detachment of left eye    Chronic low back pain    Sleep apnea, using CPAP 100%    Posterior vitreous detachment, left eye    Vitreous hemorrhage, left eye    Cardiovascular event risk, ASCVD 10-year risk 12.3%, 12/2015    CKD (chronic kidney disease) stage 3, GFR 30-59 ml/min    Hypertensive left ventricular hypertrophy, without heart failure    Irritable bowel syndrome with diarrhea    PUD (peptic ulcer disease)    Essential hypertension    Thrombocytosis    Adjustment insomnia    Other osteoporosis without current pathological fracture    History of  pathological fracture due to osteoporosis    Intestinal metaplasia of gastric mucosa    Fibromyalgia    Generalized osteoarthritis    Spinal stenosis of lumbar region    Xerosis of skin    Synovial cyst of right popliteal space    Sedentary lifestyle    Gross hematuria    Primary osteoarthritis of right knee    Monoclonal gammopathy associated with lymphoplasmacytic dyscrasias    Arthritis of both knees    Moderate persistent asthma without complication    Obesity    Venous insufficiency of both lower extremities    Fast heart beat, onset 11/2018     Total face-to-face time with the patient was 35 minutes and greater than 50% was spent in counseling and coordination of care. The above assessment and plan have been discussed at length. Labs and procedure over the last 6 months reviewed. Problem List updated. Asked to bring in all active medications / pills bottles with next visit.

## 2018-12-18 ENCOUNTER — HOSPITAL ENCOUNTER (OUTPATIENT)
Dept: CARDIOLOGY | Facility: HOSPITAL | Age: 68
Discharge: HOME | End: 2018-12-18
Attending: INTERNAL MEDICINE
Payer: MEDICARE

## 2018-12-18 DIAGNOSIS — R00.0 FAST HEART BEAT: ICD-10-CM

## 2018-12-20 ENCOUNTER — OFFICE VISIT (OUTPATIENT)
Dept: ORTHOPEDICS | Facility: CLINIC | Age: 68
End: 2018-12-20
Payer: MEDICARE

## 2018-12-20 DIAGNOSIS — M17.0 PRIMARY OSTEOARTHRITIS OF BOTH KNEES: ICD-10-CM

## 2018-12-20 DIAGNOSIS — M17.0 ARTHRITIS OF BOTH KNEES: ICD-10-CM

## 2018-12-20 DIAGNOSIS — M17.11 PRIMARY OSTEOARTHRITIS OF RIGHT KNEE: Primary | Chronic | ICD-10-CM

## 2018-12-20 DIAGNOSIS — M17.0 PRIMARY OSTEOARTHRITIS OF BOTH KNEES: Primary | ICD-10-CM

## 2018-12-20 PROCEDURE — 99499 UNLISTED E&M SERVICE: CPT | Mod: S$GLB,,, | Performed by: ORTHOPAEDIC SURGERY

## 2018-12-20 PROCEDURE — 99999 PR PBB SHADOW E&M-EST. PATIENT-LVL II: CPT | Mod: PBBFAC,,, | Performed by: ORTHOPAEDIC SURGERY

## 2018-12-20 PROCEDURE — 20610 DRAIN/INJ JOINT/BURSA W/O US: CPT | Mod: 50,S$GLB,, | Performed by: ORTHOPAEDIC SURGERY

## 2018-12-20 NOTE — PROGRESS NOTES
Past Medical History:   Diagnosis Date    Allergy     multiple antibiotic allergies    Anemia     Anxiety     Arthritis     Asthma     CHF (congestive heart failure)     NYHA class III     Chronic kidney disease     Colon polyp     benign    COPD (chronic obstructive pulmonary disease)     DLCO 37% , and mild pulmonary HTN    Depression     Diabetes mellitus     Diabetic retinopathy     Diastolic dysfunction     Diverticulitis of large intestine without perforation or abscess without bleeding 2/12/2018    Diverticulosis     Former smoker     Fracture of lumbar spine     GERD (gastroesophageal reflux disease)     Hernia of unspecified site of abdominal cavity without mention of obstruction or gangrene     Hyperlipidemia     Hypertension     hypertensive renal    IBS (irritable bowel syndrome)     Mild nonproliferative diabetic retinopathy(362.04) 11/25/2013    Morbid obesity     Nuclear sclerosis 11/25/2013    Osteoporosis     Peripheral edema     Rash     Recurrent upper respiratory infection (URI)     S/P LASIK (laser assisted in situ keratomileusis)     Sleep apnea     sleep apnea uses bipap.    Stroke     Thyroid disease     on synthroid    TIA (transient ischemic attack)     Urinary tract infection        Past Surgical History:   Procedure Laterality Date    ABDOMINAL SURGERY      ADENOIDECTOMY      ARTHROSCOPIC CHONDROPLASTY OF KNEE JOINT Right 8/31/2018    Procedure: ARTHROSCOPY, KNEE, WITH CHONDROPLASTY;  Surgeon: Larry Molina MD;  Location: [facility] OR;  Service: Orthopedics;  Laterality: Right;  Tricompartmental chondroplasty    ARTHROSCOPY, KNEE, WITH CHONDROPLASTY Right 8/31/2018    Performed by Larry Molina MD at [facility] OR    ARTHROSCOPY, KNEE, WITH MENISCECTOMY Right 8/31/2018    Performed by Larry Molina MD at [facility] OR    COLONOSCOPY  03/05/2013    repeat in 5 years    COLONOSCOPY N/A 5/31/2017    Procedure: COLONOSCOPY;  Surgeon: Luis  MU Navarro MD;  Location: [facility];  Service: Endoscopy;  Laterality: N/A;    COLONOSCOPY N/A 4/11/2018    Procedure: COLONOSCOPY;  Surgeon: Luis Navarro MD;  Location: [facility];  Service: Endoscopy;  Laterality: N/A;    COLONOSCOPY N/A 4/11/2018    Performed by Luis [facility]arro [facility] at [facility]    COLONOSCOPY N/A 5/31/2017    Performed by Luis [facility]arro [facility] at [facility]    COLONOSCOPY N/A 3/5/2013    Performed by Florian Randall MD at [facility]    COSMETIC SURGERY      belt abdominoplasty    CYSTOSCOPY      CYSTOSCOPY N/A 8/6/2018    Procedure: CYSTOSCOPY;  Surgeon: Angy Campos MD;  Location: [facility] OR;  Service: Urology;  Laterality: N/A;    CYSTOSCOPY N/A 8/6/2018    Performed by Angy Campos [facility] at [facility] OR    EGD (ESOPHAGOGASTRODUODENOSCOPY) N/A 3/5/2013    [facility] at [facility] ENDO    ESOPHAGOGASTRODUODENOSCOPY (EGD) N/A 1/11/2018    Performed by Luis [facility]arro [facility] at [facility] ENDO    ESOPHAGOGASTRODUODENOSCOPY (EGD) N/A 12/27/2016    Performed by Luis [facility] at [facility] ENDO    ESOPHAGOGASTRODUODENOSCOPY (EGD) N/A 10/25/2016    Performed by Luis [facility]arro [facility] at [facility] ENDO    ESOPHAGOGASTRODUODENOSCOPY (EGD) N/A 8/24/2016    Performed by Luis [facility]arro [facility] at [facility] ENDO    ESOPHAGOGASTRODUODENOSCOPY (EGD) N/A 7/21/2016    Performed by Florian Randall MD at [facility] ENDO    ESOPHAGOGASTRODUODENOSCOPY (EGD)-[id number] N/A 12/12/2014    Performed by [facility]ong [facility] at [facility] OR 2ND FLR    EYE SURGERY Right     mazzulla    GASTRECTOMY      GASTRECTOMY-SLEEVE-LAPAROSCOPIC-[id number]; EGD-[id number] N/A 12/12/2014    Performed by [facility] at [facility] OR 2ND FLR    gastric sleeve      HERNIA REPAIR      5 years old    HYSTERECTOMY      ovaries remain    KNEE ARTHROSCOPY W/ MENISCECTOMY Right 8/31/2018    Procedure: ARTHROSCOPY, KNEE, WITH MENISCECTOMY;  Surgeon: Larry Molina MD;  Location: [facility];  Service:  Orthopedics;  Laterality: Right;    PANNICULECTOMY N/A 11/28/2016    Per[facility] 2ND FLR    REFRACTIVE SURGERY      mono va//ou//    REPAIR-HERNIA  11/28/2016    Per[facility] at [facility] OR 2ND FLR    RHINOPLASTY TIP      TONSILLECTOMY      TUBAL LIGATION      UPPER GASTROINTESTINAL ENDOSCOPY  03/05/2013    UPPER GASTROINTESTINAL ENDOSCOPY  08/24/2016    Dr. Veras, repeat in 8 weeks    UPPER GASTROINTESTINAL ENDOSCOPY  07/21/2016    Dr. Randall       Current Outpatient Medications   Medication Sig    albuterol-ipratropium 2.5mg-0.5mg/3mL (DUO-NEB) 0.5 mg-3 mg(2.5 mg base)/3 mL nebulizer solution Take 3 mLs by nebulization every 6 (six) hours as needed for Wheezing. Rescue    allopurinol (ZYLOPRIM) 100 MG tablet TAKE TWO TABLETS BY MOUTH AT BEDTIME    amitriptyline (ELAVIL) 50 MG tablet TAKE 2 TABLETS BY MOUTH IN THE EVENING    atenolol (TENORMIN) 25 MG tablet Take 1 tablet (25 mg total) by mouth 2 (two) times daily.    calcitRIOL (ROCALTROL) 0.25 MCG Cap Take 1 capsule by mouth twice a week. Monday Friday    CALCIUM CITRATE/VITAMIN D3 (CALCIUM CITRATE + D ORAL) Take 400 mg by mouth 3 (three) times daily.    cetirizine (ZYRTEC) 10 MG tablet Take 1 tablet (10 mg total) by mouth once daily.    clotrimazole-betamethasone 1-0.05% (LOTRISONE) cream     cyanocobalamin, vitamin B-12, (VITAMIN B-12) 5,000 mcg Subl Place 5,000 mg under the tongue once a week.    DENOSUMAB (PROLIA SUBQ) Inject into the skin every 6 (six) months.     DIABETIC SUPPLIES,MISCELL (MEDTRONIC REMOTE CONTROL MISC) No Sig Provided    diphenoxylate-atropine 2.5-0.025 mg (LOMOTIL) 2.5-0.025 mg per tablet TAKE ONE TABLET BY MOUTH 4 TIMES DAILY AS NEEDED FOR DIARRHEA    DYMISTA 137-50 mcg/spray Spry nassal spray     fluoxetine (PROZAC) 20 MG capsule TAKE TWO CAPSULES BY MOUTH ONCE DAILY    fluticasone (FLONASE) 50 mcg/actuation nasal spray 2 sprays by Nasal route once  daily.     fluticasone (FLONASE) 50 mcg/actuation nasal spray USE 2 SPRAY(S) IN EACH NOSTRIL ONCE DAILY    fluticasone-vilanterol (BREO) 100-25 mcg/dose diskus inhaler Inhale 1 puff into the lungs once daily.    gabapentin (NEURONTIN) 100 MG capsule     insulin aspart U-100 (NOVOLOG) 100 unit/mL injection Use per insulin pump, 35-40 units daily.    KLOR-CON M20 20 mEq tablet TAKE ONE TABLET BY MOUTH TWICE DAILY    l-methylfolate-b2-b6-b12 (CEREFOLIN) 6-5-50-1 mg Tab Take 1 tablet by mouth once daily.    Lacto.acidophilus-Bif.animalis (PROBIOTIC) 5 billion cell CpSP Take 1 capsule by mouth once daily.    levothyroxine (SYNTHROID) 25 MCG tablet TAKE ONE TABLET BY MOUTH ONCE DAILY    LORazepam (ATIVAN) 1 MG tablet Take 1 tablet (1 mg total) by mouth every evening.    LORazepam (ATIVAN) 1 MG tablet TAKE 1 TABLET BY MOUTH EVERY 12 HOURS AS NEEDED FOR ANXIETY    losartan (COZAAR) 50 MG tablet Take 1 tablet (50 mg total) by mouth once daily.    melatonin 3 mg Tab Take 5 mg by mouth every evening. 10 MG NIGHTLY    MULTIVIT-IRON-MIN-FOLIC ACID 3,500-18-0.4 UNIT-MG-MG ORAL CHEW Take by mouth 2 (two) times daily.    mupirocin (BACTROBAN) 2 % ointment     oxyCODONE (ROXICODONE) 15 MG Tab Take 1 tablet (15 mg total) by mouth every 6 (six) hours as needed for Pain.    pantoprazole (PROTONIX) 40 MG tablet TAKE ONE TABLET BY MOUTH ONCE DAILY    polymyxin B sulf-trimethoprim (POLYTRIM) 10,000 unit- 1 mg/mL Drop Place 1 drop into both eyes every 4 (four) hours.    ranitidine (ZANTAC) 300 MG tablet TAKE ONE TABLET BY MOUTH IN THE EVENING    SSD 1 % cream     torsemide (DEMADEX) 20 MG Tab 1 tab every other day then 2 tab every day    estradiol (ESTRACE) 0.01 % (0.1 mg/gram) vaginal cream Place 1 g vaginally 3 (three) times a week. Once a day for first two weeks then 3x a week after     Current Facility-Administered Medications   Medication    gentamicin injection 80 mg     Facility-Administered Medications Ordered  in Other Visits   Medication    lactated ringers infusion       Review of patient's allergies indicates:   Allergen Reactions    Adhesive Other (See Comments)     Blisters    Cleocin [clindamycin hcl] Hives    Ceclor [cefaclor] Hives    Advair diskus [fluticasone-salmeterol]      Dry mouth    Aleve [naproxen sodium]      Unable to take secondary to kidney function.     Erythromycin Hives    Levaquin [levofloxacin] Dermatitis    Macrobid [nitrofurantoin monohyd/m-cryst] Other (See Comments)     Stomach pain/ GI issues    Macrodantin [nitrofurantoin macrocrystalline] Hives    Motrin [ibuprofen]      Unable to take secondary to kidney functions.    Restoril [temazepam]      LIGHTHEADED UPON WAKING.with poor results    Sulfa (sulfonamide antibiotics)      Hold due to renal problems    Trazodone      PALPITATIONS    Remeron [mirtazapine] Palpitations and Other (See Comments)     Jittery    Vancomycin analogues Rash     Feet broke out/inflammed blood vessels         Family History   Problem Relation Age of Onset    Heart disease Mother 59    Cancer Mother 59        throat    Allergies Mother     Diabetes Mother     Heart disease Father 70        flutter    Allergies Father     Diabetes Father     Cancer Sister 22        22 thyroid,49  breast    Allergies Sister     Breast cancer Sister     Diabetes Sister     Cancer Brother 62        lung    Diabetes Brother     Asthma Daughter     Diabetes Daughter     Depression Daughter     Asthma Grandchild     Eczema Grandchild     Eczema Grandchild     Breast cancer Maternal Grandmother     Ovarian cancer Cousin     Amblyopia Neg Hx     Blindness Neg Hx     Cataracts Neg Hx     Glaucoma Neg Hx     Hypertension Neg Hx     Macular degeneration Neg Hx     Retinal detachment Neg Hx     Strabismus Neg Hx     Stroke Neg Hx     Thyroid disease Neg Hx     Angioedema Neg Hx     Immunodeficiency Neg Hx     Allergic rhinitis Neg Hx     Atopy  Neg Hx     Rhinitis Neg Hx     Urticaria Neg Hx     Colon cancer Neg Hx     Colon polyps Neg Hx     Crohn's disease Neg Hx     Ulcerative colitis Neg Hx     Celiac disease Neg Hx        Social History     Socioeconomic History    Marital status:      Spouse name: Not on file    Number of children: Not on file    Years of education: Not on file    Highest education level: Not on file   Social Needs    Financial resource strain: Not on file    Food insecurity - worry: Not on file    Food insecurity - inability: Not on file    Transportation needs - medical: Not on file    Transportation needs - non-medical: Not on file   Occupational History    Not on file   Tobacco Use    Smoking status: Former Smoker     Types: Cigarettes    Smokeless tobacco: Never Used    Tobacco comment: quit 1990   Substance and Sexual Activity    Alcohol use: Yes     Comment: rare    Drug use: No    Sexual activity: Yes     Birth control/protection: Surgical   Other Topics Concern    Not on file   Social History Narrative    Not on file       Chief Complaint:   Chief Complaint   Patient presents with    Knee Pain     bilateral knee-Synvisc 2/3        Date of surgery:  August 31, 2018    History of present illness:  67-year-old with a history of bilateral knee arthritis.  Patient had moderate medial compartment arthritic changes.  Pain is a 7/10.  Feels better after the surgery. She is able to walk with less pain but still having some issues.  Did have a couple falls.  Wearing a patellar tendon strap.      Review of Systems:    Musculoskeletal:  See HPI        Physical Examination:    Vital Signs:    There were no vitals filed for this visit.    There is no height or weight on file to calculate BMI.    This a well-developed, well nourished patient in no acute distress.  They are alert and oriented and cooperative to examination.  Pt. walks without an antalgic gait.      Examination of both knees shows no rashes  or erythema. There are no masses ecchymosis or effusion. Patient has full range of motion from 0-130°. Patient is nontender to palpation over lateral joint line and moderately tender to palpation over the medial joint line. Patient has a - Lachman exam, - anterior drawer exam, and - posterior drawer exam. - Mervin's exam. Knee is stable to varus and valgus stress. 5 out of 5 motor strength. Palpable distal pulses. Intact light touch sensation. Negative Patellofemoral crepitus      X-rays:  None     Assessment::  Bilateral knee arthritis    Plan:  I injected both knees with Synvisc 2 of 3.  Follow up next week.  I also gave her a refill of her pain cream.  We will also get her a Rollator ordered.    This note was created using [employer] voice recognition software that occasionally misinterpreted phrases or words.

## 2018-12-20 NOTE — PROCEDURES
Large Joint Aspiration/Injection: R knee, L knee  Date/Time: 12/20/2018 11:22 AM  Performed by: Larry Molina MD  Authorized by: Larry Molina MD     Consent Done?:  Yes (Verbal)  Indications:  Pain  Procedure site marked: Yes    Timeout: Prior to procedure the correct patient, procedure, and site was verified      Location:  Knee  Site:  R knee and L knee  Prep: Patient was prepped and draped in usual sterile fashion    Needle size:  20 G  Approach:  Anterolateral  Medications:  16 mg hyaluronate 16 mg/2 mL; 16 mg hyaluronate 16 mg/2 mL  Patient tolerance:  Patient tolerated the procedure well with no immediate complications

## 2018-12-27 ENCOUNTER — OFFICE VISIT (OUTPATIENT)
Dept: ORTHOPEDICS | Facility: CLINIC | Age: 68
End: 2018-12-27
Payer: MEDICARE

## 2018-12-27 DIAGNOSIS — M17.0 ARTHRITIS OF BOTH KNEES: Primary | ICD-10-CM

## 2018-12-27 PROBLEM — M17.11 PRIMARY OSTEOARTHRITIS OF RIGHT KNEE: Chronic | Status: RESOLVED | Noted: 2018-08-16 | Resolved: 2018-12-27

## 2018-12-27 PROCEDURE — 99999 PR PBB SHADOW E&M-EST. PATIENT-LVL II: CPT | Mod: PBBFAC,,, | Performed by: ORTHOPAEDIC SURGERY

## 2018-12-27 PROCEDURE — 20610 DRAIN/INJ JOINT/BURSA W/O US: CPT | Mod: 50,S$GLB,, | Performed by: ORTHOPAEDIC SURGERY

## 2018-12-27 PROCEDURE — 99499 UNLISTED E&M SERVICE: CPT | Mod: S$GLB,,, | Performed by: ORTHOPAEDIC SURGERY

## 2018-12-27 NOTE — PROCEDURES
Large Joint Aspiration/Injection: R knee, L knee  Date/Time: 12/27/2018 11:46 AM  Performed by: Larry Molina MD  Authorized by: Larry Molina MD     Consent Done?:  Yes (Verbal)  Indications:  Pain  Procedure site marked: Yes    Timeout: Prior to procedure the correct patient, procedure, and site was verified      Location:  Knee  Site:  R knee and L knee  Prep: Patient was prepped and draped in usual sterile fashion    Needle size:  20 G  Approach:  Anterolateral  Medications:  16 mg hyaluronate 16 mg/2 mL; 16 mg hyaluronate 16 mg/2 mL  Patient tolerance:  Patient tolerated the procedure well with no immediate complications

## 2018-12-27 NOTE — PROGRESS NOTES
Past Medical History:   Diagnosis Date    Allergy     multiple antibiotic allergies    Anemia     Anxiety     Arthritis     Asthma     CHF (congestive heart failure)     NYHA class III     Chronic kidney disease     Colon polyp     benign    COPD (chronic obstructive pulmonary disease)     DLCO 37% , and mild pulmonary HTN    Depression     Diabetes mellitus     Diabetic retinopathy     Diastolic dysfunction     Diverticulitis of large intestine without perforation or abscess without bleeding 2/12/2018    Diverticulosis     Former smoker     Fracture of lumbar spine     GERD (gastroesophageal reflux disease)     Hernia of unspecified site of abdominal cavity without mention of obstruction or gangrene     Hyperlipidemia     Hypertension     hypertensive renal    IBS (irritable bowel syndrome)     Mild nonproliferative diabetic retinopathy(362.04) 11/25/2013    Morbid obesity     Nuclear sclerosis 11/25/2013    Osteoporosis     Peripheral edema     Rash     Recurrent upper respiratory infection (URI)     S/P LASIK (laser assisted in situ keratomileusis)     Sleep apnea     sleep apnea uses bipap.    Stroke     Thyroid disease     on synthroid    TIA (transient ischemic attack)     Urinary tract infection        Past Surgical History:   Procedure Laterality Date    ABDOMINAL SURGERY      ADENOIDECTOMY      ARTHROSCOPY, KNEE, WITH CHONDROPLASTY Right 8/31/2018    [facility]    ARTHROSCOPY, KNEE, WITH MENISCECTOMY Right 8/31/2018    [facility] at [facility] OR    COLONOSCOPY  03/05/2013    repeat in 5 years    COLONOSCOPY N/A 4/11/2018    Performed by Luis Navarro MD at [facility] ENDO    COLONOSCOPY N/A 5/31/2017    [facility] ENDO    COLONOSCOPY N/A 3/5/2013    [facility] by Florian Randall [facility] at [facility] ENDO    COSMETIC SURGERY      belt abdominoplasty    CYSTOSCOPY      CYSTOSCOPY  N/A 8/6/2018    [facility]    EGD (ESOPHAGOGASTRODUODENOSCOPY) N/A 3/5/2013    [facility] at [facility] ENDO    ESOPHAGOGASTRODUODENOSCOPY (EGD) N/A 1/11/2018    [facility] at [facility] ENDO    ESOPHAGOGASTRODUODENOSCOPY (EGD) N/A 12/27/2016    [facility] at [facility] ENDO    ESOPHAGOGASTRODUODENOSCOPY (EGD) N/A 10/25/2016    [facility] at [facility] ENDO    ESOPHAGOGASTRODUODENOSCOPY (EGD) N/A 8/24/2016    [facility] ENDO    ESOPHAGOGASTRODUODENOSCOPY (EGD) N/A 7/21/2016    Performed by [facility]ian Randall [facility] at [facility] ENDO    ESOPHAGOGASTRODUODENOSCOPY (EGD)-94504 N/A 12/12/2014    [facility]formed [facility] at [facility] OR 2ND FLR    EYE SURGERY Right     mazzulla    GASTRECTOMY      GASTRECTOMY-SLEEVE-LAPAROSCOPIC-38975; EGD-[id number] N/A 12/12/2014    [facility] at [facility] OR 2ND FLR    gastric sleeve      HERNIA REPAIR      5 years old    HYSTERECTOMY      ovaries remain    PANNICULECTOMY N/A 11/28/2016    Performed by Christiano Becerril [facility] at [facility] OR 2ND FLR    REFRACTIVE SURGERY      mono va//ou//    REPAIR-HERNIA  11/28/2016    Performed by [facility]il [facility] at [facility] OR 2ND FLR    RHINOPLASTY TIP      TONSILLECTOMY      TUBAL LIGATION      UPPER GASTROINTESTINAL ENDOSCOPY  03/05/2013    UPPER GASTROINTESTINAL ENDOSCOPY  08/24/2016    Dr. Navarro, repeat in 8 weeks    UPPER GASTROINTESTINAL ENDOSCOPY  07/21/2016    Dr. Randall       Current Outpatient Medications   Medication Sig    albuterol-ipratropium 2.5mg-0.5mg/3mL (DUO-NEB) 0.5 mg-3 mg(2.5 mg base)/3 mL nebulizer solution Take 3 mLs by nebulization every 6 (six) hours as needed for Wheezing. Rescue    allopurinol (ZYLOPRIM) 100 MG tablet TAKE TWO TABLETS BY MOUTH AT BEDTIME    amitriptyline (ELAVIL) 50 MG tablet TAKE 2 TABLETS BY MOUTH IN THE  EVENING    atenolol (TENORMIN) 25 MG tablet Take 1 tablet (25 mg total) by mouth 2 (two) times daily.    calcitRIOL (ROCALTROL) 0.25 MCG Cap Take 1 capsule by mouth twice a week. Monday Friday    CALCIUM CITRATE/VITAMIN D3 (CALCIUM CITRATE + D ORAL) Take 400 mg by mouth 3 (three) times daily.    cetirizine (ZYRTEC) 10 MG tablet Take 1 tablet (10 mg total) by mouth once daily.    clotrimazole-betamethasone 1-0.05% (LOTRISONE) cream     cyanocobalamin, vitamin B-12, (VITAMIN B-12) 5,000 mcg Subl Place 5,000 mg under the tongue once a week.    DENOSUMAB (PROLIA SUBQ) Inject into the skin every 6 (six) months.     DIABETIC SUPPLIES,MISCELL ([employer] REMOTE CONTROL MISC) No Sig Provided    diphenoxylate-atropine 2.5-0.025 mg (LOMOTIL) 2.5-0.025 mg per tablet TAKE ONE TABLET BY MOUTH 4 TIMES DAILY AS NEEDED FOR DIARRHEA    DYMISTA 137-50 mcg/spray Spry nassal spray     fluoxetine (PROZAC) 20 MG capsule TAKE TWO CAPSULES BY MOUTH ONCE DAILY    fluticasone (FLONASE) 50 mcg/actuation nasal spray 2 sprays by Nasal route once daily.     fluticasone (FLONASE) 50 mcg/actuation nasal spray USE 2 SPRAY(S) IN EACH NOSTRIL ONCE DAILY    fluticasone-vilanterol (BREO) 100-25 mcg/dose diskus inhaler Inhale 1 puff into the lungs once daily.    gabapentin (NEURONTIN) 100 MG capsule     insulin aspart U-100 (NOVOLOG) 100 unit/mL injection Use per insulin pump, 35-40 units daily.    KLOR-CON M20 20 mEq tablet TAKE ONE TABLET BY MOUTH TWICE DAILY    l-methylfolate-b2-b6-b12 (CEREFOLIN) 6-5-50-1 mg Tab Take 1 tablet by mouth once daily.    Lacto.acidophilus-Bif.animalis (PROBIOTIC) 5 billion cell CpSP Take 1 capsule by mouth once daily.    levothyroxine (SYNTHROID) 25 MCG tablet TAKE ONE TABLET BY MOUTH ONCE DAILY    LORazepam (ATIVAN) 1 MG tablet Take 1 tablet (1 mg total) by mouth every evening.    LORazepam (ATIVAN) 1 MG tablet TAKE 1 TABLET BY MOUTH EVERY 12 HOURS AS NEEDED FOR ANXIETY    losartan (COZAAR) 50 MG  tablet Take 1 tablet (50 mg total) by mouth once daily.    melatonin 3 mg Tab Take 5 mg by mouth every evening. 10 MG NIGHTLY    MULTIVIT-IRON-MIN-FOLIC ACID 3,500-18-0.4 UNIT-MG-MG ORAL CHEW Take by mouth 2 (two) times daily.    mupirocin (BACTROBAN) 2 % ointment     oxyCODONE (ROXICODONE) 15 MG Tab Take 1 tablet (15 mg total) by mouth every 6 (six) hours as needed for Pain.    pantoprazole (PROTONIX) 40 MG tablet TAKE ONE TABLET BY MOUTH ONCE DAILY    polymyxin B sulf-trimethoprim (POLYTRIM) 10,000 unit- 1 mg/mL Drop Place 1 drop into both eyes every 4 (four) hours.    ranitidine (ZANTAC) 300 MG tablet TAKE ONE TABLET BY MOUTH IN THE EVENING    SSD 1 % cream     torsemide (DEMADEX) 20 MG Tab 1 tab every other day then 2 tab every day    estradiol (ESTRACE) 0.01 % (0.1 mg/gram) vaginal cream Place 1 g vaginally 3 (three) times a week. Once a day for first two weeks then 3x a week after     Current Facility-Administered Medications   Medication    gentamicin injection 80 mg     Facility-Administered Medications Ordered in Other Visits   Medication    lactated ringers infusion       Review of patient's allergies indicates:   Allergen Reactions    Adhesive Other (See Comments)     Blisters    Cleocin [clindamycin hcl] Hives    Ceclor [cefaclor] Hives    Advair diskus [fluticasone-salmeterol]      Dry mouth    Aleve [naproxen sodium]      Unable to take secondary to kidney function.     Erythromycin Hives    Levaquin [levofloxacin] Dermatitis    Macrobid [nitrofurantoin monohyd/m-cryst] Other (See Comments)     Stomach pain/ GI issues    Macrodantin [nitrofurantoin macrocrystalline] Hives    Motrin [ibuprofen]      Unable to take secondary to kidney functions.    Restoril [temazepam]      LIGHTHEADED UPON WAKING.with poor results    Sulfa (sulfonamide antibiotics)      Hold due to renal problems    Trazodone      PALPITATIONS    Remeron [mirtazapine] Palpitations and Other (See Comments)      Jittery    Vancomycin analogues Rash     Feet broke out/inflammed blood vessels         Family History   Problem Relation Age of Onset    Heart disease Mother 59    Cancer Mother 59        throat    Allergies Mother     Diabetes Mother     Heart disease Father 70        flutter    Allergies Father     Diabetes Father     Cancer Sister 22        22 thyroid,49  breast    Allergies Sister     Breast cancer Sister     Diabetes Sister     Cancer Brother 62        lung    Diabetes Brother     Asthma Daughter     Diabetes Daughter     Depression Daughter     Asthma Grandchild     Eczema Grandchild     Eczema Grandchild     Breast cancer Maternal Grandmother     Ovarian cancer Cousin     Amblyopia Neg Hx     Blindness Neg Hx     Cataracts Neg Hx     Glaucoma Neg Hx     Hypertension Neg Hx     Macular degeneration Neg Hx     Retinal detachment Neg Hx     Strabismus Neg Hx     Stroke Neg Hx     Thyroid disease Neg Hx     Angioedema Neg Hx     Immunodeficiency Neg Hx     Allergic rhinitis Neg Hx     Atopy Neg Hx     Rhinitis Neg Hx     Urticaria Neg Hx     Colon cancer Neg Hx     Colon polyps Neg Hx     Crohn's disease Neg Hx     Ulcerative colitis Neg Hx     Celiac disease Neg Hx        Social History     Socioeconomic History    Marital status:      Spouse name: Not on file    Number of children: Not on file    Years of education: Not on file    Highest education level: Not on file   Social Needs    Financial resource strain: Not on file    Food insecurity - worry: Not on file    Food insecurity - inability: Not on file    Transportation needs - medical: Not on file    Transportation needs - non-medical: Not on file   Occupational History    Not on file   Tobacco Use    Smoking status: Former Smoker     Types: Cigarettes    Smokeless tobacco: Never Used    Tobacco comment: quit 1990   Substance and Sexual Activity    Alcohol use: Yes     Comment: rare    Drug  use: No    Sexual activity: Yes     Birth control/protection: Surgical   Other Topics Concern    Not on file   Social History Narrative    Not on file       Chief Complaint:   Chief Complaint   Patient presents with    Knee Pain     bilateral synvisc 3/3       Date of surgery:  August 31, 2018    History of present illness:  67-year-old with a history of bilateral knee arthritis.  Patient had moderate medial compartment arthritic changes.  Pain is a 7/10.  Feels better after the surgery. She is able to walk with less pain but still having some issues.  Did have a couple falls.  Wearing a patellar tendon strap.      Review of Systems:    Musculoskeletal:  See HPI        Physical Examination:    Vital Signs:    There were no vitals filed for this visit.    There is no height or weight on file to calculate BMI.    This a well-developed, well nourished patient in no acute distress.  They are alert and oriented and cooperative to examination.  Pt. walks without an antalgic gait.      Examination of both knees shows no rashes or erythema. There are no masses ecchymosis or effusion. Patient has full range of motion from 0-130°. Patient is nontender to palpation over lateral joint line and moderately tender to palpation over the medial joint line. Patient has a - Lachman exam, - anterior drawer exam, and - posterior drawer exam. - Mervin's exam. Knee is stable to varus and valgus stress. 5 out of 5 motor strength. Palpable distal pulses. Intact light touch sensation. Negative Patellofemoral crepitus      X-rays:  None     Assessment::  Bilateral knee arthritis    Plan:  I injected both knees with Synvisc 3 of 3.  Follow up as needed.    This note was created using [employer] voice recognition software that occasionally misinterpreted phrases or words.

## 2019-01-08 ENCOUNTER — TELEPHONE (OUTPATIENT)
Dept: CARDIOLOGY | Facility: CLINIC | Age: 69
End: 2019-01-08

## 2019-01-08 NOTE — TELEPHONE ENCOUNTER
----- Message from Adná Figueroa sent at 1/8/2019  2:09 PM CST -----  Contact: Patient  Type: Needs Medical Advice    Who Called:  Patient  Pharmacy name and phone #:    [id number] - [place], [place] - [address]  [address]  [place] [address]  Phone: [phone] Fax: [phone]  Best Call Back Number: [phone]  Additional Information: Patient states that medication losartan (COZAAR) 50 MG tablet has been recalled and would to talk to the nurse/doctor. Please call to advise. Thanks!

## 2019-01-14 ENCOUNTER — TELEPHONE (OUTPATIENT)
Dept: ORTHOPEDICS | Facility: CLINIC | Age: 69
End: 2019-01-14

## 2019-01-14 NOTE — TELEPHONE ENCOUNTER
----- Message from Kavita Huber sent at 1/14/2019  3:06 PM CST -----  Contact: Patient  Type: Needs Medical Advice    Who Called:  Patient  Best Call Back Number:   Additional Information: Calling to follow up on the Walker request. She said it should have been ordered in December 2018. Please advise.

## 2019-01-14 NOTE — TELEPHONE ENCOUNTER
Called pt and advised I will have our orthotic fitter give her a call to set up a time to drop off walker. Pt verbalized understanding.

## 2019-01-16 RX ORDER — MUPIROCIN 20 MG/G
OINTMENT TOPICAL
Qty: 15 G | Refills: 11 | Status: SHIPPED | OUTPATIENT
Start: 2019-01-16

## 2019-01-17 ENCOUNTER — TELEPHONE (OUTPATIENT)
Dept: UROLOGY | Facility: CLINIC | Age: 69
End: 2019-01-17

## 2019-01-17 ENCOUNTER — OFFICE VISIT (OUTPATIENT)
Dept: OBSTETRICS AND GYNECOLOGY | Facility: CLINIC | Age: 69
End: 2019-01-17
Attending: OBSTETRICS & GYNECOLOGY
Payer: MEDICARE

## 2019-01-17 VITALS — HEIGHT: 62 IN | BODY MASS INDEX: 43.37 KG/M2 | WEIGHT: 235.69 LBS

## 2019-01-17 DIAGNOSIS — R10.2 FEMALE PELVIC PAIN: ICD-10-CM

## 2019-01-17 DIAGNOSIS — R31.0 GROSS HEMATURIA: Primary | ICD-10-CM

## 2019-01-17 DIAGNOSIS — R60.0 EDEMA EXTREMITIES: ICD-10-CM

## 2019-01-17 PROCEDURE — 99214 OFFICE O/P EST MOD 30 MIN: CPT | Mod: S$GLB,,, | Performed by: OBSTETRICS & GYNECOLOGY

## 2019-01-17 PROCEDURE — 99214 PR OFFICE/OUTPT VISIT, EST, LEVL IV, 30-39 MIN: ICD-10-PCS | Mod: S$GLB,,, | Performed by: OBSTETRICS & GYNECOLOGY

## 2019-01-17 PROCEDURE — 99999 PR PBB SHADOW E&M-EST. PATIENT-LVL IV: ICD-10-PCS | Mod: PBBFAC,,, | Performed by: OBSTETRICS & GYNECOLOGY

## 2019-01-17 PROCEDURE — 99999 PR PBB SHADOW E&M-EST. PATIENT-LVL IV: CPT | Mod: PBBFAC,,, | Performed by: OBSTETRICS & GYNECOLOGY

## 2019-01-17 PROCEDURE — 1101F PR PT FALLS ASSESS DOC 0-1 FALLS W/OUT INJ PAST YR: ICD-10-PCS | Mod: CPTII,S$GLB,, | Performed by: OBSTETRICS & GYNECOLOGY

## 2019-01-17 PROCEDURE — 1101F PT FALLS ASSESS-DOCD LE1/YR: CPT | Mod: CPTII,S$GLB,, | Performed by: OBSTETRICS & GYNECOLOGY

## 2019-01-17 RX ORDER — TORSEMIDE 20 MG/1
TABLET ORAL
Qty: 60 TABLET | Refills: 2 | Status: SHIPPED | OUTPATIENT
Start: 2019-01-17 | End: 2019-04-10

## 2019-01-17 NOTE — LETTER
January 17, 2019      Angy Campos MD  [address] [place] [address] [address]  [address]  [facility] [address]           [facility]  [address] [address] [address]  Phone: [phone]  Fax: [phone]          Patient: Fifi Mcnair   MR Number: [id number]   YOB: 1950   Date of Visit: 1/17/2019       Dear Dr. Angy Campos:    Thank you for referring Fifi Mcnair to me for evaluation. Attached you will find relevant portions of my assessment and plan of care.    If you have questions, please do not hesitate to call me. I look forward to following Fifi Mcnair along with you.    Sincerely,    Adán Moctezuma MD    Enclosure  CC:  No Recipients    If you would like to receive this communication electronically, please contact [email or web address] or [phone] to request more information on [employer] access.    For providers and/or their staff who would like to refer a patient to [facility], please contact us through our one-stop-shop provider referral line, [facility], at [phone].    If you feel you have received this communication in error or would no longer like to receive these types of communications, please e-mail [email or web address]

## 2019-01-17 NOTE — TELEPHONE ENCOUNTER
Please have pt return on 2/1/19 for appointment to discuss her persistent hematuria.   has seen her with no gyn source.  Her last cytology was abnormal in 10/2018 but her biopsy was negative.    I want her to return soon for a cath urine for ua, culture AND very important, cytology. I would like to be able to review these results with her at her f/u appt.  Will likely do another cysto      URINE, VOIDED 10/10/18:  - Urothelial atypia; scattered groups of atypical urothelial cells, benign and reactive urothelial cells, benign  squamous cells and moderate-to-marked acute inflammation (see comment).  COMMENT: The groups of atypical urothelial cells seen in this specimen most likely represent reactive cells.  However, this cannot be determined with certainty. Clinical correlation, close clinical follow-up and a repeat urine  cytology after the infection/inflammation has resolved, are recommended.

## 2019-01-17 NOTE — PROGRESS NOTES
Chief Complaint   Patient presents with    new patient    Vaginal Bleeding       History of Present Illness   68 y.o.  female patient presents today for recurrent bladder pain / frequency and hematuria self reported. Urology ruled out hematuria and she is here today to rule out vaginal source of bleeding. Patient reports hysterectomy for benign disease >20 years ago and not currently sexually active. She reports blood seen only in toilet while voiding, none with BMs or in underwear.     Past medical and surgical history reviewed.   I have reviewed the patient's medical history in detail and updated the computerized patient record.    Review of patient's allergies indicates:   Allergen Reactions    Adhesive Other (See Comments)     Blisters    Cleocin [clindamycin hcl] Hives    Ceclor [cefaclor] Hives    Advair diskus [fluticasone-salmeterol]      Dry mouth    Aleve [naproxen sodium]      Unable to take secondary to kidney function.     Erythromycin Hives    Levaquin [levofloxacin] Dermatitis    Macrobid [nitrofurantoin monohyd/m-cryst] Other (See Comments)     Stomach pain/ GI issues    Macrodantin [nitrofurantoin macrocrystalline] Hives    Motrin [ibuprofen]      Unable to take secondary to kidney functions.    Restoril [temazepam]      LIGHTHEADED UPON WAKING.with poor results    Sulfa (sulfonamide antibiotics)      Hold due to renal problems    Trazodone      PALPITATIONS    Remeron [mirtazapine] Palpitations and Other (See Comments)     Jittery    Vancomycin analogues Rash     Feet broke out/inflammed blood vessels         OB History      Para Term  AB Living    2 2 2     2    SAB TAB Ectopic Multiple Live Births                       Past Medical History:   Diagnosis Date    Allergy     multiple antibiotic allergies    Anemia     Anxiety     Arthritis     Asthma     CHF (congestive heart failure)     NYHA class III     Chronic kidney disease     Colon polyp      benign    COPD (chronic obstructive pulmonary disease)     DLCO 37% , and mild pulmonary HTN    Depression     Diabetes mellitus     Diabetic retinopathy     Diastolic dysfunction     Diverticulitis of large intestine without perforation or abscess without bleeding 2/12/2018    Diverticulosis     Former smoker     Fracture of lumbar spine     GERD (gastroesophageal reflux disease)     Hernia of unspecified site of abdominal cavity without mention of obstruction or gangrene     Hyperlipidemia     Hypertension     hypertensive renal    IBS (irritable bowel syndrome)     Mild nonproliferative diabetic retinopathy(362.04) 11/25/2013    Morbid obesity     Nuclear sclerosis 11/25/2013    Osteoporosis     Peripheral edema     Rash     Recurrent upper respiratory infection (URI)     S/P LASIK (laser assisted in situ keratomileusis)     Sleep apnea     sleep apnea uses bipap.    Stroke     Thyroid disease     on synthroid    TIA (transient ischemic attack)     Urinary tract infection        Past Surgical History:   Procedure Laterality Date    ABDOMINAL SURGERY      ADENOIDECTOMY      ARTHROSCOPY, KNEE, WITH CHONDROPLASTY Right 8/31/2018    [facility]    ARTHROSCOPY, KNEE, WITH MENISCECTOMY Right 8/31/2018    [facility]    COLONOSCOPY  03/05/2013    repeat in 5 years    COLONOSCOPY N/A 4/11/2018    [facility]formed [facility]arro [facility] ENDO    COLONOSCOPY N/A 5/31/2017    [facility] ENDO    COLONOSCOPY N/A 3/5/2013    [facility]ian Randall [facility] ENDO    COSMETIC SURGERY      belt abdominoplasty    CYSTOSCOPY      CYSTOSCOPY N/A 8/6/2018    [facility]    EGD (ESOPHAGOGASTRODUODENOSCOPY) N/A 3/5/2013    [facility] ENDO    ESOPHAGOGASTRODUODENOSCOPY (EGD) N/A 1/11/2018    [facility]    ESOPHAGOGASTRODUODENOSCOPY (EGD) N/A 12/27/2016    [facility] ENDO    ESOPHAGOGASTRODUODENOSCOPY (EGD) N/A 10/25/2016    [facility] ENDO    ESOPHAGOGASTRODUODENOSCOPY (EGD) N/A 8/24/2016    [facility] ENDO    ESOPHAGOGASTRODUODENOSCOPY (EGD) N/A 7/21/2016    [facility] ENDO    ESOPHAGOGASTRODUODENOSCOPY (EGD)-93063 N/A 12/12/2014    [facility] at [facility] OR [address] [place] [address]    EYE SURGERY Right     mazzulla    GASTRECTOMY      GASTRECTOMY-SLEEVE-LAPAROSCOPIC-44306; EGD-[id number] N/A 12/12/2014    [facility]formed [facility] at [facility] OR [address] [place] [address]    gastric sleeve      HERNIA REPAIR      5 years old    HYSTERECTOMY      ovaries remain    PANNICULECTOMY N/A 11/28/2016    Performed by [facility]o [facility]erril [facility] at [facility] OR 2ND FLR    REFRACTIVE SURGERY      mono va//ou//    REPAIR-HERNIA  11/28/2016    [facility]    RHINOPLASTY TIP      TONSILLECTOMY      TUBAL LIGATION      UPPER GASTROINTESTINAL ENDOSCOPY  03/05/2013    UPPER GASTROINTESTINAL ENDOSCOPY  08/24/2016    Dr. Navarro, repeat in 8 weeks    UPPER GASTROINTESTINAL ENDOSCOPY  07/21/2016    Dr. Randall       Current Outpatient Medications on File Prior to Visit   Medication Sig Dispense Refill    albuterol-ipratropium 2.5mg-0.5mg/3mL (DUO-NEB) 0.5 mg-3 mg(2.5 mg base)/3 mL nebulizer solution Take 3 mLs by nebulization every 6 (six) hours as needed for Wheezing. Rescue 1 Box 3    allopurinol (ZYLOPRIM) 100 MG tablet TAKE TWO TABLETS BY MOUTH AT BEDTIME 180 tablet 4    amitriptyline (ELAVIL) 50 MG tablet TAKE 2 TABLETS BY MOUTH IN THE EVENING 60 tablet 0    atenolol (TENORMIN) 25 MG tablet Take 1 tablet (25 mg total) by mouth 2 (two) times daily. 180 tablet 4    calcitRIOL (ROCALTROL) 0.25 MCG Cap Take 1 capsule  by mouth twice a week. Monday Friday      CALCIUM CITRATE/VITAMIN D3 (CALCIUM CITRATE + D ORAL) Take 400 mg by mouth 3 (three) times daily.      cetirizine (ZYRTEC) 10 MG tablet Take 1 tablet (10 mg total) by mouth once daily. 1 Bottle 5    clotrimazole-betamethasone 1-0.05% (LOTRISONE) cream       cyanocobalamin, vitamin B-12, (VITAMIN B-12) 5,000 mcg Subl Place 5,000 mg under the tongue once a week.      DENOSUMAB (PROLIA SUBQ) Inject into the skin every 6 (six) months.       DIABETIC SUPPLIES,MISCELL ([employer] REMOTE CONTROL MISC) No Sig Provided      diphenoxylate-atropine 2.5-0.025 mg (LOMOTIL) 2.5-0.025 mg per tablet TAKE ONE TABLET BY MOUTH 4 TIMES DAILY AS NEEDED FOR DIARRHEA 60 tablet 1    DYMISTA 137-50 mcg/spray Spry nassal spray       fluoxetine (PROZAC) 20 MG capsule TAKE TWO CAPSULES BY MOUTH ONCE DAILY 180 capsule 4    fluticasone (FLONASE) 50 mcg/actuation nasal spray 2 sprays by Nasal route once daily.       fluticasone (FLONASE) 50 mcg/actuation nasal spray USE 2 SPRAY(S) IN EACH NOSTRIL ONCE DAILY 1 Bottle 1    fluticasone-vilanterol (BREO) 100-25 mcg/dose diskus inhaler Inhale 1 puff into the lungs once daily.      gabapentin (NEURONTIN) 100 MG capsule       insulin aspart U-100 (NOVOLOG) 100 unit/mL injection Use per insulin pump, 35-40 units daily. 40 mL 0    KLOR-CON M20 20 mEq tablet TAKE ONE TABLET BY MOUTH TWICE DAILY 180 tablet 3    l-methylfolate-b2-b6-b12 (CEREFOLIN) 6-5-50-1 mg Tab Take 1 tablet by mouth once daily.      Lacto.acidophilus-Bif.animalis (PROBIOTIC) 5 billion cell CpSP Take 1 capsule by mouth once daily. 30 capsule 3    levothyroxine (SYNTHROID) 25 MCG tablet TAKE ONE TABLET BY MOUTH ONCE DAILY 90 tablet 3    LORazepam (ATIVAN) 1 MG tablet Take 1 tablet (1 mg total) by mouth every evening. 30 tablet 2    LORazepam (ATIVAN) 1 MG tablet TAKE 1 TABLET BY MOUTH EVERY 12 HOURS AS NEEDED FOR ANXIETY 45 tablet 2    losartan (COZAAR) 50 MG tablet Take 1  tablet (50 mg total) by mouth once daily. 90 tablet 3    melatonin 3 mg Tab Take 5 mg by mouth every evening. 10 MG NIGHTLY      MULTIVIT-IRON-MIN-FOLIC ACID 3,500-18-0.4 UNIT-MG-MG ORAL CHEW Take by mouth 2 (two) times daily.      mupirocin (BACTROBAN) 2 % ointment APPLY OINTMENT TOPICALLY TO AFFECTED AREA ON NOSE THREE TIMES DAILY AS DIRECTED 15 g 11    oxyCODONE (ROXICODONE) 15 MG Tab Take 1 tablet (15 mg total) by mouth every 6 (six) hours as needed for Pain. 30 tablet 0    pantoprazole (PROTONIX) 40 MG tablet TAKE ONE TABLET BY MOUTH ONCE DAILY 90 tablet 3    polymyxin B sulf-trimethoprim (POLYTRIM) 10,000 unit- 1 mg/mL Drop Place 1 drop into both eyes every 4 (four) hours. 10 mL 0    ranitidine (ZANTAC) 300 MG tablet TAKE ONE TABLET BY MOUTH IN THE EVENING 30 tablet 1    SSD 1 % cream       torsemide (DEMADEX) 20 MG Tab 1 tab every other day then 2 tab every day 60 tablet 2    [DISCONTINUED] estradiol (ESTRACE) 0.01 % (0.1 mg/gram) vaginal cream Place 1 g vaginally 3 (three) times a week. Once a day for first two weeks then 3x a week after 42.5 g 6     Current Facility-Administered Medications on File Prior to Visit   Medication Dose Route Frequency Provider Last Rate Last Dose    gentamicin injection 80 mg  80 mg Intramuscular 1 time in Clinic/HOD Angy Campos MD        lactated ringers infusion   Intravenous Continuous Shaheen Hanson MD 10 mL/hr at 08/31/18 0739         Review of patient's allergies indicates:   Allergen Reactions    Adhesive Other (See Comments)     Blisters    Cleocin [clindamycin hcl] Hives    Ceclor [cefaclor] Hives    Advair diskus [fluticasone-salmeterol]      Dry mouth    Aleve [naproxen sodium]      Unable to take secondary to kidney function.     Erythromycin Hives    Levaquin [levofloxacin] Dermatitis    Macrobid [nitrofurantoin monohyd/m-cryst] Other (See Comments)     Stomach pain/ GI issues    Macrodantin [nitrofurantoin macrocrystalline]  Hives    Motrin [ibuprofen]      Unable to take secondary to kidney functions.    Restoril [temazepam]      LIGHTHEADED UPON WAKING.with poor results    Sulfa (sulfonamide antibiotics)      Hold due to renal problems    Trazodone      PALPITATIONS    Remeron [mirtazapine] Palpitations and Other (See Comments)     Jittery    Vancomycin analogues Rash     Feet broke out/inflammed blood vessels         Social History     Socioeconomic History    Marital status:      Spouse name: Not on file    Number of children: Not on file    Years of education: Not on file    Highest education level: Not on file   Social Needs    Financial resource strain: Not on file    Food insecurity - worry: Not on file    Food insecurity - inability: Not on file    Transportation needs - medical: Not on file    Transportation needs - non-medical: Not on file   Occupational History    Not on file   Tobacco Use    Smoking status: Former Smoker     Types: Cigarettes    Smokeless tobacco: Never Used    Tobacco comment: quit 1990   Substance and Sexual Activity    Alcohol use: Yes     Comment: rare    Drug use: No    Sexual activity: Yes     Birth control/protection: Surgical   Other Topics Concern    Not on file   Social History Narrative    Not on file       Family History   Problem Relation Age of Onset    Heart disease Mother 59    Cancer Mother 59        throat    Allergies Mother     Diabetes Mother     Heart disease Father 70        flutter    Allergies Father     Diabetes Father     Cancer Sister 22        22 thyroid,49  breast    Allergies Sister     Breast cancer Sister     Diabetes Sister     Cancer Brother 62        lung    Diabetes Brother     Asthma Daughter     Diabetes Daughter     Depression Daughter     Asthma Grandchild     Eczema Grandchild     Eczema Grandchild     Breast cancer Maternal Grandmother     Ovarian cancer Cousin     Amblyopia Neg Hx     Blindness Neg Hx      "Cataracts Neg Hx     Glaucoma Neg Hx     Hypertension Neg Hx     Macular degeneration Neg Hx     Retinal detachment Neg Hx     Strabismus Neg Hx     Stroke Neg Hx     Thyroid disease Neg Hx     Angioedema Neg Hx     Immunodeficiency Neg Hx     Allergic rhinitis Neg Hx     Atopy Neg Hx     Rhinitis Neg Hx     Urticaria Neg Hx     Colon cancer Neg Hx     Colon polyps Neg Hx     Crohn's disease Neg Hx     Ulcerative colitis Neg Hx     Celiac disease Neg Hx          Review of Systems - Negative except HPI  GEN ROS: negative for - chills or fever  Breast ROS: negative for breast lumps  Genito-Urinary ROS: positive for - pelvic pain      Physical Examination:  Ht 5' 2" (1.575 m)   Wt 106.9 kg (235 lb 10.8 oz)   LMP  (LMP Unknown)   BMI 43.10 kg/m²    Constitutional: She is oriented to person, place, and time. She appears well-developed and well-nourished. No distress.  HENT:   Head: Normocephalic and atraumatic.   Eyes: Conjunctivae and EOM are normal. No scleral icterus.   Neck: Normal range of motion. Neck supple. No tracheal deviation present.   Cardiovascular: Normal rate.    Abdominal: Soft. She exhibits no distension and no mass. There is no tenderness. There is no rebound and no guarding. Obese.   Genitourinary:    External rectal exam shows no thrombosed external hemorrhoids.    Pelvic exam was performed with patient supine.   No labial fusion.   There is no rash, lesion or injury on the right labia.   There is no rash, lesion or injury on the left labia.   No bleeding and no signs of injury around the vaginal introitus, urethra is without lesions and well supported.    No vaginal discharge found. Pale and atrophic but without lesions or trauma.    No significant Cystocele, Enterocele or rectocele, and cuff well supported.   Bimanual exam:   The urethra and vagina are without palpable masses or tenderness.   Uterus and cervix are surgically absents, vaginal cuff is intact and well supported.   " Right adnexum displays no mass and no tenderness.   Left adnexum displays no mass and no tenderness.  Musculoskeletal: Normal range of motion.   Lymphadenopathy: No inguinal adenopathy present.   Neurological: She is alert and oriented to person, place, and time. Coordination normal.   Skin: Skin is warm and dry. She is not diaphoretic.   Psychiatric: She has a normal mood and affect.          Assessment:  Hematuria, pelvic pain  Normal menopausal GYN exam s/p hysterectomy      Plan:  No GYN intervention indicated at this time.   Patient informed will be contacted with results within 2 weeks. Encouraged to please call back or email if she has not heard from us by then.

## 2019-01-17 NOTE — TELEPHONE ENCOUNTER
Spoke with patient informed her of recommendations. Patient states she will keep appointment on 2/12 declined appointment on 2/1. Nurse visit scheduled for 1/22

## 2019-01-30 ENCOUNTER — LAB VISIT (OUTPATIENT)
Dept: LAB | Facility: HOSPITAL | Age: 69
End: 2019-01-30
Attending: INTERNAL MEDICINE
Payer: MEDICARE

## 2019-01-30 DIAGNOSIS — D47.2 MGUS (MONOCLONAL GAMMOPATHY OF UNKNOWN SIGNIFICANCE): ICD-10-CM

## 2019-01-30 LAB
BASOPHILS # BLD AUTO: 0 K/UL
BASOPHILS NFR BLD: 0.3 %
DIFFERENTIAL METHOD: ABNORMAL
EOSINOPHIL # BLD AUTO: 0.1 K/UL
EOSINOPHIL NFR BLD: 1.7 %
ERYTHROCYTE [DISTWIDTH] IN BLOOD BY AUTOMATED COUNT: 14 %
HCT VFR BLD AUTO: 37.1 %
HGB BLD-MCNC: 12.5 G/DL
IGA SERPL-MCNC: 365 MG/DL
LYMPHOCYTES # BLD AUTO: 1.9 K/UL
LYMPHOCYTES NFR BLD: 30.4 %
MCH RBC QN AUTO: 32.1 PG
MCHC RBC AUTO-ENTMCNC: 33.8 G/DL
MCV RBC AUTO: 95 FL
MONOCYTES # BLD AUTO: 0.4 K/UL
MONOCYTES NFR BLD: 5.9 %
NEUTROPHILS # BLD AUTO: 3.8 K/UL
NEUTROPHILS NFR BLD: 61.7 %
PLATELET # BLD AUTO: 246 K/UL
PMV BLD AUTO: 7.9 FL
RBC # BLD AUTO: 3.9 M/UL
WBC # BLD AUTO: 6.1 K/UL

## 2019-01-30 PROCEDURE — 85025 COMPLETE CBC W/AUTO DIFF WBC: CPT | Mod: 91

## 2019-01-30 PROCEDURE — 83520 IMMUNOASSAY QUANT NOS NONAB: CPT | Mod: 59

## 2019-01-30 PROCEDURE — 36415 COLL VENOUS BLD VENIPUNCTURE: CPT

## 2019-01-30 PROCEDURE — 82784 ASSAY IGA/IGD/IGG/IGM EACH: CPT

## 2019-01-31 ENCOUNTER — TELEPHONE (OUTPATIENT)
Dept: FAMILY MEDICINE | Facility: CLINIC | Age: 69
End: 2019-01-31

## 2019-01-31 LAB
KAPPA LC SER QL IA: 4.03 MG/DL
KAPPA LC/LAMBDA SER IA: 1.6
LAMBDA LC SER QL IA: 2.52 MG/DL

## 2019-01-31 NOTE — TELEPHONE ENCOUNTER
----- Message from RT Faye sent at 1/31/2019  3:59 PM CST -----  Contact: pt    pt , returned missed call, called pod, thanks.

## 2019-02-07 ENCOUNTER — OFFICE VISIT (OUTPATIENT)
Dept: HEMATOLOGY/ONCOLOGY | Facility: CLINIC | Age: 69
End: 2019-02-07
Payer: MEDICARE

## 2019-02-07 VITALS
HEIGHT: 62 IN | HEART RATE: 75 BPM | SYSTOLIC BLOOD PRESSURE: 136 MMHG | BODY MASS INDEX: 43.61 KG/M2 | WEIGHT: 237 LBS | TEMPERATURE: 99 F | DIASTOLIC BLOOD PRESSURE: 67 MMHG | RESPIRATION RATE: 22 BRPM

## 2019-02-07 DIAGNOSIS — Z79.4 CONTROLLED TYPE 2 DIABETES MELLITUS WITH COMPLICATION, WITH LONG-TERM CURRENT USE OF INSULIN: ICD-10-CM

## 2019-02-07 DIAGNOSIS — N18.9 ANEMIA IN CHRONIC KIDNEY DISEASE, UNSPECIFIED CKD STAGE: ICD-10-CM

## 2019-02-07 DIAGNOSIS — I50.32 CHRONIC DIASTOLIC HEART FAILURE, NYHA CLASS 1: ICD-10-CM

## 2019-02-07 DIAGNOSIS — R79.9 ABNORMAL FINDING OF BLOOD CHEMISTRY: ICD-10-CM

## 2019-02-07 DIAGNOSIS — D63.1 ANEMIA IN CHRONIC KIDNEY DISEASE, UNSPECIFIED CKD STAGE: ICD-10-CM

## 2019-02-07 DIAGNOSIS — N18.30 CKD (CHRONIC KIDNEY DISEASE) STAGE 3, GFR 30-59 ML/MIN: ICD-10-CM

## 2019-02-07 DIAGNOSIS — E11.8 CONTROLLED TYPE 2 DIABETES MELLITUS WITH COMPLICATION, WITH LONG-TERM CURRENT USE OF INSULIN: ICD-10-CM

## 2019-02-07 DIAGNOSIS — D47.2 MONOCLONAL GAMMOPATHY ASSOCIATED WITH LYMPHOPLASMACYTIC DYSCRASIAS: Primary | Chronic | ICD-10-CM

## 2019-02-07 DIAGNOSIS — E66.01 MORBID OBESITY WITH BMI OF 40.0-44.9, ADULT: ICD-10-CM

## 2019-02-07 PROCEDURE — 3078F DIAST BP <80 MM HG: CPT | Mod: CPTII,S$GLB,, | Performed by: INTERNAL MEDICINE

## 2019-02-07 PROCEDURE — 99215 OFFICE O/P EST HI 40 MIN: CPT | Mod: S$GLB,,, | Performed by: INTERNAL MEDICINE

## 2019-02-07 PROCEDURE — 1101F PR PT FALLS ASSESS DOC 0-1 FALLS W/OUT INJ PAST YR: ICD-10-PCS | Mod: CPTII,S$GLB,, | Performed by: INTERNAL MEDICINE

## 2019-02-07 PROCEDURE — 99499 UNLISTED E&M SERVICE: CPT | Mod: S$GLB,,, | Performed by: INTERNAL MEDICINE

## 2019-02-07 PROCEDURE — 3045F PR MOST RECENT HEMOGLOBIN A1C LEVEL 7.0-9.0%: CPT | Mod: CPTII,S$GLB,, | Performed by: INTERNAL MEDICINE

## 2019-02-07 PROCEDURE — 99499 RISK ADDL DX/OHS AUDIT: ICD-10-PCS | Mod: S$GLB,,, | Performed by: INTERNAL MEDICINE

## 2019-02-07 PROCEDURE — 3045F PR MOST RECENT HEMOGLOBIN A1C LEVEL 7.0-9.0%: ICD-10-PCS | Mod: CPTII,S$GLB,, | Performed by: INTERNAL MEDICINE

## 2019-02-07 PROCEDURE — 3075F SYST BP GE 130 - 139MM HG: CPT | Mod: CPTII,S$GLB,, | Performed by: INTERNAL MEDICINE

## 2019-02-07 PROCEDURE — 3078F PR MOST RECENT DIASTOLIC BLOOD PRESSURE < 80 MM HG: ICD-10-PCS | Mod: CPTII,S$GLB,, | Performed by: INTERNAL MEDICINE

## 2019-02-07 PROCEDURE — 99999 PR PBB SHADOW E&M-EST. PATIENT-LVL III: ICD-10-PCS | Mod: PBBFAC,,, | Performed by: INTERNAL MEDICINE

## 2019-02-07 PROCEDURE — 3075F PR MOST RECENT SYSTOLIC BLOOD PRESS GE 130-139MM HG: ICD-10-PCS | Mod: CPTII,S$GLB,, | Performed by: INTERNAL MEDICINE

## 2019-02-07 PROCEDURE — 99215 PR OFFICE/OUTPT VISIT, EST, LEVL V, 40-54 MIN: ICD-10-PCS | Mod: S$GLB,,, | Performed by: INTERNAL MEDICINE

## 2019-02-07 PROCEDURE — 99999 PR PBB SHADOW E&M-EST. PATIENT-LVL III: CPT | Mod: PBBFAC,,, | Performed by: INTERNAL MEDICINE

## 2019-02-07 PROCEDURE — 1101F PT FALLS ASSESS-DOCD LE1/YR: CPT | Mod: CPTII,S$GLB,, | Performed by: INTERNAL MEDICINE

## 2019-02-07 NOTE — PROGRESS NOTES
Subjective:       Patient ID: Fifi Mcnair is a 68 y.o. female.    Chief Complaint: I am concerned about my results     HPI    This is a 68  yr old female tolerating prolia for osteoporosis.  She started prolia for osteoporosis and now she has osteopenia. Pt has had a gastric sleeve in the past. She remains on B12 and iron supplement  She has not needed IV iron    Not on prolia started K 2 She is tolerating Synthroid for thyroid disorder and neurontin for neuropathy.  Past Labs revealed an elevated kappa light chain with No further elevation of the ratio, IGA is decreasing .  Bone marrow showed no evidence of a plasma cell dyscrasia in 2016   Pt with history of sleeve gastrectomy contributing to malabsorption of certain vitamins  IgA remains elevated  as with the elevated kappa levels yest stable   She is tolerating allopurinol for gout prevention  She is tolerating Prozac for depression    Current Outpatient Medications:     albuterol-ipratropium 2.5mg-0.5mg/3mL (DUO-NEB) 0.5 mg-3 mg(2.5 mg base)/3 mL nebulizer solution, Take 3 mLs by nebulization every 6 (six) hours as needed for Wheezing. Rescue, Disp: 1 Box, Rfl: 3    allopurinol (ZYLOPRIM) 100 MG tablet, TAKE TWO TABLETS BY MOUTH AT BEDTIME, Disp: 180 tablet, Rfl: 4    amitriptyline (ELAVIL) 50 MG tablet, TAKE 2 TABLETS BY MOUTH IN THE EVENING, Disp: 60 tablet, Rfl: 0    atenolol (TENORMIN) 25 MG tablet, Take 1 tablet (25 mg total) by mouth 2 (two) times daily., Disp: 180 tablet, Rfl: 4    calcitRIOL (ROCALTROL) 0.25 MCG Cap, Take 1 capsule by mouth twice a week. Monday Friday, Disp: , Rfl:     CALCIUM CITRATE/VITAMIN D3 (CALCIUM CITRATE + D ORAL), Take 400 mg by mouth 3 (three) times daily., Disp: , Rfl:     cetirizine (ZYRTEC) 10 MG tablet, Take 1 tablet (10 mg total) by mouth once daily., Disp: 1 Bottle, Rfl: 5    clotrimazole-betamethasone 1-0.05% (LOTRISONE) cream, , Disp: , Rfl:     cyanocobalamin, vitamin B-12, (VITAMIN B-12) 5,000 mcg Subl,  Place 5,000 mg under the tongue once a week., Disp: , Rfl:     DENOSUMAB (PROLIA SUBQ), Inject into the skin every 6 (six) months. , Disp: , Rfl:     DIABETIC SUPPLIES,MISCELL (MEDTRONIC REMOTE CONTROL MISC), No Sig Provided, Disp: , Rfl:     diphenoxylate-atropine 2.5-0.025 mg (LOMOTIL) 2.5-0.025 mg per tablet, TAKE ONE TABLET BY MOUTH 4 TIMES DAILY AS NEEDED FOR DIARRHEA, Disp: 60 tablet, Rfl: 1    DYMISTA 137-50 mcg/spray Berkey nassal spray, , Disp: , Rfl:     fluoxetine (PROZAC) 20 MG capsule, TAKE TWO CAPSULES BY MOUTH ONCE DAILY, Disp: 180 capsule, Rfl: 4    fluticasone (FLONASE) 50 mcg/actuation nasal spray, 2 sprays by Nasal route once daily. , Disp: , Rfl:     fluticasone (FLONASE) 50 mcg/actuation nasal spray, USE 2 SPRAY(S) IN EACH NOSTRIL ONCE DAILY, Disp: 1 Bottle, Rfl: 1    fluticasone-vilanterol (BREO) 100-25 mcg/dose diskus inhaler, Inhale 1 puff into the lungs once daily., Disp: , Rfl:     gabapentin (NEURONTIN) 100 MG capsule, , Disp: , Rfl:     insulin aspart U-100 (NOVOLOG) 100 unit/mL injection, Use per insulin pump, 35-40 units daily., Disp: 40 mL, Rfl: 0    KLOR-CON M20 20 mEq tablet, TAKE ONE TABLET BY MOUTH TWICE DAILY, Disp: 180 tablet, Rfl: 3    l-methylfolate-b2-b6-b12 (CEREFOLIN) 6-5-50-1 mg Tab, Take 1 tablet by mouth once daily., Disp: , Rfl:     Lacto.acidophilus-Bif.animalis (PROBIOTIC) 5 billion cell CpSP, Take 1 capsule by mouth once daily., Disp: 30 capsule, Rfl: 3    levothyroxine (SYNTHROID) 25 MCG tablet, TAKE ONE TABLET BY MOUTH ONCE DAILY, Disp: 90 tablet, Rfl: 3    LORazepam (ATIVAN) 1 MG tablet, Take 1 tablet (1 mg total) by mouth every evening., Disp: 30 tablet, Rfl: 2    LORazepam (ATIVAN) 1 MG tablet, TAKE 1 TABLET BY MOUTH EVERY 12 HOURS AS NEEDED FOR ANXIETY, Disp: 45 tablet, Rfl: 2    losartan (COZAAR) 50 MG tablet, Take 1 tablet (50 mg total) by mouth once daily., Disp: 90 tablet, Rfl: 3    melatonin 3 mg Tab, Take 5 mg by mouth every evening. 10 MG  NIGHTLY, Disp: , Rfl:     MULTIVIT-IRON-MIN-FOLIC ACID 3,500-18-0.4 UNIT-MG-MG ORAL CHEW, Take by mouth 2 (two) times daily., Disp: , Rfl:     mupirocin (BACTROBAN) 2 % ointment, APPLY OINTMENT TOPICALLY TO AFFECTED AREA ON NOSE THREE TIMES DAILY AS DIRECTED, Disp: 15 g, Rfl: 11    oxyCODONE (ROXICODONE) 15 MG Tab, Take 1 tablet (15 mg total) by mouth every 6 (six) hours as needed for Pain., Disp: 30 tablet, Rfl: 0    pantoprazole (PROTONIX) 40 MG tablet, TAKE ONE TABLET BY MOUTH ONCE DAILY, Disp: 90 tablet, Rfl: 3    polymyxin B sulf-trimethoprim (POLYTRIM) 10,000 unit- 1 mg/mL Drop, Place 1 drop into both eyes every 4 (four) hours., Disp: 10 mL, Rfl: 0    ranitidine (ZANTAC) 300 MG tablet, TAKE ONE TABLET BY MOUTH IN THE EVENING, Disp: 30 tablet, Rfl: 1    SSD 1 % cream, , Disp: , Rfl:     torsemide (DEMADEX) 20 MG Tab, TAKE 1 TABLET BY MOUTH EVERY OTHER DAY THEN 2 TABLETS EVERY DAY, Disp: 60 tablet, Rfl: 2    Current Facility-Administered Medications:     gentamicin injection 80 mg, 80 mg, Intramuscular, 1 time in Clinic/HOD, Angy Campos MD    Facility-Administered Medications Ordered in Other Visits:     lactated ringers infusion, , Intravenous, Continuous, Shaheen Hanson MD, Last Rate: 10 mL/hr at 08/31/18 0739    No prolia at this time   All medications and past history have been reviewed.    Review of Systems   Constitutional: Negative.  Negative for fatigue, fever and unexpected weight change.   HENT: Negative.  Negative for ear discharge, mouth sores, rhinorrhea and sore throat.    Eyes: Negative.  Negative for photophobia and itching.   Respiratory: Negative for cough and shortness of breath.    Cardiovascular: Negative.  Negative for chest pain and leg swelling.   Gastrointestinal: Negative.  Negative for abdominal pain, constipation, diarrhea, nausea and vomiting.   Endocrine: Negative.  Negative for cold intolerance and heat intolerance.   Genitourinary: Positive for difficulty  "urinating, dysuria, pelvic pain and urgency. Negative for frequency and hematuria.   Musculoskeletal: Negative for arthralgias, back pain and neck pain.   Skin: Negative for pallor and rash.   Allergic/Immunologic: Negative.    Neurological: Negative for weakness, numbness and headaches.   Hematological: Negative for adenopathy. Does not bruise/bleed easily.   Psychiatric/Behavioral: Negative for agitation and confusion.   All other systems reviewed and are negative.       Pertinent positives are intermittent knee pain and fatigue  Depression screening negative on medication  Positive intermittent dysuria pelvic bloating and pain  Objective:      /67   Pulse 75   Temp 98.9 °F (37.2 °C)   Resp (!) 22   Ht 5' 2" (1.575 m)   Wt 107.5 kg (236 lb 15.9 oz)   LMP  (LMP Unknown)   BMI 43.35 kg/m²     Physical Exam   Constitutional: She is oriented to person, place, and time. She appears well-developed and well-nourished.   HENT:   Head: Normocephalic.   Mouth/Throat: Oropharynx is clear and moist. No oropharyngeal exudate.   Eyes: Conjunctivae are normal. No scleral icterus.   Neck: Normal range of motion. No JVD present. No tracheal deviation present.   Cardiovascular: Normal rate and regular rhythm.   No murmur (2= capillary refill) heard.  Pulmonary/Chest: Effort normal. No respiratory distress. She has no wheezes.   Abdominal: Soft. She exhibits no distension. Tenderness: midepigastric.   Musculoskeletal: Normal range of motion. She exhibits no edema.   Neurological: She is alert and oriented to person, place, and time. No cranial nerve deficit.   Steady gait   Skin: Skin is dry. No rash noted. No erythema.   Psychiatric: She has a normal mood and affect.   Vitals reviewed.    pain in knee better  no midepigastric tenderness although indicated above in the physical exam I cannot find where removed this from the template  Bone marrow with no evidence of plasma cell dyscrasia, no evidence of myelodysplasia.  "     No change in physical exam today   I reviewed the percentage of cells noted in the marrow along with flow cytometry and cytogenetics  Skeletal survey negative for any lytic or ostial sclerotic disease  All labs and imaging have been reviewed.   Lab Results   Component Value Date    WBC 5.90 01/30/2019    HGB 12.5 01/30/2019    HCT 36.8 (L) 01/30/2019    MCV 96 01/30/2019     01/30/2019         CMP  Sodium   Date Value Ref Range Status   01/30/2019 138 136 - 145 mmol/L Final     Potassium   Date Value Ref Range Status   01/30/2019 4.0 3.5 - 5.1 mmol/L Final     Chloride   Date Value Ref Range Status   01/30/2019 100 95 - 110 mmol/L Final     CO2   Date Value Ref Range Status   01/30/2019 26 23 - 29 mmol/L Final     Glucose   Date Value Ref Range Status   01/30/2019 165 (H) 70 - 110 mg/dL Final     BUN, Bld   Date Value Ref Range Status   01/30/2019 13 8 - 23 mg/dL Final     Creatinine   Date Value Ref Range Status   01/30/2019 1.2 0.5 - 1.4 mg/dL Final   08/31/2013 1.1 0.5 - 1.4 mg/dL Final     Calcium   Date Value Ref Range Status   01/30/2019 10.1 8.7 - 10.5 mg/dL Final   08/31/2013 9.5 8.7 - 10.5 mg/dL Final     Total Protein   Date Value Ref Range Status   01/30/2019 7.1 6.0 - 8.4 g/dL Final     Albumin   Date Value Ref Range Status   01/30/2019 3.6 3.5 - 5.2 g/dL Final     Total Bilirubin   Date Value Ref Range Status   01/30/2019 0.4 0.1 - 1.0 mg/dL Final     Comment:     For infants and newborns, interpretation of results should be based  on gestational age, weight and in agreement with clinical  observations.  Premature Infant recommended reference ranges:  Up to 24 hours.............<8.0 mg/dL  Up to 48 hours............<12.0 mg/dL  3-5 days..................<15.0 mg/dL  6-29 days.................<15.0 mg/dL       Alkaline Phosphatase   Date Value Ref Range Status   01/30/2019 120 55 - 135 U/L Final   08/30/2013 93 55 - 135 U/L Final     AST   Date Value Ref Range Status   01/30/2019 23 10 - 40  U/L Final   08/30/2013 21 10 - 40 U/L Final     ALT   Date Value Ref Range Status   01/30/2019 20 10 - 44 U/L Final     Anion Gap   Date Value Ref Range Status   01/30/2019 12 8 - 16 mmol/L Final   08/31/2013 12 5 - 15 meq/L Final     eGFR if    Date Value Ref Range Status   01/30/2019 54 (A) >60 mL/min/1.73 m^2 Final     eGFR if non    Date Value Ref Range Status   01/30/2019 47 (A) >60 mL/min/1.73 m^2 Final     Comment:     Calculation used to obtain the estimated glomerular filtration  rate (eGFR) is the CKD-EPI equation.        Leukemia/Lymphoma Screen - Bone Marrow Right Posterior Iliac Crest   Order: [id number]     Status:  Final result   Visible to patient:  Yes (Patient Portal) Next appt:  02/11/2019 at 02:45 PM in Orthopedics (Larry Molina MD) Dx:  Anemia, unspecified type; Elevated se...   Component 2yr ago   Clinical Diagnosis - Bone Marrow ANM    Body Site - Bone Marrow RPI    Bone Marrow Interpretation No abnormal hematopoietic population is detected in this sample. Correlate with marrow morphology and other ancillary studies.      Comment: Interpreted by: Katarina Hicks MD, Signed on 10/10/2016 at 12:50   Bone Marrow Antibodies Analyzed All analyzed: CD2, CD3, CD4, CD5, CD7, CD8, CD10, CD13, CD19, CD20, CD34, , KAPPA, LAMBDA,CD45 and 7AAD.      Bone Marrow Comment Flow cytometric analysis of  bone marrow shows populations of polyclonal B lymphocytes with a subset of hematogones detected, and T lymphocytes that are immunophenotypically unremarkable.  The blast gate is not increased. Flow differential:  Lymphocytes   7.5%, Monocytes 2.9%, Granulocytes  74.9%, Blast  2.4%, Debris/nRBC 12.2%,  Viability 93.2%.                 Assessment:       Monoclonal gammopathy associated with lymphoplasmacytic dyscrasias  -     CBC auto differential; Future; Expected date: 02/07/2019  -     Iron and TIBC; Future; Expected date: 02/07/2019  -     CMP; Future; Expected  date: 02/07/2019  -     FREE LT CHAIN ANAL; Future; Expected date: 02/07/2019  -     IgA; Future; Expected date: 02/07/2019    CKD (chronic kidney disease) stage 3, GFR 30-59 ml/min    Chronic diastolic heart failure, NYHA class 1    Controlled type 2 diabetes mellitus with complication, with long-term current use of insulin    Morbid obesity with BMI of 40.0-44.9, adult, today 42.5    Abnormal finding of blood chemistry   -     Iron and TIBC; Future; Expected date: 02/07/2019    Anemia in chronic kidney disease, unspecified CKD stage          Plan:     Anemia not requiring intervention:  No evidence of bleeding no need for Procrit GFR 43 is stable for her.  CKD stable   elev IGA : overall decreasing   Explained her path results from prior bone marrow bopsy   Pt no longer meets criteria for prolia due to a change in her density from osteoporosis to osteopenia  She believes she is in need of a knee replacement : cont calcium and vitamin D3  IGA  Elevated , kappa 4.01  Next dexa in Dec of 2019 NO longer on prolia since she has osteopenia   Cont K 2 for bone health   GFR lower than 50   History of broken bones   Increase elavil to twice a day wean up   Cont calcium and vitamin D  Cont prozac for depression    Explained MGUS and the transition to myeloma is rare and less than 2% I also explained this could be a component of her low GFR  Showed her her values and the ratio is absolutely normal  This is NOT myeloma   She cannot take oral bisphosphonate because of GI issues  No need for procrit despite anemia due to renal disease    RTC 3 months for evaluation.  She will call if she has side effects related to Elavil    The plan was discussed with the patient and all questions/concerns have been answered to the patient's satisfaction.

## 2019-02-08 DIAGNOSIS — M25.561 PAIN IN BOTH KNEES, UNSPECIFIED CHRONICITY: Primary | ICD-10-CM

## 2019-02-08 DIAGNOSIS — M25.562 PAIN IN BOTH KNEES, UNSPECIFIED CHRONICITY: Primary | ICD-10-CM

## 2019-02-11 ENCOUNTER — HOSPITAL ENCOUNTER (OUTPATIENT)
Dept: RADIOLOGY | Facility: HOSPITAL | Age: 69
Discharge: HOME | End: 2019-02-11
Attending: ORTHOPAEDIC SURGERY
Payer: MEDICARE

## 2019-02-11 ENCOUNTER — OFFICE VISIT (OUTPATIENT)
Dept: ORTHOPEDICS | Facility: CLINIC | Age: 69
End: 2019-02-11
Payer: MEDICARE

## 2019-02-11 VITALS
BODY MASS INDEX: 43.43 KG/M2 | HEIGHT: 62 IN | DIASTOLIC BLOOD PRESSURE: 63 MMHG | SYSTOLIC BLOOD PRESSURE: 136 MMHG | WEIGHT: 236 LBS | HEART RATE: 92 BPM

## 2019-02-11 DIAGNOSIS — M17.0 ARTHRITIS OF BOTH KNEES: Primary | ICD-10-CM

## 2019-02-11 DIAGNOSIS — M25.562 PAIN IN BOTH KNEES, UNSPECIFIED CHRONICITY: ICD-10-CM

## 2019-02-11 DIAGNOSIS — M65.312 TRIGGER THUMB, LEFT THUMB: ICD-10-CM

## 2019-02-11 DIAGNOSIS — M25.561 PAIN IN BOTH KNEES, UNSPECIFIED CHRONICITY: ICD-10-CM

## 2019-02-11 PROCEDURE — 99214 PR OFFICE/OUTPT VISIT, EST, LEVL IV, 30-39 MIN: ICD-10-PCS | Mod: 57,25,S$GLB, | Performed by: ORTHOPAEDIC SURGERY

## 2019-02-11 PROCEDURE — 1101F PR PT FALLS ASSESS DOC 0-1 FALLS W/OUT INJ PAST YR: ICD-10-PCS | Mod: CPTII,S$GLB,, | Performed by: ORTHOPAEDIC SURGERY

## 2019-02-11 PROCEDURE — 3078F DIAST BP <80 MM HG: CPT | Mod: CPTII,S$GLB,, | Performed by: ORTHOPAEDIC SURGERY

## 2019-02-11 PROCEDURE — 99214 OFFICE O/P EST MOD 30 MIN: CPT | Mod: 57,25,S$GLB, | Performed by: ORTHOPAEDIC SURGERY

## 2019-02-11 PROCEDURE — 99999 PR PBB SHADOW E&M-EST. PATIENT-LVL III: CPT | Mod: PBBFAC,,, | Performed by: ORTHOPAEDIC SURGERY

## 2019-02-11 PROCEDURE — 73564 XR KNEE ORTHO BILAT WITH FLEXION: ICD-10-PCS | Mod: 26,50,, | Performed by: RADIOLOGY

## 2019-02-11 PROCEDURE — 3075F PR MOST RECENT SYSTOLIC BLOOD PRESS GE 130-139MM HG: ICD-10-PCS | Mod: CPTII,S$GLB,, | Performed by: ORTHOPAEDIC SURGERY

## 2019-02-11 PROCEDURE — 1101F PT FALLS ASSESS-DOCD LE1/YR: CPT | Mod: CPTII,S$GLB,, | Performed by: ORTHOPAEDIC SURGERY

## 2019-02-11 PROCEDURE — 99999 PR PBB SHADOW E&M-EST. PATIENT-LVL III: ICD-10-PCS | Mod: PBBFAC,,, | Performed by: ORTHOPAEDIC SURGERY

## 2019-02-11 PROCEDURE — 20550 NJX 1 TENDON SHEATH/LIGAMENT: CPT | Mod: FA,S$GLB,, | Performed by: ORTHOPAEDIC SURGERY

## 2019-02-11 PROCEDURE — 3075F SYST BP GE 130 - 139MM HG: CPT | Mod: CPTII,S$GLB,, | Performed by: ORTHOPAEDIC SURGERY

## 2019-02-11 PROCEDURE — 73564 X-RAY EXAM KNEE 4 OR MORE: CPT | Mod: TC,50,PN

## 2019-02-11 PROCEDURE — 3078F PR MOST RECENT DIASTOLIC BLOOD PRESSURE < 80 MM HG: ICD-10-PCS | Mod: CPTII,S$GLB,, | Performed by: ORTHOPAEDIC SURGERY

## 2019-02-11 PROCEDURE — 20550 TENDON SHEATH: L THUMB MCP: ICD-10-PCS | Mod: FA,S$GLB,, | Performed by: ORTHOPAEDIC SURGERY

## 2019-02-11 PROCEDURE — 73564 X-RAY EXAM KNEE 4 OR MORE: CPT | Mod: 26,50,, | Performed by: RADIOLOGY

## 2019-02-11 RX ORDER — DICLOFENAC SODIUM 10 MG/G
2 GEL TOPICAL DAILY
Qty: 1 TUBE | Refills: 0 | Status: SHIPPED | OUTPATIENT
Start: 2019-02-11 | End: 2019-03-27 | Stop reason: SDUPTHER

## 2019-02-11 RX ORDER — TRIAMCINOLONE ACETONIDE 40 MG/ML
40 INJECTION, SUSPENSION INTRA-ARTICULAR; INTRAMUSCULAR
Status: DISCONTINUED | OUTPATIENT
Start: 2019-02-11 | End: 2019-02-11 | Stop reason: HOSPADM

## 2019-02-11 RX ADMIN — TRIAMCINOLONE ACETONIDE 40 MG: 40 INJECTION, SUSPENSION INTRA-ARTICULAR; INTRAMUSCULAR at 03:02

## 2019-02-11 NOTE — PROGRESS NOTES
Past Medical History:   Diagnosis Date    Allergy     multiple antibiotic allergies    Anemia     Anxiety     Arthritis     Asthma     CHF (congestive heart failure)     NYHA class III     Chronic kidney disease     Colon polyp     benign    COPD (chronic obstructive pulmonary disease)     DLCO 37% , and mild pulmonary HTN    Depression     Diabetes mellitus     Diabetic retinopathy     Diastolic dysfunction     Diverticulitis of large intestine without perforation or abscess without bleeding 2/12/2018    Diverticulosis     Former smoker     Fracture of lumbar spine     GERD (gastroesophageal reflux disease)     Hernia of unspecified site of abdominal cavity without mention of obstruction or gangrene     Hyperlipidemia     Hypertension     hypertensive renal    IBS (irritable bowel syndrome)     Mild nonproliferative diabetic retinopathy(362.04) 11/25/2013    Morbid obesity     Nuclear sclerosis 11/25/2013    Osteoporosis     Peripheral edema     Rash     Recurrent upper respiratory infection (URI)     S/P LASIK (laser assisted in situ keratomileusis)     Sleep apnea     sleep apnea uses bipap.    Stroke     Thyroid disease     on synthroid    TIA (transient ischemic attack)     Urinary tract infection        Past Surgical History:   Procedure Laterality Date    ABDOMINAL SURGERY      ADENOIDECTOMY      ARTHROSCOPY, KNEE, WITH CHONDROPLASTY Right 8/31/2018    [facility]    ARTHROSCOPY, KNEE, WITH MENISCECTOMY Right 8/31/2018    [facility]    COLONOSCOPY  03/05/2013    repeat in 5 years    COLONOSCOPY N/A 4/11/2018    [facility] ENDO    COLONOSCOPY N/A 5/31/2017    [facility] ENDO    COLONOSCOPY N/A 3/5/2013    [facility]ian Randall [facility] at [facility] END[facility]    COSMETIC SURGERY      belt abdominoplasty    CYSTOSCOPY      CYSTOSCOPY  N/A 8/6/2018    [facility]    EGD (ESOPHAGOGASTRODUODENOSCOPY) N/A 3/5/2013    [facility] ENDO    ESOPHAGOGASTRODUODENOSCOPY (EGD) N/A 1/11/2018    [facility] ENDO    ESOPHAGOGASTRODUODENOSCOPY (EGD) N/A 12/27/2016    [facility] ENDO    ESOPHAGOGASTRODUODENOSCOPY (EGD) N/A 10/25/2016    [facility] ENDO    ESOPHAGOGASTRODUODENOSCOPY (EGD) N/A 8/24/2016    [facility] ENDO    ESOPHAGOGASTRODUODENOSCOPY (EGD) N/A 7/21/2016    [facility] ENDO    ESOPHAGOGASTRODUODENOSCOPY (EGD)-90905 N/A 12/12/2014    [facility] at [facility] OR 2ND FLR    EYE SURGERY Right     mazzulla    GASTRECTOMY      GASTRECTOMY-SLEEVE-LAPAROSCOPIC-84295; EGD-[id number] N/A 12/12/2014    [facility] at [facility] OR 2ND FLR    gastric sleeve      HERNIA REPAIR      5 years old    HYSTERECTOMY      ovaries remain    PANNICULECTOMY N/A 11/28/2016    [facility]o [facility]erril [facility] at [facility] OR 2ND FLR    REFRACTIVE SURGERY      mono va//ou//    REPAIR-HERNIA  11/28/2016    [facility] at [facility] OR 2ND FLR    RHINOPLASTY TIP      TONSILLECTOMY      TUBAL LIGATION      UPPER GASTROINTESTINAL ENDOSCOPY  03/05/2013    UPPER GASTROINTESTINAL ENDOSCOPY  08/24/2016    Dr. Navarro, repeat in 8 weeks    UPPER GASTROINTESTINAL ENDOSCOPY  07/21/2016    Dr. Randall       Current Outpatient Medications   Medication Sig    albuterol-ipratropium 2.5mg-0.5mg/3mL (DUO-NEB) 0.5 mg-3 mg(2.5 mg base)/3 mL nebulizer solution Take 3 mLs by nebulization every 6 (six) hours as needed for Wheezing. Rescue    allopurinol (ZYLOPRIM) 100 MG tablet TAKE TWO TABLETS BY MOUTH AT BEDTIME    amitriptyline (ELAVIL) 50 MG tablet TAKE 2 TABLETS BY MOUTH IN THE  EVENING    atenolol (TENORMIN) 25 MG tablet Take 1 tablet (25 mg total) by mouth 2 (two) times daily.    calcitRIOL (ROCALTROL) 0.25 MCG Cap Take 1 capsule by mouth twice a week. Monday Friday    CALCIUM CITRATE/VITAMIN D3 (CALCIUM CITRATE + D ORAL) Take 400 mg by mouth 3 (three) times daily.    cetirizine (ZYRTEC) 10 MG tablet Take 1 tablet (10 mg total) by mouth once daily.    clotrimazole-betamethasone 1-0.05% (LOTRISONE) cream     cyanocobalamin, vitamin B-12, (VITAMIN B-12) 5,000 mcg Subl Place 5,000 mg under the tongue once a week.    DENOSUMAB (PROLIA SUBQ) Inject into the skin every 6 (six) months.     DIABETIC SUPPLIES,MISCELL (Jibe REMOTE CONTROL MISC) No Sig Provided    diclofenac sodium (VOLTAREN) 1 % Gel Apply 2 g topically once daily.    diphenoxylate-atropine 2.5-0.025 mg (LOMOTIL) 2.5-0.025 mg per tablet TAKE ONE TABLET BY MOUTH 4 TIMES DAILY AS NEEDED FOR DIARRHEA    DYMISTA 137-50 mcg/spray Spry nassal spray     fluoxetine (PROZAC) 20 MG capsule TAKE TWO CAPSULES BY MOUTH ONCE DAILY    fluticasone (FLONASE) 50 mcg/actuation nasal spray 2 sprays by Nasal route once daily.     fluticasone (FLONASE) 50 mcg/actuation nasal spray USE 2 SPRAY(S) IN EACH NOSTRIL ONCE DAILY    fluticasone-vilanterol (BREO) 100-25 mcg/dose diskus inhaler Inhale 1 puff into the lungs once daily.    gabapentin (NEURONTIN) 100 MG capsule     insulin aspart U-100 (NOVOLOG) 100 unit/mL injection Use per insulin pump, 35-40 units daily.    KLOR-CON M20 20 mEq tablet TAKE ONE TABLET BY MOUTH TWICE DAILY    l-methylfolate-b2-b6-b12 (CEREFOLIN) 6-5-50-1 mg Tab Take 1 tablet by mouth once daily.    Lacto.acidophilus-Bif.animalis (PROBIOTIC) 5 billion cell CpSP Take 1 capsule by mouth once daily.    levothyroxine (SYNTHROID) 25 MCG tablet TAKE ONE TABLET BY MOUTH ONCE DAILY    LORazepam (ATIVAN) 1 MG tablet Take 1 tablet (1 mg total) by mouth every evening.    LORazepam (ATIVAN) 1 MG tablet TAKE 1 TABLET BY  MOUTH EVERY 12 HOURS AS NEEDED FOR ANXIETY    losartan (COZAAR) 50 MG tablet Take 1 tablet (50 mg total) by mouth once daily.    melatonin 3 mg Tab Take 5 mg by mouth every evening. 10 MG NIGHTLY    MULTIVIT-IRON-MIN-FOLIC ACID 3,500-18-0.4 UNIT-MG-MG ORAL CHEW Take by mouth 2 (two) times daily.    mupirocin (BACTROBAN) 2 % ointment APPLY OINTMENT TOPICALLY TO AFFECTED AREA ON NOSE THREE TIMES DAILY AS DIRECTED    oxyCODONE (ROXICODONE) 15 MG Tab Take 1 tablet (15 mg total) by mouth every 6 (six) hours as needed for Pain.    pantoprazole (PROTONIX) 40 MG tablet TAKE ONE TABLET BY MOUTH ONCE DAILY    polymyxin B sulf-trimethoprim (POLYTRIM) 10,000 unit- 1 mg/mL Drop Place 1 drop into both eyes every 4 (four) hours.    ranitidine (ZANTAC) 300 MG tablet TAKE ONE TABLET BY MOUTH IN THE EVENING    SSD 1 % cream     torsemide (DEMADEX) 20 MG Tab TAKE 1 TABLET BY MOUTH EVERY OTHER DAY THEN 2 TABLETS EVERY DAY     Current Facility-Administered Medications   Medication    gentamicin injection 80 mg     Facility-Administered Medications Ordered in Other Visits   Medication    lactated ringers infusion       Review of patient's allergies indicates:   Allergen Reactions    Adhesive Other (See Comments)     Blisters    Cleocin [clindamycin hcl] Hives    Ceclor [cefaclor] Hives    Advair diskus [fluticasone-salmeterol]      Dry mouth    Aleve [naproxen sodium]      Unable to take secondary to kidney function.     Erythromycin Hives    Levaquin [levofloxacin] Dermatitis    Macrobid [nitrofurantoin monohyd/m-cryst] Other (See Comments)     Stomach pain/ GI issues    Macrodantin [nitrofurantoin macrocrystalline] Hives    Motrin [ibuprofen]      Unable to take secondary to kidney functions.    Restoril [temazepam]      LIGHTHEADED UPON WAKING.with poor results    Sulfa (sulfonamide antibiotics)      Hold due to renal problems    Trazodone      PALPITATIONS    Remeron [mirtazapine] Palpitations and Other (See  Comments)     Jittery    Vancomycin analogues Rash     Feet broke out/inflammed blood vessels         Family History   Problem Relation Age of Onset    Heart disease Mother 59    Cancer Mother 59        throat    Allergies Mother     Diabetes Mother     Heart disease Father 70        flutter    Allergies Father     Diabetes Father     Cancer Sister 22        22 thyroid,49  breast    Allergies Sister     Breast cancer Sister     Diabetes Sister     Cancer Brother 62        lung    Diabetes Brother     Asthma Daughter     Diabetes Daughter     Depression Daughter     Asthma Grandchild     Eczema Grandchild     Eczema Grandchild     Breast cancer Maternal Grandmother     Ovarian cancer Cousin     Amblyopia Neg Hx     Blindness Neg Hx     Cataracts Neg Hx     Glaucoma Neg Hx     Hypertension Neg Hx     Macular degeneration Neg Hx     Retinal detachment Neg Hx     Strabismus Neg Hx     Stroke Neg Hx     Thyroid disease Neg Hx     Angioedema Neg Hx     Immunodeficiency Neg Hx     Allergic rhinitis Neg Hx     Atopy Neg Hx     Rhinitis Neg Hx     Urticaria Neg Hx     Colon cancer Neg Hx     Colon polyps Neg Hx     Crohn's disease Neg Hx     Ulcerative colitis Neg Hx     Celiac disease Neg Hx        Social History     Socioeconomic History    Marital status:      Spouse name: Not on file    Number of children: Not on file    Years of education: Not on file    Highest education level: Not on file   Social Needs    Financial resource strain: Not on file    Food insecurity - worry: Not on file    Food insecurity - inability: Not on file    Transportation needs - medical: Not on file    Transportation needs - non-medical: Not on file   Occupational History    Not on file   Tobacco Use    Smoking status: Former Smoker     Types: Cigarettes    Smokeless tobacco: Never Used    Tobacco comment: quit 1990   Substance and Sexual Activity    Alcohol use: Yes     Comment:  rare    Drug use: No    Sexual activity: Yes     Birth control/protection: Surgical   Other Topics Concern    Not on file   Social History Narrative    Not on file       Chief Complaint:   Chief Complaint   Patient presents with    Knee Pain     bilateral        Date of surgery:  August 31, 2018    History of present illness:  68-year-old with a history of bilateral knee arthritis.  Patient had left severe medial compartment arthritic changes.  She is ready to have her left knee replaced.  Also having some trigger thumb.  This started about 3 weeks ago.  Woke up with pain. No injury.  Pain is a 6/10.      Answers for HPI/ROS submitted by the patient on 2/8/2019   Leg pain  unexpected weight change: No  appetite change : No  sleep disturbance: Yes  IMMUNOCOMPROMISED: No  nervous/ anxious: Yes  dysphoric mood: No  rash: No  visual disturbance: No  eye redness: Yes  eye pain: No  ear pain: No  tinnitus: No  hearing loss: No  sinus pressure : Yes  nosebleeds: No  enviro allergies: Yes  food allergies: No  cough: Yes  shortness of breath: No  sweating: No  dysuria: Yes  frequency: Yes  difficulty urinating: No  hematuria: Yes  painful intercourse: No  chest pain: No  palpitations: Yes  nausea: No  vomiting: No  diarrhea: Yes  blood in stool: No  constipation: No  headaches: No  dizziness: No  numbness: Yes  seizures: No  joint swelling: Yes  myalgia: Yes  weakness: Yes  back pain: Yes  Pain Chronicity: chronic  History of trauma: Yes  Onset: more than 1 month ago  Frequency: constantly  Progression since onset: rapidly worsening  Injury mechanism: falling  injury location: at home  pain- numeric: 7/10  pain location: right elbow, left hip, right hip, left knee, right knee  pain quality: aching, hot, sharp, burning, shooting, throbbing, tingling  Radiating Pain: Yes  If your pain is radiating, to what part of the body?: left foot, left forearm, left jaw, left knee, right knee, left neck, right neck, left thigh, right  thigh, lower back, mid back, upper back  Aggravating factors: activity, bending, coughing, bearing weight, cold, exercise, standing, walking, lifting, sitting  fever: No  inability to bear weight: Yes  itching: No  joint locking: Yes  limited range of motion: Yes  stiffness: Yes  tingling: Yes  Treatments tried: brace/corset, cold, heat, injection treatment, oral narcotics, OTC ointments, OTC pain meds  physical therapy: ineffective  Improvement on treatment: significant      Physical Examination:    Vital Signs:    Vitals:    02/11/19 1438   BP: 136/63   Pulse: 92       Body mass index is 43.16 kg/m².    This a well-developed, well nourished patient in no acute distress.  They are alert and oriented and cooperative to examination.  Pt. walks without an antalgic gait.      Examination of both knees shows no rashes or erythema. There are no masses ecchymosis or effusion. Patient has full range of motion from 0-130°. Patient is nontender to palpation over lateral joint line and moderately tender to palpation over the medial joint line. Patient has a - Lachman exam, - anterior drawer exam, and - posterior drawer exam. - Mervin's exam. Knee is stable to varus and valgus stress. 5 out of 5 motor strength. Palpable distal pulses. Intact light touch sensation. Negative Patellofemoral crepitus    Examination of the left hand and wrist shows no signs of rashes or erythema. Patient has no masses ecchymosis or effusions. Patient has full range of motion of the wrist in flexion and extension as well as ulnar and radial deviation. The patient also has full range of motion of all joints in the hand. There are 2+ radial pulse and intact light touch sensation in all 5 digits. Nontender over the anatomic snuffbox.  Pain over the A1 pulley of the thumb    Heart is regular rate without obvious murmurs   Normal respiratory effort without audible wheezing  Abdomen is soft and nontender         X-rays:  X-rays of both knees are ordered  and reviewed which show severe medial joint space narrowing of the left knee and more moderate medial joint space narrowing of the right knee     Assessment::  Bilateral knee arthritis  Left trigger thumb    Plan:  I reviewed knee replacement with her today. I recommended doing the left knee 1st.  Plan will be for a left total knee arthroplasty.  This will be scheduled for early April so that she is greater than 3 months out from her last injection. I did agree to inject her trigger thumb today. Patient will also need antibiotic cement due to her history of infections.  She will need medical clearance.  Risks, benefits, and alternatives to the procedure were explained to the patient including but not limited to damage to nerves, arteries, blood vessels, bones, tendons, ligaments, stiffness, instability, infection, DVT, PE, as well as general anesthetic complications including seizure, stroke, heart attack and even death. The patient understood these risks and wished to proceed and signed the informed consent.       This note was created using [employer] [place] [employer] voice recognition software that occasionally misinterpreted phrases or words.

## 2019-02-11 NOTE — PROCEDURES
Tendon Sheath: L thumb MCP  Date/Time: 2/11/2019 3:53 PM  Performed by: Larry Molina MD  Authorized by: Larry Molina MD     Consent Done?: Yes (Verbal)  Timeout: prior to procedure the correct patient, procedure, and site was verified   Indications:  Pain  Site marked: the procedure site was marked    Timeout: prior to procedure the correct patient, procedure, and site was verified    Location:  Thumb  Site:  L thumb MCP  Prep: patient was prepped and draped in usual sterile fashion    Ultrasonic guidance for needle placement?: No    Needle size:  22 G  Approach:  Volar  Medications:  40 mg triamcinolone acetonide 40 mg/mL  Patient tolerance:  Patient tolerated the procedure well with no immediate complications

## 2019-02-12 ENCOUNTER — APPOINTMENT (OUTPATIENT)
Dept: LAB | Facility: HOSPITAL | Age: 69
End: 2019-02-12
Attending: UROLOGY
Payer: MEDICARE

## 2019-02-12 ENCOUNTER — OFFICE VISIT (OUTPATIENT)
Dept: UROLOGY | Facility: CLINIC | Age: 69
End: 2019-02-12
Payer: MEDICARE

## 2019-02-12 ENCOUNTER — TELEPHONE (OUTPATIENT)
Dept: CARDIOLOGY | Facility: CLINIC | Age: 69
End: 2019-02-12

## 2019-02-12 VITALS
WEIGHT: 236.31 LBS | HEIGHT: 62 IN | SYSTOLIC BLOOD PRESSURE: 130 MMHG | DIASTOLIC BLOOD PRESSURE: 69 MMHG | TEMPERATURE: 98 F | HEART RATE: 71 BPM | BODY MASS INDEX: 43.49 KG/M2

## 2019-02-12 DIAGNOSIS — N39.0 RECURRENT UTI: ICD-10-CM

## 2019-02-12 DIAGNOSIS — R39.89 BLADDER PAIN: ICD-10-CM

## 2019-02-12 DIAGNOSIS — R31.0 GROSS HEMATURIA: Primary | ICD-10-CM

## 2019-02-12 LAB
BACTERIA #/AREA URNS HPF: ABNORMAL /HPF
BILIRUB UR QL STRIP: NEGATIVE
CLARITY UR: CLEAR
COLOR UR: YELLOW
GLUCOSE UR QL STRIP: ABNORMAL
HGB UR QL STRIP: NEGATIVE
HYALINE CASTS #/AREA URNS LPF: 5 /LPF
KETONES UR QL STRIP: NEGATIVE
LEUKOCYTE ESTERASE UR QL STRIP: NEGATIVE
MICROSCOPIC COMMENT: ABNORMAL
NITRITE UR QL STRIP: NEGATIVE
PH UR STRIP: 6 [PH] (ref 5–8)
PROT UR QL STRIP: NEGATIVE
RBC #/AREA URNS HPF: 2 /HPF (ref 0–4)
SP GR UR STRIP: <=1.005 (ref 1–1.03)
URN SPEC COLLECT METH UR: ABNORMAL
UROBILINOGEN UR STRIP-ACNC: NEGATIVE EU/DL
WBC #/AREA URNS HPF: 1 /HPF (ref 0–5)
YEAST URNS QL MICRO: ABNORMAL

## 2019-02-12 PROCEDURE — 87086 URINE CULTURE/COLONY COUNT: CPT

## 2019-02-12 PROCEDURE — 3078F PR MOST RECENT DIASTOLIC BLOOD PRESSURE < 80 MM HG: ICD-10-PCS | Mod: CPTII,S$GLB,, | Performed by: UROLOGY

## 2019-02-12 PROCEDURE — 3075F PR MOST RECENT SYSTOLIC BLOOD PRESS GE 130-139MM HG: ICD-10-PCS | Mod: CPTII,S$GLB,, | Performed by: UROLOGY

## 2019-02-12 PROCEDURE — 81000 URINALYSIS NONAUTO W/SCOPE: CPT

## 2019-02-12 PROCEDURE — 88112 CYTOPATH CELL ENHANCE TECH: CPT | Performed by: PATHOLOGY

## 2019-02-12 PROCEDURE — 3078F DIAST BP <80 MM HG: CPT | Mod: CPTII,S$GLB,, | Performed by: UROLOGY

## 2019-02-12 PROCEDURE — 99214 OFFICE O/P EST MOD 30 MIN: CPT | Mod: 25,S$GLB,, | Performed by: UROLOGY

## 2019-02-12 PROCEDURE — 88112 CYTOPATH CELL ENHANCE TECH: CPT | Mod: 26,,, | Performed by: PATHOLOGY

## 2019-02-12 PROCEDURE — 51701 PR INSERTION OF NON-INDWELLING BLADDER CATHETERIZATION FOR RESIDUAL UR: ICD-10-PCS | Mod: S$GLB,,, | Performed by: UROLOGY

## 2019-02-12 PROCEDURE — 51701 INSERT BLADDER CATHETER: CPT | Mod: S$GLB,,, | Performed by: UROLOGY

## 2019-02-12 PROCEDURE — 1101F PR PT FALLS ASSESS DOC 0-1 FALLS W/OUT INJ PAST YR: ICD-10-PCS | Mod: CPTII,S$GLB,, | Performed by: UROLOGY

## 2019-02-12 PROCEDURE — 99214 PR OFFICE/OUTPT VISIT, EST, LEVL IV, 30-39 MIN: ICD-10-PCS | Mod: 25,S$GLB,, | Performed by: UROLOGY

## 2019-02-12 PROCEDURE — 99999 PR PBB SHADOW E&M-EST. PATIENT-LVL V: ICD-10-PCS | Mod: PBBFAC,,, | Performed by: UROLOGY

## 2019-02-12 PROCEDURE — 1101F PT FALLS ASSESS-DOCD LE1/YR: CPT | Mod: CPTII,S$GLB,, | Performed by: UROLOGY

## 2019-02-12 PROCEDURE — 3075F SYST BP GE 130 - 139MM HG: CPT | Mod: CPTII,S$GLB,, | Performed by: UROLOGY

## 2019-02-12 PROCEDURE — 88112 TISSUE SPECIMEN TO PATHOLOGY: ICD-10-PCS | Mod: 26,,, | Performed by: PATHOLOGY

## 2019-02-12 PROCEDURE — 99999 PR PBB SHADOW E&M-EST. PATIENT-LVL V: CPT | Mod: PBBFAC,,, | Performed by: UROLOGY

## 2019-02-12 RX ORDER — MUPIROCIN 20 MG/G
OINTMENT TOPICAL
Status: CANCELLED | OUTPATIENT
Start: 2019-02-12

## 2019-02-12 NOTE — PATIENT INSTRUCTIONS
Gross hematuria  -she continues to have intermittent gross hematuria with negative cultures.   -most recent imaging was 2/2018 At this point do not recommend repeat imaging. Most recent cysto 8/2018 (abnormal patch found to be cystitis).  -will repeat cytology from cath urine today and if abnormal may need repeat cysto awake and possible selective cytology if that cysto was negative. Denies oab. Actually only voids twice a day.   -send cath urine today for ua, culture and cytology.     Recurrent uti reviewed vs bladder pain/ic  -could be IC since she has occ bladder pain twice a month that resolves with pyridium. She is at risk for uti's bc of her ibs however. In general, recommend:  -continue to void every 2 hours  -continue to drink plenty of water and avoid pads  -if she has symptoms of uti recommend to NOT self treat with antibiotics  -likely related to IBS . Sometimes where's pads  -continue for pyridium take as needed for pain. Only takes twice a month max, unlikely to affect kidneys.   -keep blood sugars under control.   -given list of IC foods for her to see if they aggravate pain  -does have atrophic vaginitis and tenderness on right levator.     F/u 3 months with np to ensure cytology normal, no recurrent hematuria  F/u 6 months with me

## 2019-02-12 NOTE — TELEPHONE ENCOUNTER
----- Message from Sabrina Singh LPN sent at 2/12/2019  9:09 AM CST -----  Pt is scheduled for left TKA with Dr. Molina on 4/16/19. Pt requires cardiac clearance and permission to stop any blood thinners 5 days prior to sx. Please update chart with clearances or fax to [phone]. Thanks!

## 2019-02-12 NOTE — PROGRESS NOTES
[facility] Note - [place]  Staff: MD Andres    Referring provider and please cc: Felipa Fernandez  PCP: Dr.Raymond Baez [facility]    Chief Complaint: gross hematuria and recurrent uti    Subjective:        HPI: Fifi Mcnair is a 68 y.o. female presents with     Gross hematuria, bladder pain, recurrent uti  -She was evaluated in 2014 by Dr. Parrish, Urologist for recurrent UTIs along with a cystoscopy procedure done in his office on or around 01/01/2014.  (No records to review during ov today). Pt states today that cysto results showed normal findings at that time.  -in the last 5 years she's had about 3 uti's a year all with gross hematuria. They all start with dysuria, then GH, then urgency, urge incontinence and frequency. This year she has had gross hematuria 3x starting in June and always continues to recur, associate with uti symptoms. Cultures however have been negative but she has also been off and on abx. Otherwise has no OAB except recently has UUI with OAB, but also on lasix.   -gross hematuria: on no anticoagulation. Former tobacco, <10 years in a row. Has h/o recurrent utis for the last 10 years.  -ctu 2/2018 shows transverse displacement of right kidney but no other abnormalities including no stones. Cytology 7/18/18 and 7/24/18 was  Negative. Cytology 10/10/18: urothelial atypia  -cysto bladder bx on 8/6/18. Abnormal patch on posterior bladder. Found to be cystitis.   -RF for uti's: diarrhea 2x a week or more due to IBS.  Diabetes x 30  -seen by gyn  on 1/17/19 to r/o vaginal cause of bleeding.  -a1c 8.4    Last time she saw blood was about a month ago. Usually associated with bladder pain which improves with a uribel or two. Takes maybe 2 uribel in 1 month period. Has not had to take abx. Only voids 2x a day      ua today void cath:  -sent for urinalysis, culture and cytology. pvr by I&o: 40cc. Feels some pressure today, last urinated a few hours  ago  Urine history:   10/10/18 Ng,  Cath: nit+/2+glucose/tr ketones, 2 wbc, rare bacteria, cytology: urothelial atypia  8/6/18  Bladder path from cysto: cystitis  8/1/18  No cx, void: neg, 1 rbc  7/24/18 No cx, void: neg, pvr by I&o: 30cc, cytology: negative  7/18/18 Multiple org, void: none, 1 rbc/1 wbc, cytology: URINE, VOIDED 7/18: no cancer cells seen  7/7/18  Ng, void: 2 protein/3+blood/nit+/2+leuk - +GH. ua +but no sx. tx with augmentin  6/24/18 Ng, void: red, nit+/3+leuk, 100 rbc, 40 wbc- +GH. ua + on cipro, but sent home with keflex and rocephin, + urgency  6/19/18 Ng, void: 3+glucose  2/12/18 No cx, void: tr wbc/no blood  8/4/17  Ng, void: tr leuk  2/22/17 Multiple org, void: neg  11/23/14 Ng  1/9/14  Multiple org  9/18/13 Ng  11/13/12 Multiple org  4/5/12  K.pneumoniae      ECOG Status: 0    G2, P 2, vaginal     REVIEW OF SYSTEMS:  General ROS: no fevers, no chills  Psychological ROS: no depression  Endocrine ROS: no heat or cold  Respiratory ROS: no SOB  Cardiovascular ROS: no CP  Gastrointestinal ROS: no abdominal pain, no constipation, no diarrhea, noBRBPR  Musculoskeletal ROS: no muscle pain  Neurological ROS: no headaches  Dermatological ROS: no rashes  HEENT: noglasses, no sinus   ROS: per HPI     PMHx:  Past Medical History:   Diagnosis Date    Allergy     multiple antibiotic allergies    Anemia     Anxiety     Arthritis     Asthma     CHF (congestive heart failure)     NYHA class III     Chronic kidney disease     Colon polyp     benign    COPD (chronic obstructive pulmonary disease)     DLCO 37% , and mild pulmonary HTN    Depression     Diabetes mellitus     Diabetic retinopathy     Diastolic dysfunction     Diverticulitis of large intestine without perforation or abscess without bleeding 2/12/2018    Diverticulosis     Former smoker     Fracture of lumbar spine     GERD (gastroesophageal reflux disease)     Hernia of unspecified site of abdominal cavity without mention of  obstruction or gangrene     Hyperlipidemia     Hypertension     hypertensive renal    IBS (irritable bowel syndrome)     Mild nonproliferative diabetic retinopathy(362.04) 11/25/2013    Morbid obesity     Nuclear sclerosis 11/25/2013    Osteoporosis     Peripheral edema     Rash     Recurrent upper respiratory infection (URI)     S/P LASIK (laser assisted in situ keratomileusis)     Sleep apnea     sleep apnea uses bipap.    Stroke     Thyroid disease     on synthroid    TIA (transient ischemic attack)     Urinary tract infection      Kidney stones no     PSHx:  Past Surgical History:   Procedure Laterality Date    ABDOMINAL SURGERY      ADENOIDECTOMY      ARTHROSCOPY, KNEE, WITH CHONDROPLASTY Right 8/31/2018    [facility] OR    ARTHROSCOPY, KNEE, WITH MENISCECTOMY Right 8/31/2018    [facility] OR    COLONOSCOPY  03/05/2013    repeat in 5 years    COLONOSCOPY N/A 4/11/2018    [facility] END[facility]    COLONOSCOPY N/A 5/31/2017    [facility]    COLONOSCOPY N/A 3/5/2013    [facility] Florian [facility] at [facility] END[facility]    COSMETIC SURGERY      belt abdominoplasty    CYSTOSCOPY      CYSTOSCOPY N/A 8/6/2018    [facility]    EGD (ESOPHAGOGASTRODUODENOSCOPY) N/A 3/5/2013    [facility] END[facility]    ESOPHAGOGASTRODUODENOSCOPY (EGD) N/A 1/11/2018    [facility] END[facility]    ESOPHAGOGASTRODUODENOSCOPY (EGD) N/A 12/27/2016    [facility] END[facility]    ESOPHAGOGASTRODUODENOSCOPY (EGD) N/A 10/25/2016    [facility] ENDO    ESOPHAGOGASTRODUODENOSCOPY (EGD) N/A 8/24/2016    [facility] END[facility]    ESOPHAGOGASTRODUODENOSCOPY (EGD) N/A 7/21/2016    [facility] END[facility]     ESOPHAGOGASTRODUODENOSCOPY (EGD)-[id number] N/A 2014    [facility]    EYE SURGERY Right     mazzulla    GASTRECTOMY      GASTRECTOMY-SLEEVE-LAPAROSCOPIC-13728; EGD-[id number] N/A 2014    [facility] at [facility]    gastric sleeve      HERNIA REPAIR      5 years old    HYSTERECTOMY      ovaries remain    PANNICULECTOMY N/A 2016    [facility] at [facility]    REFRACTIVE SURGERY      mono va//ou//    REPAIR-HERNIA  2016    [facility]    RHINOPLASTY TIP      TONSILLECTOMY      TUBAL LIGATION      UPPER GASTROINTESTINAL ENDOSCOPY  2013    UPPER GASTROINTESTINAL ENDOSCOPY  2016    Dr. Veras, repeat in 8 weeks    UPPER GASTROINTESTINAL ENDOSCOPY  2016    Dr. Randall     Urologic or Gynecologic Surgery: hysterectomy    Stents/Valves/Foreign Bodies: No  Cardiac Evaluation: Yes -     Screening Studies  Colonoscopy: recently    Fam Hx:   malignancies: None, gyn malignancies: Yes - Maternal cousin had ovarian cancer at age late 40s.   kidney stones: No   Mother  of throat cancer.  Paternal grandmother  of kidney cancer.    Soc Hx:  Former tobacco.  Smoked less than 10 yers  No alcohol  Lives in [place]  :yes  Children: 2  Occupation:retired     Allergies:  Adhesive; Cleocin [clindamycin hcl]; Ceclor [cefaclor]; Advair diskus [fluticasone-salmeterol]; Aleve [naproxen sodium]; Erythromycin; Levaquin [levofloxacin]; Macrobid [nitrofurantoin monohyd/m-cryst]; Macrodantin [nitrofurantoin macrocrystalline]; Motrin [ibuprofen]; Restoril [temazepam]; Sulfa (sulfonamide antibiotics); Trazodone; Remeron [mirtazapine]; and Vancomycin analogues   Sulfa- not allergic to sulfa, kd issues  macrobid - hives.stomach pain/GI?  levaquin - dermatitis    Medications: reviewed    Anticoagulation: No    Objective:     Vitals:    02/12/19 1131   BP: 130/69   Pulse: 71   Temp: 97.9 °F (36.6 °C)       General:WDWN in NAD  Eyes: PERRLA, normal conjunctiva  Respiratory: no increased work on breathing. No wheezing.   Cardiovascular: No obvious extremity edema. Warm and well perfused.   GI: no palpation of masses. No tenderness. No hepatosplenomegaly to palpation.  Musculoskeletal: normal range of motion of bilateral upper extremities. Normal muscle strength and tone.  Skin: no obvious rashes or lesions. No tightening of skin noted.  Neurologic: CN grossly normal. Normal sensation.   Psychiatric: awake, alert and oriented x 3. Mood and affect normal. Cooperative.    Pelvic exam 7/24/18  External Genitalia: normal hair distribution, no lesions  Urethral meatus: normal without prolapse or caruncle  Urethra: without tenderness or mass  Bladder: without fulness or tenderness  Vagina: normal appearing. No discharge or lesions. no prolapse.   Anus and perineum: appear normal  In and out cath performed with 30cc residual  Levator ani tenderness: none     pvr by I&o cath today 2/12/18: 40cc, atrophic vaginitis  Tenderness right levator    LABS REVIEW:    BMP  Lab Results   Component Value Date     01/30/2019    K 4.0 01/30/2019     01/30/2019    CO2 26 01/30/2019    BUN 13 01/30/2019    CREATININE 1.2 01/30/2019    CALCIUM 10.1 01/30/2019    ANIONGAP 12 01/30/2019    ESTGFRAFRICA 54 (A) 01/30/2019    EGFRNONAA 47 (A) 01/30/2019     Lab Results   Component Value Date    WBC 5.90 01/30/2019    HGB 12.5 01/30/2019    HCT 36.8 (L) 01/30/2019    MCV 96 01/30/2019     01/30/2019       PATHOLOGY REVIEW:  URINE, VOIDED 10/10/18:  - Urothelial atypia; scattered groups of atypical urothelial cells, benign and reactive urothelial cells, benign  squamous cells and moderate-to-marked acute inflammation (see comment).    BIOPSY OF POSTERIOR RIGHT BLADDER WALL 8/6/18:  NO NEOPLASIA IDENTIFIED  MINIMAL  CHRONIC CYSTITIS    URINE, VOIDED 7/18/18:  -Negative; numerous benign urothelial cells and squamous cells.  -No dysplastic or malignant cells are present.    RADIOGRAPHIC REVIEW:  ctap w wo 2/12/18  Right kidney transversely displaced  Diverticulosis and findings most likely representing mild acute diverticulitis sigmoid colon without perforation or abscess and improved compared to prior study 2/9/2018 although not completely resolved.  Please see above discussion.  Additional findings as detailed above.    Assessment:       1. Gross hematuria    2. Recurrent UTI    3. Bladder pain          Plan:     Gross hematuria  -she continues to have intermittent gross hematuria with negative cultures.   -most recent imaging was 2/2018 At this point do not recommend repeat imaging. Most recent cysto 8/2018 (abnormal patch found to be cystitis).  -will repeat cytology from cath urine today and if abnormal may need repeat cysto awake and possible selective cytology if that cysto was negative. Denies oab. Actually only voids twice a day.   -send cath urine today for ua, culture and cytology.     Recurrent uti reviewed vs bladder pain/ic  -could be IC since she has occ bladder pain twice a month that resolves with pyridium. She is at risk for uti's bc of her ibs however. In general, recommend:  -continue to void every 2 hours  -continue to drink plenty of water and avoid pads  -if she has symptoms of uti recommend to NOT self treat with antibiotics  -likely related to IBS . Sometimes where's pads  -continue for pyridium take as needed for pain. Only takes twice a month max, unlikely to affect kidneys.   -keep blood sugars under control.   -given list of IC foods for her to see if they aggravate pain  -does have atrophic vaginitis and tenderness on right levator.     F/u 3 months with np to ensure cytology normal, no recurrent hematuria  F/u 6 months with me    Cc  and Dr.Tveit Angy Campos MD

## 2019-02-12 NOTE — TELEPHONE ENCOUNTER
"Ms. Mcnair has been seen within the last 6 months and is stable and asymptomatic from the cardiovascular standpoint they can be cleared for surgery and anesthesia with acceptable cardiac risk. May be at increase risk for complications due to the patient's co-morbidities.     In requard to holding the antiplatelet drugs or warfarin / Coumadin (ASA, Plavix /clopidogrel, Pletal /cilostazol, Brilinta /ticagrelor), if no hospitalization for acute coronary syndrome in the past 12 months, can hold for 5-7 days at the discretion of the surgeon. If the patient is on NOAC (Eliquis, Xarelto, Pradaxa) can hold for 1-2 days per surgeon discretion. Certain recommend resumption of these medications ASAP post procedure. If an official "clearance" is needed, then will need an office visit.     Thanks, Dr. Appiah  "

## 2019-02-14 LAB — BACTERIA UR CULT: NO GROWTH

## 2019-02-16 DIAGNOSIS — G57.01 NEUROPATHY OF RIGHT SCIATIC NERVE: ICD-10-CM

## 2019-02-16 DIAGNOSIS — G89.29 CHRONIC LEFT-SIDED THORACIC BACK PAIN: ICD-10-CM

## 2019-02-16 DIAGNOSIS — I89.0 LYMPHEDEMA OF RIGHT LOWER EXTREMITY: ICD-10-CM

## 2019-02-16 DIAGNOSIS — M54.6 CHRONIC LEFT-SIDED THORACIC BACK PAIN: ICD-10-CM

## 2019-02-19 RX ORDER — AMITRIPTYLINE HYDROCHLORIDE 50 MG/1
TABLET, FILM COATED ORAL
Qty: 60 TABLET | Refills: 0 | Status: SHIPPED | OUTPATIENT
Start: 2019-02-19 | End: 2019-03-18 | Stop reason: SDUPTHER

## 2019-02-19 NOTE — PROCEDURES
Large Joint Aspiration/Injection  Date/Time: 6/21/2018 11:37 AM  Performed by: WALE BENTON  Authorized by: WALE BENTON     Consent Done?:  Yes (Verbal)  Indications:  Pain  Procedure site marked: Yes    Timeout: Prior to procedure the correct patient, procedure, and site was verified      Location:  Knee  Site:  R knee and L knee  Prep: Patient was prepped and draped in usual sterile fashion    Needle size:  20 G  Approach:  Anterolateral  Medications:  16 mg hyaluronate 16 mg/2 mL; 16 mg hyaluronate 16 mg/2 mL  Patient tolerance:  Patient tolerated the procedure well with no immediate complications      
Diabetes mellitus

## 2019-02-22 ENCOUNTER — TELEPHONE (OUTPATIENT)
Dept: ORTHOPEDICS | Facility: CLINIC | Age: 69
End: 2019-02-22

## 2019-02-22 NOTE — TELEPHONE ENCOUNTER
----- Message from Kaley Chau sent at 2/22/2019  8:30 AM CST -----  Contact: self  Type: Needs Medical Advice    Who Called:  self  Symptoms (please be specific):   How long has patient had these symptoms:    Pharmacy name and phone #:    Best Call Back Number: [phone]  Additional Information: Patient states they changed her surgery date to 04/16/19 but has not changed the pro op date.  Please call patient.  Thanks!

## 2019-02-27 ENCOUNTER — TELEPHONE (OUTPATIENT)
Dept: FAMILY MEDICINE | Facility: CLINIC | Age: 69
End: 2019-02-27

## 2019-02-27 NOTE — TELEPHONE ENCOUNTER
----- Message from Tanika Srivastava LPN sent at 2/22/2019  4:58 PM CST -----      ----- Message -----  From: Porsche Cedeno  Sent: 2/22/2019   3:07 PM  To: Alicia Caldera Staff    Margaret  With [facility]  Calling about  A  Script  For  cpap   Supplies   Please  Call [phone]

## 2019-03-13 ENCOUNTER — TELEPHONE (OUTPATIENT)
Dept: CARDIOLOGY | Facility: CLINIC | Age: 69
End: 2019-03-13

## 2019-03-13 NOTE — TELEPHONE ENCOUNTER
----- Message from Anurag Dow sent at 3/13/2019  4:08 PM CDT -----  Contact: pt  Type:  RX Refill Request    Who Called:  pt  Refill or New Rx:  Refill / NEW  RX Name and Strength:  losartan (COZAAR) 50 MG tablet  How is the patient currently taking it? (ex. 1XDay):  1 x day  Is this a 30 day or 90 day RX:  90  Preferred Pharmacy with phone number:    [facility] [address] [place], [place] - [address] [place], [place] [address] [address]  Phone: [phone] Fax: [phone]    Local or Mail Order:    Ordering Provider:    Best Call Back Number:  [phone]  Additional Information:  Pt is requesting a substitution for the above Rx. She is wanting to discontinue losartan (COZAAR) 50 MG tablet due to causing cancer. Please call pt to advise.

## 2019-03-14 NOTE — TELEPHONE ENCOUNTER
Called and spoke with Ms. Mcnair, advised her per Dr. Appiah Cancer risk is uncertain. Recommend continue use til more definitive info. She verbalized understanding, and stated that she will start it back. No further issues noted.

## 2019-03-18 DIAGNOSIS — I89.0 LYMPHEDEMA OF RIGHT LOWER EXTREMITY: ICD-10-CM

## 2019-03-18 DIAGNOSIS — M62.838 MUSCLE SPASM: ICD-10-CM

## 2019-03-18 DIAGNOSIS — M54.6 CHRONIC LEFT-SIDED THORACIC BACK PAIN: ICD-10-CM

## 2019-03-18 DIAGNOSIS — G89.29 CHRONIC LEFT-SIDED THORACIC BACK PAIN: ICD-10-CM

## 2019-03-18 DIAGNOSIS — G57.01 NEUROPATHY OF RIGHT SCIATIC NERVE: ICD-10-CM

## 2019-03-18 DIAGNOSIS — J30.9 ALLERGIC RHINOCONJUNCTIVITIS: ICD-10-CM

## 2019-03-18 DIAGNOSIS — H10.10 ALLERGIC RHINOCONJUNCTIVITIS: ICD-10-CM

## 2019-03-20 RX ORDER — AMITRIPTYLINE HYDROCHLORIDE 50 MG/1
TABLET, FILM COATED ORAL
Qty: 60 TABLET | Refills: 0 | Status: SHIPPED | OUTPATIENT
Start: 2019-03-20 | End: 2019-05-24 | Stop reason: SDUPTHER

## 2019-03-21 RX ORDER — FLUTICASONE PROPIONATE 50 MCG
SPRAY, SUSPENSION (ML) NASAL
Qty: 18.2 ML | Refills: 1 | OUTPATIENT
Start: 2019-03-21

## 2019-03-21 RX ORDER — LORAZEPAM 1 MG/1
TABLET ORAL
Qty: 45 TABLET | Refills: 2 | OUTPATIENT
Start: 2019-03-21

## 2019-03-25 ENCOUNTER — PATIENT OUTREACH (OUTPATIENT)
Dept: ADMINISTRATIVE | Facility: HOSPITAL | Age: 69
End: 2019-03-25

## 2019-03-25 ENCOUNTER — LAB VISIT (OUTPATIENT)
Dept: LAB | Facility: HOSPITAL | Age: 69
End: 2019-03-25
Attending: INTERNAL MEDICINE
Payer: MEDICARE

## 2019-03-25 DIAGNOSIS — E66.01 MORBID OBESITY: ICD-10-CM

## 2019-03-25 DIAGNOSIS — I12.9 PARENCHYMAL RENAL HYPERTENSION: ICD-10-CM

## 2019-03-25 DIAGNOSIS — E11.9 DIABETES MELLITUS WITHOUT COMPLICATION: ICD-10-CM

## 2019-03-25 DIAGNOSIS — R05.9 COUGH: ICD-10-CM

## 2019-03-25 DIAGNOSIS — I11.0 HYPERTENSIVE HEART DISEASE WITH CONGESTIVE HEART FAILURE: ICD-10-CM

## 2019-03-25 DIAGNOSIS — N18.30 CHRONIC KIDNEY DISEASE, STAGE III (MODERATE): Primary | ICD-10-CM

## 2019-03-25 DIAGNOSIS — N39.0 URINARY TRACT INFECTION, SITE NOT SPECIFIED: ICD-10-CM

## 2019-03-25 DIAGNOSIS — R60.9 EDEMA: ICD-10-CM

## 2019-03-25 LAB
ALBUMIN SERPL BCP-MCNC: 3.6 G/DL (ref 3.5–5.2)
ALP SERPL-CCNC: 122 U/L (ref 55–135)
ALT SERPL W/O P-5'-P-CCNC: 21 U/L (ref 10–44)
ANION GAP SERPL CALC-SCNC: 10 MMOL/L (ref 8–16)
AST SERPL-CCNC: 22 U/L (ref 10–40)
BASOPHILS # BLD AUTO: 0 K/UL (ref 0–0.2)
BASOPHILS NFR BLD: 0.4 % (ref 0–1.9)
BILIRUB SERPL-MCNC: 0.3 MG/DL (ref 0.1–1)
BUN SERPL-MCNC: 25 MG/DL (ref 8–23)
CALCIUM SERPL-MCNC: 9.7 MG/DL (ref 8.7–10.5)
CHLORIDE SERPL-SCNC: 99 MMOL/L (ref 95–110)
CO2 SERPL-SCNC: 28 MMOL/L (ref 23–29)
CREAT SERPL-MCNC: 1.4 MG/DL (ref 0.5–1.4)
DIFFERENTIAL METHOD: ABNORMAL
EOSINOPHIL # BLD AUTO: 0.1 K/UL (ref 0–0.5)
EOSINOPHIL NFR BLD: 1.4 % (ref 0–8)
ERYTHROCYTE [DISTWIDTH] IN BLOOD BY AUTOMATED COUNT: 14 % (ref 11.5–14.5)
EST. GFR  (AFRICAN AMERICAN): 45 ML/MIN/1.73 M^2
EST. GFR  (NON AFRICAN AMERICAN): 39 ML/MIN/1.73 M^2
GLUCOSE SERPL-MCNC: 248 MG/DL (ref 70–110)
HCT VFR BLD AUTO: 38.2 % (ref 37–48.5)
HGB BLD-MCNC: 12.9 G/DL (ref 12–16)
LYMPHOCYTES # BLD AUTO: 2.1 K/UL (ref 1–4.8)
LYMPHOCYTES NFR BLD: 29.9 % (ref 18–48)
MCH RBC QN AUTO: 31.4 PG (ref 27–31)
MCHC RBC AUTO-ENTMCNC: 33.8 G/DL (ref 32–36)
MCV RBC AUTO: 93 FL (ref 82–98)
MONOCYTES # BLD AUTO: 0.5 K/UL (ref 0.3–1)
MONOCYTES NFR BLD: 7.5 % (ref 4–15)
NEUTROPHILS # BLD AUTO: 4.4 K/UL (ref 1.8–7.7)
NEUTROPHILS NFR BLD: 60.8 % (ref 38–73)
PHOSPHATE SERPL-MCNC: 4.4 MG/DL (ref 2.7–4.5)
PLATELET # BLD AUTO: 248 K/UL (ref 150–350)
PMV BLD AUTO: 7.4 FL (ref 9.2–12.9)
POTASSIUM SERPL-SCNC: 4.4 MMOL/L (ref 3.5–5.1)
PROT SERPL-MCNC: 7 G/DL (ref 6–8.4)
PTH-INTACT SERPL-MCNC: 57.1 PG/ML (ref 9–77)
RBC # BLD AUTO: 4.11 M/UL (ref 4–5.4)
SODIUM SERPL-SCNC: 137 MMOL/L (ref 136–145)
WBC # BLD AUTO: 7.2 K/UL (ref 3.9–12.7)

## 2019-03-25 PROCEDURE — 84100 ASSAY OF PHOSPHORUS: CPT

## 2019-03-25 PROCEDURE — 85025 COMPLETE CBC W/AUTO DIFF WBC: CPT

## 2019-03-25 PROCEDURE — 80053 COMPREHEN METABOLIC PANEL: CPT

## 2019-03-25 PROCEDURE — 36415 COLL VENOUS BLD VENIPUNCTURE: CPT

## 2019-03-25 PROCEDURE — 83970 ASSAY OF PARATHORMONE: CPT

## 2019-03-27 RX ORDER — DICLOFENAC SODIUM 10 MG/G
GEL TOPICAL
Qty: 1 TUBE | Refills: 0 | Status: SHIPPED | OUTPATIENT
Start: 2019-03-27 | End: 2020-09-20 | Stop reason: CLARIF

## 2019-04-04 ENCOUNTER — HOSPITAL ENCOUNTER (OUTPATIENT)
Dept: PREADMISSION TESTING | Facility: HOSPITAL | Age: 69
Discharge: HOME | End: 2019-04-04
Attending: ORTHOPAEDIC SURGERY
Payer: MEDICARE

## 2019-04-04 ENCOUNTER — HOSPITAL ENCOUNTER (OUTPATIENT)
Dept: RADIOLOGY | Facility: HOSPITAL | Age: 69
Discharge: HOME | End: 2019-04-04
Attending: ORTHOPAEDIC SURGERY
Payer: MEDICARE

## 2019-04-04 VITALS — BODY MASS INDEX: 44.16 KG/M2 | HEIGHT: 62 IN | WEIGHT: 240 LBS

## 2019-04-04 DIAGNOSIS — M17.0 ARTHRITIS OF BOTH KNEES: Primary | ICD-10-CM

## 2019-04-04 LAB
ABO + RH BLD: NORMAL
ANION GAP SERPL CALC-SCNC: 7 MMOL/L (ref 8–16)
BACTERIA #/AREA URNS HPF: NORMAL /HPF
BASOPHILS # BLD AUTO: 0 K/UL (ref 0–0.2)
BASOPHILS NFR BLD: 0.4 % (ref 0–1.9)
BILIRUB UR QL STRIP: NEGATIVE
BLD GP AB SCN CELLS X3 SERPL QL: NORMAL
BUN SERPL-MCNC: 18 MG/DL (ref 8–23)
CALCIUM SERPL-MCNC: 10.1 MG/DL (ref 8.7–10.5)
CHLORIDE SERPL-SCNC: 99 MMOL/L (ref 95–110)
CLARITY UR: CLEAR
CO2 SERPL-SCNC: 32 MMOL/L (ref 23–29)
COLOR UR: YELLOW
CREAT SERPL-MCNC: 1.3 MG/DL (ref 0.5–1.4)
DIFFERENTIAL METHOD: ABNORMAL
EOSINOPHIL # BLD AUTO: 0.2 K/UL (ref 0–0.5)
EOSINOPHIL NFR BLD: 3.9 % (ref 0–8)
ERYTHROCYTE [DISTWIDTH] IN BLOOD BY AUTOMATED COUNT: 13.7 % (ref 11.5–14.5)
EST. GFR  (AFRICAN AMERICAN): 49 ML/MIN/1.73 M^2
EST. GFR  (NON AFRICAN AMERICAN): 42 ML/MIN/1.73 M^2
GLUCOSE SERPL-MCNC: 188 MG/DL (ref 70–110)
GLUCOSE UR QL STRIP: NEGATIVE
HCT VFR BLD AUTO: 38.3 % (ref 37–48.5)
HGB BLD-MCNC: 12.9 G/DL (ref 12–16)
HGB UR QL STRIP: NEGATIVE
KETONES UR QL STRIP: NEGATIVE
LEUKOCYTE ESTERASE UR QL STRIP: ABNORMAL
LYMPHOCYTES # BLD AUTO: 1.8 K/UL (ref 1–4.8)
LYMPHOCYTES NFR BLD: 29.7 % (ref 18–48)
MCH RBC QN AUTO: 32.5 PG (ref 27–31)
MCHC RBC AUTO-ENTMCNC: 33.7 G/DL (ref 32–36)
MCV RBC AUTO: 96 FL (ref 82–98)
MICROSCOPIC COMMENT: NORMAL
MONOCYTES # BLD AUTO: 0.4 K/UL (ref 0.3–1)
MONOCYTES NFR BLD: 6.5 % (ref 4–15)
NEUTROPHILS # BLD AUTO: 3.6 K/UL (ref 1.8–7.7)
NEUTROPHILS NFR BLD: 59.5 % (ref 38–73)
NITRITE UR QL STRIP: NEGATIVE
PH UR STRIP: 7 [PH] (ref 5–8)
PLATELET # BLD AUTO: 262 K/UL (ref 150–350)
PMV BLD AUTO: 7.7 FL (ref 9.2–12.9)
POTASSIUM SERPL-SCNC: 4 MMOL/L (ref 3.5–5.1)
PROT UR QL STRIP: NEGATIVE
RBC # BLD AUTO: 3.97 M/UL (ref 4–5.4)
SODIUM SERPL-SCNC: 138 MMOL/L (ref 136–145)
SP GR UR STRIP: <=1.005 (ref 1–1.03)
URN SPEC COLLECT METH UR: ABNORMAL
UROBILINOGEN UR STRIP-ACNC: NEGATIVE EU/DL
WBC # BLD AUTO: 6.1 K/UL (ref 3.9–12.7)
WBC #/AREA URNS HPF: 3 /HPF (ref 0–5)

## 2019-04-04 PROCEDURE — 99900103 DSU ONLY-NO CHARGE-INITIAL HR (STAT)

## 2019-04-04 PROCEDURE — 87081 CULTURE SCREEN ONLY: CPT

## 2019-04-04 PROCEDURE — 80048 BASIC METABOLIC PNL TOTAL CA: CPT

## 2019-04-04 PROCEDURE — 71046 X-RAY EXAM CHEST 2 VIEWS: CPT | Mod: TC,FY

## 2019-04-04 PROCEDURE — 99900104 DSU ONLY-NO CHARGE-EA ADD'L HR (STAT)

## 2019-04-04 PROCEDURE — 81000 URINALYSIS NONAUTO W/SCOPE: CPT

## 2019-04-04 PROCEDURE — 85025 COMPLETE CBC W/AUTO DIFF WBC: CPT

## 2019-04-04 PROCEDURE — 71046 X-RAY EXAM CHEST 2 VIEWS: CPT | Mod: 26,,, | Performed by: RADIOLOGY

## 2019-04-04 PROCEDURE — 86901 BLOOD TYPING SEROLOGIC RH(D): CPT

## 2019-04-04 PROCEDURE — 71046 XR CHEST PA AND LATERAL: ICD-10-PCS | Mod: 26,,, | Performed by: RADIOLOGY

## 2019-04-04 PROCEDURE — 36415 COLL VENOUS BLD VENIPUNCTURE: CPT

## 2019-04-04 NOTE — PRE ADMISSION SCREENING
Patient Name: Fifi Mcnair  YOB: 1950   MRN: [id number]     [facility]   Basic Mobility Inpatient Short Form 6 Clicks         How much difficulty does the patient currently have  Unable  A Lot  A Little  None      1. Turning over in bed (including adjusting bedclothes, sheets and blankets)?     1 []    2 []    3 []    4 [x]        2. Sitting down on and standing up from a chair with arms (e.g., wheelchair, bedside commode, etc.)     1 []  2 []  3 [x]     4 []      3. Moving from lying on back to sitting on the side of the bed?     1 []  2 []  3 []    4 [x]    How much help from another person does the patient currently need  Total  A Lot  A Little  None      4. Moving to and from a bed to a chair (including a wheelchair)?    1 []  2 []  3 []    4 [x]      5. Need to walk in hospital room?    1 []  2 []  3 []    4 [x]      6. Climbing 3-5 steps with a railing?    1 []  2 []  3 []    4 [x]       Raw Score:      23            CMS 0-100% Score:     11.20       %   Standardized Score:    56.93           CMS Modifier:       CI                                   [facility]   Basic Mobility Inpatient Short Form 6 Clicks Score Conversion Table*         *Use this form to convert -PAC Basic Mobility Inpatient Raw Scores.   [facility] Inpatient Basic Mobility Short Form Scoring Example   1. Add the number values associated with the response to each item. For example, items totals yield a Raw Score of 21.   2. Match the raw score to the t-Scale scores (t-Scale score = 50.25, SE = 4.69).   3. Find the associated CMS % (CMS % = 28.97%).   4. Locate the correct CMS Functional Modifier Code, or G Code (G code = CJ)     NOTE: Each -PAC Short Form has a separate conversion table. Make sure that you use the correct conversion table.       Instruction Manual - page 45 contains conversion table

## 2019-04-04 NOTE — PRE ADMISSION SCREENING
JOINT CAMP ASSESSMENT    Name Fifi Mcnair   MRN [id number]    Age/Sex 68 y.o. female    Surgeon Dr. Larry Molina   Joint Camp Date 4/4/2019   Surgery Date 4/16/2019   Procedure Left Knee Arthroplasty   Insurance Payor: [employer] / Plan: [employer] 65 / Product Type: Medicare Advantage /    Care Team Patient Care Team:  Werner Vargas MD as PCP - General (Family Medicine)  Myron Cantu MD as Consulting Physician (Endocrinology)  Adán Soto MD as Consulting Physician (Nephrology)  Marcela Driscoll LPN as Care Coordinator (Family Medicine)  Felipa Franco LPN as Care Coordinator  Larry Molina MD as Consulting Physician (Sports Medicine)    Ph[facility]   [facility] [place] - [address]  [address]  [place] [address]  Phone: [phone] Fax: [phone]     AM-PAC Score   23   Risk Assessment Score 8     Past Medical History:   Diagnosis Date    Allergy     multiple antibiotic allergies    Anemia     Anxiety     Arthritis     Asthma     CHF (congestive heart failure)     NYHA class III     Chronic kidney disease     Colon polyp     benign    COPD (chronic obstructive pulmonary disease)     DLCO 37% , and mild pulmonary HTN    Depression     Diabetes mellitus     Diabetic retinopathy     Diastolic dysfunction     Diverticulitis of large intestine without perforation or abscess without bleeding 2/12/2018    Diverticulosis     Former smoker     Fracture of lumbar spine     GERD (gastroesophageal reflux disease)     Hernia of unspecified site of abdominal cavity without mention of obstruction or gangrene     Hyperlipidemia     Hypertension     hypertensive renal    IBS (irritable bowel syndrome)     Mild nonproliferative diabetic retinopathy(362.04) 11/25/2013    Morbid obesity     Nuclear sclerosis 11/25/2013    Osteoporosis     Peripheral edema     Rash     Recurrent upper respiratory  infection (URI)     S/P LASIK (laser assisted in situ keratomileusis)     Sleep apnea     sleep apnea uses bipap.    Stroke     Thyroid disease     on synthroid    TIA (transient ischemic attack)     Urinary tract infection        Past Surgical History:   Procedure Laterality Date    ABDOMINAL SURGERY      ADENOIDECTOMY      ARTHROSCOPY, KNEE, WITH CHONDROPLASTY Right 8/31/2018    [facility]    ARTHROSCOPY, KNEE, WITH MENISCECTOMY Right 8/31/2018    [facility] OR    COLONOSCOPY  03/05/2013    repeat in 5 years    COLONOSCOPY N/A 4/11/2018    [facility] ENDO    COLONOSCOPY N/A 5/31/2017    [facility]O    COLONOSCOPY N/A 3/5/2013    [facility] at [facility] ENDO    COSMETIC SURGERY      belt abdominoplasty    CYSTOSCOPY      CYSTOSCOPY N/A 8/6/2018    [facility]    EGD (ESOPHAGOGASTRODUODENOSCOPY) N/A 3/5/2013    [facility] at [facility] ENDO    ESOPHAGOGASTRODUODENOSCOPY (EGD) N/A 1/11/2018    [facility] ENDO    ESOPHAGOGASTRODUODENOSCOPY (EGD) N/A 12/27/2016    [facility]    ESOPHAGOGASTRODUODENOSCOPY (EGD) N/A 10/25/2016    [facility] ENDO    ESOPHAGOGASTRODUODENOSCOPY (EGD) N/A 8/24/2016    [facility]    ESOPHAGOGASTRODUODENOSCOPY (EGD) N/A 7/21/2016    [facility] at [facility] ENDO    ESOPHAGOGASTRODUODENOSCOPY (EGD)-[id number] N/A 12/12/2014    [facility] 2ND [facility]    EYE SURGERY Right     mazzulla    GASTRECTOMY      GASTRECTOMY-SLEEVE-LAPAROSCOPIC-55527; EGD-[id number] N/A 12/12/2014    [facility]    gastric sleeve      HERNIA REPAIR      5  years old    HYSTERECTOMY      ovaries remain    PANNICULECTOMY N/A 11/28/2016    [facility] 2ND [facility]    REFRACTIVE SURGERY      mono va//ou//    REPAIR-HERNIA  11/28/2016    [facility]    RHINOPLASTY TIP      TONSILLECTOMY      TUBAL LIGATION      UPPER GASTROINTESTINAL ENDOSCOPY  03/05/2013    UPPER GASTROINTESTINAL ENDOSCOPY  08/24/2016    Dr. Veras, repeat in 8 weeks    UPPER GASTROINTESTINAL ENDOSCOPY  07/21/2016    Dr. Randall         Home Enviroment     Living Arrangement: Lives with spouse  Home Environment: 1-story house/ trailer, ramped, tub-shower  Home Safety Concerns: Pets in the home: dogs (1).    DISCHARGE CAREGIVER/SUPPORT SYSTEM     Identified post-op caregiver: Patient has spouse / significant other.  Patient's caregiver(s) will be able to provide physical assistance. Patient will have someone to assist overnight.      Caregiver present at pre-op interview:  No      PRE-OPERATIVE FUNCTIONAL STATUS     Employment: Disabled    Pre-op Functional Status: Patient is independent with mobility/ambulation, transfers, ADL's, IADL's.    Use of assistive device for ambulation: none  ADL: self care  ADL Limitations: difficulty with walking  Medical Restrictions: Decreased range of motions in extremities    POTENTIAL BARRIERS TO DISCHARGE/POTENTIAL POST-OP COMPLICATIONS     Patient with significant cardia hx, kidney disease. See medical hx above. Patient currently awaiting surgical clearance from cardiology, endocrinology, pulmonary, and PCP. Patient states that MD told her that she could stay in the hospital for 3 days.    DISCHARGE PLAN     Expected LOS of 2 days or less for joint replacement discussed with patient. We also discussed a discharge path of HH for approximately two weeks with a transition to outpatient PT on the third week given no post-op complications.      Patient in agreement with discharge  plan: Yes    Discharge to: Discharge home with home health (PT/OT) x2 weeks with transition to outpatient PT     HH:  [facility]     OP PT: [facility]     Home DME: rolling walker and rollator    Needed DME at D/C: bedside commode, tub transfer bench and assistive device kit     Rx: Per Dr. Molina at discharge     Meds to Beds: N/A  Patient expected to discharge on Aspirin 325mg by mouth twice daily for DVT prophylaxis.

## 2019-04-04 NOTE — PRE ADMISSION SCREENING
"               [facility] Risk Assessment Scale    Patient Name: Fifi Mcnair  YOB: 1950  MRN: [id number]            RIsk Factor Measure Recommendation Patient Data Scale/Score   BMI >40 Reconsider surgery, weight loss   Estimated body mass index is 43.9 kg/m² as calculated from the following:    Height as of this encounter: 5' 2" (1.575 m).    Weight as of this encounter: 108.9 kg (240 lb).   [] 0 = 1 - 24.9  [] 1 = 25-29.9  [] 2 = 30-34.9  [] 3 = 35-39.9  [x] 4 = 40-44.9  [] 5 = 45-99.9   Hemoglobin AIC (if applicable) >9 Delay surgery until DM under control  Refer for:  · Nutrition Therapy  · Exercise   · Medication    Lab Results   Component Value Date    HGBA1C 8.8 (H) 01/30/2019    HGBA1C 8.8 (H) 01/30/2019       Lab Results   Component Value Date     (H) 04/04/2019      [] 0 = 4.0-5.6  [] 1 = 5.7-6.4  [] 2 = 6.5-6.9  [] 3 = 7.0-7.9  [x] 4 = 8.0-8.9  [] 5 = 9.0-12   Hemoglobin (Anemia) <9 Delay surgery   Correct anemia Lab Results   Component Value Date    HGB 12.9 04/04/2019    [] 20 - <9.0                    Albumin <3 Delay surgery &Workup Lab Results   Component Value Date    ALBUMIN 3.6 03/25/2019    [] 20 - <3.0   Smoking Cessation >4 Weeks Delay Surgery  Refer to OP Cessation Class    Former Smoker  Quit: 1961 [] 20 - current smoker                                _____ PPD                    Hx of MI, PE, Arrhythmia, CVA, DVT <30 Days Delay Surgery    N/A [] 20      Infection Variable Delay surgery and re-evaluate   N/A [] 20 - recent/current infection     Depression (PHQ) >10 out of 27 Delay Surgery and re-evaluate  Medication  Counseling              [x] 0     []1     []2     []3      []4      [] 5                    (1-4)      (5-9)  (10-14)  (15-19)   (20-27)     Memory Impairment & Memory loss (Mini-Cog Screening Tool) Advanced dementia and/or Parkinson's Reconsider surgery     [x] 0     []1     []2     []3     []4     [] 5     Physical Conditioning (Modified AM-PAC Per Physical " [facility]) Unable to ambulate on day of surgery Delay surgery and re-evaluate  Pre-Rehabilitation   (PT evaluation)       [x]  0   []4       []8     []12        []16     []20       (<20%)   (<40%)   (<60%)   (<80% )    (>80%)     Home Environment/Caregiver support  (Per /Navigator Interview)    Availability of basic services and/or approprate assistance during post-operative period Delay surgery and re-evaluate  Safe home environment  Health   1 week post-surgery  Transportation  availability  Ability to obtain DME/Medications post-op    [x] 0     []1     []2     []3     []4     [] 5  [x] 0     []1     []2     []3     []4     [] 5  [x] 0     []1     []2     []3     []4     [] 5  [x] 0     []1     []2     []3     []4     [] 5         MD Contact: Dr. Molina Comments:  Total Score:  8

## 2019-04-04 NOTE — DISCHARGE INSTRUCTIONS
To confirm, Your doctor has instructed you that surgery is scheduled for:     Please report to [facility], Registration the morning of surgery. You must check-in and receive a wristband before going to your procedure.    Pre-Op will call the afternoon prior to surgery between 1:00 and 6:00 PM with the final arrival time.  Phone number: [phone]    PLEASE NOTE:  The surgery schedule has many variables which may affect the time of your surgery case.  Family members should be available if your surgery time changes.  Plan to be here the day of your procedure between 4-6 hours.    MEDICATIONS:  TAKE ONLY THESE MEDICATIONS WITH A SMALL SIP OF WATER THE MORNING OF YOUR PROCEDURE:  ATENOLOL, ZYRTEC, PROZAC, GABAPENTIN, LEVOTHYROXINE, OXYCODONE, PROTONIX  FLONASE, BREO      DO NOT TAKE THESE MEDICATIONS 5-7 DAYS PRIOR to your procedure or per your surgeon's request: ASPIRIN, ALEVE, ADVIL, IBUPROFEN, FISH OIL VITAMIN E, HERBALS  (May take Tylenol)  MVI    ONLY if you are prescribed any types of blood thinners such as:  Aspirin, Coumadin, Plavix, Pradaxa, Xarelto, Aggrenox, Effient, Eliquis, Savasya, Brilinta, or any other, ask your surgeon whether you should stop taking them and how long before surgery you should stop.  You may also need to verify with the prescribing physician if it is ok to stop your medication.      INSTRUCTIONS IMPORTANT!!  · Do not eat or drink anything between midnight and the time of your procedure- this includes gum, mints, and candy.  · Do not smoke or drink alcoholic beverages 24 hours prior to your procedure.  · Shower the night before AND the morning of your procedure with a Chlorhexidine wash such as Hibiclens or Dial antibacterial soap from the neck down.  Do not get it on your face or in your eyes.  You may use your own shampoo and face wash. This helps your skin to be as bacteria free as possible.    · If you wear contact lenses, dentures, hearing aids or glasses,  bring a container to put them in during surgery and give to a family member for safe keeping.  Please leave all jewelry, piercing's and valuables at home.   · DO NOT remove hair from the surgery site.  Do not shave the incision site unless you are given specific instructions to do so.    · ONLY if you have been diagnosed with sleep apnea please bring your C-PAP machine.  · ONLY if you wear home oxygen please bring your portable oxygen tank the day of your procedure.  · ONLY if you have a history of OPEN HEART SURGERY you will need a clearance from your Cardiologist per Anesthesia.      · ONLY for patients requiring bowel prep, written instructions will be given by your doctor's office.  · ONLY if you have a neuro stimulator, please bring the controller with you the morning of surgery  · ONLY if a type and screen test is needed before surgery, please return:  · If your doctor has scheduled you for an overnight stay, bring a small overnight bag with any personal items you need.  · Make arrangements in advance for transportation home by a responsible adult.  It is not safe to drive a vehicle during the 24 hours after anesthesia.      · Visiting hours are 10:00AM to 8:30PM.  For the safety of all patients, visitors under the age of 12 are not allowed above the first floor of the hospital.    · All [facility] facilities and properties are tobacco free.  Smoking is NOT allowed.       If you have any questions about these instructions, call Pre-Op Admit  Nursing at [phone] or the Pre-Op Day Surgery Unit at [phone].

## 2019-04-05 DIAGNOSIS — M62.838 MUSCLE SPASM: ICD-10-CM

## 2019-04-05 RX ORDER — LORAZEPAM 1 MG/1
TABLET ORAL
Qty: 30 TABLET | Refills: 0 | Status: SHIPPED | OUTPATIENT
Start: 2019-04-05 | End: 2019-05-06 | Stop reason: SDUPTHER

## 2019-04-05 NOTE — PRE-PROCEDURE INSTRUCTIONS
Viviana at Dr Molina's office notified of urine: trace leukocytes and WBC 3. States she will inform

## 2019-04-06 LAB — MRSA SPEC QL CULT: NORMAL

## 2019-04-09 ENCOUNTER — TELEPHONE (OUTPATIENT)
Dept: ORTHOPEDICS | Facility: CLINIC | Age: 69
End: 2019-04-09

## 2019-04-09 NOTE — TELEPHONE ENCOUNTER
----- Message from Anurag Dow sent at 4/9/2019 10:24 AM CDT -----  Contact: Levi with [facility]  Levi is requesting a call back regarding the approval for the surgery that was scheduled 3.21.19 but was rescheduled 4.16.19. Please call to advise.     Levi is requesting a fax to state the date of service change.     Fax: [phone]  Call Back#: [phone]  Thanks

## 2019-04-10 ENCOUNTER — OFFICE VISIT (OUTPATIENT)
Dept: FAMILY MEDICINE | Facility: CLINIC | Age: 69
End: 2019-04-10
Payer: MEDICARE

## 2019-04-10 VITALS
HEIGHT: 62 IN | DIASTOLIC BLOOD PRESSURE: 80 MMHG | WEIGHT: 242.5 LBS | HEART RATE: 73 BPM | SYSTOLIC BLOOD PRESSURE: 140 MMHG | BODY MASS INDEX: 44.63 KG/M2 | TEMPERATURE: 98 F

## 2019-04-10 DIAGNOSIS — E66.01 MORBID OBESITY WITH BMI OF 40.0-44.9, ADULT: ICD-10-CM

## 2019-04-10 DIAGNOSIS — G89.29 CHRONIC LOW BACK PAIN WITH LEFT-SIDED SCIATICA, UNSPECIFIED BACK PAIN LATERALITY: ICD-10-CM

## 2019-04-10 DIAGNOSIS — M79.7 FIBROMYALGIA: ICD-10-CM

## 2019-04-10 DIAGNOSIS — G47.33 OBSTRUCTIVE SLEEP APNEA SYNDROME: Primary | ICD-10-CM

## 2019-04-10 DIAGNOSIS — R60.0 EDEMA EXTREMITIES: ICD-10-CM

## 2019-04-10 DIAGNOSIS — K27.9 PUD (PEPTIC ULCER DISEASE): ICD-10-CM

## 2019-04-10 DIAGNOSIS — M54.42 CHRONIC LOW BACK PAIN WITH LEFT-SIDED SCIATICA, UNSPECIFIED BACK PAIN LATERALITY: ICD-10-CM

## 2019-04-10 DIAGNOSIS — D75.839 THROMBOCYTOSIS: ICD-10-CM

## 2019-04-10 PROCEDURE — 3079F PR MOST RECENT DIASTOLIC BLOOD PRESSURE 80-89 MM HG: ICD-10-PCS | Mod: CPTII,S$GLB,, | Performed by: FAMILY MEDICINE

## 2019-04-10 PROCEDURE — 99499 RISK ADDL DX/OHS AUDIT: ICD-10-PCS | Mod: S$GLB,,, | Performed by: FAMILY MEDICINE

## 2019-04-10 PROCEDURE — 3077F SYST BP >= 140 MM HG: CPT | Mod: CPTII,S$GLB,, | Performed by: FAMILY MEDICINE

## 2019-04-10 PROCEDURE — 1101F PR PT FALLS ASSESS DOC 0-1 FALLS W/OUT INJ PAST YR: ICD-10-PCS | Mod: CPTII,S$GLB,, | Performed by: FAMILY MEDICINE

## 2019-04-10 PROCEDURE — 3077F PR MOST RECENT SYSTOLIC BLOOD PRESSURE >= 140 MM HG: ICD-10-PCS | Mod: CPTII,S$GLB,, | Performed by: FAMILY MEDICINE

## 2019-04-10 PROCEDURE — 99214 PR OFFICE/OUTPT VISIT, EST, LEVL IV, 30-39 MIN: ICD-10-PCS | Mod: S$GLB,,, | Performed by: FAMILY MEDICINE

## 2019-04-10 PROCEDURE — 1101F PT FALLS ASSESS-DOCD LE1/YR: CPT | Mod: CPTII,S$GLB,, | Performed by: FAMILY MEDICINE

## 2019-04-10 PROCEDURE — 99499 UNLISTED E&M SERVICE: CPT | Mod: S$GLB,,, | Performed by: FAMILY MEDICINE

## 2019-04-10 PROCEDURE — 3079F DIAST BP 80-89 MM HG: CPT | Mod: CPTII,S$GLB,, | Performed by: FAMILY MEDICINE

## 2019-04-10 PROCEDURE — 99214 OFFICE O/P EST MOD 30 MIN: CPT | Mod: S$GLB,,, | Performed by: FAMILY MEDICINE

## 2019-04-10 PROCEDURE — 99999 PR PBB SHADOW E&M-EST. PATIENT-LVL III: ICD-10-PCS | Mod: PBBFAC,,, | Performed by: FAMILY MEDICINE

## 2019-04-10 PROCEDURE — 99999 PR PBB SHADOW E&M-EST. PATIENT-LVL III: CPT | Mod: PBBFAC,,, | Performed by: FAMILY MEDICINE

## 2019-04-10 RX ORDER — TORSEMIDE 20 MG/1
20 TABLET ORAL 2 TIMES DAILY
Qty: 60 TABLET | Refills: 2
Start: 2019-04-10 | End: 2020-01-02

## 2019-04-10 RX ORDER — INSULIN GLARGINE 100 [IU]/ML
20 INJECTION, SOLUTION SUBCUTANEOUS NIGHTLY
Refills: 0 | Status: ON HOLD
Start: 2019-04-10 | End: 2021-02-09 | Stop reason: ALTCHOICE

## 2019-04-10 NOTE — LETTER
[facility]  [address] [place] [address]  Phone: [phone]  Fax: [phone] April 10, 2019     Dr Larry Molina    Patient: Fifi Mcnair   MR Number: [id number]   YOB: 1950   Date of Visit: 4/10/2019       Dear Dr Molina:    I am referring my patient, Fifi Mcnair, to you for evaluation of left total knee replacement.     I appreciate your assistance in her care and look forward to your findings and recommendations.  She has a low risk for cardiac event. She has insulin pump that should be turn off for surgery day, also hold Basalglar the night before. Hold her blood pressure medications. Also she has abstain from NSAID due to CKD 3. She is medical clear for surgery and anesthesia.  Sincerely,                           Werner Vargas MD            CC  No Recipients

## 2019-04-10 NOTE — PATIENT INSTRUCTIONS

## 2019-04-11 ENCOUNTER — OFFICE VISIT (OUTPATIENT)
Dept: PULMONOLOGY | Facility: CLINIC | Age: 69
End: 2019-04-11
Payer: MEDICARE

## 2019-04-11 ENCOUNTER — TELEPHONE (OUTPATIENT)
Dept: ORTHOPEDICS | Facility: CLINIC | Age: 69
End: 2019-04-11

## 2019-04-11 VITALS
HEIGHT: 62 IN | SYSTOLIC BLOOD PRESSURE: 110 MMHG | OXYGEN SATURATION: 97 % | DIASTOLIC BLOOD PRESSURE: 70 MMHG | HEART RATE: 86 BPM | WEIGHT: 243 LBS | BODY MASS INDEX: 44.72 KG/M2

## 2019-04-11 DIAGNOSIS — J45.30 MILD PERSISTENT ASTHMA WITHOUT COMPLICATION: ICD-10-CM

## 2019-04-11 DIAGNOSIS — G47.33 OSA (OBSTRUCTIVE SLEEP APNEA): ICD-10-CM

## 2019-04-11 PROCEDURE — 1101F PT FALLS ASSESS-DOCD LE1/YR: CPT | Mod: ,,, | Performed by: NURSE PRACTITIONER

## 2019-04-11 PROCEDURE — 3078F PR MOST RECENT DIASTOLIC BLOOD PRESSURE < 80 MM HG: ICD-10-PCS | Mod: ,,, | Performed by: NURSE PRACTITIONER

## 2019-04-11 PROCEDURE — 3074F PR MOST RECENT SYSTOLIC BLOOD PRESSURE < 130 MM HG: ICD-10-PCS | Mod: ,,, | Performed by: NURSE PRACTITIONER

## 2019-04-11 PROCEDURE — 99214 PR OFFICE/OUTPT VISIT, EST, LEVL IV, 30-39 MIN: ICD-10-PCS | Mod: ,,, | Performed by: NURSE PRACTITIONER

## 2019-04-11 PROCEDURE — 3078F DIAST BP <80 MM HG: CPT | Mod: ,,, | Performed by: NURSE PRACTITIONER

## 2019-04-11 PROCEDURE — 3074F SYST BP LT 130 MM HG: CPT | Mod: ,,, | Performed by: NURSE PRACTITIONER

## 2019-04-11 PROCEDURE — 1101F PR PT FALLS ASSESS DOC 0-1 FALLS W/OUT INJ PAST YR: ICD-10-PCS | Mod: ,,, | Performed by: NURSE PRACTITIONER

## 2019-04-11 PROCEDURE — 99214 OFFICE O/P EST MOD 30 MIN: CPT | Mod: ,,, | Performed by: NURSE PRACTITIONER

## 2019-04-11 NOTE — PROGRESS NOTES
SUBJECTIVE:  Fifi Mcnair is a 68 y.o. female who sustained a left knee injury chronic several year(s) ago. Mechanism of injury: unknown. Immediate symptoms: delayed pain, delayed swelling, no deformity was noted by the patient. Symptoms have been chronic since that time. Prior history of related problems: previous knee injury with poor results with steroid, not PT, nor hyaluronic acid injections..    Diabetes II controled with insulin pumps  CKD 4  Hyperparathyroid   Chronic depression remission  Chronic rhinitis  Hypothyroid  Lumbar stenosis  Opiates dependence  CHF,     OBJECTIVE:  Vital signs as noted above.  Appearance: alert, well appearing, and in no distress, oriented to person, place, and time, obese, acyanotic, in no respiratory distress and well hydrated.  Knee exam: antalgic gait, soft tissue tenderness ,median effusion, reduced range of motion, negative drawer sign, negative pivot-shift, collateral ligaments intact, positive Mervin sign, positive Apley's sign, positive Lachman sign, patellar tenderness, normal ipsilateral hip exam, normal ipsilateral foot and ankle exam, normal contralateral knee exam.  X-ray: shows DJD changes, likely chronic.    ASSESSMENT:  Knee underlying chronic DJD likely  Obstructive sleep apnea syndrome    Edema extremities  -     torsemide (DEMADEX) 20 MG Tab; Take 1 tablet (20 mg total) by mouth 2 (two) times daily. TAKE 1 TABLET BY MOUTH EVERY OTHER DAY THEN 2 TABLETS EVERY DAY  Dispense: 60 tablet; Refill: 2    Fibromyalgia    Chronic low back pain with left-sided sciatica, unspecified back pain laterality    PUD (peptic ulcer disease)    Morbid obesity with BMI of 40.0-44.9, adult, today 42.5    Thrombocytosis    Other orders  -     insulin (BASAGLAR KWIKPEN U-100 INSULIN) glargine 100 units/mL (3mL) SubQ pen; Inject 20 Units into the skin every evening. Use 10 units if BS over 210.Night before surgery.; Refill: 0      Letter sent to Dr Molina.  PLAN:  rest the  injured area as much as practical, referral to Orthopedics for this injury  See orders for this visit as documented in the electronic medical record.    Answers for HPI/ROS submitted by the patient on 4/8/2019   activity change: No  unexpected weight change: No  neck pain: Yes  hearing loss: No  rhinorrhea: No  trouble swallowing: No  eye discharge: No  visual disturbance: No  chest tightness: No  wheezing: No  chest pain: No  palpitations: No  blood in stool: No  constipation: No  vomiting: No  diarrhea: Yes  polydipsia: No  polyuria: No  difficulty urinating: No  hematuria: No  menstrual problem: No  dysuria: No  joint swelling: No  arthralgias: Yes  headaches: No  weakness: No  confusion: No  dysphoric mood: No

## 2019-04-11 NOTE — PATIENT INSTRUCTIONS
Understanding Asthma Triggers  Triggers are things that cause you to have asthma symptoms. Some triggers you can stay away from completely. Others you can plan for and adjust to. Use this sheet to help you know your triggers.    What are triggers?  Triggers irritate your lungs and lead to asthma flare-ups. Some examples are:  · Irritants, such as tobacco smoke or air pollution. These are a concern for all people with asthma.  · Allergens or substances that cause allergies, like pets, dust mites, or pollen  · Special conditions. These include being ill with a cold or the flu, or certain kinds of weather, including changes in weather. These differ from person to person.  · Exercise can trigger asthma in some people. If exercise is one of your triggers, you can learn how to exercise safely.  What triggers your asthma?  Which of these common triggers cause your asthma to flare up? Check all that apply to you.  Irritants:  ? Tobacco smoke (smoking or secondhand smoke)  ? Smoke from fireplaces  ? Vehicle exhaust  ? Smog or air pollution  ? Aerosol sprays  ? Strong odors, such as perfume, incense, or cooking odors  ? Household , such as ammonia or bleach  Allergens:  ? Cats  ? Dogs  ? Birds  ? Dust or dust mites  ? Pollen  ? Mold  ? Cockroaches  Other triggers:  ? Cold air  ? Hot air  ? Weather changes  ? Exercise  ? Certain foods or food ingredients (such as sulfites)  ? Medicines  ? Emotions such as laughing, crying, or feeling stressed  ? Illness such as colds, flu, and sinus infections  Allergies and allergy treatment  People with asthma often have allergies. If you have allergies, or think you have them, talk with your healthcare provider about testing and treatment. Allergy testing can find out exactly which allergens affect you. Types of tests include:  · Skin tests. A small amount of each allergen is put on the skin. Sites are then looked at for an allergic reaction. This could be redness, swelling, or  itching. In general, the greater the reaction, the stronger the allergy.  · Blood tests. A blood test can show sensitivity to the allergen.  Exposing a person to gradually larger amounts of an allergen can help the body build up a tolerance. This is the purpose of allergy shots (immunotherapy). For this therapy, injections are given over a period of years. At first, you get injections with a very small amount of allergen about once a week. As treatment goes on, the amount of allergen is gradually increased to a certain level. Eventually, you have the injections less often. This therapy can take up to a year to start working. But it can be very effective to manage certain allergies over time.   Date Last Reviewed: 10/1/2016  © 8868-0131 [employer]. [address] [place] [address], [place] [address]. All rights reserved. This information is not intended as a substitute for professional medical care. Always follow your healthcare professional's instructions.        Understanding Asthma Triggers  Triggers are things that cause you to have asthma symptoms. Some triggers you can stay away from completely. Others you can plan for and adjust to. Use this sheet to help you know your triggers.    What are triggers?  Triggers irritate your lungs and lead to asthma flare-ups. Some examples are:  · Irritants, such as tobacco smoke or air pollution. These are a concern for all people with asthma.  · Allergens or substances that cause allergies, like pets, dust mites, or pollen  · Special conditions. These include being ill with a cold or the flu, or certain kinds of weather, including changes in weather. These differ from person to person.  · Exercise can trigger asthma in some people. If exercise is one of your triggers, you can learn how to exercise safely.  What triggers your asthma?  Which of these common triggers cause your asthma to flare up? Check all that apply to you.  Irritants:  ? Tobacco smoke (smoking or  secondhand smoke)  ? Smoke from fireplaces  ? Vehicle exhaust  ? Smog or air pollution  ? Aerosol sprays  ? Strong odors, such as perfume, incense, or cooking odors  ? Household , such as ammonia or bleach  Allergens:  ? Cats  ? Dogs  ? Birds  ? Dust or dust mites  ? Pollen  ? Mold  ? Cockroaches  Other triggers:  ? Cold air  ? Hot air  ? Weather changes  ? Exercise  ? Certain foods or food ingredients (such as sulfites)  ? Medicines  ? Emotions such as laughing, crying, or feeling stressed  ? Illness such as colds, flu, and sinus infections  Allergies and allergy treatment  People with asthma often have allergies. If you have allergies, or think you have them, talk with your healthcare provider about testing and treatment. Allergy testing can find out exactly which allergens affect you. Types of tests include:  · Skin tests. A small amount of each allergen is put on the skin. Sites are then looked at for an allergic reaction. This could be redness, swelling, or itching. In general, the greater the reaction, the stronger the allergy.  · Blood tests. A blood test can show sensitivity to the allergen.  Exposing a person to gradually larger amounts of an allergen can help the body build up a tolerance. This is the purpose of allergy shots (immunotherapy). For this therapy, injections are given over a period of years. At first, you get injections with a very small amount of allergen about once a week. As treatment goes on, the amount of allergen is gradually increased to a certain level. Eventually, you have the injections less often. This therapy can take up to a year to start working. But it can be very effective to manage certain allergies over time.   Date Last Reviewed: 10/1/2016  © 2666-3661 [employer]. [address] [place] [address]. All rights reserved. This information is not intended as a substitute for professional medical care. Always follow your healthcare professional's  instructions.       Continue your Breo daily  Keep sleeping on your CPAP, Bring with you for surgery  Call if you get sick

## 2019-04-11 NOTE — TELEPHONE ENCOUNTER
----- Message from Justina Shabazz sent at 4/11/2019  4:49 PM CDT -----  Contact: self [phone]  She is requesting that you send another letter to her insurance with the new surgery date (4/16/19).  Thank you!

## 2019-04-11 NOTE — PROGRESS NOTES
SUBJECTIVE:    Patient ID: Fifi Mcnair is a 68 y.o. female.    Chief Complaint: No chief complaint on file.    HPI     Patient here today feeling alright. She is having a knee replacement next week.  She is using her Breo daily, she is now in her donut hole.  She has not used her rescue inhaler. She is sleeping on her CPAP faithfully.     Past Medical History:   Diagnosis Date    Allergy     multiple antibiotic allergies    Anemia     Anxiety     Arthritis     Asthma     CHF (congestive heart failure)     NYHA class III     Chronic kidney disease     Colon polyp     benign    COPD (chronic obstructive pulmonary disease)     DLCO 37% , and mild pulmonary HTN    Dental bridge present     LOWER    Depression     Diabetes mellitus     Diabetic retinopathy     Diastolic dysfunction     Diverticulitis of large intestine without perforation or abscess without bleeding 2/12/2018    Diverticulosis     Former smoker     Fracture of lumbar spine     GERD (gastroesophageal reflux disease)     Hernia of unspecified site of abdominal cavity without mention of obstruction or gangrene     Hyperlipidemia     Hypertension     hypertensive renal    IBS (irritable bowel syndrome)     Mild nonproliferative diabetic retinopathy(362.04) 11/25/2013    Morbid obesity     Nuclear sclerosis 11/25/2013    Osteoporosis     Peripheral edema     Rash     Recurrent upper respiratory infection (URI)     S/P LASIK (laser assisted in situ keratomileusis)     Sleep apnea     sleep apnea uses bipap.    Stroke     tia    Thyroid disease     on synthroid    TIA (transient ischemic attack)     Urinary tract infection     Wears glasses      Past Surgical History:   Procedure Laterality Date    ABDOMINAL SURGERY      ADENOIDECTOMY      ARTHROSCOPY, KNEE, WITH CHONDROPLASTY Right 8/31/2018    [facility]    ARTHROSCOPY, KNEE, WITH MENISCECTOMY Right 8/31/2018    Performed  [facility] at [facility] OR    COLONOSCOPY  03/05/2013    repeat in 5 years    COLONOSCOPY N/A 4/11/2018    Performed by Luis Navarro MD at [facility] ENDO    COLONOSCOPY N/A 5/31/2017    Performed [facility] Luis [facility] at [facility] ENDO    COLONOSCOPY N/A 3/5/2013    Performed by Florian Randall MD at [facility] ENDO    COSMETIC SURGERY      belt abdominoplasty    CYSTOSCOPY      CYSTOSCOPY N/A 8/6/2018    [facility]    EGD (ESOPHAGOGASTRODUODENOSCOPY) N/A 3/5/2013    Performed by Florian Randall MD at [facility] ENDO    ESOPHAGOGASTRODUODENOSCOPY (EGD) N/A 1/11/2018    Performed by Luis [facility]arro [facility] at [facility] ENDO    ESOPHAGOGASTRODUODENOSCOPY (EGD) N/A 12/27/2016    Performed by Luis [facility]arro [facility] at [facility] ENDO    ESOPHAGOGASTRODUODENOSCOPY (EGD) N/A 10/25/2016    Performed by Luis Navarro [facility] at [facility] ENDO    ESOPHAGOGASTRODUODENOSCOPY (EGD) N/A 8/24/2016    Performed by Luis [facility]arro [facility] at [facility] ENDO    ESOPHAGOGASTRODUODENOSCOPY (EGD) N/A 7/21/2016    Performed by Florian Randall MD at [facility] ENDO    ESOPHAGOGASTRODUODENOSCOPY (EGD)-57204 N/A 12/12/2014    Performed by [facility]ong [facility] at [facility] OR 2ND FLR    EYE SURGERY Right     mazzulla    GASTRECTOMY      GASTRECTOMY-SLEEVE-LAPAROSCOPIC-75659; EGD-97823 N/A 12/12/2014    Performed by [facility]ong [facility] at [facility] OR 2ND FLR    gastric sleeve      HERNIA REPAIR      5 years old    HYSTERECTOMY      ovaries remain    PANNICULECTOMY N/A 11/28/2016    Performed by Christiano Becerril MD at [facility] OR 2ND FLR    REFRACTIVE SURGERY      mono va//ou//    REPAIR-HERNIA  11/28/2016    Performed by [facility]o [facility]erril [facility] at [facility] OR 2ND FLR    RHINOPLASTY TIP      TONSILLECTOMY      TUBAL LIGATION      UPPER GASTROINTESTINAL ENDOSCOPY  03/05/2013    UPPER GASTROINTESTINAL ENDOSCOPY  08/24/2016    Dr. Navarro, repeat in 8 weeks    UPPER GASTROINTESTINAL ENDOSCOPY   2016    Dr. Randall     Social History     Tobacco Use    Smoking status: Former Smoker     Packs/day: 1.00     Years: 4.00     Pack years: 4.00     Types: Cigarettes     Last attempt to quit:      Years since quittin.2    Smokeless tobacco: Never Used    Tobacco comment: quit , lived with smokers    Substance Use Topics    Alcohol use: Yes     Comment: rare     Family History   Problem Relation Age of Onset    Heart disease Mother 59    Cancer Mother 59        throat    Allergies Mother     Diabetes Mother     Heart disease Father 70        flutter    Allergies Father     Diabetes Father     Cancer Sister 22        22 thyroid,49  breast    Allergies Sister     Breast cancer Sister     Diabetes Sister     Cancer Brother 62        lung    Diabetes Brother     Asthma Daughter     Diabetes Daughter     Depression Daughter     Asthma Grandchild     Eczema Grandchild     Eczema Grandchild     Breast cancer Maternal Grandmother     Ovarian cancer Cousin       Review of patient's allergies indicates:   Allergen Reactions    Adhesive Other (See Comments)     Blisters    Cleocin [clindamycin hcl] Hives    Ceclor [cefaclor] Hives    Advair diskus [fluticasone-salmeterol]      Dry mouth    Aleve [naproxen sodium]      Unable to take secondary to kidney function.     Erythromycin Hives    Levaquin [levofloxacin] Dermatitis    Macrobid [nitrofurantoin monohyd/m-cryst] Other (See Comments)     Stomach pain/ GI issues    Macrodantin [nitrofurantoin macrocrystalline] Hives    Motrin [ibuprofen]      Unable to take secondary to kidney functions.    Restoril [temazepam]      LIGHTHEADED UPON WAKING.with poor results    Sulfa (sulfonamide antibiotics)      Hold due to renal problems    Trazodone      PALPITATIONS    Remeron [mirtazapine] Palpitations and Other (See Comments)     Jittery    Vancomycin analogues Rash     Feet broke out/inflammed blood vessels         Current  Outpatient Medications   Medication Sig Dispense Refill    allopurinol (ZYLOPRIM) 100 MG tablet TAKE TWO TABLETS BY MOUTH AT BEDTIME 180 tablet 4    amitriptyline (ELAVIL) 50 MG tablet TAKE 2 TABLETS BY MOUTH IN THE EVENING 60 tablet 0    atenolol (TENORMIN) 25 MG tablet Take 1 tablet (25 mg total) by mouth 2 (two) times daily. 180 tablet 4    calcitRIOL (ROCALTROL) 0.25 MCG Cap Take 1 capsule by mouth twice a week. Monday Friday      cetirizine (ZYRTEC) 10 MG tablet Take 1 tablet (10 mg total) by mouth once daily. 1 Bottle 5    clotrimazole-betamethasone 1-0.05% (LOTRISONE) cream       cyanocobalamin, vitamin B-12, (VITAMIN B-12) 5,000 mcg Subl Place 5,000 mg under the tongue once a week.      diclofenac sodium (VOLTAREN) 1 % Gel  APPLY 2 GRAMS TOPICALLY ONCE DAILY 1 Tube 0    diphenoxylate-atropine 2.5-0.025 mg (LOMOTIL) 2.5-0.025 mg per tablet TAKE ONE TABLET BY MOUTH 4 TIMES DAILY AS NEEDED FOR DIARRHEA 60 tablet 1    DYMISTA 137-50 mcg/spray Spry nassal spray as needed.       fluoxetine (PROZAC) 20 MG capsule TAKE TWO CAPSULES BY MOUTH ONCE DAILY 180 capsule 4    fluticasone (FLONASE) 50 mcg/actuation nasal spray 2 sprays by Nasal route once daily.       fluticasone-vilanterol (BREO) 100-25 mcg/dose diskus inhaler Inhale 1 puff into the lungs once daily.      gabapentin (NEURONTIN) 100 MG capsule 3 (three) times daily.       insulin (BASAGLAR KWIKPEN U-100 INSULIN) glargine 100 units/mL (3mL) SubQ pen Inject 20 Units into the skin every evening. Use 10 units if BS over 210.Night before surgery.  0    insulin aspart U-100 (NOVOLOG) 100 unit/mL injection Use per insulin pump, 35-40 units daily. 40 mL 0    KLOR-CON M20 20 mEq tablet TAKE ONE TABLET BY MOUTH TWICE DAILY 180 tablet 3    l-methylfolate-b2-b6-b12 (CEREFOLIN) 6-5-50-1 mg Tab Take 1 tablet by mouth once daily.      Lacto.acidophilus-Bif.animalis (PROBIOTIC) 5 billion cell CpSP Take 1 capsule by mouth once daily. 30 capsule 3     levothyroxine (SYNTHROID) 25 MCG tablet TAKE ONE TABLET BY MOUTH ONCE DAILY 90 tablet 3    LORazepam (ATIVAN) 1 MG tablet TAKE 1 TABLET BY MOUTH EVERY 12 HOURS AS NEEDED FOR ANXIETY 30 tablet 0    losartan (COZAAR) 50 MG tablet Take 1 tablet (50 mg total) by mouth once daily. 90 tablet 3    MULTIVIT-IRON-MIN-FOLIC ACID 3,500-18-0.4 UNIT-MG-MG ORAL CHEW Take by mouth 2 (two) times daily.      mupirocin (BACTROBAN) 2 % ointment APPLY OINTMENT TOPICALLY TO AFFECTED AREA ON NOSE THREE TIMES DAILY AS DIRECTED 15 g 11    oxyCODONE (ROXICODONE) 15 MG Tab Take 1 tablet (15 mg total) by mouth every 6 (six) hours as needed for Pain. 30 tablet 0    pantoprazole (PROTONIX) 40 MG tablet TAKE ONE TABLET BY MOUTH ONCE DAILY 90 tablet 3    ranitidine (ZANTAC) 300 MG tablet TAKE ONE TABLET BY MOUTH IN THE EVENING 30 tablet 1    SSD 1 % cream       torsemide (DEMADEX) 20 MG Tab Take 1 tablet (20 mg total) by mouth 2 (two) times daily. TAKE 1 TABLET BY MOUTH EVERY OTHER DAY THEN 2 TABLETS EVERY DAY 60 tablet 2    albuterol-ipratropium 2.5mg-0.5mg/3mL (DUO-NEB) 0.5 mg-3 mg(2.5 mg base)/3 mL nebulizer solution Take 3 mLs by nebulization every 6 (six) hours as needed for Wheezing. Rescue 1 Box 3     Current Facility-Administered Medications   Medication Dose Route Frequency Provider Last Rate Last Dose    gentamicin injection 80 mg  80 mg Intramuscular 1 time in Clinic/HOD Angy Campos MD         Facility-Administered Medications Ordered in Other Visits   Medication Dose Route Frequency Provider Last Rate Last Dose    lactated ringers infusion   Intravenous Continuous Shaheen Hanson MD 10 mL/hr at 08/31/18 0739           Last PFT: 06/05/2017  Last Chest xray:  04/04/2019    Review of Systems  General: Feeling Well.  Eyes: dry eyes  ENT:  Sinus congestion at times   Heart:: No chest pain or palpitations.  Lungs: cough at night for several months   GI: Reflux   : No dysuria, hesitancy, or nocturia.  Skin: No  "lesions or rashes.  Musculoskeletal: knee pain   Neuro: headaches over the last   Lymph: No edema or adenopathy.  Psych: No anxiety or depression.  Endo: weight gain     OBJECTIVE:      /70 (BP Location: Left arm, Patient Position: Sitting, BP Method: Medium (Manual))   Pulse 86   Ht 5' 2" (1.575 m)   Wt 110.2 kg (243 lb)   LMP  (LMP Unknown)   SpO2 97%   BMI 44.45 kg/m²     Physical Exam  GENERAL: Older patient in no distress.  HEENT: Pupils equal and reactive. Extraocular movements intact. Nose intact.      Pharynx moist.  NECK: Supple.   HEART: Regular rate and rhythm. No murmur or gallop auscultated.  LUNGS: Clear to auscultation and percussion. Lung excursion symmetrical. No change in fremitus. No adventitial noises.  ABDOMEN: Bowel sounds present. Non-tender, no masses palpated. Obese   : Normal anatomy.  EXTREMITIES: Normal muscle tone and joint movement, no cyanosis or clubbing.   LYMPHATICS: No adenopathy palpated, no edema.  SKIN: Dry, intact, no lesions.   NEURO: Cranial nerves II-XII intact. Motor strength 5/5 bilaterally, upper and lower extremities.  PSYCH: Appropriate affect.    Assessment:       1. MECHELLE (obstructive sleep apnea)    2. Mild persistent asthma without complication          Plan:       MECHELLE (obstructive sleep apnea)    Mild persistent asthma without complication     Continue your Breo daily  Keep sleeping on your CPAP, Bring with you for surgery  Call if you get sick    Follow up in about 6 months (around 10/11/2019).        "

## 2019-04-15 ENCOUNTER — ANESTHESIA EVENT (OUTPATIENT)
Dept: SURGERY | Facility: HOSPITAL | Age: 69
End: 2019-04-15
Payer: MEDICARE

## 2019-04-15 ENCOUNTER — OFFICE VISIT (OUTPATIENT)
Dept: RHEUMATOLOGY | Facility: CLINIC | Age: 69
End: 2019-04-15
Payer: MEDICARE

## 2019-04-15 VITALS
BODY MASS INDEX: 44.59 KG/M2 | SYSTOLIC BLOOD PRESSURE: 123 MMHG | WEIGHT: 243.81 LBS | DIASTOLIC BLOOD PRESSURE: 76 MMHG

## 2019-04-15 DIAGNOSIS — M19.91 PRIMARY OSTEOARTHRITIS, UNSPECIFIED SITE: Primary | ICD-10-CM

## 2019-04-15 PROCEDURE — 1101F PR PT FALLS ASSESS DOC 0-1 FALLS W/OUT INJ PAST YR: ICD-10-PCS | Mod: ,,, | Performed by: INTERNAL MEDICINE

## 2019-04-15 PROCEDURE — 3074F SYST BP LT 130 MM HG: CPT | Mod: ,,, | Performed by: INTERNAL MEDICINE

## 2019-04-15 PROCEDURE — 99213 OFFICE O/P EST LOW 20 MIN: CPT | Mod: ,,, | Performed by: INTERNAL MEDICINE

## 2019-04-15 PROCEDURE — 3078F PR MOST RECENT DIASTOLIC BLOOD PRESSURE < 80 MM HG: ICD-10-PCS | Mod: ,,, | Performed by: INTERNAL MEDICINE

## 2019-04-15 PROCEDURE — 3078F DIAST BP <80 MM HG: CPT | Mod: ,,, | Performed by: INTERNAL MEDICINE

## 2019-04-15 PROCEDURE — 3074F PR MOST RECENT SYSTOLIC BLOOD PRESSURE < 130 MM HG: ICD-10-PCS | Mod: ,,, | Performed by: INTERNAL MEDICINE

## 2019-04-15 PROCEDURE — 1101F PT FALLS ASSESS-DOCD LE1/YR: CPT | Mod: ,,, | Performed by: INTERNAL MEDICINE

## 2019-04-15 PROCEDURE — 99213 PR OFFICE/OUTPT VISIT, EST, LEVL III, 20-29 MIN: ICD-10-PCS | Mod: ,,, | Performed by: INTERNAL MEDICINE

## 2019-04-15 RX ORDER — MONTELUKAST SODIUM 10 MG/1
10 TABLET ORAL NIGHTLY
Refills: 8 | COMMUNITY
Start: 2019-04-11 | End: 2020-01-06 | Stop reason: SDUPTHER

## 2019-04-15 NOTE — PROGRESS NOTES
[facility] RHEUMATOLOGY            PROGRESS NOTE      Subjective:       Patient ID:   NAME: Fifi Mcnair : 1950     68 y.o. female    Referring Doc: No ref. provider found  Other Physicians:    Chief Complaint:  Fibromyalgia      History of Present Illness:     Patient returns today for a regularly scheduled follow-up visit for  FM,OA  Last seen in .       The patient will have L knee replacement surgery tomorrow.  No chest pains cough, shortness of breath.            ROS:   GEN:  No  fever, night sweats . weight is stable   + fatigue  SKIN: no rashes, no bruising, no ulcerations, no Raynaud's  HEENT: no HA's, No visual changes, no mucosal ulcers, no sicca symptoms,  CV:   no CP, SOB, PND, LOERA, no orthopnea, no palpitations  PULM: normal with no SOB, cough, hemoptysis, sputum or pleuritic pain  GI:  no abdominal pain, nausea, vomiting, constipation, diarrhea, melanotic stools, BRBPR, hematemesis, no dysphagia  :   no dysuria  NEURO: no paresthesias, headaches, visual disturbances, muscle weakness  MUSCULOSKELETAL:no joint swelling, prolonged AM stiffness, no back pain, + muscle pain  Allergies:  Review of patient's allergies indicates:   Allergen Reactions    Adhesive Other (See Comments)     Blisters    Cleocin [clindamycin hcl] Hives    Ceclor [cefaclor] Hives    Advair diskus [fluticasone-salmeterol]      Dry mouth    Aleve [naproxen sodium]      Unable to take secondary to kidney function.     Erythromycin Hives    Levaquin [levofloxacin] Dermatitis    Macrobid [nitrofurantoin monohyd/m-cryst] Other (See Comments)     Stomach pain/ GI issues    Macrodantin [nitrofurantoin macrocrystalline] Hives    Motrin [ibuprofen]      Unable to take secondary to kidney functions.    Restoril [temazepam]      LIGHTHEADED UPON WAKING.with poor results    Sulfa (sulfonamide antibiotics)      Hold due to renal problems    Trazodone      PALPITATIONS    Remeron [mirtazapine] Palpitations and  Other (See Comments)     Jittery    Vancomycin analogues Rash     Feet broke out/inflammed blood vessels         Medications:    Current Outpatient Medications:     albuterol-ipratropium 2.5mg-0.5mg/3mL (DUO-NEB) 0.5 mg-3 mg(2.5 mg base)/3 mL nebulizer solution, Take 3 mLs by nebulization every 6 (six) hours as needed for Wheezing. Rescue, Disp: 1 Box, Rfl: 3    allopurinol (ZYLOPRIM) 100 MG tablet, TAKE TWO TABLETS BY MOUTH AT BEDTIME, Disp: 180 tablet, Rfl: 4    amitriptyline (ELAVIL) 50 MG tablet, TAKE 2 TABLETS BY MOUTH IN THE EVENING, Disp: 60 tablet, Rfl: 0    atenolol (TENORMIN) 25 MG tablet, Take 1 tablet (25 mg total) by mouth 2 (two) times daily., Disp: 180 tablet, Rfl: 4    calcitRIOL (ROCALTROL) 0.25 MCG Cap, Take 1 capsule by mouth twice a week. Monday Friday, Disp: , Rfl:     cetirizine (ZYRTEC) 10 MG tablet, Take 1 tablet (10 mg total) by mouth once daily., Disp: 1 Bottle, Rfl: 5    clotrimazole-betamethasone 1-0.05% (LOTRISONE) cream, , Disp: , Rfl:     cyanocobalamin, vitamin B-12, (VITAMIN B-12) 5,000 mcg Subl, Place 5,000 mg under the tongue once a week., Disp: , Rfl:     diclofenac sodium (VOLTAREN) 1 % Gel,  APPLY 2 GRAMS TOPICALLY ONCE DAILY, Disp: 1 Tube, Rfl: 0    diphenoxylate-atropine 2.5-0.025 mg (LOMOTIL) 2.5-0.025 mg per tablet, TAKE ONE TABLET BY MOUTH 4 TIMES DAILY AS NEEDED FOR DIARRHEA, Disp: 60 tablet, Rfl: 1    DYMISTA 137-50 mcg/spray Spry nassal spray, as needed. , Disp: , Rfl:     fluoxetine (PROZAC) 20 MG capsule, TAKE TWO CAPSULES BY MOUTH ONCE DAILY, Disp: 180 capsule, Rfl: 4    fluticasone (FLONASE) 50 mcg/actuation nasal spray, 2 sprays by Nasal route once daily. , Disp: , Rfl:     fluticasone-vilanterol (BREO) 100-25 mcg/dose diskus inhaler, Inhale 1 puff into the lungs once daily., Disp: , Rfl:     gabapentin (NEURONTIN) 100 MG capsule, 3 (three) times daily. , Disp: , Rfl:     insulin (BASAGLAR KWIKPEN U-100 INSULIN) glargine 100 units/mL (3mL) SubQ pen,  Inject 20 Units into the skin every evening. Use 10 units if BS over 210.Night before surgery., Disp: , Rfl: 0    insulin aspart U-100 (NOVOLOG) 100 unit/mL injection, Use per insulin pump, 35-40 units daily., Disp: 40 mL, Rfl: 0    KLOR-CON M20 20 mEq tablet, TAKE ONE TABLET BY MOUTH TWICE DAILY, Disp: 180 tablet, Rfl: 3    l-methylfolate-b2-b6-b12 (CEREFOLIN) 6-5-50-1 mg Tab, Take 1 tablet by mouth once daily., Disp: , Rfl:     Lacto.acidophilus-Bif.animalis (PROBIOTIC) 5 billion cell CpSP, Take 1 capsule by mouth once daily., Disp: 30 capsule, Rfl: 3    levothyroxine (SYNTHROID) 25 MCG tablet, TAKE ONE TABLET BY MOUTH ONCE DAILY, Disp: 90 tablet, Rfl: 3    LORazepam (ATIVAN) 1 MG tablet, TAKE 1 TABLET BY MOUTH EVERY 12 HOURS AS NEEDED FOR ANXIETY, Disp: 30 tablet, Rfl: 0    losartan (COZAAR) 50 MG tablet, Take 1 tablet (50 mg total) by mouth once daily., Disp: 90 tablet, Rfl: 3    montelukast (SINGULAIR) 10 mg tablet, , Disp: , Rfl: 8    MULTIVIT-IRON-MIN-FOLIC ACID 3,500-18-0.4 UNIT-MG-MG ORAL CHEW, Take by mouth 2 (two) times daily., Disp: , Rfl:     mupirocin (BACTROBAN) 2 % ointment, APPLY OINTMENT TOPICALLY TO AFFECTED AREA ON NOSE THREE TIMES DAILY AS DIRECTED, Disp: 15 g, Rfl: 11    oxyCODONE (ROXICODONE) 15 MG Tab, Take 1 tablet (15 mg total) by mouth every 6 (six) hours as needed for Pain., Disp: 30 tablet, Rfl: 0    pantoprazole (PROTONIX) 40 MG tablet, TAKE ONE TABLET BY MOUTH ONCE DAILY, Disp: 90 tablet, Rfl: 3    ranitidine (ZANTAC) 300 MG tablet, TAKE ONE TABLET BY MOUTH IN THE EVENING, Disp: 30 tablet, Rfl: 1    SSD 1 % cream, , Disp: , Rfl:     torsemide (DEMADEX) 20 MG Tab, Take 1 tablet (20 mg total) by mouth 2 (two) times daily. TAKE 1 TABLET BY MOUTH EVERY OTHER DAY THEN 2 TABLETS EVERY DAY, Disp: 60 tablet, Rfl: 2    Current Facility-Administered Medications:     gentamicin injection 80 mg, 80 mg, Intramuscular, 1 time in Clinic/HOD, Angy Campos,  MD    Facility-Administered Medications Ordered in Other Visits:     lactated ringers infusion, , Intravenous, Continuous, Shaheen Hanson MD, Last Rate: 10 mL/hr at 08/31/18 0739    PMHx/PSHx Updates:      Objective:     Vitals:  Blood pressure 123/76, weight 110.6 kg (243 lb 12.8 oz).    Physical Examination:   GEN: no apparent distress, comfortable; AAOx3  SKIN: no rashes,no ulceration, no Raynaud's, no petechiae, no SQ nodules,  HEAD: normal  EYES: no pallor, no icterus,  NECK: no masses, thyroid normal, trachea midline, no LAD/LN's, supple  CV: RRR with no murmur; l S1 and S2 reg. ,no gallop no rubs,   CHEST: Normal respiratory effort; CTAB; normal breath sounds; no wheeze or crackles  ABDOM: nontender and nondistended; soft; no masses; no rebound/guarding  MUSC/Skeletal: ROM normal; no crepitus; joints without synovitis,  no deformities  No joint swelling or tenderness of PIP, MCP, wrist, elbow, shoulder, or knee joints  EXTREM: no clubbing, cyanosis, no edema,normal  pulses   NEURO: grossly intact; motor WNL; AAOx3;  PSYCH: normal mood, affect and behavior  LYMPH: normal cervical, supraclavicular          Labs:   Lab Results   Component Value Date    WBC 6.10 04/04/2019    HGB 12.9 04/04/2019    HCT 38.3 04/04/2019    MCV 96 04/04/2019     04/04/2019    CMP  @[facility](NA,K,CL,CO2,GLU,BUN,Creatinine,Calcium,PROT,Albumin,Bilitot,Alkphos,AST,ALT,CRP,ESR,RF,CCP,MONICA,SSA,CPK,uric acid) )@  I have reviewed all available lab results and radiology reports.    Radiology/Diagnostic Studies:        Assessment/Plan:   (1) 68 y.o. female with diagnosis of FM,OA...stable                Discussion:     I have explained all of the above in detail and the patient understands all of the current recommendation(s). I have answered all questions to the best of my ability and to their complete satisfaction.       The patient is to continue with the current management plan         RTC in   4-5 months      Electronically  signed by Melissa Hernandez MD

## 2019-04-15 NOTE — PROGRESS NOTES
Spoke with Dr. Hanson/anesthesia and showed him the recommendations by Dr. Cantu of [facility] paperwork.  Received order to order hydrocortisone 100 mg IV on call as per 's paper order.  He states to inform the anesthesia personnel taking care of the patient tomorrow on DOS.  Agreed with recommendation concerning insuin pump. Papers placed in chart folder for anesthesia to review on DOS.

## 2019-04-16 ENCOUNTER — ANESTHESIA (OUTPATIENT)
Dept: SURGERY | Facility: HOSPITAL | Age: 69
End: 2019-04-16
Payer: MEDICARE

## 2019-04-16 ENCOUNTER — HOSPITAL ENCOUNTER (OUTPATIENT)
Facility: HOSPITAL | Age: 69
Discharge: HOME HEALTH | End: 2019-04-19
Attending: ORTHOPAEDIC SURGERY | Admitting: ORTHOPAEDIC SURGERY
Payer: MEDICARE

## 2019-04-16 DIAGNOSIS — Z96.652 S/P TOTAL KNEE ARTHROPLASTY, LEFT: Primary | ICD-10-CM

## 2019-04-16 DIAGNOSIS — M17.0 ARTHRITIS OF BOTH KNEES: ICD-10-CM

## 2019-04-16 LAB
GLUCOSE SERPL-MCNC: 379 MG/DL (ref 70–110)
POCT GLUCOSE: 218 MG/DL (ref 70–110)

## 2019-04-16 PROCEDURE — 27447 TOTAL KNEE ARTHROPLASTY: CPT | Mod: LT,,, | Performed by: ORTHOPAEDIC SURGERY

## 2019-04-16 PROCEDURE — 27200688 HC TRAY, SPINAL-HYPER/ ISOBARIC: Performed by: ANESTHESIOLOGY

## 2019-04-16 PROCEDURE — C1776 JOINT DEVICE (IMPLANTABLE): HCPCS | Performed by: ORTHOPAEDIC SURGERY

## 2019-04-16 PROCEDURE — 97161 PT EVAL LOW COMPLEX 20 MIN: CPT | Performed by: PHYSICAL THERAPIST

## 2019-04-16 PROCEDURE — 82962 GLUCOSE BLOOD TEST: CPT | Performed by: ORTHOPAEDIC SURGERY

## 2019-04-16 PROCEDURE — 27000221 HC OXYGEN, UP TO 24 HOURS

## 2019-04-16 PROCEDURE — 36000711: Performed by: ORTHOPAEDIC SURGERY

## 2019-04-16 PROCEDURE — 94799 UNLISTED PULMONARY SVC/PX: CPT

## 2019-04-16 PROCEDURE — 64447 PERIPHERAL BLOCK: ICD-10-PCS | Mod: 59,LT,, | Performed by: ANESTHESIOLOGY

## 2019-04-16 PROCEDURE — D9220A PRA ANESTHESIA: ICD-10-PCS | Mod: ANES,,, | Performed by: ANESTHESIOLOGY

## 2019-04-16 PROCEDURE — D9220A PRA ANESTHESIA: ICD-10-PCS | Mod: CRNA,,, | Performed by: NURSE ANESTHETIST, CERTIFIED REGISTERED

## 2019-04-16 PROCEDURE — 25000003 PHARM REV CODE 250: Performed by: NURSE ANESTHETIST, CERTIFIED REGISTERED

## 2019-04-16 PROCEDURE — 36415 COLL VENOUS BLD VENIPUNCTURE: CPT

## 2019-04-16 PROCEDURE — 76942 ECHO GUIDE FOR BIOPSY: CPT | Mod: 26,,, | Performed by: ANESTHESIOLOGY

## 2019-04-16 PROCEDURE — C1713 ANCHOR/SCREW BN/BN,TIS/BN: HCPCS | Performed by: ORTHOPAEDIC SURGERY

## 2019-04-16 PROCEDURE — 63600175 PHARM REV CODE 636 W HCPCS: Performed by: NURSE ANESTHETIST, CERTIFIED REGISTERED

## 2019-04-16 PROCEDURE — 37000009 HC ANESTHESIA EA ADD 15 MINS: Performed by: ORTHOPAEDIC SURGERY

## 2019-04-16 PROCEDURE — 94761 N-INVAS EAR/PLS OXIMETRY MLT: CPT

## 2019-04-16 PROCEDURE — 27447 PR TOTAL KNEE ARTHROPLASTY: ICD-10-PCS | Mod: LT,,, | Performed by: ORTHOPAEDIC SURGERY

## 2019-04-16 PROCEDURE — 71000039 HC RECOVERY, EACH ADD'L HOUR: Performed by: ORTHOPAEDIC SURGERY

## 2019-04-16 PROCEDURE — 97110 THERAPEUTIC EXERCISES: CPT | Performed by: PHYSICAL THERAPIST

## 2019-04-16 PROCEDURE — G8978 MOBILITY CURRENT STATUS: HCPCS | Mod: CJ | Performed by: PHYSICAL THERAPIST

## 2019-04-16 PROCEDURE — 99900104 DSU ONLY-NO CHARGE-EA ADD'L HR (STAT): Performed by: ORTHOPAEDIC SURGERY

## 2019-04-16 PROCEDURE — 97116 GAIT TRAINING THERAPY: CPT | Performed by: PHYSICAL THERAPIST

## 2019-04-16 PROCEDURE — 37000008 HC ANESTHESIA 1ST 15 MINUTES: Performed by: ORTHOPAEDIC SURGERY

## 2019-04-16 PROCEDURE — C9290 INJ, BUPIVACAINE LIPOSOME: HCPCS | Performed by: ANESTHESIOLOGY

## 2019-04-16 PROCEDURE — 71000033 HC RECOVERY, INTIAL HOUR: Performed by: ORTHOPAEDIC SURGERY

## 2019-04-16 PROCEDURE — S0020 INJECTION, BUPIVICAINE HYDRO: HCPCS | Performed by: ANESTHESIOLOGY

## 2019-04-16 PROCEDURE — 36000710: Performed by: ORTHOPAEDIC SURGERY

## 2019-04-16 PROCEDURE — 25000003 PHARM REV CODE 250: Performed by: ORTHOPAEDIC SURGERY

## 2019-04-16 PROCEDURE — 63600175 PHARM REV CODE 636 W HCPCS: Performed by: ANESTHESIOLOGY

## 2019-04-16 PROCEDURE — D9220A PRA ANESTHESIA: Mod: ANES,,, | Performed by: ANESTHESIOLOGY

## 2019-04-16 PROCEDURE — G8979 MOBILITY GOAL STATUS: HCPCS | Mod: CI | Performed by: PHYSICAL THERAPIST

## 2019-04-16 PROCEDURE — 94640 AIRWAY INHALATION TREATMENT: CPT

## 2019-04-16 PROCEDURE — 25000003 PHARM REV CODE 250: Performed by: ANESTHESIOLOGY

## 2019-04-16 PROCEDURE — 63600175 PHARM REV CODE 636 W HCPCS: Performed by: ORTHOPAEDIC SURGERY

## 2019-04-16 PROCEDURE — 27201423 OPTIME MED/SURG SUP & DEVICES STERILE SUPPLY: Performed by: ORTHOPAEDIC SURGERY

## 2019-04-16 PROCEDURE — 63600175 PHARM REV CODE 636 W HCPCS

## 2019-04-16 PROCEDURE — 82947 ASSAY GLUCOSE BLOOD QUANT: CPT

## 2019-04-16 PROCEDURE — 25000242 PHARM REV CODE 250 ALT 637 W/ HCPCS: Performed by: ORTHOPAEDIC SURGERY

## 2019-04-16 PROCEDURE — D9220A PRA ANESTHESIA: Mod: CRNA,,, | Performed by: NURSE ANESTHETIST, CERTIFIED REGISTERED

## 2019-04-16 PROCEDURE — 99900103 DSU ONLY-NO CHARGE-INITIAL HR (STAT): Performed by: ORTHOPAEDIC SURGERY

## 2019-04-16 PROCEDURE — 63600175 PHARM REV CODE 636 W HCPCS: Performed by: INTERNAL MEDICINE

## 2019-04-16 PROCEDURE — 99900035 HC TECH TIME PER 15 MIN (STAT)

## 2019-04-16 PROCEDURE — 76942 PERIPHERAL BLOCK: ICD-10-PCS | Mod: 26,,, | Performed by: ANESTHESIOLOGY

## 2019-04-16 PROCEDURE — 27200665 HC NERVE BLOCK NEEDLE/ CATHETER: Performed by: NURSE ANESTHETIST, CERTIFIED REGISTERED

## 2019-04-16 PROCEDURE — 64447 NJX AA&/STRD FEMORAL NRV IMG: CPT | Performed by: ANESTHESIOLOGY

## 2019-04-16 DEVICE — PATELLA TRI 29X8 X3 POLYETHYLE: Type: IMPLANTABLE DEVICE | Site: KNEE | Status: FUNCTIONAL

## 2019-04-16 DEVICE — BASEPLATE SZ 3 TRI TS IMPLANT: Type: IMPLANTABLE DEVICE | Site: KNEE | Status: FUNCTIONAL

## 2019-04-16 DEVICE — CEMENT BONE WHOLE BATCH: Type: IMPLANTABLE DEVICE | Site: KNEE | Status: FUNCTIONAL

## 2019-04-16 DEVICE — INSERT TRIATHLON X3 SZ3 9MM: Type: IMPLANTABLE DEVICE | Site: KNEE | Status: FUNCTIONAL

## 2019-04-16 DEVICE — COMPONENT FEM CR TRI SZ3 L: Type: IMPLANTABLE DEVICE | Site: KNEE | Status: FUNCTIONAL

## 2019-04-16 DEVICE — PIN PINABALL HEADLESS: Type: IMPLANTABLE DEVICE | Site: KNEE | Status: FUNCTIONAL

## 2019-04-16 RX ORDER — MIDAZOLAM HYDROCHLORIDE 1 MG/ML
INJECTION, SOLUTION INTRAMUSCULAR; INTRAVENOUS
Status: DISCONTINUED | OUTPATIENT
Start: 2019-04-16 | End: 2019-04-16

## 2019-04-16 RX ORDER — LORAZEPAM 1 MG/1
1 TABLET ORAL EVERY 12 HOURS PRN
Status: DISCONTINUED | OUTPATIENT
Start: 2019-04-16 | End: 2019-04-19 | Stop reason: HOSPADM

## 2019-04-16 RX ORDER — SODIUM CHLORIDE, SODIUM LACTATE, POTASSIUM CHLORIDE, CALCIUM CHLORIDE 600; 310; 30; 20 MG/100ML; MG/100ML; MG/100ML; MG/100ML
10 INJECTION, SOLUTION INTRAVENOUS CONTINUOUS
Status: DISCONTINUED | OUTPATIENT
Start: 2019-04-17 | End: 2019-04-16

## 2019-04-16 RX ORDER — BUPIVACAINE HYDROCHLORIDE 5 MG/ML
INJECTION, SOLUTION EPIDURAL; INTRACAUDAL
Status: COMPLETED | OUTPATIENT
Start: 2019-04-16 | End: 2019-04-16

## 2019-04-16 RX ORDER — DIPHENHYDRAMINE HYDROCHLORIDE 50 MG/ML
12.5 INJECTION INTRAMUSCULAR; INTRAVENOUS
Status: DISCONTINUED | OUTPATIENT
Start: 2019-04-16 | End: 2019-04-19 | Stop reason: HOSPADM

## 2019-04-16 RX ORDER — OXYCODONE HYDROCHLORIDE 5 MG/1
15 TABLET ORAL EVERY 6 HOURS PRN
Status: DISCONTINUED | OUTPATIENT
Start: 2019-04-16 | End: 2019-04-19 | Stop reason: HOSPADM

## 2019-04-16 RX ORDER — PROPOFOL 10 MG/ML
VIAL (ML) INTRAVENOUS
Status: DISCONTINUED | OUTPATIENT
Start: 2019-04-16 | End: 2019-04-16

## 2019-04-16 RX ORDER — ACETAMINOPHEN 500 MG
500 TABLET ORAL EVERY 6 HOURS
Status: DISPENSED | OUTPATIENT
Start: 2019-04-16 | End: 2019-04-18

## 2019-04-16 RX ORDER — FLUTICASONE FUROATE AND VILANTEROL 100; 25 UG/1; UG/1
1 POWDER RESPIRATORY (INHALATION) DAILY
Status: DISCONTINUED | OUTPATIENT
Start: 2019-04-16 | End: 2019-04-19 | Stop reason: HOSPADM

## 2019-04-16 RX ORDER — CEFAZOLIN SODIUM 2 G/50ML
2 SOLUTION INTRAVENOUS
Status: COMPLETED | OUTPATIENT
Start: 2019-04-16 | End: 2019-04-16

## 2019-04-16 RX ORDER — ZOLPIDEM TARTRATE 5 MG/1
5 TABLET ORAL NIGHTLY PRN
Status: DISCONTINUED | OUTPATIENT
Start: 2019-04-16 | End: 2019-04-16 | Stop reason: SDUPTHER

## 2019-04-16 RX ORDER — MUPIROCIN 20 MG/G
OINTMENT TOPICAL
Status: DISCONTINUED | OUTPATIENT
Start: 2019-04-16 | End: 2019-04-16

## 2019-04-16 RX ORDER — CETIRIZINE HYDROCHLORIDE 10 MG/1
10 TABLET ORAL DAILY
Status: DISCONTINUED | OUTPATIENT
Start: 2019-04-16 | End: 2019-04-19 | Stop reason: HOSPADM

## 2019-04-16 RX ORDER — SODIUM CHLORIDE 0.9 % (FLUSH) 0.9 %
3 SYRINGE (ML) INJECTION
Status: DISCONTINUED | OUTPATIENT
Start: 2019-04-16 | End: 2019-04-16

## 2019-04-16 RX ORDER — PREGABALIN 75 MG/1
75 CAPSULE ORAL 2 TIMES DAILY
Status: DISCONTINUED | OUTPATIENT
Start: 2019-04-16 | End: 2019-04-16 | Stop reason: SDUPTHER

## 2019-04-16 RX ORDER — GABAPENTIN 300 MG/1
300 CAPSULE ORAL 3 TIMES DAILY
Status: DISCONTINUED | OUTPATIENT
Start: 2019-04-16 | End: 2019-04-19 | Stop reason: HOSPADM

## 2019-04-16 RX ORDER — SODIUM CHLORIDE, SODIUM LACTATE, POTASSIUM CHLORIDE, CALCIUM CHLORIDE 600; 310; 30; 20 MG/100ML; MG/100ML; MG/100ML; MG/100ML
10 INJECTION, SOLUTION INTRAVENOUS CONTINUOUS
Status: DISCONTINUED | OUTPATIENT
Start: 2019-04-16 | End: 2019-04-16

## 2019-04-16 RX ORDER — ONDANSETRON 2 MG/ML
4 INJECTION INTRAMUSCULAR; INTRAVENOUS EVERY 12 HOURS PRN
Status: DISCONTINUED | OUTPATIENT
Start: 2019-04-16 | End: 2019-04-19 | Stop reason: HOSPADM

## 2019-04-16 RX ORDER — OXYCODONE HYDROCHLORIDE 5 MG/1
5 TABLET ORAL
Status: DISCONTINUED | OUTPATIENT
Start: 2019-04-16 | End: 2019-04-19 | Stop reason: HOSPADM

## 2019-04-16 RX ORDER — CELECOXIB 100 MG/1
100 CAPSULE ORAL 2 TIMES DAILY
Status: DISCONTINUED | OUTPATIENT
Start: 2019-04-16 | End: 2019-04-17

## 2019-04-16 RX ORDER — FENTANYL CITRATE 50 UG/ML
INJECTION, SOLUTION INTRAMUSCULAR; INTRAVENOUS
Status: DISCONTINUED | OUTPATIENT
Start: 2019-04-16 | End: 2019-04-16

## 2019-04-16 RX ORDER — OXYCODONE HYDROCHLORIDE 10 MG/1
10 TABLET ORAL
Status: DISCONTINUED | OUTPATIENT
Start: 2019-04-16 | End: 2019-04-16 | Stop reason: SDUPTHER

## 2019-04-16 RX ORDER — TRANEXAMIC ACID 100 MG/ML
INJECTION, SOLUTION INTRAVENOUS
Status: DISCONTINUED | OUTPATIENT
Start: 2019-04-16 | End: 2019-04-16

## 2019-04-16 RX ORDER — ONDANSETRON 2 MG/ML
INJECTION INTRAMUSCULAR; INTRAVENOUS
Status: DISCONTINUED | OUTPATIENT
Start: 2019-04-16 | End: 2019-04-16

## 2019-04-16 RX ORDER — PREGABALIN 75 MG/1
75 CAPSULE ORAL 2 TIMES DAILY
Status: DISCONTINUED | OUTPATIENT
Start: 2019-04-16 | End: 2019-04-19

## 2019-04-16 RX ORDER — OXYCODONE HYDROCHLORIDE 10 MG/1
10 TABLET ORAL
Status: DISCONTINUED | OUTPATIENT
Start: 2019-04-16 | End: 2019-04-19 | Stop reason: HOSPADM

## 2019-04-16 RX ORDER — KETAMINE HYDROCHLORIDE 100 MG/ML
INJECTION, SOLUTION INTRAMUSCULAR; INTRAVENOUS
Status: DISCONTINUED | OUTPATIENT
Start: 2019-04-16 | End: 2019-04-16

## 2019-04-16 RX ORDER — AMITRIPTYLINE HYDROCHLORIDE 25 MG/1
25 TABLET, FILM COATED ORAL NIGHTLY PRN
Status: DISCONTINUED | OUTPATIENT
Start: 2019-04-16 | End: 2019-04-19 | Stop reason: HOSPADM

## 2019-04-16 RX ORDER — LIDOCAINE HCL/PF 100 MG/5ML
SYRINGE (ML) INTRAVENOUS
Status: DISCONTINUED | OUTPATIENT
Start: 2019-04-16 | End: 2019-04-16

## 2019-04-16 RX ORDER — OXYCODONE HCL 10 MG/1
10 TABLET, FILM COATED, EXTENDED RELEASE ORAL EVERY 12 HOURS
Status: DISCONTINUED | OUTPATIENT
Start: 2019-04-16 | End: 2019-04-16

## 2019-04-16 RX ORDER — MORPHINE SULFATE 4 MG/ML
2 INJECTION, SOLUTION INTRAMUSCULAR; INTRAVENOUS
Status: DISCONTINUED | OUTPATIENT
Start: 2019-04-17 | End: 2019-04-17 | Stop reason: SDUPTHER

## 2019-04-16 RX ORDER — GLYCOPYRROLATE 0.2 MG/ML
INJECTION INTRAMUSCULAR; INTRAVENOUS
Status: DISCONTINUED | OUTPATIENT
Start: 2019-04-16 | End: 2019-04-16

## 2019-04-16 RX ORDER — ATENOLOL 25 MG/1
25 TABLET ORAL 2 TIMES DAILY
Status: DISCONTINUED | OUTPATIENT
Start: 2019-04-16 | End: 2019-04-19 | Stop reason: HOSPADM

## 2019-04-16 RX ORDER — ALLOPURINOL 100 MG/1
200 TABLET ORAL NIGHTLY
Status: DISCONTINUED | OUTPATIENT
Start: 2019-04-16 | End: 2019-04-19 | Stop reason: HOSPADM

## 2019-04-16 RX ORDER — BUPIVACAINE HYDROCHLORIDE 7.5 MG/ML
INJECTION, SOLUTION EPIDURAL; RETROBULBAR
Status: COMPLETED | OUTPATIENT
Start: 2019-04-16 | End: 2019-04-16

## 2019-04-16 RX ORDER — SODIUM CHLORIDE 9 MG/ML
INJECTION, SOLUTION INTRAVENOUS CONTINUOUS
Status: DISCONTINUED | OUTPATIENT
Start: 2019-04-16 | End: 2019-04-16

## 2019-04-16 RX ORDER — ENOXAPARIN SODIUM 100 MG/ML
40 INJECTION SUBCUTANEOUS EVERY 24 HOURS
Status: DISCONTINUED | OUTPATIENT
Start: 2019-04-16 | End: 2019-04-19

## 2019-04-16 RX ORDER — PREGABALIN 75 MG/1
75 CAPSULE ORAL
Status: COMPLETED | OUTPATIENT
Start: 2019-04-16 | End: 2019-04-16

## 2019-04-16 RX ORDER — LOPERAMIDE HYDROCHLORIDE 2 MG/1
2 CAPSULE ORAL CONTINUOUS PRN
Status: DISCONTINUED | OUTPATIENT
Start: 2019-04-16 | End: 2019-04-19 | Stop reason: HOSPADM

## 2019-04-16 RX ORDER — ACETAMINOPHEN 500 MG
1000 TABLET ORAL EVERY 6 HOURS
Status: DISPENSED | OUTPATIENT
Start: 2019-04-17 | End: 2019-04-18

## 2019-04-16 RX ORDER — SODIUM CHLORIDE 0.9 % (FLUSH) 0.9 %
3 SYRINGE (ML) INJECTION EVERY 8 HOURS
Status: DISCONTINUED | OUTPATIENT
Start: 2019-04-16 | End: 2019-04-16

## 2019-04-16 RX ORDER — ZOLPIDEM TARTRATE 5 MG/1
5 TABLET ORAL NIGHTLY PRN
Status: DISCONTINUED | OUTPATIENT
Start: 2019-04-16 | End: 2019-04-19 | Stop reason: HOSPADM

## 2019-04-16 RX ORDER — LIDOCAINE HYDROCHLORIDE 10 MG/ML
1 INJECTION, SOLUTION EPIDURAL; INFILTRATION; INTRACAUDAL; PERINEURAL ONCE
Status: COMPLETED | OUTPATIENT
Start: 2019-04-16 | End: 2019-04-16

## 2019-04-16 RX ORDER — IPRATROPIUM BROMIDE AND ALBUTEROL SULFATE 2.5; .5 MG/3ML; MG/3ML
3 SOLUTION RESPIRATORY (INHALATION) EVERY 6 HOURS PRN
Status: DISCONTINUED | OUTPATIENT
Start: 2019-04-16 | End: 2019-04-19 | Stop reason: HOSPADM

## 2019-04-16 RX ORDER — ONDANSETRON 8 MG/1
8 TABLET, ORALLY DISINTEGRATING ORAL EVERY 8 HOURS PRN
Status: DISCONTINUED | OUTPATIENT
Start: 2019-04-16 | End: 2019-04-19 | Stop reason: HOSPADM

## 2019-04-16 RX ORDER — FLUOXETINE HYDROCHLORIDE 20 MG/1
40 CAPSULE ORAL DAILY
Status: DISCONTINUED | OUTPATIENT
Start: 2019-04-16 | End: 2019-04-19 | Stop reason: HOSPADM

## 2019-04-16 RX ORDER — LEVOTHYROXINE SODIUM 25 UG/1
25 TABLET ORAL
Status: DISCONTINUED | OUTPATIENT
Start: 2019-04-16 | End: 2019-04-19 | Stop reason: HOSPADM

## 2019-04-16 RX ORDER — OXYCODONE HCL 10 MG/1
10 TABLET, FILM COATED, EXTENDED RELEASE ORAL EVERY 12 HOURS
Status: DISCONTINUED | OUTPATIENT
Start: 2019-04-16 | End: 2019-04-16 | Stop reason: SDUPTHER

## 2019-04-16 RX ORDER — MONTELUKAST SODIUM 5 MG/1
5 TABLET, CHEWABLE ORAL DAILY
Status: DISCONTINUED | OUTPATIENT
Start: 2019-04-16 | End: 2019-04-19 | Stop reason: HOSPADM

## 2019-04-16 RX ORDER — PROPOFOL 10 MG/ML
VIAL (ML) INTRAVENOUS CONTINUOUS PRN
Status: DISCONTINUED | OUTPATIENT
Start: 2019-04-16 | End: 2019-04-16

## 2019-04-16 RX ORDER — DOCUSATE SODIUM 100 MG/1
100 CAPSULE, LIQUID FILLED ORAL EVERY 12 HOURS
Status: DISCONTINUED | OUTPATIENT
Start: 2019-04-16 | End: 2019-04-19 | Stop reason: HOSPADM

## 2019-04-16 RX ORDER — CALCITRIOL 0.25 UG/1
0.25 CAPSULE ORAL
Status: DISCONTINUED | OUTPATIENT
Start: 2019-04-19 | End: 2019-04-19 | Stop reason: HOSPADM

## 2019-04-16 RX ORDER — ACETAMINOPHEN 10 MG/ML
1000 INJECTION, SOLUTION INTRAVENOUS ONCE
Status: ACTIVE | OUTPATIENT
Start: 2019-04-16 | End: 2019-04-17

## 2019-04-16 RX ORDER — SODIUM CHLORIDE 0.9 % (FLUSH) 0.9 %
10 SYRINGE (ML) INJECTION
Status: DISCONTINUED | OUTPATIENT
Start: 2019-04-16 | End: 2019-04-19 | Stop reason: HOSPADM

## 2019-04-16 RX ORDER — FAMOTIDINE 20 MG/1
20 TABLET, FILM COATED ORAL 2 TIMES DAILY
Status: DISCONTINUED | OUTPATIENT
Start: 2019-04-16 | End: 2019-04-16 | Stop reason: SDUPTHER

## 2019-04-16 RX ORDER — OXYCODONE HYDROCHLORIDE 5 MG/1
5 TABLET ORAL ONCE AS NEEDED
Status: DISCONTINUED | OUTPATIENT
Start: 2019-04-16 | End: 2019-04-16

## 2019-04-16 RX ORDER — ACETAMINOPHEN 10 MG/ML
1000 INJECTION, SOLUTION INTRAVENOUS ONCE
Status: COMPLETED | OUTPATIENT
Start: 2019-04-16 | End: 2019-04-16

## 2019-04-16 RX ORDER — LOSARTAN POTASSIUM 25 MG/1
50 TABLET ORAL DAILY
Status: DISCONTINUED | OUTPATIENT
Start: 2019-04-16 | End: 2019-04-19 | Stop reason: HOSPADM

## 2019-04-16 RX ORDER — FAMOTIDINE 20 MG/1
20 TABLET, FILM COATED ORAL 2 TIMES DAILY
Status: DISCONTINUED | OUTPATIENT
Start: 2019-04-16 | End: 2019-04-19 | Stop reason: HOSPADM

## 2019-04-16 RX ORDER — PANTOPRAZOLE SODIUM 40 MG/1
40 TABLET, DELAYED RELEASE ORAL DAILY
Status: DISCONTINUED | OUTPATIENT
Start: 2019-04-16 | End: 2019-04-19 | Stop reason: HOSPADM

## 2019-04-16 RX ORDER — FENTANYL CITRATE 50 UG/ML
25 INJECTION, SOLUTION INTRAMUSCULAR; INTRAVENOUS EVERY 5 MIN PRN
Status: COMPLETED | OUTPATIENT
Start: 2019-04-16 | End: 2019-04-16

## 2019-04-16 RX ORDER — FLUTICASONE PROPIONATE 50 MCG
2 SPRAY, SUSPENSION (ML) NASAL DAILY
Status: DISCONTINUED | OUTPATIENT
Start: 2019-04-16 | End: 2019-04-19 | Stop reason: HOSPADM

## 2019-04-16 RX ORDER — POTASSIUM CHLORIDE 20 MEQ/1
20 TABLET, EXTENDED RELEASE ORAL 2 TIMES DAILY
Status: DISCONTINUED | OUTPATIENT
Start: 2019-04-16 | End: 2019-04-19 | Stop reason: HOSPADM

## 2019-04-16 RX ORDER — OXYCODONE HYDROCHLORIDE 5 MG/1
5 TABLET ORAL
Status: DISCONTINUED | OUTPATIENT
Start: 2019-04-16 | End: 2019-04-16 | Stop reason: SDUPTHER

## 2019-04-16 RX ORDER — HYDROMORPHONE HYDROCHLORIDE 2 MG/ML
0.2 INJECTION, SOLUTION INTRAMUSCULAR; INTRAVENOUS; SUBCUTANEOUS EVERY 5 MIN PRN
Status: DISCONTINUED | OUTPATIENT
Start: 2019-04-16 | End: 2019-04-16

## 2019-04-16 RX ORDER — TORSEMIDE 20 MG/1
20 TABLET ORAL 2 TIMES DAILY
Status: DISCONTINUED | OUTPATIENT
Start: 2019-04-16 | End: 2019-04-19 | Stop reason: HOSPADM

## 2019-04-16 RX ORDER — CELECOXIB 100 MG/1
200 CAPSULE ORAL ONCE
Status: COMPLETED | OUTPATIENT
Start: 2019-04-16 | End: 2019-04-16

## 2019-04-16 RX ADMIN — PROPOFOL 50 MCG/KG/MIN: 10 INJECTION, EMULSION INTRAVENOUS at 07:04

## 2019-04-16 RX ADMIN — MULTIPLE VITAMINS W/ MINERALS TAB 1 TABLET: TAB at 12:04

## 2019-04-16 RX ADMIN — GLYCOPYRROLATE 0.2 MG: 0.2 INJECTION, SOLUTION INTRAMUSCULAR; INTRAVENOUS at 07:04

## 2019-04-16 RX ADMIN — FAMOTIDINE 20 MG: 20 TABLET, FILM COATED ORAL at 12:04

## 2019-04-16 RX ADMIN — CELECOXIB 200 MG: 100 CAPSULE ORAL at 06:04

## 2019-04-16 RX ADMIN — ENOXAPARIN SODIUM 40 MG: 100 INJECTION SUBCUTANEOUS at 07:04

## 2019-04-16 RX ADMIN — SODIUM CHLORIDE, SODIUM LACTATE, POTASSIUM CHLORIDE, AND CALCIUM CHLORIDE 10 ML/HR: .6; .31; .03; .02 INJECTION, SOLUTION INTRAVENOUS at 06:04

## 2019-04-16 RX ADMIN — FENTANYL CITRATE 50 MCG: 50 INJECTION, SOLUTION INTRAMUSCULAR; INTRAVENOUS at 07:04

## 2019-04-16 RX ADMIN — ACETAMINOPHEN 1000 MG: 10 INJECTION, SOLUTION INTRAVENOUS at 06:04

## 2019-04-16 RX ADMIN — CEFAZOLIN SODIUM 2 G: 2 SOLUTION INTRAVENOUS at 04:04

## 2019-04-16 RX ADMIN — FAMOTIDINE 20 MG: 20 TABLET, FILM COATED ORAL at 09:04

## 2019-04-16 RX ADMIN — ACETAMINOPHEN 1000 MG: 500 TABLET ORAL at 11:04

## 2019-04-16 RX ADMIN — FENTANYL CITRATE 25 MCG: 50 INJECTION, SOLUTION INTRAMUSCULAR; INTRAVENOUS at 08:04

## 2019-04-16 RX ADMIN — ONDANSETRON 4 MG: 2 INJECTION, SOLUTION INTRAMUSCULAR; INTRAVENOUS at 08:04

## 2019-04-16 RX ADMIN — OXYCODONE HYDROCHLORIDE 15 MG: 10 TABLET ORAL at 07:04

## 2019-04-16 RX ADMIN — BUPIVACAINE HYDROCHLORIDE 1.4 ML: 7.5 INJECTION, SOLUTION EPIDURAL; RETROBULBAR at 07:04

## 2019-04-16 RX ADMIN — TRANEXAMIC ACID 1000 MG: 100 INJECTION, SOLUTION INTRAVENOUS at 07:04

## 2019-04-16 RX ADMIN — LIDOCAINE HYDROCHLORIDE 100 MG: 20 INJECTION, SOLUTION INTRAVENOUS at 07:04

## 2019-04-16 RX ADMIN — LORAZEPAM 1 MG: 1 TABLET ORAL at 09:04

## 2019-04-16 RX ADMIN — MIDAZOLAM 1 MG: 1 INJECTION INTRAMUSCULAR; INTRAVENOUS at 07:04

## 2019-04-16 RX ADMIN — OXYCODONE HYDROCHLORIDE 10 MG: 10 TABLET, FILM COATED, EXTENDED RELEASE ORAL at 06:04

## 2019-04-16 RX ADMIN — THERA TABS 1 TABLET: TAB at 12:04

## 2019-04-16 RX ADMIN — PREGABALIN 75 MG: 75 CAPSULE ORAL at 06:04

## 2019-04-16 RX ADMIN — MUPIROCIN: 20 OINTMENT TOPICAL at 06:04

## 2019-04-16 RX ADMIN — GABAPENTIN 300 MG: 300 CAPSULE ORAL at 09:04

## 2019-04-16 RX ADMIN — FLUOXETINE 40 MG: 20 CAPSULE ORAL at 12:04

## 2019-04-16 RX ADMIN — LIDOCAINE HYDROCHLORIDE 10 MG: 10 INJECTION, SOLUTION EPIDURAL; INFILTRATION; INTRACAUDAL; PERINEURAL at 06:04

## 2019-04-16 RX ADMIN — HYDROCORTISONE SODIUM SUCCINATE 100 MG: 100 INJECTION, POWDER, FOR SOLUTION INTRAMUSCULAR; INTRAVENOUS at 07:04

## 2019-04-16 RX ADMIN — POTASSIUM CHLORIDE 20 MEQ: 1500 TABLET, EXTENDED RELEASE ORAL at 09:04

## 2019-04-16 RX ADMIN — PREGABALIN 75 MG: 75 CAPSULE ORAL at 09:04

## 2019-04-16 RX ADMIN — FENTANYL CITRATE 25 MCG: 50 INJECTION INTRAMUSCULAR; INTRAVENOUS at 09:04

## 2019-04-16 RX ADMIN — CEFAZOLIN SODIUM 2 G: 2 SOLUTION INTRAVENOUS at 10:04

## 2019-04-16 RX ADMIN — CEFAZOLIN 3 G: 1 INJECTION, POWDER, FOR SOLUTION INTRAVENOUS at 07:04

## 2019-04-16 RX ADMIN — FLUTICASONE FUROATE AND VILANTEROL TRIFENATATE 1 PUFF: 100; 25 POWDER RESPIRATORY (INHALATION) at 12:04

## 2019-04-16 RX ADMIN — GABAPENTIN 300 MG: 300 CAPSULE ORAL at 04:04

## 2019-04-16 RX ADMIN — SODIUM CHLORIDE, SODIUM LACTATE, POTASSIUM CHLORIDE, AND CALCIUM CHLORIDE: .6; .31; .03; .02 INJECTION, SOLUTION INTRAVENOUS at 08:04

## 2019-04-16 RX ADMIN — BUPIVACAINE 20 ML: 13.3 INJECTION, SUSPENSION, LIPOSOMAL INFILTRATION at 07:04

## 2019-04-16 RX ADMIN — ACETAMINOPHEN 500 MG: 500 TABLET ORAL at 12:04

## 2019-04-16 RX ADMIN — KETAMINE HYDROCHLORIDE 30 MG: 100 INJECTION, SOLUTION, CONCENTRATE INTRAMUSCULAR; INTRAVENOUS at 07:04

## 2019-04-16 RX ADMIN — Medication 1 EACH: at 01:04

## 2019-04-16 RX ADMIN — ACETAMINOPHEN 500 MG: 500 TABLET ORAL at 06:04

## 2019-04-16 RX ADMIN — OXYCODONE HYDROCHLORIDE 15 MG: 10 TABLET ORAL at 12:04

## 2019-04-16 RX ADMIN — ALLOPURINOL 200 MG: 100 TABLET ORAL at 09:04

## 2019-04-16 RX ADMIN — BUPIVACAINE HYDROCHLORIDE 10 ML: 5 INJECTION, SOLUTION EPIDURAL; INTRACAUDAL; PERINEURAL at 07:04

## 2019-04-16 RX ADMIN — SODIUM CHLORIDE: 0.9 INJECTION, SOLUTION INTRAVENOUS at 10:04

## 2019-04-16 RX ADMIN — PROPOFOL 20 MG: 10 INJECTION, EMULSION INTRAVENOUS at 07:04

## 2019-04-16 RX ADMIN — FLUTICASONE PROPIONATE 100 MCG: 50 SPRAY, METERED NASAL at 01:04

## 2019-04-16 RX ADMIN — CELECOXIB 100 MG: 100 CAPSULE ORAL at 09:04

## 2019-04-16 RX ADMIN — ROPIVACAINE HYDROCHLORIDE: 5 INJECTION, SOLUTION EPIDURAL; INFILTRATION; PERINEURAL at 07:04

## 2019-04-16 RX ADMIN — AMITRIPTYLINE HYDROCHLORIDE 25 MG: 25 TABLET, FILM COATED ORAL at 09:04

## 2019-04-16 RX ADMIN — ATENOLOL 25 MG: 25 TABLET ORAL at 09:04

## 2019-04-16 RX ADMIN — DIPHENHYDRAMINE HYDROCHLORIDE 25 MG: 50 INJECTION, SOLUTION INTRAMUSCULAR; INTRAVENOUS at 07:04

## 2019-04-16 RX ADMIN — OXYCODONE HYDROCHLORIDE 10 MG: 10 TABLET ORAL at 09:04

## 2019-04-16 RX ADMIN — POTASSIUM CHLORIDE 20 MEQ: 1500 TABLET, EXTENDED RELEASE ORAL at 12:04

## 2019-04-16 NOTE — ANESTHESIA PREPROCEDURE EVALUATION
04/16/2019  Fifi Mcnair is a 68 y.o., female.    Anesthesia Evaluation      I have reviewed the Medications.     Review of Systems  Anesthesia Hx:  No problems with previous Anesthesia   Social:  Non-Smoker, No Alcohol Use    Cardiovascular:   Hypertension hyperlipidemia    Pulmonary:   COPD Asthma Sleep Apnea, CPAP    Renal/:   Chronic Renal Disease, CRI    Hepatic/GI:   GERD    Neurological:   CVA    Endocrine:   Diabetes (insulin pump), poorly controlled, type 2, using insulin Hypothyroidism    Psych:   anxiety          Physical Exam  General:  Morbid Obesity    Airway/Jaw/Neck:  Airway Findings: Mouth Opening: Normal Tongue: Normal  General Airway Assessment: Adult, Average  Mallampati: II  Jaw/Neck Findings:  Neck ROM: Normal ROM       Chest/Lungs:  Chest/Lungs Findings: Clear to auscultation, Normal Respiratory Rate     Heart/Vascular:  Heart Findings: Rate: Normal  Rhythm: Regular Rhythm  Sounds: Normal  Heart murmur: negative       Mental Status:  Mental Status Findings:  Cooperative, Alert and Oriented         Anesthesia Plan  Type of Anesthesia, risks & benefits discussed:  Anesthesia Type:  spinal  Patient's Preference:   Intra-op Monitoring Plan:   Intra-op Monitoring Plan Comments:   Post Op Pain Control Plan: peripheral nerve block  Post Op Pain Control Plan Comments:   Induction:    Beta Blocker:  Patient is not currently on a Beta-Blocker (No further documentation required).       Informed Consent: Patient understands risks and agrees with Anesthesia plan.  Questions answered. Anesthesia consent signed with patient.  ASA Score: 3     Day of Surgery Review of History & Physical:            Ready For Surgery From Anesthesia Perspective.

## 2019-04-16 NOTE — OP NOTE
[facility]  Orthopedic Surgery  Operative Note    SUMMARY     Date of Procedure: 4/16/2019     Procedure: Procedure(s) (LRB):  ARTHROPLASTY, KNEE (Left)       Surgeon(s) and Role:     * Larry Molina MD - Primary    Assistant: Shmuel Chiang    Pre-Operative Diagnosis: Arthritis of both knees [M17.0]    Post-Operative Diagnosis: Post-Op Diagnosis Codes:     * Arthritis of both knees [M17.0]    Anesthesia:Spinal    Complications: No    Estimated Blood Loss (EBL): 10ml           Implants:   Implant Name Type Inv. Item Serial No.  Lot No. LRB No. Used   PIN PINABALL HEADLESS - [id number]  PIN PINABALL HEADLESS  [employer]. [id number] Left 1   CEMENT BONE WHOLE BATCH - [id number]  CEMENT BONE WHOLE BATCH  [employer]. [id number] Left 2   PATELLA TRI 29X8 X3 POLYETHYLE - [id number]  PATELLA TRI 29X8 X3 POLYETHYLE  [employer]. [id number] Left 1   COMPONENT FEM CR TRI SZ3 L - [id number]  COMPONENT FEM CR TRI SZ3 L  [employer]. [id number] Left 1   BASEPLATE SZ 3 TRI TS IMPLANT - [id number]  BASEPLATE SZ 3 TRI TS IMPLANT  [employer]. [id number] Left 1   INSERT TRIATHLON X3 SZ3 9MM - [id number]  INSERT TRIATHLON X3 SZ3 9MM  [employer]. [id number] Left 1       Tourniquet time: 52min at 300mmHg    Specimens:   Specimen (12h ago, onward)    None                  Condition: Good    Disposition: PACU - hemodynamically stable.    Attestation: I was present and scrubbed for the entire procedure.    INDICATIONS FOR THE PROCEDURE: A 68 female with a history of chronic   Left knee arthritis, had failed all conservative measures including cortisone   injections, PT, viscosupplementation and activity modification. After a long   discussion, the patient wished to proceed with the procedure above.     PROCEDURE IN DETAIL: Risks, benefits and alternatives of the procedure were   explained to the patient including, but not limited to damage to nerves,   arteries or blood vessels.  Also explained risk of infection, stiffness, DVT, PE,   polyethylene wear as well as anesthetic complications including seizure, stroke,   heart attack and death, understood this and signed informed consent. The   patient's Left knee was marked prior to coming to the Operating Room. The patient was brought to the operating room, placed on the operating table in a supine position.A  formal timeout was done in which correct patient, procedure and op site were all   correctly identified and confirmed by the entire operating team.  2gm Ancef was given prior to surgical incision. Spinal anesthesia was induced. The patient'sLeft  lower extremity was prepped and   draped in normal sterile fashion. TheLeft  leg was exsanguinated with an   Esmarch. Tourniquet was inflated up 300 mmHg. Standard anterior approach to   the knee was made. Medial parapatellar arthrotomy was made, leaving about 1/8   inch of tissue for later repair. Proximal medial tibial release was performed,   making sure not to release any of the MCL. We then   everted the patella and reflexed the knee. Fat pad was excised as well as some   of the ACL and PCL. Soft tissue off the distal anterior femur was removed for   visualization, so as to help prevent notching.  The intramedullary canal was then drilled and suctioned of fat.  The intramedullary guide was then inserted with 5° of valgus set.  It was then pinned into place.  I then made a distal femoral cut of 8 millimeters.  After   this was done, we turned our attention to the tibia. Ankle clamp was then inserted.  We then used the stylus to measure 2 millimeters off the most affected side. The ankle clamp was used to replicate the tibial slope and went perfectly centered down the tibial crest. A tibial cut   was then made. We then checked our extension gap, a 9-spacer was able to be placed.  We then turned our attention back to the femur.  We then sized the femur using 3° off the posterior condylar axis.  This was also parallel to the epicondylar axis.  It measured a size 3.  We then drilled our holes.  Four in 1 block for the size 3 block was then placed and the 4 in 1 cuts were made. We then   checked posteriorly and removed posterior femoral osteophytes.  We then used the gap  to make sure that a 9 spacer good fit both in flexion and extension. Varus and valgus balancing was then checked as well. We   decided to trial. Trial components were placed with a 9 meniscal bearing. The   patient had good balancing again in varus valgus in both flexion and extension.   We turned our attention to patella, caliper was used on the patella where it   measured 24. We set guide at 16 and made our 8-mm cut for polyethylene component, 29 was selected. Then drill holes were placed and the patellar button was placed.   This had good tracking. No need for a lateral release and with no hand tests,   it stayed centered the whole time. We then marked our tibial rotation. We then drilled our femoral lugs.  We then   removed everything except the size 3 tibial tray. We then checked our tibial tray   with a drop shonna again and then marked our rotation and pinned it into place then   drilled, and then punched for our keel. We then started preparing final   components on the back table. Knee was injected with our local   Cocktail. Bony surfaces   copiously irrigated with Pulsavac and then thoroughly dried. Once the cement   was the appropriate consistency, first the tibia and then the femur were   cemented into place, removing excess cement. A 9 trial was placed. The knee   was held in compression and then the patella was placed. Cement was allowed to   cure. Once the cement was cured, we then removed excess cement. Again trialed   one final time, again seeing a 9. We then tapped into place the   final 9 meniscal bearing into place. After this was done, we then did our   diluted iodine soak for 3 minutes and then proceeded with  closing.  Arthrotomy was closed using our StrataFix.   Subcutaneous tissue was closed using 2-0 Vicryl and skin was using a running 3-0   StrataFix and Dermabond.Instrument, sponge, and needle counts were correct prior to wound closure and at the conclusion of the case.  Sterile dressing was applied including a Cryo/Cuff.   They were extubated, awakened and transferred from the Operating Room to the   Recovery Room in stable condition.

## 2019-04-16 NOTE — TRANSFER OF CARE
Anesthesia Transfer of Care Note    Patient: Fifi Mcnair    Procedure(s) Performed: Procedure(s) (LRB):  ARTHROPLASTY, KNEE (Left)    Patient location: PACU    Anesthesia Type: spinal    Transport from OR: Transported from OR on 2-3 L/min O2 by NC with adequate spontaneous ventilation    Post pain: adequate analgesia    Post assessment: no apparent anesthetic complications    Post vital signs: stable    Level of consciousness: awake and alert    Nausea/Vomiting: no nausea/vomiting    Complications: none    Transfer of care protocol was followed      Last vitals:   Visit Vitals  BP (!) 142/67 (BP Location: Left arm, Patient Position: Lying)   Pulse 73   Temp 37.1 °C (98.8 °F)   Resp 17   LMP  (LMP Unknown)   SpO2 99%   Breastfeeding? No

## 2019-04-16 NOTE — H&P
Past Medical History:   Diagnosis Date    Allergy     multiple antibiotic allergies    Anemia     Anxiety     Arthritis     Asthma     CHF (congestive heart failure)     NYHA class III     Chronic kidney disease     Colon polyp     benign    COPD (chronic obstructive pulmonary disease)     DLCO 37% , and mild pulmonary HTN    Depression     Diabetes mellitus     Diabetic retinopathy     Diastolic dysfunction     Diverticulitis of large intestine without perforation or abscess without bleeding 2/12/2018    Diverticulosis     Former smoker     Fracture of lumbar spine     GERD (gastroesophageal reflux disease)     Hernia of unspecified site of abdominal cavity without mention of obstruction or gangrene     Hyperlipidemia     Hypertension     hypertensive renal    IBS (irritable bowel syndrome)     Mild nonproliferative diabetic retinopathy(362.04) 11/25/2013    Morbid obesity     Nuclear sclerosis 11/25/2013    Osteoporosis     Peripheral edema     Rash     Recurrent upper respiratory infection (URI)     S/P LASIK (laser assisted in situ keratomileusis)     Sleep apnea     sleep apnea uses bipap.    Stroke     Thyroid disease     on synthroid    TIA (transient ischemic attack)     Urinary tract infection            Past Surgical History:   Procedure Laterality Date    ABDOMINAL SURGERY      ADENOIDECTOMY      ARTHROSCOPY, KNEE, WITH CHONDROPLASTY Right 8/31/2018    [facility]    ARTHROSCOPY, KNEE, WITH MENISCECTOMY Right 8/31/2018    [facility] OR    COLONOSCOPY  03/05/2013    repeat in 5 years    COLONOSCOPY N/A 4/11/2018    Performed by Luis Navarro MD at [facility] ENDO    COLONOSCOPY N/A 5/31/2017    [facility] ENDO    COLONOSCOPY N/A 3/5/2013    Performed by Florian Randall [facility] at [facility] ENDO    COSMETIC SURGERY      belt abdominoplasty    CYSTOSCOPY       CYSTOSCOPY N/A 8/6/2018    [facility]    EGD (ESOPHAGOGASTRODUODENOSCOPY) N/A 3/5/2013    [facility] ENDO    ESOPHAGOGASTRODUODENOSCOPY (EGD) N/A 1/11/2018    [facility] ENDO    ESOPHAGOGASTRODUODENOSCOPY (EGD) N/A 12/27/2016    [facility] ENDO    ESOPHAGOGASTRODUODENOSCOPY (EGD) N/A 10/25/2016    [facility] ENDO    ESOPHAGOGASTRODUODENOSCOPY (EGD) N/A 8/24/2016    [facility] ENDO    ESOPHAGOGASTRODUODENOSCOPY (EGD) N/A 7/21/2016    [facility] at [facility] ENDO    ESOPHAGOGASTRODUODENOSCOPY (EGD)-55628 N/A 12/12/2014    [facility] OR 2ND FLR    EYE SURGERY Right     mazzulla    GASTRECTOMY      GASTRECTOMY-SLEEVE-LAPAROSCOPIC-94174; EGD-[id number] N/A 12/12/2014    Per[facility] at [facility] OR 2ND FLR    gastric sleeve      HERNIA REPAIR      5 years old    HYSTERECTOMY      ovaries remain    PANNICULECTOMY N/A 11/28/2016    Performed by [facility]o [facility]erril [facility] at [facility] OR 2ND FLR    REFRACTIVE SURGERY      mono va//ou//    REPAIR-HERNIA  11/28/2016    Performed by [facility]il [facility] at [facility] OR 2ND FLR    RHINOPLASTY TIP      TONSILLECTOMY      TUBAL LIGATION      UPPER GASTROINTESTINAL ENDOSCOPY  03/05/2013    UPPER GASTROINTESTINAL ENDOSCOPY  08/24/2016    Dr. Navarro, repeat in 8 weeks    UPPER GASTROINTESTINAL ENDOSCOPY  07/21/2016    Dr. Randall          Current Outpatient Medications   Medication Sig    albuterol-ipratropium 2.5mg-0.5mg/3mL (DUO-NEB) 0.5 mg-3 mg(2.5 mg base)/3 mL nebulizer solution Take 3 mLs by nebulization every 6 (six) hours as needed for Wheezing. Rescue    allopurinol (ZYLOPRIM) 100 MG tablet TAKE TWO TABLETS BY MOUTH AT BEDTIME    amitriptyline (ELAVIL) 50 MG tablet TAKE 2 TABLETS BY  MOUTH IN THE EVENING    atenolol (TENORMIN) 25 MG tablet Take 1 tablet (25 mg total) by mouth 2 (two) times daily.    calcitRIOL (ROCALTROL) 0.25 MCG Cap Take 1 capsule by mouth twice a week. Monday Friday    CALCIUM CITRATE/VITAMIN D3 (CALCIUM CITRATE + D ORAL) Take 400 mg by mouth 3 (three) times daily.    cetirizine (ZYRTEC) 10 MG tablet Take 1 tablet (10 mg total) by mouth once daily.    clotrimazole-betamethasone 1-0.05% (LOTRISONE) cream     cyanocobalamin, vitamin B-12, (VITAMIN B-12) 5,000 mcg Subl Place 5,000 mg under the tongue once a week.    DENOSUMAB (PROLIA SUBQ) Inject into the skin every 6 (six) months.     DIABETIC SUPPLIES,MISCELL ([employer] REMOTE CONTROL MISC) No Sig Provided    diclofenac sodium (VOLTAREN) 1 % Gel Apply 2 g topically once daily.    diphenoxylate-atropine 2.5-0.025 mg (LOMOTIL) 2.5-0.025 mg per tablet TAKE ONE TABLET BY MOUTH 4 TIMES DAILY AS NEEDED FOR DIARRHEA    DYMISTA 137-50 mcg/spray Spry nassal spray     fluoxetine (PROZAC) 20 MG capsule TAKE TWO CAPSULES BY MOUTH ONCE DAILY    fluticasone (FLONASE) 50 mcg/actuation nasal spray 2 sprays by Nasal route once daily.     fluticasone (FLONASE) 50 mcg/actuation nasal spray USE 2 SPRAY(S) IN EACH NOSTRIL ONCE DAILY    fluticasone-vilanterol (BREO) 100-25 mcg/dose diskus inhaler Inhale 1 puff into the lungs once daily.    gabapentin (NEURONTIN) 100 MG capsule     insulin aspart U-100 (NOVOLOG) 100 unit/mL injection Use per insulin pump, 35-40 units daily.    KLOR-CON M20 20 mEq tablet TAKE ONE TABLET BY MOUTH TWICE DAILY    l-methylfolate-b2-b6-b12 (CEREFOLIN) 6-5-50-1 mg Tab Take 1 tablet by mouth once daily.    Lacto.acidophilus-Bif.animalis (PROBIOTIC) 5 billion cell CpSP Take 1 capsule by mouth once daily.    levothyroxine (SYNTHROID) 25 MCG tablet TAKE ONE TABLET BY MOUTH ONCE DAILY    LORazepam (ATIVAN) 1 MG tablet Take 1 tablet (1 mg total) by mouth every evening.    LORazepam (ATIVAN) 1 MG tablet  TAKE 1 TABLET BY MOUTH EVERY 12 HOURS AS NEEDED FOR ANXIETY    losartan (COZAAR) 50 MG tablet Take 1 tablet (50 mg total) by mouth once daily.    melatonin 3 mg Tab Take 5 mg by mouth every evening. 10 MG NIGHTLY    MULTIVIT-IRON-MIN-FOLIC ACID 3,500-18-0.4 UNIT-MG-MG ORAL CHEW Take by mouth 2 (two) times daily.    mupirocin (BACTROBAN) 2 % ointment APPLY OINTMENT TOPICALLY TO AFFECTED AREA ON NOSE THREE TIMES DAILY AS DIRECTED    oxyCODONE (ROXICODONE) 15 MG Tab Take 1 tablet (15 mg total) by mouth every 6 (six) hours as needed for Pain.    pantoprazole (PROTONIX) 40 MG tablet TAKE ONE TABLET BY MOUTH ONCE DAILY    polymyxin B sulf-trimethoprim (POLYTRIM) 10,000 unit- 1 mg/mL Drop Place 1 drop into both eyes every 4 (four) hours.    ranitidine (ZANTAC) 300 MG tablet TAKE ONE TABLET BY MOUTH IN THE EVENING    SSD 1 % cream     torsemide (DEMADEX) 20 MG Tab TAKE 1 TABLET BY MOUTH EVERY OTHER DAY THEN 2 TABLETS EVERY DAY         Current Facility-Administered Medications   Medication    gentamicin injection 80 mg         Facility-Administered Medications Ordered in Other Visits   Medication    lactated ringers infusion           Review of patient's allergies indicates:   Allergen Reactions    Adhesive Other (See Comments)     Blisters    Cleocin [clindamycin hcl] Hives    Ceclor [cefaclor] Hives    Advair diskus [fluticasone-salmeterol]      Dry mouth    Aleve [naproxen sodium]      Unable to take secondary to kidney function.     Erythromycin Hives    Levaquin [levofloxacin] Dermatitis    Macrobid [nitrofurantoin monohyd/m-cryst] Other (See Comments)     Stomach pain/ GI issues    Macrodantin [nitrofurantoin macrocrystalline] Hives    Motrin [ibuprofen]      Unable to take secondary to kidney functions.    Restoril [temazepam]      LIGHTHEADED UPON WAKING.with poor results    Sulfa (sulfonamide antibiotics)      Hold due to renal problems    Trazodone      PALPITATIONS    Remeron  [mirtazapine] Palpitations and Other (See Comments)     Jittery    Vancomycin analogues Rash     Feet broke out/inflammed blood vessels            Family History   Problem Relation Age of Onset    Heart disease Mother 59    Cancer Mother 59    throat    Allergies Mother     Diabetes Mother     Heart disease Father 70    flutter    Allergies Father     Diabetes Father     Cancer Sister 22    22 thyroid,49 breast    Allergies Sister     Breast cancer Sister     Diabetes Sister     Cancer Brother 62    lung    Diabetes Brother     Asthma Daughter     Diabetes Daughter     Depression Daughter     Asthma Grandchild     Eczema Grandchild     Eczema Grandchild     Breast cancer Maternal Grandmother     Ovarian cancer Cousin     Amblyopia Neg Hx     Blindness Neg Hx     Cataracts Neg Hx     Glaucoma Neg Hx     Hypertension Neg Hx     Macular degeneration Neg Hx     Retinal detachment Neg Hx     Strabismus Neg Hx     Stroke Neg Hx     Thyroid disease Neg Hx     Angioedema Neg Hx     Immunodeficiency Neg Hx     Allergic rhinitis Neg Hx     Atopy Neg Hx     Rhinitis Neg Hx     Urticaria Neg Hx     Colon cancer Neg Hx     Colon polyps Neg Hx     Crohn's disease Neg Hx     Ulcerative colitis Neg Hx     Celiac disease Neg Hx      Social History           Socioeconomic History    Marital status:      Spouse name: Not on file    Number of children: Not on file    Years of education: Not on file    Highest education level: Not on file   Social Needs    Financial resource strain: Not on file    Food insecurity - worry: Not on file    Food insecurity - inability: Not on file    Transportation needs - medical: Not on file    Transportation needs - non-medical: Not on file   Occupational History    Not on file   Tobacco Use    Smoking status: Former Smoker     Types: Cigarettes    Smokeless tobacco: Never Used    Tobacco comment: quit 1990   Substance and Sexual Activity     Alcohol use: Yes     Comment: rare    Drug use: No    Sexual activity: Yes     Birth control/protection: Surgical   Other Topics Concern    Not on file   Social History Narrative    Not on file     Chief Complaint:        Chief Complaint   Patient presents with    Knee Pain     bilateral      Date of surgery: August 31, 2018   History of present illness: 68-year-old with a history of bilateral knee arthritis. Patient had left severe medial compartment arthritic changes. She is ready to have her left knee replaced. Also having some trigger thumb. This started about 3 weeks ago. Woke up with pain. No injury. Pain is a 6/10.   Answers for HPI/ROS submitted by the patient on 2/8/2019   Leg pain   unexpected weight change: No   appetite change : No   sleep disturbance: Yes   IMMUNOCOMPROMISED: No   nervous/ anxious: Yes   dysphoric mood: No   rash: No   visual disturbance: No   eye redness: Yes   eye pain: No   ear pain: No   tinnitus: No   hearing loss: No   sinus pressure : Yes   nosebleeds: No   enviro allergies: Yes   food allergies: No   cough: Yes   shortness of breath: No   sweating: No   dysuria: Yes   frequency: Yes   difficulty urinating: No   hematuria: Yes   painful intercourse: No   chest pain: No   palpitations: Yes   nausea: No   vomiting: No   diarrhea: Yes   blood in stool: No   constipation: No   headaches: No   dizziness: No   numbness: Yes   seizures: No   joint swelling: Yes   myalgia: Yes   weakness: Yes   back pain: Yes   Pain Chronicity: chronic   History of trauma: Yes   Onset: more than 1 month ago   Frequency: constantly   Progression since onset: rapidly worsening   Injury mechanism: falling   injury location: at home   pain- numeric: 7/10   pain location: right elbow, left hip, right hip, left knee, right knee   pain quality: aching, hot, sharp, burning, shooting, throbbing, tingling   Radiating Pain: Yes   If your pain is radiating, to what part of the body?: left foot, left forearm,  left jaw, left knee, right knee, left neck, right neck, left thigh, right thigh, lower back, mid back, upper back   Aggravating factors: activity, bending, coughing, bearing weight, cold, exercise, standing, walking, lifting, sitting   fever: No   inability to bear weight: Yes   itching: No   joint locking: Yes   limited range of motion: Yes   stiffness: Yes   tingling: Yes   Treatments tried: brace/corset, cold, heat, injection treatment, oral narcotics, OTC ointments, OTC pain meds   physical therapy: ineffective   Improvement on treatment: significant   Physical Examination:   Vital Signs:       Vitals:    02/11/19 1438   BP: 136/63   Pulse: 92     Body mass index is 43.16 kg/m².   This a well-developed, well nourished patient in no acute distress. They are alert and oriented and cooperative to examination. Pt. walks without an antalgic gait.     Examination of both knees shows no rashes or erythema. There are no masses ecchymosis or effusion. Patient has full range of motion from 0-130°. Patient is nontender to palpation over lateral joint line and moderately tender to palpation over the medial joint line. Patient has a - Lachman exam, - anterior drawer exam, and - posterior drawer exam. - Mervin's exam. Knee is stable to varus and valgus stress. 5 out of 5 motor strength. Palpable distal pulses. Intact light touch sensation. Negative Patellofemoral crepitus     Examination of the left hand and wrist shows no signs of rashes or erythema. Patient has no masses ecchymosis or effusions. Patient has full range of motion of the wrist in flexion and extension as well as ulnar and radial deviation. The patient also has full range of motion of all joints in the hand. There are 2+ radial pulse and intact light touch sensation in all 5 digits. Nontender over the anatomic snuffbox. Pain over the A1 pulley of the thumb     Heart is regular rate without obvious murmurs   Normal respiratory effort without audible wheezing    Abdomen is soft and nontender     X-rays: X-rays of both knees are ordered and reviewed which show severe medial joint space narrowing of the left knee and more moderate medial joint space narrowing of the right knee     Assessment:: Bilateral knee arthritis   Left trigger thumb     Plan: I reviewed knee replacement with her today. I recommended doing the left knee 1st. Plan will be for a left total knee arthroplasty. This will be scheduled for early April so that she is greater than 3 months out from her last injection. I did agree to inject her trigger thumb today. Patient will also need antibiotic cement due to her history of infections. She will need medical clearance. Risks, benefits, and alternatives to the procedure were explained to the patient including but not limited to damage to nerves, arteries, blood vessels, bones, tendons, ligaments, stiffness, instability, infection, DVT, PE, as well as general anesthetic complications including seizure, stroke, heart attack and even death. The patient understood these risks and wished to proceed and signed the informed consent.     This note was created using [employer]add[employer] voice recognition software that occasionally misinterpreted phrases or words.

## 2019-04-16 NOTE — HPI
68-year-old with a history of bilateral knee arthritis. Patient had left severe medial compartment arthritic changes. She is ready to have her left knee replaced. Also having some trigger thumb. This started about 3 weeks ago. Woke up with pain. No injury. Pain is a 6/10.     Patient is s/p left TKA

## 2019-04-16 NOTE — HOSPITAL COURSE
Status post left total knee arthroplasty on April 16, 2019    Patient ambulated down hallway today, complains of pain

## 2019-04-16 NOTE — CONSULTS
PCP: Werner Vargas MD     Medical Consult     Reason for consult: Medical Management     History of Present Illness:  Patient is a 68 y.o. female s/p left total knee arthroplasty by Dr. Molina. Patient has PMH significant for osteoarthritis, bronchial asthma, history of congestive heart failure, chronic kidney disease stage 3, chronic obstructive pulmonary disease, diabetes mellitus type 2 with retinopathy, gastroesophageal reflux disease, hypertension, hyperlipidemia, mild pulmonary hypertension and hypothyroidism. Post-operatively, patient is doing well. Patient denied chest pain, shortness of breath, abdominal pain, nausea, vomiting, headache, vision changes, focal neuro-deficits, cough or fever.          Past Medical History:   Diagnosis Date    Allergy       multiple antibiotic allergies    Anemia      Anxiety      Arthritis      Asthma      CHF (congestive heart failure)       NYHA class III     Chronic kidney disease      Colon polyp       benign    COPD (chronic obstructive pulmonary disease)       DLCO 37% , and mild pulmonary HTN    Dental bridge present       LOWER    Depression      Diabetes mellitus      Diabetic retinopathy      Diastolic dysfunction      Diverticulitis of large intestine without perforation or abscess without bleeding 2/12/2018    Diverticulosis      Former smoker      Fracture of lumbar spine      GERD (gastroesophageal reflux disease)      Hernia of unspecified site of abdominal cavity without mention of obstruction or gangrene      Hyperlipidemia      Hypertension       hypertensive renal    IBS (irritable bowel syndrome)      Mild nonproliferative diabetic retinopathy(362.04) 11/25/2013    Morbid obesity      Nuclear sclerosis 11/25/2013    Osteoporosis      Peripheral edema      Rash      Recurrent upper respiratory infection (URI)      S/P LASIK (laser assisted in situ keratomileusis)      Sleep apnea       sleep apnea uses bipap.    Stroke        tia    Thyroid disease       on synthroid    TIA (transient ischemic attack)      Urinary tract infection      Wears glasses              Past Surgical History:   Procedure Laterality Date    ABDOMINAL SURGERY        ADENOIDECTOMY        ARTHROSCOPY, KNEE, WITH CHONDROPLASTY Right 8/31/2018     Performed by [facility] at [facility] OR    ARTHROSCOPY, KNEE, WITH MENISCECTOMY Right 8/31/2018     Performed by Larry Molina MD at [facility] OR    COLONOSCOPY   03/05/2013     repeat in 5 years    COLONOSCOPY N/A 4/11/2018     Performed by Luis Navarro MD at [facility] ENDO    COLONOSCOPY N/A 5/31/2017     Performed by [facility]arro [facility] at [facility] ENDO    COLONOSCOPY N/A 3/5/2013     Performed by Florian Randall MD at [facility] ENDO    COSMETIC SURGERY         belt abdominoplasty    CYSTOSCOPY        CYSTOSCOPY N/A 8/6/2018     [facility]    EGD (ESOPHAGOGASTRODUODENOSCOPY) N/A 3/5/2013     Performed by Florian Randall MD at [facility] ENDO    ESOPHAGOGASTRODUODENOSCOPY (EGD) N/A 1/11/2018     Performed by Luis Navarro MD at [facility] ENDO    ESOPHAGOGASTRODUODENOSCOPY (EGD) N/A 12/27/2016     Performed by Luis Navarro MD at [facility] ENDO    ESOPHAGOGASTRODUODENOSCOPY (EGD) N/A 10/25/2016     Performed by Luis [facility]arro [facility] at [facility] ENDO    ESOPHAGOGASTRODUODENOSCOPY (EGD) N/A 8/24/2016     Performed by Luis [facility]arro [facility] at [facility] ENDO    ESOPHAGOGASTRODUODENOSCOPY (EGD) N/A 7/21/2016     Performed by Florian Randall MD at [facility] ENDO    ESOPHAGOGASTRODUODENOSCOPY (EGD)-[id number] N/A 12/12/2014     Performed by [facility]ong [facility] at [facility] OR 2ND FLR    EYE SURGERY Right       mazzulla    GASTRECTOMY        GASTRECTOMY-SLEEVE-LAPAROSCOPIC-41615; EGD-[id number] N/A 12/12/2014     Performed by [facility]ong [facility] at [facility] OR 2ND FLR    gastric sleeve        HERNIA REPAIR         5 years old    HYSTERECTOMY         ovaries remain     PANNICULECTOMY N/A 2016     Performed by [facility]il [facility] at [facility] OR 2ND FLR    REFRACTIVE SURGERY         mono va//ou//    REPAIR-HERNIA   2016     Performed by [facility]il [facility] at [facility] OR 2ND FLR    RHINOPLASTY TIP        TONSILLECTOMY        TOTAL KNEE ARTHROPLASTY Left 19    TUBAL LIGATION        UPPER GASTROINTESTINAL ENDOSCOPY   2013    UPPER GASTROINTESTINAL ENDOSCOPY   2016     Dr. Veras, repeat in 8 weeks    UPPER GASTROINTESTINAL ENDOSCOPY   2016     Dr. Randall            Family History   Problem Relation Age of Onset    Heart disease Mother 59    Cancer Mother 59         throat    Allergies Mother      Diabetes Mother      Heart disease Father 70         flutter    Allergies Father      Diabetes Father      Cancer Sister 22         22 thyroid,49  breast    Allergies Sister      Breast cancer Sister      Diabetes Sister      Cancer Brother 62         lung    Diabetes Brother      Asthma Daughter      Diabetes Daughter      Depression Daughter      Asthma Grandchild      Eczema Grandchild      Eczema Grandchild      Breast cancer Maternal Grandmother      Ovarian cancer Cousin        Social History            Tobacco Use    Smoking status: Former Smoker       Packs/day: 1.00       Years: 4.00       Pack years: 4.00       Types: Cigarettes       Last attempt to quit:        Years since quittin.3    Smokeless tobacco: Never Used    Tobacco comment: quit , lived with smokers    Substance Use Topics    Alcohol use: Yes       Comment: rare    Drug use: No            Review of patient's allergies indicates:   Allergen Reactions    Adhesive Other (See Comments)       Blisters    Cleocin [clindamycin hcl] Hives    Ceclor [cefaclor] Hives    Advair diskus [fluticasone-salmeterol]         Dry mouth    Aleve [naproxen sodium]         Unable to take secondary to kidney function.     Erythromycin Hives     Levaquin [levofloxacin] Dermatitis    Macrobid [nitrofurantoin monohyd/m-cryst] Other (See Comments)       Stomach pain/ GI issues    Macrodantin [nitrofurantoin macrocrystalline] Hives    Motrin [ibuprofen]         Unable to take secondary to kidney functions.    Restoril [temazepam]         LIGHTHEADED UPON WAKING.with poor results    Sulfa (sulfonamide antibiotics)         Hold due to renal problems    Trazodone         PALPITATIONS    Remeron [mirtazapine] Palpitations and Other (See Comments)       Jittery    Vancomycin analogues Rash       Feet broke out/inflammed blood vessels             Facility-Administered Medications Prior to Admission   Medication    gentamicin injection 80 mg           PTA Medications   Medication Sig    allopurinol (ZYLOPRIM) 100 MG tablet TAKE TWO TABLETS BY MOUTH AT BEDTIME    amitriptyline (ELAVIL) 50 MG tablet TAKE 2 TABLETS BY MOUTH IN THE EVENING    atenolol (TENORMIN) 25 MG tablet Take 1 tablet (25 mg total) by mouth 2 (two) times daily.    calcitRIOL (ROCALTROL) 0.25 MCG Cap Take 1 capsule by mouth twice a week. Monday Friday    cetirizine (ZYRTEC) 10 MG tablet Take 1 tablet (10 mg total) by mouth once daily.    clotrimazole-betamethasone 1-0.05% (LOTRISONE) cream      cyanocobalamin, vitamin B-12, (VITAMIN B-12) 5,000 mcg Subl Place 5,000 mg under the tongue once a week.    diclofenac sodium (VOLTAREN) 1 % Gel  APPLY 2 GRAMS TOPICALLY ONCE DAILY    diphenoxylate-atropine 2.5-0.025 mg (LOMOTIL) 2.5-0.025 mg per tablet TAKE ONE TABLET BY MOUTH 4 TIMES DAILY AS NEEDED FOR DIARRHEA    fluoxetine (PROZAC) 20 MG capsule TAKE TWO CAPSULES BY MOUTH ONCE DAILY    fluticasone (FLONASE) 50 mcg/actuation nasal spray 2 sprays by Nasal route once daily.     fluticasone-vilanterol (BREO) 100-25 mcg/dose diskus inhaler Inhale 1 puff into the lungs once daily.    gabapentin (NEURONTIN) 100 MG capsule 3 (three) times daily.     levothyroxine (SYNTHROID) 25 MCG tablet  TAKE ONE TABLET BY MOUTH ONCE DAILY    albuterol-ipratropium 2.5mg-0.5mg/3mL (DUO-NEB) 0.5 mg-3 mg(2.5 mg base)/3 mL nebulizer solution Take 3 mLs by nebulization every 6 (six) hours as needed for Wheezing. Rescue    DYMISTA 137-50 mcg/spray Spry nassal spray as needed.     insulin (BASAGLAR KWIKPEN U-100 INSULIN) glargine 100 units/mL (3mL) SubQ pen Inject 20 Units into the skin every evening. Use 10 units if BS over 210.Night before surgery.    insulin aspart U-100 (NOVOLOG) 100 unit/mL injection Use per insulin pump, 35-40 units daily.    KLOR-CON M20 20 mEq tablet TAKE ONE TABLET BY MOUTH TWICE DAILY    l-methylfolate-b2-b6-b12 (CEREFOLIN) 6-5-50-1 mg Tab Take 1 tablet by mouth once daily.    Lacto.acidophilus-Bif.animalis (PROBIOTIC) 5 billion cell CpSP Take 1 capsule by mouth once daily.    LORazepam (ATIVAN) 1 MG tablet TAKE 1 TABLET BY MOUTH EVERY 12 HOURS AS NEEDED FOR ANXIETY    losartan (COZAAR) 50 MG tablet Take 1 tablet (50 mg total) by mouth once daily.    montelukast (SINGULAIR) 10 mg tablet      MULTIVIT-IRON-MIN-FOLIC ACID 3,500-18-0.4 UNIT-MG-MG ORAL CHEW Take by mouth 2 (two) times daily.    mupirocin (BACTROBAN) 2 % ointment APPLY OINTMENT TOPICALLY TO AFFECTED AREA ON NOSE THREE TIMES DAILY AS DIRECTED    oxyCODONE (ROXICODONE) 15 MG Tab Take 1 tablet (15 mg total) by mouth every 6 (six) hours as needed for Pain.    pantoprazole (PROTONIX) 40 MG tablet TAKE ONE TABLET BY MOUTH ONCE DAILY    ranitidine (ZANTAC) 300 MG tablet TAKE ONE TABLET BY MOUTH IN THE EVENING    SSD 1 % cream      torsemide (DEMADEX) 20 MG Tab Take 1 tablet (20 mg total) by mouth 2 (two) times daily. TAKE 1 TABLET BY MOUTH EVERY OTHER DAY THEN 2 TABLETS EVERY DAY         Review of Systems:  Constitutional: no fever or chills  Eyes: no visual changes  Ears, nose, mouth, throat, and face: no nasal congestion or sore throat  Respiratory: no cough or shorness of breath  Cardiovascular: no chest pain or  palpitations  Gastrointestinal: no nausea or vomiting, no abdominal pain or change in bowel habits  Genitourinary: no hematuria or dysuria  Integument/breast: no rash or pruritis  Hematologic/lymphatic: no easy bruising or lymphadenopathy  Musculoskeletal:  See history of present illness  Neurological: no seizures or tremors.  Behavioral/Psych: no auditory or visual hallucinations  Endocrine: no heat or cold intolerance     OBJECTIVE:      Vital Signs (Most Recent)  Temp: 98.8 °F (37.1 °C) (04/16/19 0635)  Pulse: 73 (04/16/19 0600)  Resp: 17 (04/16/19 0600)  BP: (!) 142/67 (04/16/19 0600)  SpO2: 99 % (04/16/19 0600)     Physical Exam:  General appearance: well developed, appears stated age  Head: normocephalic, atraumatic  Eyes:  conjunctivae/corneas clear. PERRL.  Nose: Nares normal. Septum midline.  Throat: lips, mucosa, and tongue normal; teeth and gums normal, no throat erythema.  Neck: supple, symmetrical, trachea midline, no JVD and thyroid not enlarged, symmetric, no tenderness/mass/nodules  Lungs:  clear to auscultation bilaterally and normal respiratory effort  Chest wall: no tenderness  Heart: regular rate and rhythm, S1, S2 normal, no murmur, click, rub or gallop  Abdomen: soft, non-tender non-distented; bowel sounds normal; no masses,  no organomegaly  Extremities: no cyanosis, clubbing or edema. Left knee dressing clean dry and intact with a SIENA drain in place.  Pulses: 2+ and symmetric  Skin: Skin color, texture, turgor normal. No rashes or lesions.  Lymph nodes: Cervical, supraclavicular, and axillary nodes normal.  Neurologic: Normal strength and tone. No focal numbness or weakness. CNII-XII intact.       Laboratory:   CBC: No results for input(s): WBC, RBC, HGB, HCT, PLT, MCV, MCH, MCHC in the last 168 hours.  CMP: No results for input(s): GLU, CALCIUM, ALBUMIN, PROT, NA, K, CO2, CL, BUN, CREATININE, ALKPHOS, ALT, AST, BILITOT in the last 168 hours.             Hemoglobin A1C   Date Value Ref Range  Status   01/30/2019 8.8 (H) 4.0 - 5.6 % Final       Comment:       ADA Screening Guidelines:  5.7-6.4%  Consistent with prediabetes  >or=6.5%  Consistent with diabetes  High levels of fetal hemoglobin interfere with the HbA1C  assay. Heterozygous hemoglobin variants (HbS, HgC, etc)do  not significantly interfere with this assay.   However, presence of multiple variants may affect accuracy.      01/30/2019 8.8 (H) 4.0 - 5.6 % Final       Comment:       ADA Screening Guidelines:  5.7-6.4%  Consistent with prediabetes  >or=6.5%  Consistent with diabetes  High levels of fetal hemoglobin interfere with the HbA1C  assay. Heterozygous hemoglobin variants (HbS, HgC, etc)do  not significantly interfere with this assay.   However, presence of multiple variants may affect accuracy.      11/21/2018 9.4 (H) 4.0 - 5.6 % Final       Comment:       ADA Screening Guidelines:  5.7-6.4%  Consistent with prediabetes  >or=6.5%  Consistent with diabetes  High levels of fetal hemoglobin interfere with the HbA1C  assay. Heterozygous hemoglobin variants (HbS, HgC, etc)do  not significantly interfere with this assay.   However, presence of multiple variants may affect accuracy.      08/30/2013 8.0 (H) 4.8 - 5.6 % Final       Comment:                Increased risk for diabetes: 5.7 - 6.4           Diabetes: >6.4           Glycemic control for adults with diabetes: <7.0  **In order to standardize %HbA1c results worldwide, as of October 11, 2010,  the %HbA1c is being calculated using the master equation recommended in the  consensus statement adopted by the ADA (American Diabetes Assoc), EASD  (European Assoc for the Study of Diabetes), IFCC (International Federation  of Clinical Chemistry and Laboratory Medicine) and IDF (International  Diabetes Federation). Result units: %HgbA1c (DCCT/NGSP).  In common with other methods, Hb A1C values may not accurately reflect mean  blood glucose in patients with hemoglobin variants (HgbF, HgbS and  HgbC).  Any cause of shortened erythrocyte survival will reduce exposure of  erythrocytes to glucose with a consequent decrease in HbA1c (%) values, even  though the time-averaged blood glucose level may be elevated. Causes of  shortened erythrocyte lifetime might be hemolytic anemia or other hemolytic  diseases, homozygous sickle cell trait, pregnancy, recent significant or  chronic blood loss, etc. Caution should be used when interpreting the HbA1c  results from patients with these conditions.   02/02/2013 8.2 (H) 4.8 - 5.6 % Final       Comment:                Increased risk for diabetes: 5.7 - 6.4           Diabetes: >6.4           Glycemic control for adults with diabetes: <7.0  **In order to standardize %HbA1c results worldwide, as of October 11, 2010,  the %HbA1c is being calculated using the master equation recommended in the  consensus statement adopted by the ADA (American Diabetes Assoc), EASD  (European Assoc for the Study of Diabetes), IFCC (International Federation  of Clinical Chemistry and Laboratory Medicine) and IDF (International  Diabetes Federation). Result units: %HgbA1c (DCCT/NGSP).  In common with other methods, Hb A1C values may not accurately reflect mean  blood glucose in patients with hemoglobin variants (HgbF, HgbS and HgbC).  Any cause of shortened erythrocyte survival will reduce exposure of  erythrocytes to glucose with a consequent decrease in HbA1c (%) values, even  though the time-averaged blood glucose level may be elevated. Causes of  shortened erythrocyte lifetime might be hemolytic anemia or other hemolytic  diseases, homozygous sickle cell trait, pregnancy, recent significant or  chronic blood loss, etc. Caution should be used when interpreting the HbA1c  results from patients with these conditions.         Diagnostic Results: None     ASSESSMENT/PLAN:      Active Hospital Problems     Diagnosis   POA    *S/P total knee arthroplasty, left [Z96.652]   Not Applicable     Arthritis of both knees [M17.0]  Continue to follow Orthopedic recommendations.  Needs aggressive incentive spirometry.  Follow hemoglobin and hematocrit closely.  Pain control with IV narcotics and antiemetics as needed.  Physical therapy as per Orthopedics protocol with fall precautions.      Yes    Obesity [E66.9]  There is no height or weight on file to calculate BMI. Morbid obesity complicates all aspects of disease management from diagnostic modalities to treatment. Weight loss encouraged and health benefits explained to patient.      Yes    PUD (peptic ulcer disease) [K27.9]  On PPI.      Yes    Chronic low back pain [M54.5, G89.29]  As above, supportive care, fall precautions.       Yes    Sleep apnea, using CPAP 100% [G47.30]  Use CPAP as needed.      Yes    Type 2 diabetes mellitus [E11.9]  Check blood glucose level q AC/HS.  Use Novolog Insulin Sliding Scale as needed.   Continue American Diabetic Association 1800 Kcal diet.      Yes    COPD (chronic obstructive pulmonary disease) [J44.9]   Yes       DLCO 37% , and mild pulmonary HTN       LBBB (left bundle branch block) [I44.7]  Tele-monitoring.       Yes    Acquired hypothyroidism [E03.9]  Chronic problem. Will continue chronic medications and monitor for any changes, adjusting as needed.      Yes    Chronic kidney disease, stage III (moderate) [N18.3]   Yes       Monitor BUN/SCr.  Monitor I/Os.  Monitor electrolytes.  Avoid non-steroidal anti-inflammatory medications.       GERD (gastroesophageal reflux disease) [K21.9]  On PPI.      Yes    Hypertension [I10]   Yes       Chronic problem. Will continue chronic medications and monitor for any changes, adjusting as needed.                   DVT prophylaxis:  Use sequential compression stockings and Sathish hose.  On Lovenox 40 mg subcu daily.     Thank you for allowing me to participate in the care of your patient. Will follow with you.

## 2019-04-16 NOTE — PLAN OF CARE
Problem: Physical Therapy Goal  Goal: Physical Therapy Goal  Goals to be met by: 19     Patient will increase functional independence with mobility by performin. Supine to sit with [place]  2. Sit to stand transfer with Modified [place] using Rolling Walker  3. Gait  x 250 feet with Modified [place] using Rolling Walker.   4. Stand for 10 minutes with Modified [place] using Rolling Walker  5. Lower extremity exercise program x10-20 reps per handout, with independence    Outcome: Ongoing (interventions implemented as appropriate)  PT goals established, gait and transfer training implemented

## 2019-04-16 NOTE — ANESTHESIA PROCEDURE NOTES
Peripheral Block    Patient location during procedure: pre-op   Block not for primary anesthetic.  Reason for block: at surgeon's request and post-op pain management   Post-op Pain Location: left knee  Start time: 4/16/2019 7:05 AM  Timeout: 4/16/2019 7:04 AM   End time: 4/16/2019 7:11 AM  Staffing  Anesthesiologist: Leesa Radford MD  Performed: anesthesiologist   Preanesthetic Checklist  Completed: patient identified, site marked, surgical consent, pre-op evaluation, timeout performed, IV checked, risks and benefits discussed and monitors and equipment checked  Peripheral Block  Patient position: supine  Prep: ChloraPrep and site prepped and draped  Patient monitoring: heart rate, cardiac monitor, continuous pulse ox and frequent blood pressure checks  Block type: adductor canal  Laterality: left  Injection technique: single shot  Needle  Needle type: Stimuplex   Needle gauge: 22 G  Needle length: 4 in  Needle localization: ultrasound guidance   -ultrasound image captured on disc.  Assessment  Injection assessment: negative aspiration, negative parasthesia and local visualized surrounding nerve  Paresthesia pain: none  Heart rate change: no  Slow fractionated injection: yes  Additional Notes  Exparel 20 ml

## 2019-04-16 NOTE — PROGRESS NOTES
04/16/19 1249   Patient Assessment/Suction   Level of Consciousness (AVPU) alert   All Lung Fields Breath Sounds diminished   PRE-TX-O2   O2 Device (Oxygen Therapy) nasal cannula   $ Is the patient on Low Flow Oxygen? Yes   Flow (L/min) 2   SpO2 98 %   Pulse 80   Resp 20   Inhaler   $ Inhaler Charges MDI (Metered Dose Inahler) Treatment   Respiratory Treatment Status (Inhaler) given   Treatment Route (Inhaler) mouthpiece   Patient Position (Inhaler) HOB elevated   Breath Sounds Post-Respiratory Treatment   Throughout All Fields Post-Treatment All Fields   Throughout All Fields Post-Treatment no change   Incentive Spirometer   $ Incentive Spirometer Charges done with encouragement   Incentive Spirometer Predicted Level (mL) 1800   Administration (IS) instruction provided, initial   Number of Repetitions (IS) 10   Level Incentive Spirometer (mL) 1000

## 2019-04-16 NOTE — PLAN OF CARE
Problem: Adult Inpatient Plan of Care  Goal: Plan of Care Review  Alert and oriented x 4. Plan of care reviewed with pt. Verbalized understanding. Dressing and ace wrap to left knee  Incision dry and intact. Cryotherapy in progress. Pt c/o pain, managed with prn pain medication. Insulin pump maintained. Telemetry in place. Up in chair with PT assist. Voiding without difficulty.Left  Foot pump and right SCD maintained. Nurse demonstrated IS.Correct return demonstration noted. Bed in low position and locked. Call light within reach. Hourly rounding for safety. Will continue to monitor.

## 2019-04-16 NOTE — PT/OT/SLP EVAL
Physical Therapy Evaluation    Patient Name:  Fifi Mcnair   MRN:  [id number]    Recommendations:     Discharge Recommendations:  home health PT   Discharge Equipment Recommendations: none   Barriers to discharge: None    Assessment:     Fifi Mcnair is a 68 y.o. female admitted with a medical diagnosis of S/P total knee arthroplasty, left.  She presents with the following impairments/functional limitations:  weakness, impaired endurance, impaired self care skills, impaired functional mobilty, gait instability, impaired balance, decreased lower extremity function, pain, decreased ROM, impaired cardiopulmonary response to activity, impaired joint extensibility, impaired muscle length, orthopedic precautions. Pt was able to complete all LE with independence and then was able to get to EOB with CGA. Pt able to independently scoot to get feet on the floor. Pt then performed sit o stand with Brittani and use of RW. Pt ambulated tot he bathroom and then performed sit o stand from the toilet with CGA using grab bars and RW. Pt then ambulated 100 feet with CGa and RW and returned to the room and sat up in the chair with all needs and call button within reach.    Rehab Prognosis: Good; patient would benefit from acute skilled PT services to address these deficits and reach maximum level of function.    Recent Surgery: Procedure(s) (LRB):  ARTHROPLASTY, KNEE (Left) Day of Surgery    Plan:     During this hospitalization, patient to be seen BID to address the identified rehab impairments via gait training, therapeutic activities, therapeutic exercises and progress toward the following goals:    · Plan of Care Expires:  04/30/19    Subjective     Chief Complaint: none  Patient/Family Comments/goals: none  Pain/Comfort:  · Pain Rating 1: 4/10  · Location - Side 1: Left  · Location - Orientation 1: generalized  · Location 1: knee  · Pain Addressed 1: Reposition, Distraction, Pre-medicate for activity    Patients cultural,  spiritual, Jewish conflicts given the current situation: no    Living Environment:  Pt lives with her  in a one story home with a ramp to get in.  Prior to admission, patients level of function was independent with intermittent use of rollator for long distance.  Equipment used at home: bath bench, walker, rolling, hip kit, bedside commode, rollator.  DME owned (not currently used): none.  Upon discharge, patient will have assistance from .    Objective:     Communicated with nurse Sanchez prior to session.  Patient found supine with telemetry, peripheral IV, oxygen, knee immobilizer, cryotherapy  upon PT entry to room.    General Precautions: Standard, fall   Orthopedic Precautions:LLE weight bearing as tolerated   Braces: Knee immobilizer     Exams:  · Cognitive Exam:  Patient is oriented to Person, Place, Time and Situation  · Postural Exam:  Patient presented with the following abnormalities:    · -       Rounded shoulders  · -       Forward head  · -       Posterior pelvic tilt  · RLE ROM: WFL  · RLE Strength: WFL  · LLE ROM: WFL except knee flexion ~50 degrees, extension lacked ~15 degrees  · LLE Strength: WFL except knee flexion and extension 3/5    Functional Mobility:  · Bed Mobility:     · Rolling Right: independence  · Scooting: independence  · Supine to Sit: contact guard assistance  · Transfers:     · Sit to Stand:  minimum assistance with rolling walker  · Toilet Transfer: contact guard assistance with  rolling walker  using  Stand Pivot  · Gait: Pt ambulated 100 feet with RW and CGA      Therapeutic Activities and Exercises:   Pt performed bilateral ankle pumps, glute set, quad sets, L heel slides, L SAQs, L SLRs all x10 reps    AM-PAC 6 CLICK MOBILITY  Total Score:19     Patient left up in chair with all lines intact, call button in reach and nurse Lilliana notified.    GOALS:   Multidisciplinary Problems     Physical Therapy Goals        Problem: Physical Therapy Goal    Goal Priority  Disciplines Outcome Goal Variances Interventions   Physical Therapy Goal     PT, PT/OT Ongoing (interventions implemented as appropriate)     Description:  Goals to be met by: 19     Patient will increase functional independence with mobility by performin. Supine to sit with [place]  2. Sit to stand transfer with Modified [place] using Rolling Walker  3. Gait  x 250 feet with Modified [place] using Rolling Walker.   4. Stand for 10 minutes with Modified [place] using Rolling Walker  5. Lower extremity exercise program x10-20 reps per handout, with independence                      History:     Past Medical History:   Diagnosis Date    Allergy     multiple antibiotic allergies    Anemia     Anxiety     Arthritis     Asthma     CHF (congestive heart failure)     NYHA class III     Chronic kidney disease     Colon polyp     benign    COPD (chronic obstructive pulmonary disease)     DLCO 37% , and mild pulmonary HTN    Dental bridge present     LOWER    Depression     Diabetes mellitus     Diabetic retinopathy     Diastolic dysfunction     Diverticulitis of large intestine without perforation or abscess without bleeding 2018    Diverticulosis     Former smoker     Fracture of lumbar spine     GERD (gastroesophageal reflux disease)     Hernia of unspecified site of abdominal cavity without mention of obstruction or gangrene     Hyperlipidemia     Hypertension     hypertensive renal    IBS (irritable bowel syndrome)     Mild nonproliferative diabetic retinopathy(362.04) 2013    Morbid obesity     Nuclear sclerosis 2013    Osteoporosis     Peripheral edema     Rash     Recurrent upper respiratory infection (URI)     S/P LASIK (laser assisted in situ keratomileusis)     Sleep apnea     sleep apnea uses bipap.    Stroke     tia    Thyroid disease     on synthroid    TIA (transient ischemic attack)     Urinary tract infection     Wears  glasses        Past Surgical History:   Procedure Laterality Date    ABDOMINAL SURGERY      ADENOIDECTOMY      ARTHROSCOPY, KNEE, WITH CHONDROPLASTY Right 8/31/2018    [facility]    ARTHROSCOPY, KNEE, WITH MENISCECTOMY Right 8/31/2018    [facility] at [facility] OR    COLONOSCOPY  03/05/2013    repeat in 5 years    COLONOSCOPY N/A 4/11/2018    Performed by Luis Navarro [facility] at [facility] ENDO    COLONOSCOPY N/A 5/31/2017    [facility] ENDO    COLONOSCOPY N/A 3/5/2013    [facility] by Florian Randall [facility] at [facility] ENDO    COSMETIC SURGERY      belt abdominoplasty    CYSTOSCOPY      CYSTOSCOPY N/A 8/6/2018    [facility] OR    EGD (ESOPHAGOGASTRODUODENOSCOPY) N/A 3/5/2013    [facility] at [facility] ENDO    ESOPHAGOGASTRODUODENOSCOPY (EGD) N/A 1/11/2018    [facility] ENDO    ESOPHAGOGASTRODUODENOSCOPY (EGD) N/A 12/27/2016    [facility] ENDO    ESOPHAGOGASTRODUODENOSCOPY (EGD) N/A 10/25/2016    [facility] ENDO    ESOPHAGOGASTRODUODENOSCOPY (EGD) N/A 8/24/2016    [facility] ENDO    ESOPHAGOGASTRODUODENOSCOPY (EGD) N/A 7/21/2016    [facility] at [facility] ENDO    ESOPHAGOGASTRODUODENOSCOPY (EGD)-69960 N/A 12/12/2014    [facility] OR 2ND FLR    EYE SURGERY Right     mazzulla    GASTRECTOMY      GASTRECTOMY-SLEEVE-LAPAROSCOPIC-38884; EGD-69770 N/A 12/12/2014    [facility] at [facility] OR 2ND FLR    gastric sleeve      HERNIA REPAIR      5 years old    HYSTERECTOMY      ovaries remain    PANNICULECTOMY N/A 11/28/2016    [facility]    REFRACTIVE SURGERY      mono va//ou//    REPAIR-HERNIA  11/28/2016     [facility] 2ND FLR    RHINOPLASTY TIP      TONSILLECTOMY      TOTAL KNEE ARTHROPLASTY Left 4/16/19    TUBAL LIGATION      UPPER GASTROINTESTINAL ENDOSCOPY  03/05/2013    UPPER GASTROINTESTINAL ENDOSCOPY  08/24/2016    Dr. Veras, repeat in 8 weeks    UPPER GASTROINTESTINAL ENDOSCOPY  07/21/2016    Dr. Randall       Time Tracking:     PT Received On: 04/16/19  PT Start Time: 1324     PT Stop Time: 1409  PT Total Time (min): 45 min     Billable Minutes: Evaluation 15, Gait Training 15 and Therapeutic Exercise 15      Toney Reddy, SPT  04/16/2019

## 2019-04-16 NOTE — PLAN OF CARE
Pt. AAOx4, calm and cooperative.  RN reviewed plan of care with patient and family who all voiced understanding. Pt. Prepared for surgery.

## 2019-04-16 NOTE — ANESTHESIA POSTPROCEDURE EVALUATION
Anesthesia Post Evaluation    Patient: Fifi Mcnair    Procedure(s) Performed: Procedure(s) (LRB):  ARTHROPLASTY, KNEE (Left)    Final Anesthesia Type: spinal  Patient location during evaluation: PACU  Patient participation: Yes- Able to Participate  Level of consciousness: awake and alert  Post-procedure vital signs: reviewed and stable  Pain management: adequate  Airway patency: patent  PONV status at discharge: No PONV  Anesthetic complications: no      Cardiovascular status: hemodynamically stable and blood pressure returned to baseline  Respiratory status: unassisted, spontaneous ventilation and nasal cannula  Hydration status: euvolemic  Follow-up not needed.          Vitals Value Taken Time   /63 4/16/2019 10:41 AM   Temp 36.5 °C (97.7 °F) 4/16/2019 10:41 AM   Pulse 80 4/16/2019 12:49 PM   Resp 20 4/16/2019 12:49 PM   SpO2 98 % 4/16/2019 12:49 PM         Event Time     Out of Recovery 10:52:03          Pain/Mohamud Score: Pain Rating Prior to Med Admin: 8 (4/16/2019 12:25 PM)  Mohamud Score: 9 (4/16/2019 10:00 AM)

## 2019-04-16 NOTE — ANESTHESIA PROCEDURE NOTES
Spinal    Diagnosis: left knee DJD  Patient location during procedure: OR  Start time: 4/16/2019 7:21 AM  Timeout: 4/16/2019 7:20 AM  End time: 4/16/2019 7:25 AM  Staffing  Anesthesiologist: Leesa Radford MD  Performed: anesthesiologist   Preanesthetic Checklist  Completed: patient identified, site marked, surgical consent, pre-op evaluation, timeout performed, IV checked, risks and benefits discussed and monitors and equipment checked  Spinal Block  Patient position: sitting  Prep: ChloraPrep  Patient monitoring: heart rate, cardiac monitor and continuous pulse ox  Approach: midline  Location: L4-5  Injection technique: single shot  CSF Fluid: clear free-flowing CSF  Needle  Needle type: Soraida   Needle gauge: 25 G  Needle length: 3.5 in  Additional Documentation: incremental injection, negative aspiration for heme and no paresthesia on injection  Needle localization: anatomical landmarks  Assessment  Ease of block: moderate  Patient's tolerance of the procedure: comfortable throughout block and no complaints

## 2019-04-17 LAB
ANION GAP SERPL CALC-SCNC: 9 MMOL/L (ref 8–16)
BASOPHILS # BLD AUTO: 0 K/UL (ref 0–0.2)
BASOPHILS NFR BLD: 0.3 % (ref 0–1.9)
BUN SERPL-MCNC: 14 MG/DL (ref 8–23)
CALCIUM SERPL-MCNC: 9 MG/DL (ref 8.7–10.5)
CHLORIDE SERPL-SCNC: 104 MMOL/L (ref 95–110)
CO2 SERPL-SCNC: 25 MMOL/L (ref 23–29)
CREAT SERPL-MCNC: 1.2 MG/DL (ref 0.5–1.4)
DIFFERENTIAL METHOD: ABNORMAL
EOSINOPHIL # BLD AUTO: 0.1 K/UL (ref 0–0.5)
EOSINOPHIL NFR BLD: 1.1 % (ref 0–8)
ERYTHROCYTE [DISTWIDTH] IN BLOOD BY AUTOMATED COUNT: 13.4 % (ref 11.5–14.5)
EST. GFR  (AFRICAN AMERICAN): 54 ML/MIN/1.73 M^2
EST. GFR  (NON AFRICAN AMERICAN): 47 ML/MIN/1.73 M^2
GLUCOSE SERPL-MCNC: 212 MG/DL (ref 70–110)
HCT VFR BLD AUTO: 30.3 % (ref 37–48.5)
HGB BLD-MCNC: 10.1 G/DL (ref 12–16)
LYMPHOCYTES # BLD AUTO: 2 K/UL (ref 1–4.8)
LYMPHOCYTES NFR BLD: 24.8 % (ref 18–48)
MCH RBC QN AUTO: 31.3 PG (ref 27–31)
MCHC RBC AUTO-ENTMCNC: 33.2 G/DL (ref 32–36)
MCV RBC AUTO: 94 FL (ref 82–98)
MONOCYTES # BLD AUTO: 0.6 K/UL (ref 0.3–1)
MONOCYTES NFR BLD: 7.9 % (ref 4–15)
NEUTROPHILS # BLD AUTO: 5.3 K/UL (ref 1.8–7.7)
NEUTROPHILS NFR BLD: 65.9 % (ref 38–73)
PLATELET # BLD AUTO: 218 K/UL (ref 150–350)
PMV BLD AUTO: 7.9 FL (ref 9.2–12.9)
POCT GLUCOSE: 202 MG/DL (ref 70–110)
POCT GLUCOSE: 272 MG/DL (ref 70–110)
POCT GLUCOSE: 296 MG/DL (ref 70–110)
POCT GLUCOSE: 308 MG/DL (ref 70–110)
POCT GLUCOSE: 409 MG/DL (ref 70–110)
POCT GLUCOSE: 491 MG/DL (ref 70–110)
POCT GLUCOSE: >500 MG/DL (ref 70–110)
POTASSIUM SERPL-SCNC: 4.4 MMOL/L (ref 3.5–5.1)
RBC # BLD AUTO: 3.21 M/UL (ref 4–5.4)
SODIUM SERPL-SCNC: 138 MMOL/L (ref 136–145)
WBC # BLD AUTO: 8.1 K/UL (ref 3.9–12.7)

## 2019-04-17 PROCEDURE — 25000003 PHARM REV CODE 250: Performed by: ANESTHESIOLOGY

## 2019-04-17 PROCEDURE — 85025 COMPLETE CBC W/AUTO DIFF WBC: CPT

## 2019-04-17 PROCEDURE — 97116 GAIT TRAINING THERAPY: CPT | Mod: 59

## 2019-04-17 PROCEDURE — 94640 AIRWAY INHALATION TREATMENT: CPT

## 2019-04-17 PROCEDURE — 97535 SELF CARE MNGMENT TRAINING: CPT

## 2019-04-17 PROCEDURE — 94799 UNLISTED PULMONARY SVC/PX: CPT

## 2019-04-17 PROCEDURE — 97110 THERAPEUTIC EXERCISES: CPT

## 2019-04-17 PROCEDURE — 63600175 PHARM REV CODE 636 W HCPCS: Performed by: INTERNAL MEDICINE

## 2019-04-17 PROCEDURE — 94761 N-INVAS EAR/PLS OXIMETRY MLT: CPT

## 2019-04-17 PROCEDURE — 63600175 PHARM REV CODE 636 W HCPCS: Performed by: ORTHOPAEDIC SURGERY

## 2019-04-17 PROCEDURE — 25000003 PHARM REV CODE 250: Performed by: ORTHOPAEDIC SURGERY

## 2019-04-17 PROCEDURE — G8988 SELF CARE GOAL STATUS: HCPCS | Mod: CJ

## 2019-04-17 PROCEDURE — 80048 BASIC METABOLIC PNL TOTAL CA: CPT

## 2019-04-17 PROCEDURE — 97165 OT EVAL LOW COMPLEX 30 MIN: CPT

## 2019-04-17 PROCEDURE — 99900035 HC TECH TIME PER 15 MIN (STAT)

## 2019-04-17 PROCEDURE — 36415 COLL VENOUS BLD VENIPUNCTURE: CPT

## 2019-04-17 PROCEDURE — G8987 SELF CARE CURRENT STATUS: HCPCS | Mod: CK

## 2019-04-17 PROCEDURE — 97530 THERAPEUTIC ACTIVITIES: CPT

## 2019-04-17 RX ORDER — MORPHINE SULFATE 2 MG/ML
2 INJECTION, SOLUTION INTRAMUSCULAR; INTRAVENOUS
Status: DISCONTINUED | OUTPATIENT
Start: 2019-04-17 | End: 2019-04-19 | Stop reason: HOSPADM

## 2019-04-17 RX ADMIN — CELECOXIB 100 MG: 100 CAPSULE ORAL at 08:04

## 2019-04-17 RX ADMIN — ENOXAPARIN SODIUM 40 MG: 100 INJECTION SUBCUTANEOUS at 05:04

## 2019-04-17 RX ADMIN — MONTELUKAST SODIUM 5 MG: 5 TABLET, CHEWABLE ORAL at 08:04

## 2019-04-17 RX ADMIN — LORAZEPAM 1 MG: 1 TABLET ORAL at 10:04

## 2019-04-17 RX ADMIN — MULTIPLE VITAMINS W/ MINERALS TAB 1 TABLET: TAB at 08:04

## 2019-04-17 RX ADMIN — FLUTICASONE FUROATE AND VILANTEROL TRIFENATATE 1 PUFF: 100; 25 POWDER RESPIRATORY (INHALATION) at 07:04

## 2019-04-17 RX ADMIN — OXYCODONE HYDROCHLORIDE 15 MG: 5 TABLET ORAL at 11:04

## 2019-04-17 RX ADMIN — MORPHINE SULFATE 2 MG: 2 INJECTION, SOLUTION INTRAMUSCULAR; INTRAVENOUS at 06:04

## 2019-04-17 RX ADMIN — THERA TABS 1 TABLET: TAB at 08:04

## 2019-04-17 RX ADMIN — PANTOPRAZOLE SODIUM 40 MG: 40 TABLET, DELAYED RELEASE ORAL at 08:04

## 2019-04-17 RX ADMIN — ATENOLOL 25 MG: 25 TABLET ORAL at 08:04

## 2019-04-17 RX ADMIN — LEVOTHYROXINE SODIUM 25 MCG: 25 TABLET ORAL at 06:04

## 2019-04-17 RX ADMIN — FAMOTIDINE 20 MG: 20 TABLET, FILM COATED ORAL at 08:04

## 2019-04-17 RX ADMIN — LOSARTAN POTASSIUM 50 MG: 25 TABLET ORAL at 08:04

## 2019-04-17 RX ADMIN — ACETAMINOPHEN 1000 MG: 500 TABLET ORAL at 06:04

## 2019-04-17 RX ADMIN — GABAPENTIN 300 MG: 300 CAPSULE ORAL at 02:04

## 2019-04-17 RX ADMIN — OXYCODONE HYDROCHLORIDE 15 MG: 10 TABLET ORAL at 10:04

## 2019-04-17 RX ADMIN — Medication 1 EACH: at 08:04

## 2019-04-17 RX ADMIN — PREGABALIN 75 MG: 75 CAPSULE ORAL at 08:04

## 2019-04-17 RX ADMIN — GABAPENTIN 300 MG: 300 CAPSULE ORAL at 08:04

## 2019-04-17 RX ADMIN — FLUTICASONE PROPIONATE 100 MCG: 50 SPRAY, METERED NASAL at 08:04

## 2019-04-17 RX ADMIN — ALLOPURINOL 200 MG: 100 TABLET ORAL at 08:04

## 2019-04-17 RX ADMIN — FLUOXETINE 40 MG: 20 CAPSULE ORAL at 08:04

## 2019-04-17 RX ADMIN — CETIRIZINE HYDROCHLORIDE 10 MG: 10 TABLET, FILM COATED ORAL at 08:04

## 2019-04-17 RX ADMIN — POTASSIUM CHLORIDE 20 MEQ: 1500 TABLET, EXTENDED RELEASE ORAL at 08:04

## 2019-04-17 RX ADMIN — DOCUSATE SODIUM 100 MG: 100 CAPSULE, LIQUID FILLED ORAL at 08:04

## 2019-04-17 RX ADMIN — ACETAMINOPHEN 1000 MG: 500 TABLET ORAL at 02:04

## 2019-04-17 RX ADMIN — AMITRIPTYLINE HYDROCHLORIDE 25 MG: 25 TABLET, FILM COATED ORAL at 10:04

## 2019-04-17 RX ADMIN — OXYCODONE HYDROCHLORIDE 10 MG: 10 TABLET ORAL at 06:04

## 2019-04-17 NOTE — SIGNIFICANT EVENT
Charge nurse notified that patient was ambulating to restroom with walker and 1 person assist. She lost footing and was lowered to the ground. Vs stable. MD notified. House sup. Notified. Working on post fall check list    1845- Dr. Richardson notified of fall.

## 2019-04-17 NOTE — PLAN OF CARE
Problem: Physical Therapy Goal  Goal: Physical Therapy Goal  Goals to be met by: 19     Patient will increase functional independence with mobility by performin. Supine to sit with [place]  2. Sit to stand transfer with Modified [place] using Rolling Walker  3. Gait  x 250 feet with Modified [place] using Rolling Walker.   4. Stand for 10 minutes with Modified [place] using Rolling Walker  5. Lower extremity exercise program x10-20 reps per handout, with independence     Outcome: Ongoing (interventions implemented as appropriate)  PT BID for bed mobility, transfer training, therapeutic exercises and gait training.

## 2019-04-17 NOTE — PT/OT/SLP PROGRESS
Physical Therapy Treatment    Patient Name:  Fifi Mcnair   MRN:  [id number]    Recommendations:     Discharge Recommendations:  home health PT     Assessment:     Fifi Mncair is a 68 y.o. female admitted with a medical diagnosis of S/P total knee arthroplasty, left.  She presents with the following impairments/functional limitations:  weakness, impaired endurance, impaired self care skills, impaired functional mobilty, gait instability, impaired balance, decreased lower extremity function, pain, decreased ROM, impaired cardiopulmonary response to activity, impaired joint extensibility, impaired muscle length, orthopedic precautions.    Rehab Prognosis: Good; patient would benefit from acute skilled PT services to address these deficits and reach maximum level of function.    Recent Surgery: Procedure(s) (LRB):  ARTHROPLASTY, KNEE (Left) 1 Day Post-Op    Plan:     During this hospitalization, patient to be seen BID to address the identified rehab impairments via gait training, therapeutic activities, therapeutic exercises and progress toward the following goals:    · Plan of Care Expires:  04/30/19    Subjective     Chief Complaint: pain in Left knee posteriorly.  Patient/Family Comments/goals: Eager to work with PT today.  Pain/Comfort:  · Pain Rating 1: 8/10  · Location - Side 1: Left  · Location 1: knee  · Pain Rating Post-Intervention 1: 4/10      Objective:     Communicated with nurse Sanchez and nurse Tijerina prior to session.  Patient found HOB elevated with telemetry, peripheral IV, cryotherapy(personal insulin pump.) upon PT entry to room.     General Precautions: Standard, fall   Orthopedic Precautions:LLE weight bearing as tolerated   Braces: Knee immobilizer     Functional Mobility:  · Bed Mobility:     · Supine to Sit: minimum assistance    · Transfers:     · Sit to Stand: minimum assistance with rolling walker    · Gait: 75' (slow chari) with RW and therapist providing CGA.      Therapeutic  Activities and Exercises:  Therapist donned knee immobilizer prior to bed mobility.     Assisted from stand to sit into chair in hallway at end of gait training. Pt rolled back to room in chair.     Therapist doffed knee immobilizer post gait training.     Therapist instructed pt to perform lower extremity AROM therapeutic exercises.    B LE AROM: AP and QS x 10 reps each.    L LE AROM: SLRs and Heel slides x 10 reps each.      Patient left up in chair with all lines intact, call button in reach and nursing notified..    GOALS:   Multidisciplinary Problems     Physical Therapy Goals        Problem: Physical Therapy Goal    Goal Priority Disciplines Outcome Goal Variances Interventions   Physical Therapy Goal     PT, PT/OT Ongoing (interventions implemented as appropriate)     Description:  Goals to be met by: 19     Patient will increase functional independence with mobility by performin. Supine to sit with [place]  2. Sit to stand transfer with Modified [place] using Rolling Walker  3. Gait  x 250 feet with Modified [place] using Rolling Walker.   4. Stand for 10 minutes with Modified [place] using Rolling Walker  5. Lower extremity exercise program x10-20 reps per handout, with independence                      Time Tracking:     PT Received On: 19  PT Start Time: 0850     PT Stop Time: 09  PT Total Time (min): 36 min     Billable Minutes: Gait Training 10, Therapeutic Activity 16 and Therapeutic Exercise 10    Treatment Type: Treatment  PT/PTA: SADAF     PTA Visit Number: 1     [place] Head    I certify that I was present in the room directing the student in service delivery and guiding them using my skilled judgment. As the co-signing therapist I have reviewed the students documentation and am responsible for the treatment, assessment, and plan.       Marissa Snell, SADAF  2019

## 2019-04-17 NOTE — NURSING
"Pt request blood sugar be taken    CBG greater than 500  Retook and greater than 491    Stat glucose lab in per policy, waiting  at this time to draw    Pt did administer 4U novolog bolus to self "as she did not want it to get too high"    Spoke with Dr Art, accu check orders to be placed and ok for pt to bolus herself with her insulin pump    2230 pt made aware blood sugar from lab was 379, pt dosed herself as needed from insulin pump  "

## 2019-04-17 NOTE — SUBJECTIVE & OBJECTIVE
Principal Problem:S/P total knee arthroplasty, left    Principal Orthopedic Problem: OA left knee    Interval History: s/p left TKA    Review of patient's allergies indicates:   Allergen Reactions    Adhesive Other (See Comments)     Blisters    Cleocin [clindamycin hcl] Hives    Ceclor [cefaclor] Hives    Advair diskus [fluticasone-salmeterol]      Dry mouth    Aleve [naproxen sodium]      Unable to take secondary to kidney function.     Erythromycin Hives    Levaquin [levofloxacin] Dermatitis    Macrobid [nitrofurantoin monohyd/m-cryst] Other (See Comments)     Stomach pain/ GI issues    Macrodantin [nitrofurantoin macrocrystalline] Hives    Motrin [ibuprofen]      Unable to take secondary to kidney functions.    Restoril [temazepam]      LIGHTHEADED UPON WAKING.with poor results    Sulfa (sulfonamide antibiotics)      Hold due to renal problems    Trazodone      PALPITATIONS    Remeron [mirtazapine] Palpitations and Other (See Comments)     Jittery    Vancomycin analogues Rash     Feet broke out/inflammed blood vessels         Current Facility-Administered Medications   Medication    acetaminophen (10 mg/mL) injection 1,000 mg    Followed by    acetaminophen tablet 1,000 mg    acetaminophen tablet 500 mg    albuterol-ipratropium 2.5 mg-0.5 mg/3 mL nebulizer solution 3 mL    allopurinol tablet 200 mg    amitriptyline tablet 25 mg    atenolol tablet 25 mg    [START ON 4/19/2019] calcitRIOL capsule 0.25 mcg    cetirizine tablet 10 mg    diphenhydrAMINE injection 12.5 mg    diphenhydrAMINE injection 12.5 mg    docusate sodium capsule 100 mg    enoxaparin injection 40 mg    famotidine tablet 20 mg    FLUoxetine capsule 40 mg    fluticasone 50 mcg/actuation nasal spray 100 mcg    fluticasone-vilanterol 100-25 mcg/dose diskus inhaler 1 puff    gabapentin capsule 300 mg    Lactobacillus rhamnosus GG packet 1 each    levothyroxine tablet 25 mcg    loperamide capsule 2 mg    LORazepam  "tablet 1 mg    losartan tablet 50 mg    montelukast chewable tablet 5 mg    morphine injection 2 mg    multivit-iron-FA-calcium-mins 9 mg iron-400 mcg tablet Tab 1 tablet    multivitamin tablet 1 tablet    ondansetron disintegrating tablet 8 mg    ondansetron injection 4 mg    oxyCODONE immediate release tablet 10 mg    oxyCODONE immediate release tablet 15 mg    oxyCODONE immediate release tablet 15 mg    oxyCODONE immediate release tablet 5 mg    pantoprazole EC tablet 40 mg    potassium chloride SA CR tablet 20 mEq    pregabalin capsule 75 mg    promethazine (PHENERGAN) 6.25 mg in dextrose 5 % 50 mL IVPB    sodium chloride 0.9% flush 10 mL    torsemide tablet 20 mg    zolpidem tablet 5 mg     Facility-Administered Medications Ordered in Other Encounters   Medication    lactated ringers infusion     Objective:     Vital Signs (Most Recent):  Temp: 97.2 °F (36.2 °C) (04/17/19 0821)  Pulse: 80 (04/17/19 0821)  Resp: 18 (04/17/19 0821)  BP: (!) 117/56 (04/17/19 0821)  SpO2: 95 % (04/17/19 0821) Vital Signs (24h Range):  Temp:  [97.2 °F (36.2 °C)-98 °F (36.7 °C)] 97.2 °F (36.2 °C)  Pulse:  [76-86] 80  Resp:  [16-20] 18  SpO2:  [95 %-98 %] 95 %  BP: (117-131)/(56-60) 117/56        Height: 5' 2" (157.5 cm)  Body mass index is 44.59 kg/m².      Intake/Output Summary (Last 24 hours) at 4/17/2019 1218  Last data filed at 4/17/2019 0200  Gross per 24 hour   Intake 770 ml   Output --   Net 770 ml       General    Vitals reviewed.  Constitutional: She appears well-developed and well-nourished.   HENT:   Head: Normocephalic and atraumatic.   Eyes: Pupils are equal, round, and reactive to light.   Cardiovascular: Normal rate and regular rhythm.    Abdominal: Soft.   Psychiatric: She has a normal mood and affect. Her behavior is normal.             Left Knee Exam     Comments:  Polar care in place but not plugged in  Bandage C/D/I  Motor intact EHL/FHL/TA/cleopatra  +2 DP/PT  Sensation LT intact " D/P/1st          Significant Labs: none    Significant Imaging: none

## 2019-04-17 NOTE — PROGRESS NOTES
Progress Note  Hospital Medicine  Patient Name:Fifi Mcnair  MRN:  [id number]  Patient Class: OP- Outpatient Recovery  Admit Date: 4/16/2019  Length of Stay: 0 days  Expected Discharge Date:   Attending Physician: Larry Molina MD  Primary Care Provider:  Werner Vargas MD    SUBJECTIVE:     Principal Problem: S/P total knee arthroplasty, left  Initial history of present illness: Patient is a 68 y.o. female s/p left total knee arthroplasty by Dr. Molina. Patient has PMH significant for osteoarthritis, bronchial asthma, history of congestive heart failure, chronic kidney disease stage 3, chronic obstructive pulmonary disease, diabetes mellitus type 2 with retinopathy, gastroesophageal reflux disease, hypertension, hyperlipidemia, mild pulmonary hypertension and hypothyroidism. Post-operatively, patient is doing well. Patient denied chest pain, shortness of breath, abdominal pain, nausea, vomiting, headache, vision changes, focal neuro-deficits, cough or fever.    PMH/PSH/SH/FH/Meds: reviewed.    Symptoms/Review of Systems:  Patient is doing well.  Asking for more pain medications.  Patient is participating with physical therapy.  No shortness of breath, cough, chest pain or headache, fever or abdominal pain.     Diet:  Adequate intake.    Activity level: Normal.    Pain:  As above     OBJECTIVE:   Vital Signs (Most Recent):      Temp: 97.8 °F (36.6 °C) (04/17/19 0315)  Pulse: 80 (04/17/19 0821)  Resp: 18 (04/17/19 0753)  BP: (!) 117/56 (04/17/19 0821)  SpO2: 95 % (04/17/19 0753)       Vital Signs Range (Last 24H):  Temp:  [97.7 °F (36.5 °C)-98 °F (36.7 °C)]   Pulse:  [76-96]   Resp:  [13-33]   BP: (117-162)/(56-78)   SpO2:  [95 %-98 %]        Body mass index is 44.59 kg/m².    Intake/Output Summary (Last 24 hours) at 4/17/2019 0836  Last data filed at 4/17/2019 0200  Gross per 24 hour   Intake 1370 ml   Output 50 ml   Net 1320 ml     Physical Examination:  General appearance: well developed, appears stated  age  Head: normocephalic, atraumatic  Eyes:  conjunctivae/corneas clear. PERRL.  Nose: Nares normal. Septum midline.  Throat: lips, mucosa, and tongue normal; teeth and gums normal, no throat erythema.  Neck: supple, symmetrical, trachea midline, no JVD and thyroid not enlarged, symmetric, no tenderness/mass/nodules  Lungs:  clear to auscultation bilaterally and normal respiratory effort  Chest wall: no tenderness  Heart: regular rate and rhythm, S1, S2 normal, no murmur, click, rub or gallop  Abdomen: soft, non-tender non-distented; bowel sounds normal; no masses,  no organomegaly  Extremities: no cyanosis, clubbing or edema. Left knee dressing clean dry and intact with a SIENA drain in place.  Pulses: 2+ and symmetric  Skin: Skin color, texture, turgor normal. No rashes or lesions.  Lymph nodes: Cervical, supraclavicular, and axillary nodes normal.  Neurologic: Normal strength and tone. No focal numbness or weakness. CNII-XII intact.      CBC:  Recent Labs   Lab 04/17/19  0356   WBC 8.10   RBC 3.21*   HGB 10.1*   HCT 30.3*      MCV 94   MCH 31.3*   MCHC 33.2   BMP  Recent Labs   Lab 04/16/19  2206 04/17/19  0356   * 212*   NA  --  138   K  --  4.4   CL  --  104   CO2  --  25   BUN  --  14   CREATININE  --  1.2   CALCIUM  --  9.0      Diagnostic Results:  Microbiology Results (last 7 days)     ** No results found for the last 168 hours. **           Assessment/Plan:      *S/P total knee arthroplasty, left [Z96.652]   Not Applicable    Arthritis of both knees [M17.0]  Continue to follow Orthopedic recommendations.  Needs aggressive incentive spirometry.  Follow hemoglobin and hematocrit closely.  Pain control with PO narcotics and antiemetics as needed.  Physical therapy as per Orthopedics protocol with fall precautions.      Yes    Obesity [E66.9]  There is no height or weight on file to calculate BMI. Morbid obesity complicates all aspects of disease management from diagnostic modalities to  treatment. Weight loss encouraged and health benefits explained to patient.      Yes    PUD (peptic ulcer disease) [K27.9]  On PPI.      Yes    Chronic low back pain [M54.5, G89.29]  As above, supportive care, fall precautions.       Yes    Sleep apnea, using CPAP 100% [G47.30]  Use CPAP as needed.      Yes    Type 2 diabetes mellitus [E11.9]  Check blood glucose level q AC/HS.  Use Novolog Insulin Sliding Scale as needed.   Continue American Diabetic Association 1800 Kcal diet.      Yes    COPD (chronic obstructive pulmonary disease) [J44.9]   Yes       DLCO 37% , and mild pulmonary HTN       LBBB (left bundle branch block) [I44.7]  Tele-monitoring.       Yes    Acquired hypothyroidism [E03.9]  Chronic problem. Will continue chronic medications and monitor for any changes, adjusting as needed.      Yes    Chronic kidney disease, stage III (moderate) [N18.3]   Yes       Monitor BUN/SCr.  Monitor I/Os.  Monitor electrolytes.  Avoid non-steroidal anti-inflammatory medications.       GERD (gastroesophageal reflux disease) [K21.9]  On PPI.      Yes    Hypertension [I10]   Yes       Chronic problem. Will continue chronic medications and monitor for any changes, adjusting as needed.                  VTE Risk Mitigation (From admission, onward)        Ordered     enoxaparin injection 40 mg  Daily      04/16/19 1223        Ole Sevilla MD  Department of Hospital Medicine   [facility]

## 2019-04-17 NOTE — PT/OT/SLP PROGRESS
"Physical Therapy Treatment    Patient Name:  Fifi Mcnair   MRN:  [id number]    Recommendations:     Discharge Recommendations:  home health PT     Assessment:     Fifi Mcnair is a 68 y.o. female admitted with a medical diagnosis of S/P total knee arthroplasty, left.  She presents with the following impairments/functional limitations:  weakness, impaired endurance, impaired self care skills, orthopedic precautions, decreased lower extremity function, gait instability, impaired functional mobilty, decreased coordination, impaired balance, decreased ROM.    Rehab Prognosis: Good and Fair; patient would benefit from acute skilled PT services to address these deficits and reach maximum level of function.    Recent Surgery: Procedure(s) (LRB):  ARTHROPLASTY, KNEE (Left) 1 Day Post-Op    Plan:     During this hospitalization, patient to be seen BID to address the identified rehab impairments via gait training, therapeutic activities, therapeutic exercises and progress toward the following goals:    · Plan of Care Expires:  04/30/19    Subjective     Chief Complaint: Pain in both knees, reported her R knee is "bad too". Pt complained about decrease in dosage of pain medication.   Patient/Family Comments/goals: Eager to work with PT. Pt stated that her  will not be home to assist her until Friday. Pt stated "my right knee is bone on bone".  Pain/Comfort:  · Pain Rating 1: 9/10  · Location - Side 1: Bilateral  · Location 1: knee  · Pain Rating Post-Intervention 1: 4/10      Objective:     Communicated with nurse Sanchez and nurse Tijerina prior to session.  Patient found up in chair with telemetry, cryotherapy(personal insulin pump.) upon PT entry to room.     General Precautions: Standard, fall   Orthopedic Precautions:LLE weight bearing as tolerated   Braces: Knee immobilizer     Functional Mobility:  · Bed Mobility:     · Bridging: modified independence  · Sit to Supine: minimum assistance    · Transfers:     · Sit " "to Stand:  maximal assistance and of 2 persons with rolling walker (3x's total, unable to stand 1st attempt)  · Chair to Bed: minimum assistance with rolling walker    · Gait: 75' (slow chari) with RW and therapist providing minimum assistance for directing walker. Pt demonstrating anatalgic gait pattern and dragging R LE. During gait training pt stated "I feel like my legs are going to give out"; therapist assisted pt to chair with assistance of 2 persons.      Therapeutic Activities and Exercises:  Therapist donned knee immobilizer to L LE prior to transfer training.    Therapist rolled pt back to room in chair post gait training. Pt assisted out of chair and into bed.    Therapist doffed knee immobilizer while pt was seated on EOB.     Assisted BTB and donned SCDs.     Patient left HOB elevated with all lines intact, call button in reach and nursing. notified..    GOALS:   Multidisciplinary Problems     Physical Therapy Goals        Problem: Physical Therapy Goal    Goal Priority Disciplines Outcome Goal Variances Interventions   Physical Therapy Goal     PT, PT/OT Ongoing (interventions implemented as appropriate)     Description:  Goals to be met by: 19     Patient will increase functional independence with mobility by performin. Supine to sit with Blaine  2. Sit to stand transfer with Modified Blaine using Rolling Walker  3. Gait  x 250 feet with Modified Blaine using Rolling Walker.   4. Stand for 10 minutes with Modified Blaine using Rolling Walker  5. Lower extremity exercise program x10-20 reps per handout, with independence                      Time Tracking:     PT Received On: 19  PT Start Time: 1320     PT Stop Time: 1348  PT Total Time (min): 28 min     Billable Minutes: Gait Training 13 and Therapeutic Activity 15    Treatment Type: Treatment  PT/PTA: PTA     PTA Visit Number: 1     [place] Head    I certify that I was present in the room " directing the student in service delivery and guiding them using my skilled judgment. As the co-signing therapist I have reviewed the students documentation and am responsible for the treatment, assessment, and plan.       Marissa Snell, PTA  04/17/2019

## 2019-04-17 NOTE — PLAN OF CARE
Problem: Adult Inpatient Plan of Care  Goal: Plan of Care Review  Outcome: Ongoing (interventions implemented as appropriate)  Vitals stable. Afebrile. POC/Meds reviewed, pt verbalized understanding. IV fluids infusing per order, IVPB abx administered. Neuro checks performed, no deficits noted. Tele In place-NSR. Up with x1 assist and walker to BR. SCD and foot pump ON. IS at bedside, instructed on use and return demonstration performed. Cryo in place- checked and refilled as needed. PRN oxycodone administered for complaints of pain. Blood glucose monitoring ac/hs, coverage per pts insulin pump- see prior note. Denies nausea. Reposistions self. Hourly/Q2hr rounding performed, safety maintained. Bed in lowest position, wheels locked, SR up x2, call light in easy reach. Will continue to monitor

## 2019-04-17 NOTE — PLAN OF CARE
Problem: Adult Inpatient Plan of Care  Goal: Plan of Care Review  Alert and oriented x 4. Plan of care reviewed with pt. Verbalized understanding. Dressing and ace wrap to left knee  Incision dry and intact. Cryotherapy in progress. Pt c/o pain, managed with prn pain medication. Blood glucose monitoring done.Managed with insulin pump. Telemetry in place , normal sinus rhythm. Ambulated in ceron and up in chair with PT assist. Voiding without difficulty. Left foot pump and right SCD maintained. Pt correctly demonstrates use of IS. Bed in low position and locked. Call light within reach. Hourly rounding for safety. Will continue to monitor.

## 2019-04-17 NOTE — PROGRESS NOTES
[facility]  Orthopedics  Progress Note    Patient Name: Fifi Mcnair  MRN: [id number]  Admission Date: 4/16/2019  Hospital Length of Stay: 0 days  Attending Provider: Larry Molina MD  Primary Care Provider: Werner Vargas MD  Follow-up For: Procedure(s) (LRB):  ARTHROPLASTY, KNEE (Left)    Post-Operative Day: 1 Day Post-Op  Subjective:     Principal Problem:S/P total knee arthroplasty, left    Principal Orthopedic Problem: OA left knee    Interval History: s/p left TKA    Review of patient's allergies indicates:   Allergen Reactions    Adhesive Other (See Comments)     Blisters    Cleocin [clindamycin hcl] Hives    Ceclor [cefaclor] Hives    Advair diskus [fluticasone-salmeterol]      Dry mouth    Aleve [naproxen sodium]      Unable to take secondary to kidney function.     Erythromycin Hives    Levaquin [levofloxacin] Dermatitis    Macrobid [nitrofurantoin monohyd/m-cryst] Other (See Comments)     Stomach pain/ GI issues    Macrodantin [nitrofurantoin macrocrystalline] Hives    Motrin [ibuprofen]      Unable to take secondary to kidney functions.    Restoril [temazepam]      LIGHTHEADED UPON WAKING.with poor results    Sulfa (sulfonamide antibiotics)      Hold due to renal problems    Trazodone      PALPITATIONS    Remeron [mirtazapine] Palpitations and Other (See Comments)     Jittery    Vancomycin analogues Rash     Feet broke out/inflammed blood vessels         Current Facility-Administered Medications   Medication    acetaminophen (10 mg/mL) injection 1,000 mg    Followed by    acetaminophen tablet 1,000 mg    acetaminophen tablet 500 mg    albuterol-ipratropium 2.5 mg-0.5 mg/3 mL nebulizer solution 3 mL    allopurinol tablet 200 mg    amitriptyline tablet 25 mg    atenolol tablet 25 mg    [START ON 4/19/2019] calcitRIOL capsule 0.25 mcg    cetirizine tablet 10 mg    diphenhydrAMINE injection 12.5 mg    diphenhydrAMINE injection 12.5 mg    docusate sodium  "capsule 100 mg    enoxaparin injection 40 mg    famotidine tablet 20 mg    FLUoxetine capsule 40 mg    fluticasone 50 mcg/actuation nasal spray 100 mcg    fluticasone-vilanterol 100-25 mcg/dose diskus inhaler 1 puff    gabapentin capsule 300 mg    Lactobacillus rhamnosus GG packet 1 each    levothyroxine tablet 25 mcg    loperamide capsule 2 mg    LORazepam tablet 1 mg    losartan tablet 50 mg    montelukast chewable tablet 5 mg    morphine injection 2 mg    multivit-iron-FA-calcium-mins 9 mg iron-400 mcg tablet Tab 1 tablet    multivitamin tablet 1 tablet    ondansetron disintegrating tablet 8 mg    ondansetron injection 4 mg    oxyCODONE immediate release tablet 10 mg    oxyCODONE immediate release tablet 15 mg    oxyCODONE immediate release tablet 15 mg    oxyCODONE immediate release tablet 5 mg    pantoprazole EC tablet 40 mg    potassium chloride SA CR tablet 20 mEq    pregabalin capsule 75 mg    promethazine (PHENERGAN) 6.25 mg in dextrose 5 % 50 mL IVPB    sodium chloride 0.9% flush 10 mL    torsemide tablet 20 mg    zolpidem tablet 5 mg     Facility-Administered Medications Ordered in Other Encounters   Medication    lactated ringers infusion     Objective:     Vital Signs (Most Recent):  Temp: 97.2 °F (36.2 °C) (04/17/19 0821)  Pulse: 80 (04/17/19 0821)  Resp: 18 (04/17/19 0821)  BP: (!) 117/56 (04/17/19 0821)  SpO2: 95 % (04/17/19 0821) Vital Signs (24h Range):  Temp:  [97.2 °F (36.2 °C)-98 °F (36.7 °C)] 97.2 °F (36.2 °C)  Pulse:  [76-86] 80  Resp:  [16-20] 18  SpO2:  [95 %-98 %] 95 %  BP: (117-131)/(56-60) 117/56        Height: 5' 2" (157.5 cm)  Body mass index is 44.59 kg/m².      Intake/Output Summary (Last 24 hours) at 4/17/2019 1218  Last data filed at 4/17/2019 0200  Gross per 24 hour   Intake 770 ml   Output --   Net 770 ml       General    Vitals reviewed.  Constitutional: She appears well-developed and well-nourished.   HENT:   Head: Normocephalic and atraumatic. "   Eyes: Pupils are equal, round, and reactive to light.   Cardiovascular: Normal rate and regular rhythm.    Abdominal: Soft.   Psychiatric: She has a normal mood and affect. Her behavior is normal.             Left Knee Exam     Comments:  Polar care in place but not plugged in  Bandage C/D/I  Motor intact EHL/FHL/TA/cleopatra  +2 DP/PT  Sensation LT intact D/P/1st          Significant Labs: none    Significant Imaging: none    Assessment/Plan:     POD #1 s/p left TKA, stable.  Patient still having pain and had somewhat limited distance in ambulation today.  Likely D/C home tomorrow.      John Neal II, MD  Orthopedics  [facility]

## 2019-04-17 NOTE — PT/OT/SLP EVAL
"Occupational Therapy   Evaluation    Name: Fifi Mcnair  MRN: [id number]  Admitting Diagnosis:  S/P total knee arthroplasty, left 1 Day Post-Op    Recommendations:     Discharge Recommendations: home health PT  Discharge Equipment Recommendations:  none  Barriers to discharge:  None    Assessment:     Fifi Mcnair is a 68 y.o. female with a medical diagnosis of S/P total knee arthroplasty, left.  Performance deficits affecting function: weakness, impaired endurance, impaired self care skills, orthopedic precautions, decreased lower extremity function, gait instability, impaired functional mobilty, impaired balance, decreased coordination, decreased ROM, pain. Noted moderate L knee buckling during ambulation with RW. Also noted L lateral LOB while standing at sink 2/2 L LE weakness. Recommend HHPT.    Rehab Prognosis: Good; patient would benefit from acute skilled OT services to address these deficits and reach maximum level of function.       Plan:     Patient to be seen 3 x/week to address the above listed problems via self-care/home management, therapeutic activities, therapeutic exercises  · Plan of Care Expires:    · Plan of Care Reviewed with: patient    Subjective     Chief Complaint: L knee buckling  Patient/Family Comments/goals: "My R knee as just as bad as my L knee."    Occupational Profile:  Living Environment: Pt lives with  in a [facility] with ramp.  Previous level of function: Pt was independent with ADLs and ambulatory with rollator.   Equipment Used at Home:  walker, rolling, rollator, hip kit, bedside commode, bath bench  Assistance upon Discharge: Pt will receive assistance from .    Pain/Comfort:  · Pain Rating 1: 6/10  · Location - Side 1: Left  · Location - Orientation 1: generalized  · Location 1: knee  · Pain Addressed 1: Reposition, Distraction, Pre-medicate for activity  · Pain Rating Post-Intervention 1: 6/10    Patients cultural, spiritual, Amish conflicts given the " current situation:      Objective:     Communicated with: RN prior to session.  Patient found up in chair with telemetry, cryotherapy upon OT entry to room.    General Precautions: Standard, fall   Orthopedic Precautions:LLE weight bearing as tolerated   Braces: Knee immobilizer     Occupational Performance:    Bed Mobility:    · Not assessed; pt seated in chair upon arrival. See PT notes.    Functional Mobility/Transfers:  · Patient completed Sit <> Stand Transfer with minimum assistance  with  rolling walker   · Patient completed Toilet Transfer Stand Pivot technique with minimum assistance with  rolling walker  · Functional Mobility: Pt ambulated in room with CGA and RW from chair>sink>bathroom>chair again. Noted moderate LOB at sink, requiring mod A to regain balance.    Activities of Daily Living:  · Grooming: contact guard assistance with oral hygiene while standing at sink.  · Lower Body Dressing: maximal assistance to don/doff socks in chair.  · Toileting: supervision with bryce-hygiene after voiding on toilet.    Cognitive/Visual Perceptual:  Cognitive/Psychosocial Skills:     -       Oriented to: x4   -       Follows Commands/attention:Follows multistep  commands  -       Communication: clear/fluent  -       Safety awareness/insight to disability: impaired   -       Mood/Affect/Coping skills/emotional control: Appropriate to situation  Visual/Perceptual:      -Intact     Physical Exam:  Postural examination/scapula alignment:    -       Rounded shoulders  -       Forward head  Upper Extremity Range of Motion:     -       Right Upper Extremity: WFL  -       Left Upper Extremity: WFL  Upper Extremity Strength:    -       Right Upper Extremity: WFL  -       Left Upper Extremity: WFL   Strength:    -       Right Upper Extremity: WFL  -       Left Upper Extremity: WFL  Fine Motor Coordination:    -       Intact  Gross motor coordination:   WFL in B UE    AMPAC 6 Click ADL:  AMPAC Total Score: 19    Treatment  & Education:  OT ed patient on safety with walker use for functional mobility with cues for hand placement & sequencing.   OT ed pt on use of adaptive equipment for LB dressing & safe item retrieval with reacher with demonstration provided.    Education:    Patient left up in chair with all lines intact, call button in reach and nurse notified    GOALS:   Multidisciplinary Problems     Occupational Therapy Goals        Problem: Occupational Therapy Goal    Goal Priority Disciplines Outcome Interventions   Occupational Therapy Goal     OT, PT/OT Ongoing (interventions implemented as appropriate)    Description:  Goals to be met by: 4/27/2019     Patient will increase functional independence with ADLs by performing:    LE Dressing with Modified [place] and Assistive Devices as needed.  Grooming while standing at sink with Supervision.  Toileting from toilet with Modified [place] for hygiene and clothing management.   Supine to sit with Supervision.  Toilet transfer to toilet with Supervision.                      History:     Past Medical History:   Diagnosis Date    Allergy     multiple antibiotic allergies    Anemia     Anxiety     Arthritis     Asthma     CHF (congestive heart failure)     NYHA class III     Chronic kidney disease     Colon polyp     benign    COPD (chronic obstructive pulmonary disease)     DLCO 37% , and mild pulmonary HTN    Dental bridge present     LOWER    Depression     Diabetes mellitus     Diabetic retinopathy     Diastolic dysfunction     Diverticulitis of large intestine without perforation or abscess without bleeding 2/12/2018    Diverticulosis     Former smoker     Fracture of lumbar spine     GERD (gastroesophageal reflux disease)     Hernia of unspecified site of abdominal cavity without mention of obstruction or gangrene     Hyperlipidemia     Hypertension     hypertensive renal    IBS (irritable bowel syndrome)     Mild nonproliferative diabetic  retinopathy(362.04) 11/25/2013    Morbid obesity     Nuclear sclerosis 11/25/2013    Osteoporosis     Peripheral edema     Rash     Recurrent upper respiratory infection (URI)     S/P LASIK (laser assisted in situ keratomileusis)     Sleep apnea     sleep apnea uses bipap.    Stroke     tia    Thyroid disease     on synthroid    TIA (transient ischemic attack)     Urinary tract infection     Wears glasses        Past Surgical History:   Procedure Laterality Date    ABDOMINAL SURGERY      ADENOIDECTOMY      ARTHROSCOPY, KNEE, WITH CHONDROPLASTY Right 8/31/2018    [facility]    ARTHROSCOPY, KNEE, WITH MENISCECTOMY Right 8/31/2018    [facility] at [facility]    COLONOSCOPY  03/05/2013    repeat in 5 years    COLONOSCOPY N/A 4/11/2018    Performed by Luis Navarro [facility] ENDO    COLONOSCOPY N/A 5/31/2017    [facility]    COLONOSCOPY N/A 3/5/2013    [facility]formed by Florian Randall [facility] at [facility] ENDO    COSMETIC SURGERY      belt abdominoplasty    CYSTOSCOPY      CYSTOSCOPY N/A 8/6/2018    [facility]    EGD (ESOPHAGOGASTRODUODENOSCOPY) N/A 3/5/2013    [facility] at [facility] ENDO    ESOPHAGOGASTRODUODENOSCOPY (EGD) N/A 1/11/2018    [facility] ENDO    ESOPHAGOGASTRODUODENOSCOPY (EGD) N/A 12/27/2016    [facility] END[facility]    ESOPHAGOGASTRODUODENOSCOPY (EGD) N/A 10/25/2016    [facility] END[facility]    ESOPHAGOGASTRODUODENOSCOPY (EGD) N/A 8/24/2016    [facility] ENDO    ESOPHAGOGASTRODUODENOSCOPY (EGD) N/A 7/21/2016    [facility]formed [facility] at [facility] ENDO    ESOPHAGOGASTRODUODENOSCOPY (EGD)-[id number] N/A 12/12/2014    [facility]    EYE SURGERY Right     [facility]     GASTRECTOMY      GASTRECTOMY-SLEEVE-LAPAROSCOPIC-[id number]; EGD-[id number] N/A 12/12/2014    [facility] 2ND FLR    gastric sleeve      HERNIA REPAIR      5 years old    HYSTERECTOMY      ovaries remain    PANNICULECTOMY N/A 11/28/2016    [facility] at [facility] OR 2ND FLR    REFRACTIVE SURGERY      mono va//ou//    REPAIR-HERNIA  11/28/2016    [facility] OR 2ND FLR    RHINOPLASTY TIP      TONSILLECTOMY      TOTAL KNEE ARTHROPLASTY Left 4/16/19    TUBAL LIGATION      UPPER GASTROINTESTINAL ENDOSCOPY  03/05/2013    UPPER GASTROINTESTINAL ENDOSCOPY  08/24/2016    Dr. Veras, repeat in 8 weeks    UPPER GASTROINTESTINAL ENDOSCOPY  07/21/2016    Dr. Randall       Time Tracking:     OT Date of Treatment: 04/17/19  OT Start Time: 1101  OT Stop Time: 1133  OT Total Time (min): 32 min    Billable Minutes:Evaluation 10  Self Care/Home Management 22    Henok Horvath, OT  4/17/2019

## 2019-04-17 NOTE — PLAN OF CARE
04/17/19 0753   Patient Assessment/Suction   Level of Consciousness (AVPU) alert   Respiratory Effort Normal;Unlabored   Expansion/Accessory Muscles/Retractions no use of accessory muscles;no retractions;expansion symmetric   All Lung Fields Breath Sounds clear;diminished   Rhythm/Pattern, Respiratory depth regular;pattern regular;unlabored   Cough Frequency infrequent   Cough Type good;nonproductive   PRE-TX-O2   O2 Device (Oxygen Therapy) room air   SpO2 95 %   Pulse Oximetry Type Intermittent   $ Pulse Oximetry - Multiple Charge Pulse Oximetry - Multiple   Pulse 76   Resp 18   Aerosol Therapy   $ Aerosol Therapy Charges PRN treatment not required   Respiratory Treatment Status (SVN) PRN treatment not required   Inhaler   $ Inhaler Charges MDI (Metered Dose Inahler) Treatment   Respiratory Treatment Status (Inhaler) given;mouth rinsed post treatment   Treatment Route (Inhaler) mouthpiece   Patient Position (Inhaler) HOB elevated   Post Treatment Assessment (Inhaler) breath sounds unchanged   Signs of Intolerance (Inhaler) none   Breath Sounds Post-Respiratory Treatment   Throughout All Fields Post-Treatment All Fields   Throughout All Fields Post-Treatment no change   Post-treatment Heart Rate (beats/min) 78   Post-treatment Resp Rate (breaths/min) 18   Incentive Spirometer   $ Incentive Spirometer Charges done with encouragement   Administration (IS) instruction provided, follow-up   Number of Repetitions (IS) 10   Level Incentive Spirometer (mL) 1500   Patient Tolerance (IS) good       Breo q day. Aerosol treatments PRN. Patient encouraged to do deep breathing exercises independently.

## 2019-04-17 NOTE — PLAN OF CARE
"   04/17/19 1448   Discharge Assessment   Assessment Type Discharge Planning Reassessment   CM spoke with patient after reviewing therapy notes. CM mentioned home health vs skilled placement. Patient does not want to go to a skilled facility and plans to go home but will not have help until Friday. Her spouse plans to take off work to be available .Patient reports that her daughter lives nearby but sleeps all day and has back problems. CM asked patient if daughter would be able to assist and patient stated " if she has to , she will". CM will follow .   "

## 2019-04-17 NOTE — PROGRESS NOTES
[facility]  Orthopedics  Progress Note    Patient Name: Fifi Mcnair  MRN: [id number]  Admission Date: 4/16/2019  Hospital Length of Stay: 0 days  Attending Provider: Larry Molina MD  Primary Care Provider: Werner Vargas MD  Follow-up For: Procedure(s) (LRB):  ARTHROPLASTY, KNEE (Left)    Post-Operative Day: 1 Day Post-Op  Subjective:     No new subjective & objective note has been filed under this hospital service since the last note was generated.    Assessment/Plan:     POD #1 s/p left TKA, stable and doing well.  Continue to advance PT, Likely D/C home tomorrow.      John Neal II, MD  Orthopedics  [facility]

## 2019-04-17 NOTE — PLAN OF CARE
Problem: Occupational Therapy Goal  Goal: Occupational Therapy Goal  Goals to be met by: 4/27/2019     Patient will increase functional independence with ADLs by performing:    LE Dressing with Modified [place] and Assistive Devices as needed.  Grooming while standing at sink with Supervision.  Toileting from toilet with Modified [place] for hygiene and clothing management.   Supine to sit with Supervision.  Toilet transfer to toilet with Supervision.    Outcome: Ongoing (interventions implemented as appropriate)  OT evaluation completed today. Goals & care plan established.    Henok Horvath, OT  4/17/2019

## 2019-04-17 NOTE — PLAN OF CARE
CM met with patient at bedside, patient lives with spouse, reports independence with ADL's, uses rollator in home and scooter out of home and drives. [facility]acy [facility], daughter, Dalia Acosta is [facility], [phone]. Patient choice disclosure signed, patient will accept fist available home health through [facility]. Patient reports having all dme, rolling walker is in hospital room. PCP is Dr. Vargas.POst op followup added to avs.  CM will follow for dischaege planning needs.      04/17/19 1130   Discharge Assessment   Assessment Type Discharge Planning Assessment   Confirmed/corrected address and phone number on facesheet? Yes   Assessment information obtained from? Patient   Communicated expected length of stay with patient/caregiver yes   Prior to hospitilization cognitive status: Alert/Oriented   Prior to hospitalization functional status: Assistive Equipment   Current cognitive status: Alert/Oriented   Current Functional Status: Assistive Equipment   Lives With spouse   Able to Return to Prior Arrangements yes   Is patient able to care for self after discharge? Yes   Patient's perception of discharge disposition home health   Readmission Within the Last 30 Days no previous admission in last 30 days   Patient currently being followed by outpatient case management? No   Patient currently receives any other outside agency services? No   Equipment Currently Used at Home walker, rolling;rollator;hip kit;3-in-1 commode   Do you have any problems affording any of your prescribed medications? No   Is the patient taking medications as prescribed? yes   Does the patient have transportation home? Yes   Transportation Anticipated family or friend will provide   Does the patient receive services at the [facility]? No   Discharge Plan A Home   DME Needed Upon Discharge  none   Patient/Family in Agreement with Plan yes   Does the patient have transportation to healthcare appointments? Yes

## 2019-04-18 LAB
POCT GLUCOSE: 223 MG/DL (ref 70–110)
POCT GLUCOSE: 239 MG/DL (ref 70–110)
POCT GLUCOSE: 243 MG/DL (ref 70–110)
POCT GLUCOSE: 251 MG/DL (ref 70–110)
POCT GLUCOSE: 275 MG/DL (ref 70–110)

## 2019-04-18 PROCEDURE — 94799 UNLISTED PULMONARY SVC/PX: CPT

## 2019-04-18 PROCEDURE — 97116 GAIT TRAINING THERAPY: CPT | Mod: 59

## 2019-04-18 PROCEDURE — 97110 THERAPEUTIC EXERCISES: CPT

## 2019-04-18 PROCEDURE — 94761 N-INVAS EAR/PLS OXIMETRY MLT: CPT

## 2019-04-18 PROCEDURE — 99900035 HC TECH TIME PER 15 MIN (STAT)

## 2019-04-18 PROCEDURE — 63600175 PHARM REV CODE 636 W HCPCS: Performed by: INTERNAL MEDICINE

## 2019-04-18 PROCEDURE — 25000003 PHARM REV CODE 250: Performed by: ANESTHESIOLOGY

## 2019-04-18 PROCEDURE — 25000003 PHARM REV CODE 250: Performed by: ORTHOPAEDIC SURGERY

## 2019-04-18 PROCEDURE — 97530 THERAPEUTIC ACTIVITIES: CPT

## 2019-04-18 PROCEDURE — 94640 AIRWAY INHALATION TREATMENT: CPT

## 2019-04-18 PROCEDURE — 63600175 PHARM REV CODE 636 W HCPCS: Performed by: ORTHOPAEDIC SURGERY

## 2019-04-18 PROCEDURE — S5571 INSULIN DISPOS PEN 3 ML: HCPCS | Performed by: INTERNAL MEDICINE

## 2019-04-18 RX ADMIN — PREGABALIN 75 MG: 75 CAPSULE ORAL at 08:04

## 2019-04-18 RX ADMIN — LORAZEPAM 1 MG: 1 TABLET ORAL at 10:04

## 2019-04-18 RX ADMIN — MONTELUKAST SODIUM 5 MG: 5 TABLET, CHEWABLE ORAL at 08:04

## 2019-04-18 RX ADMIN — DOCUSATE SODIUM 100 MG: 100 CAPSULE, LIQUID FILLED ORAL at 08:04

## 2019-04-18 RX ADMIN — CETIRIZINE HYDROCHLORIDE 10 MG: 10 TABLET, FILM COATED ORAL at 08:04

## 2019-04-18 RX ADMIN — THERA TABS 1 TABLET: TAB at 08:04

## 2019-04-18 RX ADMIN — LOSARTAN POTASSIUM 50 MG: 25 TABLET ORAL at 08:04

## 2019-04-18 RX ADMIN — Medication 1 EACH: at 08:04

## 2019-04-18 RX ADMIN — FAMOTIDINE 20 MG: 20 TABLET, FILM COATED ORAL at 08:04

## 2019-04-18 RX ADMIN — ALLOPURINOL 200 MG: 100 TABLET ORAL at 08:04

## 2019-04-18 RX ADMIN — ATENOLOL 25 MG: 25 TABLET ORAL at 08:04

## 2019-04-18 RX ADMIN — FLUOXETINE 40 MG: 20 CAPSULE ORAL at 08:04

## 2019-04-18 RX ADMIN — INSULIN DETEMIR 20 UNITS: 100 INJECTION, SOLUTION SUBCUTANEOUS at 09:04

## 2019-04-18 RX ADMIN — GABAPENTIN 300 MG: 300 CAPSULE ORAL at 04:04

## 2019-04-18 RX ADMIN — PANTOPRAZOLE SODIUM 40 MG: 40 TABLET, DELAYED RELEASE ORAL at 08:04

## 2019-04-18 RX ADMIN — FLUTICASONE PROPIONATE 100 MCG: 50 SPRAY, METERED NASAL at 08:04

## 2019-04-18 RX ADMIN — GABAPENTIN 300 MG: 300 CAPSULE ORAL at 08:04

## 2019-04-18 RX ADMIN — OXYCODONE HYDROCHLORIDE 15 MG: 10 TABLET ORAL at 08:04

## 2019-04-18 RX ADMIN — MORPHINE SULFATE 2 MG: 2 INJECTION, SOLUTION INTRAMUSCULAR; INTRAVENOUS at 08:04

## 2019-04-18 RX ADMIN — ACETAMINOPHEN 1000 MG: 500 TABLET ORAL at 12:04

## 2019-04-18 RX ADMIN — POTASSIUM CHLORIDE 20 MEQ: 1500 TABLET, EXTENDED RELEASE ORAL at 08:04

## 2019-04-18 RX ADMIN — MULTIPLE VITAMINS W/ MINERALS TAB 1 TABLET: TAB at 08:04

## 2019-04-18 RX ADMIN — LEVOTHYROXINE SODIUM 25 MCG: 25 TABLET ORAL at 06:04

## 2019-04-18 RX ADMIN — ENOXAPARIN SODIUM 40 MG: 100 INJECTION SUBCUTANEOUS at 04:04

## 2019-04-18 RX ADMIN — AMITRIPTYLINE HYDROCHLORIDE 25 MG: 25 TABLET, FILM COATED ORAL at 10:04

## 2019-04-18 RX ADMIN — FLUTICASONE FUROATE AND VILANTEROL TRIFENATATE 1 PUFF: 100; 25 POWDER RESPIRATORY (INHALATION) at 07:04

## 2019-04-18 NOTE — NURSING
PT blood sugar 243 mg/dL, pt does not want to bolus through insulin pump. She stated she wants to wait until breakfast. Instructed pt that da shift will have to recheck blood sugar before she bolus insulin.

## 2019-04-18 NOTE — PLAN OF CARE
Problem: Physical Therapy Goal  Goal: Physical Therapy Goal  Goals to be met by: 19     Patient will increase functional independence with mobility by performin. Supine to sit with [place]  2. Sit to stand transfer with Modified [place] using Rolling Walker  3. Gait  x 250 feet with Modified [place] using Rolling Walker.   4. Stand for 10 minutes with Modified [place] using Rolling Walker  5. Lower extremity exercise program x10-20 reps per handout, with independence     Outcome: Ongoing (interventions implemented as appropriate)  PT BID for bed mobility, transfer training, gait and therex

## 2019-04-18 NOTE — PROGRESS NOTES
Progress Note  Hospital Medicine  Patient Name:Fifi Mcnair  MRN:  [id number]  Patient Class: OP- Outpatient Recovery  Admit Date: 4/16/2019  Length of Stay: 0 days  Expected Discharge Date:   Attending Physician: Larry Molina MD  Primary Care Provider:  Werner Vargas MD    SUBJECTIVE:     Principal Problem: S/P total knee arthroplasty, left  Initial history of present illness: Patient is a 68 y.o. female s/p left total knee arthroplasty by Dr. Molina. Patient has PMH significant for osteoarthritis, bronchial asthma, history of congestive heart failure, chronic kidney disease stage 3, chronic obstructive pulmonary disease, diabetes mellitus type 2 with retinopathy, gastroesophageal reflux disease, hypertension, hyperlipidemia, mild pulmonary hypertension and hypothyroidism. Post-operatively, patient is doing well. Patient denied chest pain, shortness of breath, abdominal pain, nausea, vomiting, headache, vision changes, focal neuro-deficits, cough or fever.    PMH/PSH/SH/FH/Meds: reviewed.    Symptoms/Review of Systems:  Patient is doing well but not interested in going home yet.  Blood sugars remain high.  Last evening, while ambulating to the restroom with walker along with person assist , patient lost footing and was low to the ground.  No injuries reported.  Patient is participating with physical therapy.  No shortness of breath, cough, chest pain or headache, fever or abdominal pain.     Diet:  Adequate intake.    Activity level: Normal.    Pain:  As above     OBJECTIVE:   Vital Signs (Most Recent):      Temp: 98.7 °F (37.1 °C) (04/18/19 0424)  Pulse: 103 (04/18/19 0724)  Resp: 18 (04/18/19 0724)  BP: (!) 132/59 (04/18/19 0750)  SpO2: 98 % (04/18/19 0724)       Vital Signs Range (Last 24H):  Temp:  [97.8 °F (36.6 °C)-98.9 °F (37.2 °C)]   Pulse:  []   Resp:  [18-22]   BP: (119-141)/(56-94)   SpO2:  [93 %-99 %]     Weight: 119 kg (262 lb 5.6 oz)  Body mass index is 47.98 kg/m².    Intake/Output  Summary (Last 24 hours) at 4/18/2019 0842  Last data filed at 4/18/2019 0600  Gross per 24 hour   Intake 780 ml   Output 401 ml   Net 379 ml     Physical Examination:  General appearance: well developed, appears stated age  Head: normocephalic, atraumatic  Eyes:  conjunctivae/corneas clear. PERRL.  Nose: Nares normal. Septum midline.  Throat: lips, mucosa, and tongue normal; teeth and gums normal, no throat erythema.  Neck: supple, symmetrical, trachea midline, no JVD and thyroid not enlarged, symmetric, no tenderness/mass/nodules  Lungs:  clear to auscultation bilaterally and normal respiratory effort  Chest wall: no tenderness  Heart: regular rate and rhythm, S1, S2 normal, no murmur, click, rub or gallop  Abdomen: soft, non-tender non-distented; bowel sounds normal; no masses,  no organomegaly  Extremities: no cyanosis, clubbing or edema. Left knee dressing clean dry and intact.  Pulses: 2+ and symmetric  Skin: Skin color, texture, turgor normal. No rashes or lesions.  Lymph nodes: Cervical, supraclavicular, and axillary nodes normal.  Neurologic: Normal strength and tone. No focal numbness or weakness. CNII-XII intact.      CBC:  Recent Labs   Lab 04/17/19  0356   WBC 8.10   RBC 3.21*   HGB 10.1*   HCT 30.3*      MCV 94   MCH 31.3*   MCHC 33.2   BMP  Recent Labs   Lab 04/16/19  2206 04/17/19  0356   * 212*   NA  --  138   K  --  4.4   CL  --  104   CO2  --  25   BUN  --  14   CREATININE  --  1.2   CALCIUM  --  9.0      Diagnostic Results:  Microbiology Results (last 7 days)     ** No results found for the last 168 hours. **           Assessment/Plan:      *S/P total knee arthroplasty, left [Z96.652]   Not Applicable    Arthritis of both knees [M17.0]  Continue to follow Orthopedic recommendations.  Needs aggressive incentive spirometry.  Follow hemoglobin and hematocrit closely.  Pain control with PO narcotics and antiemetics as needed.  Physical therapy as per Orthopedics protocol with fall  precautions.      Yes    Obesity [E66.9]  There is no height or weight on file to calculate BMI. Morbid obesity complicates all aspects of disease management from diagnostic modalities to treatment. Weight loss encouraged and health benefits explained to patient.      Yes    PUD (peptic ulcer disease) [K27.9]  On PPI.      Yes    Chronic low back pain [M54.5, G89.29]  As above, supportive care, fall precautions.       Yes    Sleep apnea, using CPAP 100% [G47.30]  Use CPAP as needed.      Yes    Type 2 diabetes mellitus [E11.9], uncontrolled with hyperglycemia  Check blood glucose level q AC/HS.  Use Novolog Insulin Sliding Scale as needed.   Continue American Diabetic Association 1800 Kcal diet.  Start Levemir 20 units subcu daily.      Yes    COPD (chronic obstructive pulmonary disease) [J44.9]   Yes       DLCO 37% , and mild pulmonary HTN       LBBB (left bundle branch block) [I44.7]  Tele-monitoring.       Yes    Acquired hypothyroidism [E03.9]  Chronic problem. Will continue chronic medications and monitor for any changes, adjusting as needed.      Yes    Chronic kidney disease, stage III (moderate) [N18.3]   Yes       Monitor BUN/SCr.  Monitor I/Os.  Monitor electrolytes.  Avoid non-steroidal anti-inflammatory medications.       GERD (gastroesophageal reflux disease) [K21.9]  On PPI.      Yes    Hypertension [I10]   Yes       Chronic problem. Will continue chronic medications and monitor for any changes, adjusting as needed.                  VTE Risk Mitigation (From admission, onward)        Ordered     enoxaparin injection 40 mg  Daily      04/16/19 2366        Ole Sevilla MD  Department of Hospital Medicine   [facility]

## 2019-04-18 NOTE — PLAN OF CARE
Problem: Adult Inpatient Plan of Care  Goal: Plan of Care Review  Patient sats 93% on RA/I S done 1500ml x 10bpm. Patient has her own CPAP @bedside

## 2019-04-18 NOTE — PLAN OF CARE
Problem: Adult Inpatient Plan of Care  Goal: Plan of Care Review  Alert and oriented x 4. Plan of care reviewed with pt. Verbalized understanding. aquafoam dressing to incision dry and intact. Cryotherapy in progress. Pt c/o pain, managed with prn pain medication. Blood glucose monitoring done. Managed with insulin pump. Telemetry in place , normal sinus rhythm. Ambulated in ceron and up in chair with PT assist. Voiding without difficulty. Left foot pump and right SCD maintained. Pt correctly demonstrates use of IS. Bed in low position and locked. Call light within reach. Avasys monitoring  at bedside. Hourly rounding for safety. Will continue to monitor.

## 2019-04-18 NOTE — SUBJECTIVE & OBJECTIVE
Principal Problem:S/P total knee arthroplasty, left      Interval History: Feels weak and knee wants to buckle. Not a lot of pain. Walked about 40 ft.     Review of patient's allergies indicates:   Allergen Reactions    Adhesive Other (See Comments)     Blisters    Cleocin [clindamycin hcl] Hives    Ceclor [cefaclor] Hives    Advair diskus [fluticasone-salmeterol]      Dry mouth    Aleve [naproxen sodium]      Unable to take secondary to kidney function.     Erythromycin Hives    Levaquin [levofloxacin] Dermatitis    Macrobid [nitrofurantoin monohyd/m-cryst] Other (See Comments)     Stomach pain/ GI issues    Macrodantin [nitrofurantoin macrocrystalline] Hives    Motrin [ibuprofen]      Unable to take secondary to kidney functions.    Restoril [temazepam]      LIGHTHEADED UPON WAKING.with poor results    Sulfa (sulfonamide antibiotics)      Hold due to renal problems    Trazodone      PALPITATIONS    Remeron [mirtazapine] Palpitations and Other (See Comments)     Jittery    Vancomycin analogues Rash     Feet broke out/inflammed blood vessels         Current Facility-Administered Medications   Medication    acetaminophen tablet 1,000 mg    albuterol-ipratropium 2.5 mg-0.5 mg/3 mL nebulizer solution 3 mL    allopurinol tablet 200 mg    amitriptyline tablet 25 mg    atenolol tablet 25 mg    [START ON 4/19/2019] calcitRIOL capsule 0.25 mcg    cetirizine tablet 10 mg    diphenhydrAMINE injection 12.5 mg    diphenhydrAMINE injection 12.5 mg    docusate sodium capsule 100 mg    enoxaparin injection 40 mg    famotidine tablet 20 mg    FLUoxetine capsule 40 mg    fluticasone 50 mcg/actuation nasal spray 100 mcg    fluticasone-vilanterol 100-25 mcg/dose diskus inhaler 1 puff    gabapentin capsule 300 mg    insulin detemir U-100 pen 20 Units    Lactobacillus rhamnosus GG packet 1 each    levothyroxine tablet 25 mcg    loperamide capsule 2 mg    LORazepam tablet 1 mg    losartan tablet 50 mg  "   montelukast chewable tablet 5 mg    morphine injection 2 mg    multivit-iron-FA-calcium-mins 9 mg iron-400 mcg tablet Tab 1 tablet    multivitamin tablet 1 tablet    ondansetron disintegrating tablet 8 mg    ondansetron injection 4 mg    oxyCODONE immediate release tablet 10 mg    oxyCODONE immediate release tablet 15 mg    oxyCODONE immediate release tablet 15 mg    oxyCODONE immediate release tablet 5 mg    pantoprazole EC tablet 40 mg    potassium chloride SA CR tablet 20 mEq    pregabalin capsule 75 mg    promethazine (PHENERGAN) 6.25 mg in dextrose 5 % 50 mL IVPB    sodium chloride 0.9% flush 10 mL    torsemide tablet 20 mg    zolpidem tablet 5 mg     Facility-Administered Medications Ordered in Other Encounters   Medication    lactated ringers infusion     Objective:     Vital Signs (Most Recent):  Temp: 99.6 °F (37.6 °C) (04/18/19 0800)  Pulse: 93 (04/18/19 0800)  Resp: 20 (04/18/19 0800)  BP: (!) 132/59 (04/18/19 0800)  SpO2: 96 % (04/18/19 0800) Vital Signs (24h Range):  Temp:  [97.8 °F (36.6 °C)-99.6 °F (37.6 °C)] 99.6 °F (37.6 °C)  Pulse:  [] 93  Resp:  [18-22] 20  SpO2:  [93 %-99 %] 96 %  BP: (119-141)/(56-94) 132/59     Weight: 119 kg (262 lb 5.6 oz)  Height: 5' 2" (157.5 cm)  Body mass index is 47.98 kg/m².      Intake/Output Summary (Last 24 hours) at 4/18/2019 1149  Last data filed at 4/18/2019 0600  Gross per 24 hour   Intake 780 ml   Output 400 ml   Net 380 ml       General    Nursing note and vitals reviewed.  Constitutional: She is oriented to person, place, and time. She appears well-developed and well-nourished. No distress.   HENT:   Head: Normocephalic and atraumatic.   Eyes: EOM are normal.   Cardiovascular: Normal rate.    Pulmonary/Chest: Effort normal.   Abdominal: Soft.   Neurological: She is alert and oriented to person, place, and time.   Psychiatric: Her behavior is normal.           Right Knee Exam   Right knee exam is normal.    Left Knee Exam "     Inspection   Erythema: absent  Swelling: present    Range of Motion   Extension: 0   Flexion: 90     Other   Sensation: normal    Comments:  Dsg c/d/i    Muscle Strength   Left Lower Extremity   Quadriceps:  4/5     Vascular Exam       Left Pulses  Dorsalis Pedis:      2+          Edema  Left Lower Leg: absent      Significant Labs:   CBC:   Recent Labs   Lab 04/17/19  0356   WBC 8.10   HGB 10.1*   HCT 30.3*        CMP:   Recent Labs   Lab 04/16/19 2206 04/17/19  0356   NA  --  138   K  --  4.4   CL  --  104   CO2  --  25   * 212*   BUN  --  14   CREATININE  --  1.2   CALCIUM  --  9.0   ANIONGAP  --  9   EGFRNONAA  --  47*     All pertinent labs within the past 24 hours have been reviewed.    Significant Imaging: X-Ray: I have reviewed all pertinent results/findings and my personal findings are:  nl TKA

## 2019-04-18 NOTE — PLAN OF CARE
Problem: Adult Inpatient Plan of Care  Goal: Plan of Care Review  Plan of care reviewed with pt, pt verbalized understanding. IV site clean, dry, intact, no redness or swelling noted. Pt has CPAP at bedside. Dressing and ace wrap to left knee c/d/i. Cryotherapy in progress. Pt c/o pain, managed with prn pain medication. CBG monitored, pt self administers with home insulin pump. Cardiac monitoring in place. Pt ambulates with walker to bedside commode, 2 person assist, voiding without difficulty. Pt remained free of fall/trauma this shift. Pain is controlled with current regimen. VSS. Safety maintained throughout shift, call light in reach, bed locked and in lowest position, side rails up x2. Will continue to monitor, observe and report any changes.

## 2019-04-18 NOTE — PROGRESS NOTES
[facility]  Orthopedics  Progress Note    Patient Name: Fifi Mcnair  MRN: [id number]  Admission Date: 4/16/2019  Hospital Length of Stay: 0 days  Attending Provider: Larry Molina MD  Primary Care Provider: Werner Vargas MD  Follow-up For: Procedure(s) (LRB):  ARTHROPLASTY, KNEE (Left)    Post-Operative Day: 2 Days Post-Op  Subjective:     Principal Problem:S/P total knee arthroplasty, left      Interval History: Feels weak and knee wants to buckle. Not a lot of pain. Walked about 40 ft.     Review of patient's allergies indicates:   Allergen Reactions    Adhesive Other (See Comments)     Blisters    Cleocin [clindamycin hcl] Hives    Ceclor [cefaclor] Hives    Advair diskus [fluticasone-salmeterol]      Dry mouth    Aleve [naproxen sodium]      Unable to take secondary to kidney function.     Erythromycin Hives    Levaquin [levofloxacin] Dermatitis    Macrobid [nitrofurantoin monohyd/m-cryst] Other (See Comments)     Stomach pain/ GI issues    Macrodantin [nitrofurantoin macrocrystalline] Hives    Motrin [ibuprofen]      Unable to take secondary to kidney functions.    Restoril [temazepam]      LIGHTHEADED UPON WAKING.with poor results    Sulfa (sulfonamide antibiotics)      Hold due to renal problems    Trazodone      PALPITATIONS    Remeron [mirtazapine] Palpitations and Other (See Comments)     Jittery    Vancomycin analogues Rash     Feet broke out/inflammed blood vessels         Current Facility-Administered Medications   Medication    acetaminophen tablet 1,000 mg    albuterol-ipratropium 2.5 mg-0.5 mg/3 mL nebulizer solution 3 mL    allopurinol tablet 200 mg    amitriptyline tablet 25 mg    atenolol tablet 25 mg    [START ON 4/19/2019] calcitRIOL capsule 0.25 mcg    cetirizine tablet 10 mg    diphenhydrAMINE injection 12.5 mg    diphenhydrAMINE injection 12.5 mg    docusate sodium capsule 100 mg    enoxaparin injection 40 mg    famotidine tablet 20 mg  "   FLUoxetine capsule 40 mg    fluticasone 50 mcg/actuation nasal spray 100 mcg    fluticasone-vilanterol 100-25 mcg/dose diskus inhaler 1 puff    gabapentin capsule 300 mg    insulin detemir U-100 pen 20 Units    Lactobacillus rhamnosus GG packet 1 each    levothyroxine tablet 25 mcg    loperamide capsule 2 mg    LORazepam tablet 1 mg    losartan tablet 50 mg    montelukast chewable tablet 5 mg    morphine injection 2 mg    multivit-iron-FA-calcium-mins 9 mg iron-400 mcg tablet Tab 1 tablet    multivitamin tablet 1 tablet    ondansetron disintegrating tablet 8 mg    ondansetron injection 4 mg    oxyCODONE immediate release tablet 10 mg    oxyCODONE immediate release tablet 15 mg    oxyCODONE immediate release tablet 15 mg    oxyCODONE immediate release tablet 5 mg    pantoprazole EC tablet 40 mg    potassium chloride SA CR tablet 20 mEq    pregabalin capsule 75 mg    promethazine (PHENERGAN) 6.25 mg in dextrose 5 % 50 mL IVPB    sodium chloride 0.9% flush 10 mL    torsemide tablet 20 mg    zolpidem tablet 5 mg     Facility-Administered Medications Ordered in Other Encounters   Medication    lactated ringers infusion     Objective:     Vital Signs (Most Recent):  Temp: 99.6 °F (37.6 °C) (04/18/19 0800)  Pulse: 93 (04/18/19 0800)  Resp: 20 (04/18/19 0800)  BP: (!) 132/59 (04/18/19 0800)  SpO2: 96 % (04/18/19 0800) Vital Signs (24h Range):  Temp:  [97.8 °F (36.6 °C)-99.6 °F (37.6 °C)] 99.6 °F (37.6 °C)  Pulse:  [] 93  Resp:  [18-22] 20  SpO2:  [93 %-99 %] 96 %  BP: (119-141)/(56-94) 132/59     Weight: 119 kg (262 lb 5.6 oz)  Height: 5' 2" (157.5 cm)  Body mass index is 47.98 kg/m².      Intake/Output Summary (Last 24 hours) at 4/18/2019 1149  Last data filed at 4/18/2019 0600  Gross per 24 hour   Intake 780 ml   Output 400 ml   Net 380 ml       General    Nursing note and vitals reviewed.  Constitutional: She is oriented to person, place, and time. She appears well-developed and " well-nourished. No distress.   HENT:   Head: Normocephalic and atraumatic.   Eyes: EOM are normal.   Cardiovascular: Normal rate.    Pulmonary/Chest: Effort normal.   Abdominal: Soft.   Neurological: She is alert and oriented to person, place, and time.   Psychiatric: Her behavior is normal.           Right Knee Exam   Right knee exam is normal.    Left Knee Exam     Inspection   Erythema: absent  Swelling: present    Range of Motion   Extension: 0   Flexion: 90     Other   Sensation: normal    Comments:  Dsg c/d/i    Muscle Strength   Left Lower Extremity   Quadriceps:  4/5     Vascular Exam       Left Pulses  Dorsalis Pedis:      2+          Edema  Left Lower Leg: absent      Significant Labs:   CBC:   Recent Labs   Lab 04/17/19  0356   WBC 8.10   HGB 10.1*   HCT 30.3*        CMP:   Recent Labs   Lab 04/16/19  2206 04/17/19  0356   NA  --  138   K  --  4.4   CL  --  104   CO2  --  25   * 212*   BUN  --  14   CREATININE  --  1.2   CALCIUM  --  9.0   ANIONGAP  --  9   EGFRNONAA  --  47*     All pertinent labs within the past 24 hours have been reviewed.    Significant Imaging: X-Ray: I have reviewed all pertinent results/findings and my personal findings are:  nl TKA    Assessment/Plan:     * S/P total knee arthroplasty, left  Cont PT.  Plan on home tomorrow.  Pain control.            Larry Molina MD  Orthopedics  [facility]

## 2019-04-18 NOTE — PT/OT/SLP PROGRESS
"Physical Therapy Treatment    Patient Name:  Fifi Mcnair   MRN:  [id number]    Recommendations:     Discharge Recommendations:  home health PT       Assessment:     Fifi Mcnair is a 68 y.o. female admitted with a medical diagnosis of S/P total knee arthroplasty, left.  She presents with the following impairments/functional limitations:  weakness, impaired endurance, impaired functional mobilty, gait instability, impaired balance, decreased lower extremity function, decreased safety awareness, pain, decreased ROM, impaired joint extensibility, orthopedic precautions .    Rehab Prognosis: Good; patient would benefit from acute skilled PT services to address these deficits and reach maximum level of function.    Recent Surgery: Procedure(s) (LRB):  ARTHROPLASTY, KNEE (Left) 2 Days Post-Op    Plan:     During this hospitalization, patient to be seen BID to address the identified rehab impairments via gait training, therapeutic activities, therapeutic exercises and progress toward the following goals:    · Plan of Care Expires:  04/30/19    Subjective     Chief Complaint: "It's not a good morning. I almost fell again today." pt c/o of knee buckling. Pt also c/o frequent need to urinate.  Patient/Family Comments/goals: agreeable to PT. Requesting to use commode first.   Pain/Comfort:  · Pain Rating 1: (did not rate, expressed less pain today overall )  · Location - Side 1: Right  · Location 1: knee  · Pain Addressed 1: Pre-medicate for activity, Reposition, Distraction      Objective:     Communicated with nurse Tijerina prior to session.  Patient found supine with cryotherapy, telemetry, SCD(personal insulin pump) upon PT entry to room.     General Precautions: Standard, fall   Orthopedic Precautions:LLE weight bearing as tolerated   Braces: Knee immobilizer     Functional Mobility:  · Bed Mobility:     · Scooting: minimum assistance  · Supine to Sit: minimum assistance    · Transfers:     · Sit to Stand:  minimum " assistance and moderate assistance with rolling walker  · Toilet Transfer: minimum assistance with  rolling walker  using  Stand Pivot    · Gait: 45' with CGA using RW and KI donned. pt c/o knee buckling (no buckling noted by PTA). pt instructed to contract quad during gait to keep knee from buckling. pt then ambulated another 10' until c/o again that her knee was buckling. pt was assisted to w/c.        Therapeutic Activities and Exercises:    KI donned by therapist prior to mobility.     pt assisted on/off commode with min A. Pt able to perform own hygiene task In standing with CGA provided for standing balance to ensure pt safety.   pt then proceeded to gait in hallway. Pt assisted back to room via w/c.     seated BLE therex: AP,LAQ, heel slides with towel x 10-12 reps each    Patient left up in chair with all lines intact, call button in reach and nurse Lilliana notified..    GOALS:   Multidisciplinary Problems     Physical Therapy Goals        Problem: Physical Therapy Goal    Goal Priority Disciplines Outcome Goal Variances Interventions   Physical Therapy Goal     PT, PT/OT Ongoing (interventions implemented as appropriate)     Description:  Goals to be met by: 19     Patient will increase functional independence with mobility by performin. Supine to sit with [place]  2. Sit to stand transfer with Modified [place] using Rolling Walker  3. Gait  x 250 feet with Modified [place] using Rolling Walker.   4. Stand for 10 minutes with Modified [place] using Rolling Walker  5. Lower extremity exercise program x10-20 reps per handout, with independence                      Time Tracking:     PT Received On: 19  PT Start Time: 925     PT Stop Time: 952  PT Total Time (min): 27 min     Billable Minutes: Gait Training 10, Therapeutic Activity 8 and Therapeutic Exercise 8    Treatment Type: Treatment  PT/PTA: PTA     PTA Visit Number: 2     Marissa Snell, SADAF  2019

## 2019-04-18 NOTE — PROGRESS NOTES
04/18/19 0724   Patient Assessment/Suction   Level of Consciousness (AVPU) alert   All Lung Fields Breath Sounds clear   PRE-TX-O2   O2 Device (Oxygen Therapy) room air   SpO2 98 %   Pulse Oximetry Type Intermittent   $ Pulse Oximetry - Multiple Charge Pulse Oximetry - Multiple   Pulse 103   Resp 18   Aerosol Therapy   $ Aerosol Therapy Charges PRN treatment not required   Inhaler   $ Inhaler Charges MDI (Metered Dose Inahler) Treatment   Respiratory Treatment Status (Inhaler) given   Treatment Route (Inhaler) mouthpiece   Patient Position (Inhaler) HOB elevated   Signs of Intolerance (Inhaler) none   Incentive Spirometer   $ Incentive Spirometer Charges done with encouragement   Number of Repetitions (IS) 10   Level Incentive Spirometer (mL) 1250   Patient Tolerance (IS) good

## 2019-04-19 VITALS
BODY MASS INDEX: 48.28 KG/M2 | HEART RATE: 83 BPM | DIASTOLIC BLOOD PRESSURE: 60 MMHG | HEIGHT: 62 IN | RESPIRATION RATE: 18 BRPM | SYSTOLIC BLOOD PRESSURE: 127 MMHG | TEMPERATURE: 99 F | OXYGEN SATURATION: 93 % | WEIGHT: 262.38 LBS

## 2019-04-19 LAB
BACTERIA #/AREA URNS HPF: ABNORMAL /HPF
BILIRUB UR QL STRIP: NEGATIVE
CLARITY UR: ABNORMAL
COLOR UR: YELLOW
GLUCOSE UR QL STRIP: NEGATIVE
HGB UR QL STRIP: NEGATIVE
KETONES UR QL STRIP: NEGATIVE
LEUKOCYTE ESTERASE UR QL STRIP: ABNORMAL
MICROSCOPIC COMMENT: ABNORMAL
NITRITE UR QL STRIP: NEGATIVE
PH UR STRIP: 6 [PH] (ref 5–8)
POCT GLUCOSE: 154 MG/DL (ref 70–110)
PROT UR QL STRIP: NEGATIVE
SP GR UR STRIP: 1.01 (ref 1–1.03)
SQUAMOUS #/AREA URNS HPF: 3 /HPF
URN SPEC COLLECT METH UR: ABNORMAL
UROBILINOGEN UR STRIP-ACNC: NEGATIVE EU/DL
WBC #/AREA URNS HPF: 25 /HPF (ref 0–5)
YEAST URNS QL MICRO: ABNORMAL

## 2019-04-19 PROCEDURE — 97116 GAIT TRAINING THERAPY: CPT

## 2019-04-19 PROCEDURE — 25000003 PHARM REV CODE 250: Performed by: ORTHOPAEDIC SURGERY

## 2019-04-19 PROCEDURE — 25000003 PHARM REV CODE 250: Performed by: INTERNAL MEDICINE

## 2019-04-19 PROCEDURE — 97110 THERAPEUTIC EXERCISES: CPT

## 2019-04-19 PROCEDURE — 94799 UNLISTED PULMONARY SVC/PX: CPT

## 2019-04-19 PROCEDURE — 99900035 HC TECH TIME PER 15 MIN (STAT)

## 2019-04-19 PROCEDURE — 94761 N-INVAS EAR/PLS OXIMETRY MLT: CPT

## 2019-04-19 PROCEDURE — 87086 URINE CULTURE/COLONY COUNT: CPT

## 2019-04-19 PROCEDURE — 27000221 HC OXYGEN, UP TO 24 HOURS

## 2019-04-19 PROCEDURE — 81000 URINALYSIS NONAUTO W/SCOPE: CPT

## 2019-04-19 PROCEDURE — 94640 AIRWAY INHALATION TREATMENT: CPT

## 2019-04-19 RX ORDER — OXYCODONE AND ACETAMINOPHEN 5; 325 MG/1; MG/1
1 TABLET ORAL EVERY 4 HOURS PRN
Qty: 40 TABLET | Refills: 0 | Status: SHIPPED | OUTPATIENT
Start: 2019-04-19 | End: 2019-08-26

## 2019-04-19 RX ORDER — ENOXAPARIN SODIUM 100 MG/ML
40 INJECTION SUBCUTANEOUS EVERY 12 HOURS
Status: DISCONTINUED | OUTPATIENT
Start: 2019-04-19 | End: 2019-04-19 | Stop reason: HOSPADM

## 2019-04-19 RX ORDER — ASPIRIN 325 MG
325 TABLET, DELAYED RELEASE (ENTERIC COATED) ORAL 2 TIMES DAILY WITH MEALS
Refills: 0 | COMMUNITY
Start: 2019-04-19 | End: 2019-08-26

## 2019-04-19 RX ORDER — DOXYCYCLINE HYCLATE 100 MG
100 TABLET ORAL EVERY 12 HOURS
Status: DISCONTINUED | OUTPATIENT
Start: 2019-04-19 | End: 2019-04-19 | Stop reason: HOSPADM

## 2019-04-19 RX ORDER — SODIUM CHLORIDE 0.9 % (FLUSH) 0.9 %
10 SYRINGE (ML) INJECTION
Status: DISCONTINUED | OUTPATIENT
Start: 2019-04-19 | End: 2019-04-19 | Stop reason: HOSPADM

## 2019-04-19 RX ORDER — ACETAMINOPHEN 325 MG/1
650 TABLET ORAL EVERY 6 HOURS PRN
Status: DISCONTINUED | OUTPATIENT
Start: 2019-04-19 | End: 2019-04-19 | Stop reason: HOSPADM

## 2019-04-19 RX ADMIN — DOXYCYCLINE HYCLATE 100 MG: 100 TABLET, COATED ORAL at 01:04

## 2019-04-19 RX ADMIN — CETIRIZINE HYDROCHLORIDE 10 MG: 10 TABLET, FILM COATED ORAL at 08:04

## 2019-04-19 RX ADMIN — FLUTICASONE PROPIONATE 100 MCG: 50 SPRAY, METERED NASAL at 09:04

## 2019-04-19 RX ADMIN — MULTIPLE VITAMINS W/ MINERALS TAB 1 TABLET: TAB at 09:04

## 2019-04-19 RX ADMIN — ATENOLOL 25 MG: 25 TABLET ORAL at 08:04

## 2019-04-19 RX ADMIN — GABAPENTIN 300 MG: 300 CAPSULE ORAL at 09:04

## 2019-04-19 RX ADMIN — CALCITRIOL 0.25 MCG: 0.25 CAPSULE ORAL at 08:04

## 2019-04-19 RX ADMIN — INSULIN DETEMIR 20 UNITS: 100 INJECTION, SOLUTION SUBCUTANEOUS at 08:04

## 2019-04-19 RX ADMIN — Medication 1 EACH: at 09:04

## 2019-04-19 RX ADMIN — FLUOXETINE 40 MG: 20 CAPSULE ORAL at 09:04

## 2019-04-19 RX ADMIN — LEVOTHYROXINE SODIUM 25 MCG: 25 TABLET ORAL at 06:04

## 2019-04-19 RX ADMIN — LOSARTAN POTASSIUM 50 MG: 25 TABLET ORAL at 09:04

## 2019-04-19 RX ADMIN — FLUTICASONE FUROATE AND VILANTEROL TRIFENATATE 1 PUFF: 100; 25 POWDER RESPIRATORY (INHALATION) at 08:04

## 2019-04-19 RX ADMIN — ACETAMINOPHEN 650 MG: 325 TABLET ORAL at 09:04

## 2019-04-19 RX ADMIN — POTASSIUM CHLORIDE 20 MEQ: 1500 TABLET, EXTENDED RELEASE ORAL at 09:04

## 2019-04-19 RX ADMIN — PANTOPRAZOLE SODIUM 40 MG: 40 TABLET, DELAYED RELEASE ORAL at 09:04

## 2019-04-19 NOTE — PROGRESS NOTES
Progress Note  Hospital Medicine  Patient Name:Fifi Mcnair  MRN:  [id number]  Patient Class: OP- Outpatient Recovery  Admit Date: 4/16/2019  Length of Stay: 0 days  Expected Discharge Date:   Attending Physician: Larry Molina MD  Primary Care Provider:  Werner Vargas MD    SUBJECTIVE:     Principal Problem: S/P total knee arthroplasty, left  Initial history of present illness: Patient is a 68 y.o. female s/p left total knee arthroplasty by Dr. Molina. Patient has PMH significant for osteoarthritis, bronchial asthma, history of congestive heart failure, chronic kidney disease stage 3, chronic obstructive pulmonary disease, diabetes mellitus type 2 with retinopathy, gastroesophageal reflux disease, hypertension, hyperlipidemia, mild pulmonary hypertension and hypothyroidism. Post-operatively, patient is doing well. Patient denied chest pain, shortness of breath, abdominal pain, nausea, vomiting, headache, vision changes, focal neuro-deficits, cough or fever.    PMH/PSH/SH/FH/Meds: reviewed.    Symptoms/Review of Systems:  Patient is looking and feeling better.  T-max 100.6 degree F. ambulating in hallway with physical therapy.  Patient denies any cough, sputum expectoration, calf tenderness or any urinary difficulties. No shortness of breath, cough, chest pain or headache or abdominal pain.     Diet:  Adequate intake.    Activity level: Normal.    Pain:  As above     OBJECTIVE:   Vital Signs (Most Recent):      Temp: (!) 100.7 °F (38.2 °C) (04/19/19 0719)  Pulse: 92 (04/19/19 0719)  Resp: 18 (04/19/19 0719)  BP: 131/67 (04/19/19 0719)  SpO2: (!) 94 % (04/19/19 0719)       Vital Signs Range (Last 24H):  Temp:  [9.5 °F (-12.5 °C)-100.7 °F (38.2 °C)]   Pulse:  [90-99]   Resp:  [16-20]   BP: (117-155)/(57-81)   SpO2:  [92 %-98 %]     Weight: 119 kg (262 lb 5.6 oz)  Body mass index is 47.98 kg/m².    Intake/Output Summary (Last 24 hours) at 4/19/2019 0741  Last data filed at 4/18/2019 1800  Gross per 24 hour    Intake 600 ml   Output 400 ml   Net 200 ml     Physical Examination:  General appearance: well developed, appears stated age  Head: normocephalic, atraumatic  Eyes:  conjunctivae/corneas clear. PERRL.  Nose: Nares normal. Septum midline.  Throat: lips, mucosa, and tongue normal; teeth and gums normal, no throat erythema.  Neck: supple, symmetrical, trachea midline, no JVD and thyroid not enlarged, symmetric, no tenderness/mass/nodules  Lungs:  clear to auscultation bilaterally and normal respiratory effort  Chest wall: no tenderness  Heart: regular rate and rhythm, S1, S2 normal, no murmur, click, rub or gallop  Abdomen: soft, non-tender non-distented; bowel sounds normal; no masses,  no organomegaly  Extremities: no cyanosis, clubbing or edema. Left knee dressing clean dry and intact.  Pulses: 2+ and symmetric  Skin: Skin color, texture, turgor normal. No rashes or lesions.  Lymph nodes: Cervical, supraclavicular, and axillary nodes normal.  Neurologic: Normal strength and tone. No focal numbness or weakness. CNII-XII intact.      CBC:  Recent Labs   Lab 04/17/19  0356   WBC 8.10   RBC 3.21*   HGB 10.1*   HCT 30.3*      MCV 94   MCH 31.3*   MCHC 33.2   BMP  Recent Labs   Lab 04/16/19  2206 04/17/19  0356   * 212*   NA  --  138   K  --  4.4   CL  --  104   CO2  --  25   BUN  --  14   CREATININE  --  1.2   CALCIUM  --  9.0      Diagnostic Results:  Microbiology Results (last 7 days)     ** No results found for the last 168 hours. **           Assessment/Plan:      *S/P total knee arthroplasty, left [Z96.652]   Not Applicable    Arthritis of both knees [M17.0]  Continue to follow Orthopedic recommendations.  Needs aggressive incentive spirometry.  Counseled again.  Follow hemoglobin and hematocrit closely.  Pain control with PO narcotics and antiemetics as needed.  Physical therapy as per Orthopedics protocol with fall precautions.  Obtain chest x-ray and urine analysis.  IV site without any  erythema.      Yes    Obesity [E66.9]  There is no height or weight on file to calculate BMI. Morbid obesity complicates all aspects of disease management from diagnostic modalities to treatment. Weight loss encouraged and health benefits explained to patient.      Yes    PUD (peptic ulcer disease) [K27.9]  On PPI.      Yes    Chronic low back pain [M54.5, G89.29]  As above, supportive care, fall precautions.       Yes    Sleep apnea, using CPAP 100% [G47.30]  Use CPAP as needed.      Yes    Type 2 diabetes mellitus [E11.9], uncontrolled with hyperglycemia  Check blood glucose level q AC/HS.  Use Novolog Insulin Sliding Scale as needed.   Continue American Diabetic Association 1800 Kcal diet.  Start Levemir 20 units subcu daily.      Yes    COPD (chronic obstructive pulmonary disease) [J44.9]   Yes       DLCO 37% , and mild pulmonary HTN       LBBB (left bundle branch block) [I44.7]  Tele-monitoring.       Yes    Acquired hypothyroidism [E03.9]  Chronic problem. Will continue chronic medications and monitor for any changes, adjusting as needed.      Yes    Chronic kidney disease, stage III (moderate) [N18.3]   Yes       Monitor BUN/SCr.  Monitor I/Os.  Monitor electrolytes.  Avoid non-steroidal anti-inflammatory medications.       GERD (gastroesophageal reflux disease) [K21.9]  On PPI.      Yes    Hypertension [I10]   Yes       Chronic problem. Will continue chronic medications and monitor for any changes, adjusting as needed.                  VTE Risk Mitigation (From admission, onward)        Ordered     enoxaparin injection 40 mg  Daily      04/16/19 8367        Ole Sevilla MD  Department of Hospital Medicine   [facility]

## 2019-04-19 NOTE — PLAN OF CARE
04/19/19 1413   Post-Acute Status   Post-Acute Authorization Home Health/Hospice   Home Health/Hospice Status Referrals Sent   CM sent referral for home health tp PHN via fax to [phone], unable to send via [facility] since patient is outpatient LOC. CM placed call to [phone] to inform of referral and will follow for acceptance, spoke to Starr.

## 2019-04-19 NOTE — NURSING
Pt is sitting in chair at this time, IS is in reach she was educated on use and states she is using it at least 10 times daily.  Lungs are clear and diminished in bases, she denies a cough.  Dressing to left knee clean and intact, no drainage noted, pulses are good, she is using scd and plexi per MD order.  She is on room air in no distress. She was encouraged to ambulate and to stay out of bed and in chair as much as she can tolerate.

## 2019-04-19 NOTE — NURSING
Doctor Jesse contacted regarding pt discharge plan, pt said she feels ok to be discharge so he said he would put her orders in.  I told Dr. Molina about the pt having a UTI and Dr. Sevilla starting her on PO Abx.  Pt is able to stand and sit and ambulate with only stand by assist she has ambulated with PT today in the ceron.  She is voiding, eating, and has had a small BM today.  Left leg dressing is clean and dry, pulses positive.

## 2019-04-19 NOTE — PLAN OF CARE
04/19/19 1511   Post-Acute Status   Post-Acute Authorization Placement   Post-Acute Placement Status Set-up Complete  (Per Starr with PHN , patient set up with [facility] .)

## 2019-04-19 NOTE — NURSING
Pt up to restroom to void, minimal/stand by assist only with walker, she ambulated to restroom and wanted to get back in bed, I encouraged her to stay in chair she verbalized understanding.  Urine sample was taken to lab per MD order.

## 2019-04-19 NOTE — NURSING
Discharge instructions were given and discussed in detail with pt and her .  All personal belongings were taken with pt to private car.  IV and tele box removed.  Dressing to left knee intact with no bleeding or drainage noted.  She took IS with her and she was encouraged to increase fluid intake and to continue to use IS at home, she verbalized understanding.

## 2019-04-19 NOTE — PT/OT/SLP PROGRESS
Physical Therapy Treatment    Patient Name:  Fifi Mcnair   MRN:  [id number]    Recommendations:     Discharge Recommendations:  home health PT   Discharge Equipment Recommendations: none       Assessment:     Fifi Mcnair is a 68 y.o. female admitted with a medical diagnosis of S/P total knee arthroplasty, left.  She presents with the following impairments/functional limitations:  weakness, impaired self care skills, impaired endurance, impaired functional mobilty, gait instability, decreased lower extremity function, orthopedic precautions .  Increased stride length and chari.  No LOB with ambulation.    Rehab Prognosis: Good; patient would benefit from acute skilled PT services to address these deficits and reach maximum level of function.    Recent Surgery: Procedure(s) (LRB):  ARTHROPLASTY, KNEE (Left) 3 Days Post-Op    Plan:     During this hospitalization, patient to be seen BID to address the identified rehab impairments via gait training, therapeutic activities, therapeutic exercises and progress toward the following goals:    · Plan of Care Expires:  04/30/19    Subjective     Chief Complaint:   Patient/Family Comments/goals: agreeable to tx  Pain/Comfort:  · Pain Rating 1: 4/10  · Location 1: knee      Objective:     Communicated with nurse Emilee prior to session.  Patient found up in chair with knee immobilizer, telemetry upon PT entry to room.     General Precautions: Standard, fall   Orthopedic Precautions:LLE weight bearing as tolerated   Braces:       Functional Mobility:  · Transfers:     · Sit to Stand:  minimum assistance with rolling walker  · Gait: 100'w/rw, cga      AM-PAC 6 CLICK MOBILITY          Therapeutic Activities and Exercises:      Patient left up in chair with all lines intact and call button in reach..    GOALS:   Multidisciplinary Problems     Physical Therapy Goals        Problem: Physical Therapy Goal    Goal Priority Disciplines Outcome Goal Variances Interventions   Physical  Therapy Goal     PT, PT/OT Ongoing (interventions implemented as appropriate)     Description:  Goals to be met by: 19     Patient will increase functional independence with mobility by performin. Supine to sit with [place]  2. Sit to stand transfer with Modified [place] using Rolling Walker  3. Gait  x 250 feet with Modified [place] using Rolling Walker.   4. Stand for 10 minutes with Modified [place] using Rolling Walker  5. Lower extremity exercise program x10-20 reps per handout, with independence                      Time Tracking:     PT Received On: 19  PT Start Time: 1130     PT Stop Time: 1140  PT Total Time (min): 10 min     Billable Minutes: Gait Training 10    Treatment Type: Treatment  PT/PTA: PTA     PTA Visit Number: 3     Daphnie Alston PTA  2019

## 2019-04-19 NOTE — PLAN OF CARE
Problem: Adult Inpatient Plan of Care  Goal: Plan of Care Review  Plan of care reviewed with pt, pt verbalized understanding. IV site clean, dry, intact, no redness or swelling noted. Pt has CPAP at bedside. Dressing to left knee c/d/i. Cryotherapy in progress. Pt c/o pain, managed with prn pain medication. Blood glucose monitoring, pt self administers with home insulin pump. Cardiac monitoring in progress. Pt ambulates with walker to bedside commode, 2 person assist, voids without difficulty. Pt remained free of fall/trauma this shift. Safety maintained throughout shift, call light in reach, bed locked and in lowest position, side rails up x2. Will continue to monitor, observe and report any changes

## 2019-04-19 NOTE — PLAN OF CARE
04/19/19 0824   Patient Assessment/Suction   Level of Consciousness (AVPU) alert   All Lung Fields Breath Sounds clear;diminished   PRE-TX-O2   O2 Device (Oxygen Therapy) room air  (NC being used PRN)   $ Is the patient on Low Flow Oxygen? Yes   SpO2 98 %   Pulse Oximetry Type Intermittent   $ Pulse Oximetry - Multiple Charge Pulse Oximetry - Multiple   Pulse 99   Resp 18   Aerosol Therapy   $ Aerosol Therapy Charges PRN treatment not required   Inhaler   $ Inhaler Charges MDI (Metered Dose Inahler) Treatment;Mouth rinsed post treatment   Respiratory Treatment Status (Inhaler) given   Treatment Route (Inhaler) mouthpiece   Patient Position (Inhaler) HOB elevated   Post Treatment Assessment (Inhaler) breath sounds unchanged   Signs of Intolerance (Inhaler) none

## 2019-04-19 NOTE — PLAN OF CARE
Problem: Physical Therapy Goal  Goal: Physical Therapy Goal  Goals to be met by: 19     Patient will increase functional independence with mobility by performin. Supine to sit with [place]  2. Sit to stand transfer with Modified [place] using Rolling Walker  3. Gait  x 250 feet with Modified [place] using Rolling Walker.   4. Stand for 10 minutes with Modified [place] using Rolling Walker  5. Lower extremity exercise program x10-20 reps per handout, with independence     Outcome: Ongoing (interventions implemented as appropriate)  Bed mobility sba.  Ambulated 80'w/rw, cga.

## 2019-04-19 NOTE — PROGRESS NOTES
4/19/2019 Serum creatinine: 1.2 mg/dL 04/17/19 0356  Estimated creatinine clearance: 55 mL/min Body mass index is 47.98 kg/m².     Enoxaparin 40mg SQ daily ordered.    Per renal dosing protocol, increase enoxaparin to 40mg SQ every 12 hours.     Brie Pennington, PharmD

## 2019-04-19 NOTE — DISCHARGE SUMMARY
[facility]  Orthopedics  Discharge Summary      Patient Name: Fifi Mcnair  MRN: [id number]  Admission Date: 4/16/2019  Hospital Length of Stay: 0 days  Discharge Date and Time:  04/19/2019 1:08 PM  Attending Physician: Larry Benton MD   Discharging Provider: Larry Benton MD  Primary Care Provider: Werner Vargas MD    HPI:   68-year-old with a history of bilateral knee arthritis. Patient had left severe medial compartment arthritic changes. She is ready to have her left knee replaced. Also having some trigger thumb. This started about 3 weeks ago. Woke up with pain. No injury. Pain is a 6/10.     Patient is s/p left TKA    Procedure(s) (LRB):  ARTHROPLASTY, KNEE (Left)      Hospital Course:  Status post left total knee arthroplasty on April 16, 2019    Patient ambulated down hallway today, complains of pain    Consults (From admission, onward)        Status Ordering Provider     Inpatient consult to Hospitalist  Once     Provider:  Ole Sevilla MD    Completed LARRY BENTON          Significant Diagnostic Studies: No pertinent studies.    Pending Diagnostic Studies:     None        Final Active Diagnoses:    Diagnosis Date Noted POA    PRINCIPAL PROBLEM:  S/P total knee arthroplasty, left [Z96.652] 04/16/2019 Not Applicable    Obesity [E66.9] 10/11/2018 Yes    Arthritis of both knees [M17.0] 08/28/2018 Yes    PUD (peptic ulcer disease) [K27.9] 12/27/2016 Yes    Chronic low back pain [M54.5, G89.29] 01/21/2016 Yes    Sleep apnea, using CPAP 100% [G47.30] 01/21/2016 Yes    Type 2 diabetes mellitus [E11.9] 01/20/2016 Yes    COPD (chronic obstructive pulmonary disease) [J44.9]  Yes    LBBB (left bundle branch block) [I44.7] 05/07/2015 Yes    Acquired hypothyroidism [E03.9]  Yes    Chronic kidney disease, stage III (moderate) [N18.3]  Yes    GERD (gastroesophageal reflux disease) [K21.9]  Yes    Hypertension [I10]  Yes      Problems Resolved During this  Admission:      Discharged Condition: good    Disposition: Home or Self Care    Follow Up:  Follow-up Information     Larry Molina MD On 5/2/2019.    Specialties:  Sports Medicine, Orthopedic Surgery  Why:  post op followup 5/2 @ 9945am  Contact information:  [address] [place] [address] [address] [place] [id number]  [phone]                 Patient Instructions:      Ambulatory referral to Home Health   Referral Priority: Routine Referral Type: Home Health   Referral Reason: Specialty Services Required   Requested Specialty: Home Health Services   Number of Visits Requested: 1     Referral to Home health   Referral Priority: Routine Referral Type: Home Health Care   Referral Reason: Specialty Services Required   Requested Specialty: Home Health Services   Number of Visits Requested: 1     Diet general     Ice to affected area     No driving, operating heavy equipment or signing legal documents while taking pain medication     Call MD for:  temperature >100.4     Call MD for:  persistent nausea and vomiting     Call MD for:  severe uncontrolled pain     Call MD for:  difficulty breathing, headache or visual disturbances     Call MD for:  redness, tenderness, or signs of infection (pain, swelling, redness, odor or green/yellow discharge around incision site)     Call MD for:  hives     Call MD for:  persistent dizziness or light-headedness     Call MD for:  extreme fatigue     Leave dressing on - Keep it clean, dry, and intact until clinic visit     Change dressing (specify)   Order Comments: Dressing change: If dressing loses its seal, change daily.     Medications:  Reconciled Home Medications:      Medication List      START taking these medications    aspirin 325 MG EC tablet  Commonly known as:  ECOTRIN  Take 1 tablet (325 mg total) by mouth 2 (two) times daily with meals.     oxyCODONE-acetaminophen 5-325 mg per tablet  Commonly known as:  PERCOCET  Take 1 tablet by mouth every 4 (four) hours as needed for  Pain.        CONTINUE taking these medications    albuterol-ipratropium 2.5 mg-0.5 mg/3 mL nebulizer solution  Commonly known as:  DUO-NEB  Take 3 mLs by nebulization every 6 (six) hours as needed for Wheezing. Rescue     allopurinol 100 MG tablet  Commonly known as:  ZYLOPRIM  TAKE TWO TABLETS BY MOUTH AT BEDTIME     amitriptyline 50 MG tablet  Commonly known as:  ELAVIL  TAKE 2 TABLETS BY MOUTH IN THE EVENING     atenolol 25 MG tablet  Commonly known as:  TENORMIN  Take 1 tablet (25 mg total) by mouth 2 (two) times daily.     calcitRIOL 0.25 MCG Cap  Commonly known as:  ROCALTROL  Take 1 capsule by mouth twice a week. Monday Friday     CENTRUM 3,500-18-0.4 unit-mg-mg Chew  Generic drug:  multivit-iron-min-folic acid  Take by mouth 2 (two) times daily.     cetirizine 10 MG tablet  Commonly known as:  ZYRTEC  Take 1 tablet (10 mg total) by mouth once daily.     clotrimazole-betamethasone 1-0.05% cream  Commonly known as:  LOTRISONE     diclofenac sodium 1 % Gel  Commonly known as:  VOLTAREN  APPLY 2 GRAMS TOPICALLY ONCE DAILY     diphenoxylate-atropine 2.5-0.025 mg 2.5-0.025 mg per tablet  Commonly known as:  LOMOTIL  TAKE ONE TABLET BY MOUTH 4 TIMES DAILY AS NEEDED FOR DIARRHEA     DYMISTA 137-50 mcg/spray Spry nassal spray  Generic drug:  azelastine-fluticasone  as needed.     FLUoxetine 20 MG capsule  TAKE TWO CAPSULES BY MOUTH ONCE DAILY     fluticasone 50 mcg/actuation nasal spray  Commonly known as:  FLONASE  2 sprays by Nasal route once daily.     fluticasone-vilanterol 100-25 mcg/dose diskus inhaler  Commonly known as:  BREO  Inhale 1 puff into the lungs once daily.     gabapentin 100 MG capsule  Commonly known as:  NEURONTIN  3 (three) times daily.     insulin aspart U-100 100 unit/mL injection  Commonly known as:  NOVOLOG  Use per insulin pump, 35-40 units daily.     insulin glargine 100 units/mL (3mL) SubQ pen  Commonly known as:  BASAGLAR KWIKPEN U-100 INSULIN  Inject 20 Units into the skin every  evening. Use 10 units if BS over 210.Night before surgery.     KLOR-CON M20 20 MEQ tablet  Generic drug:  potassium chloride SA  TAKE ONE TABLET BY MOUTH TWICE DAILY     l-methylfolate-b2-b6-b12 6-5-50-1 mg Tab  Commonly known as:  CEREFOLIN  Take 1 tablet by mouth once daily.     Lacto.acidophilus-Bif.animalis 5 billion cell Cpsp  Commonly known as:  PROBIOTIC  Take 1 capsule by mouth once daily.     levothyroxine 25 MCG tablet  Commonly known as:  SYNTHROID  TAKE ONE TABLET BY MOUTH ONCE DAILY     LORazepam 1 MG tablet  Commonly known as:  ATIVAN  TAKE 1 TABLET BY MOUTH EVERY 12 HOURS AS NEEDED FOR ANXIETY     losartan 50 MG tablet  Commonly known as:  COZAAR  Take 1 tablet (50 mg total) by mouth once daily.     montelukast 10 mg tablet  Commonly known as:  SINGULAIR     mupirocin 2 % ointment  Commonly known as:  BACTROBAN  APPLY OINTMENT TOPICALLY TO AFFECTED AREA ON NOSE THREE TIMES DAILY AS DIRECTED     oxyCODONE 15 MG Tab  Commonly known as:  ROXICODONE  Take 1 tablet (15 mg total) by mouth every 6 (six) hours as needed for Pain.     pantoprazole 40 MG tablet  Commonly known as:  PROTONIX  TAKE ONE TABLET BY MOUTH ONCE DAILY     ranitidine 300 MG tablet  Commonly known as:  ZANTAC  TAKE ONE TABLET BY MOUTH IN THE EVENING     SSD 1 % cream  Generic drug:  silver sulfADIAZINE 1%     torsemide 20 MG Tab  Commonly known as:  DEMADEX  Take 1 tablet (20 mg total) by mouth 2 (two) times daily. TAKE 1 TABLET BY MOUTH EVERY OTHER DAY THEN 2 TABLETS EVERY DAY     VITAMIN B-12 5,000 mcg Subl  Generic drug:  cyanocobalamin (vitamin B-12)  Place 5,000 mg under the tongue once a week.            Larry Molina MD  Orthopedics  [facility]

## 2019-04-19 NOTE — PLAN OF CARE
Problem: Physical Therapy Goal  Goal: Physical Therapy Goal  Goals to be met by: 19     Patient will increase functional independence with mobility by performin. Supine to sit with [place]  2. Sit to stand transfer with Modified [place] using Rolling Walker  3. Gait  x 250 feet with Modified [place] using Rolling Walker.   4. Stand for 10 minutes with Modified [place] using Rolling Walker  5. Lower extremity exercise program x10-20 reps per handout, with independence     Outcome: Ongoing (interventions implemented as appropriate)  Pt ambulated 100' w/rw, cga.

## 2019-04-19 NOTE — PROGRESS NOTES
04/18/19 2200   Patient Assessment/Suction   Level of Consciousness (AVPU) alert   Respiratory Effort Unlabored   Expansion/Accessory Muscles/Retractions expansion symmetric;no use of accessory muscles;no retractions   All Lung Fields Breath Sounds diminished   Rhythm/Pattern, Respiratory no shortness of breath reported   Cough Frequency infrequent   Cough Type good;nonproductive   PRE-TX-O2   O2 Device (Oxygen Therapy) room air  (Pt has her own CPAP device @bedside set @16 cmH2O.)   SpO2 95 %   Pulse Oximetry Type Intermittent   $ Pulse Oximetry - Multiple Charge Pulse Oximetry - Multiple   Aerosol Therapy   $ Aerosol Therapy Charges PRN treatment not required   Incentive Spirometer   $ Incentive Spirometer Charges done with encouragement;postop instruction;ready for self-administration;breath hold utilized;proper technique demonstrated   Incentive Spirometer Predicted Level (mL) 2000   Administration (IS) instruction provided, follow-up;proper technique demonstrated   Number of Repetitions (IS) 10   Level Incentive Spirometer (mL) 1000   Patient Tolerance (IS) good

## 2019-04-19 NOTE — PT/OT/SLP PROGRESS
"Physical Therapy Treatment    Patient Name:  Fifi Mcnair   MRN:  [id number]    Recommendations:     Discharge Recommendations:  home health PT   Discharge Equipment Recommendations: none       Assessment:     Fifi Mcnair is a 68 y.o. female admitted with a medical diagnosis of S/P total knee arthroplasty, left.  She presents with the following impairments/functional limitations:  weakness, impaired self care skills, decreased ROM, impaired joint extensibility, impaired endurance, impaired functional mobilty, pain, gait instability, decreased lower extremity function, orthopedic precautions .  Knee immobilizer donned prior to ambulation.  No LOB with ambulation.  Fatigues easily.    Rehab Prognosis: Good; patient would benefit from acute skilled PT services to address these deficits and reach maximum level of function.    Recent Surgery: Procedure(s) (LRB):  ARTHROPLASTY, KNEE (Left) 3 Days Post-Op    Plan:     During this hospitalization, patient to be seen BID to address the identified rehab impairments via gait training, therapeutic activities, therapeutic exercises and progress toward the following goals:    · Plan of Care Expires:  04/30/19    Subjective     Chief Complaint: "I've run fever all night"  Patient/Family Comments/goals: "I won't really know how bad it hurts until I start walking"  Pain/Comfort:  · Pain Rating 1: 4/10  · Location 1: knee      Objective:     Communicated with nurse Emilee prior to session.  Patient found supine with knee immobilizer, telemetry, cryotherapy upon PT entry to room.     General Precautions: Standard, fall   Orthopedic Precautions:LLE weight bearing as tolerated   Braces:       Functional Mobility:  · Bed Mobility:     · Rolling Right: stand by assistance  · Scooting: stand by assistance  · Supine to Sit: stand by assistance  · Transfers:     · Sit to Stand:  contact guard assistance with rolling walker  · Gait: 80' w/rw, cga      AM-PAC 6 CLICK MOBILITY   "        Therapeutic Activities and Exercises:   supine: hs, QS, ap x 15 each    Patient left up in chair with all lines intact, call button in reach and nurse present..    GOALS:   Multidisciplinary Problems     Physical Therapy Goals        Problem: Physical Therapy Goal    Goal Priority Disciplines Outcome Goal Variances Interventions   Physical Therapy Goal     PT, PT/OT Ongoing (interventions implemented as appropriate)     Description:  Goals to be met by: 19     Patient will increase functional independence with mobility by performin. Supine to sit with [place]  2. Sit to stand transfer with Modified [place] using Rolling Walker  3. Gait  x 250 feet with Modified [place] using Rolling Walker.   4. Stand for 10 minutes with Modified [place] using Rolling Walker  5. Lower extremity exercise program x10-20 reps per handout, with independence                      Time Tracking:     PT Received On: 19  PT Start Time: 826     PT Stop Time: 854  PT Total Time (min): 28 min     Billable Minutes: Gait Training 15 and Therapeutic Exercise 13    Treatment Type: Treatment  PT/PTA: PTA     PTA Visit Number: 3     Daphnie Alston PTA  2019

## 2019-04-19 NOTE — PLAN OF CARE
04/19/19 1512   Final Note   Assessment Type Final Discharge Note   Anticipated Discharge Disposition Home-Health

## 2019-04-21 LAB — BACTERIA UR CULT: NO GROWTH

## 2019-04-22 ENCOUNTER — TELEPHONE (OUTPATIENT)
Dept: ORTHOPEDICS | Facility: CLINIC | Age: 69
End: 2019-04-22

## 2019-04-22 ENCOUNTER — HOSPITAL ENCOUNTER (OUTPATIENT)
Dept: RADIOLOGY | Facility: HOSPITAL | Age: 69
Discharge: HOME | End: 2019-04-22
Attending: ORTHOPAEDIC SURGERY
Payer: MEDICARE

## 2019-04-22 ENCOUNTER — PATIENT MESSAGE (OUTPATIENT)
Dept: ORTHOPEDICS | Facility: CLINIC | Age: 69
End: 2019-04-22

## 2019-04-22 DIAGNOSIS — R60.9 EDEMA, UNSPECIFIED TYPE: Primary | ICD-10-CM

## 2019-04-22 DIAGNOSIS — R60.9 EDEMA, UNSPECIFIED TYPE: ICD-10-CM

## 2019-04-22 LAB — POCT GLUCOSE: 200 MG/DL (ref 70–110)

## 2019-04-22 PROCEDURE — G0180 PR HOME HEALTH MD CERTIFICATION: ICD-10-PCS | Mod: ,,, | Performed by: ORTHOPAEDIC SURGERY

## 2019-04-22 PROCEDURE — G0180 MD CERTIFICATION HHA PATIENT: HCPCS | Mod: ,,, | Performed by: ORTHOPAEDIC SURGERY

## 2019-04-22 PROCEDURE — 93971 EXTREMITY STUDY: CPT | Mod: TC,LT

## 2019-04-22 PROCEDURE — 93971 US LOWER EXTREMITY VEINS LEFT: ICD-10-PCS | Mod: 26,LT,, | Performed by: RADIOLOGY

## 2019-04-22 PROCEDURE — 93971 EXTREMITY STUDY: CPT | Mod: 26,LT,, | Performed by: RADIOLOGY

## 2019-04-22 NOTE — TELEPHONE ENCOUNTER
Called and spoke with the home health nurse and she stated the patient is c/o calf soreness and redness on her shin. She also states that her pain is not being controlled by her pain medication but she will contact her pain management MD regarding this.   STAT US of calf ordered per Dr. Molina, advised patient that she will need to go to outpatient area of the hospital to have this done.   Also advised patient to send picture of the front of her shin via [facility] for Dr. Molina to review. If not able to upload advised that she will need an appointment on Wednesday in [place].  She verbalized understanding.

## 2019-04-22 NOTE — TELEPHONE ENCOUNTER
----- Message from Vivi Castro MA sent at 4/22/2019  2:36 PM CDT -----  Contact: jeannie howard   home [facility]kki nurse ([facility])   Low grade temp, for weekend   Being admitted to home health   Swelling,  Redness to front of chin, tenderness to    Neg magno sign   Pain not responding to pain meds       Call back

## 2019-04-29 ENCOUNTER — HOSPITAL ENCOUNTER (OUTPATIENT)
Dept: RADIOLOGY | Facility: HOSPITAL | Age: 69
Discharge: HOME | End: 2019-04-29
Attending: ORTHOPAEDIC SURGERY
Payer: MEDICARE

## 2019-04-29 ENCOUNTER — TELEPHONE (OUTPATIENT)
Dept: ORTHOPEDICS | Facility: CLINIC | Age: 69
End: 2019-04-29

## 2019-04-29 DIAGNOSIS — R60.9 EDEMA, UNSPECIFIED TYPE: Primary | ICD-10-CM

## 2019-04-29 DIAGNOSIS — R60.9 EDEMA, UNSPECIFIED TYPE: ICD-10-CM

## 2019-04-29 PROCEDURE — 93971 US LOWER EXTREMITY VEINS LEFT: ICD-10-PCS | Mod: 26,LT,, | Performed by: RADIOLOGY

## 2019-04-29 PROCEDURE — 93971 EXTREMITY STUDY: CPT | Mod: 26,LT,, | Performed by: RADIOLOGY

## 2019-04-29 PROCEDURE — 93971 EXTREMITY STUDY: CPT | Mod: TC,LT

## 2019-04-29 NOTE — TELEPHONE ENCOUNTER
Patients Home Health PT called and stated that the patient is having swelling and tenderness on the left calf. He concerned patient may have developed a blood clot. Orders entered for patient to have STAT ultrasound of the left lower extremity per Dr. Molina's orders. Patient verbalized understanding.    Patient also c/o 10/10 pain in the knee. Advised that she will need to contact her pain management for further pain medication. She verbalized understanding.

## 2019-04-29 NOTE — TELEPHONE ENCOUNTER
Called and spoke with patient and advised her that her Ultrasound was negative for DVT per Dr. Molina. She verbalized understanding.

## 2019-04-30 DIAGNOSIS — M25.562 LEFT KNEE PAIN, UNSPECIFIED CHRONICITY: Primary | ICD-10-CM

## 2019-05-02 ENCOUNTER — OFFICE VISIT (OUTPATIENT)
Dept: ORTHOPEDICS | Facility: CLINIC | Age: 69
End: 2019-05-02
Payer: MEDICARE

## 2019-05-02 ENCOUNTER — HOSPITAL ENCOUNTER (OUTPATIENT)
Dept: RADIOLOGY | Facility: HOSPITAL | Age: 69
Discharge: HOME | End: 2019-05-02
Attending: ORTHOPAEDIC SURGERY
Payer: MEDICARE

## 2019-05-02 VITALS
WEIGHT: 262 LBS | BODY MASS INDEX: 48.21 KG/M2 | HEIGHT: 62 IN | SYSTOLIC BLOOD PRESSURE: 151 MMHG | HEART RATE: 77 BPM | DIASTOLIC BLOOD PRESSURE: 68 MMHG

## 2019-05-02 DIAGNOSIS — Z96.652 S/P TOTAL KNEE ARTHROPLASTY, LEFT: Primary | ICD-10-CM

## 2019-05-02 DIAGNOSIS — M25.562 LEFT KNEE PAIN, UNSPECIFIED CHRONICITY: ICD-10-CM

## 2019-05-02 PROCEDURE — 73560 XR KNEE 1 OR 2 VIEW LEFT: ICD-10-PCS | Mod: 26,LT,, | Performed by: RADIOLOGY

## 2019-05-02 PROCEDURE — 99024 PR POST-OP FOLLOW-UP VISIT: ICD-10-PCS | Mod: S$GLB,,, | Performed by: ORTHOPAEDIC SURGERY

## 2019-05-02 PROCEDURE — 99999 PR PBB SHADOW E&M-EST. PATIENT-LVL III: ICD-10-PCS | Mod: PBBFAC,,, | Performed by: ORTHOPAEDIC SURGERY

## 2019-05-02 PROCEDURE — 99024 POSTOP FOLLOW-UP VISIT: CPT | Mod: S$GLB,,, | Performed by: ORTHOPAEDIC SURGERY

## 2019-05-02 PROCEDURE — 73560 X-RAY EXAM OF KNEE 1 OR 2: CPT | Mod: 26,LT,, | Performed by: RADIOLOGY

## 2019-05-02 PROCEDURE — 99999 PR PBB SHADOW E&M-EST. PATIENT-LVL III: CPT | Mod: PBBFAC,,, | Performed by: ORTHOPAEDIC SURGERY

## 2019-05-02 PROCEDURE — 73560 X-RAY EXAM OF KNEE 1 OR 2: CPT | Mod: TC,PN,LT

## 2019-05-02 NOTE — PROGRESS NOTES
Past Medical History:   Diagnosis Date    Allergy     multiple antibiotic allergies    Anemia     Anxiety     Arthritis     Asthma     CHF (congestive heart failure)     NYHA class III     Chronic kidney disease     Colon polyp     benign    COPD (chronic obstructive pulmonary disease)     DLCO 37% , and mild pulmonary HTN    Dental bridge present     LOWER    Depression     Diabetes mellitus     Diabetic retinopathy     Diastolic dysfunction     Diverticulitis of large intestine without perforation or abscess without bleeding 2/12/2018    Diverticulosis     Former smoker     Fracture of lumbar spine     GERD (gastroesophageal reflux disease)     Hernia of unspecified site of abdominal cavity without mention of obstruction or gangrene     Hyperlipidemia     Hypertension     hypertensive renal    IBS (irritable bowel syndrome)     Mild nonproliferative diabetic retinopathy(362.04) 11/25/2013    Morbid obesity     Nuclear sclerosis 11/25/2013    Osteoporosis     Peripheral edema     Rash     Recurrent upper respiratory infection (URI)     S/P LASIK (laser assisted in situ keratomileusis)     Sleep apnea     sleep apnea uses bipap.    Stroke     tia    Thyroid disease     on synthroid    TIA (transient ischemic attack)     Urinary tract infection     Wears glasses        Past Surgical History:   Procedure Laterality Date    ABDOMINAL SURGERY      ADENOIDECTOMY      ARTHROPLASTY, KNEE Left 4/16/2019    [facility]    ARTHROSCOPY, KNEE, WITH CHONDROPLASTY Right 8/31/2018    [facility]    ARTHROSCOPY, KNEE, WITH MENISCECTOMY Right 8/31/2018    [facility]    COLONOSCOPY  03/05/2013    repeat in 5 years    COLONOSCOPY N/A 4/11/2018    [facility] Luis [facility]arro [facility] at [facility] ENDO    COLONOSCOPY N/A 5/31/2017    [facility] ENDO     COLONOSCOPY N/A 3/5/2013    [facility] ENDO    COSMETIC SURGERY      belt abdominoplasty    CYSTOSCOPY      CYSTOSCOPY N/A 8/6/2018    [facility]    EGD (ESOPHAGOGASTRODUODENOSCOPY) N/A 3/5/2013    [facility] ENDO    ESOPHAGOGASTRODUODENOSCOPY (EGD) N/A 1/11/2018    [facility] ENDO    ESOPHAGOGASTRODUODENOSCOPY (EGD) N/A 12/27/2016    [facility] ENDO    ESOPHAGOGASTRODUODENOSCOPY (EGD) N/A 10/25/2016    [facility] ENDO    ESOPHAGOGASTRODUODENOSCOPY (EGD) N/A 8/24/2016    [facility] ENDO    ESOPHAGOGASTRODUODENOSCOPY (EGD) N/A 7/21/2016    [facility] at [facility] ENDO    ESOPHAGOGASTRODUODENOSCOPY (EGD)-14401 N/A 12/12/2014    [facility]    EYE SURGERY Right     mazzulla    GASTRECTOMY      GASTRECTOMY-SLEEVE-LAPAROSCOPIC-54557; EGD-[id number] N/A 12/12/2014    Performed [facility] at [facility] OR 2ND FLR    gastric sleeve      HERNIA REPAIR      5 years old    HYSTERECTOMY      ovaries remain    PANNICULECTOMY N/A 11/28/2016    Performed by Christiano Becerril [facility] at [facility] OR 2ND FLR    REFRACTIVE SURGERY      mono va//ou//    REPAIR-HERNIA  11/28/2016    Performed by [facility]il [facility] at [facility] OR 2ND FLR    RHINOPLASTY TIP      TONSILLECTOMY      TOTAL KNEE ARTHROPLASTY Left 4/16/19    TUBAL LIGATION      UPPER GASTROINTESTINAL ENDOSCOPY  03/05/2013    UPPER GASTROINTESTINAL ENDOSCOPY  08/24/2016    Dr. Navarro, repeat in 8 weeks    UPPER GASTROINTESTINAL ENDOSCOPY  07/21/2016    Dr. Randall       Current Outpatient Medications   Medication Sig    albuterol-ipratropium 2.5mg-0.5mg/3mL (DUO-NEB) 0.5 mg-3 mg(2.5 mg base)/3 mL nebulizer solution Take 3 mLs by nebulization every 6  (six) hours as needed for Wheezing. Rescue    allopurinol (ZYLOPRIM) 100 MG tablet TAKE TWO TABLETS BY MOUTH AT BEDTIME    amitriptyline (ELAVIL) 50 MG tablet TAKE 2 TABLETS BY MOUTH IN THE EVENING    aspirin (ECOTRIN) 325 MG EC tablet Take 1 tablet (325 mg total) by mouth 2 (two) times daily with meals.    atenolol (TENORMIN) 25 MG tablet Take 1 tablet (25 mg total) by mouth 2 (two) times daily.    calcitRIOL (ROCALTROL) 0.25 MCG Cap Take 1 capsule by mouth twice a week. Monday Friday    cetirizine (ZYRTEC) 10 MG tablet Take 1 tablet (10 mg total) by mouth once daily.    clotrimazole-betamethasone 1-0.05% (LOTRISONE) cream     cyanocobalamin, vitamin B-12, (VITAMIN B-12) 5,000 mcg Subl Place 5,000 mg under the tongue once a week.    diclofenac sodium (VOLTAREN) 1 % Gel  APPLY 2 GRAMS TOPICALLY ONCE DAILY    diphenoxylate-atropine 2.5-0.025 mg (LOMOTIL) 2.5-0.025 mg per tablet TAKE ONE TABLET BY MOUTH 4 TIMES DAILY AS NEEDED FOR DIARRHEA    DYMISTA 137-50 mcg/spray Spry nassal spray as needed.     fluoxetine (PROZAC) 20 MG capsule TAKE TWO CAPSULES BY MOUTH ONCE DAILY    fluticasone (FLONASE) 50 mcg/actuation nasal spray 2 sprays by Nasal route once daily.     fluticasone-vilanterol (BREO) 100-25 mcg/dose diskus inhaler Inhale 1 puff into the lungs once daily.    gabapentin (NEURONTIN) 100 MG capsule 3 (three) times daily.     insulin (BASAGLAR KWIKPEN U-100 INSULIN) glargine 100 units/mL (3mL) SubQ pen Inject 20 Units into the skin every evening. Use 10 units if BS over 210.Night before surgery.    insulin aspart U-100 (NOVOLOG) 100 unit/mL injection Use per insulin pump, 35-40 units daily.    KLOR-CON M20 20 mEq tablet TAKE ONE TABLET BY MOUTH TWICE DAILY    l-methylfolate-b2-b6-b12 (CEREFOLIN) 6-5-50-1 mg Tab Take 1 tablet by mouth once daily.    Lacto.acidophilus-Bif.animalis (PROBIOTIC) 5 billion cell CpSP Take 1 capsule by mouth once daily.    levothyroxine (SYNTHROID) 25 MCG tablet TAKE  ONE TABLET BY MOUTH ONCE DAILY    LORazepam (ATIVAN) 1 MG tablet TAKE 1 TABLET BY MOUTH EVERY 12 HOURS AS NEEDED FOR ANXIETY    losartan (COZAAR) 50 MG tablet Take 1 tablet (50 mg total) by mouth once daily.    montelukast (SINGULAIR) 10 mg tablet     MULTIVIT-IRON-MIN-FOLIC ACID 3,500-18-0.4 UNIT-MG-MG ORAL CHEW Take by mouth 2 (two) times daily.    mupirocin (BACTROBAN) 2 % ointment APPLY OINTMENT TOPICALLY TO AFFECTED AREA ON NOSE THREE TIMES DAILY AS DIRECTED    oxyCODONE (ROXICODONE) 15 MG Tab Take 1 tablet (15 mg total) by mouth every 6 (six) hours as needed for Pain.    oxyCODONE-acetaminophen (PERCOCET) 5-325 mg per tablet Take 1 tablet by mouth every 4 (four) hours as needed for Pain.    pantoprazole (PROTONIX) 40 MG tablet TAKE ONE TABLET BY MOUTH ONCE DAILY    ranitidine (ZANTAC) 300 MG tablet TAKE ONE TABLET BY MOUTH IN THE EVENING    SSD 1 % cream     torsemide (DEMADEX) 20 MG Tab Take 1 tablet (20 mg total) by mouth 2 (two) times daily. TAKE 1 TABLET BY MOUTH EVERY OTHER DAY THEN 2 TABLETS EVERY DAY     Current Facility-Administered Medications   Medication    gentamicin injection 80 mg     Facility-Administered Medications Ordered in Other Visits   Medication    lactated ringers infusion       Review of patient's allergies indicates:   Allergen Reactions    Adhesive Other (See Comments)     Blisters    Cleocin [clindamycin hcl] Hives    Ceclor [cefaclor] Hives    Advair diskus [fluticasone propion-salmeterol]      Dry mouth    Aleve [naproxen sodium]      Unable to take secondary to kidney function.     Erythromycin Hives    Levaquin [levofloxacin] Dermatitis    Macrobid [nitrofurantoin monohyd/m-cryst] Other (See Comments)     Stomach pain/ GI issues    Macrodantin [nitrofurantoin macrocrystalline] Hives    Motrin [ibuprofen]      Unable to take secondary to kidney functions.    Restoril [temazepam]      LIGHTHEADED UPON WAKING.with poor results    Sulfa (sulfonamide  antibiotics)      Hold due to renal problems    Trazodone      PALPITATIONS    Remeron [mirtazapine] Palpitations and Other (See Comments)     Jittery    Vancomycin analogues Rash     Feet broke out/inflammed blood vessels         Family History   Problem Relation Age of Onset    Heart disease Mother 59    Cancer Mother 59        throat    Allergies Mother     Diabetes Mother     Heart disease Father 70        flutter    Allergies Father     Diabetes Father     Cancer Sister 22        22 thyroid,49  breast    Allergies Sister     Breast cancer Sister     Diabetes Sister     Cancer Brother 62        lung    Diabetes Brother     Asthma Daughter     Diabetes Daughter     Depression Daughter     Asthma Grandchild     Eczema Grandchild     Eczema Grandchild     Breast cancer Maternal Grandmother     Ovarian cancer Cousin        Social History     Socioeconomic History    Marital status:      Spouse name: Not on file    Number of children: Not on file    Years of education: Not on file    Highest education level: Not on file   Occupational History    Not on file   Social Needs    Financial resource strain: Not on file    Food insecurity:     Worry: Not on file     Inability: Not on file    Transportation needs:     Medical: Not on file     Non-medical: Not on file   Tobacco Use    Smoking status: Former Smoker     Packs/day: 1.00     Years: 4.00     Pack years: 4.00     Types: Cigarettes     Last attempt to quit:      Years since quittin.3    Smokeless tobacco: Never Used    Tobacco comment: quit , lived with smokers    Substance and Sexual Activity    Alcohol use: Yes     Comment: rare    Drug use: No    Sexual activity: Yes     Birth control/protection: Surgical   Lifestyle    Physical activity:     Days per week: Not on file     Minutes per session: Not on file    Stress: Not on file   Relationships    Social connections:     Talks on phone: Not on file      Gets together: Not on file     Attends Scientologist service: Not on file     Active member of club or organization: Not on file     Attends meetings of clubs or organizations: Not on file     Relationship status: Not on file   Other Topics Concern    Not on file   Social History Narrative    Not on file       Chief Complaint:   Chief Complaint   Patient presents with    Post-op Evaluation     s/p left knee TKA 4/16/19       Date of surgery:  April 16, 2019    History of present illness:  68-year-old female who underwent left total knee arthroplasty 2 weeks ago.  Patient is doing pretty well.  Has good range of motion.  Still having a fair amount of pain.  She has had a couple ultrasounds to rule out DVT because of persistent calf pain.  They were both negative.  Pain is a 7/10.  No swelling.  No redness.      Review of Systems:    Musculoskeletal:  See HPI        Physical Examination:    Vital Signs:    Vitals:    05/02/19 1124   BP: (!) 151/68   Pulse: 77       Body mass index is 47.92 kg/m².    This a well-developed, well nourished patient in no acute distress.  They are alert and oriented and cooperative to examination.  Pt. walks with a mild antalgic gait.      Examination left knee shows well-healing surgical incision. No erythema or drainage. No real joint swelling.  Range of motion is 0-110 degrees.  Mild calf pain but no Homans sign.    X-rays:  Two views of the left knee are ordered and reviewed which show well-aligned total knee arthroplasty components     Assessment::  Status post left DJO total knee arthroplasty    Plan:  I reviewed the x-ray with her today.  Everything looks good.  We will transition her to outpatient physical therapy.  Continue the aspirin.  Follow up in 4 weeks.    This note was created using [employer] voice recognition software that occasionally misinterpreted phrases or words.

## 2019-05-06 DIAGNOSIS — M62.838 MUSCLE SPASM: ICD-10-CM

## 2019-05-06 RX ORDER — LORAZEPAM 1 MG/1
TABLET ORAL
Qty: 30 TABLET | Refills: 2 | Status: SHIPPED | OUTPATIENT
Start: 2019-05-06 | End: 2019-10-28 | Stop reason: SDUPTHER

## 2019-05-10 DIAGNOSIS — E11.9 TYPE 2 DIABETES MELLITUS WITHOUT COMPLICATION: ICD-10-CM

## 2019-05-13 DIAGNOSIS — Z96.652 S/P TOTAL KNEE ARTHROPLASTY, LEFT: Primary | ICD-10-CM

## 2019-05-18 DIAGNOSIS — G89.29 CHRONIC LEFT-SIDED THORACIC BACK PAIN: ICD-10-CM

## 2019-05-18 DIAGNOSIS — G57.01 NEUROPATHY OF RIGHT SCIATIC NERVE: ICD-10-CM

## 2019-05-18 DIAGNOSIS — I89.0 LYMPHEDEMA OF RIGHT LOWER EXTREMITY: ICD-10-CM

## 2019-05-18 DIAGNOSIS — M54.6 CHRONIC LEFT-SIDED THORACIC BACK PAIN: ICD-10-CM

## 2019-05-19 RX ORDER — AMITRIPTYLINE HYDROCHLORIDE 50 MG/1
TABLET, FILM COATED ORAL
Qty: 60 TABLET | Refills: 0 | OUTPATIENT
Start: 2019-05-19

## 2019-05-21 ENCOUNTER — CLINICAL SUPPORT (OUTPATIENT)
Dept: REHABILITATION | Facility: HOSPITAL | Age: 69
End: 2019-05-21
Attending: ORTHOPAEDIC SURGERY
Payer: MEDICARE

## 2019-05-21 DIAGNOSIS — R26.9 GAIT ABNORMALITY: ICD-10-CM

## 2019-05-21 DIAGNOSIS — Z96.652 S/P TOTAL KNEE ARTHROPLASTY, LEFT: Primary | ICD-10-CM

## 2019-05-21 PROCEDURE — 97161 PT EVAL LOW COMPLEX 20 MIN: CPT | Mod: PN | Performed by: PHYSICAL THERAPIST

## 2019-05-21 PROCEDURE — 97110 THERAPEUTIC EXERCISES: CPT | Mod: PN | Performed by: PHYSICAL THERAPIST

## 2019-05-21 NOTE — PLAN OF CARE
[facility]  Physical Therapy Initial Evaluation    [facility] [id number]    Therapy Diagnosis:   Encounter Diagnoses   Name Primary?    S/P total knee arthroplasty, left Yes    Gait abnormality      Physician: Larry Molina,*    Physician Orders: PT Eval and Treat   Medical Diagnosis from Referral: S/P total knee arthroplasty, left  Evaluation Date: 5/21/2019  Authorization Period Expiration: 12/31/2019  Plan of Care Expiration: 7/12/2019  Visit # / Visits authorized: 1/ 12    Time In: 1200  Time Out: 1300  Total Billable Time: 60 minutes    Precautions: Standard    Subjective   Date of onset: DOS: 4/16/2019   History of current condition - Jessica reports: she underwent a left TKA on 4/16/2019. She reports she received 3 weeks of home health PT following discharge. She stated she had significant pain the first 2 weeks after the surgery but notes the pain has decreased. She had 2 ultrasounds to r/o DVT.     Medical History:   Past Medical History:   Diagnosis Date    Allergy     multiple antibiotic allergies    Anemia     Anxiety     Arthritis     Asthma     CHF (congestive heart failure)     NYHA class III     Chronic kidney disease     Colon polyp     benign    COPD (chronic obstructive pulmonary disease)     DLCO 37% , and mild pulmonary HTN    Dental bridge present     LOWER    Depression     Diabetes mellitus     Diabetic retinopathy     Diastolic dysfunction     Diverticulitis of large intestine without perforation or abscess without bleeding 2/12/2018    Diverticulosis     Former smoker     Fracture of lumbar spine     GERD (gastroesophageal reflux disease)     Hernia of unspecified site of abdominal cavity without mention of obstruction or gangrene     Hyperlipidemia     Hypertension     hypertensive renal    IBS (irritable bowel syndrome)     Mild nonproliferative diabetic retinopathy(362.04) 11/25/2013    Morbid obesity      Nuclear sclerosis 11/25/2013    Osteoporosis     Peripheral edema     Rash     Recurrent upper respiratory infection (URI)     S/P LASIK (laser assisted in situ keratomileusis)     Sleep apnea     sleep apnea uses bipap.    Stroke     tia    Thyroid disease     on synthroid    TIA (transient ischemic attack)     Urinary tract infection     Wears glasses        Surgical History:   Fifi Mcnair  has a past surgical history that includes Rhinoplasty tip; Tonsillectomy; Tubal ligation; Cystoscopy; Refractive surgery; Adenoidectomy; Hysterectomy; Hernia repair; Colonoscopy (03/05/2013); Upper gastrointestinal endoscopy (03/05/2013); Upper gastrointestinal endoscopy (08/24/2016); Upper gastrointestinal endoscopy (07/21/2016); gastric sleeve; Abdominal surgery; Colonoscopy (N/A, 5/31/2017); Colonoscopy (N/A, 4/11/2018); Cystoscopy (N/A, 8/6/2018); Eye surgery (Right); Cosmetic surgery; Knee arthroscopy w/ meniscectomy (Right, 8/31/2018); Arthroscopic chondroplasty of knee joint (Right, 8/31/2018); Gastrectomy; Total knee arthroplasty (Left, 4/16/19); and Knee Arthroplasty (Left, 4/16/2019).    Medications:   Fifi has a current medication list which includes the following prescription(s): albuterol-ipratropium, allopurinol, amitriptyline, aspirin, atenolol, calcitriol, cetirizine, clotrimazole-betamethasone 1-0.05%, cyanocobalamin (vitamin b-12), diclofenac sodium, diphenoxylate-atropine 2.5-0.025 mg, dymista, fluoxetine, fluticasone propionate, fluticasone furoate-vilanterol, gabapentin, insulin, insulin aspart u-100, klor-con m20, l-methylfolate-b2-b6-b12, lacto.acidophilus-bif.animalis, levothyroxine, lorazepam, losartan, montelukast, multivit-iron-min-folic acid, mupirocin, oxycodone, oxycodone-acetaminophen, pantoprazole, ranitidine, ssd, and torsemide, and the following Facility-Administered Medications: gentamicin and lactated ringers.    Allergies:   Review of patient's allergies indicates:  "  Allergen Reactions    Adhesive Other (See Comments)     Blisters    Cleocin [clindamycin hcl] Hives    Ceclor [cefaclor] Hives    Advair diskus [fluticasone propion-salmeterol]      Dry mouth    Aleve [naproxen sodium]      Unable to take secondary to kidney function.     Erythromycin Hives    Levaquin [levofloxacin] Dermatitis    Macrobid [nitrofurantoin monohyd/m-cryst] Other (See Comments)     Stomach pain/ GI issues    Macrodantin [nitrofurantoin macrocrystalline] Hives    Motrin [ibuprofen]      Unable to take secondary to kidney functions.    Restoril [temazepam]      LIGHTHEADED UPON WAKING.with poor results    Sulfa (sulfonamide antibiotics)      Hold due to renal problems    Trazodone      PALPITATIONS    Remeron [mirtazapine] Palpitations and Other (See Comments)     Jittery    Vancomycin analogues Rash     Feet broke out/inflammed blood vessels          Imaging, X-rays: "There are postoperative changes of total left knee arthroplasty.  No hardware complication appreciated radiographically.  There is synovial hypertrophy and/or a large knee joint effusion.  Please correlate clinically.  There is also prominent soft tissue swelling within the subcutaneous soft tissues along the lateral aspect of the distal left thigh and proximal left calf."    Prior Therapy: home health  Social History:  lives with their spouse  Occupation: Retired  Prior Level of Function: Independent  Current Level of Function: Decreased ability to perform ADL and decreased tolerance to ambulation    Pain:  Current 3/10, worst 5/10, best 2/10   Location: left knee   Description: Aching and Sharp  Aggravating Factors: Standing and Walking  Easing Factors: rest    Pts goals: Return to PLOF    Objective     Posture: Decreased lumbar lordosis and forward head in standing  Palpation: Moderate point tenderness noted with palpation of the medial and lateral joint lines  Sensation: Decreased along the incision otherwise " intact  Range of Motion/Strength:     Knee Left Right Pain/Dysfunction with Movement   AROM/PROM      flexion  113*  115*    extension  -12*  0*        Knee MMT Left Right   Knee Flexion 4/5 5/5   Knee Extension 4+/5 5/5     Flexibility Left Right   Hamstrings 52 degrees 58 degrees   Achilles 0 degrees 3 degrees     Girth measurements Left Right   5 cm above MJL  47.4 cm  52.0 cm    At MJL  40.6 cm  43.6 cm    5 cm below MJL 37.6 cm  37.4 cm        Gait Without AD   Analysis The patient ambulates with bilateral LE externally rotated in stance phase       Other:   LEFS: 52/80:  35.0% impairment     TREATMENT   Treatment Time In: 1230  Treatment Time Out: 1300  Total Treatment time separate from Evaluation: 30 minutes    Jessica received therapeutic exercises to develop strength, ROM and flexibility for 30 minutes including:    Long sitting HSS 3 X 30 sec  Long sitting GSS 3 x 30 sec  Quad sets 3 x 10  Ottoman stretch 3 min  SLR 3 x 10  TKE 3 x 10  Adductor squeeze 3 x 10  Hook lying abduction 3 x 10  Heel slides 3 x 10  Seated heel slides 3 x 10    Home Exercises and Patient Education Provided    Education provided:   - Importance of obtaining full knee extension    Written Home Exercises Provided: yes.  Exercises were reviewed and Jessica was able to demonstrate them prior to the end of the session.  Jessica demonstrated good  understanding of the education provided.     See EMR under Patient Instructions for exercises provided 5/21/2019.    Assessment   Fifi is a 68 y.o. female referred to outpatient Physical Therapy with a medical diagnosis of S/P total knee arthroplasty, left. Pt presents with     1. Left knee pain  2. Decreased left knee ROM  3. Decreased left knee strength  4. Impaired ambulatory status    Pt prognosis is Good.   Pt will benefit from skilled outpatient Physical Therapy to address the deficits stated above and in the chart below, provide pt/family education, and to maximize pt's level of independence.      Plan of care discussed with patient: Yes  Pt's spiritual, cultural and educational needs considered and patient is agreeable to the plan of care and goals as stated below:     Anticipated Barriers for therapy: none    Medical Necessity is demonstrated by the following  History  Co-morbidities and personal factors that may impact the plan of care Co-morbidities:   anxiety and CHF    Personal Factors:   no deficits     low   Examination  Body Structures and Functions, activity limitations and participation restrictions that may impact the plan of care Body Regions:   lower extremities    Body Systems:    ROM  strength  gait    Participation Restrictions:   none    Activity limitations:   Learning and applying knowledge  no deficits    General Tasks and Commands  no deficits    Communication  no deficits    Mobility  no deficits    Self care  no deficits    Domestic Life  no deficits    Interactions/Relationships  no deficits    Life Areas  no deficits    Community and Social Life  no deficits         low   Clinical Presentation stable and uncomplicated low   Decision Making/ Complexity Score: low     Goals:  Short Term Goals: 3 weeks   1. The patient will begin a written HEP  2. Increase knee ROM to -5 - 115*  3. Decrease soft tissue tenderness to mild     Long Term Goals: 6 weeks   1. There patient will be independent with her HEP for maintenace  2. Increase knee strength to 5/5  3. The patient will ambulate on a community level without  AD .  4. Decrease LEFS impairment to  30%.     Plan   Plan of care Certification: 5/21/2019 to 7/12/2019.    Outpatient Physical Therapy 2 times weekly for 6 weeks to include the following interventions: Gait Training, Manual Therapy, Moist Heat/ Ice, Patient Education, Therapeutic Activites and Therapeutic Exercise.    Thank you for this referral,     Abraham Kramer, PT

## 2019-05-21 NOTE — PATIENT INSTRUCTIONS
Stretching: Hamstring (Standing)                      PERFORM SITTING IN A CHAIR. NO BOX NEEDED        Place right foot on stool. Slowly lean forward, keeping back straight, until stretch is felt in back of thigh. Hold __30__ seconds.  Repeat __3__ times per set. Do __1__ sets per session. Do __2__ sessions per day.     https://Flashnotes/658     Copyright © [employer]. All rights reserved.              CAN BE DONE SITTING IN A CHAIR  Stretching: Calf - Towel        Sit with knee straight and towel looped around left foot. Gently pull on towel until stretch is felt in calf. Hold __30__ seconds.  Repeat __3__ times per set. Do __1__ sets per session. Do __2__ sessions per day.     https://Flashnotes/706     Copyright © [employer]. All rights reserved.   Knee Extension Mobilization: Towel Prop        With rolled towel under right ankle, place __?__ pound weight across knee. Hold __3-5__ minutes.  Repeat __1__ times per set. Do __1__ sets per session. Do __2__ sessions per day.     https://[employer]/720     Copyright © [employer]. All rights reserved.   PROM: Knee Flexion        With towel around left heel, gently pull knee up with towel until stretch is felt. Hold __3__ seconds.  Repeat __10__ times per set. Do __3__ sets per session. Do __2__ sessions per day.     https://Flashnotes/672     Copyright © [employer]. All rights reserved.   Dorsiflexion: Self-Mobilization (Sitting)        Feet flat, other foot forward, slide left foot back until gentle stretch is felt. Keep entire foot on floor. Hold __3__ seconds. Relax.  Repeat __10__ times per set. Do __3__ sets per session. Do __2__ sessions per day.     https://[employer]/82     Copyright © [employer]. All rights reserved.   Strengthening: Straight Leg Raise (Phase 1)        Tighten muscles on front of right thigh, then lift leg __6__ inches from surface, keeping knee locked.   Repeat __10__ times per set. Do __3__ sets per session. Do __2__ sessions per day.     https://[employer].Pricefalls.us/614      Copyright © [employer]. All rights reserved.   Strengthening: Terminal Knee Extension (Supine)        With right knee over bolster, straighten knee by tightening muscles on top of thigh. Keep bottom of knee on bolster.  Repeat __10__ times per set. Do __3__ sets per session. Do __2__ sessions per day.     [email or web address]://CreativeWorx.[employer].us/626     Copyright © [employer]. All rights reserved.   Strengthening: Hip Adduction - Isometric        With ball or folded pillow between knees, squeeze knees together. Hold __3__ seconds.  Repeat __10__ times per set. Do __3__ sets per session. Do __2__ sessions per day.     [email or web address]://[employer]Worx.[employer].us/612     Copyright © [employer]. All rights reserved.   Strengthening: Hip Abductor - Resisted        With band looped around both legs above knees, push thighs apart.  Repeat __10__ times per set. Do __3__ sets per session. Do __2__ sessions per day.     [email or web address]://[employer]x.[employer].us/688     Copyright © [employer]. All rights reserved.

## 2019-05-22 DIAGNOSIS — I89.0 LYMPHEDEMA OF RIGHT LOWER EXTREMITY: ICD-10-CM

## 2019-05-22 DIAGNOSIS — G57.01 NEUROPATHY OF RIGHT SCIATIC NERVE: ICD-10-CM

## 2019-05-22 DIAGNOSIS — G89.29 CHRONIC LEFT-SIDED THORACIC BACK PAIN: ICD-10-CM

## 2019-05-22 DIAGNOSIS — M54.6 CHRONIC LEFT-SIDED THORACIC BACK PAIN: ICD-10-CM

## 2019-05-22 RX ORDER — AMITRIPTYLINE HYDROCHLORIDE 50 MG/1
TABLET, FILM COATED ORAL
Qty: 60 TABLET | Refills: 0 | Status: CANCELLED | OUTPATIENT
Start: 2019-05-22

## 2019-05-23 DIAGNOSIS — G89.29 CHRONIC LEFT-SIDED THORACIC BACK PAIN: ICD-10-CM

## 2019-05-23 DIAGNOSIS — G57.01 NEUROPATHY OF RIGHT SCIATIC NERVE: ICD-10-CM

## 2019-05-23 DIAGNOSIS — I89.0 LYMPHEDEMA OF RIGHT LOWER EXTREMITY: ICD-10-CM

## 2019-05-23 DIAGNOSIS — M54.6 CHRONIC LEFT-SIDED THORACIC BACK PAIN: ICD-10-CM

## 2019-05-23 RX ORDER — AMITRIPTYLINE HYDROCHLORIDE 50 MG/1
TABLET, FILM COATED ORAL
Qty: 60 TABLET | Refills: 0 | OUTPATIENT
Start: 2019-05-23

## 2019-05-23 NOTE — TELEPHONE ENCOUNTER
----- Message from Ari Galloway sent at 5/23/2019 10:26 AM CDT -----  Type: Needs Medical Advice    Who Called:  Patient    Pharmacy name and phone #:    [id number] - [place] - [address]  [address] [place], [place] [address] [address]  Phone: [phone] Fax: [phone]      Best Call Back Number: [phone]   Additional Information:  Patient states that her prescription for amitriptyline (ELAVIL) 50 MG tablet is not at the pharmacy  Please call to advise

## 2019-05-24 RX ORDER — AMITRIPTYLINE HYDROCHLORIDE 50 MG/1
TABLET, FILM COATED ORAL
Qty: 60 TABLET | Refills: 0 | Status: SHIPPED | OUTPATIENT
Start: 2019-05-24 | End: 2019-06-23 | Stop reason: SDUPTHER

## 2019-05-28 ENCOUNTER — CLINICAL SUPPORT (OUTPATIENT)
Dept: REHABILITATION | Facility: HOSPITAL | Age: 69
End: 2019-05-28
Attending: ORTHOPAEDIC SURGERY
Payer: MEDICARE

## 2019-05-28 DIAGNOSIS — R26.9 GAIT ABNORMALITY: ICD-10-CM

## 2019-05-28 DIAGNOSIS — Z96.652 S/P TOTAL KNEE ARTHROPLASTY, LEFT: ICD-10-CM

## 2019-05-28 PROCEDURE — 97110 THERAPEUTIC EXERCISES: CPT | Mod: PN

## 2019-05-28 NOTE — PROGRESS NOTES
Physical Therapy Daily Treatment Note     [id number]    Therapy Diagnosis:   Encounter Diagnoses   Name Primary?    Gait abnormality     S/P total knee arthroplasty, left      Physician: Larry Molina,*    Visit Date: 5/28/2019  Physician Orders: PT Eval and Treat   Medical Diagnosis from Referral: S/P total knee arthroplasty, left  Evaluation Date: 5/21/2019  Authorization Period Expiration: 12/31/2019  Plan of Care Expiration: 7/12/2019  Visit # / Visits authorized: 2/ 12      Time In: 3pm  Time Out: 355pm  Total Billable Time: 55 minutes    Precautions: Standard and Fall    Subjective     Pt reports: pain in other knee is bothersome, pain in surgical knee, L knee, remains, but is improving.  She was compliant with home exercise program.  Response to previous treatment: first visit after initial evaluation  Functional change:     Pain: 3/10  Location: left knee      Objective     Jessica received therapeutic exercises to develop strength, ROM and flexibility for 55 minutes including:    Bike x 10 minutes  Seated gastroc stretch 3 x 30 sec  Seated hamstring stretch 3 x 30 sec  Ball squeeze x 30  LAQ 1# x 30  Seated heel slides x 30  Hip abduction GTB x 30  SLR 1# x 30  Heel slide with strap x 30  SAQ 1# x 30  Ottoman stretch 4# x 4 minutes      Home Exercises Provided and Patient Education Provided     Education provided:   - cont HEP    Written Home Exercises Provided: Patient instructed to cont prior HEP.  Exercises were reviewed and Jessica was able to demonstrate them prior to the end of the session.  Jessica demonstrated good  understanding of the education provided.     See EMR under Patient Instructions for exercises provided prior visit.    Assessment     Decreased endurance.  Good tolerance for exercises on L LE.    Jessica is progressing well towards her goals.   Pt prognosis is Good.     Pt will continue to benefit from skilled outpatient physical therapy to address  the deficits listed in the problem list box on initial evaluation, provide pt/family education and to maximize pt's level of independence in the home and community environment.     Pt's spiritual, cultural and educational needs considered and pt agreeable to plan of care and goals.    Anticipated barriers to physical therapy: none    Goals:  Short Term Goals: 3 weeks   1. The patient will begin a written HEP  2. Increase knee ROM to -5 - 115*  3. Decrease soft tissue tenderness to mild      Long Term Goals: 6 weeks   1. There patient will be independent with her HEP for maintenace  2. Increase knee strength to 5/5  3. The patient will ambulate on a community level without  AD .  4. Decrease LEFS impairment to  30%.       Plan     Cont per POC    Daphnie Alston, PTA

## 2019-05-30 ENCOUNTER — CLINICAL SUPPORT (OUTPATIENT)
Dept: REHABILITATION | Facility: HOSPITAL | Age: 69
End: 2019-05-30
Attending: ORTHOPAEDIC SURGERY
Payer: MEDICARE

## 2019-05-30 ENCOUNTER — OFFICE VISIT (OUTPATIENT)
Dept: ORTHOPEDICS | Facility: CLINIC | Age: 69
End: 2019-05-30
Payer: MEDICARE

## 2019-05-30 VITALS
BODY MASS INDEX: 48.21 KG/M2 | HEART RATE: 80 BPM | SYSTOLIC BLOOD PRESSURE: 154 MMHG | HEIGHT: 62 IN | WEIGHT: 262 LBS | DIASTOLIC BLOOD PRESSURE: 67 MMHG

## 2019-05-30 DIAGNOSIS — M17.0 ARTHRITIS OF BOTH KNEES: Primary | ICD-10-CM

## 2019-05-30 DIAGNOSIS — R26.9 GAIT ABNORMALITY: ICD-10-CM

## 2019-05-30 DIAGNOSIS — Z96.652 S/P TOTAL KNEE ARTHROPLASTY, LEFT: ICD-10-CM

## 2019-05-30 PROCEDURE — 99999 PR PBB SHADOW E&M-EST. PATIENT-LVL III: ICD-10-PCS | Mod: PBBFAC,,, | Performed by: ORTHOPAEDIC SURGERY

## 2019-05-30 PROCEDURE — 3077F PR MOST RECENT SYSTOLIC BLOOD PRESSURE >= 140 MM HG: ICD-10-PCS | Mod: CPTII,S$GLB,, | Performed by: ORTHOPAEDIC SURGERY

## 2019-05-30 PROCEDURE — 1101F PT FALLS ASSESS-DOCD LE1/YR: CPT | Mod: CPTII,S$GLB,, | Performed by: ORTHOPAEDIC SURGERY

## 2019-05-30 PROCEDURE — 3077F SYST BP >= 140 MM HG: CPT | Mod: CPTII,S$GLB,, | Performed by: ORTHOPAEDIC SURGERY

## 2019-05-30 PROCEDURE — 97110 THERAPEUTIC EXERCISES: CPT | Mod: PN | Performed by: PHYSICAL THERAPIST

## 2019-05-30 PROCEDURE — 3078F PR MOST RECENT DIASTOLIC BLOOD PRESSURE < 80 MM HG: ICD-10-PCS | Mod: CPTII,S$GLB,, | Performed by: ORTHOPAEDIC SURGERY

## 2019-05-30 PROCEDURE — 99999 PR PBB SHADOW E&M-EST. PATIENT-LVL III: CPT | Mod: PBBFAC,,, | Performed by: ORTHOPAEDIC SURGERY

## 2019-05-30 PROCEDURE — 1101F PR PT FALLS ASSESS DOC 0-1 FALLS W/OUT INJ PAST YR: ICD-10-PCS | Mod: CPTII,S$GLB,, | Performed by: ORTHOPAEDIC SURGERY

## 2019-05-30 PROCEDURE — 3078F DIAST BP <80 MM HG: CPT | Mod: CPTII,S$GLB,, | Performed by: ORTHOPAEDIC SURGERY

## 2019-05-30 PROCEDURE — 99214 OFFICE O/P EST MOD 30 MIN: CPT | Mod: 57,24,S$GLB, | Performed by: ORTHOPAEDIC SURGERY

## 2019-05-30 PROCEDURE — 99214 PR OFFICE/OUTPT VISIT, EST, LEVL IV, 30-39 MIN: ICD-10-PCS | Mod: 57,24,S$GLB, | Performed by: ORTHOPAEDIC SURGERY

## 2019-05-30 NOTE — PROGRESS NOTES
Physical Therapy Daily Treatment Note     [facility] [id number]    Therapy Diagnosis:   Encounter Diagnoses   Name Primary?    Gait abnormality     S/P total knee arthroplasty, left      Physician: Larry Molina,*    Visit Date: 5/30/2019    Physician Orders: PT Eval and Treat   Medical Diagnosis from Referral: S/P total knee arthroplasty, left  Evaluation Date: 5/21/2019  Authorization Period Expiration: 12/31/2019  Plan of Care Expiration: 7/12/2019  Visit # / Visits authorized: 1/ 12      Time In: 1155  Time Out: 1255  Total Billable Time: 60 minutes    Precautions: Standard    Subjective     Pt reports: decreased pain today.  She was compliant with home exercise program.  Response to previous treatment: no complaints  Functional change:     Pain: 2/10  Location: left knee      Objective     Jessica received therapeutic exercises to develop strength, endurance, ROM and flexibility for 60 minutes including:    Seated HSS 3 x 30 sec  Seated GSS 3 x 30 sec  Ottoman stretch 3 min  Quad sets 3 x 10  SLR 3 x 10 1#  TKE 3 x 10 1#  Heel slides 3 x 10  Adductor squeeze 3 x 10  Hook lying abduction 3 x 10 GTB  Stationary bike 10 min    In //bars  LSU 3 x 10  HR/TR 3 x 10  Mini squats 3 x 10      Home Exercises Provided and Patient Education Provided     Education provided:   -     Written Home Exercises Provided: Patient instructed to cont prior HEP.  Exercises were reviewed and Jessica was able to demonstrate them prior to the end of the session.  Jessica demonstrated good  understanding of the education provided.     See EMR under Patient Instructions for exercises provided prior visit.    Assessment       Jessica is progressing well towards her goals.   Pt prognosis is Good.     Pt will continue to benefit from skilled outpatient physical therapy to address the deficits listed in the problem list box on initial evaluation, provide pt/family education and to maximize pt's level of independence in  the home and community environment.     Pt's spiritual, cultural and educational needs considered and pt agreeable to plan of care and goals.    Anticipated barriers to physical therapy: none    Goals:   Short Term Goals: 3 weeks   1. The patient will begin a written HEP  2. Increase knee ROM to -5 - 115*  3. Decrease soft tissue tenderness to mild      Long Term Goals: 6 weeks   1. There patient will be independent with her HEP for maintenace  2. Increase knee strength to 5/5  3. The patient will ambulate on a community level without  AD .  4. Decrease LEFS impairment to  30%.       Plan     Continue with physical therapy as per plan of care    Abraham Kramer, PT

## 2019-05-30 NOTE — PROGRESS NOTES
Past Medical History:   Diagnosis Date    Allergy     multiple antibiotic allergies    Anemia     Anxiety     Arthritis     Asthma     CHF (congestive heart failure)     NYHA class III     Chronic kidney disease     Colon polyp     benign    COPD (chronic obstructive pulmonary disease)     DLCO 37% , and mild pulmonary HTN    Dental bridge present     LOWER    Depression     Diabetes mellitus     Diabetic retinopathy     Diastolic dysfunction     Diverticulitis of large intestine without perforation or abscess without bleeding 2/12/2018    Diverticulosis     Former smoker     Fracture of lumbar spine     GERD (gastroesophageal reflux disease)     Hernia of unspecified site of abdominal cavity without mention of obstruction or gangrene     Hyperlipidemia     Hypertension     hypertensive renal    IBS (irritable bowel syndrome)     Mild nonproliferative diabetic retinopathy(362.04) 11/25/2013    Morbid obesity     Nuclear sclerosis 11/25/2013    Osteoporosis     Peripheral edema     Rash     Recurrent upper respiratory infection (URI)     S/P LASIK (laser assisted in situ keratomileusis)     Sleep apnea     sleep apnea uses bipap.    Stroke     tia    Thyroid disease     on synthroid    TIA (transient ischemic attack)     Urinary tract infection     Wears glasses        Past Surgical History:   Procedure Laterality Date    ABDOMINAL SURGERY      ADENOIDECTOMY      ARTHROPLASTY, KNEE Left 4/16/2019    Performed [facility] at [facility] OR    ARTHROSCOPY, KNEE, WITH CHONDROPLASTY Right 8/31/2018    [facility] at [facility] OR    ARTHROSCOPY, KNEE, WITH MENISCECTOMY Right 8/31/2018    [facility] at [facility] OR    COLONOSCOPY  03/05/2013    repeat in 5 years    COLONOSCOPY N/A 4/11/2018    Performed by Luis Navarro [facility] at [facility] ENDO    COLONOSCOPY N/A 5/31/2017    [facility] at [facility] ENDO     COLONOSCOPY N/A 3/5/2013    [facility] at [facility] ENDO    COSMETIC SURGERY      belt abdominoplasty    CYSTOSCOPY      CYSTOSCOPY N/A 8/6/2018    [facility] OR    EGD (ESOPHAGOGASTRODUODENOSCOPY) N/A 3/5/2013    [facility] at [facility] ENDO    ESOPHAGOGASTRODUODENOSCOPY (EGD) N/A 1/11/2018    [facility] at [facility] ENDO    ESOPHAGOGASTRODUODENOSCOPY (EGD) N/A 12/27/2016    [facility] at [facility] ENDO    ESOPHAGOGASTRODUODENOSCOPY (EGD) N/A 10/25/2016    [facility] at [facility] ENDO    ESOPHAGOGASTRODUODENOSCOPY (EGD) N/A 8/24/2016    [facility] at [facility] ENDO    ESOPHAGOGASTRODUODENOSCOPY (EGD) N/A 7/21/2016    [facility] at [facility] ENDO    ESOPHAGOGASTRODUODENOSCOPY (EGD)-[id number] N/A 12/12/2014    [facility]    EYE SURGERY Right     mazzulla    GASTRECTOMY      GASTRECTOMY-SLEEVE-LAPAROSCOPIC-96504; EGD-[id number] N/A 12/12/2014    [facility] at [facility] OR 2ND FLR    gastric sleeve      HERNIA REPAIR      5 years old    HYSTERECTOMY      ovaries remain    PANNICULECTOMY N/A 11/28/2016    Performed by [facility]o [facility]erril [facility] at [facility] OR 2ND FLR    REFRACTIVE SURGERY      mono va//ou//    REPAIR-HERNIA  11/28/2016    Per[facility] at [facility] OR 2ND FLR    RHINOPLASTY TIP      TONSILLECTOMY      TOTAL KNEE ARTHROPLASTY Left 4/16/19    TUBAL LIGATION      UPPER GASTROINTESTINAL ENDOSCOPY  03/05/2013    UPPER GASTROINTESTINAL ENDOSCOPY  08/24/2016    Dr. Navarro, repeat in 8 weeks    UPPER GASTROINTESTINAL ENDOSCOPY  07/21/2016    Dr. Randall       Current Outpatient Medications   Medication Sig    allopurinol (ZYLOPRIM) 100 MG tablet TAKE TWO TABLETS BY MOUTH AT BEDTIME    amitriptyline (ELAVIL) 50 MG tablet TAKE 2 TABLETS BY  MOUTH IN THE EVENING    aspirin (ECOTRIN) 325 MG EC tablet Take 1 tablet (325 mg total) by mouth 2 (two) times daily with meals.    atenolol (TENORMIN) 25 MG tablet Take 1 tablet (25 mg total) by mouth 2 (two) times daily.    calcitRIOL (ROCALTROL) 0.25 MCG Cap Take 1 capsule by mouth twice a week. Monday Friday    cetirizine (ZYRTEC) 10 MG tablet Take 1 tablet (10 mg total) by mouth once daily.    clotrimazole-betamethasone 1-0.05% (LOTRISONE) cream     cyanocobalamin, vitamin B-12, (VITAMIN B-12) 5,000 mcg Subl Place 5,000 mg under the tongue once a week.    diclofenac sodium (VOLTAREN) 1 % Gel  APPLY 2 GRAMS TOPICALLY ONCE DAILY    diphenoxylate-atropine 2.5-0.025 mg (LOMOTIL) 2.5-0.025 mg per tablet TAKE ONE TABLET BY MOUTH 4 TIMES DAILY AS NEEDED FOR DIARRHEA    DYMISTA 137-50 mcg/spray Spry nassal spray as needed.     fluoxetine (PROZAC) 20 MG capsule TAKE TWO CAPSULES BY MOUTH ONCE DAILY    fluticasone (FLONASE) 50 mcg/actuation nasal spray 2 sprays by Nasal route once daily.     fluticasone-vilanterol (BREO) 100-25 mcg/dose diskus inhaler Inhale 1 puff into the lungs once daily.    gabapentin (NEURONTIN) 100 MG capsule 3 (three) times daily.     insulin (BASAGLAR KWIKPEN U-100 INSULIN) glargine 100 units/mL (3mL) SubQ pen Inject 20 Units into the skin every evening. Use 10 units if BS over 210.Night before surgery.    insulin aspart U-100 (NOVOLOG) 100 unit/mL injection Use per insulin pump, 35-40 units daily.    KLOR-CON M20 20 mEq tablet TAKE ONE TABLET BY MOUTH TWICE DAILY    l-methylfolate-b2-b6-b12 (CEREFOLIN) 6-5-50-1 mg Tab Take 1 tablet by mouth once daily.    Lacto.acidophilus-Bif.animalis (PROBIOTIC) 5 billion cell CpSP Take 1 capsule by mouth once daily.    levothyroxine (SYNTHROID) 25 MCG tablet TAKE ONE TABLET BY MOUTH ONCE DAILY    LORazepam (ATIVAN) 1 MG tablet TAKE 1 TABLET BY MOUTH EVERY 12 HOURS AS NEEDED FOR ANXIETY    losartan (COZAAR) 50 MG tablet Take 1 tablet (50  mg total) by mouth once daily.    montelukast (SINGULAIR) 10 mg tablet     MULTIVIT-IRON-MIN-FOLIC ACID 3,500-18-0.4 UNIT-MG-MG ORAL CHEW Take by mouth 2 (two) times daily.    mupirocin (BACTROBAN) 2 % ointment APPLY OINTMENT TOPICALLY TO AFFECTED AREA ON NOSE THREE TIMES DAILY AS DIRECTED    oxyCODONE (ROXICODONE) 15 MG Tab Take 1 tablet (15 mg total) by mouth every 6 (six) hours as needed for Pain.    oxyCODONE-acetaminophen (PERCOCET) 5-325 mg per tablet Take 1 tablet by mouth every 4 (four) hours as needed for Pain.    pantoprazole (PROTONIX) 40 MG tablet TAKE ONE TABLET BY MOUTH ONCE DAILY    ranitidine (ZANTAC) 300 MG tablet TAKE ONE TABLET BY MOUTH IN THE EVENING    SSD 1 % cream     torsemide (DEMADEX) 20 MG Tab Take 1 tablet (20 mg total) by mouth 2 (two) times daily. TAKE 1 TABLET BY MOUTH EVERY OTHER DAY THEN 2 TABLETS EVERY DAY    albuterol-ipratropium 2.5mg-0.5mg/3mL (DUO-NEB) 0.5 mg-3 mg(2.5 mg base)/3 mL nebulizer solution Take 3 mLs by nebulization every 6 (six) hours as needed for Wheezing. Rescue     Current Facility-Administered Medications   Medication    gentamicin injection 80 mg     Facility-Administered Medications Ordered in Other Visits   Medication    lactated ringers infusion       Review of patient's allergies indicates:   Allergen Reactions    Adhesive Other (See Comments)     Blisters    Cleocin [clindamycin hcl] Hives    Ceclor [cefaclor] Hives    Advair diskus [fluticasone propion-salmeterol]      Dry mouth    Aleve [naproxen sodium]      Unable to take secondary to kidney function.     Erythromycin Hives    Levaquin [levofloxacin] Dermatitis    Macrobid [nitrofurantoin monohyd/m-cryst] Other (See Comments)     Stomach pain/ GI issues    Macrodantin [nitrofurantoin macrocrystalline] Hives    Motrin [ibuprofen]      Unable to take secondary to kidney functions.    Restoril [temazepam]      LIGHTHEADED UPON WAKING.with poor results    Sulfa (sulfonamide  antibiotics)      Hold due to renal problems    Trazodone      PALPITATIONS    Remeron [mirtazapine] Palpitations and Other (See Comments)     Jittery    Vancomycin analogues Rash     Feet broke out/inflammed blood vessels         Family History   Problem Relation Age of Onset    Heart disease Mother 59    Cancer Mother 59        throat    Allergies Mother     Diabetes Mother     Heart disease Father 70        flutter    Allergies Father     Diabetes Father     Cancer Sister 22        22 thyroid,49  breast    Allergies Sister     Breast cancer Sister     Diabetes Sister     Cancer Brother 62        lung    Diabetes Brother     Asthma Daughter     Diabetes Daughter     Depression Daughter     Asthma Grandchild     Eczema Grandchild     Eczema Grandchild     Breast cancer Maternal Grandmother     Ovarian cancer Cousin        Social History     Socioeconomic History    Marital status:      Spouse name: Not on file    Number of children: Not on file    Years of education: Not on file    Highest education level: Not on file   Occupational History    Not on file   Social Needs    Financial resource strain: Not on file    Food insecurity:     Worry: Not on file     Inability: Not on file    Transportation needs:     Medical: Not on file     Non-medical: Not on file   Tobacco Use    Smoking status: Former Smoker     Packs/day: 1.00     Years: 4.00     Pack years: 4.00     Types: Cigarettes     Last attempt to quit:      Years since quittin.4    Smokeless tobacco: Never Used    Tobacco comment: quit , lived with smokers    Substance and Sexual Activity    Alcohol use: Yes     Comment: rare    Drug use: No    Sexual activity: Yes     Birth control/protection: Surgical   Lifestyle    Physical activity:     Days per week: Not on file     Minutes per session: Not on file    Stress: Not on file   Relationships    Social connections:     Talks on phone: Not on file      Gets together: Not on file     Attends Christian service: Not on file     Active member of club or organization: Not on file     Attends meetings of clubs or organizations: Not on file     Relationship status: Not on file   Other Topics Concern    Not on file   Social History Narrative    Not on file       Chief Complaint:   Chief Complaint   Patient presents with    Post-op Evaluation     s/p left knee TKA 4/22/19        Date of surgery:  April 16, 2019    History of present illness:  68-year-old female who underwent left total knee arthroplasty 6 weeks ago.  Patient is doing pretty well.  Has good range of motion.  Her left knee is doing great.  She is more concerned about the right knee now.  Is giving more trouble.  Right knee is a 9/10.  Left knee is a 2/10.    Review of Systems:    Musculoskeletal:  See HPI        Physical Examination:    Vital Signs:    Vitals:    05/30/19 1100   BP: (!) 154/67   Pulse: 80       Body mass index is 47.92 kg/m².    This a well-developed, well nourished patient in no acute distress.  They are alert and oriented and cooperative to examination.  Pt. walks with a mild antalgic gait.      Examination left knee shows healed surgical incision. No erythema or drainage. No real joint swelling.  Range of motion is 0-120 degrees.  Mild calf pain but no Homans sign.    Examination of the right knee shows no rashes or erythema. There are no masses ecchymosis or effusion. Patient has full range of motion from 0-130°. Patient is nontender to palpation over lateral joint line and nontender to palpation over the medial joint line. Patient has a - Lachman exam, - anterior drawer exam, and - posterior drawer exam. - Mervin's exam. Knee is stable to varus and valgus stress. 5 out of 5 motor strength. Palpable distal pulses. Intact light touch sensation. Negative Patellofemoral crepitus    Heart is regular rate without obvious murmurs   Normal respiratory effort without audible  wheezing  Abdomen is soft and nontender       X-rays:  Two views of the left knee are  reviewed which show well-aligned total knee arthroplasty components     Assessment::  Status post left Scott total knee arthroplasty    Plan:  She is doing great with the left knee. We talked about scheduling her for the right knee.  Plan is for right Scott total knee arthroplasty.  Patient will follow up in 6 weeks for final follow-up on the left knee. Risks, benefits, and alternatives to the procedure were explained to the patient including but not limited to damage to nerves, arteries, blood vessels, bones, tendons, ligaments, stiffness, instability, infection, DVT, PE, as well as general anesthetic complications including seizure, stroke, heart attack and even death. The patient understood these risks and wished to proceed and signed the informed consent.       This note was created using M [employer] voice recognition software that occasionally misinterpreted phrases or words.

## 2019-05-31 ENCOUNTER — TELEPHONE (OUTPATIENT)
Dept: CARDIOLOGY | Facility: CLINIC | Age: 69
End: 2019-05-31

## 2019-05-31 ENCOUNTER — LAB VISIT (OUTPATIENT)
Dept: LAB | Facility: HOSPITAL | Age: 69
End: 2019-05-31
Attending: INTERNAL MEDICINE
Payer: MEDICARE

## 2019-05-31 DIAGNOSIS — R79.9 ABNORMAL FINDING OF BLOOD CHEMISTRY: ICD-10-CM

## 2019-05-31 DIAGNOSIS — D47.2 MONOCLONAL GAMMOPATHY ASSOCIATED WITH LYMPHOPLASMACYTIC DYSCRASIAS: Chronic | ICD-10-CM

## 2019-05-31 LAB
ALBUMIN SERPL BCP-MCNC: 3.4 G/DL (ref 3.5–5.2)
ALP SERPL-CCNC: 128 U/L (ref 55–135)
ALT SERPL W/O P-5'-P-CCNC: 16 U/L (ref 10–44)
ANION GAP SERPL CALC-SCNC: 11 MMOL/L (ref 8–16)
AST SERPL-CCNC: 18 U/L (ref 10–40)
BASOPHILS # BLD AUTO: 0 K/UL (ref 0–0.2)
BASOPHILS NFR BLD: 0.3 % (ref 0–1.9)
BILIRUB SERPL-MCNC: 0.4 MG/DL (ref 0.1–1)
BUN SERPL-MCNC: 13 MG/DL (ref 8–23)
CALCIUM SERPL-MCNC: 9.9 MG/DL (ref 8.7–10.5)
CHLORIDE SERPL-SCNC: 101 MMOL/L (ref 95–110)
CO2 SERPL-SCNC: 25 MMOL/L (ref 23–29)
CREAT SERPL-MCNC: 1.1 MG/DL (ref 0.5–1.4)
DIFFERENTIAL METHOD: ABNORMAL
EOSINOPHIL # BLD AUTO: 0.1 K/UL (ref 0–0.5)
EOSINOPHIL NFR BLD: 2.4 % (ref 0–8)
ERYTHROCYTE [DISTWIDTH] IN BLOOD BY AUTOMATED COUNT: 14.2 % (ref 11.5–14.5)
EST. GFR  (AFRICAN AMERICAN): 60 ML/MIN/1.73 M^2
EST. GFR  (NON AFRICAN AMERICAN): 52 ML/MIN/1.73 M^2
GLUCOSE SERPL-MCNC: 209 MG/DL (ref 70–110)
HCT VFR BLD AUTO: 35.4 % (ref 37–48.5)
HGB BLD-MCNC: 11.8 G/DL (ref 12–16)
IGA SERPL-MCNC: 408 MG/DL (ref 40–350)
IRON SERPL-MCNC: 60 UG/DL (ref 30–160)
LYMPHOCYTES # BLD AUTO: 1.4 K/UL (ref 1–4.8)
LYMPHOCYTES NFR BLD: 26.5 % (ref 18–48)
MCH RBC QN AUTO: 31 PG (ref 27–31)
MCHC RBC AUTO-ENTMCNC: 33.4 G/DL (ref 32–36)
MCV RBC AUTO: 93 FL (ref 82–98)
MONOCYTES # BLD AUTO: 0.3 K/UL (ref 0.3–1)
MONOCYTES NFR BLD: 6 % (ref 4–15)
NEUTROPHILS # BLD AUTO: 3.4 K/UL (ref 1.8–7.7)
NEUTROPHILS NFR BLD: 64.8 % (ref 38–73)
PLATELET # BLD AUTO: 260 K/UL (ref 150–350)
PMV BLD AUTO: 7.6 FL (ref 9.2–12.9)
POTASSIUM SERPL-SCNC: 4.4 MMOL/L (ref 3.5–5.1)
PROT SERPL-MCNC: 7 G/DL (ref 6–8.4)
RBC # BLD AUTO: 3.81 M/UL (ref 4–5.4)
SATURATED IRON: 24 % (ref 20–50)
SODIUM SERPL-SCNC: 137 MMOL/L (ref 136–145)
TOTAL IRON BINDING CAPACITY: 252 UG/DL (ref 250–450)
TRANSFERRIN SERPL-MCNC: 170 MG/DL (ref 200–375)
WBC # BLD AUTO: 5.3 K/UL (ref 3.9–12.7)

## 2019-05-31 PROCEDURE — 83540 ASSAY OF IRON: CPT

## 2019-05-31 PROCEDURE — 83520 IMMUNOASSAY QUANT NOS NONAB: CPT

## 2019-05-31 PROCEDURE — 85025 COMPLETE CBC W/AUTO DIFF WBC: CPT

## 2019-05-31 PROCEDURE — 82784 ASSAY IGA/IGD/IGG/IGM EACH: CPT

## 2019-05-31 PROCEDURE — 36415 COLL VENOUS BLD VENIPUNCTURE: CPT

## 2019-05-31 RX ORDER — FLUOXETINE HYDROCHLORIDE 20 MG/1
40 CAPSULE ORAL DAILY
Qty: 180 CAPSULE | Refills: 3 | Status: SHIPPED | OUTPATIENT
Start: 2019-05-31 | End: 2020-01-06 | Stop reason: SDUPTHER

## 2019-05-31 RX ORDER — MUPIROCIN 20 MG/G
OINTMENT TOPICAL
Status: CANCELLED | OUTPATIENT
Start: 2019-05-31

## 2019-05-31 RX ORDER — FLUOXETINE HYDROCHLORIDE 20 MG/1
CAPSULE ORAL
Qty: 180 CAPSULE | Refills: 4 | OUTPATIENT
Start: 2019-05-31

## 2019-05-31 NOTE — TELEPHONE ENCOUNTER
----- Message from Sabrina Singh LPN sent at 5/31/2019 10:25 AM CDT -----  Pt is scheduled for right TKA with Dr. Molina on 9/10/19. Pt requires cardiac clearance and permission to stop blood thinners 5 days prior to sx. Please update chart with clearance for sx on 9/10/19. Thanks!

## 2019-06-03 LAB
KAPPA LC SER QL IA: 4.25 MG/DL (ref 0.33–1.94)
KAPPA LC/LAMBDA SER IA: 1.69 (ref 0.26–1.65)
LAMBDA LC SER QL IA: 2.52 MG/DL (ref 0.57–2.63)

## 2019-06-04 ENCOUNTER — CLINICAL SUPPORT (OUTPATIENT)
Dept: REHABILITATION | Facility: HOSPITAL | Age: 69
End: 2019-06-04
Attending: ORTHOPAEDIC SURGERY
Payer: MEDICARE

## 2019-06-04 DIAGNOSIS — Z96.652 S/P TOTAL KNEE ARTHROPLASTY, LEFT: ICD-10-CM

## 2019-06-04 DIAGNOSIS — R26.9 GAIT ABNORMALITY: ICD-10-CM

## 2019-06-04 PROCEDURE — 97110 THERAPEUTIC EXERCISES: CPT | Mod: PN

## 2019-06-04 NOTE — PROGRESS NOTES
Physical Therapy Daily Treatment Note     Name: Fifi PADGETT [facility] Number: [id number]    Therapy Diagnosis:   Encounter Diagnoses   Name Primary?    Gait abnormality     S/P total knee arthroplasty, left      Physician: Larry Molina,*    Visit Date: 6/4/2019  Physician Orders: PT Eval and Treat   Medical Diagnosis from Referral: S/P total knee arthroplasty, left  Evaluation Date: 5/21/2019  Authorization Period Expiration: 12/31/2019  Plan of Care Expiration: 7/12/2019  Visit # / Visits authorized: 4/ 12      Time In: 259p  Time Out: 354p  Total Billable Time: 55 minutes    Precautions: Standard    Subjective     Pt reports: she is getting the R knee replaced Sept.10th.  She was compliant with home exercise program.  Response to previous treatment: ROM increasing  Functional change: strength improving, improved gait pattern    Pain: 2/10  Location: left knee      Objective     Jessica received therapeutic exercises to develop strength, ROM and flexibility for 55 minutes including:    Bike x 10 minutes  Ball squeeze x 30  LAQ 1# x 30  Seated heel slides x 30  Hip abduction GTB x 30  SLR 1# x 30  Heel slide with strap x 30  SAQ 1# x 30  Ottoman stretch 4# x 4 minutes    Home Exercises Provided and Patient Education Provided     Education provided:   - cont HEP    Written Home Exercises Provided: Patient instructed to cont prior HEP.  Exercises were reviewed and Jessica was able to demonstrate them prior to the end of the session.  Jessica demonstrated good  understanding of the education provided.     See EMR under Patient Instructions for exercises provided prior visit.    Assessment     Good tolerance for activity.  Making progress with strength and ROM    Jessica is progressing well towards her goals.   Pt prognosis is Good.     Pt will continue to benefit from skilled outpatient physical therapy to address the deficits listed in the problem list box on initial evaluation, provide pt/family education and to  maximize pt's level of independence in the home and community environment.     Pt's spiritual, cultural and educational needs considered and pt agreeable to plan of care and goals.    Anticipated barriers to physical therapy: none    Goals:  Short Term Goals: 3 weeks   1. The patient will begin a written HEP  2. Increase knee ROM to -5 - 115*  3. Decrease soft tissue tenderness to mild      Long Term Goals: 6 weeks   1. There patient will be independent with her HEP for maintenace  2. Increase knee strength to 5/5  3. The patient will ambulate on a community level without  AD .  4. Decrease LEFS impairment to  30%.       Plan     Cont per POC    Daphnie Alston, PTA

## 2019-06-07 ENCOUNTER — TELEPHONE (OUTPATIENT)
Dept: HEMATOLOGY/ONCOLOGY | Facility: CLINIC | Age: 69
End: 2019-06-07

## 2019-06-07 ENCOUNTER — CLINICAL SUPPORT (OUTPATIENT)
Dept: REHABILITATION | Facility: HOSPITAL | Age: 69
End: 2019-06-07
Attending: ORTHOPAEDIC SURGERY
Payer: MEDICARE

## 2019-06-07 DIAGNOSIS — R26.9 GAIT ABNORMALITY: ICD-10-CM

## 2019-06-07 DIAGNOSIS — Z96.652 S/P TOTAL KNEE ARTHROPLASTY, LEFT: ICD-10-CM

## 2019-06-07 PROCEDURE — 97110 THERAPEUTIC EXERCISES: CPT | Mod: PN

## 2019-06-07 NOTE — PROGRESS NOTES
"  Physical Therapy Daily Treatment Note     Name: Fifi Mcnair  [facility] Number: [id number]    Therapy Diagnosis:   Encounter Diagnoses   Name Primary?    Gait abnormality     S/P total knee arthroplasty, left      Physician: Larry Molina,*    Visit Date: 6/7/2019  Physician Orders: PT Eval and Treat   Medical Diagnosis from Referral: S/P total knee arthroplasty, left  Evaluation Date: 5/21/2019  Authorization Period Expiration: 12/31/2019  Plan of Care Expiration: 7/12/2019  Visit # / Visits authorized: 5/ 12      Time In: 1058  Time Out: 1155  Total Billable Time: 30 minutes    Precautions: Standard and Fall    Subjective     Pt reports: no pain L knee, pain in R knee, TKR on R knee in September.  She was compliant with home exercise program.  Response to previous treatment: strength improving  Functional change: more even gait pattern    Pain: 0/10  Location: left knee      Objective     Jessica received therapeutic exercises to develop strength, ROM and flexibility for 57 minutes including:    Bike x 10 minutes  Seated gastroc stretch 3 x 30 sec  Seated hamstring stretch 3 x 30 sec  Seated heel slides x 30  Ball squeeze x 30  LAQ 1# x 30  Hip abduction GTB x 30  SLR 1# x 30  Heel slide with strap x 30  SAQ 1# x 30  Ottoman stretch 4# x 4 minutes  HR/TR x 30  Mini squats x 30  LSU 4" x 30  TKE ball behind knee against wall x 30    Home Exercises Provided and Patient Education Provided     Education provided:   - cont HEP    Written Home Exercises Provided: Patient instructed to cont prior HEP.  Exercises were reviewed and Jessica was able to demonstrate them prior to the end of the session.  Jessica demonstrated good  understanding of the education provided.     See EMR under Patient Instructions for exercises provided prior visit.    Assessment     Making good progress with strength and flexibility.    Jessica is progressing well towards her goals.   Pt prognosis is Good.     Pt will continue to benefit from " skilled outpatient physical therapy to address the deficits listed in the problem list box on initial evaluation, provide pt/family education and to maximize pt's level of independence in the home and community environment.     Pt's spiritual, cultural and educational needs considered and pt agreeable to plan of care and goals.    Anticipated barriers to physical therapy: none    Goals:  Short Term Goals: 3 weeks   1. The patient will begin a written HEP  2. Increase knee ROM to -5 - 115*  3. Decrease soft tissue tenderness to mild      Long Term Goals: 6 weeks   1. There patient will be independent with her HEP for maintenace  2. Increase knee strength to 5/5  3. The patient will ambulate on a community level without  AD .  4. Decrease LEFS impairment to  30%.       Plan     Cont per POC    Daphnie Alston, PTA

## 2019-06-07 NOTE — TELEPHONE ENCOUNTER
Left message for pt to r/s apt today with dr macias due to not being in office. Instructed pt to call

## 2019-06-11 ENCOUNTER — CLINICAL SUPPORT (OUTPATIENT)
Dept: REHABILITATION | Facility: HOSPITAL | Age: 69
End: 2019-06-11
Attending: ORTHOPAEDIC SURGERY
Payer: MEDICARE

## 2019-06-11 DIAGNOSIS — Z96.652 S/P TOTAL KNEE ARTHROPLASTY, LEFT: ICD-10-CM

## 2019-06-11 DIAGNOSIS — R26.9 GAIT ABNORMALITY: ICD-10-CM

## 2019-06-11 PROCEDURE — 97110 THERAPEUTIC EXERCISES: CPT | Mod: PN

## 2019-06-11 NOTE — PROGRESS NOTES
"  Physical Therapy Daily Treatment Note     Name: Fifi Mcnair  [facility] Number: [id number]    Therapy Diagnosis:   Encounter Diagnoses   Name Primary?    Gait abnormality     S/P total knee arthroplasty, left      Physician: Larry Molina,*    Visit Date: 6/11/2019  Physician Orders: PT Eval and Treat   Medical Diagnosis from Referral: S/P total knee arthroplasty, left  Evaluation Date: 5/21/2019  Authorization Period Expiration: 12/31/2019  Plan of Care Expiration: 7/12/2019  Visit # / Visits authorized: 6/ 12      Time In: 304p  Time Out: 358p  Total Billable Time: 30 minutes    Precautions: Standard and Fall    Subjective     Pt reports: she spends about three hours in the pool daily.  She was compliant with home exercise program.  Response to previous treatment:   Functional change: strength improving    Pain: 0/10  Location: left knee      Objective     Jessica received therapeutic exercises to develop strength, ROM and flexibility for 54 minutes including:    Bike x 10 minutes  Ball squeeze x 30  LAQ 2# x 30  Hip abduction GTB x 30  Hip adduction x 30  SLR 1# x 30  Heel slide with strap x 30  SAQ 2# x 30  Shuttle: 25# B, 25# heel dips, 12# L x 30 each  HR/TR on airex x 30  Standing HSC 2# x 30  Standing hip abd 2# x 30  LSU 6" x 30  Mini squats x 30  TKE ball behind knee against wall x 30    Home Exercises Provided and Patient Education Provided     Education provided:   - cont HEP    Written Home Exercises Provided: Patient instructed to cont prior HEP.  Exercises were reviewed and Jessica was able to demonstrate them prior to the end of the session.  Jessica demonstrated good  understanding of the education provided.     See EMR under Patient Instructions for exercises provided prior visit.    Assessment     Knee flexion increasing.  Strength improving.  Good tolerance for additional exercises.  Jessica is progressing well towards her goals.   Pt prognosis is Good.     Pt will continue to benefit from skilled " outpatient physical therapy to address the deficits listed in the problem list box on initial evaluation, provide pt/family education and to maximize pt's level of independence in the home and community environment.     Pt's spiritual, cultural and educational needs considered and pt agreeable to plan of care and goals.    Anticipated barriers to physical therapy: none    Goals:  Short Term Goals: 3 weeks   1. The patient will begin a written HEP  2. Increase knee ROM to -5 - 115*  3. Decrease soft tissue tenderness to mild      Long Term Goals: 6 weeks   1. There patient will be independent with her HEP for maintenace  2. Increase knee strength to 5/5  3. The patient will ambulate on a community level without  AD .  4. Decrease LEFS impairment to  30%.       Plan     Cont per POC    Daphnie Alston, PTA

## 2019-06-12 ENCOUNTER — TELEPHONE (OUTPATIENT)
Dept: HEMATOLOGY/ONCOLOGY | Facility: CLINIC | Age: 69
End: 2019-06-12

## 2019-06-12 NOTE — TELEPHONE ENCOUNTER
----- Message from Kaley Chau sent at 6/12/2019  1:08 PM CDT -----  Contact: self  Patient states her appointment on 06/07/19 shows that she no showed. Patient states the office called to cancel that appointment she did not no show. Please call patient to rescheduled appointment at [phone]. Thanks!

## 2019-06-13 ENCOUNTER — EXTERNAL HOME HEALTH (OUTPATIENT)
Dept: HOME HEALTH SERVICES | Facility: HOSPITAL | Age: 69
End: 2019-06-13
Payer: MEDICARE

## 2019-06-13 ENCOUNTER — CLINICAL SUPPORT (OUTPATIENT)
Dept: REHABILITATION | Facility: HOSPITAL | Age: 69
End: 2019-06-13
Attending: ORTHOPAEDIC SURGERY
Payer: MEDICARE

## 2019-06-13 DIAGNOSIS — Z96.652 S/P TOTAL KNEE ARTHROPLASTY, LEFT: ICD-10-CM

## 2019-06-13 DIAGNOSIS — R26.9 GAIT ABNORMALITY: ICD-10-CM

## 2019-06-13 PROCEDURE — 97110 THERAPEUTIC EXERCISES: CPT | Mod: PN | Performed by: PHYSICAL THERAPIST

## 2019-06-13 NOTE — PROGRESS NOTES
Physical Therapy Daily Treatment Note     [facility] [id number]    Therapy Diagnosis:   Encounter Diagnoses   Name Primary?    Gait abnormality     S/P total knee arthroplasty, left      Physician: Larry Molina,*    Visit Date: 6/13/2019    Physician Orders: PT Eval and Treat   Medical Diagnosis from Referral: S/P total knee arthroplasty, left  Evaluation Date: 5/21/2019  Authorization Period Expiration: 12/31/2019  Plan of Care Expiration: 7/12/2019  Visit # / Visits authorized: 1/ 12      Time In: 1155  Time Out: 1255  Total Billable Time: 60 minutes    Precautions: Standard    Subjective     Pt reports: decreased pain today.  She was compliant with home exercise program.  Response to previous treatment: no complaints  Functional change:     Pain: 2/10  Location: left knee      Objective     Jessica received therapeutic exercises to develop strength, endurance, ROM and flexibility for 60 minutes including:    Seated HSS 3 x 30 sec  Seated GSS 3 x 30 sec  Ottoman stretch 3 min  Quad sets 3 x 10  SLR 3 x 10 1#  TKE 3 x 10 1#  Heel slides 3 x 10  Adductor squeeze 3 x 10  Hook lying abduction 3 x 10 GTB  Stationary bike 10 min  Shuttle leg press 3 x 10 50#  Shuttle single leg press 3 x 10 25#  Shuttle calf press 3 x 10 50#    In //bars  LSU 3 x 10  HR/TR 3 x 10  Mini squats 3 x 10      Home Exercises Provided and Patient Education Provided     Education provided:   -     Written Home Exercises Provided: Patient instructed to cont prior HEP.  Exercises were reviewed and Jessica was able to demonstrate them prior to the end of the session.  Jessica demonstrated good  understanding of the education provided.     See EMR under Patient Instructions for exercises provided prior visit.    Assessment       Jessica is progressing well towards her goals.   Pt prognosis is Good.     Pt will continue to benefit from skilled outpatient physical therapy to address the deficits listed in the problem list  box on initial evaluation, provide pt/family education and to maximize pt's level of independence in the home and community environment.     Pt's spiritual, cultural and educational needs considered and pt agreeable to plan of care and goals.    Anticipated barriers to physical therapy: none    Goals:   Short Term Goals: 3 weeks   1. The patient will begin a written HEP  2. Increase knee ROM to -5 - 115*  3. Decrease soft tissue tenderness to mild      Long Term Goals: 6 weeks   1. There patient will be independent with her HEP for maintenace  2. Increase knee strength to 5/5  3. The patient will ambulate on a community level without  AD .  4. Decrease LEFS impairment to  30%.       Plan     Continue with physical therapy as per plan of care    Abraham Kramer, PT

## 2019-06-18 ENCOUNTER — CLINICAL SUPPORT (OUTPATIENT)
Dept: REHABILITATION | Facility: HOSPITAL | Age: 69
End: 2019-06-18
Attending: ORTHOPAEDIC SURGERY
Payer: MEDICARE

## 2019-06-18 ENCOUNTER — TELEPHONE (OUTPATIENT)
Dept: HEMATOLOGY/ONCOLOGY | Facility: CLINIC | Age: 69
End: 2019-06-18

## 2019-06-18 DIAGNOSIS — R26.9 GAIT ABNORMALITY: ICD-10-CM

## 2019-06-18 DIAGNOSIS — Z96.652 S/P TOTAL KNEE ARTHROPLASTY, LEFT: ICD-10-CM

## 2019-06-18 PROCEDURE — 97110 THERAPEUTIC EXERCISES: CPT | Mod: PN | Performed by: PHYSICAL THERAPIST

## 2019-06-18 NOTE — TELEPHONE ENCOUNTER
----- Message from Saadia Miller sent at 6/18/2019  4:45 PM CDT -----  Contact: Fifi pt  Type:  Sooner Apoointment Request    Caller is requesting a sooner appointment.  Caller declined first available appointment listed below.  Caller will not accept being placed on the waitlist and is requesting a message be sent to doctor.    Name of Caller:  Fifi  When is the first available appointment?  Dept book  Symptoms:  She took blood test now she needs appt.   Best Call Back Number:  [phone]  Additional Information:  Pls call pt regarding appt. She adv'd her last appt shows no show but was cancelled by 's office. She want to speak to a nurse regarding appt.

## 2019-06-18 NOTE — PROGRESS NOTES
Physical Therapy Daily Treatment Note     [facility] [id number]    Therapy Diagnosis:   Encounter Diagnoses   Name Primary?    Gait abnormality     S/P total knee arthroplasty, left      Physician: Larry Molina,*    Visit Date: 6/18/2019    Physician Orders: PT Eval and Treat   Medical Diagnosis from Referral: S/P total knee arthroplasty, left  Evaluation Date: 5/21/2019  Authorization Period Expiration: 12/31/2019  Plan of Care Expiration: 7/12/2019  Visit # / Visits authorized: 1/ 12      Time In: 1500  Time Out: 1600  Total Billable Time: 60 minutes    Precautions: Standard    Subjective     Pt reports: continued decreased knee pain  She was compliant with home exercise program.  Response to previous treatment: no complaints  Functional change:     Pain: 2/10  Location: left knee      Objective     Jessica received therapeutic exercises to develop strength, endurance, ROM and flexibility for 60 minutes including:    Seated HSS 3 x 30 sec  Seated GSS 3 x 30 sec  Ottoman stretch 3 min  Quad sets 3 x 10  SLR 3 x 10 1#  TKE 3 x 10 1#  Heel slides 3 x 10  Adductor squeeze 3 x 10  Hook lying abduction 3 x 10 GTB  Stationary bike 10 min  Shuttle leg press 3 x 10 50#  Shuttle single leg press 3 x 10 25#  Shuttle calf press 3 x 10 50#    In //bars  LSU 3 x 10  HR/TR 3 x 10  Mini squats 3 x 10      Home Exercises Provided and Patient Education Provided     Education provided:   -     Written Home Exercises Provided: Patient instructed to cont prior HEP.  Exercises were reviewed and Jessica was able to demonstrate them prior to the end of the session.  Jessica demonstrated good  understanding of the education provided.     See EMR under Patient Instructions for exercises provided prior visit.    Assessment       Jessica is progressing well towards her goals.   Pt prognosis is Good.     Pt will continue to benefit from skilled outpatient physical therapy to address the deficits listed in the problem  list box on initial evaluation, provide pt/family education and to maximize pt's level of independence in the home and community environment.     Pt's spiritual, cultural and educational needs considered and pt agreeable to plan of care and goals.    Anticipated barriers to physical therapy: none    Goals:   Short Term Goals: 3 weeks   1. The patient will begin a written HEP  2. Increase knee ROM to -5 - 115*  3. Decrease soft tissue tenderness to mild      Long Term Goals: 6 weeks   1. There patient will be independent with her HEP for maintenace  2. Increase knee strength to 5/5  3. The patient will ambulate on a community level without  AD .  4. Decrease LEFS impairment to  30%.       Plan     Continue with physical therapy as per plan of care    Abraham Kramer, PT

## 2019-06-19 ENCOUNTER — TELEPHONE (OUTPATIENT)
Dept: HEMATOLOGY/ONCOLOGY | Facility: CLINIC | Age: 69
End: 2019-06-19

## 2019-06-19 NOTE — TELEPHONE ENCOUNTER
----- Message from Constance Ventura sent at 6/19/2019 10:01 AM CDT -----  Type:  Patient Returning Call    Who Called:  Patient  Who Left Message for Patient:  Smita  Does the patient know what this is regarding?:  Appointment to schedule  Best Call Back Number:  [phone] (home)

## 2019-06-20 ENCOUNTER — CLINICAL SUPPORT (OUTPATIENT)
Dept: REHABILITATION | Facility: HOSPITAL | Age: 69
End: 2019-06-20
Attending: ORTHOPAEDIC SURGERY
Payer: MEDICARE

## 2019-06-20 DIAGNOSIS — Z96.652 S/P TOTAL KNEE ARTHROPLASTY, LEFT: ICD-10-CM

## 2019-06-20 DIAGNOSIS — R26.9 GAIT ABNORMALITY: ICD-10-CM

## 2019-06-20 DIAGNOSIS — M25.562 LEFT KNEE PAIN, UNSPECIFIED CHRONICITY: Primary | ICD-10-CM

## 2019-06-20 PROCEDURE — 97110 THERAPEUTIC EXERCISES: CPT | Mod: PN | Performed by: PHYSICAL THERAPIST

## 2019-06-20 NOTE — PROGRESS NOTES
Physical Therapy Daily Treatment Note     [facility] [id number]    Therapy Diagnosis:   Encounter Diagnoses   Name Primary?    Gait abnormality     S/P total knee arthroplasty, left      Physician: Larry Molina,*    Visit Date: 6/20/2019    Physician Orders: PT Eval and Treat   Medical Diagnosis from Referral: S/P total knee arthroplasty, left  Evaluation Date: 5/21/2019  Authorization Period Expiration: 12/31/2019  Plan of Care Expiration: 7/12/2019  Visit # / Visits authorized: 1/ 12      Time In: 1500  Time Out: 1600  Total Billable Time: 60 minutes    Precautions: Standard    Subjective     Pt reports: continued decreased knee pain  She was compliant with home exercise program.  Response to previous treatment: no complaints  Functional change:     Pain: 2/10  Location: left knee      Objective     Jessica received therapeutic exercises to develop strength, endurance, ROM and flexibility for 60 minutes including:    Seated HSS 3 x 30 sec  Seated GSS 3 x 30 sec  Ottoman stretch 3 min  Quad sets 3 x 10  SLR 3 x 10 2#  TKE 3 x 10 2#  Heel slides 3 x 10  Adductor squeeze 3 x 10  Hook lying abduction 3 x 10 GTB  Stationary bike 10 min  Shuttle leg press 3 x 10 50#  Shuttle single leg press 3 x 10 25#  Shuttle calf press 3 x 10 50#    In //bars  LSU 3 x 10  Mini squats 3 x 10      Home Exercises Provided and Patient Education Provided     Education provided:   -     Written Home Exercises Provided: Patient instructed to cont prior HEP.  Exercises were reviewed and Jessica was able to demonstrate them prior to the end of the session.  Jessica demonstrated good  understanding of the education provided.     See EMR under Patient Instructions for exercises provided prior visit.    Assessment       Jessica is progressing well towards her goals.   Pt prognosis is Good.     Pt will continue to benefit from skilled outpatient physical therapy to address the deficits listed in the problem list box on  initial evaluation, provide pt/family education and to maximize pt's level of independence in the home and community environment.     Pt's spiritual, cultural and educational needs considered and pt agreeable to plan of care and goals.    Anticipated barriers to physical therapy: none    Goals:   Short Term Goals: 3 weeks   1. The patient will begin a written HEP  2. Increase knee ROM to -5 - 115*  3. Decrease soft tissue tenderness to mild      Long Term Goals: 6 weeks   1. There patient will be independent with her HEP for maintenace  2. Increase knee strength to 5/5  3. The patient will ambulate on a community level without  AD .  4. Decrease LEFS impairment to  30%.       Plan     Continue with physical therapy as per plan of care    Abraham Kramer, PT

## 2019-06-23 DIAGNOSIS — G57.01 NEUROPATHY OF RIGHT SCIATIC NERVE: ICD-10-CM

## 2019-06-23 DIAGNOSIS — M54.6 CHRONIC LEFT-SIDED THORACIC BACK PAIN: ICD-10-CM

## 2019-06-23 DIAGNOSIS — I89.0 LYMPHEDEMA OF RIGHT LOWER EXTREMITY: ICD-10-CM

## 2019-06-23 DIAGNOSIS — G89.29 CHRONIC LEFT-SIDED THORACIC BACK PAIN: ICD-10-CM

## 2019-06-23 RX ORDER — AMITRIPTYLINE HYDROCHLORIDE 50 MG/1
TABLET, FILM COATED ORAL
Qty: 60 TABLET | Refills: 0 | Status: SHIPPED | OUTPATIENT
Start: 2019-06-23 | End: 2019-08-02 | Stop reason: SDUPTHER

## 2019-06-25 ENCOUNTER — CLINICAL SUPPORT (OUTPATIENT)
Dept: REHABILITATION | Facility: HOSPITAL | Age: 69
End: 2019-06-25
Attending: ORTHOPAEDIC SURGERY
Payer: MEDICARE

## 2019-06-25 DIAGNOSIS — Z96.652 S/P TOTAL KNEE ARTHROPLASTY, LEFT: ICD-10-CM

## 2019-06-25 DIAGNOSIS — R26.9 GAIT ABNORMALITY: ICD-10-CM

## 2019-06-25 PROCEDURE — 97110 THERAPEUTIC EXERCISES: CPT | Mod: PN

## 2019-06-25 NOTE — PROGRESS NOTES
"  Physical Therapy Daily Treatment Note     Name: Fifi Mcnair  [facility] Number: [id number]    Therapy Diagnosis:   Encounter Diagnoses   Name Primary?    Gait abnormality     S/P total knee arthroplasty, left      Physician: Larry Molina,*    Visit Date: 6/25/2019  Physician Orders: PT Eval and Treat   Medical Diagnosis from Referral: S/P total knee arthroplasty, left  Evaluation Date: 5/21/2019  Authorization Period Expiration: 12/31/2019  Plan of Care Expiration: 7/12/2019  Visit # / Visits authorized: 10/ 12      Time In: 300p  Time Out: 355  Total Billable Time: 55 minutes    Precautions: Standard and Fall    Subjective     Pt reports: pain in R knee, no pain in L knee.  "this one is doing great"  Re: R knee.  She was compliant with home exercise program.  Response to previous treatment:   Functional change:     Pain: 0/10  Location: left knee      Objective     Jessica received therapeutic exercises to develop strength, ROM and flexibility for 55 minutes including:    Bike x 10 minutes    //bars: mini squats x 30   HR/TR x 30   LSU 6" x 30   2# HSC x 30    Shuttle: 50# B, 50# heel dips, 25# L x 30 each    Seated gastroc stretch 3 x 30 sec  Seated hamstring stretch 3 x 30 sec    Ball squeeze x 30  LAQ 2# x 30  Hip abduction BTB x 30  SLR 2# x 30  Heel slide with strap x 30  SAQ 2# x 30    Home Exercises Provided and Patient Education Provided     Education provided:   - cont HEP    Written Home Exercises Provided: Patient instructed to cont prior HEP.  Exercises were reviewed and Jessica was able to demonstrate them prior to the end of the session.  Jessica demonstrated good  understanding of the education provided.     See EMR under Patient Instructions for exercises provided prior visit.    Assessment     Fatigues easily.  Knee ROM and strength improving.    Jessica is progressing well towards her goals.   Pt prognosis is Good.     Pt will continue to benefit from skilled outpatient physical therapy to address " the deficits listed in the problem list box on initial evaluation, provide pt/family education and to maximize pt's level of independence in the home and community environment.     Pt's spiritual, cultural and educational needs considered and pt agreeable to plan of care and goals.    Anticipated barriers to physical therapy: none    Goals:  Short Term Goals: 3 weeks   1. The patient will begin a written HEP  2. Increase knee ROM to -5 - 115*  3. Decrease soft tissue tenderness to mild      Long Term Goals: 6 weeks   1. There patient will be independent with her HEP for maintenace  2. Increase knee strength to 5/5  3. The patient will ambulate on a community level without  AD .  4. Decrease LEFS impairment to  30%.       Plan     Cont per POC    Daphnie Alston, PTA

## 2019-06-27 ENCOUNTER — CLINICAL SUPPORT (OUTPATIENT)
Dept: REHABILITATION | Facility: HOSPITAL | Age: 69
End: 2019-06-27
Attending: ORTHOPAEDIC SURGERY
Payer: MEDICARE

## 2019-06-27 DIAGNOSIS — Z96.652 S/P TOTAL KNEE ARTHROPLASTY, LEFT: ICD-10-CM

## 2019-06-27 DIAGNOSIS — R26.9 GAIT ABNORMALITY: ICD-10-CM

## 2019-06-27 PROCEDURE — 97110 THERAPEUTIC EXERCISES: CPT | Mod: PN | Performed by: PHYSICAL THERAPIST

## 2019-06-27 NOTE — PROGRESS NOTES
Physical Therapy Daily Treatment Note     [facility] [id number]    Therapy Diagnosis:   Encounter Diagnoses   Name Primary?    Gait abnormality     S/P total knee arthroplasty, left      Physician: Larry Molina,*    Visit Date: 6/27/2019    Physician Orders: PT Eval and Treat   Medical Diagnosis from Referral: S/P total knee arthroplasty, left  Evaluation Date: 5/21/2019  Authorization Period Expiration: 12/31/2019  Plan of Care Expiration: 7/12/2019  Visit # / Visits authorized: 11/ 12      Time In: 1500  Time Out: 1600  Total Billable Time: 60 minutes    Precautions: Standard    Subjective     Pt reports: continued decreased knee pain  She was compliant with home exercise program.  Response to previous treatment: no complaints  Functional change:     Pain: 2/10  Location: left knee      Objective     Jessica received therapeutic exercises to develop strength, endurance, ROM and flexibility for 60 minutes including:    Seated HSS 3 x 30 sec  Seated GSS 3 x 30 sec  Ottoman stretch 3 min  Quad sets 3 x 10  SLR 3 x 10 2#  TKE 3 x 10 2#  Heel slides 3 x 10  Adductor squeeze 3 x 10  Hook lying abduction 3 x 10 GTB  Stationary bike 10 min  Shuttle leg press 3 x 10 50#  Shuttle single leg press 3 x 10 25#  Shuttle calf press 3 x 10 50#    In //bars  LSU 3 x 10  Mini squats 3 x 10      Home Exercises Provided and Patient Education Provided     Education provided:   -     Written Home Exercises Provided: Patient instructed to cont prior HEP.  Exercises were reviewed and Jessica was able to demonstrate them prior to the end of the session.  Jessica demonstrated good  understanding of the education provided.     See EMR under Patient Instructions for exercises provided prior visit.    Assessment       Jessica is progressing well towards her goals.   Pt prognosis is Good.     Pt will continue to benefit from skilled outpatient physical therapy to address the deficits listed in the problem list box on  initial evaluation, provide pt/family education and to maximize pt's level of independence in the home and community environment.     Pt's spiritual, cultural and educational needs considered and pt agreeable to plan of care and goals.    Anticipated barriers to physical therapy: none    Goals:   Short Term Goals: 3 weeks   1. The patient will begin a written HEP  2. Increase knee ROM to -5 - 115*  3. Decrease soft tissue tenderness to mild      Long Term Goals: 6 weeks   1. There patient will be independent with her HEP for maintenace  2. Increase knee strength to 5/5  3. The patient will ambulate on a community level without  AD .  4. Decrease LEFS impairment to  30%.       Plan     Continue with physical therapy as per plan of care    Abraham Kramer, PT

## 2019-07-11 ENCOUNTER — OFFICE VISIT (OUTPATIENT)
Dept: ORTHOPEDICS | Facility: CLINIC | Age: 69
End: 2019-07-11
Payer: MEDICARE

## 2019-07-11 ENCOUNTER — CLINICAL SUPPORT (OUTPATIENT)
Dept: REHABILITATION | Facility: HOSPITAL | Age: 69
End: 2019-07-11
Attending: ORTHOPAEDIC SURGERY
Payer: MEDICARE

## 2019-07-11 VITALS — HEIGHT: 62 IN | WEIGHT: 262 LBS | BODY MASS INDEX: 48.21 KG/M2

## 2019-07-11 DIAGNOSIS — R26.9 GAIT ABNORMALITY: ICD-10-CM

## 2019-07-11 DIAGNOSIS — Z96.652 S/P TOTAL KNEE ARTHROPLASTY, LEFT: ICD-10-CM

## 2019-07-11 DIAGNOSIS — Z96.652 S/P TOTAL KNEE ARTHROPLASTY, LEFT: Primary | ICD-10-CM

## 2019-07-11 PROCEDURE — 97110 THERAPEUTIC EXERCISES: CPT | Mod: PN

## 2019-07-11 PROCEDURE — 99999 PR PBB SHADOW E&M-EST. PATIENT-LVL II: ICD-10-PCS | Mod: PBBFAC,,, | Performed by: ORTHOPAEDIC SURGERY

## 2019-07-11 PROCEDURE — 99024 PR POST-OP FOLLOW-UP VISIT: ICD-10-PCS | Mod: S$GLB,,, | Performed by: ORTHOPAEDIC SURGERY

## 2019-07-11 PROCEDURE — 99999 PR PBB SHADOW E&M-EST. PATIENT-LVL II: CPT | Mod: PBBFAC,,, | Performed by: ORTHOPAEDIC SURGERY

## 2019-07-11 PROCEDURE — 99024 POSTOP FOLLOW-UP VISIT: CPT | Mod: S$GLB,,, | Performed by: ORTHOPAEDIC SURGERY

## 2019-07-11 NOTE — PROGRESS NOTES
Past Medical History:   Diagnosis Date    Allergy     multiple antibiotic allergies    Anemia     Anxiety     Arthritis     Asthma     CHF (congestive heart failure)     NYHA class III     Chronic kidney disease     Colon polyp     benign    COPD (chronic obstructive pulmonary disease)     DLCO 37% , and mild pulmonary HTN    Dental bridge present     LOWER    Depression     Diabetes mellitus     Diabetic retinopathy     Diastolic dysfunction     Diverticulitis of large intestine without perforation or abscess without bleeding 2/12/2018    Diverticulosis     Former smoker     Fracture of lumbar spine     GERD (gastroesophageal reflux disease)     Hernia of unspecified site of abdominal cavity without mention of obstruction or gangrene     Hyperlipidemia     Hypertension     hypertensive renal    IBS (irritable bowel syndrome)     Mild nonproliferative diabetic retinopathy(362.04) 11/25/2013    Morbid obesity     Nuclear sclerosis 11/25/2013    Osteoporosis     Peripheral edema     Rash     Recurrent upper respiratory infection (URI)     S/P LASIK (laser assisted in situ keratomileusis)     Sleep apnea     sleep apnea uses bipap.    Stroke     tia    Thyroid disease     on synthroid    TIA (transient ischemic attack)     Urinary tract infection     Wears glasses        Past Surgical History:   Procedure Laterality Date    ABDOMINAL SURGERY      ADENOIDECTOMY      ARTHROPLASTY, KNEE Left 4/16/2019    Performed by [facility]ina [facility] at [facility] OR    ARTHROSCOPY, KNEE, WITH CHONDROPLASTY Right 8/31/2018    [facility] at [facility] OR    ARTHROSCOPY, KNEE, WITH MENISCECTOMY Right 8/31/2018    [facility] at [facility] OR    COLONOSCOPY  03/05/2013    repeat in 5 years    COLONOSCOPY N/A 4/11/2018    Performed by Luis Navarro MD at [facility]O    COLONOSCOPY N/A 5/31/2017    [facility]arro [facility] at [facility] END[facility]     COLONOSCOPY N/A 3/5/2013    [facility] at [facility]    COSMETIC SURGERY      belt abdominoplasty    CYSTOSCOPY      CYSTOSCOPY N/A 8/6/2018    [facility]    EGD (ESOPHAGOGASTRODUODENOSCOPY) N/A 3/5/2013    [facility]    ESOPHAGOGASTRODUODENOSCOPY (EGD) N/A 1/11/2018    [facility]    ESOPHAGOGASTRODUODENOSCOPY (EGD) N/A 12/27/2016    [facility]    ESOPHAGOGASTRODUODENOSCOPY (EGD) N/A 10/25/2016    [facility]    ESOPHAGOGASTRODUODENOSCOPY (EGD) N/A 8/24/2016    [facility]    ESOPHAGOGASTRODUODENOSCOPY (EGD) N/A 7/21/2016    [facility] END[facility]    ESOPHAGOGASTRODUODENOSCOPY (EGD)-96783 N/A 12/12/2014    [facility]    EYE SURGERY Right     mazzulla    GASTRECTOMY      GASTRECTOMY-SLEEVE-LAPAROSCOPIC-12638; EGD-[id number] N/A 12/12/2014    [facility] OR 2ND FLR    gastric sleeve      HERNIA REPAIR      5 years old    HYSTERECTOMY      ovaries remain    PANNICULECTOMY N/A 11/28/2016    [facility]il [facility] at [facility] 2ND FLR    REFRACTIVE SURGERY      mono va//ou//    REPAIR-HERNIA  11/28/2016    [facility] at [facility] 2ND FLR    RHINOPLASTY TIP      TONSILLECTOMY      TOTAL KNEE ARTHROPLASTY Left 4/16/19    TUBAL LIGATION      UPPER GASTROINTESTINAL ENDOSCOPY  03/05/2013    UPPER GASTROINTESTINAL ENDOSCOPY  08/24/2016    Dr. Navarro, repeat in 8 weeks    UPPER GASTROINTESTINAL ENDOSCOPY  07/21/2016    Dr. Randall       Current Outpatient Medications   Medication Sig    allopurinol (ZYLOPRIM) 100 MG tablet TAKE TWO TABLETS BY MOUTH AT BEDTIME    amitriptyline (ELAVIL) 50 MG tablet TAKE 2 TABLETS BY  MOUTH IN THE EVENING    aspirin (ECOTRIN) 325 MG EC tablet Take 1 tablet (325 mg total) by mouth 2 (two) times daily with meals.    atenolol (TENORMIN) 25 MG tablet Take 1 tablet (25 mg total) by mouth 2 (two) times daily.    calcitRIOL (ROCALTROL) 0.25 MCG Cap Take 1 capsule by mouth twice a week. Monday Friday    cetirizine (ZYRTEC) 10 MG tablet Take 1 tablet (10 mg total) by mouth once daily.    clotrimazole-betamethasone 1-0.05% (LOTRISONE) cream     cyanocobalamin, vitamin B-12, (VITAMIN B-12) 5,000 mcg Subl Place 5,000 mg under the tongue once a week.    diclofenac sodium (VOLTAREN) 1 % Gel  APPLY 2 GRAMS TOPICALLY ONCE DAILY    diphenoxylate-atropine 2.5-0.025 mg (LOMOTIL) 2.5-0.025 mg per tablet TAKE ONE TABLET BY MOUTH 4 TIMES DAILY AS NEEDED FOR DIARRHEA    DYMISTA 137-50 mcg/spray Spry nassal spray as needed.     FLUoxetine 20 MG capsule Take 2 capsules (40 mg total) by mouth once daily.    fluticasone (FLONASE) 50 mcg/actuation nasal spray 2 sprays by Nasal route once daily.     fluticasone-vilanterol (BREO) 100-25 mcg/dose diskus inhaler Inhale 1 puff into the lungs once daily.    gabapentin (NEURONTIN) 100 MG capsule 3 (three) times daily.     insulin (BASAGLAR KWIKPEN U-100 INSULIN) glargine 100 units/mL (3mL) SubQ pen Inject 20 Units into the skin every evening. Use 10 units if BS over 210.Night before surgery.    insulin aspart U-100 (NOVOLOG) 100 unit/mL injection Use per insulin pump, 35-40 units daily.    KLOR-CON M20 20 mEq tablet TAKE ONE TABLET BY MOUTH TWICE DAILY    l-methylfolate-b2-b6-b12 (CEREFOLIN) 6-5-50-1 mg Tab Take 1 tablet by mouth once daily.    Lacto.acidophilus-Bif.animalis (PROBIOTIC) 5 billion cell CpSP Take 1 capsule by mouth once daily.    levothyroxine (SYNTHROID) 25 MCG tablet TAKE ONE TABLET BY MOUTH ONCE DAILY    LORazepam (ATIVAN) 1 MG tablet TAKE 1 TABLET BY MOUTH EVERY 12 HOURS AS NEEDED FOR ANXIETY    losartan (COZAAR) 50 MG tablet Take 1 tablet  (50 mg total) by mouth once daily.    montelukast (SINGULAIR) 10 mg tablet     MULTIVIT-IRON-MIN-FOLIC ACID 3,500-18-0.4 UNIT-MG-MG ORAL CHEW Take by mouth 2 (two) times daily.    mupirocin (BACTROBAN) 2 % ointment APPLY OINTMENT TOPICALLY TO AFFECTED AREA ON NOSE THREE TIMES DAILY AS DIRECTED    oxyCODONE (ROXICODONE) 15 MG Tab Take 1 tablet (15 mg total) by mouth every 6 (six) hours as needed for Pain.    oxyCODONE-acetaminophen (PERCOCET) 5-325 mg per tablet Take 1 tablet by mouth every 4 (four) hours as needed for Pain.    pantoprazole (PROTONIX) 40 MG tablet TAKE ONE TABLET BY MOUTH ONCE DAILY    ranitidine (ZANTAC) 300 MG tablet TAKE ONE TABLET BY MOUTH IN THE EVENING    SSD 1 % cream     torsemide (DEMADEX) 20 MG Tab Take 1 tablet (20 mg total) by mouth 2 (two) times daily. TAKE 1 TABLET BY MOUTH EVERY OTHER DAY THEN 2 TABLETS EVERY DAY    albuterol-ipratropium 2.5mg-0.5mg/3mL (DUO-NEB) 0.5 mg-3 mg(2.5 mg base)/3 mL nebulizer solution Take 3 mLs by nebulization every 6 (six) hours as needed for Wheezing. Rescue     Current Facility-Administered Medications   Medication    gentamicin injection 80 mg     Facility-Administered Medications Ordered in Other Visits   Medication    lactated ringers infusion       Review of patient's allergies indicates:   Allergen Reactions    Adhesive Other (See Comments)     Blisters    Cleocin [clindamycin hcl] Hives    Ceclor [cefaclor] Hives    Advair diskus [fluticasone propion-salmeterol]      Dry mouth    Aleve [naproxen sodium]      Unable to take secondary to kidney function.     Erythromycin Hives    Levaquin [levofloxacin] Dermatitis    Macrobid [nitrofurantoin monohyd/m-cryst] Other (See Comments)     Stomach pain/ GI issues    Macrodantin [nitrofurantoin macrocrystalline] Hives    Motrin [ibuprofen]      Unable to take secondary to kidney functions.    Restoril [temazepam]      LIGHTHEADED UPON WAKING.with poor results    Sulfa (sulfonamide  antibiotics)      Hold due to renal problems    Trazodone      PALPITATIONS    Remeron [mirtazapine] Palpitations and Other (See Comments)     Jittery    Vancomycin analogues Rash     Feet broke out/inflammed blood vessels         Family History   Problem Relation Age of Onset    Heart disease Mother 59    Cancer Mother 59        throat    Allergies Mother     Diabetes Mother     Heart disease Father 70        flutter    Allergies Father     Diabetes Father     Cancer Sister 22        22 thyroid,49  breast    Allergies Sister     Breast cancer Sister     Diabetes Sister     Cancer Brother 62        lung    Diabetes Brother     Asthma Daughter     Diabetes Daughter     Depression Daughter     Asthma Grandchild     Eczema Grandchild     Eczema Grandchild     Breast cancer Maternal Grandmother     Ovarian cancer Cousin        Social History     Socioeconomic History    Marital status:      Spouse name: Not on file    Number of children: Not on file    Years of education: Not on file    Highest education level: Not on file   Occupational History    Not on file   Social Needs    Financial resource strain: Not on file    Food insecurity:     Worry: Not on file     Inability: Not on file    Transportation needs:     Medical: Not on file     Non-medical: Not on file   Tobacco Use    Smoking status: Former Smoker     Packs/day: 1.00     Years: 4.00     Pack years: 4.00     Types: Cigarettes     Last attempt to quit:      Years since quittin.5    Smokeless tobacco: Never Used    Tobacco comment: quit , lived with smokers    Substance and Sexual Activity    Alcohol use: Yes     Comment: rare    Drug use: No    Sexual activity: Yes     Birth control/protection: Surgical   Lifestyle    Physical activity:     Days per week: Not on file     Minutes per session: Not on file    Stress: Not on file   Relationships    Social connections:     Talks on phone: Not on file      Gets together: Not on file     Attends Shinto service: Not on file     Active member of club or organization: Not on file     Attends meetings of clubs or organizations: Not on file     Relationship status: Not on file   Other Topics Concern    Not on file   Social History Narrative    Not on file       Chief Complaint:   Chief Complaint   Patient presents with    Knee Pain     L knee 6wk f/u       Date of surgery:  April 16, 2019    History of present illness:  68-year-old female who underwent left total knee arthroplasty 12 weeks ago.  Patient is doing pretty well.  Has good range of motion.  Her left knee is doing great.  She is more concerned about the right knee now.  Is giving more trouble.  Right knee is a 9/10.  Left knee is a 2/10.    Review of Systems:    Musculoskeletal:  See HPI        Physical Examination:    Vital Signs:    There were no vitals filed for this visit.    Body mass index is 47.92 kg/m².    This a well-developed, well nourished patient in no acute distress.  They are alert and oriented and cooperative to examination.  Pt. walks with a mild antalgic gait.      Examination left knee shows healed surgical incision. No erythema or drainage. No real joint swelling.  Range of motion is 0-120 degrees.  Mild calf pain but no Homans sign.    Examination of the right knee shows no rashes or erythema. There are no masses ecchymosis or effusion. Patient has full range of motion from 0-130°. Patient is nontender to palpation over lateral joint line and nontender to palpation over the medial joint line. Patient has a - Lachman exam, - anterior drawer exam, and - posterior drawer exam. - Mervin's exam. Knee is stable to varus and valgus stress. 5 out of 5 motor strength. Palpable distal pulses. Intact light touch sensation. Negative Patellofemoral crepitus    X-rays:  Two views of the left knee are  reviewed which show well-aligned total knee arthroplasty components     Assessment::  Status post  left Needham total knee arthroplasty    Plan:  She is doing great with the left knee. We talked about scheduling her for the right knee.  Plan is for right Emanuel total knee arthroplasty.  I will see her at surgery time.    This note was created using M Modal voice recognition software that occasionally misinterpreted phrases or words.

## 2019-07-11 NOTE — PROGRESS NOTES
"  Physical Therapy Daily Treatment Note     Name: Fifi Mcnair  [facility] Number: [id number]    Therapy Diagnosis:   Encounter Diagnoses   Name Primary?    Gait abnormality     S/P total knee arthroplasty, left      Physician: Larry Molina,*    Visit Date: 7/11/2019  Physician Orders: PT Eval and Treat   Medical Diagnosis from Referral: S/P total knee arthroplasty, left  Evaluation Date: 5/21/2019  Authorization Period Expiration: 12/31/2019  Plan of Care Expiration: 7/12/2019  Visit # / Visits authorized: 12/ 12      Time In: 258  Time Out: 342  Total Billable Time: 44 minutes    Precautions: Standard and Fall    Subjective     Pt reports: "I'm doing fine"  TKA R knee in September.  She was compliant with home exercise program.  Response to previous treatment:   Functional change:     Pain: 0/10  Location: left knee      Objective     Jessica received therapeutic exercises to develop strength, ROM and flexibility for 44 minutes including:    Bike x 10 minutes     //bars: mini squats x 30              HR/TR x 30              LSU 6" x 30     Shuttle: 50# B, 50# heel dips, 25# L x 30 each     Seated gastroc stretch 3 x 30 sec  Seated hamstring stretch 3 x 30 sec     Ball squeeze x 30  LAQ 2# x 30  Hip abduction BTB x 30  SLR 2# x 30  Heel slide with strap x 30  SAQ 2# x 30    Home Exercises Provided and Patient Education Provided     Education provided:   - cont HEP    Written Home Exercises Provided: Patient instructed to cont prior HEP.  Exercises were reviewed and Jessica was able to demonstrate them prior to the end of the session.  Jsesica demonstrated good  understanding of the education provided.     See EMR under Patient Instructions for exercises provided prior visit.    Assessment     No increase in s/s reported.  Good tolerance for exercises.  Strength improved.  Jessica is progressing well towards her goals.   Pt prognosis is Good.     Pt will continue to benefit from skilled outpatient physical therapy to " address the deficits listed in the problem list box on initial evaluation, provide pt/family education and to maximize pt's level of independence in the home and community environment.     Pt's spiritual, cultural and educational needs considered and pt agreeable to plan of care and goals.    Anticipated barriers to physical therapy: none  Goals:  Short Term Goals: 3 weeks   1. The patient will begin a written HEP  2. Increase knee ROM to -5 - 115*  3. Decrease soft tissue tenderness to mild      Long Term Goals: 6 weeks   1. There patient will be independent with her HEP for maintenace  2. Increase knee strength to 5/5  3. The patient will ambulate on a community level without  AD .  4. Decrease LEFS impairment to  30%.       Plan     Pt is D/C'd per PT    Daphnie Alston, PTA

## 2019-07-12 ENCOUNTER — OFFICE VISIT (OUTPATIENT)
Dept: CARDIOLOGY | Facility: CLINIC | Age: 69
End: 2019-07-12
Payer: MEDICARE

## 2019-07-12 VITALS
BODY MASS INDEX: 44.63 KG/M2 | HEART RATE: 83 BPM | DIASTOLIC BLOOD PRESSURE: 62 MMHG | SYSTOLIC BLOOD PRESSURE: 137 MMHG | HEIGHT: 62 IN | OXYGEN SATURATION: 90 % | WEIGHT: 242.5 LBS

## 2019-07-12 DIAGNOSIS — G47.33 OSA (OBSTRUCTIVE SLEEP APNEA): ICD-10-CM

## 2019-07-12 DIAGNOSIS — D63.1 ANEMIA DUE TO STAGE 3 CHRONIC KIDNEY DISEASE: ICD-10-CM

## 2019-07-12 DIAGNOSIS — I44.7 LBBB (LEFT BUNDLE BRANCH BLOCK): ICD-10-CM

## 2019-07-12 DIAGNOSIS — E88.09 HYPOALBUMINEMIA: ICD-10-CM

## 2019-07-12 DIAGNOSIS — Z91.89 CARDIOVASCULAR EVENT RISK: ICD-10-CM

## 2019-07-12 DIAGNOSIS — Z96.652 S/P TOTAL KNEE ARTHROPLASTY, LEFT: ICD-10-CM

## 2019-07-12 DIAGNOSIS — Z91.89 SEDENTARY LIFESTYLE: ICD-10-CM

## 2019-07-12 DIAGNOSIS — E11.8 CONTROLLED TYPE 2 DIABETES MELLITUS WITH COMPLICATION, WITH LONG-TERM CURRENT USE OF INSULIN: ICD-10-CM

## 2019-07-12 DIAGNOSIS — I11.9 HYPERTENSIVE LEFT VENTRICULAR HYPERTROPHY, WITHOUT HEART FAILURE: ICD-10-CM

## 2019-07-12 DIAGNOSIS — E66.01 MORBID OBESITY WITH BMI OF 40.0-44.9, ADULT: ICD-10-CM

## 2019-07-12 DIAGNOSIS — J44.1 CHRONIC OBSTRUCTIVE PULMONARY DISEASE WITH ACUTE EXACERBATION: ICD-10-CM

## 2019-07-12 DIAGNOSIS — Z98.84 BARIATRIC SURGERY STATUS: ICD-10-CM

## 2019-07-12 DIAGNOSIS — Z01.810 PREOP CARDIOVASCULAR EXAM: Primary | ICD-10-CM

## 2019-07-12 DIAGNOSIS — N18.30 ANEMIA DUE TO STAGE 3 CHRONIC KIDNEY DISEASE: ICD-10-CM

## 2019-07-12 DIAGNOSIS — I10 ESSENTIAL HYPERTENSION: ICD-10-CM

## 2019-07-12 DIAGNOSIS — M17.0 ARTHRITIS OF BOTH KNEES: ICD-10-CM

## 2019-07-12 DIAGNOSIS — N18.30 CHRONIC KIDNEY DISEASE, STAGE III (MODERATE): ICD-10-CM

## 2019-07-12 DIAGNOSIS — Z79.4 CONTROLLED TYPE 2 DIABETES MELLITUS WITH COMPLICATION, WITH LONG-TERM CURRENT USE OF INSULIN: ICD-10-CM

## 2019-07-12 PROBLEM — R00.0 FAST HEART BEAT: Status: RESOLVED | Noted: 2018-12-17 | Resolved: 2019-07-12

## 2019-07-12 PROCEDURE — 99999 PR PBB SHADOW E&M-EST. PATIENT-LVL III: CPT | Mod: PBBFAC,,, | Performed by: INTERNAL MEDICINE

## 2019-07-12 PROCEDURE — 3045F PR MOST RECENT HEMOGLOBIN A1C LEVEL 7.0-9.0%: CPT | Mod: CPTII,S$GLB,, | Performed by: INTERNAL MEDICINE

## 2019-07-12 PROCEDURE — 99215 OFFICE O/P EST HI 40 MIN: CPT | Mod: S$GLB,,, | Performed by: INTERNAL MEDICINE

## 2019-07-12 PROCEDURE — 99499 UNLISTED E&M SERVICE: CPT | Mod: S$GLB,,, | Performed by: INTERNAL MEDICINE

## 2019-07-12 PROCEDURE — 3045F PR MOST RECENT HEMOGLOBIN A1C LEVEL 7.0-9.0%: ICD-10-PCS | Mod: CPTII,S$GLB,, | Performed by: INTERNAL MEDICINE

## 2019-07-12 PROCEDURE — 93000 ELECTROCARDIOGRAM COMPLETE: CPT | Mod: S$GLB,,, | Performed by: INTERNAL MEDICINE

## 2019-07-12 PROCEDURE — 3075F PR MOST RECENT SYSTOLIC BLOOD PRESS GE 130-139MM HG: ICD-10-PCS | Mod: CPTII,S$GLB,, | Performed by: INTERNAL MEDICINE

## 2019-07-12 PROCEDURE — 1101F PR PT FALLS ASSESS DOC 0-1 FALLS W/OUT INJ PAST YR: ICD-10-PCS | Mod: CPTII,S$GLB,, | Performed by: INTERNAL MEDICINE

## 2019-07-12 PROCEDURE — 99999 PR PBB SHADOW E&M-EST. PATIENT-LVL III: ICD-10-PCS | Mod: PBBFAC,,, | Performed by: INTERNAL MEDICINE

## 2019-07-12 PROCEDURE — 99215 PR OFFICE/OUTPT VISIT, EST, LEVL V, 40-54 MIN: ICD-10-PCS | Mod: S$GLB,,, | Performed by: INTERNAL MEDICINE

## 2019-07-12 PROCEDURE — 93000 EKG 12-LEAD: ICD-10-PCS | Mod: S$GLB,,, | Performed by: INTERNAL MEDICINE

## 2019-07-12 PROCEDURE — 3078F PR MOST RECENT DIASTOLIC BLOOD PRESSURE < 80 MM HG: ICD-10-PCS | Mod: CPTII,S$GLB,, | Performed by: INTERNAL MEDICINE

## 2019-07-12 PROCEDURE — 3075F SYST BP GE 130 - 139MM HG: CPT | Mod: CPTII,S$GLB,, | Performed by: INTERNAL MEDICINE

## 2019-07-12 PROCEDURE — 1101F PT FALLS ASSESS-DOCD LE1/YR: CPT | Mod: CPTII,S$GLB,, | Performed by: INTERNAL MEDICINE

## 2019-07-12 PROCEDURE — 99499 RISK ADDL DX/OHS AUDIT: ICD-10-PCS | Mod: S$GLB,,, | Performed by: INTERNAL MEDICINE

## 2019-07-12 PROCEDURE — 3078F DIAST BP <80 MM HG: CPT | Mod: CPTII,S$GLB,, | Performed by: INTERNAL MEDICINE

## 2019-07-12 NOTE — PROGRESS NOTES
Subjective:    Patient ID:  Fifi Mcnair is a 68 y.o. female who presents for evaluation of 6 month f/u (sx clearance)  For post 100+ lbs loss after bariatric operation, chronic diastolic HF, class I, fell in 2/2018 and chronic infections, very sedentary, post left TKR and here for pre-op for right TKR  PCP: Dr. Vargas, see biannually  Plastic surgeon: Dr. Becerril  Cardiologist: Dr. Mcmullen, last seen 11/16/2015  Vascular surgeon: Dr. Varela  Bariatric: Dr. Kwong, [facility]  GI: Dr. Navarro  Orthopedic and referred by: Dr. Molina  Renal: Dr. Soto  Lung: Dr. Tian  Eye: Dr. Yap  Retina: Dr. Easton  Back: Dr. Huntley  Urologist: Dr. Campos  Lives with , Florian, here with patient, non-smoker  Disabled due to DM and CLBP, 2010, prior convenient     Health literacy: high  Activities: sedentary due to chronic knee pains, left knee improved post TKR in 4/2019  Nicotine: quit 1990, 4 py  Alcohol: none  Illicit drugs: none  Cardiac symptoms: none  Home BP: similar to office  Medication compliance: yes  Diet: diabetic, no added salt  Caffeine: 1 cpd  Labs: 5/2019, LDL 37, not on Rx, CMP (, albumin 3.3, eGFR 52), CBC (Hgb 11.8), normal TSH, A1C 8.4%, urine negative for microalbuminuria  Last Echo: 5/2018  Last stress test: 10/2016, Lexiscan  Cardiovascular angiogram: 12/2012  ECG: NSR. 56, LBBB  Fundoscopic exam: within the past year, negative for HTN changes but have diabetic retinopathy    In 9/2016:  White female here for follow up post medications adjustment due to hypotension with near syncope. Several medication was reduced in dose and now feeling OK. Noted still LOERA especially with walking, limited also by low back pains.    Chart reviewed - abnormal stress test in 11/2012  Nuclear Quantitative Functional Analysis:   LVEF: 52 % (normal is 55 - 69)  LVED Volume: 117 ml (normal is 60 - 98)  LVES Volume: 56 ml (normal is 20 - 42)    Impression: ABNORMAL MYOCARDIAL PERFUSION  1.  There is evidence for mild myocardial ischemia in the anterolateral wall of the left ventricle.   2. The perfusion scan is free of evidence for myocardial injury.   3. There is a moderate intensity fixed defect in the anteroseptal wall of the left ventricle, secondary to breast attenuation.   4. Resting wall motion is physiologic.   5. There is resting LV dysfunction with a reduced ejection fraction of 52 %.  (normal is 55 - 69)  6. The ventricular volumes are normal at rest and stress.   7. The extracardiac distribution of radioactivity is normal.   8. [facility] SSS =8, SRS =6, SDS =2, suggest that 2.5% of myocardium may be ischemic.     [facility] HEMODYNAMIC RESULTS:    LVEDP (Pre): 16 mmHg  LVEDP (Post): 16 mmHg  Ejection Fraction: 60%    C. ANGIOGRAPHIC RESULTS:    DIAGNOSTIC:       Patient has a right dominant coronary artery.        - Left Main Coronary Artery:             The LM has luminal irregularities. There is DAX 3 flow.       - Left Anterior Descending Artery:             The LAD has luminal irregularities. There is DAX 3 flow.       - Left Circumflex Artery:             The LCX has luminal irregularities. There is DAX 3 flow.       - Right Coronary Artery:             The RCA has luminal irregularities. There is DAX 3 flow.       - Left Renal Artery:             The proximal left renal has luminal irregularities.       - Right Renal Artery:             The proximal right renal has luminal irregularities.      D. SUMMARY:    1. Non-obstructive CAD.  2. Non-obstructive atherosclerotic disease of the  right and left renal arteries    E. RECOMMENDATIONS:    1. Maximize medical management.  2. Risk factor reductions.  3. ASA 81mg.    ECHO 4/2015 - CONCLUSIONS     1 - Eccentric hypertrophy.     2 - Normal left ventricular systolic function (EF 60-65%).     3 - Left ventricular diastolic dysfunction. E/e'(lat) is 12    4 - Normal right ventricular systolic function .     5 - Pulmonary hypertension. estimated  "PA systolic pressure is 45 mmHg.    6 - Mild mitral regurgitation.     7 - Mild tricuspid regurgitation.     Since visit of 1/21/2016, no further problem with low BP, little more active, able to walk longer before SOB. Also limited by chronic back pains with compression fracture with sudden weakness of the right leg. Uses a walking stick.     In 9/2016, here for pre op clearance to remove fatty tissue from abdominal wall, problem with chronic infection, pain, burning. Denies any CP nor SOB. Compliant with medications. Labs reviewed - .8, non- on no medications, ASCVD 10-year event risk is 12.3%. CMP shows CKD stage III, eGFR 48, recent Cr now reported down to 0.95. Continue on Insulin pump, dosage decrease 160 U to now 25 U daily. Only major surgical risk is being on Insulin. ECG continue to show LBBB.    In 10/2016, feeling "great", no new problem but request Lomotil for chronic diarrhea from IBS, given medication that cost $500 for 14 days.  Lexiscan - Nuclear Quantitative Functional Analysis:   LVEF: 58 % (normal is 55 - 69)  LVED Volume: 60 ml (normal is 60 - 98)  LVES Volume: 43 ml (normal is 20 - 42)    Impression: NORMAL MYOCARDIAL PERFUSION  1. The perfusion scan is free of evidence for myocardial ischemia or injury.   2. There is a moderate intensity fixed defect in the anteroseptal wall of the left ventricle, secondary to breast attenuation.   3. Resting wall motion is physiologic.   4. Resting LV function is normal.  (normal is 55 - 69)  5. The ventricular volumes are normal at rest and stress.   6. The extracardiac distribution of radioactivity is normal.   7. When compared to the previous study from 11/28/2012, the anterolateral wall now appears normal.    ECHO CONCLUSIONS     1 - Mild left atrial enlargement. measuring 36.24 cc/m2.     2 - Concentric hypertrophy. the septum and the posterior wall each measuring 1.2 cm across.     3 - Normal left ventricular systolic function (EF 55-60%). " "    4 - Left ventricular diastolic dysfunction. E/e'(lat) is 13    5 - Normal right ventricular systolic function .     6 - The estimated PA systolic pressure is 37 mmHg.     7 - Small pericardial effusion.     8 - No significant change from Echo in 395[address].     9 - Difficult apical windows.     In 5/2018, here with concern of right thigh "edema" for the last couple of months, right much bigger than the left. Also persistent anterior right knee pain since fall in 3/2018.   Venous US - Impression     1. No evidence of deep vein thrombosis    2.  Venous insufficiency within the distal right greater saphenous vein at the knee.  Reflux times are given above.    3.  Stable complex right Baker's cyst       Leg US - Nonspecific calcific subcentimeter mass at the site of palpable clinical concern in the anteromedial right leg.  This most likely represents a chronic soft tissue calcification and was present on the 03/22/2018 radiographs.    Moderate-sized, complex Baker cyst in the medial popliteal fossa.    CXR - heart is upper limits of normal-sized.  There is increased thoracic kyphosis with and there is anterior paravertebral osteophyte fight formation.    In 12/2018, here for follow up, had recommended seeing annually. At this time problem mostly non-cardiac, have noted fast heart beats over the last month, last up to 2 minutes, 4-5 times weekly, makes patient somewhat anxious. ECG in 5/2018, in NSR, chronic LBBB.    HPI comments: in 7/2019, here for pre-orthopedic surgery clearance for the right knee. Post left knee TKR in 4/2019, no complications. Have severe ASCVD risk factor and LBBB, denies any CP nor SOB. LDL is excellent at 37, not on any Rx.    Review of Systems   Constitution: Positive for weight gain (9 lbs since last visit10 lbs since 12/2018). Negative for diaphoresis, malaise/fatigue, night sweats and weight loss (110 lbs since operation 12/2014). Fever: with repeated infections.        Quit sedentary due to " chronic knees problem.   HENT: Negative for nosebleeds and tinnitus.    Eyes: Positive for redness. Negative for visual disturbance.   Cardiovascular: Positive for dyspnea on exertion. Negative for chest pain, claudication, cyanosis, irregular heartbeat, leg swelling, near-syncope, orthopnea, palpitations and paroxysmal nocturnal dyspnea.   Respiratory: Positive for cough and snoring (on CPAP nightly, 100%). Negative for shortness of breath, sleep disturbances due to breathing and wheezing.         Clarendon score 0 using CPAP 100%, stable   Endocrine: Positive for cold intolerance and heat intolerance. Negative for polydipsia and polyuria.   Hematologic/Lymphatic: Bruises/bleeds easily.   Skin: Positive for unusual hair distribution. Negative for color change, flushing, nail changes, poor wound healing and suspicious lesions.   Musculoskeletal: Positive for arthritis, back pain, gout, joint pain, joint swelling, muscle cramps, neck pain and stiffness. Negative for falls, muscle weakness and myalgias.   Gastrointestinal: Positive for diarrhea, dysphagia, heartburn, hemorrhoids and nausea. Negative for hematemesis, hematochezia and melena.        Have gastric ulcer at the site of the sleeve   Genitourinary: Positive for hematuria.        CKD due to DM   Neurological: Negative for disturbances in coordination, excessive daytime sleepiness, dizziness, focal weakness, headaches, light-headedness, loss of balance, numbness, vertigo and weakness. Paresthesias: of stomach.   Psychiatric/Behavioral: Negative for depression and substance abuse. The patient has insomnia. The patient is not nervous/anxious.    Allergic/Immunologic: Positive for environmental allergies and hives.        Objective:    Physical Exam   Constitutional: She is oriented to person, place, and time. She appears well-developed and well-nourished.   HENT:   Head: Normocephalic.   Eyes: Pupils are equal, round, and reactive to light. Conjunctivae and EOM  "are normal.   Neck: Normal range of motion. Neck supple. No JVD present. No thyromegaly present.   Circumference 16.5"   Cardiovascular: Normal rate, regular rhythm, normal heart sounds and intact distal pulses. Exam reveals no gallop and no friction rub.   No murmur heard.  Mild superficial varicose veins around the ankles.   Pulmonary/Chest: Effort normal and breath sounds normal. She has no wheezes. She has no rales. She exhibits no tenderness.   Abdominal: Soft. Bowel sounds are normal. There is no tenderness.   Waist 47" up to 52.5", hips 53.25"   Musculoskeletal: Normal range of motion. She exhibits no edema.   Lymphadenopathy:     She has no cervical adenopathy.   Neurological: She is alert and oriented to person, place, and time.   Skin: Skin is warm and dry. No rash noted.         Assessment:       1. Preop cardiovascular exam    2. LBBB (left bundle branch block)    3. Cardiovascular event risk, ASCVD 10-year risk 12.3%, 12/2015    4. Arthritis of both knees    5. Bariatric surgery status, 12/2014    6. Chronic kidney disease, stage III (moderate)    7. Chronic obstructive pulmonary disease with acute exacerbation    8. Controlled type 2 diabetes mellitus with complication, with long-term current use of insulin    9. Essential hypertension    10. Hypertensive left ventricular hypertrophy, without heart failure    11. Morbid obesity with BMI of 40.0-44.9, adult, today 44.3    12. MECHELLE (obstructive sleep apnea), on CPAP 100%    13. S/P [place] total knee arthroplasty, left April 16, 2019    14. Sedentary lifestyle    15. Hypoalbuminemia    16. Anemia due to stage 3 chronic kidney disease         Plan:       Fifi was seen today for follow-up.    Diagnoses and all orders for this visit:    Preop cardiovascular exam  -     NM Myocardial Perfusion Spect Multi Pharmacologic; Future; Expected date: 07/12/2019  -     Treadmill Stress Test; Future    LBBB (left bundle branch block)  -     EKG 12-lead  -     NM " Myocardial Perfusion Spect Multi Pharmacologic; Future; Expected date: 07/12/2019  -     Treadmill Stress Test; Future    Cardiovascular event risk, ASCVD 10-year risk 12.3%, 12/2015  -     NM Myocardial Perfusion Spect Multi Pharmacologic; Future; Expected date: 07/12/2019  -     Treadmill Stress Test; Future    Arthritis of both knees    Bariatric surgery status, 12/2014    Chronic kidney disease, stage III (moderate)  -     NM Myocardial Perfusion Spect Multi Pharmacologic; Future; Expected date: 07/12/2019  -     Treadmill Stress Test; Future    Chronic obstructive pulmonary disease with acute exacerbation    Controlled type 2 diabetes mellitus with complication, with long-term current use of insulin    Essential hypertension    Hypertensive left ventricular hypertrophy, without heart failure    Morbid obesity with BMI of 40.0-44.9, adult, today 44.3    MECHELLE (obstructive sleep apnea), on CPAP 100%    S/P [place] total knee arthroplasty, left April 16, 2019    Sedentary lifestyle    Hypoalbuminemia    Anemia due to stage 3 chronic kidney disease    - All medical issues reviewed, continue current Rx.  - Weigh twice weekly, try to lose 1-2 lbs per week  - CV status stable, continue current Rx, all medications reviewed, patient acknowledge good understanding.  - Recommend healthy living: healthy diet and regular exercise, and weight control  - Highly recommend 30 minutes of exercise daily, can have Sunday off, with 2-3 sessions of muscle strengthening weekly. A  would be very helpful.  - Any activities is better than none  - Recommend at least annual cardiovascular evaluation in view of her significant risk factors.  - Phone review / encourage use of [facility], clearance after results  - Need to work with a         Patient Active Problem List   Diagnosis    Hypertension    S/P LASIK (laser assisted in situ keratomileusis)    GERD (gastroesophageal reflux disease)    Chronic kidney  disease, stage III (moderate)    Acquired hypothyroidism    DM due to underlying condition with diabetic nephropathy, on Insulin    Conjunctival laceration - Left Eye    Leg pain, bilateral    Chronic allergic rhinitis    Chronic diastolic heart failure, NYHA class 1    Nuclear sclerosis    Dry eye    Myopia with astigmatism and presbyopia    Morbid obesity with BMI of 40.0-44.9, adult, today 44.3    Anti-polysaccharide antibody deficiency    Controlled diabetes mellitus type 2 with complications    Insulin pump status    Bariatric surgery status, 12/2014    LBBB (left bundle branch block)    Osteoarthritis    COPD (chronic obstructive pulmonary disease)    Vitreo-retinal adhesions    Vitreous hemorrhage, right eye    Posterior vitreous detachment, right eye    Moderate nonproliferative diabetic retinopathy of both eyes    Osteopenia with high risk of fracture    Symptomatic posterior vitreous detachment of left eye    Type 2 diabetes mellitus    Chronic low back pain    Sleep apnea, using CPAP 100%    Posterior vitreous detachment, left eye    Vitreous hemorrhage, left eye    Cardiovascular event risk, ASCVD 10-year risk 12.3%, 12/2015    CKD (chronic kidney disease) stage 3, GFR 30-59 ml/min    Hypertensive left ventricular hypertrophy, without heart failure    Irritable bowel syndrome with diarrhea    PUD (peptic ulcer disease)    Essential hypertension    Thrombocytosis    Adjustment insomnia    Other osteoporosis without current pathological fracture    History of pathological fracture due to osteoporosis    Intestinal metaplasia of gastric mucosa    Fibromyalgia    Generalized osteoarthritis    Spinal stenosis of lumbar region    Xerosis of skin    Synovial cyst of right popliteal space    Sedentary lifestyle    Gross hematuria    Monoclonal gammopathy associated with lymphoplasmacytic dyscrasias    Arthritis of both knees    Moderate persistent asthma without  complication    Obesity    Venous insufficiency of both lower extremities    MECHELLE (obstructive sleep apnea), on CPAP 100%    Mild persistent asthma without complication    S/P [place] total knee arthroplasty, left April 16, 2019    Gait abnormality    Hypoalbuminemia    Anemia due to stage 3 chronic kidney disease     Total face-to-face time with the patient was 40 minutes and greater than 50% was spent in counseling and coordination of care. The above assessment and plan have been discussed at length. Labs and procedure over the last 6 months reviewed. Problem List updated. Asked to bring in all active medications / pills bottles with next visit.

## 2019-07-22 ENCOUNTER — OFFICE VISIT (OUTPATIENT)
Dept: HEMATOLOGY/ONCOLOGY | Facility: CLINIC | Age: 69
End: 2019-07-22
Payer: MEDICARE

## 2019-07-22 VITALS
HEIGHT: 62 IN | BODY MASS INDEX: 45.03 KG/M2 | SYSTOLIC BLOOD PRESSURE: 131 MMHG | HEART RATE: 83 BPM | OXYGEN SATURATION: 96 % | RESPIRATION RATE: 20 BRPM | DIASTOLIC BLOOD PRESSURE: 59 MMHG | TEMPERATURE: 98 F | WEIGHT: 244.69 LBS

## 2019-07-22 DIAGNOSIS — D47.2 MGUS (MONOCLONAL GAMMOPATHY OF UNKNOWN SIGNIFICANCE): Primary | ICD-10-CM

## 2019-07-22 DIAGNOSIS — M85.80 OSTEOPENIA WITH HIGH RISK OF FRACTURE: ICD-10-CM

## 2019-07-22 DIAGNOSIS — E66.01 CLASS 3 SEVERE OBESITY WITH BODY MASS INDEX (BMI) OF 40.0 TO 44.9 IN ADULT, UNSPECIFIED OBESITY TYPE, UNSPECIFIED WHETHER SERIOUS COMORBIDITY PRESENT: ICD-10-CM

## 2019-07-22 DIAGNOSIS — M79.7 FIBROMYALGIA: ICD-10-CM

## 2019-07-22 PROCEDURE — 3288F FALL RISK ASSESSMENT DOCD: CPT | Mod: CPTII,S$GLB,, | Performed by: INTERNAL MEDICINE

## 2019-07-22 PROCEDURE — 3075F PR MOST RECENT SYSTOLIC BLOOD PRESS GE 130-139MM HG: ICD-10-PCS | Mod: CPTII,S$GLB,, | Performed by: INTERNAL MEDICINE

## 2019-07-22 PROCEDURE — 3288F PR FALLS RISK ASSESSMENT DOCUMENTED: ICD-10-PCS | Mod: CPTII,S$GLB,, | Performed by: INTERNAL MEDICINE

## 2019-07-22 PROCEDURE — 1101F PR PT FALLS ASSESS DOC 0-1 FALLS W/OUT INJ PAST YR: ICD-10-PCS | Mod: CPTII,S$GLB,, | Performed by: INTERNAL MEDICINE

## 2019-07-22 PROCEDURE — 99499 UNLISTED E&M SERVICE: CPT | Mod: S$GLB,,, | Performed by: INTERNAL MEDICINE

## 2019-07-22 PROCEDURE — 99215 OFFICE O/P EST HI 40 MIN: CPT | Mod: S$GLB,,, | Performed by: INTERNAL MEDICINE

## 2019-07-22 PROCEDURE — 3075F SYST BP GE 130 - 139MM HG: CPT | Mod: CPTII,S$GLB,, | Performed by: INTERNAL MEDICINE

## 2019-07-22 PROCEDURE — 99999 PR PBB SHADOW E&M-EST. PATIENT-LVL V: ICD-10-PCS | Mod: PBBFAC,,, | Performed by: INTERNAL MEDICINE

## 2019-07-22 PROCEDURE — 99999 PR PBB SHADOW E&M-EST. PATIENT-LVL V: CPT | Mod: PBBFAC,,, | Performed by: INTERNAL MEDICINE

## 2019-07-22 PROCEDURE — 3078F PR MOST RECENT DIASTOLIC BLOOD PRESSURE < 80 MM HG: ICD-10-PCS | Mod: CPTII,S$GLB,, | Performed by: INTERNAL MEDICINE

## 2019-07-22 PROCEDURE — 3078F DIAST BP <80 MM HG: CPT | Mod: CPTII,S$GLB,, | Performed by: INTERNAL MEDICINE

## 2019-07-22 PROCEDURE — 1101F PT FALLS ASSESS-DOCD LE1/YR: CPT | Mod: CPTII,S$GLB,, | Performed by: INTERNAL MEDICINE

## 2019-07-22 PROCEDURE — 99499 RISK ADDL DX/OHS AUDIT: ICD-10-PCS | Mod: S$GLB,,, | Performed by: INTERNAL MEDICINE

## 2019-07-22 PROCEDURE — 99215 PR OFFICE/OUTPT VISIT, EST, LEVL V, 40-54 MIN: ICD-10-PCS | Mod: S$GLB,,, | Performed by: INTERNAL MEDICINE

## 2019-07-22 NOTE — PROGRESS NOTES
Subjective:       Patient ID: Fifi Mcnair is a 68 y.o. female.    Chief Complaint: I started magnesium daily     HPI    This is a 68  yr old female tolerating prolia for osteoporosis.  She started prolia for osteoporosis and now she has osteopenia. Pt has had a gastric sleeve in the past. She remains on B12 and iron supplement  She has not needed IV iron    Not on prolia started K 2 She is tolerating Synthroid for thyroid disorder and neurontin for neuropathy.  Past Labs revealed an elevated kappa light chain with No further elevation of the ratio, IGA is decreasing .  Bone marrow showed no evidence of a plasma cell dyscrasia in 2016   Pt with history of sleeve gastrectomy contributing to malabsorption of certain vitamins  IgA remains elevated  as with the elevated kappa levels yest stable   She is tolerating allopurinol for gout prevention  She is tolerating Prozac for depression    Current Outpatient Medications:     albuterol-ipratropium 2.5mg-0.5mg/3mL (DUO-NEB) 0.5 mg-3 mg(2.5 mg base)/3 mL nebulizer solution, Take 3 mLs by nebulization every 6 (six) hours as needed for Wheezing. Rescue, Disp: 1 Box, Rfl: 3    allopurinol (ZYLOPRIM) 100 MG tablet, TAKE TWO TABLETS BY MOUTH AT BEDTIME, Disp: 180 tablet, Rfl: 4    amitriptyline (ELAVIL) 50 MG tablet, TAKE 2 TABLETS BY MOUTH IN THE EVENING, Disp: 60 tablet, Rfl: 0    aspirin (ECOTRIN) 325 MG EC tablet, Take 1 tablet (325 mg total) by mouth 2 (two) times daily with meals., Disp: , Rfl: 0    atenolol (TENORMIN) 25 MG tablet, Take 1 tablet (25 mg total) by mouth 2 (two) times daily., Disp: 180 tablet, Rfl: 4    calcitRIOL (ROCALTROL) 0.25 MCG Cap, Take 1 capsule by mouth twice a week. Monday Friday, Disp: , Rfl:     cetirizine (ZYRTEC) 10 MG tablet, Take 1 tablet (10 mg total) by mouth once daily., Disp: 1 Bottle, Rfl: 5    clotrimazole-betamethasone 1-0.05% (LOTRISONE) cream, , Disp: , Rfl:     cyanocobalamin, vitamin B-12, (VITAMIN B-12) 5,000 mcg  Subl, Place 5,000 mg under the tongue once a week., Disp: , Rfl:     diclofenac sodium (VOLTAREN) 1 % Gel,  APPLY 2 GRAMS TOPICALLY ONCE DAILY, Disp: 1 Tube, Rfl: 0    diphenoxylate-atropine 2.5-0.025 mg (LOMOTIL) 2.5-0.025 mg per tablet, TAKE ONE TABLET BY MOUTH 4 TIMES DAILY AS NEEDED FOR DIARRHEA, Disp: 60 tablet, Rfl: 1    DYMISTA 137-50 mcg/spray Spry nassal spray, as needed. , Disp: , Rfl:     FLUoxetine 20 MG capsule, Take 2 capsules (40 mg total) by mouth once daily., Disp: 180 capsule, Rfl: 3    fluticasone (FLONASE) 50 mcg/actuation nasal spray, 2 sprays by Nasal route once daily. , Disp: , Rfl:     fluticasone-vilanterol (BREO) 100-25 mcg/dose diskus inhaler, Inhale 1 puff into the lungs once daily., Disp: , Rfl:     gabapentin (NEURONTIN) 100 MG capsule, 3 (three) times daily. , Disp: , Rfl:     insulin (BASAGLAR KWIKPEN U-100 INSULIN) glargine 100 units/mL (3mL) SubQ pen, Inject 20 Units into the skin every evening. Use 10 units if BS over 210.Night before surgery., Disp: , Rfl: 0    insulin aspart U-100 (NOVOLOG) 100 unit/mL injection, Use per insulin pump, 35-40 units daily., Disp: 40 mL, Rfl: 0    KLOR-CON M20 20 mEq tablet, TAKE ONE TABLET BY MOUTH TWICE DAILY, Disp: 180 tablet, Rfl: 3    l-methylfolate-b2-b6-b12 (CEREFOLIN) 6-5-50-1 mg Tab, Take 1 tablet by mouth once daily., Disp: , Rfl:     Lacto.acidophilus-Bif.animalis (PROBIOTIC) 5 billion cell CpSP, Take 1 capsule by mouth once daily., Disp: 30 capsule, Rfl: 3    levothyroxine (SYNTHROID) 25 MCG tablet, TAKE ONE TABLET BY MOUTH ONCE DAILY, Disp: 90 tablet, Rfl: 3    LORazepam (ATIVAN) 1 MG tablet, TAKE 1 TABLET BY MOUTH EVERY 12 HOURS AS NEEDED FOR ANXIETY, Disp: 30 tablet, Rfl: 2    losartan (COZAAR) 50 MG tablet, Take 1 tablet (50 mg total) by mouth once daily., Disp: 90 tablet, Rfl: 3    montelukast (SINGULAIR) 10 mg tablet, , Disp: , Rfl: 8    MULTIVIT-IRON-MIN-FOLIC ACID 3,500-18-0.4 UNIT-MG-MG ORAL CHEW, Take by mouth 2  (two) times daily., Disp: , Rfl:     mupirocin (BACTROBAN) 2 % ointment, APPLY OINTMENT TOPICALLY TO AFFECTED AREA ON NOSE THREE TIMES DAILY AS DIRECTED, Disp: 15 g, Rfl: 11    oxyCODONE (ROXICODONE) 15 MG Tab, Take 1 tablet (15 mg total) by mouth every 6 (six) hours as needed for Pain., Disp: 30 tablet, Rfl: 0    oxyCODONE-acetaminophen (PERCOCET) 5-325 mg per tablet, Take 1 tablet by mouth every 4 (four) hours as needed for Pain., Disp: 40 tablet, Rfl: 0    pantoprazole (PROTONIX) 40 MG tablet, TAKE ONE TABLET BY MOUTH ONCE DAILY, Disp: 90 tablet, Rfl: 3    ranitidine (ZANTAC) 300 MG tablet, TAKE ONE TABLET BY MOUTH IN THE EVENING, Disp: 30 tablet, Rfl: 1    SSD 1 % cream, , Disp: , Rfl:     torsemide (DEMADEX) 20 MG Tab, Take 1 tablet (20 mg total) by mouth 2 (two) times daily. TAKE 1 TABLET BY MOUTH EVERY OTHER DAY THEN 2 TABLETS EVERY DAY, Disp: 60 tablet, Rfl: 2    Current Facility-Administered Medications:     gentamicin injection 80 mg, 80 mg, Intramuscular, 1 time in Clinic/HOD, Angy Campos MD    Facility-Administered Medications Ordered in Other Visits:     lactated ringers infusion, , Intravenous, Continuous, Shaheen Hanson MD, Last Rate: 10 mL/hr at 08/31/18 0739    No prolia at this time   All medications and past history have been reviewed.    Review of Systems   Constitutional: Negative.  Negative for fatigue, fever and unexpected weight change.   HENT: Negative.  Negative for ear discharge, mouth sores, rhinorrhea and sore throat.    Eyes: Negative.  Negative for photophobia and itching.   Respiratory: Negative for cough and shortness of breath.    Cardiovascular: Negative.  Negative for chest pain and leg swelling.   Gastrointestinal: Negative.  Negative for abdominal pain, constipation, diarrhea, nausea and vomiting.   Endocrine: Negative.  Negative for cold intolerance and heat intolerance.   Genitourinary: Positive for difficulty urinating, dysuria, pelvic pain and  urgency. Negative for frequency and hematuria.   Musculoskeletal: Negative for arthralgias, back pain and neck pain.   Skin: Negative for pallor and rash.   Allergic/Immunologic: Negative.    Neurological: Negative for weakness, numbness and headaches.   Hematological: Negative for adenopathy. Does not bruise/bleed easily.   Psychiatric/Behavioral: Negative for agitation and confusion.   All other systems reviewed and are negative.       Pertinent positives are intermittent knee pain and fatigue  Depression screening negative on medication  Positive intermittent dysuria pelvic bloating and pain  Objective:      LMP  (LMP Unknown)     Physical Exam   Constitutional: She is oriented to person, place, and time. She appears well-developed and well-nourished.   HENT:   Head: Normocephalic.   Mouth/Throat: Oropharynx is clear and moist. No oropharyngeal exudate.   Eyes: Conjunctivae are normal. No scleral icterus.   Neck: Normal range of motion. No JVD present. No tracheal deviation present.   Cardiovascular: Normal rate and regular rhythm.   No murmur (2= capillary refill) heard.  Pulmonary/Chest: Effort normal. No respiratory distress. She has no wheezes.   Abdominal: Soft. She exhibits no distension. Tenderness: midepigastric.   Musculoskeletal: Normal range of motion. She exhibits no edema.   Neurological: She is alert and oriented to person, place, and time. No cranial nerve deficit.   Steady gait   Skin: Skin is dry. No rash noted. No erythema.   Psychiatric: She has a normal mood and affect.   Vitals reviewed.     Bone marrow with no evidence of plasma cell dyscrasia, no evidence of myelodysplasia.    I reviewed the percentage of cells noted in the marrow along with flow cytometry and cytogenetics  Skeletal survey negative for any lytic or ostial sclerotic disease  All labs and imaging have been reviewed.   Lab Results   Component Value Date    WBC 5.40 05/31/2019    WBC 5.30 05/31/2019    HGB 11.8 (L) 05/31/2019     HGB 11.8 (L) 05/31/2019    HCT 34.8 (L) 05/31/2019    HCT 35.4 (L) 05/31/2019    MCV 92 05/31/2019    MCV 93 05/31/2019     05/31/2019     05/31/2019         CMP  Sodium   Date Value Ref Range Status   05/31/2019 137 136 - 145 mmol/L Final   05/31/2019 138 136 - 145 mmol/L Final     Potassium   Date Value Ref Range Status   05/31/2019 4.4 3.5 - 5.1 mmol/L Final   05/31/2019 4.4 3.5 - 5.1 mmol/L Final     Chloride   Date Value Ref Range Status   05/31/2019 101 95 - 110 mmol/L Final   05/31/2019 101 95 - 110 mmol/L Final     CO2   Date Value Ref Range Status   05/31/2019 25 23 - 29 mmol/L Final   05/31/2019 26 23 - 29 mmol/L Final     Glucose   Date Value Ref Range Status   05/31/2019 209 (H) 70 - 110 mg/dL Final   05/31/2019 211 (H) 70 - 110 mg/dL Final     BUN, Bld   Date Value Ref Range Status   05/31/2019 13 8 - 23 mg/dL Final   05/31/2019 13 8 - 23 mg/dL Final     Creatinine   Date Value Ref Range Status   05/31/2019 1.1 0.5 - 1.4 mg/dL Final   05/31/2019 1.1 0.5 - 1.4 mg/dL Final   08/31/2013 1.1 0.5 - 1.4 mg/dL Final     Calcium   Date Value Ref Range Status   05/31/2019 9.9 8.7 - 10.5 mg/dL Final   05/31/2019 9.9 8.7 - 10.5 mg/dL Final   08/31/2013 9.5 8.7 - 10.5 mg/dL Final     Total Protein   Date Value Ref Range Status   05/31/2019 7.0 6.0 - 8.4 g/dL Final   05/31/2019 7.0 6.0 - 8.4 g/dL Final     Albumin   Date Value Ref Range Status   05/31/2019 3.4 (L) 3.5 - 5.2 g/dL Final   05/31/2019 3.3 (L) 3.5 - 5.2 g/dL Final     Total Bilirubin   Date Value Ref Range Status   05/31/2019 0.4 0.1 - 1.0 mg/dL Final     Comment:     For infants and newborns, interpretation of results should be based  on gestational age, weight and in agreement with clinical  observations.  Premature Infant recommended reference ranges:  Up to 24 hours.............<8.0 mg/dL  Up to 48 hours............<12.0 mg/dL  3-5 days..................<15.0 mg/dL  6-29 days.................<15.0 mg/dL     05/31/2019 0.4 0.1 - 1.0  mg/dL Final     Comment:     For infants and newborns, interpretation of results should be based  on gestational age, weight and in agreement with clinical  observations.  Premature Infant recommended reference ranges:  Up to 24 hours.............<8.0 mg/dL  Up to 48 hours............<12.0 mg/dL  3-5 days..................<15.0 mg/dL  6-29 days.................<15.0 mg/dL       Alkaline Phosphatase   Date Value Ref Range Status   05/31/2019 128 55 - 135 U/L Final   05/31/2019 129 55 - 135 U/L Final   08/30/2013 93 55 - 135 U/L Final     AST   Date Value Ref Range Status   05/31/2019 18 10 - 40 U/L Final   05/31/2019 17 10 - 40 U/L Final   08/30/2013 21 10 - 40 U/L Final     ALT   Date Value Ref Range Status   05/31/2019 16 10 - 44 U/L Final   05/31/2019 16 10 - 44 U/L Final     Anion Gap   Date Value Ref Range Status   05/31/2019 11 8 - 16 mmol/L Final   05/31/2019 11 8 - 16 mmol/L Final   08/31/2013 12 5 - 15 meq/L Final     eGFR if    Date Value Ref Range Status   05/31/2019 60 >60 mL/min/1.73 m^2 Final   05/31/2019 60 >60 mL/min/1.73 m^2 Final     eGFR if non    Date Value Ref Range Status   05/31/2019 52 (A) >60 mL/min/1.73 m^2 Final     Comment:     Calculation used to obtain the estimated glomerular filtration  rate (eGFR) is the CKD-EPI equation.      05/31/2019 52 (A) >60 mL/min/1.73 m^2 Final     Comment:     Calculation used to obtain the estimated glomerular filtration  rate (eGFR) is the CKD-EPI equation.        Leukemia/Lymphoma Screen - Bone Marrow Right Posterior Iliac Crest   Order: [id number]     Status:  Final result   Visible to patient:  Yes (Patient Portal) Next appt:  02/11/2019 at 02:45 PM in Orthopedics (Larry Molina MD) Dx:  Anemia, unspecified type; Elevated se...   Component 2yr ago   Clinical Diagnosis - Bone Marrow ANM    Body Site - Bone Marrow RPI    Bone Marrow Interpretation No abnormal hematopoietic population is detected in this sample.  Correlate with marrow morphology and other ancillary studies.      Comment: Interpreted by: Katarina Hicks MD, Signed on 10/10/2016 at 12:50   Bone Marrow Antibodies Analyzed All analyzed: CD2, CD3, CD4, CD5, CD7, CD8, CD10, CD13, CD19, CD20, CD34, , KAPPA, LAMBDA,CD45 and 7AAD.      Bone Marrow Comment Flow cytometric analysis of  bone marrow shows populations of polyclonal B lymphocytes with a subset of hematogones detected, and T lymphocytes that are immunophenotypically unremarkable.  The blast gate is not increased. Flow differential:  Lymphocytes   7.5%, Monocytes 2.9%, Granulocytes  74.9%, Blast  2.4%, Debris/nRBC 12.2%,  Viability 93.2%.                 Assessment:       MGUS (monoclonal gammopathy of unknown significance)  -     FREE LT CHAIN ANAL; Future; Expected date: 07/22/2019  -     IgA; Future; Expected date: 07/22/2019  -     CBC auto differential; Standing  -     CMP; Future; Expected date: 07/22/2019    Fibromyalgia    Osteopenia with high risk of fracture    Class 3 severe obesity with body mass index (BMI) of 40.0 to 44.9 in adult, unspecified obesity type, unspecified whether serious comorbidity present      ANemia stable     Plan:     Anemia Counts improving   CKD Renal function improving   elev IGA : Increasing   Increase probiotics to help with inflammation  iNcrease hydration   Explained her path results from prior bone marrow bopsy   Pt no longer meets criteria for prolia due to a change in her density from osteoporosis to osteopenia  She believes she is in need of a knee replacement : cont calcium and vitamin D3  IGA  Elevated , kappa elevated   Next dexa in Dec of 2019 NO longer on prolia since she has osteopenia   Cont K 2 for bone health   History of broken bones   Cont calcium and vitamin D  Cont prozac for depression  Explained MGUS and the transition to myeloma is rare and less than 2% I also explained this could be a component of her low GFR  Showed her her values and the  ratio is absolutely normal  This is NOT myeloma   She cannot take oral bisphosphonate because of GI issues  No need for procrit despite anemia due to renal disease    RTC 3 months for evaluation.  She will call if she has side effects related to Elavil    The plan was discussed with the patient and all questions/concerns have been answered to the patient's satisfaction.

## 2019-07-24 ENCOUNTER — HOSPITAL ENCOUNTER (OUTPATIENT)
Dept: RADIOLOGY | Facility: HOSPITAL | Age: 69
Discharge: HOME | End: 2019-07-24
Attending: INTERNAL MEDICINE
Payer: MEDICARE

## 2019-07-24 ENCOUNTER — HOSPITAL ENCOUNTER (OUTPATIENT)
Dept: CARDIOLOGY | Facility: HOSPITAL | Age: 69
Discharge: HOME | End: 2019-07-24
Attending: INTERNAL MEDICINE
Payer: MEDICARE

## 2019-07-24 VITALS — HEART RATE: 68 BPM | DIASTOLIC BLOOD PRESSURE: 63 MMHG | SYSTOLIC BLOOD PRESSURE: 113 MMHG

## 2019-07-24 DIAGNOSIS — I44.7 LBBB (LEFT BUNDLE BRANCH BLOCK): ICD-10-CM

## 2019-07-24 DIAGNOSIS — Z01.810 PREOP CARDIOVASCULAR EXAM: ICD-10-CM

## 2019-07-24 DIAGNOSIS — Z91.89 CARDIOVASCULAR EVENT RISK: ICD-10-CM

## 2019-07-24 DIAGNOSIS — N18.30 CHRONIC KIDNEY DISEASE, STAGE III (MODERATE): ICD-10-CM

## 2019-07-24 LAB
CV STRESS BASE HR: 67 BPM
DIASTOLIC BLOOD PRESSURE: 63 MMHG
OHS CV CPX 85 PERCENT MAX PREDICTED HEART RATE MALE: 124
OHS CV CPX MAX PREDICTED HEART RATE: 146
OHS CV CPX PATIENT IS FEMALE: 1
OHS CV CPX PATIENT IS MALE: 0
OHS CV CPX PEAK DIASTOLIC BLOOD PRESSURE: 56 MMHG
OHS CV CPX PEAK HEAR RATE: 90 BPM
OHS CV CPX PEAK RATE PRESSURE PRODUCT: NORMAL
OHS CV CPX PEAK SYSTOLIC BLOOD PRESSURE: 153 MMHG
OHS CV CPX PERCENT MAX PREDICTED HEART RATE ACHIEVED: 62
OHS CV CPX RATE PRESSURE PRODUCT PRESENTING: 7571
STRESS ECHO POST EXERCISE DUR MIN: 1 MINUTES
STRESS ECHO POST EXERCISE DUR SEC: 9 SECONDS
STRESS ECHO TARGET HR: 129.2 BPM
SYSTOLIC BLOOD PRESSURE: 113 MMHG

## 2019-07-24 PROCEDURE — 78452 NM MYOCARDIAL PERFUSION SPECT MULTI PHARM: ICD-10-PCS | Mod: 26,,, | Performed by: RADIOLOGY

## 2019-07-24 PROCEDURE — 63600175 PHARM REV CODE 636 W HCPCS: Performed by: SPECIALIST

## 2019-07-24 PROCEDURE — A9502 TC99M TETROFOSMIN: HCPCS

## 2019-07-24 PROCEDURE — 93017 CV STRESS TEST TRACING ONLY: CPT

## 2019-07-24 PROCEDURE — 78452 HT MUSCLE IMAGE SPECT MULT: CPT | Mod: 26,,, | Performed by: RADIOLOGY

## 2019-07-24 RX ORDER — REGADENOSON 0.08 MG/ML
0.4 INJECTION, SOLUTION INTRAVENOUS ONCE
Status: COMPLETED | OUTPATIENT
Start: 2019-07-24 | End: 2019-07-24

## 2019-07-24 RX ADMIN — REGADENOSON 0.4 MG: 0.08 INJECTION, SOLUTION INTRAVENOUS at 11:07

## 2019-08-02 DIAGNOSIS — G89.29 CHRONIC LEFT-SIDED THORACIC BACK PAIN: ICD-10-CM

## 2019-08-02 DIAGNOSIS — R60.0 EDEMA EXTREMITIES: ICD-10-CM

## 2019-08-02 DIAGNOSIS — M54.6 CHRONIC LEFT-SIDED THORACIC BACK PAIN: ICD-10-CM

## 2019-08-02 DIAGNOSIS — G57.01 NEUROPATHY OF RIGHT SCIATIC NERVE: ICD-10-CM

## 2019-08-02 DIAGNOSIS — I89.0 LYMPHEDEMA OF RIGHT LOWER EXTREMITY: ICD-10-CM

## 2019-08-02 RX ORDER — TORSEMIDE 20 MG/1
TABLET ORAL
Qty: 60 TABLET | Refills: 2 | Status: SHIPPED | OUTPATIENT
Start: 2019-08-02 | End: 2019-08-26 | Stop reason: SDUPTHER

## 2019-08-06 RX ORDER — AMITRIPTYLINE HYDROCHLORIDE 50 MG/1
TABLET, FILM COATED ORAL
Qty: 60 TABLET | Refills: 0 | Status: SHIPPED | OUTPATIENT
Start: 2019-08-06 | End: 2019-09-08 | Stop reason: SDUPTHER

## 2019-08-12 ENCOUNTER — PATIENT OUTREACH (OUTPATIENT)
Dept: ADMINISTRATIVE | Facility: HOSPITAL | Age: 69
End: 2019-08-12

## 2019-08-13 ENCOUNTER — OFFICE VISIT (OUTPATIENT)
Dept: UROLOGY | Facility: CLINIC | Age: 69
End: 2019-08-13
Payer: MEDICARE

## 2019-08-13 VITALS
HEIGHT: 62 IN | BODY MASS INDEX: 45.5 KG/M2 | WEIGHT: 247.25 LBS | HEART RATE: 81 BPM | DIASTOLIC BLOOD PRESSURE: 66 MMHG | SYSTOLIC BLOOD PRESSURE: 131 MMHG

## 2019-08-13 DIAGNOSIS — N30.10 INTERSTITIAL CYSTITIS: ICD-10-CM

## 2019-08-13 DIAGNOSIS — N39.0 RECURRENT UTI: ICD-10-CM

## 2019-08-13 DIAGNOSIS — R31.0 HEMATURIA, GROSS: Primary | ICD-10-CM

## 2019-08-13 LAB
BILIRUB SERPL-MCNC: ABNORMAL MG/DL
BLOOD URINE, POC: ABNORMAL
COLOR, POC UA: YELLOW
GLUCOSE UR QL STRIP: 500
KETONES UR QL STRIP: ABNORMAL
LEUKOCYTE ESTERASE URINE, POC: ABNORMAL
NITRITE, POC UA: ABNORMAL
PH, POC UA: 5.5
PROTEIN, POC: ABNORMAL
SPECIFIC GRAVITY, POC UA: 1.01
UROBILINOGEN, POC UA: ABNORMAL

## 2019-08-13 PROCEDURE — 81002 URINALYSIS NONAUTO W/O SCOPE: CPT | Mod: S$GLB,,, | Performed by: UROLOGY

## 2019-08-13 PROCEDURE — 1101F PR PT FALLS ASSESS DOC 0-1 FALLS W/OUT INJ PAST YR: ICD-10-PCS | Mod: CPTII,S$GLB,, | Performed by: UROLOGY

## 2019-08-13 PROCEDURE — 81002 POCT URINE DIPSTICK WITHOUT MICROSCOPE: ICD-10-PCS | Mod: S$GLB,,, | Performed by: UROLOGY

## 2019-08-13 PROCEDURE — 3075F SYST BP GE 130 - 139MM HG: CPT | Mod: CPTII,S$GLB,, | Performed by: UROLOGY

## 2019-08-13 PROCEDURE — 99999 PR PBB SHADOW E&M-EST. PATIENT-LVL IV: CPT | Mod: PBBFAC,,, | Performed by: UROLOGY

## 2019-08-13 PROCEDURE — 99999 PR PBB SHADOW E&M-EST. PATIENT-LVL IV: ICD-10-PCS | Mod: PBBFAC,,, | Performed by: UROLOGY

## 2019-08-13 PROCEDURE — 99214 OFFICE O/P EST MOD 30 MIN: CPT | Mod: 25,S$GLB,, | Performed by: UROLOGY

## 2019-08-13 PROCEDURE — 3078F PR MOST RECENT DIASTOLIC BLOOD PRESSURE < 80 MM HG: ICD-10-PCS | Mod: CPTII,S$GLB,, | Performed by: UROLOGY

## 2019-08-13 PROCEDURE — 3075F PR MOST RECENT SYSTOLIC BLOOD PRESS GE 130-139MM HG: ICD-10-PCS | Mod: CPTII,S$GLB,, | Performed by: UROLOGY

## 2019-08-13 PROCEDURE — 99214 PR OFFICE/OUTPT VISIT, EST, LEVL IV, 30-39 MIN: ICD-10-PCS | Mod: 25,S$GLB,, | Performed by: UROLOGY

## 2019-08-13 PROCEDURE — 3078F DIAST BP <80 MM HG: CPT | Mod: CPTII,S$GLB,, | Performed by: UROLOGY

## 2019-08-13 PROCEDURE — 1101F PT FALLS ASSESS-DOCD LE1/YR: CPT | Mod: CPTII,S$GLB,, | Performed by: UROLOGY

## 2019-08-13 RX ORDER — GABAPENTIN 300 MG/1
CAPSULE ORAL
COMMUNITY
Start: 2019-07-30 | End: 2019-08-26

## 2019-08-13 NOTE — PATIENT INSTRUCTIONS
She is doing well on her regimen of avoiding pads using pyridium when she thinks she has an infection.  Although she had gross hematuria she has had a workup which included a cystoscopy and August 2018 and CT in February 2018 showing nothing to explain blood in the urine.  She has had cytology in July and October of 2018 and February 2019 all of which were negative.    At this point I think they are associated with UTIs which appeared to resolve with Pyridium.  She is doing well on the regimen of probiotic daily and Pyridium as needed    Continue IC diet which appears to be helping since she has only had 3 episodes since February which resolved pretty easily with Pyridium    She can follow up in 1 year

## 2019-08-13 NOTE — PROGRESS NOTES
[facility] Note - [place]  Staff: MD Andres    Referring provider and please cc: Felipa Fernandez  PCP: Dr.Raymond Baez [facility]    Chief Complaint: gross hematuria and recurrent uti    Subjective:        HPI: Fifi Mcnair is a 68 y.o. female presents with     Gross hematuria, bladder pain, recurrent uti  -She was evaluated in 2014 by Dr. Parrish, Urologist for recurrent UTIs along with a cystoscopy procedure done in his office on or around 01/01/2014.  (No records to review during ov today). Pt states today that cysto results showed normal findings at that time.  -in the last 5 years she's had about 3 uti's a year all with gross hematuria. They all start with dysuria, then GH, then urgency, urge incontinence and frequency. This year she has had gross hematuria 3x starting in June and always continues to recur, associate with uti symptoms. Cultures however have been negative but she has also been off and on abx. Otherwise has no OAB except recently has UUI with OAB, but also on lasix.   -gross hematuria: on no anticoagulation. Former tobacco, <10 years in a row. Has h/o recurrent utis for the last 10 years. ctu 2/2018 shows transverse displacement of right kidney but no other abnormalities including no stones. Cytology 7/18/18 and 7/24/18 was  Negative. Cytology 10/10/18: urothelial atypia. cysto bladder bx on 8/6/18. Abnormal patch on posterior bladder. Found to be cystitis. RF for uti's: diarrhea 2x a week or more due to IBS.  Diabetes x 30. seen by gyn  on 1/17/19 to r/o vaginal cause of bleeding.    I last saw her in February 2019 and she had an episode of gross hematuria 1 month prior associated with bladder pain which improved with a uribel or two.  Her urine culture is negative, her urinalysis showed no blood, her cytology was negative.  I felt she had recurrent UTI versus IC and felt that her symptoms were likely related to her IBS.  Since then she had  a left knee replacement    She returns today and states that since I saw her in February she had gross hematuria that lasted with 1 void 3 times that resolved with taking 1 Pyridium after the 1st 2 episodes, and then the 3rd episode required her to take 3 Pyridium.  Other than this she has had no issues with her bladder. She has also been working on the "[employer] diet.  No smoking history.  She voids 2 times a day when she does not take fluid pill and more often than this when she does.  No pad usage.  Also currently on a probiotic.      ua today void: tr leuk/500 glucose- no symptoms of UTI today  Urine history:   4/19/19 Ng, void: 1+leuk, 25 wbc/many yeast/rare bact  4/4/19  No cx, void: tr leuk, 3 wbc  2/12/19 Ng, cath:4+glucose, 2 rbc/1wbc, cytology: negative  10/10/18 Ng,  Cath: nit+/2+glucose/tr ketones, 2 wbc, rare bacteria, cytology: urothelial atypia  8/6/18  Bladder path from cysto: cystitis, pvr by I&o: 40cc. Feels some pressure today, last urinated a few hours ago  8/1/18  No cx, void: neg, 1 rbc  7/24/18 No cx, void: neg, pvr by I&o: 30cc, cytology: negative  7/18/18 Multiple org, void: none, 1 rbc/1 wbc, cytology: URINE, VOIDED 7/18: no cancer cells seen  7/7/18  Ng, void: 2 protein/3+blood/nit+/2+leuk - +GH. ua +but no sx. tx with augmentin  6/24/18 Ng, void: red, nit+/3+leuk, 100 rbc, 40 wbc- +GH. ua + on cipro, but sent home with keflex and rocephin, + urgency  6/19/18 Ng, void: 3+glucose  2/12/18 No cx, void: tr wbc/no blood  8/4/17  Ng, void: tr leuk  2/22/17 Multiple org, void: neg  11/23/14 Ng  1/9/14  Multiple org  9/18/13 Ng  11/13/12 Multiple org  4/5/12  K.pneumoniae      ECOG Status: 0    G2, P 2, vaginal     REVIEW OF SYSTEMS:  General ROS: no fevers, no chills  Psychological ROS: no depression  Endocrine ROS: no heat or cold  Respiratory ROS: no SOB  Cardiovascular ROS: no CP  Gastrointestinal ROS: no abdominal pain, no constipation, no diarrhea, noBRBPR  Musculoskeletal ROS: no muscle  pain  Neurological ROS: no headaches  Dermatological ROS: no rashes  HEENT: noglasses, no sinus   ROS: per HPI     PMHx:  Past Medical History:   Diagnosis Date    Allergy     multiple antibiotic allergies    Anemia     Anxiety     Arthritis     Asthma     CHF (congestive heart failure)     NYHA class III     Chronic kidney disease     Colon polyp     benign    COPD (chronic obstructive pulmonary disease)     DLCO 37% , and mild pulmonary HTN    Dental bridge present     LOWER    Depression     Diabetes mellitus     Diabetic retinopathy     Diastolic dysfunction     Diverticulitis of large intestine without perforation or abscess without bleeding 2/12/2018    Diverticulosis     Former smoker     Fracture of lumbar spine     GERD (gastroesophageal reflux disease)     Hernia of unspecified site of abdominal cavity without mention of obstruction or gangrene     Hyperlipidemia     Hypertension     hypertensive renal    IBS (irritable bowel syndrome)     Mild nonproliferative diabetic retinopathy(362.04) 11/25/2013    Morbid obesity     Nuclear sclerosis 11/25/2013    Osteoporosis     Peripheral edema     Rash     Recurrent upper respiratory infection (URI)     S/P LASIK (laser assisted in situ keratomileusis)     Sleep apnea     sleep apnea uses bipap.    Stroke     tia    Thyroid disease     on synthroid    TIA (transient ischemic attack)     Urinary tract infection     Wears glasses      Kidney stones no     PSHx:  Past Surgical History:   Procedure Laterality Date    ABDOMINAL SURGERY      ADENOIDECTOMY      ARTHROPLASTY, KNEE Left 4/16/2019    [facility]    ARTHROSCOPY, KNEE, WITH CHONDROPLASTY Right 8/31/2018    [facility]    ARTHROSCOPY, KNEE, WITH MENISCECTOMY Right 8/31/2018    [facility]    COLONOSCOPY  03/05/2013    repeat in 5 years    COLONOSCOPY  N/A 4/11/2018    [facility] Luis [facility]arro [facility]O    COLONOSCOPY N/A 5/31/2017    [facility]    COLONOSCOPY N/A 3/5/2013    [facility]ian Randall [facility] at [facility] END[facility]    COSMETIC SURGERY      belt abdominoplasty    CYSTOSCOPY      CYSTOSCOPY N/A 8/6/2018    [facility]    EGD (ESOPHAGOGASTRODUODENOSCOPY) N/A 3/5/2013    [facility] ENDO    ESOPHAGOGASTRODUODENOSCOPY (EGD) N/A 1/11/2018    [facility] END[facility]    ESOPHAGOGASTRODUODENOSCOPY (EGD) N/A 12/27/2016    [facility] END[facility]    ESOPHAGOGASTRODUODENOSCOPY (EGD) N/A 10/25/2016    [facility] END[facility]    ESOPHAGOGASTRODUODENOSCOPY (EGD) N/A 8/24/2016    [facility] END[facility]    ESOPHAGOGASTRODUODENOSCOPY (EGD) N/A 7/21/2016    [facility] ENDO    ESOPHAGOGASTRODUODENOSCOPY (EGD)-35739 N/A 12/12/2014    [facility] OR 2ND FLR    EYE SURGERY Right     mazzulla    GASTRECTOMY      GASTRECTOMY-SLEEVE-LAPAROSCOPIC-55918; EGD-[id number] N/A 12/12/2014    [facility] OR 2ND FLR    gastric sleeve      HERNIA REPAIR      5 years old    HYSTERECTOMY      ovaries remain    PANNICULECTOMY N/A 11/28/2016    [facility]formed by [facility]o [facility]erril [facility] at [facility] OR 2ND FLR    REFRACTIVE SURGERY      mono va//ou//    REPAIR-HERNIA  11/28/2016    [facility] OR 2ND FLR    RHINOPLASTY TIP      TONSILLECTOMY      TOTAL KNEE ARTHROPLASTY Left 4/16/19    TUBAL LIGATION      UPPER GASTROINTESTINAL ENDOSCOPY  03/05/2013    UPPER GASTROINTESTINAL ENDOSCOPY  08/24/2016    Dr. Navarro, repeat in 8 weeks    UPPER GASTROINTESTINAL ENDOSCOPY  07/21/2016    Dr. Randall     Urologic or Gynecologic Surgery:  hysterectomy    Stents/Valves/Foreign Bodies: No  Cardiac Evaluation: Yes -     Screening Studies  Colonoscopy: recently    Fam Hx:   malignancies: None, gyn malignancies: Yes - Maternal cousin had ovarian cancer at age late 40s.   kidney stones: No   Mother  of throat cancer.  Paternal grandmother  of kidney cancer.    Soc Hx:  Former tobacco.  Smoked less than 10 yers  No alcohol  Lives in [place]  :yes  Children: 2  Occupation:retired     Allergies:  Adhesive; Cleocin [clindamycin hcl]; Ceclor [cefaclor]; Advair diskus [fluticasone propion-salmeterol]; Aleve [naproxen sodium]; Erythromycin; Levaquin [levofloxacin]; Macrobid [nitrofurantoin monohyd/m-cryst]; Macrodantin [nitrofurantoin macrocrystalline]; Motrin [ibuprofen]; Restoril [temazepam]; Sulfa (sulfonamide antibiotics); Trazodone; Remeron [mirtazapine]; and Vancomycin analogues   Sulfa- not allergic to sulfa, kd issues  macrobid - hives.stomach pain/GI?  levaquin - dermatitis    Urologic Medications:  Pyridium and probiotic  Anticoagulation: No    Objective:     Vitals:    19 1455   BP: 131/66   Pulse: 81       General:WDWN in NAD  Eyes: PERRLA, normal conjunctiva  Respiratory: no increased work on breathing. No wheezing.   Cardiovascular: No obvious extremity edema. Warm and well perfused.   GI: no palpation of masses. No tenderness. No hepatosplenomegaly to palpation.  Musculoskeletal: normal range of motion of bilateral upper extremities. Normal muscle strength and tone.  Skin: no obvious rashes or lesions. No tightening of skin noted.  Neurologic: CN grossly normal. Normal sensation.   Psychiatric: awake, alert and oriented x 3. Mood and affect normal. Cooperative.    Pelvic exam 18  External Genitalia: normal hair distribution, no lesions  Urethral meatus: normal without prolapse or caruncle  Urethra: without tenderness or mass  Bladder: without fulness or tenderness  Vagina: normal appearing.  No discharge or lesions. no prolapse.   Anus and perineum: appear normal  In and out cath performed with 30cc residual  Levator ani tenderness: none       Tenderness right levator    LABS REVIEW:    BMP  Lab Results   Component Value Date     05/31/2019     05/31/2019    K 4.4 05/31/2019    K 4.4 05/31/2019     05/31/2019     05/31/2019    CO2 25 05/31/2019    CO2 26 05/31/2019    BUN 13 05/31/2019    BUN 13 05/31/2019    CREATININE 1.1 05/31/2019    CREATININE 1.1 05/31/2019    CALCIUM 9.9 05/31/2019    CALCIUM 9.9 05/31/2019    ANIONGAP 11 05/31/2019    ANIONGAP 11 05/31/2019    ESTGFRAFRICA 60 05/31/2019    ESTGFRAFRICA 60 05/31/2019    EGFRNONAA 52 (A) 05/31/2019    EGFRNONAA 52 (A) 05/31/2019     Lab Results   Component Value Date    WBC 5.40 05/31/2019    WBC 5.30 05/31/2019    HGB 11.8 (L) 05/31/2019    HGB 11.8 (L) 05/31/2019    HCT 34.8 (L) 05/31/2019    HCT 35.4 (L) 05/31/2019    MCV 92 05/31/2019    MCV 93 05/31/2019     05/31/2019     05/31/2019       PATHOLOGY REVIEW:  Urine, catheterize, cytology 2/12/19:  Negative for malignant cells.  Reactive and degenerating urothelial cells are present.    URINE, VOIDED 10/10/18:  - Urothelial atypia; scattered groups of atypical urothelial cells, benign and reactive urothelial cells, benign  squamous cells and moderate-to-marked acute inflammation (see comment).    BIOPSY OF POSTERIOR RIGHT BLADDER WALL 8/6/18:  NO NEOPLASIA IDENTIFIED  MINIMAL CHRONIC CYSTITIS    URINE, VOIDED 7/18/18:  -Negative; numerous benign urothelial cells and squamous cells.  -No dysplastic or malignant cells are present.    RADIOGRAPHIC REVIEW:  ctap w wo 2/12/18  Right kidney transversely displaced  Diverticulosis and findings most likely representing mild acute diverticulitis sigmoid colon without perforation or abscess and improved compared to prior study 2/9/2018 although not completely resolved.  Please see above discussion.  Additional  findings as detailed above.    Assessment:       1. Hematuria, gross    2. Recurrent UTI    3. Interstitial cystitis          Plan:     She is doing well on her regimen of avoiding pads using pyridium when she thinks she has an infection.  Although she had gross hematuria she has had a workup which included a cystoscopy and August 2018 and CT in February 2018 showing nothing to explain blood in the urine.  She has had cytology in July and October of 2018 and February 2019 all of which were negative.    At this point I think they are associated with UTIs which appeared to resolve with Pyridium.  She is doing well on the regimen of probiotic daily and Pyridium as needed    Continue IC diet which appears to be helping since she has only had 3 episodes since February which resolved pretty easily with Pyridium    She plans to have a right knee replacement    She can follow up in 1 year    Angy Campos MD

## 2019-08-26 ENCOUNTER — LAB VISIT (OUTPATIENT)
Dept: LAB | Facility: HOSPITAL | Age: 69
End: 2019-08-26
Attending: FAMILY MEDICINE
Payer: MEDICARE

## 2019-08-26 ENCOUNTER — OFFICE VISIT (OUTPATIENT)
Dept: FAMILY MEDICINE | Facility: CLINIC | Age: 69
End: 2019-08-26
Payer: MEDICARE

## 2019-08-26 VITALS
SYSTOLIC BLOOD PRESSURE: 128 MMHG | BODY MASS INDEX: 45.44 KG/M2 | WEIGHT: 246.94 LBS | DIASTOLIC BLOOD PRESSURE: 62 MMHG | TEMPERATURE: 99 F | HEART RATE: 80 BPM | HEIGHT: 62 IN | OXYGEN SATURATION: 95 %

## 2019-08-26 DIAGNOSIS — E03.4 HYPOTHYROIDISM DUE TO ACQUIRED ATROPHY OF THYROID: ICD-10-CM

## 2019-08-26 DIAGNOSIS — Z51.89 ENCOUNTER FOR PATIENT COMPLIANCE MONITORING IN DRUG TREATMENT PROGRAM: ICD-10-CM

## 2019-08-26 DIAGNOSIS — E11.8 CONTROLLED TYPE 2 DIABETES MELLITUS WITH COMPLICATION, WITH LONG-TERM CURRENT USE OF INSULIN: Primary | ICD-10-CM

## 2019-08-26 DIAGNOSIS — Z79.4 CONTROLLED TYPE 2 DIABETES MELLITUS WITH COMPLICATION, WITH LONG-TERM CURRENT USE OF INSULIN: Primary | ICD-10-CM

## 2019-08-26 PROCEDURE — 80307 DRUG TEST PRSMV CHEM ANLYZR: CPT

## 2019-08-26 PROCEDURE — 99999 PR PBB SHADOW E&M-EST. PATIENT-LVL IV: ICD-10-PCS | Mod: PBBFAC,,, | Performed by: FAMILY MEDICINE

## 2019-08-26 PROCEDURE — 99214 OFFICE O/P EST MOD 30 MIN: CPT | Mod: S$GLB,,, | Performed by: FAMILY MEDICINE

## 2019-08-26 PROCEDURE — 3074F PR MOST RECENT SYSTOLIC BLOOD PRESSURE < 130 MM HG: ICD-10-PCS | Mod: CPTII,S$GLB,, | Performed by: FAMILY MEDICINE

## 2019-08-26 PROCEDURE — 1101F PR PT FALLS ASSESS DOC 0-1 FALLS W/OUT INJ PAST YR: ICD-10-PCS | Mod: CPTII,S$GLB,, | Performed by: FAMILY MEDICINE

## 2019-08-26 PROCEDURE — 1101F PT FALLS ASSESS-DOCD LE1/YR: CPT | Mod: CPTII,S$GLB,, | Performed by: FAMILY MEDICINE

## 2019-08-26 PROCEDURE — 3045F PR MOST RECENT HEMOGLOBIN A1C LEVEL 7.0-9.0%: ICD-10-PCS | Mod: CPTII,S$GLB,, | Performed by: FAMILY MEDICINE

## 2019-08-26 PROCEDURE — 99999 PR PBB SHADOW E&M-EST. PATIENT-LVL IV: CPT | Mod: PBBFAC,,, | Performed by: FAMILY MEDICINE

## 2019-08-26 PROCEDURE — 3045F PR MOST RECENT HEMOGLOBIN A1C LEVEL 7.0-9.0%: CPT | Mod: CPTII,S$GLB,, | Performed by: FAMILY MEDICINE

## 2019-08-26 PROCEDURE — 84436 ASSAY OF TOTAL THYROXINE: CPT

## 2019-08-26 PROCEDURE — 36415 COLL VENOUS BLD VENIPUNCTURE: CPT | Mod: PO

## 2019-08-26 PROCEDURE — 3078F PR MOST RECENT DIASTOLIC BLOOD PRESSURE < 80 MM HG: ICD-10-PCS | Mod: CPTII,S$GLB,, | Performed by: FAMILY MEDICINE

## 2019-08-26 PROCEDURE — 3074F SYST BP LT 130 MM HG: CPT | Mod: CPTII,S$GLB,, | Performed by: FAMILY MEDICINE

## 2019-08-26 PROCEDURE — 84443 ASSAY THYROID STIM HORMONE: CPT

## 2019-08-26 PROCEDURE — 3078F DIAST BP <80 MM HG: CPT | Mod: CPTII,S$GLB,, | Performed by: FAMILY MEDICINE

## 2019-08-26 PROCEDURE — 99214 PR OFFICE/OUTPT VISIT, EST, LEVL IV, 30-39 MIN: ICD-10-PCS | Mod: S$GLB,,, | Performed by: FAMILY MEDICINE

## 2019-08-26 NOTE — PATIENT INSTRUCTIONS

## 2019-08-26 NOTE — PROGRESS NOTES
Subjective:       Patient ID: Fifi Mcnair is a 68 y.o. female.    Chief Complaint: No chief complaint on file.    OA generalized.  Knee rt TKR Follow-up: Patient here for Pre Op for right knee TKR surgery. Findings for surgery: medial meniscus tear. Pain is  Not controlled with current analgesics. Medications being used: narcotic analgesics including hydrocodone/acetaminophen (Lorcet, Lortab, Norco, Vicodin). The patient denies  fever, wound drainage, increasing redness, pus, increasing pain, increasing swelling.   She has uncontrolled diabetes.BS average above 180. HGa1c above 8.4.  Copd stable.  No hx of Gi bleeding  Charles hx allergies of anesthesia  No blood transfution.    Review of Systems   Constitutional: Negative for fatigue and unexpected weight change.   Respiratory: Negative for chest tightness and shortness of breath.    Cardiovascular: Negative for chest pain.   Gastrointestinal: Negative for abdominal pain.   Genitourinary: Negative for menstrual problem.   Musculoskeletal: Positive for arthralgias, back pain and joint swelling.   Psychiatric/Behavioral: Negative for dysphoric mood.       Patient Active Problem List   Diagnosis    Hypertension    S/P LASIK (laser assisted in situ keratomileusis)    GERD (gastroesophageal reflux disease)    Chronic kidney disease, stage III (moderate)    Acquired hypothyroidism    DM due to underlying condition with diabetic nephropathy, on Insulin    Conjunctival laceration - Left Eye    Leg pain, bilateral    Chronic allergic rhinitis    Chronic diastolic heart failure, NYHA class 1    Nuclear sclerosis    Dry eye    Myopia with astigmatism and presbyopia    Morbid obesity with BMI of 40.0-44.9, adult, today 44.3    Anti-polysaccharide antibody deficiency    Controlled diabetes mellitus type 2 with complications    Insulin pump status    Bariatric surgery status, 12/2014    LBBB (left bundle branch block)    Osteoarthritis    COPD (chronic  obstructive pulmonary disease)    Vitreo-retinal adhesions    Vitreous hemorrhage, right eye    Posterior vitreous detachment, right eye    Moderate nonproliferative diabetic retinopathy of both eyes    Osteopenia with high risk of fracture    Symptomatic posterior vitreous detachment of left eye    Type 2 diabetes mellitus    Chronic low back pain    Sleep apnea, using CPAP 100%    Posterior vitreous detachment, left eye    Vitreous hemorrhage, left eye    Cardiovascular event risk, ASCVD 10-year risk 12.3%, 12/2015    CKD (chronic kidney disease) stage 3, GFR 30-59 ml/min    Hypertensive left ventricular hypertrophy, without heart failure    Irritable bowel syndrome with diarrhea    PUD (peptic ulcer disease)    Essential hypertension    Thrombocytosis    Adjustment insomnia    Other osteoporosis without current pathological fracture    History of pathological fracture due to osteoporosis    Intestinal metaplasia of gastric mucosa    Fibromyalgia    Generalized osteoarthritis    Spinal stenosis of lumbar region    Xerosis of skin    Synovial cyst of right popliteal space    Sedentary lifestyle    Gross hematuria    Monoclonal gammopathy associated with lymphoplasmacytic dyscrasias    Arthritis of both knees    Moderate persistent asthma without complication    Obesity    Venous insufficiency of both lower extremities    MECHELLE (obstructive sleep apnea), on CPAP 100%    Mild persistent asthma without complication    S/P Emanuel total knee arthroplasty, left April 16, 2019    Gait abnormality    Hypoalbuminemia    Anemia due to stage 3 chronic kidney disease       Objective:      Physical Exam   Constitutional: She is oriented to person, place, and time. She appears well-developed and well-nourished.   Cardiovascular: Normal rate, regular rhythm and normal heart sounds.   Pulses:       Dorsalis pedis pulses are 3+ on the right side, and 3+ on the left side.        Posterior  tibial pulses are 3+ on the right side, and 3+ on the left side.   Pulmonary/Chest: Effort normal and breath sounds normal.   Musculoskeletal: She exhibits no edema.        Right knee: Tenderness found. Medial joint line tenderness noted.        Left knee: Tenderness found. Medial joint line tenderness noted.   Feet:   Right Foot:   Protective Sensation: 6 sites tested. 3 sites sensed.   Skin Integrity: Negative for ulcer, blister or skin breakdown.   Left Foot:   Protective Sensation: 6 sites tested. 3 sites sensed.   Skin Integrity: Negative for ulcer, blister or skin breakdown.   Neurological: She is alert and oriented to person, place, and time.   Skin: Skin is warm and dry.   Psychiatric: She has a normal mood and affect.   Nursing note and vitals reviewed.    medical clear for surgery and anesthesia. Low risk for cardiac or pulmonary.  Lab Results   Component Value Date    WBC 5.40 05/31/2019    WBC 5.30 05/31/2019    HGB 11.8 (L) 05/31/2019    HGB 11.8 (L) 05/31/2019    HCT 34.8 (L) 05/31/2019    HCT 35.4 (L) 05/31/2019     05/31/2019     05/31/2019    CHOL 132 05/31/2019    TRIG 310 (H) 05/31/2019    HDL 33 (L) 05/31/2019    ALT 16 05/31/2019    ALT 16 05/31/2019    AST 18 05/31/2019    AST 17 05/31/2019     05/31/2019     05/31/2019    K 4.4 05/31/2019    K 4.4 05/31/2019     05/31/2019     05/31/2019    CREATININE 1.1 05/31/2019    CREATININE 1.1 05/31/2019    BUN 13 05/31/2019    BUN 13 05/31/2019    CO2 25 05/31/2019    CO2 26 05/31/2019    TSH 1.475 05/31/2019    INR 1.1 12/30/2016    HGBA1C 8.4 (H) 05/31/2019     The ASCVD Risk score (Radhamayur GARCIA Jr., et al., 2013) failed to calculate for the following reasons:    The patient has a prior MI or stroke diagnosis  CXR  Not available ECHO in May EF normal LVH, Non sys dysfunction.  Assessment:       1. Controlled type 2 diabetes mellitus with complication, with long-term current use of insulin    2. Hypothyroidism due to  acquired atrophy of thyroid    3. Encounter for patient compliance monitoring in drug treatment program        Plan:       Controlled type 2 diabetes mellitus with complication, with long-term current use of insulin  -     insulin regular (HUMULIN R REGULAR U-100 INSULN) 100 unit/mL Inj injection; As directed  Dispense: 30 mL; Refill: 12  -     Ambulatory referral to Optometry    Hypothyroidism due to acquired atrophy of thyroid  -     TSH; Future; Expected date: 08/26/2019  -     T4; Future; Expected date: 08/26/2019    Encounter for patient compliance monitoring in drug treatment program  -     [facility] Drug Screen    Good Rx Insulin RX.  Patient will needs labs. BS logs and needs more fiber and diet.Patient readiness: acceptance and barriers:social stressors    During the course of the visit the patient was educated and counseled about the following:     Diabetes:  Discussed general issues about diabetes pathophysiology and management.  Addressed ADA diet.  Suggested low cholesterol diet.  Encouraged aerobic exercise.  Discussed foot care.  Reminded to get yearly retinal exam.  Discussed ways to avoid symptomatic hypoglycemia.  Hypertension:   Regular aerobic exercise.  Obesity:   Regular aerobic exercise program discussed.  Medication: bulk-forming agents.    Goals: Diabetes: Maintain Hemoglobin A1C below 7, Hypertension: Reduce Blood Pressure and Obesity: Reduce calorie intake and BMI    Did patient meet goals/outcomes: no    The following self management tools provided: blood pressure log  blood glucose log  excercise log    Patient Instructions (the written plan) was given to the patient/family.     Time spent with patient: 30 minutes    Barriers to medications present (yes )    Adverse reactions to current medications (yes)    Over the counter medications reviewed (Yes)        30-minute visit. 20 minutes spent counseling patient on diet, exercise, and weight loss.  This has been fully explained to the  patient, who indicates understanding.

## 2019-08-27 ENCOUNTER — OFFICE VISIT (OUTPATIENT)
Dept: RHEUMATOLOGY | Facility: CLINIC | Age: 69
End: 2019-08-27
Payer: MEDICARE

## 2019-08-27 VITALS — SYSTOLIC BLOOD PRESSURE: 127 MMHG | WEIGHT: 246.5 LBS | DIASTOLIC BLOOD PRESSURE: 76 MMHG | BODY MASS INDEX: 45.09 KG/M2

## 2019-08-27 DIAGNOSIS — M10.9 ARTHRITIS DUE TO GOUT: Primary | ICD-10-CM

## 2019-08-27 LAB
T4 SERPL-MCNC: 12.8 UG/DL (ref 4.5–11.5)
TSH SERPL DL<=0.005 MIU/L-ACNC: 0.69 UIU/ML (ref 0.4–4)

## 2019-08-27 PROCEDURE — 1101F PT FALLS ASSESS-DOCD LE1/YR: CPT | Mod: S$GLB,,, | Performed by: INTERNAL MEDICINE

## 2019-08-27 PROCEDURE — 99213 PR OFFICE/OUTPT VISIT, EST, LEVL III, 20-29 MIN: ICD-10-PCS | Mod: S$GLB,,, | Performed by: INTERNAL MEDICINE

## 2019-08-27 PROCEDURE — 99213 OFFICE O/P EST LOW 20 MIN: CPT | Mod: S$GLB,,, | Performed by: INTERNAL MEDICINE

## 2019-08-27 PROCEDURE — 3074F PR MOST RECENT SYSTOLIC BLOOD PRESSURE < 130 MM HG: ICD-10-PCS | Mod: S$GLB,,, | Performed by: INTERNAL MEDICINE

## 2019-08-27 PROCEDURE — 1101F PR PT FALLS ASSESS DOC 0-1 FALLS W/OUT INJ PAST YR: ICD-10-PCS | Mod: S$GLB,,, | Performed by: INTERNAL MEDICINE

## 2019-08-27 PROCEDURE — 3074F SYST BP LT 130 MM HG: CPT | Mod: S$GLB,,, | Performed by: INTERNAL MEDICINE

## 2019-08-27 PROCEDURE — 3078F PR MOST RECENT DIASTOLIC BLOOD PRESSURE < 80 MM HG: ICD-10-PCS | Mod: S$GLB,,, | Performed by: INTERNAL MEDICINE

## 2019-08-27 PROCEDURE — 3078F DIAST BP <80 MM HG: CPT | Mod: S$GLB,,, | Performed by: INTERNAL MEDICINE

## 2019-08-27 NOTE — PROGRESS NOTES
[facility] RHEUMATOLOGY            PROGRESS NOTE      Subjective:       Patient ID:   NAME: Fifi Mcnair : 1950     68 y.o. female    Referring Doc: No ref. provider found  Other Physicians:    Chief Complaint:  Osteoarthritis      History of Present Illness:     Patient returns today for a regularly scheduled follow-up visit for    Osteoarthritis, history of hyperuricemia   The patient has chronic aches and pains but no joint swelling. No chest pains. No cough or fevers. No rashes            ROS:   GEN:  No  fever, night sweats . weight is stable   + fatigue  SKIN: no rashes, no bruising, no ulcerations, no Raynaud's  HEENT: no HA's, No visual changes, no mucosal ulcers, no sicca symptoms,  CV:   no CP, SOB, PND, LOERA, no orthopnea, no palpitations  PULM: normal with no SOB, cough, hemoptysis, sputum or pleuritic pain  GI:  no abdominal pain, nausea, vomiting, constipation, diarrhea, melanotic stools, BRBPR, hematemesis, no dysphagia  :   no dysuria  NEURO: no paresthesias, headaches, visual disturbances, muscle weakness  MUSCULOSKELETAL:no joint swelling, prolonged AM stiffness, no back pain, +muscle pain  Allergies:  Review of patient's allergies indicates:   Allergen Reactions    Adhesive Other (See Comments)     Blisters    Cleocin [clindamycin hcl] Hives    Ceclor [cefaclor] Hives    Advair diskus [fluticasone propion-salmeterol]      Dry mouth    Aleve [naproxen sodium]      Unable to take secondary to kidney function.     Erythromycin Hives    Levaquin [levofloxacin] Dermatitis    Macrobid [nitrofurantoin monohyd/m-cryst] Other (See Comments)     Stomach pain/ GI issues    Macrodantin [nitrofurantoin macrocrystalline] Hives    Motrin [ibuprofen]      Unable to take secondary to kidney functions.    Restoril [temazepam]      LIGHTHEADED UPON WAKING.with poor results    Sulfa (sulfonamide antibiotics)      Hold due to renal problems    Trazodone      PALPITATIONS    Remeron  [mirtazapine] Palpitations and Other (See Comments)     Jittery    Vancomycin analogues Rash     Feet broke out/inflammed blood vessels         Medications:    Current Outpatient Medications:     albuterol-ipratropium 2.5mg-0.5mg/3mL (DUO-NEB) 0.5 mg-3 mg(2.5 mg base)/3 mL nebulizer solution, Take 3 mLs by nebulization every 6 (six) hours as needed for Wheezing. Rescue, Disp: 1 Box, Rfl: 3    allopurinol (ZYLOPRIM) 100 MG tablet, TAKE TWO TABLETS BY MOUTH AT BEDTIME, Disp: 180 tablet, Rfl: 4    amitriptyline (ELAVIL) 50 MG tablet, TAKE 2 TABLETS BY MOUTH IN THE EVENING, Disp: 60 tablet, Rfl: 0    atenolol (TENORMIN) 25 MG tablet, Take 1 tablet (25 mg total) by mouth 2 (two) times daily., Disp: 180 tablet, Rfl: 4    calcitRIOL (ROCALTROL) 0.25 MCG Cap, Take 1 capsule by mouth twice a week. Monday Friday, Disp: , Rfl:     cetirizine (ZYRTEC) 10 MG tablet, Take 1 tablet (10 mg total) by mouth once daily., Disp: 1 Bottle, Rfl: 5    clotrimazole-betamethasone 1-0.05% (LOTRISONE) cream, , Disp: , Rfl:     cyanocobalamin, vitamin B-12, (VITAMIN B-12) 5,000 mcg Subl, Place 5,000 mg under the tongue once a week., Disp: , Rfl:     diclofenac sodium (VOLTAREN) 1 % Gel,  APPLY 2 GRAMS TOPICALLY ONCE DAILY, Disp: 1 Tube, Rfl: 0    diphenoxylate-atropine 2.5-0.025 mg (LOMOTIL) 2.5-0.025 mg per tablet, TAKE ONE TABLET BY MOUTH 4 TIMES DAILY AS NEEDED FOR DIARRHEA, Disp: 60 tablet, Rfl: 1    DYMISTA 137-50 mcg/spray Spry nassal spray, as needed. , Disp: , Rfl:     FLUoxetine 20 MG capsule, Take 2 capsules (40 mg total) by mouth once daily., Disp: 180 capsule, Rfl: 3    fluticasone (FLONASE) 50 mcg/actuation nasal spray, 2 sprays by Nasal route once daily. , Disp: , Rfl:     fluticasone-vilanterol (BREO) 100-25 mcg/dose diskus inhaler, Inhale 1 puff into the lungs once daily., Disp: , Rfl:     gabapentin (NEURONTIN) 100 MG capsule, 3 (three) times daily. , Disp: , Rfl:     insulin (BASAGLAR KWIKPEN U-100 INSULIN)  glargine 100 units/mL (3mL) SubQ pen, Inject 20 Units into the skin every evening. Use 10 units if BS over 210.Night before surgery., Disp: , Rfl: 0    insulin aspart U-100 (NOVOLOG) 100 unit/mL injection, Use per insulin pump, 35-40 units daily., Disp: 40 mL, Rfl: 0    insulin regular (HUMULIN R REGULAR U-100 INSULN) 100 unit/mL Inj injection, As directed, Disp: 30 mL, Rfl: 12    KLOR-CON M20 20 mEq tablet, TAKE ONE TABLET BY MOUTH TWICE DAILY, Disp: 180 tablet, Rfl: 3    l-methylfolate-b2-b6-b12 (CEREFOLIN) 6-5-50-1 mg Tab, Take 1 tablet by mouth once daily., Disp: , Rfl:     Lacto.acidophilus-Bif.animalis (PROBIOTIC) 5 billion cell CpSP, Take 1 capsule by mouth once daily., Disp: 30 capsule, Rfl: 3    levothyroxine (SYNTHROID) 25 MCG tablet, TAKE ONE TABLET BY MOUTH ONCE DAILY, Disp: 90 tablet, Rfl: 3    LORazepam (ATIVAN) 1 MG tablet, TAKE 1 TABLET BY MOUTH EVERY 12 HOURS AS NEEDED FOR ANXIETY, Disp: 30 tablet, Rfl: 2    losartan (COZAAR) 50 MG tablet, Take 1 tablet (50 mg total) by mouth once daily., Disp: 90 tablet, Rfl: 3    montelukast (SINGULAIR) 10 mg tablet, , Disp: , Rfl: 8    MULTIVIT-IRON-MIN-FOLIC ACID 3,500-18-0.4 UNIT-MG-MG ORAL CHEW, Take by mouth 2 (two) times daily., Disp: , Rfl:     mupirocin (BACTROBAN) 2 % ointment, APPLY OINTMENT TOPICALLY TO AFFECTED AREA ON NOSE THREE TIMES DAILY AS DIRECTED, Disp: 15 g, Rfl: 11    oxyCODONE (ROXICODONE) 15 MG Tab, Take 1 tablet (15 mg total) by mouth every 6 (six) hours as needed for Pain., Disp: 30 tablet, Rfl: 0    pantoprazole (PROTONIX) 40 MG tablet, TAKE ONE TABLET BY MOUTH ONCE DAILY, Disp: 90 tablet, Rfl: 3    ranitidine (ZANTAC) 300 MG tablet, TAKE ONE TABLET BY MOUTH IN THE EVENING, Disp: 30 tablet, Rfl: 1    SSD 1 % cream, , Disp: , Rfl:     torsemide (DEMADEX) 20 MG Tab, Take 1 tablet (20 mg total) by mouth 2 (two) times daily. TAKE 1 TABLET BY MOUTH EVERY OTHER DAY THEN 2 TABLETS EVERY DAY, Disp: 60 tablet, Rfl: 2    Current  Facility-Administered Medications:     gentamicin injection 80 mg, 80 mg, Intramuscular, 1 time in Clinic/HOD, Angy Campos MD    Facility-Administered Medications Ordered in Other Visits:     lactated ringers infusion, , Intravenous, Continuous, Sahheen Hanson MD, Last Rate: 10 mL/hr at 08/31/18 0739    PMHx/PSHx Updates:            Objective:     Vitals:  Blood pressure 127/76, weight 111.8 kg (246 lb 8 oz).    Physical Examination:   GEN: no apparent distress, comfortable; AAOx3  SKIN: no rashes,no ulceration, no Raynaud's, no petechiae, no SQ nodules,  HEAD: normal  EYES: no pallor, no icterus,  NECK: no masses, thyroid normal, trachea midline, no LAD/LN's, supple  CV: RRR with no murmur; l S1 and S2 reg. ,no gallop no rubs,   CHEST: Normal respiratory effort; CTAB; normal breath sounds; no wheeze or crackles  ABDOM: nontender and nondistended; soft; no masses; no rebound/guarding  MUSC/Skeletal: ROM normal; no crepitus; joints without synovitis,  no deformities  No joint swelling or tenderness of PIP, MCP, wrist, elbow, shoulder, or knee joints  EXTREM: no clubbing, cyanosis, no edema,normal  pulses   NEURO: grossly intact; motor WNL; AAOx3; ,   PSYCH: normal mood, affect and behavior  LYMPH: normal cervical, supraclavicular          Labs:   Lab Results   Component Value Date    WBC 5.40 05/31/2019    WBC 5.30 05/31/2019    HGB 11.8 (L) 05/31/2019    HGB 11.8 (L) 05/31/2019    HCT 34.8 (L) 05/31/2019    HCT 35.4 (L) 05/31/2019    MCV 92 05/31/2019    MCV 93 05/31/2019     05/31/2019     05/31/2019    CMP  @LASTLAB(NA,K,CL,CO2,GLU,BUN,Creatinine,Calcium,PROT,Albumin,Bilitot,Alkphos,AST,ALT,CRP,ESR,RF,CCP,MONICA,SSA,CPK,uric acid) )@  I have reviewed all available lab results and radiology reports.    Radiology/Diagnostic Studies:        Assessment/Plan:   (1) 68 y.o. female with diagnosis of Osteoarthritis: stable  2) Hyperuricemia/hx gout.check Uric acid  3) Hx MGUS  4) Hx  DM            Discussion:     I have explained all of the above in detail and the patient understands all of the current recommendation(s). I have answered all questions to the best of my ability and to their complete satisfaction.       The patient is to continue with the current management plan         RTC in 4-6 months        Electronically signed by Melissa Hernandez MD

## 2019-08-29 ENCOUNTER — HOSPITAL ENCOUNTER (OUTPATIENT)
Dept: PREADMISSION TESTING | Facility: HOSPITAL | Age: 69
Discharge: HOME | End: 2019-08-29
Attending: ORTHOPAEDIC SURGERY
Payer: MEDICARE

## 2019-08-29 ENCOUNTER — HOSPITAL ENCOUNTER (OUTPATIENT)
Dept: RADIOLOGY | Facility: HOSPITAL | Age: 69
Discharge: HOME | End: 2019-08-29
Attending: ORTHOPAEDIC SURGERY
Payer: MEDICARE

## 2019-08-29 DIAGNOSIS — M17.0 ARTHRITIS OF BOTH KNEES: Primary | ICD-10-CM

## 2019-08-29 LAB
6MAM UR QL: NOT DETECTED
7AMINOCLONAZEPAM UR QL: NOT DETECTED
A-OH ALPRAZ UR QL: NOT DETECTED
ABO + RH BLD: NORMAL
ALPRAZ UR QL: NOT DETECTED
AMPHET UR QL SCN: NOT DETECTED
ANION GAP SERPL CALC-SCNC: 9 MMOL/L (ref 8–16)
BARBITURATES UR QL: NOT DETECTED
BASOPHILS # BLD AUTO: 0.02 K/UL (ref 0–0.2)
BASOPHILS NFR BLD: 0.3 % (ref 0–1.9)
BILIRUB UR QL STRIP: NEGATIVE
BLD GP AB SCN CELLS X3 SERPL QL: NORMAL
BUN SERPL-MCNC: 18 MG/DL (ref 8–23)
BUPRENORPHINE UR QL: NOT DETECTED
BZE UR QL: NOT DETECTED
CALCIUM SERPL-MCNC: 9.8 MG/DL (ref 8.7–10.5)
CARBOXYTHC UR QL: NOT DETECTED
CARISOPRODOL UR QL: NOT DETECTED
CHLORIDE SERPL-SCNC: 99 MMOL/L (ref 95–110)
CLARITY UR: CLEAR
CLONAZEPAM UR QL: NOT DETECTED
CO2 SERPL-SCNC: 29 MMOL/L (ref 23–29)
CODEINE UR QL: NOT DETECTED
COLOR UR: YELLOW
CREAT SERPL-MCNC: 1.3 MG/DL (ref 0.5–1.4)
CREAT UR-MCNC: <20 MG/DL (ref 20–400)
DIAZEPAM UR QL: NOT DETECTED
DIFFERENTIAL METHOD: NORMAL
EOSINOPHIL # BLD AUTO: 0.1 K/UL (ref 0–0.5)
EOSINOPHIL NFR BLD: 1.9 % (ref 0–8)
ERYTHROCYTE [DISTWIDTH] IN BLOOD BY AUTOMATED COUNT: 13.6 % (ref 11.5–14.5)
EST. GFR  (AFRICAN AMERICAN): 49 ML/MIN/1.73 M^2
EST. GFR  (NON AFRICAN AMERICAN): 42 ML/MIN/1.73 M^2
ETHYL GLUCURONIDE UR QL: NOT DETECTED
FENTANYL UR QL: NOT DETECTED
GLUCOSE SERPL-MCNC: 274 MG/DL (ref 70–110)
GLUCOSE UR QL STRIP: ABNORMAL
HCT VFR BLD AUTO: 37.1 % (ref 37–48.5)
HGB BLD-MCNC: 12.4 G/DL (ref 12–16)
HGB UR QL STRIP: NEGATIVE
HYDROCODONE UR QL: NOT DETECTED
HYDROMORPHONE UR QL: NOT DETECTED
IMM GRANULOCYTES # BLD AUTO: 0.03 K/UL (ref 0–0.04)
KETONES UR QL STRIP: NEGATIVE
LEUKOCYTE ESTERASE UR QL STRIP: NEGATIVE
LORAZEPAM UR QL: NOT DETECTED
LYMPHOCYTES # BLD AUTO: 1.7 K/UL (ref 1–4.8)
LYMPHOCYTES NFR BLD: 28.1 % (ref 18–48)
MCH RBC QN AUTO: 30.9 PG (ref 27–31)
MCHC RBC AUTO-ENTMCNC: 33.4 G/DL (ref 32–36)
MCV RBC AUTO: 93 FL (ref 82–98)
MDA UR QL: NOT DETECTED
MDEA UR QL: NOT DETECTED
MDMA UR QL: NOT DETECTED
ME-PHENIDATE UR QL: NOT DETECTED
MEPERIDINE UR QL: NOT DETECTED
METHADONE UR QL: NOT DETECTED
METHAMPHET UR QL: NOT DETECTED
MIDAZOLAM UR QL SCN: NOT DETECTED
MONOCYTES # BLD AUTO: 0.4 K/UL (ref 0.3–1)
MONOCYTES NFR BLD: 6.1 % (ref 4–15)
MORPHINE UR QL: NOT DETECTED
NEUTROPHILS # BLD AUTO: 3.9 K/UL (ref 1.8–7.7)
NEUTROPHILS NFR BLD: 63.1 % (ref 38–73)
NITRITE UR QL STRIP: NEGATIVE
NORBUPRENORPHINE UR QL CFM: NOT DETECTED
NORDIAZEPAM UR QL: NOT DETECTED
NORFENTANYL UR QL: NOT DETECTED
NORHYDROCODONE UR QL CFM: NOT DETECTED
NOROXYCODONE UR QL CFM: NOT DETECTED
NOROXYMORPHONE: NOT DETECTED
NRBC BLD-RTO: 0 /100 WBC
OXAZEPAM UR QL: NOT DETECTED
OXYCODONE UR QL: NOT DETECTED
OXYMORPHONE UR QL: NOT DETECTED
PATHOLOGY STUDY: ABNORMAL
PCP UR QL: NOT DETECTED
PH UR STRIP: 7 [PH] (ref 5–8)
PHENTERMINE UR QL: NOT DETECTED
PLATELET # BLD AUTO: 205 K/UL (ref 150–350)
PMV BLD AUTO: 9.5 FL (ref 9.2–12.9)
POTASSIUM SERPL-SCNC: 4.3 MMOL/L (ref 3.5–5.1)
PROPOXYPH UR QL: NOT DETECTED
PROT UR QL STRIP: NEGATIVE
RBC # BLD AUTO: 4.01 M/UL (ref 4–5.4)
SERVICE CMNT-IMP: ABNORMAL
SODIUM SERPL-SCNC: 137 MMOL/L (ref 136–145)
SP GR UR STRIP: <=1.005 (ref 1–1.03)
TAPENTADOL UR QL SCN: NOT DETECTED
TAPENTADOL-O-SULF: NOT DETECTED
TEMAZEPAM UR QL: NOT DETECTED
TRAMADOL UR QL: NOT DETECTED
URN SPEC COLLECT METH UR: ABNORMAL
UROBILINOGEN UR STRIP-ACNC: NEGATIVE EU/DL
WBC # BLD AUTO: 6.19 K/UL (ref 3.9–12.7)
ZOLPIDEM UR QL: NOT DETECTED

## 2019-08-29 PROCEDURE — 99900103 DSU ONLY-NO CHARGE-INITIAL HR (STAT)

## 2019-08-29 PROCEDURE — 71046 X-RAY EXAM CHEST 2 VIEWS: CPT | Mod: 26,,, | Performed by: RADIOLOGY

## 2019-08-29 PROCEDURE — 87081 CULTURE SCREEN ONLY: CPT

## 2019-08-29 PROCEDURE — 71046 X-RAY EXAM CHEST 2 VIEWS: CPT | Mod: TC,FY

## 2019-08-29 PROCEDURE — 86901 BLOOD TYPING SEROLOGIC RH(D): CPT

## 2019-08-29 PROCEDURE — 71046 XR CHEST PA AND LATERAL: ICD-10-PCS | Mod: 26,,, | Performed by: RADIOLOGY

## 2019-08-29 PROCEDURE — 85025 COMPLETE CBC W/AUTO DIFF WBC: CPT

## 2019-08-29 PROCEDURE — 81003 URINALYSIS AUTO W/O SCOPE: CPT

## 2019-08-29 PROCEDURE — 80048 BASIC METABOLIC PNL TOTAL CA: CPT

## 2019-08-29 PROCEDURE — 99900104 DSU ONLY-NO CHARGE-EA ADD'L HR (STAT)

## 2019-08-29 PROCEDURE — 36415 COLL VENOUS BLD VENIPUNCTURE: CPT

## 2019-08-29 RX ORDER — AMOXICILLIN 500 MG
1 CAPSULE ORAL DAILY
COMMUNITY

## 2019-08-29 RX ORDER — LANOLIN ALCOHOL/MO/W.PET/CERES
800 CREAM (GRAM) TOPICAL NIGHTLY
COMMUNITY

## 2019-08-29 RX ORDER — FERROUS SULFATE 325(65) MG
325 TABLET ORAL
COMMUNITY

## 2019-08-29 RX ORDER — NICOTINE POLACRILEX 2 MG
1 GUM BUCCAL DAILY
COMMUNITY

## 2019-08-29 RX ORDER — ASCORBIC ACID 500 MG
500 TABLET ORAL DAILY
COMMUNITY

## 2019-08-29 NOTE — DISCHARGE INSTRUCTIONS
To confirm, Your doctor has instructed you that surgery is scheduled for: 9/10/2019    Please report to [facility], [facility] [address] the morning of surgery. You must check-in and receive a wristband before going to your procedure.    Pre-Op will call the afternoon prior to surgery between 1:00 and 6:00 PM with the final arrival time.  Phone number: [phone]    PLEASE NOTE:  The surgery schedule has many variables which may affect the time of your surgery case.  Family members should be available if your surgery time changes.  Plan to be here the day of your procedure between 4-6 hours.    MEDICATIONS:  TAKE ONLY THESE MEDICATIONS WITH A SMALL SIP OF WATER THE MORNING OF YOUR PROCEDURE:  ALBUTEROL, ATENOLOL, DYMISTA, FLUOXETINE, FLONASE, GABAPENTIN, BREO, SYNTHROID, PANTOPRAZOLE, OXYCODONE IF NEEDED    NO INSULIN MORNING OF SURGERY    TURN OFF INSULIN PUMP AT MIDNIGHT BEFORE SURGERY  BRING INSULIN PUMP WITH YOU    DO NOT TAKE THESE MEDICATIONS 5-7 DAYS PRIOR to your procedure or per your surgeon's request: ASPIRIN, ALEVE, ADVIL, IBUPROFEN, FISH OIL VITAMIN E, HERBALS  (May take Tylenol)    ONLY if you are prescribed any types of blood thinners such as:  Aspirin, Coumadin, Plavix, Pradaxa, Xarelto, Aggrenox, Effient, Eliquis, Savasya, Brilinta, or any other, ask your surgeon whether you should stop taking them and how long before surgery you should stop.  You may also need to verify with the prescribing physician if it is ok to stop your medication.      INSTRUCTIONS IMPORTANT!!  · Do not eat or drink anything between midnight and the time of your procedure- this includes gum, mints, and candy.  · Do not smoke or drink alcoholic beverages 24 hours prior to your procedure.  · Shower the night before AND the morning of your procedure with a Chlorhexidine wash such as Hibiclens or Dial antibacterial soap from the neck down.  Do not get it on your face or in your eyes.  You may use your own shampoo  and face wash. This helps your skin to be as bacteria free as possible.    · If you wear contact lenses, dentures, hearing aids or glasses, bring a container to put them in during surgery and give to a family member for safe keeping.  Please leave all jewelry, piercing's and valuables at home.   · DO NOT remove hair from the surgery site.  Do not shave the incision site unless you are given specific instructions to do so.    · ONLY if you have been diagnosed with sleep apnea please bring your C-PAP machine.  · ONLY if you wear home oxygen please bring your portable oxygen tank the day of your procedure.  · ONLY if you have a history of OPEN HEART SURGERY you will need a clearance from your Cardiologist per Anesthesia.      · ONLY for patients requiring bowel prep, written instructions will be given by your doctor's office.  · ONLY if you have a neuro stimulator, please bring the controller with you the morning of surgery  · ONLY if a type and screen test is needed before surgery, please return: done today  · If your doctor has scheduled you for an overnight stay, bring a small overnight bag with any personal items you need.  · Make arrangements in advance for transportation home by a responsible adult.  It is not safe to drive a vehicle during the 24 hours after anesthesia.      · Visiting hours are 10:00AM to 8:30PM.  For the safety of all patients, visitors under the age of 12 are not allowed above the first floor of the hospital.    · All [facility] facilities and properties are tobacco free.  Smoking is NOT allowed.       If you have any questions about these instructions, call Pre-Op Admit  Nursing at [phone] or the Pre-Op Day Surgery Unit at [phone].

## 2019-08-29 NOTE — PRE ADMISSION SCREENING
Patient Name: Fifi Mcnair  YOB: 1950   MRN: [id number]     [facility]   Basic Mobility Inpatient Short Form 6 Clicks         How much difficulty does the patient currently have  Unable  A Lot  A Little  None      1. Turning over in bed (including adjusting bedclothes, sheets and blankets)?     1 []    2 []    3 []    4 [x]        2. Sitting down on and standing up from a chair with arms (e.g., wheelchair, bedside commode, etc.)     1 []  2 []  3 [x]     4 []      3. Moving from lying on back to sitting on the side of the bed?     1 []  2 []  3 []    4 [x]    How much help from another person does the patient currently need  Total  A Lot  A Little  None      4. Moving to and from a bed to a chair (including a wheelchair)?    1 []  2 []  3 []    4 [x]      5. Need to walk in hospital room?    1 []  2 []  3 []    4 [x]      6. Climbing 3-5 steps with a railing?    1 []  2 []  3 []    4 [x]       Raw Score:      23            CMS 0-100% Score:    11.20        %   Standardized Score:    56.93           CMS Modifier:       CI                                   [facility]   Basic Mobility Inpatient Short Form 6 Clicks Score Conversion Table*         *Use this form to convert -PAC Basic Mobility Inpatient Raw Scores.   [facility] Inpatient Basic Mobility Short Form Scoring Example   1. Add the number values associated with the response to each item. For example, items totals yield a Raw Score of 21.   2. Match the raw score to the t-Scale scores (t-Scale score = 50.25, SE = 4.69).   3. Find the associated CMS % (CMS % = 28.97%).   4. Locate the correct CMS Functional Modifier Code, or G Code (G code = CJ)     NOTE: Each -PAC Short Form has a separate conversion table. Make sure that you use the correct conversion table.       Instruction Manual - page 45 contains conversion table

## 2019-08-29 NOTE — PRE ADMISSION SCREENING
JOINT CAMP ASSESSMENT    Name Fifi Mcnair   MRN [id number]    Age/Sex 68 y.o. female    Surgeon Dr. Larry Molina   Joint Camp Date 8/29/2019   Surgery Date 9/10/2019   Procedure Right Knee Arthroplasty   Insurance Payor: [employer] / Plan: [employer] 65 / Product Type: Medicare Advantage /    Care Team Patient Care Team:  Werner Vargas MD as PCP - General (Family Medicine)  Myron Cantu MD as Consulting Physician (Endocrinology)  Adán Soto MD as Consulting Physician (Nephrology)  Felipa Franco LPN as Care Coordinator  Larry Molina MD as Consulting Physician (Sports Medicine)    Pharmacy   [id number] - [place], [place] - [address]  [address]  [place] [address]  Phone: [phone] Fax: [phone]     AM-PAC Score   23   Risk Assessment Score 10     Past Medical History:   Diagnosis Date    Allergy     multiple antibiotic allergies    Anemia     Anxiety     Arthritis     Asthma     CHF (congestive heart failure)     NYHA class III     Chronic kidney disease     Colon polyp     benign    COPD (chronic obstructive pulmonary disease)     DLCO 37% , and mild pulmonary HTN    Dental bridge present     LOWER    Depression     Diabetes mellitus     Diabetic retinopathy     Diastolic dysfunction     Diverticulitis of large intestine without perforation or abscess without bleeding 2/12/2018    Diverticulosis     Former smoker     Fracture of lumbar spine     GERD (gastroesophageal reflux disease)     Hernia of unspecified site of abdominal cavity without mention of obstruction or gangrene     Hyperlipidemia     Hypertension     hypertensive renal    IBS (irritable bowel syndrome)     Mild nonproliferative diabetic retinopathy(362.04) 11/25/2013    Morbid obesity     Nuclear sclerosis 11/25/2013    Osteoporosis     Peripheral edema     Rash     Recurrent upper respiratory infection (URI)     S/P  LASIK (laser assisted in situ keratomileusis)     Sleep apnea     sleep apnea uses bipap.    Stroke     tia    Thyroid disease     on synthroid    TIA (transient ischemic attack)     Urinary tract infection     Wears glasses        Past Surgical History:   Procedure Laterality Date    ABDOMINAL SURGERY      ADENOIDECTOMY      ARTHROPLASTY, KNEE Left 4/16/2019    [facility] at [facility] OR    ARTHROSCOPY, KNEE, WITH CHONDROPLASTY Right 8/31/2018    Performed [facility] at [facility] OR    ARTHROSCOPY, KNEE, WITH MENISCECTOMY Right 8/31/2018    Per[facility] at [facility] OR    COLONOSCOPY  03/05/2013    repeat in 5 years    COLONOSCOPY N/A 4/11/2018    Performed by Luis Nvaarro MD at [facility] ENDO    COLONOSCOPY N/A 5/31/2017    Performed by Luis Navarro [facility] at [facility] ENDO    COLONOSCOPY N/A 3/5/2013    Performed by Florian Randall MD at [facility] ENDO    COSMETIC SURGERY      belt abdominoplasty    CYSTOSCOPY      CYSTOSCOPY N/A 8/6/2018    [facility]    EGD (ESOPHAGOGASTRODUODENOSCOPY) N/A 3/5/2013    Performed by Florian Randall MD at [facility] ENDO    ESOPHAGOGASTRODUODENOSCOPY (EGD) N/A 1/11/2018    Performed by Luis [facility]arro MD at [facility] ENDO    ESOPHAGOGASTRODUODENOSCOPY (EGD) N/A 12/27/2016    Performed by [facility]arro [facility] at [facility] ENDO    ESOPHAGOGASTRODUODENOSCOPY (EGD) N/A 10/25/2016    Performed by Luis [facility]arro [facility] at [facility] ENDO    ESOPHAGOGASTRODUODENOSCOPY (EGD) N/A 8/24/2016    Performed by Luis [facility]arro [facility] at [facility] ENDO    ESOPHAGOGASTRODUODENOSCOPY (EGD) N/A 7/21/2016    Performed by Florian Randall MD at [facility] ENDO    ESOPHAGOGASTRODUODENOSCOPY (EGD)-29580 N/A 12/12/2014    Performed by [facility]ong [facility] at [facility] OR 2ND FLR    EYE SURGERY Right     mazzulla    GASTRECTOMY      GASTRECTOMY-SLEEVE-LAPAROSCOPIC-32832; EGD-69570 N/A 12/12/2014    Performed by  Isaiah Kwong MD at [facility] OR 2ND FLR    gastric sleeve      HERNIA REPAIR      5 years old    HYSTERECTOMY      ovaries remain    PANNICULECTOMY N/A 11/28/2016    Performed by [facility]o [facility]erril [facility] at [facility] OR 2ND FLR    REFRACTIVE SURGERY      mono va//ou//    REPAIR-HERNIA  11/28/2016    Performed by [facility] at [facility] OR 2ND FLR    RHINOPLASTY TIP      TONSILLECTOMY      TOTAL KNEE ARTHROPLASTY Left 4/16/19    TUBAL LIGATION      UPPER GASTROINTESTINAL ENDOSCOPY  03/05/2013    UPPER GASTROINTESTINAL ENDOSCOPY  08/24/2016    Dr. Veras, repeat in 8 weeks    UPPER GASTROINTESTINAL ENDOSCOPY  07/21/2016    Dr. Randall         Home Enviroment     Living Arrangement: Lives with spouse  Home Environment: 1-story house/ trailer, ramped, tub-shower  Home Safety Concerns: Pets in the home: dogs (1).    DISCHARGE CAREGIVER/SUPPORT SYSTEM     Identified post-op caregiver: Patient has spouse / significant other.  Patient's caregiver(s) will be able to provide physical assistance. Patient will have someone to assist overnight.      Caregiver present at pre-op interview:  No      PRE-OPERATIVE FUNCTIONAL STATUS     Employment: Disabled    Pre-op Functional Status: Patient is independent with mobility/ambulation, transfers, ADL's, IADL's.    Use of assistive device for ambulation: none  ADL: self care  ADL Limitations: difficulty with walking  Medical Restrictions: Decreased range of motions in extremities    POTENTIAL BARRIERS TO DISCHARGE/POTENTIAL POST-OP COMPLICATIONS     See medical history above. Patient had left knee arthroplasty on 04/16/2019 without complications and a 3 day impatient hospital stay. Patient with a current HgbA1C of 9.4 on 09/04/2019.    DISCHARGE PLAN     Expected LOS of 2 days or less for joint replacement discussed with patient. We also discussed a discharge path of HH for approximately two weeks with a transition to outpatient PT on the third week given no  post-op complications.      Patient in agreement with discharge plan: Yes    Discharge to: Discharge home with home health (PT/OT) x2 weeks with transition to outpatient PT     HH:  [facility]     OP PT: [facility]     Home DME: rolling walker, rollator, bedside commode, tub transfer bench and assistive device kit    Needed DME at D/C: none     Rx: Per Dr. Molina at discharge     Meds to Beds: N/A  Patient expected to discharge on Aspirin 325mg by mouth twice daily for DVT prophylaxis.

## 2019-08-29 NOTE — PRE-PROCEDURE INSTRUCTIONS
Spoke to Dr. Hanson with anesthesia.  Patient has an insulin pump.  He stated to have patient turn off insulin pump at Midnight before surgery and bring with her so it can be hooked back up in recovery.  Informed patient and she verbalized understanding.

## 2019-08-29 NOTE — PRE ADMISSION SCREENING
"               [facility] Risk Assessment Scale    Patient Name: Fifi Mcnair  YOB: 1950  MRN: [id number]            RIsk Factor Measure Recommendation Patient Data Scale/Score   BMI >40 Reconsider surgery, weight loss   Estimated body mass index is 45.09 kg/m² as calculated from the following:    Height as of 8/26/19: 5' 2" (1.575 m).    Weight as of 8/27/19: 111.8 kg (246 lb 8 oz).   [] 0 = 1 - 24.9  [] 1 = 25-29.9  [] 2 = 30-34.9  [] 3 = 35-39.9  [] 4 = 40-44.9  [x] 5 = 45-99.9   Hemoglobin AIC (if applicable) >9 Delay surgery until DM under control  Refer for:  · Nutrition Therapy  · Exercise   · Medication    Lab Results   Component Value Date    HGBA1C 9.4 (H) 09/04/2019       Lab Results   Component Value Date     (H) 09/04/2019      [] 0 = 4.0-5.6  [] 1 = 5.7-6.4  [] 2 = 6.5-6.9  [] 3 = 7.0-7.9  [] 4 = 8.0-8.9  [x] 5 = 9.0-12   Hemoglobin (Anemia) <9 Delay surgery   Correct anemia Lab Results   Component Value Date    HGB 13.1 09/04/2019    [] 20 - <9.0                    Albumin <3 Delay surgery &Workup Lab Results   Component Value Date    ALBUMIN 3.8 09/04/2019    [] 20 - <3.0   Smoking Cessation >4 Weeks Delay Surgery  Refer to OP Cessation Class    Former Smoker  Quit: 1990 [] 20 - current smoker                                _____ PPD                    Hx of MI, PE, Arrhythmia, CVA, DVT <30 Days Delay Surgery    N/A [] 20      Infection Variable Delay surgery and re-evaluate   N/A [] 20 - recent/current infection     Depression (PHQ) >10 out of 27 Delay Surgery and re-evaluate  Medication  Counseling              [x] 0     []1     []2     []3      []4      [] 5                    (1-4)      (5-9)  (10-14)  (15-19)   (20-27)     Memory Impairment & Memory loss (Mini-Cog Screening Tool) Advanced dementia and/or Parkinson's Reconsider surgery     [x] 0     []1     []2     []3     []4     [] 5     Physical Conditioning (Modified AM-PAC Per Physical Therapy at Joint Camp) Unable to " ambulate on day of surgery Delay surgery and re-evaluate  Pre-Rehabilitation   (PT evaluation)       [x]  0   []4       []8     []12        []16     []20       (<20%)   (<40%)   (<60%)   (<80% )    (>80%)     Home Environment/Caregiver support  (Per /Navigator Interview)    Availability of basic services and/or approprate assistance during post-operative period Delay surgery and re-evaluate  Safe home environment  Health   1 week post-surgery  Transportation  availability  Ability to obtain DME/Medications post-op    [x] 0     []1     []2     []3     []4     [] 5  [x] 0     []1     []2     []3     []4     [] 5  [x] 0     []1     []2     []3     []4     [] 5  [x] 0     []1     []2     []3     []4     [] 5         MD Contact: Dr. Molina Comments:  Total Score:  10

## 2019-08-30 DIAGNOSIS — E03.4 HYPOTHYROIDISM DUE TO ACQUIRED ATROPHY OF THYROID: Primary | ICD-10-CM

## 2019-08-30 LAB — MRSA SPEC QL CULT: NORMAL

## 2019-09-03 ENCOUNTER — TELEPHONE (OUTPATIENT)
Dept: ORTHOPEDICS | Facility: CLINIC | Age: 69
End: 2019-09-03

## 2019-09-03 ENCOUNTER — TELEPHONE (OUTPATIENT)
Dept: FAMILY MEDICINE | Facility: CLINIC | Age: 69
End: 2019-09-03

## 2019-09-03 NOTE — TELEPHONE ENCOUNTER
PT has TKA scheduled 9/10/19. Requesting cortisone injection for thumb this week. Ok for thumb injection or not before sx?     Please advise. Thanks!

## 2019-09-03 NOTE — TELEPHONE ENCOUNTER
Patient states she was seen in office on 8/26/19 for surgical clearance (knee replacement). Requesting to know the status of clearance. Please advise.

## 2019-09-03 NOTE — TELEPHONE ENCOUNTER
----- Message from Vivi Castro MA sent at 9/3/2019 11:55 AM CDT -----  Contact: chelsea Escobars trigger finger  L thumb injected.   Next available appt  09/16  Surgery  09/10   Call back

## 2019-09-04 ENCOUNTER — TELEPHONE (OUTPATIENT)
Dept: FAMILY MEDICINE | Facility: CLINIC | Age: 69
End: 2019-09-04

## 2019-09-04 ENCOUNTER — LAB VISIT (OUTPATIENT)
Dept: LAB | Facility: HOSPITAL | Age: 69
End: 2019-09-04
Attending: INTERNAL MEDICINE
Payer: MEDICARE

## 2019-09-04 DIAGNOSIS — N18.30 CHRONIC KIDNEY DISEASE, STAGE III (MODERATE): ICD-10-CM

## 2019-09-04 DIAGNOSIS — I10 ESSENTIAL HYPERTENSION, MALIGNANT: ICD-10-CM

## 2019-09-04 DIAGNOSIS — E11.42 DIABETIC POLYNEUROPATHY: ICD-10-CM

## 2019-09-04 DIAGNOSIS — Z96.41 INSULIN PUMP STATUS: ICD-10-CM

## 2019-09-04 DIAGNOSIS — D64.9 ANEMIA, UNSPECIFIED: Primary | ICD-10-CM

## 2019-09-04 DIAGNOSIS — N18.2 CHRONIC KIDNEY DISEASE, STAGE II (MILD): ICD-10-CM

## 2019-09-04 DIAGNOSIS — J44.89 OBSTRUCTIVE CHRONIC BRONCHITIS WITHOUT EXACERBATION: ICD-10-CM

## 2019-09-04 LAB
ALBUMIN SERPL BCP-MCNC: 3.8 G/DL (ref 3.5–5.2)
ALP SERPL-CCNC: 135 U/L (ref 55–135)
ALT SERPL W/O P-5'-P-CCNC: 24 U/L (ref 10–44)
ANION GAP SERPL CALC-SCNC: 12 MMOL/L (ref 8–16)
AST SERPL-CCNC: 23 U/L (ref 10–40)
BASOPHILS # BLD AUTO: 0.02 K/UL (ref 0–0.2)
BASOPHILS NFR BLD: 0.3 % (ref 0–1.9)
BILIRUB SERPL-MCNC: 0.4 MG/DL (ref 0.1–1)
BUN SERPL-MCNC: 23 MG/DL (ref 8–23)
CALCIUM SERPL-MCNC: 10 MG/DL (ref 8.7–10.5)
CHLORIDE SERPL-SCNC: 99 MMOL/L (ref 95–110)
CHOLEST SERPL-MCNC: 151 MG/DL (ref 120–199)
CHOLEST/HDLC SERPL: 4.6 {RATIO} (ref 2–5)
CO2 SERPL-SCNC: 26 MMOL/L (ref 23–29)
CREAT SERPL-MCNC: 1.4 MG/DL (ref 0.5–1.4)
DIFFERENTIAL METHOD: ABNORMAL
EOSINOPHIL # BLD AUTO: 0.1 K/UL (ref 0–0.5)
EOSINOPHIL NFR BLD: 2.2 % (ref 0–8)
ERYTHROCYTE [DISTWIDTH] IN BLOOD BY AUTOMATED COUNT: 13.7 % (ref 11.5–14.5)
EST. GFR  (AFRICAN AMERICAN): 45 ML/MIN/1.73 M^2
EST. GFR  (NON AFRICAN AMERICAN): 39 ML/MIN/1.73 M^2
GLUCOSE SERPL-MCNC: 249 MG/DL (ref 70–110)
HCT VFR BLD AUTO: 38.9 % (ref 37–48.5)
HDLC SERPL-MCNC: 33 MG/DL (ref 40–75)
HDLC SERPL: 21.9 % (ref 20–50)
HGB BLD-MCNC: 13.1 G/DL (ref 12–16)
IMM GRANULOCYTES # BLD AUTO: 0.05 K/UL (ref 0–0.04)
LDLC SERPL CALC-MCNC: ABNORMAL MG/DL (ref 63–159)
LYMPHOCYTES # BLD AUTO: 1.8 K/UL (ref 1–4.8)
LYMPHOCYTES NFR BLD: 29.4 % (ref 18–48)
MCH RBC QN AUTO: 31 PG (ref 27–31)
MCHC RBC AUTO-ENTMCNC: 33.7 G/DL (ref 32–36)
MCV RBC AUTO: 92 FL (ref 82–98)
MONOCYTES # BLD AUTO: 0.4 K/UL (ref 0.3–1)
MONOCYTES NFR BLD: 5.8 % (ref 4–15)
NEUTROPHILS # BLD AUTO: 3.9 K/UL (ref 1.8–7.7)
NEUTROPHILS NFR BLD: 61.5 % (ref 38–73)
NONHDLC SERPL-MCNC: 118 MG/DL
NRBC BLD-RTO: 0 /100 WBC
PLATELET # BLD AUTO: 237 K/UL (ref 150–350)
PMV BLD AUTO: 10 FL (ref 9.2–12.9)
POTASSIUM SERPL-SCNC: 4 MMOL/L (ref 3.5–5.1)
PROT SERPL-MCNC: 7.5 G/DL (ref 6–8.4)
RBC # BLD AUTO: 4.23 M/UL (ref 4–5.4)
SODIUM SERPL-SCNC: 137 MMOL/L (ref 136–145)
T4 FREE SERPL-MCNC: 0.91 NG/DL (ref 0.71–1.51)
TRIGL SERPL-MCNC: 469 MG/DL (ref 30–150)
TSH SERPL DL<=0.005 MIU/L-ACNC: 1.6 UIU/ML (ref 0.4–4)
WBC # BLD AUTO: 6.26 K/UL (ref 3.9–12.7)

## 2019-09-04 PROCEDURE — 83036 HEMOGLOBIN GLYCOSYLATED A1C: CPT

## 2019-09-04 PROCEDURE — 82043 UR ALBUMIN QUANTITATIVE: CPT

## 2019-09-04 PROCEDURE — 84443 ASSAY THYROID STIM HORMONE: CPT

## 2019-09-04 PROCEDURE — 80061 LIPID PANEL: CPT

## 2019-09-04 PROCEDURE — 80053 COMPREHEN METABOLIC PANEL: CPT

## 2019-09-04 PROCEDURE — 84439 ASSAY OF FREE THYROXINE: CPT

## 2019-09-04 PROCEDURE — 82652 VIT D 1 25-DIHYDROXY: CPT

## 2019-09-04 PROCEDURE — 84481 FREE ASSAY (FT-3): CPT

## 2019-09-04 PROCEDURE — 85025 COMPLETE CBC W/AUTO DIFF WBC: CPT

## 2019-09-04 NOTE — TELEPHONE ENCOUNTER
Kaley Caldera Staff   Caller: self (Today, 10:02 AM)             Type:  Patient Returning Call     Who Called:  self   Who Left Message for Patient:  Tanika   Does the patient know what this is regarding?:  yes   Best Call Back Number:  [phone] (home)   Additional Information:  Patient states it is regarding her upcoming surgery. Please call patient. Thanks!

## 2019-09-04 NOTE — TELEPHONE ENCOUNTER
----- Message from Kaley Chau sent at 9/4/2019 10:02 AM CDT -----  Contact: self  Type:  Patient Returning Call    Who Called:  self  Who Left Message for Patient:  Tanika  Does the patient know what this is regarding?:  yes  Best Call Back Number:  [phone] (home)   Additional Information:  Patient states it is regarding her upcoming surgery. Please call patient. Thanks!

## 2019-09-04 NOTE — TELEPHONE ENCOUNTER
Patient notified Dr. Vargas is requesting blood sugar logs from this week to review related to surgery clearance. Patient verbalized understanding.

## 2019-09-04 NOTE — TELEPHONE ENCOUNTER
Patient notified Dr. Vargas is requesting blood sugar logs from this week to review related to request for surgery clearance. Patient states she will submit blood sugar logs to office by Friday.

## 2019-09-05 LAB
ALBUMIN/CREAT UR: 127.3 UG/MG (ref 0–30)
CREAT UR-MCNC: 11 MG/DL (ref 15–325)
ESTIMATED AVG GLUCOSE: 223 MG/DL (ref 68–131)
HBA1C MFR BLD HPLC: 9.4 % (ref 4–5.6)
MICROALBUMIN UR DL<=1MG/L-MCNC: 14 UG/ML
T3FREE SERPL-MCNC: 2.6 PG/ML (ref 2.3–4.2)

## 2019-09-06 ENCOUNTER — TELEPHONE (OUTPATIENT)
Dept: FAMILY MEDICINE | Facility: CLINIC | Age: 69
End: 2019-09-06

## 2019-09-06 DIAGNOSIS — E11.8 CONTROLLED TYPE 2 DIABETES MELLITUS WITH COMPLICATION, WITH LONG-TERM CURRENT USE OF INSULIN: ICD-10-CM

## 2019-09-06 DIAGNOSIS — Z79.4 CONTROLLED TYPE 2 DIABETES MELLITUS WITH COMPLICATION, WITH LONG-TERM CURRENT USE OF INSULIN: ICD-10-CM

## 2019-09-06 LAB — 1,25(OH)2D3 SERPL-MCNC: 43 PG/ML (ref 20–79)

## 2019-09-06 NOTE — TELEPHONE ENCOUNTER
----- Message from Cindy Broussard sent at 9/6/2019 11:32 AM CDT -----  Contact: Fifi Escamillayuni  Patient is dropping off blood sugar level. Also she says she cannot  prescription for insulin because the pharmacy said the amount of how much she takes is not on the prescription and that they've tried calling here but cannot get a hold of anyone.    Thank you,    Cindy

## 2019-09-06 NOTE — TELEPHONE ENCOUNTER
Prescription for Humulin R insulin does not have instructions/directions/dosage information. Please e-scribe a new prescription with sig to [facility] ([phone]).

## 2019-09-06 NOTE — TELEPHONE ENCOUNTER
----- Message from Joey Vega sent at 9/6/2019 10:25 AM CDT -----  Contact: [facility] called in regarding the attached patient and her Rx for Humulin Regular Insulin.  Patient is out of insulin & there are directions on Rx.      [facility] [address] [facility]  Phone number: [phone]

## 2019-09-08 DIAGNOSIS — G89.29 CHRONIC LEFT-SIDED THORACIC BACK PAIN: ICD-10-CM

## 2019-09-08 DIAGNOSIS — G57.01 NEUROPATHY OF RIGHT SCIATIC NERVE: ICD-10-CM

## 2019-09-08 DIAGNOSIS — M54.6 CHRONIC LEFT-SIDED THORACIC BACK PAIN: ICD-10-CM

## 2019-09-08 DIAGNOSIS — I89.0 LYMPHEDEMA OF RIGHT LOWER EXTREMITY: ICD-10-CM

## 2019-09-08 RX ORDER — ALLOPURINOL 100 MG/1
TABLET ORAL
Qty: 180 TABLET | Refills: 4 | Status: SHIPPED | OUTPATIENT
Start: 2019-09-08 | End: 2020-01-06 | Stop reason: SDUPTHER

## 2019-09-09 DIAGNOSIS — I51.89 DIASTOLIC DYSFUNCTION: ICD-10-CM

## 2019-09-09 DIAGNOSIS — I50.32 CHF (CONGESTIVE HEART FAILURE), NYHA CLASS III, CHRONIC, DIASTOLIC: ICD-10-CM

## 2019-09-09 RX ORDER — ATENOLOL 25 MG/1
TABLET ORAL
Qty: 180 TABLET | Refills: 4 | Status: SHIPPED | OUTPATIENT
Start: 2019-09-09 | End: 2020-01-06 | Stop reason: SDUPTHER

## 2019-09-09 RX ORDER — LOSARTAN POTASSIUM 50 MG/1
TABLET ORAL
Qty: 90 TABLET | Refills: 3 | Status: SHIPPED | OUTPATIENT
Start: 2019-09-09 | End: 2020-01-06 | Stop reason: SDUPTHER

## 2019-09-11 RX ORDER — AMITRIPTYLINE HYDROCHLORIDE 50 MG/1
TABLET, FILM COATED ORAL
Qty: 60 TABLET | Refills: 0 | Status: SHIPPED | OUTPATIENT
Start: 2019-09-11 | End: 2019-10-22 | Stop reason: SDUPTHER

## 2019-09-17 DIAGNOSIS — M79.643 PAIN OF HAND, UNSPECIFIED LATERALITY: Primary | ICD-10-CM

## 2019-09-19 ENCOUNTER — HOSPITAL ENCOUNTER (OUTPATIENT)
Dept: RADIOLOGY | Facility: HOSPITAL | Age: 69
Discharge: HOME | End: 2019-09-19
Attending: ORTHOPAEDIC SURGERY
Payer: MEDICARE

## 2019-09-19 ENCOUNTER — OFFICE VISIT (OUTPATIENT)
Dept: ORTHOPEDICS | Facility: CLINIC | Age: 69
End: 2019-09-19
Payer: MEDICARE

## 2019-09-19 VITALS
DIASTOLIC BLOOD PRESSURE: 61 MMHG | SYSTOLIC BLOOD PRESSURE: 137 MMHG | HEIGHT: 62 IN | BODY MASS INDEX: 45.27 KG/M2 | HEART RATE: 92 BPM | WEIGHT: 246 LBS

## 2019-09-19 DIAGNOSIS — M65.312 TRIGGER FINGER OF LEFT THUMB: Primary | ICD-10-CM

## 2019-09-19 DIAGNOSIS — M79.643 PAIN OF HAND, UNSPECIFIED LATERALITY: ICD-10-CM

## 2019-09-19 PROCEDURE — 20550 NJX 1 TENDON SHEATH/LIGAMENT: CPT | Mod: FA,S$GLB,, | Performed by: ORTHOPAEDIC SURGERY

## 2019-09-19 PROCEDURE — 1101F PT FALLS ASSESS-DOCD LE1/YR: CPT | Mod: CPTII,S$GLB,, | Performed by: ORTHOPAEDIC SURGERY

## 2019-09-19 PROCEDURE — 99213 PR OFFICE/OUTPT VISIT, EST, LEVL III, 20-29 MIN: ICD-10-PCS | Mod: 25,S$GLB,, | Performed by: ORTHOPAEDIC SURGERY

## 2019-09-19 PROCEDURE — 3075F SYST BP GE 130 - 139MM HG: CPT | Mod: CPTII,S$GLB,, | Performed by: ORTHOPAEDIC SURGERY

## 2019-09-19 PROCEDURE — 1101F PR PT FALLS ASSESS DOC 0-1 FALLS W/OUT INJ PAST YR: ICD-10-PCS | Mod: CPTII,S$GLB,, | Performed by: ORTHOPAEDIC SURGERY

## 2019-09-19 PROCEDURE — 3075F PR MOST RECENT SYSTOLIC BLOOD PRESS GE 130-139MM HG: ICD-10-PCS | Mod: CPTII,S$GLB,, | Performed by: ORTHOPAEDIC SURGERY

## 2019-09-19 PROCEDURE — 99213 OFFICE O/P EST LOW 20 MIN: CPT | Mod: 25,S$GLB,, | Performed by: ORTHOPAEDIC SURGERY

## 2019-09-19 PROCEDURE — 3078F PR MOST RECENT DIASTOLIC BLOOD PRESSURE < 80 MM HG: ICD-10-PCS | Mod: CPTII,S$GLB,, | Performed by: ORTHOPAEDIC SURGERY

## 2019-09-19 PROCEDURE — 73130 X-RAY EXAM OF HAND: CPT | Mod: 26,LT,, | Performed by: RADIOLOGY

## 2019-09-19 PROCEDURE — 20550 TENDON SHEATH: L THUMB MCP: ICD-10-PCS | Mod: FA,S$GLB,, | Performed by: ORTHOPAEDIC SURGERY

## 2019-09-19 PROCEDURE — 3078F DIAST BP <80 MM HG: CPT | Mod: CPTII,S$GLB,, | Performed by: ORTHOPAEDIC SURGERY

## 2019-09-19 PROCEDURE — 73130 X-RAY EXAM OF HAND: CPT | Mod: TC,PN,LT

## 2019-09-19 PROCEDURE — 99999 PR PBB SHADOW E&M-EST. PATIENT-LVL III: ICD-10-PCS | Mod: PBBFAC,,, | Performed by: ORTHOPAEDIC SURGERY

## 2019-09-19 PROCEDURE — 73130 XR HAND COMPLETE 3 VIEW LEFT: ICD-10-PCS | Mod: 26,LT,, | Performed by: RADIOLOGY

## 2019-09-19 PROCEDURE — 99999 PR PBB SHADOW E&M-EST. PATIENT-LVL III: CPT | Mod: PBBFAC,,, | Performed by: ORTHOPAEDIC SURGERY

## 2019-09-19 RX ADMIN — TRIAMCINOLONE ACETONIDE 40 MG: 40 INJECTION, SUSPENSION INTRA-ARTICULAR; INTRAMUSCULAR at 04:09

## 2019-09-22 RX ORDER — TRIAMCINOLONE ACETONIDE 40 MG/ML
40 INJECTION, SUSPENSION INTRA-ARTICULAR; INTRAMUSCULAR
Status: DISCONTINUED | OUTPATIENT
Start: 2019-09-19 | End: 2019-09-22 | Stop reason: HOSPADM

## 2019-09-22 NOTE — PROCEDURES
Tendon Sheath: L thumb MCP  Date/Time: 9/19/2019 4:00 PM  Performed by: Larry Molina MD  Authorized by: Larry Molina MD     Consent Done?: Yes (Verbal)  Timeout: prior to procedure the correct patient, procedure, and site was verified   Indications:  Pain  Site marked: the procedure site was marked    Timeout: prior to procedure the correct patient, procedure, and site was verified    Location:  Thumb  Site:  L thumb MCP  Prep: patient was prepped and draped in usual sterile fashion    Ultrasonic guidance for needle placement?: No    Needle size:  22 G  Approach:  Volar  Medications:  40 mg triamcinolone acetonide 40 mg/mL  Patient tolerance:  Patient tolerated the procedure well with no immediate complications

## 2019-09-22 NOTE — PROGRESS NOTES
Past Medical History:   Diagnosis Date    Allergy     multiple antibiotic allergies    Anemia     Anxiety     Arthritis     Asthma     CHF (congestive heart failure)     NYHA class III     Chronic kidney disease     Colon polyp     benign    COPD (chronic obstructive pulmonary disease)     DLCO 37% , and mild pulmonary HTN    Dental bridge present     LOWER    Depression     Diabetes mellitus     Diabetic retinopathy     Diastolic dysfunction     Diverticulitis of large intestine without perforation or abscess without bleeding 2/12/2018    Diverticulosis     Former smoker     Fracture of lumbar spine     GERD (gastroesophageal reflux disease)     Hernia of unspecified site of abdominal cavity without mention of obstruction or gangrene     Hyperlipidemia     Hypertension     hypertensive renal    IBS (irritable bowel syndrome)     Mild nonproliferative diabetic retinopathy(362.04) 11/25/2013    Morbid obesity     Nuclear sclerosis 11/25/2013    Osteoporosis     Peripheral edema     Rash     Recurrent upper respiratory infection (URI)     S/P LASIK (laser assisted in situ keratomileusis)     Sleep apnea     sleep apnea uses bipap.    Stroke     tia    Thyroid disease     on synthroid    TIA (transient ischemic attack)     Urinary tract infection     Wears glasses        Past Surgical History:   Procedure Laterality Date    ABDOMINAL SURGERY      ADENOIDECTOMY      ARTHROPLASTY, KNEE Left 4/16/2019    Performed by [facility]ina [facility] at [facility] OR    ARTHROSCOPY, KNEE, WITH CHONDROPLASTY Right 8/31/2018    Performed by [facility] at [facility] OR    ARTHROSCOPY, KNEE, WITH MENISCECTOMY Right 8/31/2018    [facility] at [facility] OR    COLONOSCOPY  03/05/2013    repeat in 5 years    COLONOSCOPY N/A 4/11/2018    Performed by Luis Navarro MD at [facility] ENDO    COLONOSCOPY N/A 5/31/2017    [facility] at [facility] ENDO     COLONOSCOPY N/A 3/5/2013    [facility]    COSMETIC SURGERY      belt abdominoplasty    CYSTOSCOPY      CYSTOSCOPY N/A 8/6/2018    [facility]    EGD (ESOPHAGOGASTRODUODENOSCOPY) N/A 3/5/2013    [facility] ENDO    ESOPHAGOGASTRODUODENOSCOPY (EGD) N/A 1/11/2018    [facility] ENDO    ESOPHAGOGASTRODUODENOSCOPY (EGD) N/A 12/27/2016    [facility] ENDO    ESOPHAGOGASTRODUODENOSCOPY (EGD) N/A 10/25/2016    [facility] at [facility] ENDO    ESOPHAGOGASTRODUODENOSCOPY (EGD) N/A 8/24/2016    [facility] ENDO    ESOPHAGOGASTRODUODENOSCOPY (EGD) N/A 7/21/2016    [facility] at [facility] ENDO    ESOPHAGOGASTRODUODENOSCOPY (EGD)-79712 N/A 12/12/2014    [facility]    EYE SURGERY Right     mazzulla    GASTRECTOMY      gastric sleeve    GASTRECTOMY-SLEEVE-LAPAROSCOPIC-23951; EGD-[id number] N/A 12/12/2014    Performed by Isaiah [facility]ong [facility] at [facility] OR 2ND F[facility]    gastric sleeve      HERNIA REPAIR      5 years old    HYSTERECTOMY      ovaries remain    JOINT REPLACEMENT      PANNICULECTOMY N/A 11/28/2016    Performed by Christiano Becerril [facility] at [facility] OR [facility]    REFRACTIVE SURGERY      mono va//ou//    REPAIR-HERNIA  11/28/2016    Performed by [facility]o [facility]erril [facility] at [facility] OR [facility]    RHINOPLASTY TIP      TONSILLECTOMY      TOTAL KNEE ARTHROPLASTY Left 4/16/19    TUBAL LIGATION      UPPER GASTROINTESTINAL ENDOSCOPY  03/05/2013    UPPER GASTROINTESTINAL ENDOSCOPY  08/24/2016    Dr. Navarro, repeat in 8 weeks    UPPER GASTROINTESTINAL ENDOSCOPY  07/21/2016    Dr. Randall       Current Outpatient Medications   Medication Sig    allopurinol (ZYLOPRIM) 100 MG tablet TAKE 2 TABLETS BY MOUTH AT BEDTIME    amitriptyline  (ELAVIL) 50 MG tablet TAKE 2 TABLETS BY MOUTH IN THE EVENING    ascorbic acid, vitamin C, (VITAMIN C) 500 MG tablet Take 500 mg by mouth once daily.    atenolol (TENORMIN) 25 MG tablet TAKE 1 TABLET BY MOUTH TWICE DAILY    biotin 1 mg Cap Take by mouth.    calcitRIOL (ROCALTROL) 0.25 MCG Cap Take 1 capsule by mouth twice a week. Monday Friday    cetirizine (ZYRTEC) 10 MG tablet Take 1 tablet (10 mg total) by mouth once daily.    cinnamon bark (CINNAMON ORAL) Take by mouth once daily.    clotrimazole-betamethasone 1-0.05% (LOTRISONE) cream     cyanocobalamin, vitamin B-12, (VITAMIN B-12) 5,000 mcg Subl Place 5,000 mg under the tongue once a week.    diclofenac sodium (VOLTAREN) 1 % Gel  APPLY 2 GRAMS TOPICALLY ONCE DAILY    diphenoxylate-atropine 2.5-0.025 mg (LOMOTIL) 2.5-0.025 mg per tablet TAKE ONE TABLET BY MOUTH 4 TIMES DAILY AS NEEDED FOR DIARRHEA    DYMISTA 137-50 mcg/spray Spry nassal spray as needed.     ferrous sulfate (IRON) 325 mg (65 mg iron) Tab tablet Take 325 mg by mouth daily with breakfast.    fish oil-omega-3 fatty acids 300-1,000 mg capsule Take 1 capsule by mouth once daily.    FLUoxetine 20 MG capsule Take 2 capsules (40 mg total) by mouth once daily.    fluticasone (FLONASE) 50 mcg/actuation nasal spray 2 sprays by Nasal route once daily.     fluticasone-vilanterol (BREO) 100-25 mcg/dose diskus inhaler Inhale 1 puff into the lungs once daily.    gabapentin (NEURONTIN) 100 MG capsule 100 mg 2 (two) times daily.     insulin (BASAGLAR KWIKPEN U-100 INSULIN) glargine 100 units/mL (3mL) SubQ pen Inject 20 Units into the skin every evening. Use 10 units if BS over 210.Night before surgery. (Patient taking differently: Inject 20 Units into the skin once daily. Use 10 units if BS over 210.Night before surgery.)    insulin aspart U-100 (NOVOLOG) 100 unit/mL injection Use per insulin pump, 35-40 units daily.    insulin regular (HUMULIN R REGULAR U-100 INSULN) 100 unit/mL Inj injection  Bolus 40 units. 20 -40 units basal .As directed for insulin pump.    KLOR-CON M20 20 mEq tablet TAKE ONE TABLET BY MOUTH TWICE DAILY    L-LYSINE ORAL Take by mouth.    l-methylfolate-b2-b6-b12 (CEREFOLIN) 6-5-50-1 mg Tab Take 1 tablet by mouth once daily.    Lacto.acidophilus-Bif.animalis (PROBIOTIC) 5 billion cell CpSP Take 1 capsule by mouth once daily.    levothyroxine (SYNTHROID) 25 MCG tablet TAKE ONE TABLET BY MOUTH ONCE DAILY    LORazepam (ATIVAN) 1 MG tablet TAKE 1 TABLET BY MOUTH EVERY 12 HOURS AS NEEDED FOR ANXIETY    losartan (COZAAR) 50 MG tablet TAKE 1 TABLET BY MOUTH ONCE DAILY    magnesium oxide (MAG-OX) 400 mg (241.3 mg magnesium) tablet Take 800 mg by mouth every evening.    montelukast (SINGULAIR) 10 mg tablet Take 10 mg by mouth every evening.     MULTIVIT-IRON-MIN-FOLIC ACID 3,500-18-0.4 UNIT-MG-MG ORAL CHEW Take by mouth 2 (two) times daily.    mupirocin (BACTROBAN) 2 % ointment APPLY OINTMENT TOPICALLY TO AFFECTED AREA ON NOSE THREE TIMES DAILY AS DIRECTED    oxyCODONE (ROXICODONE) 15 MG Tab Take 1 tablet (15 mg total) by mouth every 6 (six) hours as needed for Pain.    pantoprazole (PROTONIX) 40 MG tablet TAKE ONE TABLET BY MOUTH ONCE DAILY    ranitidine (ZANTAC) 300 MG tablet TAKE ONE TABLET BY MOUTH IN THE EVENING    SSD 1 % cream     torsemide (DEMADEX) 20 MG Tab Take 1 tablet (20 mg total) by mouth 2 (two) times daily. TAKE 1 TABLET BY MOUTH EVERY OTHER DAY THEN 2 TABLETS EVERY DAY    TURMERIC ORAL Take by mouth once daily.    albuterol-ipratropium 2.5mg-0.5mg/3mL (DUO-NEB) 0.5 mg-3 mg(2.5 mg base)/3 mL nebulizer solution Take 3 mLs by nebulization every 6 (six) hours as needed for Wheezing. Rescue     Current Facility-Administered Medications   Medication    gentamicin injection 80 mg     Facility-Administered Medications Ordered in Other Visits   Medication    lactated ringers infusion       Review of patient's allergies indicates:   Allergen Reactions    Adhesive  Other (See Comments)     Blisters    Cleocin [clindamycin hcl] Hives    Ceclor [cefaclor] Hives    Advair diskus [fluticasone propion-salmeterol]      Dry mouth    Aleve [naproxen sodium]      Unable to take secondary to kidney function.     Erythromycin Hives    Levaquin [levofloxacin] Dermatitis    Macrobid [nitrofurantoin monohyd/m-cryst] Other (See Comments)     Stomach pain/ GI issues    Macrodantin [nitrofurantoin macrocrystalline] Hives    Motrin [ibuprofen]      Unable to take secondary to kidney functions.    Restoril [temazepam]      LIGHTHEADED UPON WAKING.with poor results    Sulfa (sulfonamide antibiotics)      Hold due to renal problems    Trazodone      PALPITATIONS    Remeron [mirtazapine] Palpitations and Other (See Comments)     Jittery    Vancomycin analogues Rash     Feet broke out/inflammed blood vessels         Family History   Problem Relation Age of Onset    Heart disease Mother 59    Cancer Mother 59        throat    Allergies Mother     Diabetes Mother     Heart disease Father 70        flutter    Allergies Father     Diabetes Father     Cancer Sister 22        22 thyroid,49  breast    Allergies Sister     Breast cancer Sister     Diabetes Sister     Cancer Brother 62        lung    Diabetes Brother     Asthma Daughter     Diabetes Daughter     Depression Daughter     Asthma Grandchild     Eczema Grandchild     Eczema Grandchild     Breast cancer Maternal Grandmother     Ovarian cancer Cousin        Social History     Socioeconomic History    Marital status:      Spouse name: Not on file    Number of children: Not on file    Years of education: Not on file    Highest education level: Not on file   Occupational History    Not on file   Social Needs    Financial resource strain: Not on file    Food insecurity:     Worry: Not on file     Inability: Not on file    Transportation needs:     Medical: Not on file     Non-medical: Not on file    Tobacco Use    Smoking status: Former Smoker     Packs/day: 1.00     Years: 4.00     Pack years: 4.00     Types: Cigarettes     Last attempt to quit:      Years since quittin.7    Smokeless tobacco: Never Used    Tobacco comment: quit , lived with smokers    Substance and Sexual Activity    Alcohol use: Not Currently    Drug use: No    Sexual activity: Yes     Birth control/protection: Surgical   Lifestyle    Physical activity:     Days per week: Not on file     Minutes per session: Not on file    Stress: Not on file   Relationships    Social connections:     Talks on phone: Not on file     Gets together: Not on file     Attends Orthodoxy service: Not on file     Active member of club or organization: Not on file     Attends meetings of clubs or organizations: Not on file     Relationship status: Not on file   Other Topics Concern    Not on file   Social History Narrative    Not on file       Chief Complaint:   No chief complaint on file.      Date of surgery:  2019    History of present illness:  68-year-old female who underwent left total knee arthroplasty and started having more pain in her left thumb about 2 weeks ago.  Having triggering and pain.  Locking and catching in her thumb.  Pain is an 8/10.  No previous history of trigger fingers.  No previous treatment.    Review of Systems:    Musculoskeletal:  See HPI        Physical Examination:    Vital Signs:    Vitals:    19 1416   BP: 137/61   Pulse: 92       Body mass index is 44.99 kg/m².    This a well-developed, well nourished patient in no acute distress.  They are alert and oriented and cooperative to examination.  Pt. walks with a mild antalgic gait.      Examination of the left hand and wrist shows no signs of rashes or erythema. Patient has no masses ecchymosis or effusions. Patient has full range of motion of the wrist in flexion and extension as well as ulnar and radial deviation. The patient also has full  range of motion of all joints in the hand. There are 2+ radial pulse and intact light touch sensation in all 5 digits. Nontender over the anatomic snuffbox. Negative Tinel's. Negative Finkelstein's test.  Positive triggering over the A1 pulley of the thumb.      X-rays:  X-rays left hand are ordered and reviewed which show some mild degenerative change without significant bony abnormality or severe arthritis.    Assessment::  Status post left Emanuel total knee arthroplasty  Left trigger thumb    Plan:  We discussed trigger fingers in great detail today.  Offered her trigger finger injection for her trigger thumb.  Patient agreed to the injection. She will follow up as needed.    This note was created using M Mod[employer] voice recognition software that occasionally misinterpreted phrases or words.

## 2019-10-08 RX ORDER — AMOXICILLIN 500 MG/1
500 TABLET, FILM COATED ORAL
Qty: 4 TABLET | Refills: 0 | Status: SHIPPED | OUTPATIENT
Start: 2019-10-08 | End: 2019-10-09

## 2019-10-08 NOTE — TELEPHONE ENCOUNTER
----- Message from Constance Ventura sent at 10/8/2019 10:11 AM CDT -----  Type: Needs Medical Advice    Who Called:  Patient  Best Call Back Number: [phone] (home)     Additional Information: Had a knee replacement in 4/18. She is going to her dentist to have her teeth cleaned. She wants to know if she needs to take any antibiotics before the cleaning. Please call to advise.

## 2019-10-16 ENCOUNTER — LAB VISIT (OUTPATIENT)
Dept: LAB | Facility: HOSPITAL | Age: 69
End: 2019-10-16
Attending: INTERNAL MEDICINE
Payer: MEDICARE

## 2019-10-16 DIAGNOSIS — D47.2 MGUS (MONOCLONAL GAMMOPATHY OF UNKNOWN SIGNIFICANCE): ICD-10-CM

## 2019-10-16 LAB
BASOPHILS # BLD AUTO: 0.03 K/UL (ref 0–0.2)
BASOPHILS NFR BLD: 0.5 % (ref 0–1.9)
DIFFERENTIAL METHOD: ABNORMAL
EOSINOPHIL # BLD AUTO: 0.1 K/UL (ref 0–0.5)
EOSINOPHIL NFR BLD: 1.7 % (ref 0–8)
ERYTHROCYTE [DISTWIDTH] IN BLOOD BY AUTOMATED COUNT: 13.4 % (ref 11.5–14.5)
HCT VFR BLD AUTO: 38.9 % (ref 37–48.5)
HGB BLD-MCNC: 12.7 G/DL (ref 12–16)
IMM GRANULOCYTES # BLD AUTO: 0.05 K/UL (ref 0–0.04)
LYMPHOCYTES # BLD AUTO: 2 K/UL (ref 1–4.8)
LYMPHOCYTES NFR BLD: 30.9 % (ref 18–48)
MCH RBC QN AUTO: 31.4 PG (ref 27–31)
MCHC RBC AUTO-ENTMCNC: 32.6 G/DL (ref 32–36)
MCV RBC AUTO: 96 FL (ref 82–98)
MONOCYTES # BLD AUTO: 0.4 K/UL (ref 0.3–1)
MONOCYTES NFR BLD: 6.3 % (ref 4–15)
NEUTROPHILS # BLD AUTO: 3.9 K/UL (ref 1.8–7.7)
NEUTROPHILS NFR BLD: 59.8 % (ref 38–73)
NRBC BLD-RTO: 0 /100 WBC
PLATELET # BLD AUTO: 201 K/UL (ref 150–350)
PMV BLD AUTO: 8.8 FL (ref 9.2–12.9)
RBC # BLD AUTO: 4.05 M/UL (ref 4–5.4)
WBC # BLD AUTO: 6.51 K/UL (ref 3.9–12.7)

## 2019-10-16 PROCEDURE — 85025 COMPLETE CBC W/AUTO DIFF WBC: CPT

## 2019-10-16 PROCEDURE — 36415 COLL VENOUS BLD VENIPUNCTURE: CPT

## 2019-10-17 ENCOUNTER — OFFICE VISIT (OUTPATIENT)
Dept: PULMONOLOGY | Facility: CLINIC | Age: 69
End: 2019-10-17
Payer: MEDICARE

## 2019-10-17 VITALS
SYSTOLIC BLOOD PRESSURE: 125 MMHG | WEIGHT: 249 LBS | DIASTOLIC BLOOD PRESSURE: 75 MMHG | BODY MASS INDEX: 45.54 KG/M2 | OXYGEN SATURATION: 95 %

## 2019-10-17 DIAGNOSIS — G47.33 OSA (OBSTRUCTIVE SLEEP APNEA): Primary | ICD-10-CM

## 2019-10-17 DIAGNOSIS — J45.40 MODERATE PERSISTENT ASTHMA WITHOUT COMPLICATION: ICD-10-CM

## 2019-10-17 DIAGNOSIS — K21.9 GASTROESOPHAGEAL REFLUX DISEASE, ESOPHAGITIS PRESENCE NOT SPECIFIED: ICD-10-CM

## 2019-10-17 PROBLEM — E88.09 HYPOALBUMINEMIA: Status: RESOLVED | Noted: 2019-07-12 | Resolved: 2019-10-17

## 2019-10-17 PROCEDURE — 3074F SYST BP LT 130 MM HG: CPT | Mod: S$GLB,,, | Performed by: INTERNAL MEDICINE

## 2019-10-17 PROCEDURE — 3078F PR MOST RECENT DIASTOLIC BLOOD PRESSURE < 80 MM HG: ICD-10-PCS | Mod: S$GLB,,, | Performed by: INTERNAL MEDICINE

## 2019-10-17 PROCEDURE — 1101F PR PT FALLS ASSESS DOC 0-1 FALLS W/OUT INJ PAST YR: ICD-10-PCS | Mod: S$GLB,,, | Performed by: INTERNAL MEDICINE

## 2019-10-17 PROCEDURE — 99214 OFFICE O/P EST MOD 30 MIN: CPT | Mod: S$GLB,,, | Performed by: INTERNAL MEDICINE

## 2019-10-17 PROCEDURE — 99214 PR OFFICE/OUTPT VISIT, EST, LEVL IV, 30-39 MIN: ICD-10-PCS | Mod: S$GLB,,, | Performed by: INTERNAL MEDICINE

## 2019-10-17 PROCEDURE — 1101F PT FALLS ASSESS-DOCD LE1/YR: CPT | Mod: S$GLB,,, | Performed by: INTERNAL MEDICINE

## 2019-10-17 PROCEDURE — 3078F DIAST BP <80 MM HG: CPT | Mod: S$GLB,,, | Performed by: INTERNAL MEDICINE

## 2019-10-17 PROCEDURE — 3074F PR MOST RECENT SYSTOLIC BLOOD PRESSURE < 130 MM HG: ICD-10-PCS | Mod: S$GLB,,, | Performed by: INTERNAL MEDICINE

## 2019-10-17 NOTE — PATIENT INSTRUCTIONS
Keep sleeping on your CPAP every night  Use your Breo daily  Use your Dymista  1 puff to each nostril twice a day  Follow up in about 6 months (around 4/17/2020).  No coffee or chocolate at night

## 2019-10-17 NOTE — PROGRESS NOTES
SUBJECTIVE:    Patient ID: Fifi Mcnair is a 68 y.o. female.    Chief Complaint: No chief complaint on file.    HPI   Patient here today up another 10 pounds.  She states she is using her Breo a couple of days a week to save money.  She does have a cough mainly in the evening.  It does not keep her up at night.      Past Medical History:   Diagnosis Date    Allergy     multiple antibiotic allergies    Anemia     Anxiety     Arthritis     Asthma     CHF (congestive heart failure)     NYHA class III     Chronic kidney disease     Colon polyp     benign    COPD (chronic obstructive pulmonary disease)     DLCO 37% , and mild pulmonary HTN    Dental bridge present     LOWER    Depression     Diabetes mellitus     Diabetic retinopathy     Diastolic dysfunction     Diverticulitis of large intestine without perforation or abscess without bleeding 2/12/2018    Diverticulosis     Former smoker     Fracture of lumbar spine     GERD (gastroesophageal reflux disease)     Hernia of unspecified site of abdominal cavity without mention of obstruction or gangrene     Hyperlipidemia     Hypertension     hypertensive renal    IBS (irritable bowel syndrome)     Mild nonproliferative diabetic retinopathy(362.04) 11/25/2013    Morbid obesity     Nuclear sclerosis 11/25/2013    Osteoporosis     Peripheral edema     Rash     Recurrent upper respiratory infection (URI)     S/P LASIK (laser assisted in situ keratomileusis)     Sleep apnea     sleep apnea uses bipap.    Stroke     tia    Thyroid disease     on synthroid    TIA (transient ischemic attack)     Urinary tract infection     Wears glasses      Past Surgical History:   Procedure Laterality Date    ABDOMINAL SURGERY      ADENOIDECTOMY      ARTHROSCOPIC CHONDROPLASTY OF KNEE JOINT Right 8/31/2018    Procedure: ARTHROSCOPY, KNEE, WITH CHONDROPLASTY;  Surgeon: Larry Molina MD;  Location: [facility];  Service: Orthopedics;  Laterality:  Right;  Tricompartmental chondroplasty    COLONOSCOPY  2013    repeat in 5 years    COLONOSCOPY N/A 2017    Procedure: COLONOSCOPY;  Surgeon: Luis Navarro MD;  Location: [facility] ENDO;  Service: Endoscopy;  Laterality: N/A;    COLONOSCOPY N/A 2018    Procedure: COLONOSCOPY;  Surgeon: Luis Navarro MD;  Location: [facility] ENDO;  Service: Endoscopy;  Laterality: N/A;    COSMETIC SURGERY      belt abdominoplasty    CYSTOSCOPY      CYSTOSCOPY N/A 2018    Procedure: CYSTOSCOPY;  Surgeon: Angy Campos MD;  Location: [facility] OR;  Service: Urology;  Laterality: N/A;    EYE SURGERY Right     mazzulla    GASTRECTOMY      gastric sleeve    gastric sleeve      HERNIA REPAIR      5 years old    HYSTERECTOMY      ovaries remain    JOINT REPLACEMENT      KNEE ARTHROPLASTY Left 2019    Procedure: ARTHROPLASTY, KNEE;  Surgeon: Larry Molina MD;  Location: [facility] OR;  Service: Orthopedics;  Laterality: Left;  [place]    KNEE ARTHROSCOPY W/ MENISCECTOMY Right 2018    Procedure: ARTHROSCOPY, KNEE, WITH MENISCECTOMY;  Surgeon: Larry Molina MD;  Location: [facility] OR;  Service: Orthopedics;  Laterality: Right;    REFRACTIVE SURGERY      mono va//ou//    RHINOPLASTY TIP      TONSILLECTOMY      TOTAL KNEE ARTHROPLASTY Left 19    TUBAL LIGATION      UPPER GASTROINTESTINAL ENDOSCOPY  2013    UPPER GASTROINTESTINAL ENDOSCOPY  2016    Dr. Navarro, repeat in 8 weeks    UPPER GASTROINTESTINAL ENDOSCOPY  2016    Dr. Randall     Social History     Tobacco Use    Smoking status: Former Smoker     Packs/day: 1.00     Years: 4.00     Pack years: 4.00     Types: Cigarettes     Last attempt to quit:      Years since quittin.8    Smokeless tobacco: Never Used    Tobacco comment: quit , lived with smokers    Substance Use Topics    Alcohol use: Not Currently     Family History   Problem Relation Age of Onset    Heart disease Mother 59     Cancer Mother 59        throat    Allergies Mother     Diabetes Mother     Heart disease Father 70        flutter    Allergies Father     Diabetes Father     Cancer Sister 22        22 thyroid,49  breast    Allergies Sister     Breast cancer Sister     Diabetes Sister     Cancer Brother 62        lung    Diabetes Brother     Asthma Daughter     Diabetes Daughter     Depression Daughter     Asthma Grandchild     Eczema Grandchild     Eczema Grandchild     Breast cancer Maternal Grandmother     Ovarian cancer Cousin       Review of patient's allergies indicates:   Allergen Reactions    Adhesive Other (See Comments)     Blisters    Cleocin [clindamycin hcl] Hives    Ceclor [cefaclor] Hives    Advair diskus [fluticasone-salmeterol]      Dry mouth    Aleve [naproxen sodium]      Unable to take secondary to kidney function.     Erythromycin Hives    Levaquin [levofloxacin] Dermatitis    Macrobid [nitrofurantoin monohyd/m-cryst] Other (See Comments)     Stomach pain/ GI issues    Macrodantin [nitrofurantoin macrocrystalline] Hives    Motrin [ibuprofen]      Unable to take secondary to kidney functions.    Restoril [temazepam]      LIGHTHEADED UPON WAKING.with poor results    Sulfa (sulfonamide antibiotics)      Hold due to renal problems    Trazodone      PALPITATIONS    Remeron [mirtazapine] Palpitations and Other (See Comments)     Jittery    Vancomycin analogues Rash     Feet broke out/inflammed blood vessels         Current Outpatient Medications   Medication Sig Dispense Refill    allopurinol (ZYLOPRIM) 100 MG tablet TAKE 2 TABLETS BY MOUTH AT BEDTIME 180 tablet 4    amitriptyline (ELAVIL) 50 MG tablet TAKE 2 TABLETS BY MOUTH IN THE EVENING 60 tablet 0    ascorbic acid, vitamin C, (VITAMIN C) 500 MG tablet Take 500 mg by mouth once daily.      atenolol (TENORMIN) 25 MG tablet TAKE 1 TABLET BY MOUTH TWICE DAILY 180 tablet 4    biotin 1 mg Cap Take by mouth.      calcitRIOL  (ROCALTROL) 0.25 MCG Cap Take 1 capsule by mouth twice a week. Monday Friday      cetirizine (ZYRTEC) 10 MG tablet Take 1 tablet (10 mg total) by mouth once daily. 1 Bottle 5    CHERATUSSIN AC  mg/5 mL syrup TAKE 5 ML (CC) BY MOUTH 4 TIMES DAILY AS NEEDED FOR COUGH FOR 5 DAYS  0    cinnamon bark (CINNAMON ORAL) Take by mouth once daily.      clotrimazole-betamethasone 1-0.05% (LOTRISONE) cream       cyanocobalamin, vitamin B-12, (VITAMIN B-12) 5,000 mcg Subl Place 5,000 mg under the tongue once a week.      diclofenac sodium (VOLTAREN) 1 % Gel  APPLY 2 GRAMS TOPICALLY ONCE DAILY 1 Tube 0    diphenoxylate-atropine 2.5-0.025 mg (LOMOTIL) 2.5-0.025 mg per tablet TAKE ONE TABLET BY MOUTH 4 TIMES DAILY AS NEEDED FOR DIARRHEA 60 tablet 1    DYMISTA 137-50 mcg/spray Spry nassal spray as needed.       ferrous sulfate (IRON) 325 mg (65 mg iron) Tab tablet Take 325 mg by mouth daily with breakfast.      fish oil-omega-3 fatty acids 300-1,000 mg capsule Take 1 capsule by mouth once daily.      FLUoxetine 20 MG capsule Take 2 capsules (40 mg total) by mouth once daily. 180 capsule 3    fluticasone-vilanterol (BREO) 100-25 mcg/dose diskus inhaler Inhale 1 puff into the lungs once daily.      gabapentin (NEURONTIN) 100 MG capsule 100 mg 2 (two) times daily.       insulin (BASAGLAR KWIKPEN U-100 INSULIN) glargine 100 units/mL (3mL) SubQ pen Inject 20 Units into the skin every evening. Use 10 units if BS over 210.Night before surgery. (Patient taking differently: Inject 20 Units into the skin once daily. Use 10 units if BS over 210.Night before surgery.)  0    insulin aspart U-100 (NOVOLOG) 100 unit/mL injection Use per insulin pump, 35-40 units daily. 40 mL 0    insulin regular (HUMULIN R REGULAR U-100 INSULN) 100 unit/mL Inj injection Bolus 40 units. 20 -40 units basal .As directed for insulin pump. 30 mL 12    KLOR-CON M20 20 mEq tablet TAKE ONE TABLET BY MOUTH TWICE DAILY 180 tablet 3    L-LYSINE ORAL  Take by mouth.      l-methylfolate-b2-b6-b12 (CEREFOLIN) 6-5-50-1 mg Tab Take 1 tablet by mouth once daily.      Lacto.acidophilus-Bif.animalis (PROBIOTIC) 5 billion cell CpSP Take 1 capsule by mouth once daily. 30 capsule 3    levothyroxine (SYNTHROID) 25 MCG tablet TAKE ONE TABLET BY MOUTH ONCE DAILY 90 tablet 3    LORazepam (ATIVAN) 1 MG tablet TAKE 1 TABLET BY MOUTH EVERY 12 HOURS AS NEEDED FOR ANXIETY 30 tablet 2    losartan (COZAAR) 50 MG tablet TAKE 1 TABLET BY MOUTH ONCE DAILY 90 tablet 3    magnesium oxide (MAG-OX) 400 mg (241.3 mg magnesium) tablet Take 800 mg by mouth every evening.      montelukast (SINGULAIR) 10 mg tablet Take 10 mg by mouth every evening.   8    MULTIVIT-IRON-MIN-FOLIC ACID 3,500-18-0.4 UNIT-MG-MG ORAL CHEW Take by mouth 2 (two) times daily.      mupirocin (BACTROBAN) 2 % ointment APPLY OINTMENT TOPICALLY TO AFFECTED AREA ON NOSE THREE TIMES DAILY AS DIRECTED 15 g 11    oxyCODONE (ROXICODONE) 15 MG Tab Take 1 tablet (15 mg total) by mouth every 6 (six) hours as needed for Pain. 30 tablet 0    pantoprazole (PROTONIX) 40 MG tablet TAKE ONE TABLET BY MOUTH ONCE DAILY 90 tablet 3    ranitidine (ZANTAC) 300 MG tablet TAKE ONE TABLET BY MOUTH IN THE EVENING 30 tablet 1    SSD 1 % cream       torsemide (DEMADEX) 20 MG Tab Take 1 tablet (20 mg total) by mouth 2 (two) times daily. TAKE 1 TABLET BY MOUTH EVERY OTHER DAY THEN 2 TABLETS EVERY DAY 60 tablet 2    TURMERIC ORAL Take by mouth once daily.      albuterol-ipratropium 2.5mg-0.5mg/3mL (DUO-NEB) 0.5 mg-3 mg(2.5 mg base)/3 mL nebulizer solution Take 3 mLs by nebulization every 6 (six) hours as needed for Wheezing. Rescue 1 Box 3    fluticasone (FLONASE) 50 mcg/actuation nasal spray 2 sprays by Nasal route once daily.        Current Facility-Administered Medications   Medication Dose Route Frequency Provider Last Rate Last Dose    gentamicin injection 80 mg  80 mg Intramuscular 1 time in Clinic/HOD Angy Campos  "MD         Facility-Administered Medications Ordered in Other Visits   Medication Dose Route Frequency Provider Last Rate Last Dose    lactated ringers infusion   Intravenous Continuous Shaheen Hanson MD 10 mL/hr at 08/31/18 0739           Last PFT: 06/05/2017  Last Chest xray:  04/04/2019    Review of Systems  General: feels well   Eyes: dry eyes  ENT:  Sinus congestion at times   Heart:: No chest pain or palpitations.  Lungs: states she has been coughing since December   GI: Reflux on Protonix and Zantac  : No dysuria, hesitancy, or nocturia.  Skin: No lesions or rashes.  Musculoskeletal: knee pain   Neuro: headaches over the last few weeks  Lymph: No edema or adenopathy.  Psych: No anxiety or depression.  Endo: weight gain     OBJECTIVE:      /75 (BP Location: Right arm, Patient Position: Sitting, BP Method: Large (Manual))   Wt 112.9 kg (249 lb)   LMP  (LMP Unknown)   SpO2 95%   BMI 45.54 kg/m²     Physical Exam  GENERAL: Older morbidly obese patient in no distress.  HEENT: Pupils equal and reactive. Extraocular movements intact. Nose intact.      Pharynx moist, post nasal drip and cobblestoning   NECK: Supple.   HEART: Regular rate and rhythm. No murmur or gallop auscultated.  LUNGS: Clear to auscultation and percussion. Lung excursion symmetrical. No change in fremitus. No adventitial noises.  ABDOMEN: Bowel sounds present. Non-tender, no masses palpated. Obese   : Normal anatomy.  EXTREMITIES: Normal muscle tone and joint movement, no cyanosis or clubbing.   LYMPHATICS: No adenopathy palpated, no edema.  SKIN: Dry, intact, no lesions.   NEURO: Cranial nerves II-XII intact. Motor strength 5/5 bilaterally, upper and lower extremities.  PSYCH: Appropriate affect.  Peak flow 360, green is 328    Courtney Dickerson NP served in the capacity as a "scribe" for this patient encounter.  A "face to face" encounter occurred with Dr. Tian on this date  The treatment plan and medical decision making is " outlined below:         Assessment:       1. MECHELLE (obstructive sleep apnea), on CPAP 100%    2. Gastroesophageal reflux disease, esophagitis presence not specified    3. Moderate persistent asthma without complication          Plan:       MECHELLE (obstructive sleep apnea), on CPAP 100%    Gastroesophageal reflux disease, esophagitis presence not specified    Moderate persistent asthma without complication           Keep sleeping on your CPAP every night  Use your Breo daily  Use your Dymista  1 puff to each nostril twice a day  Had her flu shot  Follow up in about 6 months (around 4/17/2020).   No coffee or chocolate at night.

## 2019-10-22 ENCOUNTER — OFFICE VISIT (OUTPATIENT)
Dept: HEMATOLOGY/ONCOLOGY | Facility: CLINIC | Age: 69
End: 2019-10-22
Payer: MEDICARE

## 2019-10-22 VITALS
HEIGHT: 62 IN | HEART RATE: 72 BPM | DIASTOLIC BLOOD PRESSURE: 57 MMHG | WEIGHT: 249.13 LBS | RESPIRATION RATE: 16 BRPM | SYSTOLIC BLOOD PRESSURE: 131 MMHG | TEMPERATURE: 98 F | BODY MASS INDEX: 45.84 KG/M2

## 2019-10-22 DIAGNOSIS — I89.0 LYMPHEDEMA OF RIGHT LOWER EXTREMITY: ICD-10-CM

## 2019-10-22 DIAGNOSIS — M54.6 CHRONIC LEFT-SIDED THORACIC BACK PAIN: ICD-10-CM

## 2019-10-22 DIAGNOSIS — E08.21 DIABETES MELLITUS DUE TO UNDERLYING CONDITION WITH DIABETIC NEPHROPATHY, WITH LONG-TERM CURRENT USE OF INSULIN: Chronic | ICD-10-CM

## 2019-10-22 DIAGNOSIS — R79.9 ABNORMAL FINDING OF BLOOD CHEMISTRY, UNSPECIFIED: ICD-10-CM

## 2019-10-22 DIAGNOSIS — Z79.4 DIABETES MELLITUS DUE TO UNDERLYING CONDITION WITH DIABETIC NEPHROPATHY, WITH LONG-TERM CURRENT USE OF INSULIN: Chronic | ICD-10-CM

## 2019-10-22 DIAGNOSIS — G57.01 NEUROPATHY OF RIGHT SCIATIC NERVE: ICD-10-CM

## 2019-10-22 DIAGNOSIS — G89.29 CHRONIC LEFT-SIDED THORACIC BACK PAIN: ICD-10-CM

## 2019-10-22 DIAGNOSIS — M85.80 OSTEOPENIA WITH HIGH RISK OF FRACTURE: ICD-10-CM

## 2019-10-22 DIAGNOSIS — D47.2 MONOCLONAL GAMMOPATHY ASSOCIATED WITH LYMPHOPLASMACYTIC DYSCRASIAS: Chronic | ICD-10-CM

## 2019-10-22 DIAGNOSIS — D47.2 MGUS (MONOCLONAL GAMMOPATHY OF UNKNOWN SIGNIFICANCE): Primary | ICD-10-CM

## 2019-10-22 DIAGNOSIS — G47.33 OSA (OBSTRUCTIVE SLEEP APNEA): ICD-10-CM

## 2019-10-22 PROCEDURE — 99499 RISK ADDL DX/OHS AUDIT: ICD-10-PCS | Mod: S$GLB,,, | Performed by: INTERNAL MEDICINE

## 2019-10-22 PROCEDURE — 3078F DIAST BP <80 MM HG: CPT | Mod: CPTII,S$GLB,, | Performed by: INTERNAL MEDICINE

## 2019-10-22 PROCEDURE — 99214 OFFICE O/P EST MOD 30 MIN: CPT | Mod: S$GLB,,, | Performed by: INTERNAL MEDICINE

## 2019-10-22 PROCEDURE — 99999 PR PBB SHADOW E&M-EST. PATIENT-LVL III: ICD-10-PCS | Mod: PBBFAC,,, | Performed by: INTERNAL MEDICINE

## 2019-10-22 PROCEDURE — 3075F SYST BP GE 130 - 139MM HG: CPT | Mod: CPTII,S$GLB,, | Performed by: INTERNAL MEDICINE

## 2019-10-22 PROCEDURE — 3078F PR MOST RECENT DIASTOLIC BLOOD PRESSURE < 80 MM HG: ICD-10-PCS | Mod: CPTII,S$GLB,, | Performed by: INTERNAL MEDICINE

## 2019-10-22 PROCEDURE — 99214 PR OFFICE/OUTPT VISIT, EST, LEVL IV, 30-39 MIN: ICD-10-PCS | Mod: S$GLB,,, | Performed by: INTERNAL MEDICINE

## 2019-10-22 PROCEDURE — 1101F PR PT FALLS ASSESS DOC 0-1 FALLS W/OUT INJ PAST YR: ICD-10-PCS | Mod: CPTII,S$GLB,, | Performed by: INTERNAL MEDICINE

## 2019-10-22 PROCEDURE — 99499 UNLISTED E&M SERVICE: CPT | Mod: S$GLB,,, | Performed by: INTERNAL MEDICINE

## 2019-10-22 PROCEDURE — 1101F PT FALLS ASSESS-DOCD LE1/YR: CPT | Mod: CPTII,S$GLB,, | Performed by: INTERNAL MEDICINE

## 2019-10-22 PROCEDURE — 3075F PR MOST RECENT SYSTOLIC BLOOD PRESS GE 130-139MM HG: ICD-10-PCS | Mod: CPTII,S$GLB,, | Performed by: INTERNAL MEDICINE

## 2019-10-22 PROCEDURE — 99999 PR PBB SHADOW E&M-EST. PATIENT-LVL III: CPT | Mod: PBBFAC,,, | Performed by: INTERNAL MEDICINE

## 2019-10-22 RX ORDER — AMITRIPTYLINE HYDROCHLORIDE 50 MG/1
TABLET, FILM COATED ORAL
Qty: 60 TABLET | Refills: 4 | Status: SHIPPED | OUTPATIENT
Start: 2019-10-22 | End: 2020-02-10

## 2019-10-22 NOTE — PROGRESS NOTES
Subjective:       Patient ID: Fifi Mcnair is a 68 y.o. female.    Chief Complaint: I feel ok     HPI    This is a 68  yr old female tolerating prolia for osteoporosis.  She started prolia for osteoporosis and now she has osteopenia. Pt has had a gastric sleeve in the past. She remains on B12 and iron supplement  She has not needed IV iron    Not on prolia started K 2 She is tolerating Synthroid for thyroid disorder and neurontin for neuropathy.  Past Labs revealed an elevated kappa light chain with No further elevation of the ratio, IGA  has been stable   Bone marrow showed no evidence of a plasma cell dyscrasia in 2016   Pt with history of sleeve gastrectomy contributing to malabsorption of certain vitamins  IgA remains elevated  as with the elevated kappa levels yest stable   She is tolerating allopurinol for gout prevention  She is tolerating Prozac for depression    Current Outpatient Medications:     albuterol-ipratropium 2.5mg-0.5mg/3mL (DUO-NEB) 0.5 mg-3 mg(2.5 mg base)/3 mL nebulizer solution, Take 3 mLs by nebulization every 6 (six) hours as needed for Wheezing. Rescue, Disp: 1 Box, Rfl: 3    allopurinol (ZYLOPRIM) 100 MG tablet, TAKE 2 TABLETS BY MOUTH AT BEDTIME, Disp: 180 tablet, Rfl: 4    amitriptyline (ELAVIL) 50 MG tablet, TAKE 2 TABLETS BY MOUTH IN THE EVENING, Disp: 60 tablet, Rfl: 0    ascorbic acid, vitamin C, (VITAMIN C) 500 MG tablet, Take 500 mg by mouth once daily., Disp: , Rfl:     atenolol (TENORMIN) 25 MG tablet, TAKE 1 TABLET BY MOUTH TWICE DAILY, Disp: 180 tablet, Rfl: 4    biotin 1 mg Cap, Take by mouth., Disp: , Rfl:     calcitRIOL (ROCALTROL) 0.25 MCG Cap, Take 1 capsule by mouth twice a week. Monday Friday, Disp: , Rfl:     cetirizine (ZYRTEC) 10 MG tablet, Take 1 tablet (10 mg total) by mouth once daily., Disp: 1 Bottle, Rfl: 5    CHERATUSSIN AC  mg/5 mL syrup, TAKE 5 ML (CC) BY MOUTH 4 TIMES DAILY AS NEEDED FOR COUGH FOR 5 DAYS, Disp: , Rfl: 0    cinnamon bark  (CINNAMON ORAL), Take by mouth once daily., Disp: , Rfl:     clotrimazole-betamethasone 1-0.05% (LOTRISONE) cream, , Disp: , Rfl:     cyanocobalamin, vitamin B-12, (VITAMIN B-12) 5,000 mcg Subl, Place 5,000 mg under the tongue once a week., Disp: , Rfl:     diclofenac sodium (VOLTAREN) 1 % Gel,  APPLY 2 GRAMS TOPICALLY ONCE DAILY, Disp: 1 Tube, Rfl: 0    diphenoxylate-atropine 2.5-0.025 mg (LOMOTIL) 2.5-0.025 mg per tablet, TAKE ONE TABLET BY MOUTH 4 TIMES DAILY AS NEEDED FOR DIARRHEA, Disp: 60 tablet, Rfl: 1    DYMISTA 137-50 mcg/spray Spry nassal spray, as needed. , Disp: , Rfl:     ferrous sulfate (IRON) 325 mg (65 mg iron) Tab tablet, Take 325 mg by mouth daily with breakfast., Disp: , Rfl:     fish oil-omega-3 fatty acids 300-1,000 mg capsule, Take 1 capsule by mouth once daily., Disp: , Rfl:     FLUoxetine 20 MG capsule, Take 2 capsules (40 mg total) by mouth once daily., Disp: 180 capsule, Rfl: 3    fluticasone (FLONASE) 50 mcg/actuation nasal spray, 2 sprays by Nasal route once daily. , Disp: , Rfl:     fluticasone-vilanterol (BREO) 100-25 mcg/dose diskus inhaler, Inhale 1 puff into the lungs once daily., Disp: , Rfl:     gabapentin (NEURONTIN) 100 MG capsule, 100 mg 2 (two) times daily. , Disp: , Rfl:     insulin (BASAGLAR KWIKPEN U-100 INSULIN) glargine 100 units/mL (3mL) SubQ pen, Inject 20 Units into the skin every evening. Use 10 units if BS over 210.Night before surgery. (Patient taking differently: Inject 20 Units into the skin once daily. Use 10 units if BS over 210.Night before surgery.), Disp: , Rfl: 0    insulin aspart U-100 (NOVOLOG) 100 unit/mL injection, Use per insulin pump, 35-40 units daily., Disp: 40 mL, Rfl: 0    insulin regular (HUMULIN R REGULAR U-100 INSULN) 100 unit/mL Inj injection, Bolus 40 units. 20 -40 units basal .As directed for insulin pump., Disp: 30 mL, Rfl: 12    KLOR-CON M20 20 mEq tablet, TAKE ONE TABLET BY MOUTH TWICE DAILY, Disp: 180 tablet, Rfl: 3     L-LYSINE ORAL, Take by mouth., Disp: , Rfl:     l-methylfolate-b2-b6-b12 (CEREFOLIN) 6-5-50-1 mg Tab, Take 1 tablet by mouth once daily., Disp: , Rfl:     Lacto.acidophilus-Bif.animalis (PROBIOTIC) 5 billion cell CpSP, Take 1 capsule by mouth once daily., Disp: 30 capsule, Rfl: 3    levothyroxine (SYNTHROID) 25 MCG tablet, TAKE ONE TABLET BY MOUTH ONCE DAILY, Disp: 90 tablet, Rfl: 3    LORazepam (ATIVAN) 1 MG tablet, TAKE 1 TABLET BY MOUTH EVERY 12 HOURS AS NEEDED FOR ANXIETY, Disp: 30 tablet, Rfl: 2    losartan (COZAAR) 50 MG tablet, TAKE 1 TABLET BY MOUTH ONCE DAILY, Disp: 90 tablet, Rfl: 3    magnesium oxide (MAG-OX) 400 mg (241.3 mg magnesium) tablet, Take 800 mg by mouth every evening., Disp: , Rfl:     montelukast (SINGULAIR) 10 mg tablet, Take 10 mg by mouth every evening. , Disp: , Rfl: 8    MULTIVIT-IRON-MIN-FOLIC ACID 3,500-18-0.4 UNIT-MG-MG ORAL CHEW, Take by mouth 2 (two) times daily., Disp: , Rfl:     mupirocin (BACTROBAN) 2 % ointment, APPLY OINTMENT TOPICALLY TO AFFECTED AREA ON NOSE THREE TIMES DAILY AS DIRECTED, Disp: 15 g, Rfl: 11    oxyCODONE (ROXICODONE) 15 MG Tab, Take 1 tablet (15 mg total) by mouth every 6 (six) hours as needed for Pain., Disp: 30 tablet, Rfl: 0    pantoprazole (PROTONIX) 40 MG tablet, TAKE ONE TABLET BY MOUTH ONCE DAILY, Disp: 90 tablet, Rfl: 3    ranitidine (ZANTAC) 300 MG tablet, TAKE ONE TABLET BY MOUTH IN THE EVENING, Disp: 30 tablet, Rfl: 1    SSD 1 % cream, , Disp: , Rfl:     torsemide (DEMADEX) 20 MG Tab, Take 1 tablet (20 mg total) by mouth 2 (two) times daily. TAKE 1 TABLET BY MOUTH EVERY OTHER DAY THEN 2 TABLETS EVERY DAY, Disp: 60 tablet, Rfl: 2    TURMERIC ORAL, Take by mouth once daily., Disp: , Rfl:     Current Facility-Administered Medications:     gentamicin injection 80 mg, 80 mg, Intramuscular, 1 time in Clinic/HOD, Angy Campos MD    Facility-Administered Medications Ordered in Other Visits:     lactated ringers infusion, ,  "Intravenous, Continuous, Shaheen Hanson MD, Last Rate: 10 mL/hr at 08/31/18 0739    No prolia at this time   All medications and past history have been reviewed.    Review of Systems   Constitutional: Negative.  Negative for fatigue, fever and unexpected weight change.   HENT: Negative.  Negative for ear discharge, mouth sores, rhinorrhea and sore throat.    Eyes: Negative.  Negative for photophobia and itching.   Respiratory: Negative for cough and shortness of breath.    Cardiovascular: Negative.  Negative for chest pain and leg swelling.   Gastrointestinal: Negative.  Negative for abdominal pain, constipation, diarrhea, nausea and vomiting.   Endocrine: Negative.  Negative for cold intolerance and heat intolerance.   Genitourinary: Positive for difficulty urinating, dysuria, pelvic pain and urgency. Negative for frequency and hematuria.   Musculoskeletal: Negative for arthralgias, back pain and neck pain.   Skin: Negative for pallor and rash.   Allergic/Immunologic: Negative.    Neurological: Negative for weakness, numbness and headaches.   Hematological: Negative for adenopathy. Does not bruise/bleed easily.   Psychiatric/Behavioral: Negative for agitation and confusion.   All other systems reviewed and are negative.       Pertinent positives are intermittent knee pain and fatigue  Depression screening negative on medication  Positive intermittent dysuria pelvic bloating and pain  Objective:        BP (!) 131/57   Pulse 72   Temp 97.9 °F (36.6 °C)   Resp 16   Ht 5' 2" (1.575 m)   Wt 113 kg (249 lb 1.9 oz)   LMP  (LMP Unknown)   BMI 45.56 kg/m²     Physical Exam   Constitutional: She is oriented to person, place, and time. She appears well-developed and well-nourished.   HENT:   Head: Normocephalic.   Mouth/Throat: Oropharynx is clear and moist. No oropharyngeal exudate.   Eyes: Conjunctivae are normal. No scleral icterus.   Neck: Normal range of motion. No JVD present. No tracheal deviation present. "   Cardiovascular: Normal rate and regular rhythm.   No murmur (2= capillary refill) heard.  Pulmonary/Chest: Effort normal. No respiratory distress. She has no wheezes.   Abdominal: Soft. She exhibits no distension. Tenderness: midepigastric.   Musculoskeletal: Normal range of motion. She exhibits no edema.   Neurological: She is alert and oriented to person, place, and time. No cranial nerve deficit.   Steady gait   Skin: Skin is dry. No rash noted. No erythema.   Psychiatric: She has a normal mood and affect.   Vitals reviewed.     Bone marrow with no evidence of plasma cell dyscrasia, no evidence of myelodysplasia.    I reviewed the percentage of cells noted in the marrow along with flow cytometry and cytogenetics  Skeletal survey negative for any lytic or ostial sclerotic disease  All labs and imaging have been reviewed.   Lab Results   Component Value Date    WBC 6.51 10/16/2019    WBC 6.81 10/16/2019    HGB 12.7 10/16/2019    HGB 12.8 10/16/2019    HCT 38.9 10/16/2019    HCT 38.3 10/16/2019    MCV 96 10/16/2019    MCV 97 10/16/2019     10/16/2019     10/16/2019         CMP  Sodium   Date Value Ref Range Status   10/16/2019 139 136 - 145 mmol/L Final     Potassium   Date Value Ref Range Status   10/16/2019 3.9 3.5 - 5.1 mmol/L Final     Chloride   Date Value Ref Range Status   10/16/2019 101 95 - 110 mmol/L Final     CO2   Date Value Ref Range Status   10/16/2019 29 23 - 29 mmol/L Final     Glucose   Date Value Ref Range Status   10/16/2019 235 (H) 70 - 110 mg/dL Final     BUN, Bld   Date Value Ref Range Status   10/16/2019 20 8 - 23 mg/dL Final     Creatinine   Date Value Ref Range Status   10/16/2019 1.2 0.5 - 1.4 mg/dL Final   08/31/2013 1.1 0.5 - 1.4 mg/dL Final     Calcium   Date Value Ref Range Status   10/16/2019 9.7 8.7 - 10.5 mg/dL Final   08/31/2013 9.5 8.7 - 10.5 mg/dL Final     Total Protein   Date Value Ref Range Status   10/16/2019 7.3 6.0 - 8.4 g/dL Final     Albumin   Date Value  Ref Range Status   10/16/2019 3.7 3.5 - 5.2 g/dL Final     Total Bilirubin   Date Value Ref Range Status   10/16/2019 0.3 0.1 - 1.0 mg/dL Final     Comment:     For infants and newborns, interpretation of results should be based  on gestational age, weight and in agreement with clinical  observations.  Premature Infant recommended reference ranges:  Up to 24 hours.............<8.0 mg/dL  Up to 48 hours............<12.0 mg/dL  3-5 days..................<15.0 mg/dL  6-29 days.................<15.0 mg/dL       Alkaline Phosphatase   Date Value Ref Range Status   10/16/2019 114 55 - 135 U/L Final   08/30/2013 93 55 - 135 U/L Final     AST   Date Value Ref Range Status   10/16/2019 21 10 - 40 U/L Final   08/30/2013 21 10 - 40 U/L Final     ALT   Date Value Ref Range Status   10/16/2019 23 10 - 44 U/L Final     Anion Gap   Date Value Ref Range Status   10/16/2019 9 8 - 16 mmol/L Final   08/31/2013 12 5 - 15 meq/L Final     eGFR if    Date Value Ref Range Status   10/16/2019 54 (A) >60 mL/min/1.73 m^2 Final     eGFR if non    Date Value Ref Range Status   10/16/2019 47 (A) >60 mL/min/1.73 m^2 Final     Comment:     Calculation used to obtain the estimated glomerular filtration  rate (eGFR) is the CKD-EPI equation.        Leukemia/Lymphoma Screen - Bone Marrow Right Posterior Iliac Crest   Order: [id number]     Status:  Final result   Visible to patient:  Yes (Patient Portal) Next appt:  02/11/2019 at 02:45 PM in Orthopedics (Larry Molina MD) Dx:  Anemia, unspecified type; Elevated se...   Component 2yr ago   Clinical Diagnosis - Bone Marrow ANM    Body Site - Bone Marrow RPI    Bone Marrow Interpretation No abnormal hematopoietic population is detected in this sample. Correlate with marrow morphology and other ancillary studies.      Comment: Interpreted by: Katarina Hicks MD, Signed on 10/10/2016 at 12:50   Bone Marrow Antibodies Analyzed All analyzed: CD2, CD3, CD4, CD5, CD7,  CD8, CD10, CD13, CD19, CD20, CD34, , KAPPA, LAMBDA,CD45 and 7AAD.      Bone Marrow Comment Flow cytometric analysis of  bone marrow shows populations of polyclonal B lymphocytes with a subset of hematogones detected, and T lymphocytes that are immunophenotypically unremarkable.  The blast gate is not increased. Flow differential:  Lymphocytes   7.5%, Monocytes 2.9%, Granulocytes  74.9%, Blast  2.4%, Debris/nRBC 12.2%,  Viability 93.2%.                 Assessment:       MGUS (monoclonal gammopathy of unknown significance)  -     Iron and TIBC; Future; Expected date: 10/22/2019  -     CBC auto differential; Standing  -     Vitamin B1; Future; Expected date: 10/22/2019  -     Vitamin B6; Future; Expected date: 10/22/2019  -     IgA; Future; Expected date: 10/22/2019  -     IgA; Future; Expected date: 10/22/2019  -     FREE LT CHAIN ANAL; Future; Expected date: 10/22/2019    Abnormal finding of blood chemistry, unspecified   -     Iron and TIBC; Future; Expected date: 10/22/2019    Monoclonal gammopathy associated with lymphoplasmacytic dyscrasias  -     IgA; Future; Expected date: 10/22/2019  -     FREE LT CHAIN ANAL; Future; Expected date: 10/22/2019    Diabetes mellitus due to underlying condition with diabetic nephropathy, with long-term current use of insulin    Osteopenia with high risk of fracture    MECHELLE (obstructive sleep apnea), on CPAP 100%    Neuropathy of right sciatic nerve  -     amitriptyline (ELAVIL) 50 MG tablet; One po BID  Dispense: 60 tablet; Refill: 4    Lymphedema of right lower extremity  -     amitriptyline (ELAVIL) 50 MG tablet; One po BID  Dispense: 60 tablet; Refill: 4    Chronic left-sided thoracic back pain  -     amitriptyline (ELAVIL) 50 MG tablet; One po BID  Dispense: 60 tablet; Refill: 4          Plan:     Anemia normalized   CKD Renal function improving   elev IGA :check in 2 months   Increase probiotics to help with inflammation  Pt no longer meets criteria for prolia due to a  change in her density from osteoporosis to osteopenia  She believes she is in need of a knee replacement : cont calcium and vitamin D3  IGA  Elevated , kappa elevated   Next dexa in Dec of 2019 NO longer on prolia since she has osteopenia   Cont K 2 for bone health   History of broken bones   Cont calcium and vitamin D  Cont prozac for depression  Explained MGUS and the transition to myeloma is rare and less than 2% I also explained this could be a component of her low GFR  This is NOT myeloma   She cannot take oral bisphosphonate because of GI issues  No need for procrit despite anemia due to renal disease    RTC 3 months for evaluation    The plan was discussed with the patient and all questions/concerns have been answered to the patient's satisfaction.

## 2019-10-28 DIAGNOSIS — M62.838 MUSCLE SPASM: ICD-10-CM

## 2019-10-29 ENCOUNTER — OFFICE VISIT (OUTPATIENT)
Dept: OPHTHALMOLOGY | Facility: CLINIC | Age: 69
End: 2019-10-29
Payer: MEDICARE

## 2019-10-29 DIAGNOSIS — H52.7 REFRACTIVE ERROR: ICD-10-CM

## 2019-10-29 DIAGNOSIS — E11.9 DIABETES MELLITUS TYPE 2 WITHOUT RETINOPATHY: ICD-10-CM

## 2019-10-29 DIAGNOSIS — H25.13 NUCLEAR SCLEROSIS, BILATERAL: ICD-10-CM

## 2019-10-29 DIAGNOSIS — H43.813 PVD (POSTERIOR VITREOUS DETACHMENT), BILATERAL: ICD-10-CM

## 2019-10-29 DIAGNOSIS — H40.039 ANATOMICAL NARROW ANGLE: Primary | ICD-10-CM

## 2019-10-29 PROCEDURE — 99999 PR PBB SHADOW E&M-EST. PATIENT-LVL IV: CPT | Mod: PBBFAC,,, | Performed by: OPHTHALMOLOGY

## 2019-10-29 PROCEDURE — 99999 PR PBB SHADOW E&M-EST. PATIENT-LVL IV: ICD-10-PCS | Mod: PBBFAC,,, | Performed by: OPHTHALMOLOGY

## 2019-10-29 PROCEDURE — 92020 PR SPECIAL EYE EVAL,GONIOSCOPY: ICD-10-PCS | Mod: S$GLB,,, | Performed by: OPHTHALMOLOGY

## 2019-10-29 PROCEDURE — 92020 GONIOSCOPY: CPT | Mod: S$GLB,,, | Performed by: OPHTHALMOLOGY

## 2019-10-29 PROCEDURE — 92014 PR EYE EXAM, EST PATIENT,COMPREHESV: ICD-10-PCS | Mod: S$GLB,,, | Performed by: OPHTHALMOLOGY

## 2019-10-29 PROCEDURE — 92014 COMPRE OPH EXAM EST PT 1/>: CPT | Mod: S$GLB,,, | Performed by: OPHTHALMOLOGY

## 2019-10-30 ENCOUNTER — PATIENT MESSAGE (OUTPATIENT)
Dept: FAMILY MEDICINE | Facility: CLINIC | Age: 69
End: 2019-10-30

## 2019-10-30 RX ORDER — LORAZEPAM 0.5 MG/1
0.5 TABLET ORAL NIGHTLY
Qty: 30 TABLET | Refills: 2 | Status: SHIPPED | OUTPATIENT
Start: 2019-10-30 | End: 2019-11-22 | Stop reason: SDUPTHER

## 2019-10-31 NOTE — PROGRESS NOTES
HPI     DLS: 5/23/19    Pt over due for annual exam. States for a few months she has been seeing   tiny black spots in vision.pt states they do not look like floaters.    States only happens late at night. No other symptoms. Denies pain in eyes   today. Denies FOL. Using OTC at's 2-3 times a day w/ relief.     HTN stable with medication. DM up and down,     Hemoglobin A1C       Date                     Value               Ref Range             Status                09/04/2019               9.4 (H)             4.0 - 5.6 %           Final                  05/31/2019               8.4 (H)             4.0 - 5.6 %           Final                  01/30/2019               8.8 (H)             4.0 - 5.6 %           Final                  01/30/2019               8.8 (H)             4.0 - 5.6 %           Final                   08/30/2013               8.0 (H)             4.8 - 5.6 %           Final                 02/02/2013               8.2 (H)             4.8 - 5.6 %           Final               Agree with above. Thinks spots are in OU, sees when looking at the tub in   the evening. States BP is good.     Last edited by Marimar Yap MD on 10/29/2019  3:35 PM.   (History)        ROS     Positive for: Gastrointestinal (sleeve procedure, taking vitamins), Skin   (on steroids for hives), Genitourinary (nephropathy), Musculoskeletal   (recently dx'd with gout), Endocrine (DM - insulin pump), Cardiovascular   (HTN - controlled with meds per pt), Eyes (LASIK OU ~10 years ago),   Respiratory (SOB, COPD), Heme/Lymph (ASA QD d/c'd due to easy bruising on   arms, resumed again the quit again couple weeks ago)    Negative for: Neurological (denies neuropathy)    Last edited by Marimar Yap MD on 10/29/2019  3:35 PM.   (History)        Assessment /Plan     For exam results, see Encounter Report.    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 Pt with DM x 20+ years, now with insulin pump. Will need Ketorolac 1 week prior to CE. Glucose currently uncontrolled.    Anatomical narrow angle - remains open to TM. Will likely improve when cataract surgery becomes necessary, also discussed possible need for LPI if catarcts not matring. ACG symptoms reviewed.    Vitreo-retinal adhesions - hemorrhagic PVD 2015, hx laser retinopexy OD 10/15. Stable.     Nuclear sclerosis, bilateral - appears to be approaching visually significance, OS>OD. Dilates well. States Versed does not work well on her, but Propofol does. Re-check glucose 6 months, as glucose currently uncontrolled and also has other surgeries lined up.    PVD (posterior vitreous detachment), bilateral - RD precautions    Refractive error    Dry eye, bilateral - continue artificial tears.

## 2019-11-06 ENCOUNTER — PATIENT OUTREACH (OUTPATIENT)
Dept: ADMINISTRATIVE | Facility: HOSPITAL | Age: 69
End: 2019-11-06

## 2019-11-11 RX ORDER — LEVOTHYROXINE SODIUM 25 UG/1
TABLET ORAL
Qty: 90 TABLET | Refills: 3 | Status: SHIPPED | OUTPATIENT
Start: 2019-11-11 | End: 2020-01-06 | Stop reason: SDUPTHER

## 2019-11-20 ENCOUNTER — OFFICE VISIT (OUTPATIENT)
Dept: FAMILY MEDICINE | Facility: CLINIC | Age: 69
End: 2019-11-20
Payer: MEDICARE

## 2019-11-20 VITALS
TEMPERATURE: 98 F | HEIGHT: 62 IN | OXYGEN SATURATION: 98 % | DIASTOLIC BLOOD PRESSURE: 72 MMHG | WEIGHT: 256.38 LBS | HEART RATE: 78 BPM | SYSTOLIC BLOOD PRESSURE: 136 MMHG | BODY MASS INDEX: 47.18 KG/M2

## 2019-11-20 DIAGNOSIS — G47.33 OBSTRUCTIVE SLEEP APNEA SYNDROME: ICD-10-CM

## 2019-11-20 DIAGNOSIS — Z79.4 CONTROLLED TYPE 2 DIABETES MELLITUS WITH COMPLICATION, WITH LONG-TERM CURRENT USE OF INSULIN: ICD-10-CM

## 2019-11-20 DIAGNOSIS — E03.4 HYPOTHYROIDISM DUE TO ACQUIRED ATROPHY OF THYROID: ICD-10-CM

## 2019-11-20 DIAGNOSIS — E66.01 OBESITY, MORBID, BMI 40.0-49.9: ICD-10-CM

## 2019-11-20 DIAGNOSIS — N18.30 CKD (CHRONIC KIDNEY DISEASE) STAGE 3, GFR 30-59 ML/MIN: ICD-10-CM

## 2019-11-20 DIAGNOSIS — E11.8 CONTROLLED TYPE 2 DIABETES MELLITUS WITH COMPLICATION, WITH LONG-TERM CURRENT USE OF INSULIN: ICD-10-CM

## 2019-11-20 DIAGNOSIS — Z51.89 ENCOUNTER FOR MONITORING OF PATIENT COMPLIANCE IN DRUG TREATMENT PROGRAM: Primary | ICD-10-CM

## 2019-11-20 PROCEDURE — 1159F MED LIST DOCD IN RCRD: CPT | Mod: S$GLB,,, | Performed by: NURSE PRACTITIONER

## 2019-11-20 PROCEDURE — 3075F PR MOST RECENT SYSTOLIC BLOOD PRESS GE 130-139MM HG: ICD-10-PCS | Mod: CPTII,S$GLB,, | Performed by: NURSE PRACTITIONER

## 2019-11-20 PROCEDURE — 99999 PR PBB SHADOW E&M-EST. PATIENT-LVL III: ICD-10-PCS | Mod: PBBFAC,,, | Performed by: NURSE PRACTITIONER

## 2019-11-20 PROCEDURE — 3078F PR MOST RECENT DIASTOLIC BLOOD PRESSURE < 80 MM HG: ICD-10-PCS | Mod: CPTII,S$GLB,, | Performed by: NURSE PRACTITIONER

## 2019-11-20 PROCEDURE — 1101F PT FALLS ASSESS-DOCD LE1/YR: CPT | Mod: CPTII,S$GLB,, | Performed by: NURSE PRACTITIONER

## 2019-11-20 PROCEDURE — 1159F PR MEDICATION LIST DOCUMENTED IN MEDICAL RECORD: ICD-10-PCS | Mod: S$GLB,,, | Performed by: NURSE PRACTITIONER

## 2019-11-20 PROCEDURE — 1125F PR PAIN SEVERITY QUANTIFIED, PAIN PRESENT: ICD-10-PCS | Mod: S$GLB,,, | Performed by: NURSE PRACTITIONER

## 2019-11-20 PROCEDURE — 99214 PR OFFICE/OUTPT VISIT, EST, LEVL IV, 30-39 MIN: ICD-10-PCS | Mod: S$GLB,,, | Performed by: NURSE PRACTITIONER

## 2019-11-20 PROCEDURE — 3046F PR MOST RECENT HEMOGLOBIN A1C LEVEL > 9.0%: ICD-10-PCS | Mod: CPTII,S$GLB,, | Performed by: NURSE PRACTITIONER

## 2019-11-20 PROCEDURE — 3075F SYST BP GE 130 - 139MM HG: CPT | Mod: CPTII,S$GLB,, | Performed by: NURSE PRACTITIONER

## 2019-11-20 PROCEDURE — 1101F PR PT FALLS ASSESS DOC 0-1 FALLS W/OUT INJ PAST YR: ICD-10-PCS | Mod: CPTII,S$GLB,, | Performed by: NURSE PRACTITIONER

## 2019-11-20 PROCEDURE — 3046F HEMOGLOBIN A1C LEVEL >9.0%: CPT | Mod: CPTII,S$GLB,, | Performed by: NURSE PRACTITIONER

## 2019-11-20 PROCEDURE — 99999 PR PBB SHADOW E&M-EST. PATIENT-LVL III: CPT | Mod: PBBFAC,,, | Performed by: NURSE PRACTITIONER

## 2019-11-20 PROCEDURE — 99214 OFFICE O/P EST MOD 30 MIN: CPT | Mod: S$GLB,,, | Performed by: NURSE PRACTITIONER

## 2019-11-20 PROCEDURE — 3078F DIAST BP <80 MM HG: CPT | Mod: CPTII,S$GLB,, | Performed by: NURSE PRACTITIONER

## 2019-11-20 PROCEDURE — 1125F AMNT PAIN NOTED PAIN PRSNT: CPT | Mod: S$GLB,,, | Performed by: NURSE PRACTITIONER

## 2019-11-20 NOTE — PROGRESS NOTES
Subjective:       Patient ID: Fifi Mcnair is a 68 y.o. female.    Chief Complaint: Medication Refill (ativan)    Patient presents today for 3 month follow up for Ativan refill. Patient reports the Ativan helps her sleep. Contract and UDS are up to date;  checked no inappropriate activity found. Patient is also taking prozac for depression.      Past Medical History:   Diagnosis Date    Allergy     multiple antibiotic allergies    Anemia     Anxiety     Arthritis     Asthma     CHF (congestive heart failure)     NYHA class III     Chronic kidney disease     Colon polyp     benign    COPD (chronic obstructive pulmonary disease)     DLCO 37% , and mild pulmonary HTN    Dental bridge present     LOWER    Depression     Diabetes mellitus     Diabetic retinopathy     Diastolic dysfunction     Diverticulitis of large intestine without perforation or abscess without bleeding 2/12/2018    Diverticulosis     Former smoker     Fracture of lumbar spine     GERD (gastroesophageal reflux disease)     Hernia of unspecified site of abdominal cavity without mention of obstruction or gangrene     Hyperlipidemia     Hypertension     hypertensive renal    IBS (irritable bowel syndrome)     Mild nonproliferative diabetic retinopathy(362.04) 11/25/2013    Morbid obesity     Nuclear sclerosis 11/25/2013    Osteoporosis     Peripheral edema     Rash     Recurrent upper respiratory infection (URI)     S/P LASIK (laser assisted in situ keratomileusis)     Sleep apnea     sleep apnea uses bipap.    Stroke     tia    Thyroid disease     on synthroid    TIA (transient ischemic attack)     Urinary tract infection     Wears glasses        Review of patient's allergies indicates:   Allergen Reactions    Adhesive Other (See Comments)     Blisters    Cleocin [clindamycin hcl] Hives    Ceclor [cefaclor] Hives    Advair diskus [fluticasone propion-salmeterol]      Dry mouth    Aleve [naproxen sodium]       Unable to take secondary to kidney function.     Erythromycin Hives    Levaquin [levofloxacin] Dermatitis    Macrobid [nitrofurantoin monohyd/m-cryst] Other (See Comments)     Stomach pain/ GI issues    Macrodantin [nitrofurantoin macrocrystalline] Hives    Motrin [ibuprofen]      Unable to take secondary to kidney functions.    Restoril [temazepam]      LIGHTHEADED UPON WAKING.with poor results    Sulfa (sulfonamide antibiotics)      Hold due to renal problems    Trazodone      PALPITATIONS    Remeron [mirtazapine] Palpitations and Other (See Comments)     Jittery    Vancomycin analogues Rash     Feet broke out/inflammed blood vessels           Current Outpatient Medications:     albuterol-ipratropium 2.5mg-0.5mg/3mL (DUO-NEB) 0.5 mg-3 mg(2.5 mg base)/3 mL nebulizer solution, Take 3 mLs by nebulization every 6 (six) hours as needed for Wheezing. Rescue, Disp: 1 Box, Rfl: 3    allopurinol (ZYLOPRIM) 100 MG tablet, TAKE 2 TABLETS BY MOUTH AT BEDTIME, Disp: 180 tablet, Rfl: 4    amitriptyline (ELAVIL) 50 MG tablet, One po BID, Disp: 60 tablet, Rfl: 4    ascorbic acid, vitamin C, (VITAMIN C) 500 MG tablet, Take 500 mg by mouth once daily., Disp: , Rfl:     atenolol (TENORMIN) 25 MG tablet, TAKE 1 TABLET BY MOUTH TWICE DAILY, Disp: 180 tablet, Rfl: 4    biotin 1 mg Cap, Take by mouth., Disp: , Rfl:     calcitRIOL (ROCALTROL) 0.25 MCG Cap, Take 1 capsule by mouth twice a week. Monday Friday, Disp: , Rfl:     cetirizine (ZYRTEC) 10 MG tablet, Take 1 tablet (10 mg total) by mouth once daily., Disp: 1 Bottle, Rfl: 5    CHERATUSSIN AC  mg/5 mL syrup, TAKE 5 ML (CC) BY MOUTH 4 TIMES DAILY AS NEEDED FOR COUGH FOR 5 DAYS, Disp: , Rfl: 0    cinnamon bark (CINNAMON ORAL), Take by mouth once daily., Disp: , Rfl:     clotrimazole-betamethasone 1-0.05% (LOTRISONE) cream, , Disp: , Rfl:     cyanocobalamin, vitamin B-12, (VITAMIN B-12) 5,000 mcg Subl, Place 5,000 mg under the tongue once a week., Disp:  , Rfl:     diclofenac sodium (VOLTAREN) 1 % Gel,  APPLY 2 GRAMS TOPICALLY ONCE DAILY, Disp: 1 Tube, Rfl: 0    diphenoxylate-atropine 2.5-0.025 mg (LOMOTIL) 2.5-0.025 mg per tablet, TAKE ONE TABLET BY MOUTH 4 TIMES DAILY AS NEEDED FOR DIARRHEA, Disp: 60 tablet, Rfl: 1    DYMISTA 137-50 mcg/spray Spry nassal spray, as needed. , Disp: , Rfl:     ferrous sulfate (IRON) 325 mg (65 mg iron) Tab tablet, Take 325 mg by mouth daily with breakfast., Disp: , Rfl:     fish oil-omega-3 fatty acids 300-1,000 mg capsule, Take 1 capsule by mouth once daily., Disp: , Rfl:     FLUoxetine 20 MG capsule, Take 2 capsules (40 mg total) by mouth once daily., Disp: 180 capsule, Rfl: 3    fluticasone (FLONASE) 50 mcg/actuation nasal spray, 2 sprays by Nasal route once daily. , Disp: , Rfl:     fluticasone-vilanterol (BREO) 100-25 mcg/dose diskus inhaler, Inhale 1 puff into the lungs once daily., Disp: , Rfl:     gabapentin (NEURONTIN) 100 MG capsule, 100 mg 2 (two) times daily. , Disp: , Rfl:     insulin (BASAGLAR KWIKPEN U-100 INSULIN) glargine 100 units/mL (3mL) SubQ pen, Inject 20 Units into the skin every evening. Use 10 units if BS over 210.Night before surgery. (Patient taking differently: Inject 20 Units into the skin once daily. Use 10 units if BS over 210.Night before surgery.), Disp: , Rfl: 0    insulin aspart U-100 (NOVOLOG) 100 unit/mL injection, Use per insulin pump, 35-40 units daily., Disp: 40 mL, Rfl: 0    insulin regular (HUMULIN R REGULAR U-100 INSULN) 100 unit/mL Inj injection, Bolus 40 units. 20 -40 units basal .As directed for insulin pump., Disp: 30 mL, Rfl: 12    KLOR-CON M20 20 mEq tablet, TAKE ONE TABLET BY MOUTH TWICE DAILY, Disp: 180 tablet, Rfl: 3    L-LYSINE ORAL, Take by mouth., Disp: , Rfl:     l-methylfolate-b2-b6-b12 (CEREFOLIN) 6-5-50-1 mg Tab, Take 1 tablet by mouth once daily., Disp: , Rfl:     Lacto.acidophilus-Bif.animalis (PROBIOTIC) 5 billion cell CpSP, Take 1 capsule by mouth once  daily., Disp: 30 capsule, Rfl: 3    levothyroxine (SYNTHROID) 25 MCG tablet, TAKE 1 TABLET BY MOUTH ONCE DAILY, Disp: 90 tablet, Rfl: 3    LORazepam (ATIVAN) 0.5 MG tablet, Take 1 tablet (0.5 mg total) by mouth every evening., Disp: 30 tablet, Rfl: 2    losartan (COZAAR) 50 MG tablet, TAKE 1 TABLET BY MOUTH ONCE DAILY, Disp: 90 tablet, Rfl: 3    magnesium oxide (MAG-OX) 400 mg (241.3 mg magnesium) tablet, Take 800 mg by mouth every evening., Disp: , Rfl:     montelukast (SINGULAIR) 10 mg tablet, Take 10 mg by mouth every evening. , Disp: , Rfl: 8    MULTIVIT-IRON-MIN-FOLIC ACID 3,500-18-0.4 UNIT-MG-MG ORAL CHEW, Take by mouth 2 (two) times daily., Disp: , Rfl:     mupirocin (BACTROBAN) 2 % ointment, APPLY OINTMENT TOPICALLY TO AFFECTED AREA ON NOSE THREE TIMES DAILY AS DIRECTED, Disp: 15 g, Rfl: 11    oxyCODONE (ROXICODONE) 15 MG Tab, Take 1 tablet (15 mg total) by mouth every 6 (six) hours as needed for Pain., Disp: 30 tablet, Rfl: 0    pantoprazole (PROTONIX) 40 MG tablet, TAKE ONE TABLET BY MOUTH ONCE DAILY, Disp: 90 tablet, Rfl: 3    ranitidine (ZANTAC) 300 MG tablet, TAKE ONE TABLET BY MOUTH IN THE EVENING, Disp: 30 tablet, Rfl: 1    SSD 1 % cream, , Disp: , Rfl:     torsemide (DEMADEX) 20 MG Tab, Take 1 tablet (20 mg total) by mouth 2 (two) times daily. TAKE 1 TABLET BY MOUTH EVERY OTHER DAY THEN 2 TABLETS EVERY DAY, Disp: 60 tablet, Rfl: 2    TURMERIC ORAL, Take by mouth once daily., Disp: , Rfl:     Current Facility-Administered Medications:     gentamicin injection 80 mg, 80 mg, Intramuscular, 1 time in Clinic/HOD, Angy Campos MD    Facility-Administered Medications Ordered in Other Visits:     lactated ringers infusion, , Intravenous, Continuous, Shaheen Hanson MD, Last Rate: 10 mL/hr at 08/31/18 0739    Review of Systems   Constitutional: Negative for unexpected weight change.   HENT: Negative for trouble swallowing.    Eyes: Negative for visual disturbance.   Respiratory:  "Negative for shortness of breath.    Cardiovascular: Negative for chest pain, palpitations and leg swelling.   Gastrointestinal: Negative for blood in stool.   Genitourinary: Negative for hematuria.   Skin: Negative for rash.   Allergic/Immunologic: Negative for immunocompromised state.   Neurological: Negative for headaches.   Hematological: Does not bruise/bleed easily.   Psychiatric/Behavioral: Negative for agitation. The patient is nervous/anxious.        Objective:      /72 (BP Location: Right arm, Patient Position: Sitting, BP Method: Large (Manual))   Pulse 78   Temp 98.3 °F (36.8 °C) (Oral)   Ht 5' 2" (1.575 m)   Wt 116.3 kg (256 lb 6.3 oz)   LMP  (LMP Unknown)   SpO2 98%   BMI 46.90 kg/m²   Physical Exam   Constitutional: She is oriented to person, place, and time. She appears well-developed and well-nourished.   Eyes: Pupils are equal, round, and reactive to light. EOM are normal.   Neck: Normal range of motion.   Cardiovascular: Normal rate, regular rhythm and normal heart sounds.   Pulmonary/Chest: Effort normal and breath sounds normal.   Abdominal: Soft. Bowel sounds are normal.   Musculoskeletal: Normal range of motion.   Neurological: She is alert and oriented to person, place, and time.   Skin: Skin is warm and dry.   Psychiatric: She has a normal mood and affect. Her behavior is normal. Judgment and thought content normal.       Assessment:       1. Encounter for monitoring of patient compliance in drug treatment program    2. Controlled type 2 diabetes mellitus with complication, with long-term current use of insulin    3. Hypothyroidism due to acquired atrophy of thyroid    4. Obstructive sleep apnea syndrome    5. CKD (chronic kidney disease) stage 3, GFR 30-59 ml/min        Plan:       Encounter for monitoring of patient compliance in drug treatment program  Contract and UDS are up to date;  checked-no inappropriate activity found  Ativan refill request sent to "   Controlled type 2 diabetes mellitus with complication, with long-term current use of insulin  A1c not at goal  Followed by Endocrine-Dr.Harold Cantu  Hemoglobin A1C   Date Value Ref Range Status   09/04/2019 9.4 (H) 4.0 - 5.6 % Final     Comment:     ADA Screening Guidelines:  5.7-6.4%  Consistent with prediabetes  >or=6.5%  Consistent with diabetes  High levels of fetal hemoglobin interfere with the HbA1C  assay. Heterozygous hemoglobin variants (HbS, HgC, etc)do  not significantly interfere with this assay.   However, presence of multiple variants may affect accuracy.     05/31/2019 8.4 (H) 4.0 - 5.6 % Final     Comment:     ADA Screening Guidelines:  5.7-6.4%  Consistent with prediabetes  >or=6.5%  Consistent with diabetes  High levels of fetal hemoglobin interfere with the HbA1C  assay. Heterozygous hemoglobin variants (HbS, HgC, etc)do  not significantly interfere with this assay.   However, presence of multiple variants may affect accuracy.     01/30/2019 8.8 (H) 4.0 - 5.6 % Final     Comment:     ADA Screening Guidelines:  5.7-6.4%  Consistent with prediabetes  >or=6.5%  Consistent with diabetes  High levels of fetal hemoglobin interfere with the HbA1C  assay. Heterozygous hemoglobin variants (HbS, HgC, etc)do  not significantly interfere with this assay.   However, presence of multiple variants may affect accuracy.     01/30/2019 8.8 (H) 4.0 - 5.6 % Final     Comment:     ADA Screening Guidelines:  5.7-6.4%  Consistent with prediabetes  >or=6.5%  Consistent with diabetes  High levels of fetal hemoglobin interfere with the HbA1C  assay. Heterozygous hemoglobin variants (HbS, HgC, etc)do  not significantly interfere with this assay.   However, presence of multiple variants may affect accuracy.     08/30/2013 8.0 (H) 4.8 - 5.6 % Final     Comment:              Increased risk for diabetes: 5.7 - 6.4           Diabetes: >6.4           Glycemic control for adults with diabetes: <7.0  **In order to  standardize %HbA1c results worldwide, as of October 11, 2010,  the %HbA1c is being calculated using the master equation recommended in the  consensus statement adopted by the ADA (American Diabetes Assoc), EASD  (European Assoc for the Study of Diabetes), IFCC (International Federation  of Clinical Chemistry and Laboratory Medicine) and IDF (International  Diabetes Federation). Result units: %HgbA1c (DCCT/NGSP).  In common with other methods, Hb A1C values may not accurately reflect mean  blood glucose in patients with hemoglobin variants (HgbF, HgbS and HgbC).  Any cause of shortened erythrocyte survival will reduce exposure of  erythrocytes to glucose with a consequent decrease in HbA1c (%) values, even  though the time-averaged blood glucose level may be elevated. Causes of  shortened erythrocyte lifetime might be hemolytic anemia or other hemolytic  diseases, homozygous sickle cell trait, pregnancy, recent significant or  chronic blood loss, etc. Caution should be used when interpreting the HbA1c  results from patients with these conditions.   02/02/2013 8.2 (H) 4.8 - 5.6 % Final     Comment:              Increased risk for diabetes: 5.7 - 6.4           Diabetes: >6.4           Glycemic control for adults with diabetes: <7.0  **In order to standardize %HbA1c results worldwide, as of October 11, 2010,  the %HbA1c is being calculated using the master equation recommended in the  consensus statement adopted by the ADA (American Diabetes Assoc), EASD  (European Assoc for the Study of Diabetes), IFCC (International Federation  of Clinical Chemistry and Laboratory Medicine) and IDF (International  Diabetes Federation). Result units: %HgbA1c (DCCT/NGSP).  In common with other methods, Hb A1C values may not accurately reflect mean  blood glucose in patients with hemoglobin variants (HgbF, HgbS and HgbC).  Any cause of shortened erythrocyte survival will reduce exposure of  erythrocytes to glucose with a consequent  "decrease in HbA1c (%) values, even  though the time-averaged blood glucose level may be elevated. Causes of  shortened erythrocyte lifetime might be hemolytic anemia or other hemolytic  diseases, homozygous sickle cell trait, pregnancy, recent significant or  chronic blood loss, etc. Caution should be used when interpreting the HbA1c  results from patients with these conditions.     Hypothyroidism due to acquired atrophy of thyroid  Stable, continue medication  Obstructive sleep apnea syndrome  Stable, CPAP at night  CKD (chronic kidney disease) stage 3, GFR 30-59 ml/min  Stable and chronic.  Will continue to monitor q3-6 months and control chronic conditions as optimally as possible to preserve function.    Obesity, morbid, BMI 40.0-49.9    Counseled patient on his ideal body weight, health consequences of being obese and current recommendations including weekly exercise and a heart healthy diet.  Current BMI is:Estimated body mass index is 46.9 kg/m² as calculated from the following:    Height as of this encounter: 5' 2" (1.575 m).    Weight as of this encounter: 116.3 kg (256 lb 6.3 oz)..  Patient is aware that ideal BMI < 25 or Weight in (lb) to have BMI = 25: 136.4.        Patient readiness: acceptance and barriers:none    During the course of the visit the patient was educated and counseled about the following:     Diabetes:  Discussed general issues about diabetes pathophysiology and management.  Suggested low cholesterol diet.  Encouraged aerobic exercise.  Hypertension:   Dietary sodium restriction.  Regular aerobic exercise.  Obesity:   General weight loss/lifestyle modification strategies discussed (elicit support from others; identify saboteurs; non-food rewards, etc).  Regular aerobic exercise program discussed.    Goals: Diabetes: Maintain Hemoglobin A1C below 7, Hypertension: Reduce Blood Pressure and Obesity: Reduce calorie intake and BMI    Did patient meet goals/outcomes: No    The following self " management tools provided: declined    Patient Instructions (the written plan) was given to the patient/family.     Time spent with patient: 30 minutes    Barriers to medications present (no )    Adverse reactions to current medications (no)    Over the counter medications reviewed (Yes)

## 2019-11-20 NOTE — PATIENT INSTRUCTIONS
Understanding Carbohydrates, Fats, and Protein  Food is a source of fuel and nourishment for your body. Its also a source of pleasure. Having diabetes doesnt mean you have to eat special foods or give up desserts. Instead, your dietitian can show you how to plan meals to suit your body. To start, learn how different foods affect blood sugar.  Carbohydrates  Carbohydrates are the main source of fuel for the body. Carbohydrates raise blood sugar. Many people think carbohydrates are only found in pasta or bread. But carbohydrates are actually in many kinds of foods:  · Sugars occur naturally in foods such as fruit, milk, honey, and molasses. Sugars can also be added to many foods, from cereals and yogurt to candy and desserts. Sugars raise blood sugar.  · Starches are found in bread, cereals, pasta, and dried beans. Theyre also found in corn, peas, potatoes, yam, acorn squash, and butternut squash. Starches also raise blood sugar.   · Fiber is found in foods such as vegetables, fruits, beans, and whole grains. Unlike other carbs, fiber isnt digested or absorbed. So it doesnt raise blood sugar. In fact, fiber can help keep blood sugar from rising too fast. It also helps keep blood cholesterol at a healthy level.  Did you know?  Even though carbohydrates raise blood sugar, its best to have some in every meal. They are an important part of a healthy diet.   Fat  Fat is an energy source that can be stored until needed. Fat does not raise blood sugar. However, it can raise blood cholesterol, increasing the risk of heart disease. Fat is also high in calories, which can cause weight gain. Not all types of fat are the same.  More Healthy:  · Monounsaturated fats are mostly found in vegetable oils, such as olive, canola, and peanut oils. They are also found in avocados and some nuts. Monounsaturated fats are healthy for your heart. Thats because they lower LDL (unhealthy) cholesterol.  · Polyunsaturated fats are mostly  found in vegetable oils, such as corn, safflower, and soybean oils. They are also found in some seeds, nuts, and fish. Polyunsaturated fats lower LDL (unhealthy) cholesterol. So, choosing them instead of saturated fats is healthy for your heart. Certain unsaturated fats can help lower triglycerides.   Less Healthy:  · Saturated fats are found in animal products, such as meat, poultry, whole milk, lard, and butter. Saturated fats raise LDL cholesterol and are not healthy for your heart.  · Hydrogenated oils and trans fats are formed when vegetable oils are processed into solid fats. They are found in many processed foods. Hydrogenated oils and trans fats raise LDL cholesterol and lower HDL (healthy) cholesterol. They are not healthy for your heart.  Protein  Protein helps the body build and repair muscle and other tissue. Protein has little or no effect on blood sugar. However, many foods that contain protein also contain saturated fat. By choosing low-fat protein sources, you can get the benefits of protein without the extra fat:  · Plant protein is found in dry beans and peas, nuts, and soy products, such as tofu and soymilk. These sources tend to be cholesterol-free and low in saturated fat.  · Animal protein is found in fish, poultry, meat, cheese, milk, and eggs. These contain cholesterol and can be high in saturated fat. Aim for lean, lower-fat choices.  Date Last Reviewed: 3/1/2016  © 2941-2318 [employer]. [address] [place] [address], [place] [address]. All rights reserved. This information is not intended as a substitute for professional medical care. Always follow your healthcare professional's instructions.

## 2019-11-22 DIAGNOSIS — M62.838 MUSCLE SPASM: ICD-10-CM

## 2019-11-22 NOTE — TELEPHONE ENCOUNTER
Patient request Ativan 0.5 mg refill. Contract and UDS are up to date.  checked-no inappropriate activity found.

## 2019-11-27 DIAGNOSIS — E87.6 HYPOKALEMIA: ICD-10-CM

## 2019-11-27 RX ORDER — LORAZEPAM 0.5 MG/1
0.5 TABLET ORAL NIGHTLY
Qty: 30 TABLET | Refills: 3 | Status: SHIPPED | OUTPATIENT
Start: 2019-11-27 | End: 2020-01-06 | Stop reason: SDUPTHER

## 2019-11-27 RX ORDER — PANTOPRAZOLE SODIUM 40 MG/1
TABLET, DELAYED RELEASE ORAL
Qty: 90 TABLET | Refills: 3 | Status: SHIPPED | OUTPATIENT
Start: 2019-11-27 | End: 2020-10-15 | Stop reason: SDUPTHER

## 2019-12-02 ENCOUNTER — LAB VISIT (OUTPATIENT)
Dept: LAB | Facility: HOSPITAL | Age: 69
End: 2019-12-02
Attending: INTERNAL MEDICINE
Payer: MEDICARE

## 2019-12-02 DIAGNOSIS — N18.2 CHRONIC KIDNEY DISEASE, STAGE II (MILD): ICD-10-CM

## 2019-12-02 DIAGNOSIS — Z96.41 INSULIN PUMP STATUS: ICD-10-CM

## 2019-12-02 DIAGNOSIS — E11.40 DIABETIC NEUROPATHY: Primary | ICD-10-CM

## 2019-12-02 LAB
ALBUMIN SERPL BCP-MCNC: 3.5 G/DL (ref 3.5–5.2)
ALP SERPL-CCNC: 134 U/L (ref 55–135)
ALT SERPL W/O P-5'-P-CCNC: 25 U/L (ref 10–44)
ANION GAP SERPL CALC-SCNC: 6 MMOL/L (ref 8–16)
AST SERPL-CCNC: 25 U/L (ref 10–40)
BASOPHILS # BLD AUTO: 0.03 K/UL (ref 0–0.2)
BASOPHILS NFR BLD: 0.5 % (ref 0–1.9)
BILIRUB SERPL-MCNC: 0.4 MG/DL (ref 0.1–1)
BUN SERPL-MCNC: 17 MG/DL (ref 8–23)
CALCIUM SERPL-MCNC: 9.6 MG/DL (ref 8.7–10.5)
CHLORIDE SERPL-SCNC: 103 MMOL/L (ref 95–110)
CHOLEST SERPL-MCNC: 148 MG/DL (ref 120–199)
CHOLEST/HDLC SERPL: 4.4 {RATIO} (ref 2–5)
CO2 SERPL-SCNC: 30 MMOL/L (ref 23–29)
CREAT SERPL-MCNC: 1.3 MG/DL (ref 0.5–1.4)
DIFFERENTIAL METHOD: ABNORMAL
EOSINOPHIL # BLD AUTO: 0.2 K/UL (ref 0–0.5)
EOSINOPHIL NFR BLD: 3.1 % (ref 0–8)
ERYTHROCYTE [DISTWIDTH] IN BLOOD BY AUTOMATED COUNT: 13.2 % (ref 11.5–14.5)
EST. GFR  (AFRICAN AMERICAN): 48 ML/MIN/1.73 M^2
EST. GFR  (NON AFRICAN AMERICAN): 42 ML/MIN/1.73 M^2
GLUCOSE SERPL-MCNC: 203 MG/DL (ref 70–110)
HCT VFR BLD AUTO: 39.5 % (ref 37–48.5)
HDLC SERPL-MCNC: 34 MG/DL (ref 40–75)
HDLC SERPL: 23 % (ref 20–50)
HGB BLD-MCNC: 13 G/DL (ref 12–16)
IMM GRANULOCYTES # BLD AUTO: 0.03 K/UL (ref 0–0.04)
LDLC SERPL CALC-MCNC: ABNORMAL MG/DL (ref 63–159)
LYMPHOCYTES # BLD AUTO: 1.8 K/UL (ref 1–4.8)
LYMPHOCYTES NFR BLD: 27.8 % (ref 18–48)
MCH RBC QN AUTO: 32.3 PG (ref 27–31)
MCHC RBC AUTO-ENTMCNC: 32.9 G/DL (ref 32–36)
MCV RBC AUTO: 98 FL (ref 82–98)
MONOCYTES # BLD AUTO: 0.4 K/UL (ref 0.3–1)
MONOCYTES NFR BLD: 6.4 % (ref 4–15)
NEUTROPHILS # BLD AUTO: 4.1 K/UL (ref 1.8–7.7)
NEUTROPHILS NFR BLD: 61.7 % (ref 38–73)
NONHDLC SERPL-MCNC: 114 MG/DL
NRBC BLD-RTO: 0 /100 WBC
PLATELET # BLD AUTO: 226 K/UL (ref 150–350)
PMV BLD AUTO: 10 FL (ref 9.2–12.9)
POTASSIUM SERPL-SCNC: 4.4 MMOL/L (ref 3.5–5.1)
PROT SERPL-MCNC: 7.4 G/DL (ref 6–8.4)
RBC # BLD AUTO: 4.02 M/UL (ref 4–5.4)
SODIUM SERPL-SCNC: 139 MMOL/L (ref 136–145)
T4 FREE SERPL-MCNC: 1 NG/DL (ref 0.71–1.51)
TRIGL SERPL-MCNC: 483 MG/DL (ref 30–150)
TSH SERPL DL<=0.005 MIU/L-ACNC: 2.94 UIU/ML (ref 0.4–4)
WBC # BLD AUTO: 6.55 K/UL (ref 3.9–12.7)

## 2019-12-02 PROCEDURE — 80053 COMPREHEN METABOLIC PANEL: CPT

## 2019-12-02 PROCEDURE — 84481 FREE ASSAY (FT-3): CPT

## 2019-12-02 PROCEDURE — 84443 ASSAY THYROID STIM HORMONE: CPT

## 2019-12-02 PROCEDURE — 36415 COLL VENOUS BLD VENIPUNCTURE: CPT

## 2019-12-02 PROCEDURE — 83036 HEMOGLOBIN GLYCOSYLATED A1C: CPT

## 2019-12-02 PROCEDURE — 84439 ASSAY OF FREE THYROXINE: CPT

## 2019-12-02 PROCEDURE — 85025 COMPLETE CBC W/AUTO DIFF WBC: CPT

## 2019-12-02 PROCEDURE — 80061 LIPID PANEL: CPT

## 2019-12-02 PROCEDURE — 82043 UR ALBUMIN QUANTITATIVE: CPT

## 2019-12-02 RX ORDER — POTASSIUM CHLORIDE 20 MEQ/1
TABLET, EXTENDED RELEASE ORAL
Qty: 180 TABLET | Refills: 3 | Status: SHIPPED | OUTPATIENT
Start: 2019-12-02 | End: 2021-03-03 | Stop reason: DRUGHIGH

## 2019-12-03 LAB
ALBUMIN/CREAT UR: 67.4 UG/MG (ref 0–30)
CREAT UR-MCNC: 95 MG/DL (ref 15–325)
ESTIMATED AVG GLUCOSE: 200 MG/DL (ref 68–131)
HBA1C MFR BLD HPLC: 8.6 % (ref 4–5.6)
MICROALBUMIN UR DL<=1MG/L-MCNC: 64 UG/ML

## 2019-12-04 LAB — T3FREE SERPL-MCNC: 3.2 PG/ML (ref 2.3–4.2)

## 2019-12-11 NOTE — PROGRESS NOTES
Past Medical History:   Diagnosis Date    Allergy     multiple antibiotic allergies    Anemia     Anxiety     Arthritis     Asthma     CHF (congestive heart failure)     NYHA class III     Chronic kidney disease     Colon polyp     benign    COPD (chronic obstructive pulmonary disease)     DLCO 37% , and mild pulmonary HTN    Depression     Diabetes mellitus     Diabetic retinopathy     Diastolic dysfunction     Diverticulosis     Former smoker     Fracture of lumbar spine     GERD (gastroesophageal reflux disease)     Hernia of unspecified site of abdominal cavity without mention of obstruction or gangrene     Hyperlipidemia     Hypertension     hypertensive renal    IBS (irritable bowel syndrome)     Mild nonproliferative diabetic retinopathy(362.04) 11/25/2013    Morbid obesity     Nuclear sclerosis 11/25/2013    Osteoporosis     Peripheral edema     Rash     Recurrent upper respiratory infection (URI)     S/P LASIK (laser assisted in situ keratomileusis)     Sleep apnea     sleep apnea uses bipap.    Thyroid disease     on synthroid    TIA (transient ischemic attack)     Urinary tract infection        Past Surgical History:   Procedure Laterality Date    ABDOMINAL SURGERY      ADENOIDECTOMY      COLONOSCOPY  03/05/2013    repeat in 5 years    COSMETIC SURGERY      CYSTOSCOPY      EYE SURGERY Right     mazzulla    GASTRECTOMY      gastric sleeve      gastric sleeve      HERNIA REPAIR      5 years old    HYSTERECTOMY      ovaries remain    REFRACTIVE SURGERY      mono va//ou//    RHINOPLASTY TIP      TONSILLECTOMY      TUBAL LIGATION      UPPER GASTROINTESTINAL ENDOSCOPY  03/05/2013    UPPER GASTROINTESTINAL ENDOSCOPY  08/24/2016    Dr. Veras, repeat in 8 weeks    UPPER GASTROINTESTINAL ENDOSCOPY  07/21/2016    Dr. Randall       Current Outpatient Prescriptions   Medication Sig    allopurinol (ZYLOPRIM) 100 MG tablet Take 2 tablets (200 mg total) by mouth  nightly.    atenolol (TENORMIN) 25 MG tablet Take 1 tablet (25 mg total) by mouth once daily.    calcitRIOL (ROCALTROL) 0.25 MCG Cap Take 1 capsule by mouth twice a week. Monday Friday    CALCIUM CITRATE/VITAMIN D3 (CALCIUM CITRATE + D ORAL) Take 400 mg by mouth 3 (three) times daily.    cetirizine (ZYRTEC) 10 MG tablet Take 1 tablet (10 mg total) by mouth once daily.    clotrimazole-betamethasone 1-0.05% (LOTRISONE) cream     cyanocobalamin, vitamin B-12, (VITAMIN B-12) 5,000 mcg Subl Place 5,000 mg under the tongue once a week.    DENOSUMAB (PROLIA SUBQ) Inject into the skin every 6 (six) months.     DIABETIC SUPPLIES,MISCELL ([employer] REMOTE CONTROL MISC) No Sig Provided    fluoxetine (PROZAC) 20 MG capsule Take 20 mg by mouth once daily.     fluticasone (FLONASE) 50 mcg/actuation nasal spray 2 sprays by Nasal route once daily.     fluticasone-vilanterol (BREO) 100-25 mcg/dose diskus inhaler Inhale 1 puff into the lungs once daily.    insulin aspart (NOVOLOG) 100 unit/mL injection Inject into the skin continuous. Pt has insulin pump    Lactobacillus rhamnosus GG (CULTURELLE) 10 billion cell capsule Take 1 capsule by mouth once daily.    levmefolate-B6 phos-methyl-B12 3-35-2 mg Tab Take 1 tablet by mouth 2 (two) times daily.    levothyroxine (SYNTHROID) 25 MCG tablet Take 1 tablet (25 mcg total) by mouth once daily.    lorazepam (ATIVAN) 0.5 MG tablet Take 1 tablet (0.5 mg total) by mouth every evening.    losartan (COZAAR) 25 MG tablet Take 1 tablet by mouth once daily.    melatonin 3 mg Tab Take 6 mg by mouth every evening.     metOLazone (ZAROXOLYN) 5 MG tablet Take 1 tablet (5 mg total) by mouth daily as needed (swelling).    metronidazole (METROGEL) 0.75 % gel Apply topically 2 (two) times daily.    montelukast (SINGULAIR) 10 mg tablet Take 10 mg by mouth once daily.     MULTIVIT-IRON-MIN-FOLIC ACID 3,500-18-0.4 UNIT-MG-MG ORAL CHEW Take by mouth 2 (two) times daily.    mupirocin  (BACTROBAN) 2 % ointment APPLY OINTMENT TOPICALLY TO AFFECTED AREA ON NOSE THREE TIMES DAILY AS DIRECTED    oxycodone (ROXICODONE) 15 MG Tab Take 15 mg by mouth every 8 (eight) hours as needed for Pain.     pantoprazole (PROTONIX) 40 MG tablet Take 1 tablet (40 mg total) by mouth once daily.    potassium chloride SA (K-DUR,KLOR-CON) 20 MEQ tablet Take 1 tablet (20 mEq total) by mouth 2 (two) times daily.    ranitidine (ZANTAC) 300 MG tablet TAKE ONE TABLET BY MOUTH IN THE EVENING    torsemide (DEMADEX) 20 MG Tab Take 2 tablets (40 mg total) by mouth once daily.    trazodone (DESYREL) 50 MG tablet Take 1 tablet (50 mg total) by mouth every evening.     No current facility-administered medications for this visit.        Review of patient's allergies indicates:   Allergen Reactions    Adhesive Other (See Comments)     Blisters    Cleocin [clindamycin hcl] Hives    Ceclor [cefaclor] Hives    Morphine Nausea And Vomiting    Advair diskus [fluticasone-salmeterol]      Dry mouth    Aleve [naproxen sodium]      Unable to take secondary to kidney function.     Levaquin [levofloxacin] Dermatitis    Macrodantin [nitrofurantoin macrocrystalline] Hives    Motrin [ibuprofen]      Unable to take secondary to kidney functions.    Sulfa (sulfonamide antibiotics)      Hold due to renal problems    Remeron [mirtazapine] Palpitations and Other (See Comments)     Jittery    Vancomycin analogues Rash     Feet broke out/inflammed blood vessels         Family History   Problem Relation Age of Onset    Heart disease Mother 59    Cancer Mother 59     throat    Allergies Mother     Diabetes Mother     Heart disease Father 70     flutter    Allergies Father     Diabetes Father     Cancer Sister 22     22 thyroid,49  breast    Allergies Sister     Breast cancer Sister     Diabetes Sister     Cancer Brother 62     lung    Diabetes Brother     Asthma Daughter     Diabetes Daughter     Depression Daughter      Asthma Grandchild     Eczema Grandchild     Eczema Grandchild     Breast cancer Maternal Grandmother     Ovarian cancer Cousin     Amblyopia Neg Hx     Blindness Neg Hx     Cataracts Neg Hx     Glaucoma Neg Hx     Hypertension Neg Hx     Macular degeneration Neg Hx     Retinal detachment Neg Hx     Strabismus Neg Hx     Stroke Neg Hx     Thyroid disease Neg Hx     Angioedema Neg Hx     Immunodeficiency Neg Hx     Allergic rhinitis Neg Hx     Atopy Neg Hx     Rhinitis Neg Hx     Urticaria Neg Hx     Colon cancer Neg Hx     Colon polyps Neg Hx     Crohn's disease Neg Hx     Ulcerative colitis Neg Hx     Celiac disease Neg Hx        Social History     Social History    Marital status:      Spouse name: N/A    Number of children: N/A    Years of education: N/A     Occupational History    Not on file.     Social History Main Topics    Smoking status: Former Smoker     Types: Cigarettes    Smokeless tobacco: Never Used      Comment: quit 30 years ago    Alcohol use No      Comment: rare    Drug use: No    Sexual activity: Yes     Birth control/ protection: Surgical     Other Topics Concern    Not on file     Social History Narrative       Chief Complaint:   Chief Complaint   Patient presents with    Knee Pain     bilateral knee synvsic 3/3       History: This is a 66-year-old female with chronic bilateral knee pain.  Left is greater than the right.  Patient has had injections previously.  Cortisone does not work very well.  Has had good success with a Synvisc series.  Synvisc one did not work well for her.  Rates her pain currently as a 7 out of 10.  Symptoms are worsening and moderate to severe.    Present: She got good results with the last Synvisc series.  Pain as a 5 out of 10.    Review of Systems:    Musculoskeletal:  See HPI          Physical Examination:    Vital Signs:    There were no vitals filed for this visit.    This a well-developed, well nourished patient in no  acute distress.  They are alert and oriented and cooperative to examination.  Pt. walks without an antalgic gait.      Examination of bilateral knees knee shows no rashes or erythema. There are no masses ecchymosis or effusion. Patient has full range of motion from 0-130°. Patient is nontender to palpation over lateral joint line and moderately tender to palpation over the medial joint line.  Knee is stable to varus and valgus stress. 5 out of 5 motor strength. Palpable distal pulses. Intact light touch sensation. Negative Patellofemoral crepitus        X-rays: 4 views of bilateral knees are  reviewed which show medial narrowing that is significantly worse on the left     Assessment:: Bilateral knee arthritis     Plan:   I injected her with Synvisc 3 of 3 today.  Follow-up in around 6 weeks.  She is going to [place] in mid April with her grandkids.   ADMIT

## 2020-01-02 DIAGNOSIS — M62.838 MUSCLE SPASM: ICD-10-CM

## 2020-01-02 DIAGNOSIS — R60.0 EDEMA OF EXTREMITIES: ICD-10-CM

## 2020-01-02 RX ORDER — TORSEMIDE 20 MG/1
TABLET ORAL
Qty: 60 TABLET | Refills: 2 | Status: SHIPPED | OUTPATIENT
Start: 2020-01-02 | End: 2020-02-10

## 2020-01-02 RX ORDER — CALCITRIOL 0.25 UG/1
CAPSULE ORAL
Qty: 8 CAPSULE | Refills: 4 | Status: SHIPPED | OUTPATIENT
Start: 2020-01-02 | End: 2020-01-06 | Stop reason: SDUPTHER

## 2020-01-02 NOTE — TELEPHONE ENCOUNTER
----- Message from Ari Galloway sent at 1/2/2020  2:39 PM CST -----  Type:  RX Refill Request    Who Called:  [facility]  Refill or New Rx:  Refill  RX Name and Strength:    LORazepam (ATIVAN) 0.5 MG tablet,  How is the patient currently taking it? (ex. 1XDay):  1XDay  Is this a 30 day or 90 day RX:  30  Preferred Pharmacy with phone number:    [facility]  [phone]  Fax [phone]    Local or Mail Order:  Local  Ordering Provider:  Alicia Paz Call Back Number:  [phone]  Additional Information:  Caller states that the patient would like [facility] added to her pharmacy list

## 2020-01-02 NOTE — TELEPHONE ENCOUNTER
Called the patient and she will no longer be using the [facility]'s pharmacy and is wanting to change her Ativan prescription to the [facility]. Called the patients [facility] pharmacy and spoke to Ava pharmacist and discontinued the patients Ativan Rx. Ava verbalized understanding. Last OV 8-26-19. Last refill 11-27-19.

## 2020-01-03 DIAGNOSIS — K58.0 IRRITABLE BOWEL SYNDROME WITH DIARRHEA: ICD-10-CM

## 2020-01-03 RX ORDER — DIPHENOXYLATE HYDROCHLORIDE AND ATROPINE SULFATE 2.5; .025 MG/1; MG/1
TABLET ORAL
Qty: 60 TABLET | Refills: 1 | Status: CANCELLED | OUTPATIENT
Start: 2020-01-03

## 2020-01-06 DIAGNOSIS — I50.32 CHF (CONGESTIVE HEART FAILURE), NYHA CLASS III, CHRONIC, DIASTOLIC: ICD-10-CM

## 2020-01-06 DIAGNOSIS — I51.89 DIASTOLIC DYSFUNCTION: ICD-10-CM

## 2020-01-06 DIAGNOSIS — J44.1 CHRONIC OBSTRUCTIVE PULMONARY DISEASE WITH ACUTE EXACERBATION: ICD-10-CM

## 2020-01-06 DIAGNOSIS — M62.838 MUSCLE SPASM: ICD-10-CM

## 2020-01-06 RX ORDER — ALLOPURINOL 100 MG/1
200 TABLET ORAL NIGHTLY
Qty: 180 TABLET | Refills: 3 | Status: SHIPPED | OUTPATIENT
Start: 2020-01-06 | End: 2020-01-08 | Stop reason: SDUPTHER

## 2020-01-06 RX ORDER — FLUTICASONE PROPIONATE 50 MCG
1 SPRAY, SUSPENSION (ML) NASAL DAILY
Qty: 1 BOTTLE | Refills: 4 | Status: SHIPPED | OUTPATIENT
Start: 2020-01-06 | End: 2020-01-08 | Stop reason: SDUPTHER

## 2020-01-06 RX ORDER — MONTELUKAST SODIUM 10 MG/1
10 TABLET ORAL NIGHTLY
Qty: 30 TABLET | Refills: 8 | Status: SHIPPED | OUTPATIENT
Start: 2020-01-06 | End: 2020-01-08 | Stop reason: SDUPTHER

## 2020-01-06 RX ORDER — LORAZEPAM 0.5 MG/1
0.5 TABLET ORAL NIGHTLY PRN
Qty: 30 TABLET | Refills: 1 | Status: SHIPPED | OUTPATIENT
Start: 2020-01-06 | End: 2020-02-05

## 2020-01-06 RX ORDER — LOSARTAN POTASSIUM 50 MG/1
50 TABLET ORAL DAILY
Qty: 90 TABLET | Refills: 3 | Status: SHIPPED | OUTPATIENT
Start: 2020-01-06 | End: 2020-01-08 | Stop reason: SDUPTHER

## 2020-01-06 RX ORDER — CALCITRIOL 0.25 UG/1
CAPSULE ORAL
Qty: 8 CAPSULE | Refills: 3 | Status: SHIPPED | OUTPATIENT
Start: 2020-01-06 | End: 2020-01-08 | Stop reason: SDUPTHER

## 2020-01-06 RX ORDER — FLUOXETINE HYDROCHLORIDE 20 MG/1
40 CAPSULE ORAL DAILY
Qty: 180 CAPSULE | Refills: 3 | Status: SHIPPED | OUTPATIENT
Start: 2020-01-06 | End: 2020-01-08 | Stop reason: SDUPTHER

## 2020-01-06 RX ORDER — LEVOTHYROXINE SODIUM 25 UG/1
25 TABLET ORAL DAILY
Qty: 90 TABLET | Refills: 3 | Status: SHIPPED | OUTPATIENT
Start: 2020-01-06 | End: 2020-01-08 | Stop reason: SDUPTHER

## 2020-01-06 RX ORDER — FLUTICASONE FUROATE AND VILANTEROL 100; 25 UG/1; UG/1
1 POWDER RESPIRATORY (INHALATION) DAILY
Qty: 60 EACH | Refills: 3 | Status: SHIPPED | OUTPATIENT
Start: 2020-01-06 | End: 2020-01-08 | Stop reason: SDUPTHER

## 2020-01-06 RX ORDER — LORAZEPAM 0.5 MG/1
0.5 TABLET ORAL NIGHTLY
Qty: 30 TABLET | Refills: 3 | OUTPATIENT
Start: 2020-01-06

## 2020-01-06 RX ORDER — ATENOLOL 25 MG/1
25 TABLET ORAL 2 TIMES DAILY
Qty: 180 TABLET | Refills: 3 | Status: SHIPPED | OUTPATIENT
Start: 2020-01-06 | End: 2020-01-08 | Stop reason: SDUPTHER

## 2020-01-06 NOTE — TELEPHONE ENCOUNTER
Preferred pharmacy changed to [facility]. New controlled substance agreement signed to reflect change in pharmacy. Please e-scribe new prescription to new pharmacy.

## 2020-01-06 NOTE — TELEPHONE ENCOUNTER
Patient changed pharmacy preferences. New preferred pharmacy is [facility]. Please e-scribe attached prescriptions.  LOV--11-  FOV--2-

## 2020-01-07 ENCOUNTER — TELEPHONE (OUTPATIENT)
Dept: FAMILY MEDICINE | Facility: CLINIC | Age: 70
End: 2020-01-07

## 2020-01-07 NOTE — TELEPHONE ENCOUNTER
Received Physician order for CPAP supplies from [facility] per fax. Form placed on Dr. Vargas's desk for review/completion. Please advise.

## 2020-01-09 RX ORDER — CALCITRIOL 0.25 UG/1
CAPSULE ORAL
Qty: 8 CAPSULE | Refills: 3 | Status: SHIPPED | OUTPATIENT
Start: 2020-01-09 | End: 2021-01-20

## 2020-01-09 RX ORDER — LOSARTAN POTASSIUM 50 MG/1
50 TABLET ORAL DAILY
Qty: 90 TABLET | Refills: 3 | Status: SHIPPED | OUTPATIENT
Start: 2020-01-09 | End: 2020-09-02 | Stop reason: CLARIF

## 2020-01-09 RX ORDER — LEVOTHYROXINE SODIUM 25 UG/1
25 TABLET ORAL DAILY
Qty: 90 TABLET | Refills: 3 | Status: SHIPPED | OUTPATIENT
Start: 2020-01-09 | End: 2021-01-22

## 2020-01-09 RX ORDER — FLUOXETINE HYDROCHLORIDE 20 MG/1
40 CAPSULE ORAL DAILY
Qty: 180 CAPSULE | Refills: 3 | Status: SHIPPED | OUTPATIENT
Start: 2020-01-09 | End: 2021-01-22

## 2020-01-09 RX ORDER — FLUTICASONE FUROATE AND VILANTEROL 100; 25 UG/1; UG/1
1 POWDER RESPIRATORY (INHALATION) DAILY
Qty: 60 EACH | Refills: 3 | Status: ON HOLD | OUTPATIENT
Start: 2020-01-09 | End: 2021-02-09 | Stop reason: SDUPTHER

## 2020-01-09 RX ORDER — ATENOLOL 25 MG/1
25 TABLET ORAL 2 TIMES DAILY
Qty: 180 TABLET | Refills: 3 | Status: ON HOLD | OUTPATIENT
Start: 2020-01-09 | End: 2020-09-21 | Stop reason: SDUPTHER

## 2020-01-09 RX ORDER — FLUTICASONE PROPIONATE 50 MCG
1 SPRAY, SUSPENSION (ML) NASAL DAILY
Qty: 1 BOTTLE | Refills: 4 | Status: SHIPPED | OUTPATIENT
Start: 2020-01-09 | End: 2021-02-22

## 2020-01-09 RX ORDER — MONTELUKAST SODIUM 10 MG/1
10 TABLET ORAL NIGHTLY
Qty: 30 TABLET | Refills: 8 | Status: SHIPPED | OUTPATIENT
Start: 2020-01-09 | End: 2021-01-22

## 2020-01-09 RX ORDER — IPRATROPIUM BROMIDE AND ALBUTEROL SULFATE 2.5; .5 MG/3ML; MG/3ML
3 SOLUTION RESPIRATORY (INHALATION) EVERY 6 HOURS PRN
Qty: 1 BOX | Refills: 3 | Status: SHIPPED | OUTPATIENT
Start: 2020-01-09 | End: 2020-02-10 | Stop reason: ALTCHOICE

## 2020-01-09 RX ORDER — ALLOPURINOL 100 MG/1
200 TABLET ORAL NIGHTLY
Qty: 180 TABLET | Refills: 3 | Status: SHIPPED | OUTPATIENT
Start: 2020-01-09 | End: 2020-11-25 | Stop reason: SDUPTHER

## 2020-01-16 ENCOUNTER — LAB VISIT (OUTPATIENT)
Dept: LAB | Facility: HOSPITAL | Age: 70
End: 2020-01-16
Attending: INTERNAL MEDICINE
Payer: MEDICARE

## 2020-01-16 DIAGNOSIS — D47.2 MGUS (MONOCLONAL GAMMOPATHY OF UNKNOWN SIGNIFICANCE): ICD-10-CM

## 2020-01-16 DIAGNOSIS — R79.9 ABNORMAL FINDING OF BLOOD CHEMISTRY, UNSPECIFIED: ICD-10-CM

## 2020-01-16 DIAGNOSIS — D47.2 MONOCLONAL GAMMOPATHY ASSOCIATED WITH LYMPHOPLASMACYTIC DYSCRASIAS: Chronic | ICD-10-CM

## 2020-01-16 LAB
BASOPHILS # BLD AUTO: 0.04 K/UL (ref 0–0.2)
BASOPHILS NFR BLD: 0.5 % (ref 0–1.9)
DIFFERENTIAL METHOD: ABNORMAL
EOSINOPHIL # BLD AUTO: 0.3 K/UL (ref 0–0.5)
EOSINOPHIL NFR BLD: 3.5 % (ref 0–8)
ERYTHROCYTE [DISTWIDTH] IN BLOOD BY AUTOMATED COUNT: 13.4 % (ref 11.5–14.5)
HCT VFR BLD AUTO: 38.1 % (ref 37–48.5)
HGB BLD-MCNC: 12.5 G/DL (ref 12–16)
IMM GRANULOCYTES # BLD AUTO: 0.04 K/UL (ref 0–0.04)
LYMPHOCYTES # BLD AUTO: 1.8 K/UL (ref 1–4.8)
LYMPHOCYTES NFR BLD: 23.3 % (ref 18–48)
MCH RBC QN AUTO: 32.1 PG (ref 27–31)
MCHC RBC AUTO-ENTMCNC: 32.8 G/DL (ref 32–36)
MCV RBC AUTO: 98 FL (ref 82–98)
MONOCYTES # BLD AUTO: 0.4 K/UL (ref 0.3–1)
MONOCYTES NFR BLD: 5.6 % (ref 4–15)
NEUTROPHILS # BLD AUTO: 5.1 K/UL (ref 1.8–7.7)
NEUTROPHILS NFR BLD: 66.6 % (ref 38–73)
NRBC BLD-RTO: 0 /100 WBC
PLATELET # BLD AUTO: 213 K/UL (ref 150–350)
PMV BLD AUTO: 9.8 FL (ref 9.2–12.9)
RBC # BLD AUTO: 3.89 M/UL (ref 4–5.4)
WBC # BLD AUTO: 7.63 K/UL (ref 3.9–12.7)

## 2020-01-16 PROCEDURE — 84207 ASSAY OF VITAMIN B-6: CPT

## 2020-01-16 PROCEDURE — 84425 ASSAY OF VITAMIN B-1: CPT

## 2020-01-16 PROCEDURE — 83520 IMMUNOASSAY QUANT NOS NONAB: CPT

## 2020-01-16 PROCEDURE — 36415 COLL VENOUS BLD VENIPUNCTURE: CPT

## 2020-01-16 PROCEDURE — 82784 ASSAY IGA/IGD/IGG/IGM EACH: CPT

## 2020-01-16 PROCEDURE — 83540 ASSAY OF IRON: CPT

## 2020-01-16 PROCEDURE — 85025 COMPLETE CBC W/AUTO DIFF WBC: CPT

## 2020-01-17 LAB
IGA SERPL-MCNC: 487 MG/DL (ref 40–350)
IGA SERPL-MCNC: 487 MG/DL (ref 40–350)
IRON SERPL-MCNC: 78 UG/DL (ref 30–160)
KAPPA LC SER QL IA: 4.31 MG/DL (ref 0.33–1.94)
KAPPA LC/LAMBDA SER IA: 1.36 (ref 0.26–1.65)
LAMBDA LC SER QL IA: 3.18 MG/DL (ref 0.57–2.63)
SATURATED IRON: 31 % (ref 20–50)
TOTAL IRON BINDING CAPACITY: 253 UG/DL (ref 250–450)
TRANSFERRIN SERPL-MCNC: 171 MG/DL (ref 200–375)

## 2020-01-21 ENCOUNTER — OFFICE VISIT (OUTPATIENT)
Dept: HEMATOLOGY/ONCOLOGY | Facility: CLINIC | Age: 70
End: 2020-01-21
Payer: MEDICARE

## 2020-01-21 VITALS
HEART RATE: 86 BPM | DIASTOLIC BLOOD PRESSURE: 60 MMHG | SYSTOLIC BLOOD PRESSURE: 131 MMHG | OXYGEN SATURATION: 97 % | TEMPERATURE: 98 F | RESPIRATION RATE: 20 BRPM | BODY MASS INDEX: 48.32 KG/M2 | WEIGHT: 262.56 LBS | HEIGHT: 62 IN

## 2020-01-21 DIAGNOSIS — D47.2 MGUS (MONOCLONAL GAMMOPATHY OF UNKNOWN SIGNIFICANCE): ICD-10-CM

## 2020-01-21 DIAGNOSIS — M85.80 OSTEOPENIA WITH HIGH RISK OF FRACTURE: Primary | ICD-10-CM

## 2020-01-21 DIAGNOSIS — M81.6 LOCALIZED OSTEOPOROSIS (LEQUESNE): ICD-10-CM

## 2020-01-21 DIAGNOSIS — Z86.2 HISTORY OF ANEMIA: ICD-10-CM

## 2020-01-21 DIAGNOSIS — N18.9 CHRONIC KIDNEY DISEASE, UNSPECIFIED CKD STAGE: ICD-10-CM

## 2020-01-21 DIAGNOSIS — R76.8 ELEVATED SERUM IMMUNOGLOBULIN FREE LIGHT CHAIN LEVEL: ICD-10-CM

## 2020-01-21 LAB — PYRIDOXAL SERPL-MCNC: 39 UG/L (ref 5–50)

## 2020-01-21 PROCEDURE — 99999 PR PBB SHADOW E&M-EST. PATIENT-LVL III: CPT | Mod: PBBFAC,,, | Performed by: INTERNAL MEDICINE

## 2020-01-21 PROCEDURE — 99499 UNLISTED E&M SERVICE: CPT | Mod: S$GLB,,, | Performed by: INTERNAL MEDICINE

## 2020-01-21 PROCEDURE — 99499 RISK ADDL DX/OHS AUDIT: ICD-10-PCS | Mod: S$GLB,,, | Performed by: INTERNAL MEDICINE

## 2020-01-21 PROCEDURE — 99214 OFFICE O/P EST MOD 30 MIN: CPT | Mod: S$GLB,,, | Performed by: INTERNAL MEDICINE

## 2020-01-21 PROCEDURE — 99999 PR PBB SHADOW E&M-EST. PATIENT-LVL III: ICD-10-PCS | Mod: PBBFAC,,, | Performed by: INTERNAL MEDICINE

## 2020-01-21 PROCEDURE — 1159F PR MEDICATION LIST DOCUMENTED IN MEDICAL RECORD: ICD-10-PCS | Mod: S$GLB,,, | Performed by: INTERNAL MEDICINE

## 2020-01-21 PROCEDURE — 3078F PR MOST RECENT DIASTOLIC BLOOD PRESSURE < 80 MM HG: ICD-10-PCS | Mod: CPTII,S$GLB,, | Performed by: INTERNAL MEDICINE

## 2020-01-21 PROCEDURE — 1125F PR PAIN SEVERITY QUANTIFIED, PAIN PRESENT: ICD-10-PCS | Mod: S$GLB,,, | Performed by: INTERNAL MEDICINE

## 2020-01-21 PROCEDURE — 1159F MED LIST DOCD IN RCRD: CPT | Mod: S$GLB,,, | Performed by: INTERNAL MEDICINE

## 2020-01-21 PROCEDURE — 1101F PT FALLS ASSESS-DOCD LE1/YR: CPT | Mod: CPTII,S$GLB,, | Performed by: INTERNAL MEDICINE

## 2020-01-21 PROCEDURE — 1125F AMNT PAIN NOTED PAIN PRSNT: CPT | Mod: S$GLB,,, | Performed by: INTERNAL MEDICINE

## 2020-01-21 PROCEDURE — 3078F DIAST BP <80 MM HG: CPT | Mod: CPTII,S$GLB,, | Performed by: INTERNAL MEDICINE

## 2020-01-21 PROCEDURE — 1101F PR PT FALLS ASSESS DOC 0-1 FALLS W/OUT INJ PAST YR: ICD-10-PCS | Mod: CPTII,S$GLB,, | Performed by: INTERNAL MEDICINE

## 2020-01-21 PROCEDURE — 99214 PR OFFICE/OUTPT VISIT, EST, LEVL IV, 30-39 MIN: ICD-10-PCS | Mod: S$GLB,,, | Performed by: INTERNAL MEDICINE

## 2020-01-21 PROCEDURE — 3075F SYST BP GE 130 - 139MM HG: CPT | Mod: CPTII,S$GLB,, | Performed by: INTERNAL MEDICINE

## 2020-01-21 PROCEDURE — 3075F PR MOST RECENT SYSTOLIC BLOOD PRESS GE 130-139MM HG: ICD-10-PCS | Mod: CPTII,S$GLB,, | Performed by: INTERNAL MEDICINE

## 2020-01-21 RX ORDER — INSULIN LISPRO 100 [IU]/ML
100 INJECTION, SOLUTION INTRAVENOUS; SUBCUTANEOUS DAILY
Status: ON HOLD | COMMUNITY
Start: 2020-01-04 | End: 2021-02-09 | Stop reason: ALTCHOICE

## 2020-01-21 NOTE — PROGRESS NOTES
Subjective:       Patient ID: Fifi Mcnair is a 69 y.o. female.    Chief Complaint: I AM OK    Follow-up   Associated symptoms include fatigue. Pertinent negatives include no abdominal pain, arthralgias, chest pain, coughing, fever, headaches, nausea, neck pain, numbness, rash, sore throat, vomiting or weakness.       This is a 69  yr old female tolerating prolia for osteoporosis.  She started prolia for osteoporosis and now she has osteopenia. Pt has had a gastric sleeve in the past. She remains on B12 and iron supplement  She has not needed IV iron    Not on prolia started K 2 She is tolerating Synthroid for thyroid disorder and neurontin for neuropathy.  Past Labs revealed an elevated kappa light chain with No further elevation of the ratio, IGA  has been stable   Bone marrow showed no evidence of a plasma cell dyscrasia in 2016   Pt with history of sleeve gastrectomy contributing to malabsorption of certain vitamins  IgA remains elevated  as with the elevated kappa levels    She is tolerating allopurinol for gout prevention  She is tolerating Prozac for depression    January 20 2020 I saw my IGA     Current Outpatient Medications:     albuterol-ipratropium (DUO-NEB) 2.5 mg-0.5 mg/3 mL nebulizer solution, Take 3 mLs by nebulization every 6 (six) hours as needed for Wheezing. Rescue, Disp: 1 Box, Rfl: 3    allopurinol (ZYLOPRIM) 100 MG tablet, Take 2 tablets (200 mg total) by mouth nightly., Disp: 180 tablet, Rfl: 3    amitriptyline (ELAVIL) 50 MG tablet, One po BID, Disp: 60 tablet, Rfl: 4    ascorbic acid, vitamin C, (VITAMIN C) 500 MG tablet, Take 500 mg by mouth once daily., Disp: , Rfl:     atenolol (TENORMIN) 25 MG tablet, Take 1 tablet (25 mg total) by mouth 2 (two) times daily., Disp: 180 tablet, Rfl: 3    biotin 1 mg Cap, Take by mouth., Disp: , Rfl:     calcitRIOL (ROCALTROL) 0.25 MCG Cap, TAKE ONE TABLET BY MOUTH TWICE A WEEK ON monday AND friday, Disp: 8 capsule, Rfl: 3    cetirizine  (ZYRTEC) 10 MG tablet, Take 1 tablet (10 mg total) by mouth once daily., Disp: 1 Bottle, Rfl: 5    CHERATUSSIN AC  mg/5 mL syrup, TAKE 5 ML (CC) BY MOUTH 4 TIMES DAILY AS NEEDED FOR COUGH FOR 5 DAYS, Disp: , Rfl: 0    cinnamon bark (CINNAMON ORAL), Take by mouth once daily., Disp: , Rfl:     clotrimazole-betamethasone 1-0.05% (LOTRISONE) cream, , Disp: , Rfl:     cyanocobalamin, vitamin B-12, (VITAMIN B-12) 5,000 mcg Subl, Place 5,000 mg under the tongue once a week., Disp: , Rfl:     diclofenac sodium (VOLTAREN) 1 % Gel,  APPLY 2 GRAMS TOPICALLY ONCE DAILY, Disp: 1 Tube, Rfl: 0    diphenoxylate-atropine 2.5-0.025 mg (LOMOTIL) 2.5-0.025 mg per tablet, TAKE ONE TABLET BY MOUTH 4 TIMES DAILY AS NEEDED FOR DIARRHEA, Disp: 60 tablet, Rfl: 1    DYMISTA 137-50 mcg/spray Spry nassal spray, as needed. , Disp: , Rfl:     ferrous sulfate (IRON) 325 mg (65 mg iron) Tab tablet, Take 325 mg by mouth daily with breakfast., Disp: , Rfl:     fish oil-omega-3 fatty acids 300-1,000 mg capsule, Take 1 capsule by mouth once daily., Disp: , Rfl:     FLUoxetine 20 MG capsule, Take 2 capsules (40 mg total) by mouth once daily., Disp: 180 capsule, Rfl: 3    fluticasone furoate-vilanterol (BREO) 100-25 mcg/dose diskus inhaler, Inhale 1 puff into the lungs once daily., Disp: 60 each, Rfl: 3    fluticasone propionate (FLONASE) 50 mcg/actuation nasal spray, 1 spray (50 mcg total) by Each Nostril route once daily., Disp: 1 Bottle, Rfl: 4    gabapentin (NEURONTIN) 100 MG capsule, 100 mg 2 (two) times daily. , Disp: , Rfl:     HUMALOG U-100 INSULIN 100 unit/mL injection, inject 100 UNITS DAILY via insulin pump, Disp: , Rfl:     insulin (BASAGLAR KWIKPEN U-100 INSULIN) glargine 100 units/mL (3mL) SubQ pen, Inject 20 Units into the skin every evening. Use 10 units if BS over 210.Night before surgery. (Patient taking differently: Inject 20 Units into the skin once daily. Use 10 units if BS over 210.Night before surgery.), Disp:  , Rfl: 0    insulin aspart U-100 (NOVOLOG) 100 unit/mL injection, Use per insulin pump, 35-40 units daily., Disp: 40 mL, Rfl: 0    L-LYSINE ORAL, Take by mouth., Disp: , Rfl:     l-methylfolate-b2-b6-b12 (CEREFOLIN) 6-5-50-1 mg Tab, Take 1 tablet by mouth once daily., Disp: , Rfl:     Lacto.acidophilus-Bif.animalis (PROBIOTIC) 5 billion cell CpSP, Take 1 capsule by mouth once daily., Disp: 30 capsule, Rfl: 3    levothyroxine (SYNTHROID) 25 MCG tablet, Take 1 tablet (25 mcg total) by mouth once daily., Disp: 90 tablet, Rfl: 3    LORazepam (ATIVAN) 0.5 MG tablet, Take 1 tablet (0.5 mg total) by mouth nightly as needed for Anxiety (Ativan should be taken 4 hrs apart from any narcotics.)., Disp: 30 tablet, Rfl: 1    losartan (COZAAR) 50 MG tablet, Take 1 tablet (50 mg total) by mouth once daily., Disp: 90 tablet, Rfl: 3    magnesium oxide (MAG-OX) 400 mg (241.3 mg magnesium) tablet, Take 800 mg by mouth every evening., Disp: , Rfl:     montelukast (SINGULAIR) 10 mg tablet, Take 1 tablet (10 mg total) by mouth every evening., Disp: 30 tablet, Rfl: 8    MULTIVIT-IRON-MIN-FOLIC ACID 3,500-18-0.4 UNIT-MG-MG ORAL CHEW, Take by mouth 2 (two) times daily., Disp: , Rfl:     mupirocin (BACTROBAN) 2 % ointment, APPLY OINTMENT TOPICALLY TO AFFECTED AREA ON NOSE THREE TIMES DAILY AS DIRECTED, Disp: 15 g, Rfl: 11    oxyCODONE (ROXICODONE) 15 MG Tab, Take 1 tablet (15 mg total) by mouth every 6 (six) hours as needed for Pain., Disp: 30 tablet, Rfl: 0    pantoprazole (PROTONIX) 40 MG tablet, TAKE 1 TABLET BY MOUTH ONCE DAILY, Disp: 90 tablet, Rfl: 3    potassium chloride SA (K-DUR,KLOR-CON) 20 MEQ tablet, TAKE 1 TABLET BY MOUTH TWICE DAILY, Disp: 180 tablet, Rfl: 3    ranitidine (ZANTAC) 300 MG tablet, TAKE ONE TABLET BY MOUTH IN THE EVENING, Disp: 30 tablet, Rfl: 1    SSD 1 % cream, , Disp: , Rfl:     torsemide (DEMADEX) 20 MG Tab, TAKE ONE TABLET BY MOUTH TWICE DAILY, Disp: 60 tablet, Rfl: 2    TURMERIC ORAL, Take  "by mouth once daily., Disp: , Rfl:     Current Facility-Administered Medications:     gentamicin injection 80 mg, 80 mg, Intramuscular, 1 time in Clinic/HOD, Angy Campos MD    Facility-Administered Medications Ordered in Other Visits:     lactated ringers infusion, , Intravenous, Continuous, Shaheen Hanson MD, Last Rate: 10 mL/hr at 08/31/18 0739    No prolia at this time   All medications and past history have been reviewed.    Review of Systems   Constitutional: Positive for fatigue. Negative for fever and unexpected weight change.   HENT: Negative.  Negative for ear discharge, mouth sores, rhinorrhea and sore throat.    Eyes: Negative.  Negative for photophobia and itching.   Respiratory: Negative for cough and shortness of breath.    Cardiovascular: Negative.  Negative for chest pain and leg swelling.   Gastrointestinal: Negative.  Negative for abdominal pain, constipation, diarrhea, nausea and vomiting.   Endocrine: Negative.  Negative for cold intolerance and heat intolerance.   Genitourinary: Negative for difficulty urinating, dysuria, frequency, hematuria, pelvic pain and urgency.   Musculoskeletal: Negative for arthralgias, back pain and neck pain.   Skin: Negative for pallor and rash.   Allergic/Immunologic: Negative.    Neurological: Negative for weakness, numbness and headaches.   Hematological: Negative for adenopathy. Does not bruise/bleed easily.   Psychiatric/Behavioral: Negative for agitation and confusion.   All other systems reviewed and are negative.       Pertinent positives are intermittent knee pain and fatigue  Depression screening negative on medication  Positive intermittent dysuria pelvic bloating and pain  Objective:        /60   Pulse 86   Temp 97.7 °F (36.5 °C) (Oral)   Resp 20   Ht 5' 2" (1.575 m)   Wt 119.1 kg (262 lb 9.1 oz)   LMP  (LMP Unknown)   SpO2 97%   BMI 48.02 kg/m²     Physical Exam   Constitutional: She is oriented to person, place, and time. " She appears well-developed and well-nourished.   HENT:   Head: Normocephalic.   Mouth/Throat: Oropharynx is clear and moist. No oropharyngeal exudate.   Eyes: Conjunctivae are normal. No scleral icterus.   Neck: Normal range of motion. No JVD present. No tracheal deviation present.   Cardiovascular: Normal rate and regular rhythm.   No murmur (2= capillary refill) heard.  Pulmonary/Chest: Effort normal. No respiratory distress. She has no wheezes.   Abdominal: Soft. She exhibits no distension. Tenderness: midepigastric.   Musculoskeletal: Normal range of motion. She exhibits no edema.   Neurological: She is alert and oriented to person, place, and time. No cranial nerve deficit.   Steady gait   Skin: Skin is dry. No rash noted. No erythema.   Psychiatric: She has a normal mood and affect.   Nursing note and vitals reviewed.     Bone marrow with no evidence of plasma cell dyscrasia, no evidence of myelodysplasia.    I reviewed the percentage of cells noted in the marrow along with flow cytometry and cytogenetics  Skeletal survey negative for any lytic or ostial sclerotic disease  All labs and imaging have been reviewed.   Lab Results   Component Value Date    WBC 7.63 01/16/2020    HGB 12.5 01/16/2020    HCT 38.1 01/16/2020    MCV 98 01/16/2020     01/16/2020     IGA elevated     CMP  Sodium   Date Value Ref Range Status   12/02/2019 139 136 - 145 mmol/L Final     Potassium   Date Value Ref Range Status   12/02/2019 4.4 3.5 - 5.1 mmol/L Final     Chloride   Date Value Ref Range Status   12/02/2019 103 95 - 110 mmol/L Final     CO2   Date Value Ref Range Status   12/02/2019 30 (H) 23 - 29 mmol/L Final     Glucose   Date Value Ref Range Status   12/02/2019 203 (H) 70 - 110 mg/dL Final     BUN, Bld   Date Value Ref Range Status   12/02/2019 17 8 - 23 mg/dL Final     Creatinine   Date Value Ref Range Status   12/02/2019 1.3 0.5 - 1.4 mg/dL Final   08/31/2013 1.1 0.5 - 1.4 mg/dL Final     Calcium   Date Value Ref  Range Status   12/02/2019 9.6 8.7 - 10.5 mg/dL Final   08/31/2013 9.5 8.7 - 10.5 mg/dL Final     Total Protein   Date Value Ref Range Status   12/02/2019 7.4 6.0 - 8.4 g/dL Final     Albumin   Date Value Ref Range Status   12/02/2019 3.5 3.5 - 5.2 g/dL Final     Total Bilirubin   Date Value Ref Range Status   12/02/2019 0.4 0.1 - 1.0 mg/dL Final     Comment:     For infants and newborns, interpretation of results should be based  on gestational age, weight and in agreement with clinical  observations.  Premature Infant recommended reference ranges:  Up to 24 hours.............<8.0 mg/dL  Up to 48 hours............<12.0 mg/dL  3-5 days..................<15.0 mg/dL  6-29 days.................<15.0 mg/dL       Alkaline Phosphatase   Date Value Ref Range Status   12/02/2019 134 55 - 135 U/L Final   08/30/2013 93 55 - 135 U/L Final     AST   Date Value Ref Range Status   12/02/2019 25 10 - 40 U/L Final   08/30/2013 21 10 - 40 U/L Final     ALT   Date Value Ref Range Status   12/02/2019 25 10 - 44 U/L Final     Anion Gap   Date Value Ref Range Status   12/02/2019 6 (L) 8 - 16 mmol/L Final   08/31/2013 12 5 - 15 meq/L Final     eGFR if    Date Value Ref Range Status   12/02/2019 48 (A) >60 mL/min/1.73 m^2 Final     eGFR if non    Date Value Ref Range Status   12/02/2019 42 (A) >60 mL/min/1.73 m^2 Final     Comment:     Calculation used to obtain the estimated glomerular filtration  rate (eGFR) is the CKD-EPI equation.        Leukemia/Lymphoma Screen - Bone Marrow Right Posterior Iliac Crest   Order: [id number]     Status:  Final result   Visible to patient:  Yes (Patient Portal) Next appt:  02/11/2019 at 02:45 PM in Orthopedics (Larry Molina MD) Dx:  Anemia, unspecified type; Elevated se...   Component 2yr ago   Clinical Diagnosis - Bone Marrow ANM    Body Site - Bone Marrow RPI    Bone Marrow Interpretation No abnormal hematopoietic population is detected in this sample. Correlate  with marrow morphology and other ancillary studies.      Comment: Interpreted by: Katarina Hicks MD, Signed on 10/10/2016 at 12:50   Bone Marrow Antibodies Analyzed All analyzed: CD2, CD3, CD4, CD5, CD7, CD8, CD10, CD13, CD19, CD20, CD34, , KAPPA, LAMBDA,CD45 and 7AAD.      Bone Marrow Comment Flow cytometric analysis of  bone marrow shows populations of polyclonal B lymphocytes with a subset of hematogones detected, and T lymphocytes that are immunophenotypically unremarkable.  The blast gate is not increased. Flow differential:  Lymphocytes   7.5%, Monocytes 2.9%, Granulocytes  74.9%, Blast  2.4%, Debris/nRBC 12.2%,  Viability 93.2%.                 Assessment:       Osteopenia with high risk of fracture  -     DXA Bone Density Spine And Hip; Future; Expected date: 01/21/2020  -     CBC auto differential; Future; Expected date: 01/21/2020  -     CMP; Future; Expected date: 01/21/2020  -     IgA; Future; Expected date: 01/21/2020  -     Beta 2 Microglobulin, Serum; Future; Expected date: 01/21/2020    Localized osteoporosis ([place])   -     DXA Bone Density Spine And Hip; Future; Expected date: 01/21/2020    History of anemia    MGUS (monoclonal gammopathy of unknown significance)  -     CBC auto differential; Future; Expected date: 01/21/2020  -     CMP; Future; Expected date: 01/21/2020  -     IgA; Future; Expected date: 01/21/2020  -     Beta 2 Microglobulin, Serum; Future; Expected date: 01/21/2020    Elevated serum immunoglobulin free light chain level  -     CBC auto differential; Future; Expected date: 01/21/2020  -     CMP; Future; Expected date: 01/21/2020  -     IgA; Future; Expected date: 01/21/2020  -     Beta 2 Microglobulin, Serum; Future; Expected date: 01/21/2020    Chronic kidney disease, unspecified CKD stage  -     CBC auto differential; Future; Expected date: 01/21/2020  -     CMP; Future; Expected date: 01/21/2020  -     IgA; Future; Expected date: 01/21/2020  -     Beta 2  Microglobulin, Serum; Future; Expected date: 01/21/2020          Plan:     Anemia normalized   CKD Renal function improving   elev IGA  Yet stable   Rising kappa but stable   Pt no longer meets criteria for prolia due to a change in her density from osteoporosis to osteopenia  She believes she is in need of a knee replacement : cont calcium and vitamin D3  Next dexa in Dec of 2019 NO longer on prolia since she has osteopenia   Cont K 2 for bone health   History of broken bones   Cont calcium and vitamin D  Cont prozac for depression  Explained MGUS and the transition to myeloma is rare and less than 2% I also explained this could be a component of her low GFR  This is NOT myeloma   She cannot take oral bisphosphonate because of GI issues  No need for procrit despite anemia due to renal disease    RTC 2 months for evaluation    The plan was discussed with the patient and all questions/concerns have been answered to the patient's satisfaction.

## 2020-01-23 LAB — VIT B1 BLD-MCNC: 96 UG/L (ref 38–122)

## 2020-01-27 ENCOUNTER — PATIENT OUTREACH (OUTPATIENT)
Dept: ADMINISTRATIVE | Facility: HOSPITAL | Age: 70
End: 2020-01-27

## 2020-01-27 DIAGNOSIS — Z12.31 SCREENING MAMMOGRAM, ENCOUNTER FOR: Primary | ICD-10-CM

## 2020-01-27 NOTE — LETTER
February 4, 2020    Fifi PADGETT Tabby  [address] [facility] [address]             [facility]  [address]  [facility] [address]  Phone: [phone] [facility] is committed to your overall health.  To help you get the most out of each of your visits, we will review your information to make sure you are up to date on all of your recommended tests and/or procedures.       Dr. Werner Vargas MD has found that your chart shows you may be due for a:     MAMMOGRAM (BREAST CANCER SCREENING)         If you have had any of the above done at another facility, please bring the records or information with you so that your record at [facility] will be complete.  If you would like to schedule any of these, please contact the clinic at [phone], OR USE THE SCHEDULING LINK PROVIDED IN THIS MESSAGE TO EASILY SCHEDULE YOUR MAMMOGRAM.       If you are currently taking medication, please bring it with you to your appointment for review.     Also, if you have any type of Advanced Directives, please bring them with you to your office visit so we may scan them into your chart.     Thank You,     Your [facility] Team,   MD Felipa West LPN Clinical Care Coordinator   [facility]   [address] [facility] [address]   Phone [phone]   Fax [phone]

## 2020-02-03 DIAGNOSIS — M62.838 MUSCLE SPASM: ICD-10-CM

## 2020-02-05 RX ORDER — LORAZEPAM 0.5 MG/1
TABLET ORAL
Qty: 10 TABLET | Refills: 0 | Status: SHIPPED | OUTPATIENT
Start: 2020-02-05 | End: 2020-02-10 | Stop reason: ALTCHOICE

## 2020-02-08 ENCOUNTER — NURSE TRIAGE (OUTPATIENT)
Dept: ADMINISTRATIVE | Facility: CLINIC | Age: 70
End: 2020-02-08

## 2020-02-08 ENCOUNTER — HOSPITAL ENCOUNTER (EMERGENCY)
Facility: HOSPITAL | Age: 70
Discharge: LEFT BEFORE DISCHARGE | End: 2020-02-08
Attending: EMERGENCY MEDICINE
Payer: MEDICARE

## 2020-02-08 VITALS
HEART RATE: 78 BPM | TEMPERATURE: 99 F | SYSTOLIC BLOOD PRESSURE: 146 MMHG | RESPIRATION RATE: 14 BRPM | BODY MASS INDEX: 48.32 KG/M2 | HEIGHT: 62 IN | DIASTOLIC BLOOD PRESSURE: 67 MMHG | OXYGEN SATURATION: 96 % | WEIGHT: 262.56 LBS

## 2020-02-08 DIAGNOSIS — R10.9 FLANK PAIN: Primary | ICD-10-CM

## 2020-02-08 LAB
ALBUMIN SERPL BCP-MCNC: 3.5 G/DL (ref 3.5–5.2)
ALP SERPL-CCNC: 118 U/L (ref 55–135)
ALT SERPL W/O P-5'-P-CCNC: 28 U/L (ref 10–44)
ANION GAP SERPL CALC-SCNC: 9 MMOL/L (ref 8–16)
AST SERPL-CCNC: 27 U/L (ref 10–40)
BACTERIA #/AREA URNS HPF: NORMAL /HPF
BASOPHILS # BLD AUTO: 0.04 K/UL (ref 0–0.2)
BASOPHILS NFR BLD: 0.5 % (ref 0–1.9)
BILIRUB SERPL-MCNC: 0.3 MG/DL (ref 0.1–1)
BILIRUB UR QL STRIP: NEGATIVE
BUN SERPL-MCNC: 20 MG/DL (ref 8–23)
CALCIUM SERPL-MCNC: 9.5 MG/DL (ref 8.7–10.5)
CHLORIDE SERPL-SCNC: 101 MMOL/L (ref 95–110)
CLARITY UR: CLEAR
CO2 SERPL-SCNC: 29 MMOL/L (ref 23–29)
COLOR UR: YELLOW
CREAT SERPL-MCNC: 1.3 MG/DL (ref 0.5–1.4)
DIFFERENTIAL METHOD: ABNORMAL
EOSINOPHIL # BLD AUTO: 0.2 K/UL (ref 0–0.5)
EOSINOPHIL NFR BLD: 2.9 % (ref 0–8)
ERYTHROCYTE [DISTWIDTH] IN BLOOD BY AUTOMATED COUNT: 13.7 % (ref 11.5–14.5)
EST. GFR  (AFRICAN AMERICAN): 48 ML/MIN/1.73 M^2
EST. GFR  (NON AFRICAN AMERICAN): 42 ML/MIN/1.73 M^2
GLUCOSE SERPL-MCNC: 208 MG/DL (ref 70–110)
GLUCOSE UR QL STRIP: ABNORMAL
HCT VFR BLD AUTO: 37.6 % (ref 37–48.5)
HGB BLD-MCNC: 12.5 G/DL (ref 12–16)
HGB UR QL STRIP: NEGATIVE
HYALINE CASTS #/AREA URNS LPF: 0 /LPF
IMM GRANULOCYTES # BLD AUTO: 0.03 K/UL (ref 0–0.04)
KETONES UR QL STRIP: NEGATIVE
LEUKOCYTE ESTERASE UR QL STRIP: ABNORMAL
LYMPHOCYTES # BLD AUTO: 2.5 K/UL (ref 1–4.8)
LYMPHOCYTES NFR BLD: 30.4 % (ref 18–48)
MCH RBC QN AUTO: 32.5 PG (ref 27–31)
MCHC RBC AUTO-ENTMCNC: 33.2 G/DL (ref 32–36)
MCV RBC AUTO: 98 FL (ref 82–98)
MICROSCOPIC COMMENT: NORMAL
MONOCYTES # BLD AUTO: 0.5 K/UL (ref 0.3–1)
MONOCYTES NFR BLD: 6.4 % (ref 4–15)
NEUTROPHILS # BLD AUTO: 4.9 K/UL (ref 1.8–7.7)
NEUTROPHILS NFR BLD: 59.4 % (ref 38–73)
NITRITE UR QL STRIP: NEGATIVE
NRBC BLD-RTO: 0 /100 WBC
PH UR STRIP: 8 [PH] (ref 5–8)
PLATELET # BLD AUTO: 224 K/UL (ref 150–350)
PMV BLD AUTO: 9.7 FL (ref 9.2–12.9)
POTASSIUM SERPL-SCNC: 4.1 MMOL/L (ref 3.5–5.1)
PROT SERPL-MCNC: 7.1 G/DL (ref 6–8.4)
PROT UR QL STRIP: ABNORMAL
RBC # BLD AUTO: 3.85 M/UL (ref 4–5.4)
RBC #/AREA URNS HPF: 0 /HPF (ref 0–4)
SODIUM SERPL-SCNC: 139 MMOL/L (ref 136–145)
SP GR UR STRIP: 1.01 (ref 1–1.03)
URN SPEC COLLECT METH UR: ABNORMAL
UROBILINOGEN UR STRIP-ACNC: 1 EU/DL
WBC # BLD AUTO: 8.3 K/UL (ref 3.9–12.7)
WBC #/AREA URNS HPF: 2 /HPF (ref 0–5)

## 2020-02-08 PROCEDURE — 36415 COLL VENOUS BLD VENIPUNCTURE: CPT

## 2020-02-08 PROCEDURE — 85025 COMPLETE CBC W/AUTO DIFF WBC: CPT

## 2020-02-08 PROCEDURE — 81000 URINALYSIS NONAUTO W/SCOPE: CPT

## 2020-02-08 PROCEDURE — 99284 EMERGENCY DEPT VISIT MOD MDM: CPT | Mod: 25

## 2020-02-08 PROCEDURE — 80053 COMPREHEN METABOLIC PANEL: CPT

## 2020-02-08 RX ORDER — DOXYCYCLINE 100 MG/1
100 CAPSULE ORAL 2 TIMES DAILY
Qty: 20 CAPSULE | Refills: 0 | Status: SHIPPED | OUTPATIENT
Start: 2020-02-08 | End: 2020-02-10 | Stop reason: ALTCHOICE

## 2020-02-08 NOTE — ED NOTES
AAOx4, skin warm/dry, respirations even/unlabored.  NAD.  Ambulatory to BR to produce urine specimen.

## 2020-02-08 NOTE — ED PROVIDER NOTES
"Encounter Date: 2/8/2020    SCRIBE #1 NOTE: I, John Christie, am scribing for, and in the presence of, Dr. Rubio.       History     Chief Complaint   Patient presents with    Flank Pain     L flank pain x 4-5 days. Decreased urine    Neck Pain     R neck     Time seen by provider: 5:01 PM on 02/08/2020      Fifi Mcnair is a 69 y.o. female with a PMHx of anxiety, HTN, DM, CHF, and CKD who presents to the ED for left sided flank pain that started 5 days ago, but worsened this morning. The patient reports that this pain is intermittent and mild in the left flank. The patient denies the pain radiating. She also states that today she feels like "I am not peeing as much as I usually do and it has been going down for the last 2 days." Patient denies pain with urination, hx of kidney stones, or blood in urine. The patient also admits that she has had right sided neck pain for the last 2 weeks, but states that "I have been putting muscle cream on it, but it has been getting better." The patient also endorses "A pocket of fluid between the top of my skull and my skin. I can feel it in there." Patient denies any rash, wound, chest pain, abdominal pain, vomiting, headache, or any other complaint at this time. The patient has a PSHx of gastric sleeve, hernia repair, and tonsillectomy.    The history is provided by the patient.     Review of patient's allergies indicates:   Allergen Reactions    Adhesive Other (See Comments)     Blisters    Cleocin [clindamycin hcl] Hives    Ceclor [cefaclor] Hives    Advair diskus [fluticasone propion-salmeterol]      Dry mouth    Aleve [naproxen sodium]      Unable to take secondary to kidney function.     Erythromycin Hives    Levaquin [levofloxacin] Dermatitis    Macrobid [nitrofurantoin monohyd/m-cryst] Other (See Comments)     Stomach pain/ GI issues    Macrodantin [nitrofurantoin macrocrystalline] Hives    Motrin [ibuprofen]      Unable to take secondary to kidney " functions.    Restoril [temazepam]      LIGHTHEADED UPON WAKING.with poor results    Sulfa (sulfonamide antibiotics)      Hold due to renal problems    Trazodone      PALPITATIONS    Remeron [mirtazapine] Palpitations and Other (See Comments)     Jittery    Vancomycin analogues Rash     Feet broke out/inflammed blood vessels       Past Medical History:   Diagnosis Date    Allergy     multiple antibiotic allergies    Anemia     Anxiety     Arthritis     Asthma     CHF (congestive heart failure)     NYHA class III     Chronic kidney disease     Colon polyp     benign    COPD (chronic obstructive pulmonary disease)     DLCO 37% , and mild pulmonary HTN    Dental bridge present     LOWER    Depression     Diabetes mellitus     Diabetic retinopathy     Diastolic dysfunction     Diverticulitis of large intestine without perforation or abscess without bleeding 2/12/2018    Diverticulosis     Former smoker     Fracture of lumbar spine     GERD (gastroesophageal reflux disease)     Hernia of unspecified site of abdominal cavity without mention of obstruction or gangrene     Hyperlipidemia     Hypertension     hypertensive renal    IBS (irritable bowel syndrome)     Mild nonproliferative diabetic retinopathy(362.04) 11/25/2013    Morbid obesity     Nuclear sclerosis 11/25/2013    Osteoporosis     Peripheral edema     Rash     Recurrent upper respiratory infection (URI)     S/P LASIK (laser assisted in situ keratomileusis)     Sleep apnea     sleep apnea uses bipap.    Stroke     tia    Thyroid disease     on synthroid    TIA (transient ischemic attack)     Urinary tract infection     Wears glasses      Past Surgical History:   Procedure Laterality Date    ABDOMINAL SURGERY      ADENOIDECTOMY      ARTHROSCOPIC CHONDROPLASTY OF KNEE JOINT Right 8/31/2018    Procedure: ARTHROSCOPY, KNEE, WITH CHONDROPLASTY;  Surgeon: Larry Molina MD;  Location: [facility];  Service:  Orthopedics;  Laterality: Right;  Tricompartmental chondroplasty    COLONOSCOPY  03/05/2013    repeat in 5 years    COLONOSCOPY N/A 5/31/2017    Procedure: COLONOSCOPY;  Surgeon: Luis Navarro MD;  Location: [facility] ENDO;  Service: Endoscopy;  Laterality: N/A;    COLONOSCOPY N/A 4/11/2018    Procedure: COLONOSCOPY;  Surgeon: Luis Navarro MD;  Location: [facility] ENDO;  Service: Endoscopy;  Laterality: N/A;    COSMETIC SURGERY      belt abdominoplasty    CYSTOSCOPY      CYSTOSCOPY N/A 8/6/2018    Procedure: CYSTOSCOPY;  Surgeon: Angy Campos MD;  Location: [facility] OR;  Service: Urology;  Laterality: N/A;    EYE SURGERY Right     mazzulla    GASTRECTOMY      gastric sleeve    gastric sleeve      HERNIA REPAIR      5 years old    HYSTERECTOMY      ovaries remain    JOINT REPLACEMENT      KNEE ARTHROPLASTY Left 4/16/2019    Procedure: ARTHROPLASTY, KNEE;  Surgeon: Larry Molina MD;  Location: [facility] OR;  Service: Orthopedics;  Laterality: Left;  [place]    KNEE ARTHROSCOPY W/ MENISCECTOMY Right 8/31/2018    Procedure: ARTHROSCOPY, KNEE, WITH MENISCECTOMY;  Surgeon: Larry Molina MD;  Location: [facility] OR;  Service: Orthopedics;  Laterality: Right;    REFRACTIVE SURGERY      mono va//ou//    RHINOPLASTY TIP      TONSILLECTOMY      TOTAL KNEE ARTHROPLASTY Left 4/16/19    TUBAL LIGATION      UPPER GASTROINTESTINAL ENDOSCOPY  03/05/2013    UPPER GASTROINTESTINAL ENDOSCOPY  08/24/2016    Dr. Navarro, repeat in 8 weeks    UPPER GASTROINTESTINAL ENDOSCOPY  07/21/2016    Dr. Randall     Family History   Problem Relation Age of Onset    Heart disease Mother 59    Cancer Mother 59        throat    Allergies Mother     Diabetes Mother     Heart disease Father 70        flutter    Allergies Father     Diabetes Father     Cancer Sister 22        22 thyroid,49  breast    Allergies Sister     Breast cancer Sister     Diabetes Sister     Cancer Brother 62        lung    Diabetes  Brother     Asthma Daughter     Diabetes Daughter     Depression Daughter     Asthma Grandchild     Eczema Grandchild     Eczema Grandchild     Breast cancer Maternal Grandmother     Ovarian cancer Cousin      Social History     Tobacco Use    Smoking status: Former Smoker     Packs/day: 1.00     Years: 4.00     Pack years: 4.00     Types: Cigarettes     Last attempt to quit:      Years since quittin.1    Smokeless tobacco: Never Used    Tobacco comment: quit , lived with smokers    Substance Use Topics    Alcohol use: Not Currently     Frequency: Never     Binge frequency: Never    Drug use: No     Review of Systems   Constitutional: Negative for activity change, appetite change, chills, diaphoresis, fatigue and fever.   HENT: Negative for congestion, rhinorrhea, sore throat, trouble swallowing and voice change.         +Scalp pain   Respiratory: Negative for cough, choking, chest tightness, shortness of breath, wheezing and stridor.    Cardiovascular: Negative for chest pain, palpitations and leg swelling.   Gastrointestinal: Negative for abdominal distention, abdominal pain, blood in stool, constipation, diarrhea, nausea and vomiting.   Genitourinary: Positive for decreased urine volume and flank pain. Negative for difficulty urinating, dysuria, frequency and hematuria.   Musculoskeletal: Positive for neck pain. Negative for arthralgias, back pain, joint swelling, myalgias and neck stiffness.   Skin: Negative for color change and rash.   Neurological: Negative for dizziness, syncope, speech difficulty, weakness, numbness and headaches.   Hematological: Negative for adenopathy. Does not bruise/bleed easily.   All other systems reviewed and are negative.      Physical Exam     Initial Vitals [20 1655]   BP Pulse Resp Temp SpO2   (!) 146/67 78 14 98.6 °F (37 °C) 96 %      MAP       --         Physical Exam    Nursing note and vitals reviewed.  Constitutional: She appears  well-developed and well-nourished. She is not diaphoretic. No distress.   HENT:   Head: Normocephalic and atraumatic.   Mouth/Throat: Oropharynx is clear and moist and mucous membranes are normal.   Cannot visualize or palpate a lesion to the patients scalp. No skull abnormality.   Eyes: EOM are normal. Pupils are equal, round, and reactive to light.   Pupils are 3 mm round and reactive to light.    Neck: Neck supple. No spinous process tenderness present. No tracheal deviation present. Carotid bruit is not present.   Right sided cervical paraspinal tenderness.   Cardiovascular: Normal rate, regular rhythm and normal heart sounds. Exam reveals no gallop and no friction rub.    No murmur heard.  Pulses:       Radial pulses are 2+ on the right side, and 2+ on the left side.        Dorsalis pedis pulses are 2+ on the right side, and 2+ on the left side.   Pulmonary/Chest: Breath sounds normal. She has no wheezes. She has no rhonchi. She has no rales.   Abdominal: Soft. Bowel sounds are normal. She exhibits no distension. There is no hepatosplenomegaly. There is no tenderness. There is no rebound, no guarding and no CVA tenderness.   No palpable organomegaly.    Musculoskeletal:   No step-off, deformity, or bony tenderness to the the spine. LE's full ROM.    Lymphadenopathy:     She has no cervical adenopathy.   Neurological: She is alert and oriented to person, place, and time. She has normal strength. No cranial nerve deficit or sensory deficit.   Skin: Skin is warm and dry.   Capillary refill is less than 2 seconds.          ED Course   Procedures  Labs Reviewed   CBC W/ AUTO DIFFERENTIAL - Abnormal; Notable for the following components:       Result Value    RBC 3.85 (*)     Mean Corpuscular Hemoglobin 32.5 (*)     All other components within normal limits   COMPREHENSIVE METABOLIC PANEL - Abnormal; Notable for the following components:    Glucose 208 (*)     eGFR if  48 (*)     eGFR if non   American 42 (*)     All other components within normal limits   URINALYSIS - Abnormal; Notable for the following components:    Protein, UA 1+ (*)     Glucose, UA 1+ (*)     Leukocytes, UA Trace (*)     All other components within normal limits   URINALYSIS MICROSCOPIC          Imaging Results          CT Renal Stone Study ABD Pelvis WO (In process)                  Medical Decision Making:   History:   Old Medical Records: I decided to obtain old medical records.  Clinical Tests:   Lab Tests: Reviewed and Ordered  Radiological Study: Ordered and Reviewed  ED Management:  This is an emergent evaluation of an 69 y.o. presenting with left flank pain and decreased urination. My initial differential diagnosis includes UTI, obstruction, and acute kidney injury. Patient also incidentally also admit right paraspinal neck pain, in which I do not find any evidence of vascular abnormality or midline spinal tenderness. The patient also complain of scalp pain, however I do not palpate or visualize any abnormality. No skull abnormality. Patient will be referred to dermatology for painful area of scalp.            Scribe Attestation:   Scribe #1: I performed the above scribed service and the documentation accurately describes the services I performed. I attest to the accuracy of the note.    Attending Attestation:             Attending ED Notes:   Review of patient's CT shows that patient has a small amount of air in the bladder, otherwise no significant acute abnormalities noted on CT, patient informed of these findings she reports no recent catheterization despite the fact that urinalysis is within normal limits I am concerned for possible infection given her symptoms patient advised admission to the hospital for further evaluation and management however she declined stating that she would like to just get some antibiotics and go home I advised patient that this was not ideal and again recommended inpatient treatment however  patient refused.  Patient is alert and oriented x4 patient appears to have capacity to make her own clinical decisions she understands that there is a risk of death disability and deterioration patient understands that she can change her mind at any time and return patient shows no clinical signs of intoxication at this time patient has appointment on Monday and is encouraged to go to this appointment she is additionally encouraged to return immediately to the emergency department should she change her mind or have any worsening.   I, Dr. Camron Rubio, personally performed the services described in this documentation. All medical record entries made by the scribe were at my direction and in my presence.  I have reviewed the chart and agree that the record reflects my personal performance and is accurate and complete. Camron Bianchi MD.                            Clinical Impression:       ICD-10-CM ICD-9-CM   1. Flank pain R10.9 789.09         Disposition:   Disposition: RAYRAY Rubio MD  02/09/20 0002

## 2020-02-08 NOTE — TELEPHONE ENCOUNTER
Reason for Disposition   Patient already left for the hospital/clinic.    Protocols used: NO CONTACT OR DUPLICATE CONTACT CALL-A-AH    Per [facility] Pt is currently in the hospital.

## 2020-02-09 NOTE — ED NOTES
Upon discharge, patient is AAOx4, no cardiac or respiratory complications. Follow up care and  Medications have been reviewed with patient and has been instructed to return to the ER if needed. Patient verbalized understanding and ambulated to the lobby without difficulty. KAYODE NARANJO.

## 2020-02-10 ENCOUNTER — OFFICE VISIT (OUTPATIENT)
Dept: FAMILY MEDICINE | Facility: CLINIC | Age: 70
End: 2020-02-10
Payer: MEDICARE

## 2020-02-10 ENCOUNTER — TELEPHONE (OUTPATIENT)
Dept: FAMILY MEDICINE | Facility: CLINIC | Age: 70
End: 2020-02-10

## 2020-02-10 VITALS
OXYGEN SATURATION: 94 % | BODY MASS INDEX: 48.28 KG/M2 | SYSTOLIC BLOOD PRESSURE: 122 MMHG | DIASTOLIC BLOOD PRESSURE: 60 MMHG | HEART RATE: 75 BPM | HEIGHT: 62 IN | WEIGHT: 262.38 LBS | TEMPERATURE: 99 F

## 2020-02-10 DIAGNOSIS — R60.0 EDEMA OF EXTREMITIES: ICD-10-CM

## 2020-02-10 DIAGNOSIS — N39.0 RECURRENT UTI: ICD-10-CM

## 2020-02-10 DIAGNOSIS — I89.0 LYMPHEDEMA OF RIGHT LOWER EXTREMITY: ICD-10-CM

## 2020-02-10 DIAGNOSIS — Z12.31 OTHER SCREENING MAMMOGRAM: ICD-10-CM

## 2020-02-10 DIAGNOSIS — G89.29 CHRONIC LEFT-SIDED THORACIC BACK PAIN: ICD-10-CM

## 2020-02-10 DIAGNOSIS — L65.9 ALOPECIA: Primary | ICD-10-CM

## 2020-02-10 DIAGNOSIS — M54.6 CHRONIC LEFT-SIDED THORACIC BACK PAIN: ICD-10-CM

## 2020-02-10 DIAGNOSIS — G57.01 NEUROPATHY OF RIGHT SCIATIC NERVE: ICD-10-CM

## 2020-02-10 PROCEDURE — 1101F PT FALLS ASSESS-DOCD LE1/YR: CPT | Mod: CPTII,S$GLB,, | Performed by: FAMILY MEDICINE

## 2020-02-10 PROCEDURE — 99499 UNLISTED E&M SERVICE: CPT | Mod: S$GLB,,, | Performed by: FAMILY MEDICINE

## 2020-02-10 PROCEDURE — 3074F PR MOST RECENT SYSTOLIC BLOOD PRESSURE < 130 MM HG: ICD-10-PCS | Mod: CPTII,S$GLB,, | Performed by: FAMILY MEDICINE

## 2020-02-10 PROCEDURE — 1101F PR PT FALLS ASSESS DOC 0-1 FALLS W/OUT INJ PAST YR: ICD-10-PCS | Mod: CPTII,S$GLB,, | Performed by: FAMILY MEDICINE

## 2020-02-10 PROCEDURE — 99499 RISK ADDL DX/OHS AUDIT: ICD-10-PCS | Mod: S$GLB,,, | Performed by: FAMILY MEDICINE

## 2020-02-10 PROCEDURE — 1159F MED LIST DOCD IN RCRD: CPT | Mod: S$GLB,,, | Performed by: FAMILY MEDICINE

## 2020-02-10 PROCEDURE — 99214 PR OFFICE/OUTPT VISIT, EST, LEVL IV, 30-39 MIN: ICD-10-PCS | Mod: S$GLB,,, | Performed by: FAMILY MEDICINE

## 2020-02-10 PROCEDURE — 99999 PR PBB SHADOW E&M-EST. PATIENT-LVL IV: CPT | Mod: PBBFAC,,, | Performed by: FAMILY MEDICINE

## 2020-02-10 PROCEDURE — 3074F SYST BP LT 130 MM HG: CPT | Mod: CPTII,S$GLB,, | Performed by: FAMILY MEDICINE

## 2020-02-10 PROCEDURE — 1126F AMNT PAIN NOTED NONE PRSNT: CPT | Mod: S$GLB,,, | Performed by: FAMILY MEDICINE

## 2020-02-10 PROCEDURE — 99999 PR PBB SHADOW E&M-EST. PATIENT-LVL IV: ICD-10-PCS | Mod: PBBFAC,,, | Performed by: FAMILY MEDICINE

## 2020-02-10 PROCEDURE — 3078F DIAST BP <80 MM HG: CPT | Mod: CPTII,S$GLB,, | Performed by: FAMILY MEDICINE

## 2020-02-10 PROCEDURE — 99214 OFFICE O/P EST MOD 30 MIN: CPT | Mod: S$GLB,,, | Performed by: FAMILY MEDICINE

## 2020-02-10 PROCEDURE — 3078F PR MOST RECENT DIASTOLIC BLOOD PRESSURE < 80 MM HG: ICD-10-PCS | Mod: CPTII,S$GLB,, | Performed by: FAMILY MEDICINE

## 2020-02-10 PROCEDURE — 1126F PR PAIN SEVERITY QUANTIFIED, NO PAIN PRESENT: ICD-10-PCS | Mod: S$GLB,,, | Performed by: FAMILY MEDICINE

## 2020-02-10 PROCEDURE — 1159F PR MEDICATION LIST DOCUMENTED IN MEDICAL RECORD: ICD-10-PCS | Mod: S$GLB,,, | Performed by: FAMILY MEDICINE

## 2020-02-10 RX ORDER — GABAPENTIN 600 MG/1
600 TABLET ORAL 3 TIMES DAILY
Qty: 90 TABLET | Refills: 11
Start: 2020-02-10 | End: 2021-03-22 | Stop reason: SDUPTHER

## 2020-02-10 RX ORDER — TORSEMIDE 20 MG/1
20 TABLET ORAL DAILY
Qty: 60 TABLET | Refills: 2
Start: 2020-02-10 | End: 2020-03-03

## 2020-02-10 RX ORDER — HYDROCORTISONE 25 MG/G
CREAM TOPICAL NIGHTLY
Qty: 3.5 G | Refills: 1 | Status: SHIPPED | OUTPATIENT
Start: 2020-02-10

## 2020-02-10 RX ORDER — AMITRIPTYLINE HYDROCHLORIDE 100 MG/1
TABLET ORAL
Qty: 90 TABLET | Refills: 2 | Status: ON HOLD | OUTPATIENT
Start: 2020-02-10 | End: 2020-09-21 | Stop reason: SDUPTHER

## 2020-02-10 NOTE — PATIENT INSTRUCTIONS

## 2020-02-10 NOTE — PROGRESS NOTES
SUBJECTIVE:  69 y.o. female for follow up of diabetes type I , insulin-dependent, moderate obesity, sedentary, Chronic kidney disease 3, opiate dependent, anxiety disorder.  There have been some probable medication, dietary and lifestyle compliance issues here. I have discussed with her the great importance of following the treatment plan exactly as directed in order to achieve a good medical outcome.  Diabetic Review of Systems - diabetic diet compliance: compliant most of the time, home glucose monitoring: fasting values range 4 to 5 times a day, blood sugars in the morning between 180-200, before dinner between 140 and 180, bedtime 200-220 despite insulin pump and long-acting insulin, further diabetic ROS: no chest pain, dyspnea or TIA's, has noted excessive thirstiness and frequent urination, weight has increased, has dysesthesias in the feet, blurry vision, last eye exam approximately ago.  Other symptoms and concerns:  Anxiety disorder iron deficiency anemia,multiple arthritic pain. Thyroid control hypertension ,well controlled nasal rhinitis/asthma in remission  Recent hospital right flank pain and decreased urine output history Patient has a abnormal urine results.  Suggests of infection.   Current Outpatient Medications   Medication Sig Dispense Refill    allopurinol (ZYLOPRIM) 100 MG tablet Take 2 tablets (200 mg total) by mouth nightly. 180 tablet 3    amitriptyline (ELAVIL) 100 MG tablet 1 tab at night 90 tablet 2    ascorbic acid, vitamin C, (VITAMIN C) 500 MG tablet Take 500 mg by mouth once daily.      atenolol (TENORMIN) 25 MG tablet Take 1 tablet (25 mg total) by mouth 2 (two) times daily. 180 tablet 3    biotin 1 mg Cap Take by mouth.      calcitRIOL (ROCALTROL) 0.25 MCG Cap TAKE ONE TABLET BY MOUTH TWICE A WEEK ON monday AND friday 8 capsule 3    cetirizine (ZYRTEC) 10 MG tablet Take 1 tablet (10 mg total) by mouth once daily. 1 Bottle 5    cinnamon bark (CINNAMON ORAL) Take by mouth once  daily.      clotrimazole-betamethasone 1-0.05% (LOTRISONE) cream       cyanocobalamin, vitamin B-12, (VITAMIN B-12) 5,000 mcg Subl Place 5,000 mg under the tongue once a week.      diclofenac sodium (VOLTAREN) 1 % Gel  APPLY 2 GRAMS TOPICALLY ONCE DAILY 1 Tube 0    diphenoxylate-atropine 2.5-0.025 mg (LOMOTIL) 2.5-0.025 mg per tablet TAKE ONE TABLET BY MOUTH 4 TIMES DAILY AS NEEDED FOR DIARRHEA 60 tablet 1    DYMISTA 137-50 mcg/spray Spry nassal spray as needed.       ferrous sulfate (IRON) 325 mg (65 mg iron) Tab tablet Take 325 mg by mouth daily with breakfast.      fish oil-omega-3 fatty acids 300-1,000 mg capsule Take 1 capsule by mouth once daily.      FLUoxetine 20 MG capsule Take 2 capsules (40 mg total) by mouth once daily. 180 capsule 3    fluticasone furoate-vilanterol (BREO) 100-25 mcg/dose diskus inhaler Inhale 1 puff into the lungs once daily. 60 each 3    fluticasone propionate (FLONASE) 50 mcg/actuation nasal spray 1 spray (50 mcg total) by Each Nostril route once daily. 1 Bottle 4    HUMALOG U-100 INSULIN 100 unit/mL injection inject 100 UNITS DAILY via insulin pump      insulin (BASAGLAR KWIKPEN U-100 INSULIN) glargine 100 units/mL (3mL) SubQ pen Inject 20 Units into the skin every evening. Use 10 units if BS over 210.Night before surgery. (Patient taking differently: Inject 20 Units into the skin once daily. Use 10 units if BS over 210.Night before surgery.)  0    insulin aspart U-100 (NOVOLOG) 100 unit/mL injection Use per insulin pump, 35-40 units daily. 40 mL 0    L-LYSINE ORAL Take by mouth.      l-methylfolate-b2-b6-b12 (CEREFOLIN) 6-5-50-1 mg Tab Take 1 tablet by mouth once daily.      Lacto.acidophilus-Bif.animalis (PROBIOTIC) 5 billion cell CpSP Take 1 capsule by mouth once daily. 30 capsule 3    levothyroxine (SYNTHROID) 25 MCG tablet Take 1 tablet (25 mcg total) by mouth once daily. 90 tablet 3    losartan (COZAAR) 50 MG tablet Take 1 tablet (50 mg total) by mouth once  "daily. 90 tablet 3    magnesium oxide (MAG-OX) 400 mg (241.3 mg magnesium) tablet Take 800 mg by mouth every evening.      montelukast (SINGULAIR) 10 mg tablet Take 1 tablet (10 mg total) by mouth every evening. 30 tablet 8    MULTIVIT-IRON-MIN-FOLIC ACID 3,500-18-0.4 UNIT-MG-MG ORAL CHEW Take by mouth 2 (two) times daily.      mupirocin (BACTROBAN) 2 % ointment APPLY OINTMENT TOPICALLY TO AFFECTED AREA ON NOSE THREE TIMES DAILY AS DIRECTED 15 g 11    oxyCODONE (ROXICODONE) 15 MG Tab Take 1 tablet (15 mg total) by mouth every 6 (six) hours as needed for Pain. 30 tablet 0    pantoprazole (PROTONIX) 40 MG tablet TAKE 1 TABLET BY MOUTH ONCE DAILY 90 tablet 3    potassium chloride SA (K-DUR,KLOR-CON) 20 MEQ tablet TAKE 1 TABLET BY MOUTH TWICE DAILY 180 tablet 3    SSD 1 % cream       torsemide (DEMADEX) 20 MG Tab Take 1 tablet (20 mg total) by mouth once daily. 60 tablet 2    TURMERIC ORAL Take by mouth once daily.      CHERATUSSIN AC  mg/5 mL syrup TAKE 5 ML (CC) BY MOUTH 4 TIMES DAILY AS NEEDED FOR COUGH FOR 5 DAYS  0    gabapentin (NEURONTIN) 600 MG tablet Take 1 tablet (600 mg total) by mouth 3 (three) times daily. 90 tablet 11    hydrocortisone 2.5 % cream Apply topically nightly. 3.5 g 1     Current Facility-Administered Medications   Medication Dose Route Frequency Provider Last Rate Last Dose    gentamicin injection 80 mg  80 mg Intramuscular 1 time in Clinic/HOD Angy Campos MD         Facility-Administered Medications Ordered in Other Visits   Medication Dose Route Frequency Provider Last Rate Last Dose    lactated ringers infusion   Intravenous Continuous Shaheen Hanson MD 10 mL/hr at 08/31/18 0739         OBJECTIVE:  Appearance: alert, well appearing, and in no distress, morbidly obese and acyanotic, in no respiratory distress.  /60 (BP Location: Right arm, Patient Position: Sitting, BP Method: Medium (Manual))   Pulse 75   Temp 99.1 °F (37.3 °C) (Oral)   Ht 5' 2" " (1.575 m)   Wt 119 kg (262 lb 5.6 oz)   LMP  (LMP Unknown)   SpO2 (!) 94%   BMI 47.98 kg/m²     Exam: fundi poorly visualized, heart sounds normal rate, regular rhythm, normal S1, S2, no murmurs, rubs, clicks or gallops, normal rate and regular rhythm, S1 and S2 normal, no murmurs noted, no gallops noted, normal bilateral carotid upstroke without bruits, no JVD, chest clear, no hepatosplenomegaly, no carotid bruits, feet: warm, good capillary refill and reduced sensation at both feet  Protective Sensation (w/ 10 gram monofilament):  Right: Decreased  Left: Decreased    Visual Inspection:  Normal -  Bilateral, Nails Intact - without Evidence of Foot Deformity- Bilateral and Callus -  Neither    Pedal Pulses:   Right: Present  Left: Present    Posterior tibialis:   Right:Diminshed  Left: Present        ASSESSMENT:  Diabetes Mellitus: poorly controlled, loss of control due to intercurrent illness, needs further observation, patient poorly compliant and needs to follow diet more regularly  Fifi was seen today for diabetes.    Diagnoses and all orders for this visit:    Alopecia  -     hydrocortisone 2.5 % cream; Apply topically nightly.    Edema of extremities  -     torsemide (DEMADEX) 20 MG Tab; Take 1 tablet (20 mg total) by mouth once daily.    Neuropathy of right sciatic nerve  -     amitriptyline (ELAVIL) 100 MG tablet; 1 tab at night    Lymphedema of right lower extremity  -     amitriptyline (ELAVIL) 100 MG tablet; 1 tab at night    Chronic left-sided thoracic back pain  -     amitriptyline (ELAVIL) 100 MG tablet; 1 tab at night    Other screening mammogram  -     Mammo Digital Screening Bilateral With CAD; Future    Recurrent UTI  -     URINALYSIS; Future  -     Urine culture; Future    Other orders  -     gabapentin (NEURONTIN) 600 MG tablet; Take 1 tablet (600 mg total) by mouth 3 (three) times daily.      PLAN:I have recommended that this patient have a exercise routine,and weight watchers diet but she  declines at this time. I have discussed the risks and benefits of this procedure with her. The patient verbalizes understanding.  I am not able to clear her medically in due to Patient has a abnormal results.w/elevated blood sugars over 200's.Issues reviewed with her: referral to Diabetic Education department, continues glucose monitoring, all medications, side effects and compliance discussed carefully, diabetic Sick Day rules reviewed, handout given, long term diabetic complications discussed and labs immediately prior to next visit.  Discussed health maintenance guidelines appropriate for age.    Answers for HPI/ROS submitted by the patient on 2/3/2020   activity change: No  unexpected weight change: Yes  neck pain: Yes  hearing loss: No  rhinorrhea: No  trouble swallowing: No  eye discharge: No  visual disturbance: Yes  chest tightness: No  wheezing: No  chest pain: No  palpitations: No  blood in stool: No  constipation: No  vomiting: No  diarrhea: Yes  polydipsia: No  polyuria: No  difficulty urinating: No  hematuria: No  menstrual problem: No  dysuria: No  joint swelling: Yes  arthralgias: Yes  headaches: Yes  weakness: No  confusion: No  dysphoric mood: Yes

## 2020-02-10 NOTE — TELEPHONE ENCOUNTER
----- Message from Autumn Dhaliwal sent at 2/10/2020  3:43 PM CST -----  Contact: pharm tech  Type: Needs Medical Advice    Who Called:  Tevin  Pharmacy name and phone #:  [facility] Call Back Number: [phone]  Additional Information: pharm wants to know if the Rx for Hydrocortisone creme--- the size they have is 28.35 is it ok to give the patient that one instead of 3.5 because they cant just give that amount to the patient that was prescribed by the Dr please advise asap the patient is on the way to pick it up

## 2020-02-11 ENCOUNTER — LAB VISIT (OUTPATIENT)
Dept: LAB | Facility: HOSPITAL | Age: 70
End: 2020-02-11
Attending: FAMILY MEDICINE
Payer: MEDICARE

## 2020-02-11 DIAGNOSIS — N39.0 RECURRENT UTI: ICD-10-CM

## 2020-02-11 LAB
BACTERIA #/AREA URNS AUTO: ABNORMAL /HPF
BILIRUB UR QL STRIP: NEGATIVE
CLARITY UR REFRACT.AUTO: ABNORMAL
COLOR UR AUTO: YELLOW
GLUCOSE UR QL STRIP: ABNORMAL
HGB UR QL STRIP: NEGATIVE
HYALINE CASTS UR QL AUTO: 12 /LPF
KETONES UR QL STRIP: NEGATIVE
LEUKOCYTE ESTERASE UR QL STRIP: NEGATIVE
MICROSCOPIC COMMENT: ABNORMAL
NITRITE UR QL STRIP: NEGATIVE
PH UR STRIP: 5 [PH] (ref 5–8)
PROT UR QL STRIP: NEGATIVE
RBC #/AREA URNS AUTO: 0 /HPF (ref 0–4)
SP GR UR STRIP: 1 (ref 1–1.03)
SQUAMOUS #/AREA URNS AUTO: 0 /HPF
URN SPEC COLLECT METH UR: ABNORMAL
WBC #/AREA URNS AUTO: 0 /HPF (ref 0–5)
YEAST UR QL AUTO: ABNORMAL

## 2020-02-11 PROCEDURE — 81001 URINALYSIS AUTO W/SCOPE: CPT

## 2020-02-11 PROCEDURE — 87086 URINE CULTURE/COLONY COUNT: CPT

## 2020-02-12 ENCOUNTER — TELEPHONE (OUTPATIENT)
Dept: FAMILY MEDICINE | Facility: CLINIC | Age: 70
End: 2020-02-12

## 2020-02-12 LAB — BACTERIA UR CULT: NORMAL

## 2020-02-12 NOTE — TELEPHONE ENCOUNTER
----- Message from Shabnam Niño MA sent at 2/12/2020  3:48 PM CST -----  Contact: Jessica lópez/ [facility]      ----- Message -----  From: Saadia Miller  Sent: 2/12/2020   3:29 PM CST  To: Alicia Caldera Staff    Type:  Pharmacy Calling to Clarify an RX    Name of Caller:  Jessica  Pharmacy Name:  [facility]  Prescription Name:  LORazepam (ATIVAN) 0.5 MG tablet (Discontinued)  What do they need to clarify?:  pls call pharm to  adv if this script is discontinued  Best Call Back Number:  [phone]  Additional Information:  Pls call pharm to adv

## 2020-02-12 NOTE — TELEPHONE ENCOUNTER
Prescription for Ativan discontinued 2-5-2020 by Dr. Vargas citing therapy completed as reason. Confirmed with Dr. Vargas this medication is discontinued. [facility] ([facility]) notified.

## 2020-02-18 ENCOUNTER — HOSPITAL ENCOUNTER (OUTPATIENT)
Dept: RADIOLOGY | Facility: CLINIC | Age: 70
Discharge: HOME | End: 2020-02-18
Attending: FAMILY MEDICINE
Payer: MEDICARE

## 2020-02-18 DIAGNOSIS — Z12.31 SCREENING MAMMOGRAM, ENCOUNTER FOR: ICD-10-CM

## 2020-02-18 PROCEDURE — 77063 BREAST TOMOSYNTHESIS BI: CPT | Mod: 26,,, | Performed by: RADIOLOGY

## 2020-02-18 PROCEDURE — 77067 SCR MAMMO BI INCL CAD: CPT | Mod: TC,PO

## 2020-02-18 PROCEDURE — 77067 MAMMO DIGITAL SCREENING BILAT WITH TOMOSYNTHESIS_CAD: ICD-10-PCS | Mod: 26,,, | Performed by: RADIOLOGY

## 2020-02-18 PROCEDURE — 77067 SCR MAMMO BI INCL CAD: CPT | Mod: 26,,, | Performed by: RADIOLOGY

## 2020-02-18 PROCEDURE — 77063 MAMMO DIGITAL SCREENING BILAT WITH TOMOSYNTHESIS_CAD: ICD-10-PCS | Mod: 26,,, | Performed by: RADIOLOGY

## 2020-02-28 ENCOUNTER — LAB VISIT (OUTPATIENT)
Dept: LAB | Facility: HOSPITAL | Age: 70
End: 2020-02-28
Attending: INTERNAL MEDICINE
Payer: MEDICARE

## 2020-02-28 DIAGNOSIS — J44.9 COPD (CHRONIC OBSTRUCTIVE PULMONARY DISEASE): ICD-10-CM

## 2020-02-28 DIAGNOSIS — N18.2 CHRONIC KIDNEY DISEASE, STAGE II (MILD): ICD-10-CM

## 2020-02-28 DIAGNOSIS — Z96.41 INSULIN PUMP STATUS: ICD-10-CM

## 2020-02-28 DIAGNOSIS — E11.40 DIABETIC NEUROPATHY: Primary | ICD-10-CM

## 2020-02-28 DIAGNOSIS — D64.9 ANEMIA, UNSPECIFIED: ICD-10-CM

## 2020-02-28 DIAGNOSIS — I10 ESSENTIAL HYPERTENSION, MALIGNANT: ICD-10-CM

## 2020-02-28 LAB
ALBUMIN SERPL BCP-MCNC: 3.7 G/DL (ref 3.5–5.2)
ALP SERPL-CCNC: 154 U/L (ref 55–135)
ALT SERPL W/O P-5'-P-CCNC: 31 U/L (ref 10–44)
ANION GAP SERPL CALC-SCNC: 12 MMOL/L (ref 8–16)
AST SERPL-CCNC: 28 U/L (ref 10–40)
BASOPHILS # BLD AUTO: 0.05 K/UL (ref 0–0.2)
BASOPHILS NFR BLD: 0.8 % (ref 0–1.9)
BILIRUB SERPL-MCNC: 0.4 MG/DL (ref 0.1–1)
BUN SERPL-MCNC: 28 MG/DL (ref 8–23)
CALCIUM SERPL-MCNC: 10 MG/DL (ref 8.7–10.5)
CHLORIDE SERPL-SCNC: 97 MMOL/L (ref 95–110)
CHOLEST SERPL-MCNC: 165 MG/DL (ref 120–199)
CHOLEST/HDLC SERPL: 5.9 {RATIO} (ref 2–5)
CO2 SERPL-SCNC: 28 MMOL/L (ref 23–29)
CREAT SERPL-MCNC: 1.6 MG/DL (ref 0.5–1.4)
DIFFERENTIAL METHOD: ABNORMAL
EOSINOPHIL # BLD AUTO: 0.2 K/UL (ref 0–0.5)
EOSINOPHIL NFR BLD: 3.1 % (ref 0–8)
ERYTHROCYTE [DISTWIDTH] IN BLOOD BY AUTOMATED COUNT: 14.3 % (ref 11.5–14.5)
EST. GFR  (AFRICAN AMERICAN): 38 ML/MIN/1.73 M^2
EST. GFR  (NON AFRICAN AMERICAN): 33 ML/MIN/1.73 M^2
GLUCOSE SERPL-MCNC: 270 MG/DL (ref 70–110)
HCT VFR BLD AUTO: 37.8 % (ref 37–48.5)
HDLC SERPL-MCNC: 28 MG/DL (ref 40–75)
HDLC SERPL: 17 % (ref 20–50)
HGB BLD-MCNC: 12.1 G/DL (ref 12–16)
IMM GRANULOCYTES # BLD AUTO: 0.04 K/UL (ref 0–0.04)
LDLC SERPL CALC-MCNC: ABNORMAL MG/DL (ref 63–159)
LYMPHOCYTES # BLD AUTO: 1.7 K/UL (ref 1–4.8)
LYMPHOCYTES NFR BLD: 26.9 % (ref 18–48)
MCH RBC QN AUTO: 31.8 PG (ref 27–31)
MCHC RBC AUTO-ENTMCNC: 32 G/DL (ref 32–36)
MCV RBC AUTO: 99 FL (ref 82–98)
MONOCYTES # BLD AUTO: 0.4 K/UL (ref 0.3–1)
MONOCYTES NFR BLD: 6.2 % (ref 4–15)
NEUTROPHILS # BLD AUTO: 3.9 K/UL (ref 1.8–7.7)
NEUTROPHILS NFR BLD: 62.4 % (ref 38–73)
NONHDLC SERPL-MCNC: 137 MG/DL
NRBC BLD-RTO: 0 /100 WBC
PLATELET # BLD AUTO: 248 K/UL (ref 150–350)
PMV BLD AUTO: 10 FL (ref 9.2–12.9)
POTASSIUM SERPL-SCNC: 4.1 MMOL/L (ref 3.5–5.1)
PROT SERPL-MCNC: 8 G/DL (ref 6–8.4)
RBC # BLD AUTO: 3.81 M/UL (ref 4–5.4)
SODIUM SERPL-SCNC: 137 MMOL/L (ref 136–145)
T4 FREE SERPL-MCNC: 0.86 NG/DL (ref 0.71–1.51)
TRIGL SERPL-MCNC: 881 MG/DL (ref 30–150)
TSH SERPL DL<=0.005 MIU/L-ACNC: 2.39 UIU/ML (ref 0.4–4)
WBC # BLD AUTO: 6.17 K/UL (ref 3.9–12.7)

## 2020-02-28 PROCEDURE — 82306 VITAMIN D 25 HYDROXY: CPT

## 2020-02-28 PROCEDURE — 84481 FREE ASSAY (FT-3): CPT

## 2020-02-28 PROCEDURE — 84443 ASSAY THYROID STIM HORMONE: CPT

## 2020-02-28 PROCEDURE — 80053 COMPREHEN METABOLIC PANEL: CPT

## 2020-02-28 PROCEDURE — 83036 HEMOGLOBIN GLYCOSYLATED A1C: CPT

## 2020-02-28 PROCEDURE — 36415 COLL VENOUS BLD VENIPUNCTURE: CPT

## 2020-02-28 PROCEDURE — 85025 COMPLETE CBC W/AUTO DIFF WBC: CPT

## 2020-02-28 PROCEDURE — 82043 UR ALBUMIN QUANTITATIVE: CPT

## 2020-02-28 PROCEDURE — 80061 LIPID PANEL: CPT

## 2020-02-28 PROCEDURE — 84439 ASSAY OF FREE THYROXINE: CPT

## 2020-02-29 LAB
25(OH)D3+25(OH)D2 SERPL-MCNC: 48 NG/ML (ref 30–96)
ALBUMIN/CREAT UR: 38.9 UG/MG (ref 0–30)
CREAT UR-MCNC: 18 MG/DL (ref 15–325)
ESTIMATED AVG GLUCOSE: 237 MG/DL (ref 68–131)
HBA1C MFR BLD HPLC: 9.9 % (ref 4–5.6)
MICROALBUMIN UR DL<=1MG/L-MCNC: 7 UG/ML
T3FREE SERPL-MCNC: 3 PG/ML (ref 2.3–4.2)

## 2020-03-03 DIAGNOSIS — R60.0 EDEMA OF EXTREMITIES: ICD-10-CM

## 2020-03-03 RX ORDER — TORSEMIDE 20 MG/1
TABLET ORAL
Qty: 60 TABLET | Refills: 2 | Status: SHIPPED | OUTPATIENT
Start: 2020-03-03 | End: 2020-12-30

## 2020-03-10 DIAGNOSIS — E11.8 CONTROLLED TYPE 2 DIABETES MELLITUS WITH COMPLICATION, WITH LONG-TERM CURRENT USE OF INSULIN: Primary | ICD-10-CM

## 2020-03-10 DIAGNOSIS — Z79.4 CONTROLLED TYPE 2 DIABETES MELLITUS WITH COMPLICATION, WITH LONG-TERM CURRENT USE OF INSULIN: Primary | ICD-10-CM

## 2020-03-12 ENCOUNTER — HOSPITAL ENCOUNTER (OUTPATIENT)
Dept: RADIOLOGY | Facility: HOSPITAL | Age: 70
Discharge: HOME | End: 2020-03-12
Attending: INTERNAL MEDICINE
Payer: MEDICARE

## 2020-03-12 DIAGNOSIS — M81.6 LOCALIZED OSTEOPOROSIS (LEQUESNE): ICD-10-CM

## 2020-03-12 DIAGNOSIS — M85.80 OSTEOPENIA WITH HIGH RISK OF FRACTURE: ICD-10-CM

## 2020-03-12 PROCEDURE — 77080 DXA BONE DENSITY AXIAL: CPT | Mod: 26,,, | Performed by: RADIOLOGY

## 2020-03-12 PROCEDURE — 77080 DEXA BONE DENSITY SPINE HIP: ICD-10-PCS | Mod: 26,,, | Performed by: RADIOLOGY

## 2020-03-12 PROCEDURE — 77080 DXA BONE DENSITY AXIAL: CPT | Mod: TC

## 2020-03-18 ENCOUNTER — TELEPHONE (OUTPATIENT)
Dept: HEMATOLOGY/ONCOLOGY | Facility: CLINIC | Age: 70
End: 2020-03-18

## 2020-03-18 NOTE — TELEPHONE ENCOUNTER
Left message offering virtual appointment in order to limit exposure of our patients to the corona virus. We wish to protect all of our patients during this time and if they are not required to leave their home we are happy to offer visits this way .

## 2020-03-19 ENCOUNTER — OFFICE VISIT (OUTPATIENT)
Dept: HEMATOLOGY/ONCOLOGY | Facility: CLINIC | Age: 70
End: 2020-03-19
Payer: MEDICARE

## 2020-03-19 VITALS
WEIGHT: 250 LBS | HEIGHT: 62 IN | OXYGEN SATURATION: 97 % | DIASTOLIC BLOOD PRESSURE: 66 MMHG | RESPIRATION RATE: 16 BRPM | SYSTOLIC BLOOD PRESSURE: 144 MMHG | HEART RATE: 73 BPM | TEMPERATURE: 98 F | BODY MASS INDEX: 46.01 KG/M2

## 2020-03-19 DIAGNOSIS — Z51.81 ENCOUNTER FOR MONITORING BISPHOSPHONATE THERAPY: ICD-10-CM

## 2020-03-19 DIAGNOSIS — Z79.83 ENCOUNTER FOR MONITORING BISPHOSPHONATE THERAPY: ICD-10-CM

## 2020-03-19 DIAGNOSIS — M85.89 OSTEOPENIA OF MULTIPLE SITES: ICD-10-CM

## 2020-03-19 DIAGNOSIS — N28.9 RENAL INSUFFICIENCY: ICD-10-CM

## 2020-03-19 DIAGNOSIS — D47.2 MGUS (MONOCLONAL GAMMOPATHY OF UNKNOWN SIGNIFICANCE): Primary | ICD-10-CM

## 2020-03-19 DIAGNOSIS — D64.9 ANEMIA, UNSPECIFIED TYPE: ICD-10-CM

## 2020-03-19 PROCEDURE — 99499 UNLISTED E&M SERVICE: CPT | Mod: S$GLB,,, | Performed by: INTERNAL MEDICINE

## 2020-03-19 PROCEDURE — 1125F PR PAIN SEVERITY QUANTIFIED, PAIN PRESENT: ICD-10-PCS | Mod: S$GLB,,, | Performed by: INTERNAL MEDICINE

## 2020-03-19 PROCEDURE — 3288F FALL RISK ASSESSMENT DOCD: CPT | Mod: CPTII,S$GLB,, | Performed by: INTERNAL MEDICINE

## 2020-03-19 PROCEDURE — 1125F AMNT PAIN NOTED PAIN PRSNT: CPT | Mod: S$GLB,,, | Performed by: INTERNAL MEDICINE

## 2020-03-19 PROCEDURE — 3077F PR MOST RECENT SYSTOLIC BLOOD PRESSURE >= 140 MM HG: ICD-10-PCS | Mod: CPTII,S$GLB,, | Performed by: INTERNAL MEDICINE

## 2020-03-19 PROCEDURE — 1100F PTFALLS ASSESS-DOCD GE2>/YR: CPT | Mod: CPTII,S$GLB,, | Performed by: INTERNAL MEDICINE

## 2020-03-19 PROCEDURE — 3078F PR MOST RECENT DIASTOLIC BLOOD PRESSURE < 80 MM HG: ICD-10-PCS | Mod: CPTII,S$GLB,, | Performed by: INTERNAL MEDICINE

## 2020-03-19 PROCEDURE — 99999 PR PBB SHADOW E&M-EST. PATIENT-LVL V: ICD-10-PCS | Mod: PBBFAC,,, | Performed by: INTERNAL MEDICINE

## 2020-03-19 PROCEDURE — 3288F PR FALLS RISK ASSESSMENT DOCUMENTED: ICD-10-PCS | Mod: CPTII,S$GLB,, | Performed by: INTERNAL MEDICINE

## 2020-03-19 PROCEDURE — 99215 OFFICE O/P EST HI 40 MIN: CPT | Mod: S$GLB,,, | Performed by: INTERNAL MEDICINE

## 2020-03-19 PROCEDURE — 99999 PR PBB SHADOW E&M-EST. PATIENT-LVL V: CPT | Mod: PBBFAC,,, | Performed by: INTERNAL MEDICINE

## 2020-03-19 PROCEDURE — 1159F PR MEDICATION LIST DOCUMENTED IN MEDICAL RECORD: ICD-10-PCS | Mod: S$GLB,,, | Performed by: INTERNAL MEDICINE

## 2020-03-19 PROCEDURE — 1100F PR PT FALLS ASSESS DOC 2+ FALLS/FALL W/INJURY/YR: ICD-10-PCS | Mod: CPTII,S$GLB,, | Performed by: INTERNAL MEDICINE

## 2020-03-19 PROCEDURE — 3077F SYST BP >= 140 MM HG: CPT | Mod: CPTII,S$GLB,, | Performed by: INTERNAL MEDICINE

## 2020-03-19 PROCEDURE — 3078F DIAST BP <80 MM HG: CPT | Mod: CPTII,S$GLB,, | Performed by: INTERNAL MEDICINE

## 2020-03-19 PROCEDURE — 99499 RISK ADDL DX/OHS AUDIT: ICD-10-PCS | Mod: S$GLB,,, | Performed by: INTERNAL MEDICINE

## 2020-03-19 PROCEDURE — 1159F MED LIST DOCD IN RCRD: CPT | Mod: S$GLB,,, | Performed by: INTERNAL MEDICINE

## 2020-03-19 PROCEDURE — 99215 PR OFFICE/OUTPT VISIT, EST, LEVL V, 40-54 MIN: ICD-10-PCS | Mod: S$GLB,,, | Performed by: INTERNAL MEDICINE

## 2020-03-19 RX ORDER — ALENDRONATE SODIUM 35 MG/1
35 TABLET ORAL
Qty: 4 TABLET | Refills: 4 | Status: SHIPPED | OUTPATIENT
Start: 2020-03-19 | End: 2020-09-01

## 2020-03-19 NOTE — PROGRESS NOTES
Subjective:       Patient ID: Fifi Mcnair is a 69 y.o. female.    Chief Complaint: I AM OK    Follow-up   Associated symptoms include fatigue. Pertinent negatives include no abdominal pain, arthralgias, chest pain, coughing, fever, headaches, nausea, neck pain, numbness, rash, sore throat, vomiting or weakness.       This is a 69  yr old female tolerating prolia for osteoporosis.  She started prolia for osteoporosis and now she has osteopenia. Pt has had a gastric sleeve in the past. She remains on B12 and iron supplement  She has not needed IV iron    Not on prolia started K 2 She is tolerating Synthroid for thyroid disorder and neurontin for neuropathy.  Past Labs revealed an elevated kappa light chain with No further elevation of the ratio, IGA  has been stable   Bone marrow showed no evidence of a plasma cell dyscrasia in 2016   Pt with history of sleeve gastrectomy contributing to malabsorption of certain vitamins  IgA remains elevated  as with the elevated kappa levels    She is tolerating allopurinol for gout prevention  She is tolerating Prozac for depression    January 20 2020 I saw my IGA     Current Outpatient Medications:     allopurinol (ZYLOPRIM) 100 MG tablet, Take 2 tablets (200 mg total) by mouth nightly., Disp: 180 tablet, Rfl: 3    amitriptyline (ELAVIL) 100 MG tablet, 1 tab at night, Disp: 90 tablet, Rfl: 2    ascorbic acid, vitamin C, (VITAMIN C) 500 MG tablet, Take 500 mg by mouth once daily., Disp: , Rfl:     atenolol (TENORMIN) 25 MG tablet, Take 1 tablet (25 mg total) by mouth 2 (two) times daily., Disp: 180 tablet, Rfl: 3    biotin 1 mg Cap, Take by mouth., Disp: , Rfl:     calcitRIOL (ROCALTROL) 0.25 MCG Cap, TAKE ONE TABLET BY MOUTH TWICE A WEEK ON monday AND friday, Disp: 8 capsule, Rfl: 3    cetirizine (ZYRTEC) 10 MG tablet, Take 1 tablet (10 mg total) by mouth once daily., Disp: 1 Bottle, Rfl: 5    CHERATUSSIN AC  mg/5 mL syrup, TAKE 5 ML (CC) BY MOUTH 4 TIMES DAILY  AS NEEDED FOR COUGH FOR 5 DAYS, Disp: , Rfl: 0    cinnamon bark (CINNAMON ORAL), Take by mouth once daily., Disp: , Rfl:     clotrimazole-betamethasone 1-0.05% (LOTRISONE) cream, , Disp: , Rfl:     cyanocobalamin, vitamin B-12, (VITAMIN B-12) 5,000 mcg Subl, Place 5,000 mg under the tongue once a week., Disp: , Rfl:     diclofenac sodium (VOLTAREN) 1 % Gel,  APPLY 2 GRAMS TOPICALLY ONCE DAILY, Disp: 1 Tube, Rfl: 0    diphenoxylate-atropine 2.5-0.025 mg (LOMOTIL) 2.5-0.025 mg per tablet, TAKE ONE TABLET BY MOUTH 4 TIMES DAILY AS NEEDED FOR DIARRHEA, Disp: 60 tablet, Rfl: 1    DYMISTA 137-50 mcg/spray Spry nassal spray, as needed. , Disp: , Rfl:     ferrous sulfate (IRON) 325 mg (65 mg iron) Tab tablet, Take 325 mg by mouth daily with breakfast., Disp: , Rfl:     fish oil-omega-3 fatty acids 300-1,000 mg capsule, Take 1 capsule by mouth once daily., Disp: , Rfl:     FLUoxetine 20 MG capsule, Take 2 capsules (40 mg total) by mouth once daily., Disp: 180 capsule, Rfl: 3    fluticasone furoate-vilanterol (BREO) 100-25 mcg/dose diskus inhaler, Inhale 1 puff into the lungs once daily., Disp: 60 each, Rfl: 3    fluticasone propionate (FLONASE) 50 mcg/actuation nasal spray, 1 spray (50 mcg total) by Each Nostril route once daily., Disp: 1 Bottle, Rfl: 4    gabapentin (NEURONTIN) 600 MG tablet, Take 1 tablet (600 mg total) by mouth 3 (three) times daily., Disp: 90 tablet, Rfl: 11    HUMALOG U-100 INSULIN 100 unit/mL injection, inject 100 UNITS DAILY via insulin pump, Disp: , Rfl:     hydrocortisone 2.5 % cream, Apply topically nightly., Disp: 3.5 g, Rfl: 1    insulin (BASAGLAR KWIKPEN U-100 INSULIN) glargine 100 units/mL (3mL) SubQ pen, Inject 20 Units into the skin every evening. Use 10 units if BS over 210.Night before surgery. (Patient taking differently: Inject 20 Units into the skin once daily. Use 10 units if BS over 210.Night before surgery.), Disp: , Rfl: 0    insulin aspart U-100 (NOVOLOG) 100 unit/mL  injection, Use per insulin pump, 35-40 units daily., Disp: 40 mL, Rfl: 0    L-LYSINE ORAL, Take by mouth., Disp: , Rfl:     l-methylfolate-b2-b6-b12 (CEREFOLIN) 6-5-50-1 mg Tab, Take 1 tablet by mouth once daily., Disp: , Rfl:     Lacto.acidophilus-Bif.animalis (PROBIOTIC) 5 billion cell CpSP, Take 1 capsule by mouth once daily., Disp: 30 capsule, Rfl: 3    levothyroxine (SYNTHROID) 25 MCG tablet, Take 1 tablet (25 mcg total) by mouth once daily., Disp: 90 tablet, Rfl: 3    losartan (COZAAR) 50 MG tablet, Take 1 tablet (50 mg total) by mouth once daily., Disp: 90 tablet, Rfl: 3    magnesium oxide (MAG-OX) 400 mg (241.3 mg magnesium) tablet, Take 800 mg by mouth every evening., Disp: , Rfl:     montelukast (SINGULAIR) 10 mg tablet, Take 1 tablet (10 mg total) by mouth every evening., Disp: 30 tablet, Rfl: 8    MULTIVIT-IRON-MIN-FOLIC ACID 3,500-18-0.4 UNIT-MG-MG ORAL CHEW, Take by mouth 2 (two) times daily., Disp: , Rfl:     mupirocin (BACTROBAN) 2 % ointment, APPLY OINTMENT TOPICALLY TO AFFECTED AREA ON NOSE THREE TIMES DAILY AS DIRECTED, Disp: 15 g, Rfl: 11    oxyCODONE (ROXICODONE) 15 MG Tab, Take 1 tablet (15 mg total) by mouth every 6 (six) hours as needed for Pain., Disp: 30 tablet, Rfl: 0    pantoprazole (PROTONIX) 40 MG tablet, TAKE 1 TABLET BY MOUTH ONCE DAILY, Disp: 90 tablet, Rfl: 3    potassium chloride SA (K-DUR,KLOR-CON) 20 MEQ tablet, TAKE 1 TABLET BY MOUTH TWICE DAILY, Disp: 180 tablet, Rfl: 3    SSD 1 % cream, , Disp: , Rfl:     torsemide (DEMADEX) 20 MG Tab, TAKE ONE TABLET BY MOUTH TWICE DAILY, Disp: 60 tablet, Rfl: 2    TURMERIC ORAL, Take by mouth once daily., Disp: , Rfl:     Current Facility-Administered Medications:     gentamicin injection 80 mg, 80 mg, Intramuscular, 1 time in Clinic/HOD, Angy Campos MD    Facility-Administered Medications Ordered in Other Visits:     lactated ringers infusion, , Intravenous, Continuous, Shaheen Hanson MD, Last Rate: 10 mL/hr  "at 08/31/18 0739    No prolia at this time   All medications and past history have been reviewed.    Review of Systems   Constitutional: Positive for fatigue. Negative for fever and unexpected weight change.   HENT: Negative.  Negative for ear discharge, mouth sores, rhinorrhea and sore throat.    Eyes: Negative.  Negative for photophobia and itching.   Respiratory: Negative for cough and shortness of breath.    Cardiovascular: Negative.  Negative for chest pain and leg swelling.   Gastrointestinal: Negative.  Negative for abdominal pain, constipation, diarrhea, nausea and vomiting.   Endocrine: Negative.  Negative for cold intolerance and heat intolerance.   Genitourinary: Negative for difficulty urinating, dysuria, frequency, hematuria, pelvic pain and urgency.   Musculoskeletal: Negative for arthralgias, back pain and neck pain.   Skin: Negative for pallor and rash.   Allergic/Immunologic: Negative.    Neurological: Negative for weakness, numbness and headaches.   Hematological: Negative for adenopathy. Does not bruise/bleed easily.   Psychiatric/Behavioral: Negative for agitation and confusion.   All other systems reviewed and are negative.       Pertinent positives are intermittent knee pain and fatigue  Depression screening negative on medication  Positive intermittent dysuria pelvic bloating and pain  Objective:        BP (!) 144/66   Pulse 73   Temp 98.4 °F (36.9 °C)   Resp 16   Ht 5' 2" (1.575 m)   Wt 113.4 kg (250 lb)   LMP  (LMP Unknown)   SpO2 97%   BMI 45.73 kg/m²     Physical Exam   Constitutional: She is oriented to person, place, and time. She appears well-developed and well-nourished.   HENT:   Head: Normocephalic.   Mouth/Throat: Oropharynx is clear and moist. No oropharyngeal exudate.   Eyes: Conjunctivae are normal. No scleral icterus.   Neck: Normal range of motion. No JVD present. No tracheal deviation present.   Cardiovascular: Normal rate and regular rhythm.   No murmur (2= capillary " refill) heard.  Pulmonary/Chest: Effort normal. No respiratory distress. She has no wheezes.   Abdominal: Soft. She exhibits no distension. Tenderness: midepigastric.   Musculoskeletal: Normal range of motion. She exhibits no edema.   Neurological: She is alert and oriented to person, place, and time. No cranial nerve deficit.   Steady gait   Skin: Skin is dry. No rash noted. No erythema.   Psychiatric: She has a normal mood and affect.   Nursing note and vitals reviewed.     Bone marrow with no evidence of plasma cell dyscrasia, no evidence of myelodysplasia.    I reviewed the percentage of cells noted in the marrow along with flow cytometry and cytogenetics  Skeletal survey negative for any lytic or ostial sclerotic disease  All labs and imaging have been reviewed.   Lab Results   Component Value Date    WBC 5.85 03/12/2020    HGB 11.4 (L) 03/12/2020    HCT 35.6 (L) 03/12/2020     (H) 03/12/2020     03/12/2020     IGA elevated     CMP  Sodium   Date Value Ref Range Status   03/12/2020 139 136 - 145 mmol/L Final     Potassium   Date Value Ref Range Status   03/12/2020 4.4 3.5 - 5.1 mmol/L Final     Chloride   Date Value Ref Range Status   03/12/2020 102 95 - 110 mmol/L Final     CO2   Date Value Ref Range Status   03/12/2020 26 23 - 29 mmol/L Final     Glucose   Date Value Ref Range Status   03/12/2020 302 (H) 70 - 110 mg/dL Final     BUN, Bld   Date Value Ref Range Status   03/12/2020 22 8 - 23 mg/dL Final     Creatinine   Date Value Ref Range Status   03/12/2020 1.7 (H) 0.5 - 1.4 mg/dL Final   08/31/2013 1.1 0.5 - 1.4 mg/dL Final     Calcium   Date Value Ref Range Status   03/12/2020 9.4 8.7 - 10.5 mg/dL Final   08/31/2013 9.5 8.7 - 10.5 mg/dL Final     Total Protein   Date Value Ref Range Status   03/12/2020 6.9 6.0 - 8.4 g/dL Final     Albumin   Date Value Ref Range Status   03/12/2020 3.4 (L) 3.5 - 5.2 g/dL Final     Total Bilirubin   Date Value Ref Range Status   03/12/2020 0.2 0.1 - 1.0 mg/dL  Final     Comment:     For infants and newborns, interpretation of results should be based  on gestational age, weight and in agreement with clinical  observations.  Premature Infant recommended reference ranges:  Up to 24 hours.............<8.0 mg/dL  Up to 48 hours............<12.0 mg/dL  3-5 days..................<15.0 mg/dL  6-29 days.................<15.0 mg/dL       Alkaline Phosphatase   Date Value Ref Range Status   03/12/2020 121 55 - 135 U/L Final   08/30/2013 93 55 - 135 U/L Final     AST   Date Value Ref Range Status   03/12/2020 25 10 - 40 U/L Final   08/30/2013 21 10 - 40 U/L Final     ALT   Date Value Ref Range Status   03/12/2020 23 10 - 44 U/L Final     Anion Gap   Date Value Ref Range Status   03/12/2020 11 8 - 16 mmol/L Final   08/31/2013 12 5 - 15 meq/L Final     eGFR if    Date Value Ref Range Status   03/12/2020 35 (A) >60 mL/min/1.73 m^2 Final     eGFR if non    Date Value Ref Range Status   03/12/2020 30 (A) >60 mL/min/1.73 m^2 Final     Comment:     Calculation used to obtain the estimated glomerular filtration  rate (eGFR) is the CKD-EPI equation.        Leukemia/Lymphoma Screen - Bone Marrow Right Posterior Iliac Crest   Order: [id number]     Status:  Final result   Visible to patient:  Yes (Patient Portal) Next appt:  02/11/2019 at 02:45 PM in Orthopedics (Larry Molina MD) Dx:  Anemia, unspecified type; Elevated se...   Component 2yr ago   Clinical Diagnosis - Bone Marrow ANM    Body Site - Bone Marrow RPI    Bone Marrow Interpretation No abnormal hematopoietic population is detected in this sample. Correlate with marrow morphology and other ancillary studies.      Comment: Interpreted by: Katarina Hicks MD, Signed on 10/10/2016 at 12:50   Bone Marrow Antibodies Analyzed All analyzed: CD2, CD3, CD4, CD5, CD7, CD8, CD10, CD13, CD19, CD20, CD34, , KAPPA, LAMBDA,CD45 and 7AAD.      Bone Marrow Comment Flow cytometric analysis of  bone marrow  shows populations of polyclonal B lymphocytes with a subset of hematogones detected, and T lymphocytes that are immunophenotypically unremarkable.  The blast gate is not increased. Flow differential:  Lymphocytes   7.5%, Monocytes 2.9%, Granulocytes  74.9%, Blast  2.4%, Debris/nRBC 12.2%,  Viability 93.2%.                 Assessment:       MGUS (monoclonal gammopathy of unknown significance)  -     CBC auto differential; Future; Expected date: 03/19/2020  -     FREE LT CHAIN ANAL; Future; Expected date: 03/19/2020  -     CMP; Future; Expected date: 03/19/2020  -     Immunoglobulins (IgG, IgA, IgM) Quantitative; Future; Expected date: 03/19/2020    Anemia, unspecified type  -     CBC auto differential; Future; Expected date: 03/19/2020  -     FREE LT CHAIN ANAL; Future; Expected date: 03/19/2020  -     CMP; Future; Expected date: 03/19/2020  -     Immunoglobulins (IgG, IgA, IgM) Quantitative; Future; Expected date: 03/19/2020    Osteopenia of multiple sites  -     alendronate (FOSAMAX) 35 MG tablet; Take 1 tablet (35 mg total) by mouth every 7 days.  Dispense: 4 tablet; Refill: 4    Renal insufficiency  -     alendronate (FOSAMAX) 35 MG tablet; Take 1 tablet (35 mg total) by mouth every 7 days.  Dispense: 4 tablet; Refill: 4          Plan:     Anemia with progression  CKD    elev IGA     Kappa elevated  Pt no longer meets criteria for prolia due to a change in her density from osteoporosis to osteopenia  She believes she is in need of a knee replacement : cont calcium and vitamin D3  Next dexa in Dec of 2019 NO longer on prolia since she has osteopenia   Cont K 2 for bone health   History of broken bones   Cont calcium and vitamin D  Cont prozac for depression  Explained MGUS and the transition to myeloma is rare and less than 2% I also explained this could be a component of her low GFR  This is not  myeloma   Cont calcium daily yet add an extra on MWF hence calcium 1200 mg on MWF and 600 or so the rest of the time    Start fosamax to plascencia off osteoporosis   Needs electrolytes   Spent over 15 minutes explaining diet and sports drinks   She cannot take oral bisphosphonate because of GI issues  No need for procrit despite anemia due to renal disease    RTC 2 months for evaluation     The plan was discussed with the patient and all questions/concerns have been answered to the patient's satisfaction.

## 2020-03-29 NOTE — PROGRESS NOTES
Patient, Fifi Mcnair (MRN #[id number]), presented with a recorded BMI of 47.98 kg/m^2 consistent with the definition of morbid obesity (ICD-10 E66.01). The patient's morbid obesity was monitored, evaluated, addressed and/or treated. This addendum to the medical record is made on 03/29/2020.

## 2020-03-31 RX ORDER — AMITRIPTYLINE HYDROCHLORIDE 50 MG/1
TABLET, FILM COATED ORAL
Qty: 60 TABLET | Refills: 2 | OUTPATIENT
Start: 2020-03-31

## 2020-04-02 ENCOUNTER — TELEPHONE (OUTPATIENT)
Dept: OPHTHALMOLOGY | Facility: CLINIC | Age: 70
End: 2020-04-02

## 2020-04-03 ENCOUNTER — TELEPHONE (OUTPATIENT)
Dept: FAMILY MEDICINE | Facility: CLINIC | Age: 70
End: 2020-04-03

## 2020-04-03 NOTE — TELEPHONE ENCOUNTER
----- Message from Ari Galloway sent at 4/3/2020 10:04 AM CDT -----  Type: Needs Medical Advice    Who Called:  [facility]: [phone]  Additional Information: Caller states that she needs updated clinical notes for the patient's sleep apnea faxed to # [phone]

## 2020-04-10 RX ORDER — AMITRIPTYLINE HYDROCHLORIDE 50 MG/1
TABLET, FILM COATED ORAL
Qty: 60 TABLET | Refills: 2 | OUTPATIENT
Start: 2020-04-10

## 2020-04-23 ENCOUNTER — CLINICAL SUPPORT (OUTPATIENT)
Dept: DIABETES | Facility: CLINIC | Age: 70
End: 2020-04-23
Payer: MEDICARE

## 2020-04-23 VITALS — BODY MASS INDEX: 46.01 KG/M2 | WEIGHT: 250 LBS | HEIGHT: 62 IN

## 2020-04-23 DIAGNOSIS — Z79.4 CONTROLLED TYPE 2 DIABETES MELLITUS WITH COMPLICATION, WITH LONG-TERM CURRENT USE OF INSULIN: ICD-10-CM

## 2020-04-23 DIAGNOSIS — E11.8 CONTROLLED TYPE 2 DIABETES MELLITUS WITH COMPLICATION, WITH LONG-TERM CURRENT USE OF INSULIN: ICD-10-CM

## 2020-04-23 PROCEDURE — G0108 PR DIAB MANAGE TRN  PER INDIV: ICD-10-PCS | Mod: S$GLB,,, | Performed by: NUTRITIONIST

## 2020-04-23 PROCEDURE — 99999 PR PBB SHADOW E&M-EST. PATIENT-LVL IV: ICD-10-PCS | Mod: PBBFAC,,, | Performed by: NUTRITIONIST

## 2020-04-23 PROCEDURE — G0108 DIAB MANAGE TRN  PER INDIV: HCPCS | Mod: S$GLB,,, | Performed by: NUTRITIONIST

## 2020-04-23 PROCEDURE — 99999 PR PBB SHADOW E&M-EST. PATIENT-LVL IV: CPT | Mod: PBBFAC,,, | Performed by: NUTRITIONIST

## 2020-04-23 NOTE — PROGRESS NOTES
Diabetes Education--Telemedicine (phone) Visit  Author: Allie Márquez RD  Date: 4/23/2020    Diabetes Care Management Summary  Diabetes Education Record Assessment/Progress: Initial  Current Diabetes Risk Level: High     Last A1c:   Lab Results   Component Value Date    HGBA1C 9.9 (H) 02/28/2020    HGBA1C 8.0 (H) 08/30/2013     Last Visit with Diabetes Educator: : 04/23/2020  Last OPCM Referral: : Not Found   Enrolled in OPCM: No    Diabetes Type  Diabetes Type : Type II    Diabetes History  Diabetes Diagnosis: >10 years  Current Treatment: Insulin pump(Humalog w/pump; states using 50-62 units daily for past week.  Medtronic pump)  Reviewed Problem List with Patient: Yes    Health Maintenance was reviewed today with patient. Discussed with patient importance of routine eye exams, foot exams/foot care, blood work (i.e.: A1c, microalbumin, and lipid), dental visits, yearly flu vaccine, and pneumonia vaccine as indicated by PCP. Patient verbalized understanding.     Health Maintenance Topics with due status: Not Due       Topic Last Completion Date    TETANUS VACCINE 07/28/2014    Colonoscopy 04/11/2018    Eye Exam 10/29/2019    Foot Exam 02/10/2020    Mammogram 02/18/2020    Lipid Panel 02/28/2020    Hemoglobin A1c 02/28/2020    DEXA SCAN 03/12/2020     There are no preventive care reminders to display for this patient.    Nutrition  Meal Planning: skipping meals, 3 meals per day, artificial sweeteners, diet drinks  What type of sweetener do you use?: Splenda  What type of beverages do you drink?: diet soda/tea, water  Meal Plan 24 Hour Recall - Breakfast: (11 am has coffee w/splenda and powdered cream.  Enters 40 carbs into pump)  Meal Plan 24 Hour Recall - Lunch: (12- pm: bologna sandwich, hartmann, mustard OR salad (cucumbers, avocado, olives);  Crystal LIte.  Enters 40 carbs in pump)  Meal Plan 24 Hour Recall - Dinner: (sirloin steak, pasta shells, pineapple salad (letuce, pineapple, hartmann) or salad (per above--no  greens); no beverage.  Enters 45 carbs in pump)  Meal Plan 24 Hour Recall - Snack: (mid afternoon--another coffee; HS--2 small bags Frito Chips (16 gm carbs each), yogurt (4 gm carbs).  Enters 50 carbs in pump)    Monitoring   Monitoring: Other  Self Monitoring : (yes)  Blood Glucose Logs: Yes(ED=295 this morning.  Finds BS runs high in morning (which is 11 am-noon))  Do you use a personal continuous glucose monitor?: No  In the last month, how often have you had a low blood sugar reaction?: never  Can you tell when your blood sugar is too high?: no    Exercise   Exercise Type: none(Issues with back--can't stand for more than 10 mins.  Had 1 knee replacement; ;needs the other but BS must decrease before sx allowed)    Current Diabetes Treatment   Current Treatment: Insulin pump(Humalog w/pump; states using 50-62 units daily for past week.  Medtronic pump)    Social History  Preferred Learning Method: Face to Face  Primary Support: Self, Family(states sister prepares most meals)  Smoking Status: Ex Smoker  Alcohol Use: Never    Barriers to Change  Barriers to Change: None  Learning Challenges : None    Readiness to Learn   Readiness to Learn : Acceptance    Cultural Influences  Cultural Influences: None    Diabetes Education Assessment/Progress  Diabetes Disease Process (diabetes disease process and treatment options): Discussion, Individual Session, Comprehends Key Points  Nutrition (Incorporating nutritional management into one's lifestyle): Individual Session, Comprehends Key Points, Written Materials Provided(materials sent to pt)  Physical Activity (incorporating physical activity into one's lifestyle): Discussion, Not Applicable  Medications (states correct name, dose, onset, peak, duration, side effects & timing of meds): Discussion, Individual Session, Comprehends Key Points  Monitoring (monitoring blood glucose/other parameters & using results): Discussion, Individual Session, Comprehends Key Points  Acute  "Complications (preventing, detecting, and treating acute complications): Discussion, Individual Session, Comprehends Key Points  Chronic Complications (preventing, detecting, and treating chronic complications): Discussion, Individual Session, Comprehends Key Points  Clinical (diabetes, other pertinent medical history, and relevant comorbidities reviewed during visit): Discussion, Individual Session, Comprehends Key Points  Cognitive (knowledge of self-management skills, functional health literacy): Discussion, Individual Session, Comprehends Key Points  Psychosocial (emotional response to diabetes): Discussion, Individual Session, Comprehends Key Points  Diabetes Distress and Support Systems: Discussion, Individual Session, Comprehends Key Points  Behavioral (readiness for change, lifestyle practices, self-care behaviors): Discussion, Individual Session, Comprehends Key Points    Goals  Patient has selected/evaluated goals during today's session: Yes, selected  Healthy Eating: Set(attempt to cut carbs to 30 grams per meal;  decrease amount of Fritos for HS snack and then cut back on frequency (pt adamant about having her Fritos); try other low carb snacks (i.e. jordyn crax w/PNB))  Start Date: 04/23/20  Target Date: 07/23/20  Physical Activity: In Progress  Monitoring: In Progress(will continue to check BS; needs to lower due to desired knee replacement sx)  Medications: In Progress(50-62 units Humalog daily w/Medtronic pump)  Problem Solving: In Progress  Healthy Coping: In Progress  Reducing Risks: In Progress    Diabetes Care Plan/Intervention  Education Plan/Intervention: Individual Follow-Up DSMT    Diabetes Meal Plan  Restrictions: Low Fat, Low Sodium, Restricted Carbohydrate  Calories: 1400  Carbohydrate Per Meal: 20-30g(30 grams per meal; has been putting 40-50 grams carbs per meal into pump)  Carbohydrate Per Snack : 7-15g  Fat: (reviewed heart healthy fats w/emphasis on moderation (prakash with "salad" " consumed--mostly avocado & olives))  Protein: (lean PRO at each feeding)    Today's Self-Management Care Plan was developed with the patient's input and is based on barriers identified during today's assessment.    The long and short-term goals in the care plan were written with the patient/caregiver's input. The patient has agreed to work toward these goals to improve her overall diabetes control.      The patient received a copy of today's self-management plan and verbalized understanding of the care plan, goals, and all of today's instructions.      The patient was encouraged to communicate with her physician and care team regarding her condition(s) and treatment.  I provided the patient with my contact information today and encouraged her to contact me via phone or patient portal as needed.       ..Diabetes Care Specialist Telehealth Visit Note     It was in the patient's best interest to receive diabetes self-management education and support services in this format to prevent the exposure to COVID-19.        Established Patient - Audio Only Telehealth Visit  The patient location is: home   The chief complaint leading to consultation is: Diabetes    Visit type: Virtual visit with audio only (telephone)  Total time spent with patient: 60 min      The reason for the audio only service rather than synchronous audio and video virtual visit was related to technical difficulties or patient preference/necessity.     Each patient to whom I provide medical services by telemedicine is:  (1) informed of the relationship between the physician and patient and the respective role of any other health care provider with respect to management of the patient; and (2) notified that they may decline to receive medical services by telemedicine and may withdraw from such care at any time. Patient verbally consented to receive this service via voice-only telephone call.            Education Units of Time   Time Spent: 60 min

## 2020-05-05 ENCOUNTER — PATIENT MESSAGE (OUTPATIENT)
Dept: ADMINISTRATIVE | Facility: HOSPITAL | Age: 70
End: 2020-05-05

## 2020-05-18 ENCOUNTER — TELEPHONE (OUTPATIENT)
Dept: HEMATOLOGY/ONCOLOGY | Facility: CLINIC | Age: 70
End: 2020-05-18

## 2020-05-18 NOTE — TELEPHONE ENCOUNTER
----- Message from Princess LUCIANA Coleman sent at 5/18/2020  3:24 PM CDT -----  Contact: ptrr  Type:  Patient Returning Call    Who Called:  Patient   Who Left Message for Patient:   Nurse Mahi  Does the patient know what this is regarding?:  yes  Best Call Back Number:  [phone] (home)     Additional Information:

## 2020-06-06 ENCOUNTER — LAB VISIT (OUTPATIENT)
Dept: LAB | Facility: HOSPITAL | Age: 70
End: 2020-06-06
Attending: INTERNAL MEDICINE
Payer: MEDICARE

## 2020-06-06 DIAGNOSIS — N18.4 CHRONIC KIDNEY DISEASE, STAGE IV (SEVERE): Primary | ICD-10-CM

## 2020-06-06 LAB
ALBUMIN SERPL BCP-MCNC: 3.4 G/DL (ref 3.5–5.2)
ALP SERPL-CCNC: 123 U/L (ref 55–135)
ALT SERPL W/O P-5'-P-CCNC: 24 U/L (ref 10–44)
ANION GAP SERPL CALC-SCNC: 13 MMOL/L (ref 8–16)
AST SERPL-CCNC: 25 U/L (ref 10–40)
BASOPHILS # BLD AUTO: 0.03 K/UL (ref 0–0.2)
BASOPHILS NFR BLD: 0.4 % (ref 0–1.9)
BILIRUB SERPL-MCNC: 0.2 MG/DL (ref 0.1–1)
BUN SERPL-MCNC: 27 MG/DL (ref 8–23)
CALCIUM SERPL-MCNC: 9.3 MG/DL (ref 8.7–10.5)
CHLORIDE SERPL-SCNC: 100 MMOL/L (ref 95–110)
CO2 SERPL-SCNC: 25 MMOL/L (ref 23–29)
CREAT SERPL-MCNC: 1.8 MG/DL (ref 0.5–1.4)
DIFFERENTIAL METHOD: ABNORMAL
EOSINOPHIL # BLD AUTO: 0.2 K/UL (ref 0–0.5)
EOSINOPHIL NFR BLD: 3.2 % (ref 0–8)
ERYTHROCYTE [DISTWIDTH] IN BLOOD BY AUTOMATED COUNT: 14 % (ref 11.5–14.5)
EST. GFR  (AFRICAN AMERICAN): 33 ML/MIN/1.73 M^2
EST. GFR  (NON AFRICAN AMERICAN): 28 ML/MIN/1.73 M^2
GLUCOSE SERPL-MCNC: 395 MG/DL (ref 70–110)
HCT VFR BLD AUTO: 36.7 % (ref 37–48.5)
HGB BLD-MCNC: 11.9 G/DL (ref 12–16)
IMM GRANULOCYTES # BLD AUTO: 0.03 K/UL (ref 0–0.04)
IMM GRANULOCYTES NFR BLD AUTO: 0.4 % (ref 0–0.5)
LYMPHOCYTES # BLD AUTO: 1.9 K/UL (ref 1–4.8)
LYMPHOCYTES NFR BLD: 27.4 % (ref 18–48)
MCH RBC QN AUTO: 32 PG (ref 27–31)
MCHC RBC AUTO-ENTMCNC: 32.4 G/DL (ref 32–36)
MCV RBC AUTO: 99 FL (ref 82–98)
MONOCYTES # BLD AUTO: 0.4 K/UL (ref 0.3–1)
MONOCYTES NFR BLD: 6.2 % (ref 4–15)
NEUTROPHILS # BLD AUTO: 4.3 K/UL (ref 1.8–7.7)
NEUTROPHILS NFR BLD: 62.4 % (ref 38–73)
NRBC BLD-RTO: 0 /100 WBC
PHOSPHATE SERPL-MCNC: 3.3 MG/DL (ref 2.7–4.5)
PLATELET # BLD AUTO: 266 K/UL (ref 150–350)
PMV BLD AUTO: 9.6 FL (ref 9.2–12.9)
POTASSIUM SERPL-SCNC: 4.4 MMOL/L (ref 3.5–5.1)
PROT SERPL-MCNC: 7.1 G/DL (ref 6–8.4)
PTH-INTACT SERPL-MCNC: 64.7 PG/ML (ref 9–77)
RBC # BLD AUTO: 3.72 M/UL (ref 4–5.4)
SODIUM SERPL-SCNC: 138 MMOL/L (ref 136–145)
WBC # BLD AUTO: 6.91 K/UL (ref 3.9–12.7)

## 2020-06-06 PROCEDURE — 83970 ASSAY OF PARATHORMONE: CPT

## 2020-06-06 PROCEDURE — 80053 COMPREHEN METABOLIC PANEL: CPT

## 2020-06-06 PROCEDURE — 85025 COMPLETE CBC W/AUTO DIFF WBC: CPT

## 2020-06-06 PROCEDURE — 84100 ASSAY OF PHOSPHORUS: CPT

## 2020-06-06 PROCEDURE — 36415 COLL VENOUS BLD VENIPUNCTURE: CPT

## 2020-06-08 ENCOUNTER — OFFICE VISIT (OUTPATIENT)
Dept: PULMONOLOGY | Facility: CLINIC | Age: 70
End: 2020-06-08
Payer: MEDICARE

## 2020-06-08 VITALS
BODY MASS INDEX: 48.4 KG/M2 | TEMPERATURE: 99 F | HEIGHT: 62 IN | OXYGEN SATURATION: 97 % | HEART RATE: 75 BPM | SYSTOLIC BLOOD PRESSURE: 113 MMHG | WEIGHT: 263 LBS | DIASTOLIC BLOOD PRESSURE: 60 MMHG

## 2020-06-08 DIAGNOSIS — E66.01 MORBID OBESITY WITH BMI OF 40.0-44.9, ADULT: ICD-10-CM

## 2020-06-08 DIAGNOSIS — J45.30 MILD PERSISTENT ASTHMA WITHOUT COMPLICATION: Primary | ICD-10-CM

## 2020-06-08 PROBLEM — J45.40 MODERATE PERSISTENT ASTHMA WITHOUT COMPLICATION: Status: RESOLVED | Noted: 2018-10-11 | Resolved: 2020-06-08

## 2020-06-08 PROCEDURE — 3078F PR MOST RECENT DIASTOLIC BLOOD PRESSURE < 80 MM HG: ICD-10-PCS | Mod: S$GLB,,, | Performed by: NURSE PRACTITIONER

## 2020-06-08 PROCEDURE — 3288F FALL RISK ASSESSMENT DOCD: CPT | Mod: S$GLB,,, | Performed by: NURSE PRACTITIONER

## 2020-06-08 PROCEDURE — 3074F PR MOST RECENT SYSTOLIC BLOOD PRESSURE < 130 MM HG: ICD-10-PCS | Mod: S$GLB,,, | Performed by: NURSE PRACTITIONER

## 2020-06-08 PROCEDURE — 1159F PR MEDICATION LIST DOCUMENTED IN MEDICAL RECORD: ICD-10-PCS | Mod: S$GLB,,, | Performed by: NURSE PRACTITIONER

## 2020-06-08 PROCEDURE — 1100F PTFALLS ASSESS-DOCD GE2>/YR: CPT | Mod: S$GLB,,, | Performed by: NURSE PRACTITIONER

## 2020-06-08 PROCEDURE — 99214 OFFICE O/P EST MOD 30 MIN: CPT | Mod: S$GLB,,, | Performed by: NURSE PRACTITIONER

## 2020-06-08 PROCEDURE — 3074F SYST BP LT 130 MM HG: CPT | Mod: S$GLB,,, | Performed by: NURSE PRACTITIONER

## 2020-06-08 PROCEDURE — 1126F PR PAIN SEVERITY QUANTIFIED, NO PAIN PRESENT: ICD-10-PCS | Mod: S$GLB,,, | Performed by: NURSE PRACTITIONER

## 2020-06-08 PROCEDURE — 99214 PR OFFICE/OUTPT VISIT, EST, LEVL IV, 30-39 MIN: ICD-10-PCS | Mod: S$GLB,,, | Performed by: NURSE PRACTITIONER

## 2020-06-08 PROCEDURE — 3078F DIAST BP <80 MM HG: CPT | Mod: S$GLB,,, | Performed by: NURSE PRACTITIONER

## 2020-06-08 PROCEDURE — 1100F PR PT FALLS ASSESS DOC 2+ FALLS/FALL W/INJURY/YR: ICD-10-PCS | Mod: S$GLB,,, | Performed by: NURSE PRACTITIONER

## 2020-06-08 PROCEDURE — 3288F PR FALLS RISK ASSESSMENT DOCUMENTED: ICD-10-PCS | Mod: S$GLB,,, | Performed by: NURSE PRACTITIONER

## 2020-06-08 PROCEDURE — 1126F AMNT PAIN NOTED NONE PRSNT: CPT | Mod: S$GLB,,, | Performed by: NURSE PRACTITIONER

## 2020-06-08 PROCEDURE — 1159F MED LIST DOCD IN RCRD: CPT | Mod: S$GLB,,, | Performed by: NURSE PRACTITIONER

## 2020-06-08 NOTE — PROGRESS NOTES
SUBJECTIVE:    Patient ID: Fifi Mcnair is a 69 y.o. female.    Chief Complaint: Follow-up and Cough (pts MECHELLE is being treated by Dr Phelps)    HPI   Patient here today not using the Breo daily still because of cost.  She uses every couple of days. She does cough occasionally. She has gained more weight. She takes Protonix for her GERD.    Past Medical History:   Diagnosis Date    Allergy     multiple antibiotic allergies    Anemia     Anxiety     Arthritis     Asthma     CHF (congestive heart failure)     NYHA class III     Chronic kidney disease     Colon polyp     benign    COPD (chronic obstructive pulmonary disease)     DLCO 37% , and mild pulmonary HTN    Dental bridge present     LOWER    Depression     Diabetes mellitus     Diabetic retinopathy     Diastolic dysfunction     Diverticulitis of large intestine without perforation or abscess without bleeding 2/12/2018    Diverticulosis     Former smoker     Fracture of lumbar spine     GERD (gastroesophageal reflux disease)     Hernia of unspecified site of abdominal cavity without mention of obstruction or gangrene     Hyperlipidemia     Hypertension     hypertensive renal    IBS (irritable bowel syndrome)     Mild nonproliferative diabetic retinopathy(362.04) 11/25/2013    Morbid obesity     Nuclear sclerosis 11/25/2013    Osteoporosis     Peripheral edema     Rash     Recurrent upper respiratory infection (URI)     S/P LASIK (laser assisted in situ keratomileusis)     Sleep apnea     sleep apnea uses bipap.    Stroke     tia    Thyroid disease     on synthroid    TIA (transient ischemic attack)     Urinary tract infection     Wears glasses      Past Surgical History:   Procedure Laterality Date    ABDOMINAL SURGERY      ADENOIDECTOMY      ARTHROSCOPIC CHONDROPLASTY OF KNEE JOINT Right 8/31/2018    Procedure: ARTHROSCOPY, KNEE, WITH CHONDROPLASTY;  Surgeon: Larry Molina MD;  [facility]: [facility];  Service:  Orthopedics;  Laterality: Right;  Tricompartmental chondroplasty    COLONOSCOPY  2013    repeat in 5 years    COLONOSCOPY N/A 2017    Procedure: COLONOSCOPY;  Surgeon: Luis Navarro MD;  Location: [facility] ENDO;  Service: Endoscopy;  Laterality: N/A;    COLONOSCOPY N/A 2018    Procedure: COLONOSCOPY;  Surgeon: Luis Navarro MD;  Location: [facility] ENDO;  Service: Endoscopy;  Laterality: N/A;    COSMETIC SURGERY      belt abdominoplasty    CYSTOSCOPY      CYSTOSCOPY N/A 2018    Procedure: CYSTOSCOPY;  Surgeon: Angy Campos MD;  Location: [facility] OR;  Service: Urology;  Laterality: N/A;    EYE SURGERY Right     mazzulla    GASTRECTOMY      gastric sleeve    gastric sleeve      HERNIA REPAIR      5 years old    HYSTERECTOMY      ovaries remain    JOINT REPLACEMENT      KNEE ARTHROPLASTY Left 2019    Procedure: ARTHROPLASTY, KNEE;  Surgeon: Larry Molina MD;  Location: [facility] OR;  Service: Orthopedics;  Laterality: Left;  [place]    KNEE ARTHROSCOPY W/ MENISCECTOMY Right 2018    Procedure: ARTHROSCOPY, KNEE, WITH MENISCECTOMY;  Surgeon: Larry Molina MD;  Location: [facility] OR;  Service: Orthopedics;  Laterality: Right;    REFRACTIVE SURGERY      mono va//ou//    RHINOPLASTY TIP      TONSILLECTOMY      TOTAL KNEE ARTHROPLASTY Left 19    TUBAL LIGATION      UPPER GASTROINTESTINAL ENDOSCOPY  2013    UPPER GASTROINTESTINAL ENDOSCOPY  2016    Dr. Navarro, repeat in 8 weeks    UPPER GASTROINTESTINAL ENDOSCOPY  2016    Dr. Randall     Social History     Tobacco Use    Smoking status: Former Smoker     Packs/day: 1.00     Years: 4.00     Pack years: 4.00     Types: Cigarettes     Last attempt to quit:      Years since quittin.4    Smokeless tobacco: Never Used    Tobacco comment: quit , lived with smokers    Substance Use Topics    Alcohol use: Not Currently     Frequency: Never     Binge frequency: Never     Family  History   Problem Relation Age of Onset    Heart disease Mother 59    Cancer Mother 59        throat    Allergies Mother     Diabetes Mother     Breast cancer Mother     Heart disease Father 70        flutter    Allergies Father     Diabetes Father     Cancer Sister 22        22 thyroid,49  breast    Allergies Sister     Breast cancer Sister     Diabetes Sister     Cancer Brother 62        lung    Diabetes Brother     Asthma Daughter     Diabetes Daughter     Depression Daughter     Asthma Grandchild     Eczema Grandchild     Eczema Grandchild     Breast cancer Maternal Grandmother     Ovarian cancer Cousin       Review of patient's allergies indicates:   Allergen Reactions    Adhesive Other (See Comments)     Blisters    Cleocin [clindamycin hcl] Hives    Ceclor [cefaclor] Hives    Advair diskus [fluticasone-salmeterol]      Dry mouth    Aleve [naproxen sodium]      Unable to take secondary to kidney function.     Erythromycin Hives    Levaquin [levofloxacin] Dermatitis    Macrobid [nitrofurantoin monohyd/m-cryst] Other (See Comments)     Stomach pain/ GI issues    Macrodantin [nitrofurantoin macrocrystalline] Hives    Motrin [ibuprofen]      Unable to take secondary to kidney functions.    Restoril [temazepam]      LIGHTHEADED UPON WAKING.with poor results    Sulfa (sulfonamide antibiotics)      Hold due to renal problems    Trazodone      PALPITATIONS    Remeron [mirtazapine] Palpitations and Other (See Comments)     Jittery    Vancomycin analogues Rash     Feet broke out/inflammed blood vessels         Current Outpatient Medications   Medication Sig Dispense Refill    alendronate (FOSAMAX) 35 MG tablet Take 1 tablet (35 mg total) by mouth every 7 days. 4 tablet 4    allopurinol (ZYLOPRIM) 100 MG tablet Take 2 tablets (200 mg total) by mouth nightly. 180 tablet 3    amitriptyline (ELAVIL) 100 MG tablet 1 tab at night 90 tablet 2    ascorbic acid, vitamin C, (VITAMIN C) 500  MG tablet Take 500 mg by mouth once daily.      atenolol (TENORMIN) 25 MG tablet Take 1 tablet (25 mg total) by mouth 2 (two) times daily. 180 tablet 3    biotin 1 mg Cap Take by mouth.      calcitRIOL (ROCALTROL) 0.25 MCG Cap TAKE ONE TABLET BY MOUTH TWICE A WEEK ON monday AND friday 8 capsule 3    cetirizine (ZYRTEC) 10 MG tablet Take 1 tablet (10 mg total) by mouth once daily. 1 Bottle 5    cinnamon bark (CINNAMON ORAL) Take by mouth once daily.      clotrimazole-betamethasone 1-0.05% (LOTRISONE) cream       cyanocobalamin, vitamin B-12, (VITAMIN B-12) 5,000 mcg Subl Place 5,000 mg under the tongue once a week.      diclofenac sodium (VOLTAREN) 1 % Gel  APPLY 2 GRAMS TOPICALLY ONCE DAILY 1 Tube 0    DYMISTA 137-50 mcg/spray Spry nassal spray as needed.       ferrous sulfate (IRON) 325 mg (65 mg iron) Tab tablet Take 325 mg by mouth daily with breakfast.      fish oil-omega-3 fatty acids 300-1,000 mg capsule Take 1 capsule by mouth once daily.      FLUoxetine 20 MG capsule Take 2 capsules (40 mg total) by mouth once daily. 180 capsule 3    fluticasone furoate-vilanterol (BREO) 100-25 mcg/dose diskus inhaler Inhale 1 puff into the lungs once daily. 60 each 3    gabapentin (NEURONTIN) 600 MG tablet Take 1 tablet (600 mg total) by mouth 3 (three) times daily. 90 tablet 11    HUMALOG U-100 INSULIN 100 unit/mL injection inject 100 UNITS DAILY via insulin pump      hydrocortisone 2.5 % cream Apply topically nightly. 3.5 g 1    insulin aspart U-100 (NOVOLOG) 100 unit/mL injection Use per insulin pump, 35-40 units daily. 40 mL 0    L-LYSINE ORAL Take by mouth.      l-methylfolate-b2-b6-b12 (CEREFOLIN) 6-5-50-1 mg Tab Take 1 tablet by mouth once daily.      levothyroxine (SYNTHROID) 25 MCG tablet Take 1 tablet (25 mcg total) by mouth once daily. 90 tablet 3    losartan (COZAAR) 50 MG tablet Take 1 tablet (50 mg total) by mouth once daily. 90 tablet 3    magnesium oxide (MAG-OX) 400 mg (241.3 mg  magnesium) tablet Take 800 mg by mouth every evening.      montelukast (SINGULAIR) 10 mg tablet Take 1 tablet (10 mg total) by mouth every evening. 30 tablet 8    oxyCODONE (ROXICODONE) 15 MG Tab Take 1 tablet (15 mg total) by mouth every 6 (six) hours as needed for Pain. 30 tablet 0    pantoprazole (PROTONIX) 40 MG tablet TAKE 1 TABLET BY MOUTH ONCE DAILY 90 tablet 3    potassium chloride SA (K-DUR,KLOR-CON) 20 MEQ tablet TAKE 1 TABLET BY MOUTH TWICE DAILY 180 tablet 3    SSD 1 % cream       torsemide (DEMADEX) 20 MG Tab TAKE ONE TABLET BY MOUTH TWICE DAILY 60 tablet 2    TURMERIC ORAL Take by mouth once daily.      CHERATUSSIN AC  mg/5 mL syrup TAKE 5 ML (CC) BY MOUTH 4 TIMES DAILY AS NEEDED FOR COUGH FOR 5 DAYS  0    diphenoxylate-atropine 2.5-0.025 mg (LOMOTIL) 2.5-0.025 mg per tablet TAKE ONE TABLET BY MOUTH 4 TIMES DAILY AS NEEDED FOR DIARRHEA 60 tablet 1    fluticasone propionate (FLONASE) 50 mcg/actuation nasal spray 1 spray (50 mcg total) by Each Nostril route once daily. 1 Bottle 4    insulin (BASAGLAR KWIKPEN U-100 INSULIN) glargine 100 units/mL (3mL) SubQ pen Inject 20 Units into the skin every evening. Use 10 units if BS over 210.Night before surgery. (Patient taking differently: Inject 20 Units into the skin once daily. Use 10 units if BS over 210.Night before surgery.)  0    Lacto.acidophilus-Bif.animalis (PROBIOTIC) 5 billion cell CpSP Take 1 capsule by mouth once daily. 30 capsule 3    MULTIVIT-IRON-MIN-FOLIC ACID 3,500-18-0.4 UNIT-MG-MG ORAL CHEW Take by mouth 2 (two) times daily.      mupirocin (BACTROBAN) 2 % ointment APPLY OINTMENT TOPICALLY TO AFFECTED AREA ON NOSE THREE TIMES DAILY AS DIRECTED 15 g 11     Current Facility-Administered Medications   Medication Dose Route Frequency Provider Last Rate Last Dose    gentamicin injection 80 mg  80 mg Intramuscular 1 time in Clinic/HOD Angy Campos MD         Facility-Administered Medications Ordered in Other Visits  "  Medication Dose Route Frequency Provider Last Rate Last Dose    lactated ringers infusion   Intravenous Continuous Shaheen Hanson MD 10 mL/hr at 08/31/18 0739           Last PFT: 06/05/2017  Last Chest xray:  04/04/2019      General: feels well   Eyes: dry eyes  ENT:  Post nasal drip   Heart:: No chest pain or palpitations.  Lungs:coughs occasionally   GI: Reflux on Protonix   : No dysuria, hesitancy, or nocturia.  Skin: No lesions or rashes.  Musculoskeletal: knee pain   Neuro: no headaches   Lymph: a little swelling in legs   Psych: No anxiety or depression.  Endo:  More weight gain, diabetes     OBJECTIVE:      /60 (BP Location: Left arm, Patient Position: Sitting, BP Method: Large (Automatic))   Pulse 75   Temp 98.6 °F (37 °C)   Ht 5' 2" (1.575 m)   Wt 119.3 kg (263 lb)   LMP  (LMP Unknown)   SpO2 97%   BMI 48.10 kg/m²     Physical Exam  GENERAL: Older morbidly obese patient in no distress.  HEENT: Pupils equal and reactive. Extraocular movements intact. Nose intact.      Pharynx moist, post nasal drip and cobblestoning   NECK: Supple.   HEART: Regular rate and rhythm. No murmur or gallop auscultated.  LUNGS: Clear to auscultation and percussion. Lung excursion symmetrical. No change in fremitus. No adventitial noises.  ABDOMEN: Bowel sounds present. Non-tender, no masses palpated. Obese   : Normal anatomy.  EXTREMITIES: Normal muscle tone and joint movement, no cyanosis or clubbing.   LYMPHATICS: No adenopathy palpated, no edema.  SKIN: Dry, intact, no lesions.   NEURO: Cranial nerves II-XII intact. Motor strength 5/5 bilaterally, upper and lower extremities.  PSYCH: Appropriate affect.          Assessment:       1. Mild persistent asthma without complication    2. Morbid obesity with BMI of 40.0-44.9, adult, today 44.3          Plan:       Mild persistent asthma without complication    Morbid obesity with BMI of 40.0-44.9, adult, today 44.3         Have to lose weight   Keep sleeping on " your CPAP every night  Call your insurance and see if Advair or symbicort generic are cheaper for you.   Continue the Singulair   Continue the Breo for now   Follow up in about 6 months (around 12/8/2020).

## 2020-06-08 NOTE — PATIENT INSTRUCTIONS
Have to lose weight   Keep sleeping on your CPAP every night  Call your insurance and see if Advair or symbicort generic are cheaper for you.   Continue the Singulair   Continue the Breo for now

## 2020-06-09 ENCOUNTER — OFFICE VISIT (OUTPATIENT)
Dept: RHEUMATOLOGY | Facility: CLINIC | Age: 70
End: 2020-06-09
Payer: MEDICARE

## 2020-06-09 VITALS
TEMPERATURE: 97 F | BODY MASS INDEX: 48.67 KG/M2 | WEIGHT: 266.13 LBS | DIASTOLIC BLOOD PRESSURE: 68 MMHG | SYSTOLIC BLOOD PRESSURE: 123 MMHG

## 2020-06-09 DIAGNOSIS — E79.0 HYPERURICEMIA: Primary | ICD-10-CM

## 2020-06-09 PROCEDURE — 1159F PR MEDICATION LIST DOCUMENTED IN MEDICAL RECORD: ICD-10-PCS | Mod: S$GLB,,, | Performed by: INTERNAL MEDICINE

## 2020-06-09 PROCEDURE — 1125F PR PAIN SEVERITY QUANTIFIED, PAIN PRESENT: ICD-10-PCS | Mod: S$GLB,,, | Performed by: INTERNAL MEDICINE

## 2020-06-09 PROCEDURE — 3078F DIAST BP <80 MM HG: CPT | Mod: S$GLB,,, | Performed by: INTERNAL MEDICINE

## 2020-06-09 PROCEDURE — 3074F SYST BP LT 130 MM HG: CPT | Mod: S$GLB,,, | Performed by: INTERNAL MEDICINE

## 2020-06-09 PROCEDURE — 1125F AMNT PAIN NOTED PAIN PRSNT: CPT | Mod: S$GLB,,, | Performed by: INTERNAL MEDICINE

## 2020-06-09 PROCEDURE — 3074F PR MOST RECENT SYSTOLIC BLOOD PRESSURE < 130 MM HG: ICD-10-PCS | Mod: S$GLB,,, | Performed by: INTERNAL MEDICINE

## 2020-06-09 PROCEDURE — 99213 PR OFFICE/OUTPT VISIT, EST, LEVL III, 20-29 MIN: ICD-10-PCS | Mod: S$GLB,,, | Performed by: INTERNAL MEDICINE

## 2020-06-09 PROCEDURE — 1101F PT FALLS ASSESS-DOCD LE1/YR: CPT | Mod: S$GLB,,, | Performed by: INTERNAL MEDICINE

## 2020-06-09 PROCEDURE — 3078F PR MOST RECENT DIASTOLIC BLOOD PRESSURE < 80 MM HG: ICD-10-PCS | Mod: S$GLB,,, | Performed by: INTERNAL MEDICINE

## 2020-06-09 PROCEDURE — 1101F PR PT FALLS ASSESS DOC 0-1 FALLS W/OUT INJ PAST YR: ICD-10-PCS | Mod: S$GLB,,, | Performed by: INTERNAL MEDICINE

## 2020-06-09 PROCEDURE — 1159F MED LIST DOCD IN RCRD: CPT | Mod: S$GLB,,, | Performed by: INTERNAL MEDICINE

## 2020-06-09 PROCEDURE — 99213 OFFICE O/P EST LOW 20 MIN: CPT | Mod: S$GLB,,, | Performed by: INTERNAL MEDICINE

## 2020-06-09 NOTE — PROGRESS NOTES
"         [facility] RHEUMATOLOGY            PROGRESS NOTE      Subjective:       Patient ID:   NAME: Fifi Mcnair : 1950     69 y.o. female    Referring Doc: No ref. provider found  Other Physicians:    Chief Complaint:  Arthritis due to gout      History of Present Illness:     Patient returns today for a regularly scheduled follow-up visit for osteoarthritis and history of hyperuricemia.     The patient was last seen in 2019.  She is having problems with uncontrolled diabetes.  Pain in right knee with occasional swelling.  Patient states she was told  It was "bone on bone".  Chronic low back pain.  No GI complaints.          ROS:   GEN:  No  fever, night sweats . weight is stable   + fatigue  SKIN: no rashes, no bruising, no ulcerations, no Raynaud's  HEENT: no HA's, No visual changes, no mucosal ulcers, no sicca symptoms,  CV:   no CP, SOB, PND,+ LOERA, no orthopnea, no palpitations  PULM: normal with no SOB, cough, hemoptysis, sputum or pleuritic pain  GI:  no abdominal pain, nausea, vomiting, constipation, diarrhea, melanotic stools, BRBPR, hematemesis, no dysphagia  :   no dysuria  NEURO: no , headaches, visual disturbances, muscle weakness  MUSCULOSKELETAL:no joint swelling, prolonged AM stiffness, + back pain, no muscle pain  Allergies:  Review of patient's allergies indicates:   Allergen Reactions    Adhesive Other (See Comments)     Blisters    Cleocin [clindamycin hcl] Hives    Ceclor [cefaclor] Hives    Advair diskus [fluticasone propion-salmeterol]      Dry mouth    Aleve [naproxen sodium]      Unable to take secondary to kidney function.     Erythromycin Hives    Levaquin [levofloxacin] Dermatitis    Macrobid [nitrofurantoin monohyd/m-cryst] Other (See Comments)     Stomach pain/ GI issues    Macrodantin [nitrofurantoin macrocrystalline] Hives    Motrin [ibuprofen]      Unable to take secondary to kidney functions.    Restoril [temazepam]      LIGHTHEADED UPON WAKING.with " poor results    Sulfa (sulfonamide antibiotics)      Hold due to renal problems    Trazodone      PALPITATIONS    Remeron [mirtazapine] Palpitations and Other (See Comments)     Jittery    Vancomycin analogues Rash     Feet broke out/inflammed blood vessels         Medications:    Current Outpatient Medications:     alendronate (FOSAMAX) 35 MG tablet, Take 1 tablet (35 mg total) by mouth every 7 days., Disp: 4 tablet, Rfl: 4    allopurinol (ZYLOPRIM) 100 MG tablet, Take 2 tablets (200 mg total) by mouth nightly., Disp: 180 tablet, Rfl: 3    amitriptyline (ELAVIL) 100 MG tablet, 1 tab at night, Disp: 90 tablet, Rfl: 2    ascorbic acid, vitamin C, (VITAMIN C) 500 MG tablet, Take 500 mg by mouth once daily., Disp: , Rfl:     atenolol (TENORMIN) 25 MG tablet, Take 1 tablet (25 mg total) by mouth 2 (two) times daily., Disp: 180 tablet, Rfl: 3    biotin 1 mg Cap, Take by mouth., Disp: , Rfl:     calcitRIOL (ROCALTROL) 0.25 MCG Cap, TAKE ONE TABLET BY MOUTH TWICE A WEEK ON monday AND friday, Disp: 8 capsule, Rfl: 3    cetirizine (ZYRTEC) 10 MG tablet, Take 1 tablet (10 mg total) by mouth once daily., Disp: 1 Bottle, Rfl: 5    CHERATUSSIN AC  mg/5 mL syrup, TAKE 5 ML (CC) BY MOUTH 4 TIMES DAILY AS NEEDED FOR COUGH FOR 5 DAYS, Disp: , Rfl: 0    cinnamon bark (CINNAMON ORAL), Take by mouth once daily., Disp: , Rfl:     clotrimazole-betamethasone 1-0.05% (LOTRISONE) cream, , Disp: , Rfl:     cyanocobalamin, vitamin B-12, (VITAMIN B-12) 5,000 mcg Subl, Place 5,000 mg under the tongue once a week., Disp: , Rfl:     diclofenac sodium (VOLTAREN) 1 % Gel,  APPLY 2 GRAMS TOPICALLY ONCE DAILY, Disp: 1 Tube, Rfl: 0    diphenoxylate-atropine 2.5-0.025 mg (LOMOTIL) 2.5-0.025 mg per tablet, TAKE ONE TABLET BY MOUTH 4 TIMES DAILY AS NEEDED FOR DIARRHEA, Disp: 60 tablet, Rfl: 1    DYMISTA 137-50 mcg/spray Spry nassal spray, as needed. , Disp: , Rfl:     ferrous sulfate (IRON) 325 mg (65 mg iron) Tab tablet, Take  325 mg by mouth daily with breakfast., Disp: , Rfl:     fish oil-omega-3 fatty acids 300-1,000 mg capsule, Take 1 capsule by mouth once daily., Disp: , Rfl:     FLUoxetine 20 MG capsule, Take 2 capsules (40 mg total) by mouth once daily., Disp: 180 capsule, Rfl: 3    fluticasone furoate-vilanterol (BREO) 100-25 mcg/dose diskus inhaler, Inhale 1 puff into the lungs once daily., Disp: 60 each, Rfl: 3    fluticasone propionate (FLONASE) 50 mcg/actuation nasal spray, 1 spray (50 mcg total) by Each Nostril route once daily., Disp: 1 Bottle, Rfl: 4    gabapentin (NEURONTIN) 600 MG tablet, Take 1 tablet (600 mg total) by mouth 3 (three) times daily., Disp: 90 tablet, Rfl: 11    HUMALOG U-100 INSULIN 100 unit/mL injection, inject 100 UNITS DAILY via insulin pump, Disp: , Rfl:     hydrocortisone 2.5 % cream, Apply topically nightly., Disp: 3.5 g, Rfl: 1    insulin (BASAGLAR KWIKPEN U-100 INSULIN) glargine 100 units/mL (3mL) SubQ pen, Inject 20 Units into the skin every evening. Use 10 units if BS over 210.Night before surgery. (Patient taking differently: Inject 20 Units into the skin once daily. Use 10 units if BS over 210.Night before surgery.), Disp: , Rfl: 0    insulin aspart U-100 (NOVOLOG) 100 unit/mL injection, Use per insulin pump, 35-40 units daily., Disp: 40 mL, Rfl: 0    L-LYSINE ORAL, Take by mouth., Disp: , Rfl:     l-methylfolate-b2-b6-b12 (CEREFOLIN) 6-5-50-1 mg Tab, Take 1 tablet by mouth once daily., Disp: , Rfl:     Lacto.acidophilus-Bif.animalis (PROBIOTIC) 5 billion cell CpSP, Take 1 capsule by mouth once daily., Disp: 30 capsule, Rfl: 3    levothyroxine (SYNTHROID) 25 MCG tablet, Take 1 tablet (25 mcg total) by mouth once daily., Disp: 90 tablet, Rfl: 3    losartan (COZAAR) 50 MG tablet, Take 1 tablet (50 mg total) by mouth once daily., Disp: 90 tablet, Rfl: 3    magnesium oxide (MAG-OX) 400 mg (241.3 mg magnesium) tablet, Take 800 mg by mouth every evening., Disp: , Rfl:     montelukast  (SINGULAIR) 10 mg tablet, Take 1 tablet (10 mg total) by mouth every evening., Disp: 30 tablet, Rfl: 8    MULTIVIT-IRON-MIN-FOLIC ACID 3,500-18-0.4 UNIT-MG-MG ORAL CHEW, Take by mouth 2 (two) times daily., Disp: , Rfl:     mupirocin (BACTROBAN) 2 % ointment, APPLY OINTMENT TOPICALLY TO AFFECTED AREA ON NOSE THREE TIMES DAILY AS DIRECTED, Disp: 15 g, Rfl: 11    oxyCODONE (ROXICODONE) 15 MG Tab, Take 1 tablet (15 mg total) by mouth every 6 (six) hours as needed for Pain., Disp: 30 tablet, Rfl: 0    pantoprazole (PROTONIX) 40 MG tablet, TAKE 1 TABLET BY MOUTH ONCE DAILY, Disp: 90 tablet, Rfl: 3    potassium chloride SA (K-DUR,KLOR-CON) 20 MEQ tablet, TAKE 1 TABLET BY MOUTH TWICE DAILY, Disp: 180 tablet, Rfl: 3    SSD 1 % cream, , Disp: , Rfl:     torsemide (DEMADEX) 20 MG Tab, TAKE ONE TABLET BY MOUTH TWICE DAILY, Disp: 60 tablet, Rfl: 2    TURMERIC ORAL, Take by mouth once daily., Disp: , Rfl:     Current Facility-Administered Medications:     gentamicin injection 80 mg, 80 mg, Intramuscular, 1 time in Clinic/HOD, Angy Campos MD    Facility-Administered Medications Ordered in Other Visits:     lactated ringers infusion, , Intravenous, Continuous, Shaheen Hanson MD, Last Rate: 10 mL/hr at 08/31/18 0739    PMHx/PSHx Updates:      Objective:     Vitals:  Blood pressure 123/68, temperature 96.9 °F (36.1 °C), weight 120.7 kg (266 lb 1.6 oz).    Physical Examination:   GEN: no apparent distress, comfortable; AAOx3  SKIN: no rashes,no ulceration, no Raynaud's, no petechiae, no SQ nodules,  HEAD: normal  EYES: no pallor, no icterus,  NECK: no masses, thyroid normal, trachea midline, no LAD/LN's, supple  CV: RRR with no murmur; l S1 and S2 reg. ,no gallop no rubs,   CHEST: Normal respiratory effort; CTAB; normal breath sounds; no wheeze or crackles  MUSC/Skeletal: ROM normal; no crepitus; joints without synovitis,  no deformities  No joint swelling or tenderness of PIP, MCP, wrist, elbow, shoulder, or  knee joints  EXTREM: no clubbing, cyanosis, no edema,normal  pulses   NEURO: grossly intact; motor WNL; AAOx3; ,  PSYCH: normal mood, affect and behavior  LYMPH: normal cervical, supraclavicular          Labs:   Lab Results   Component Value Date    WBC 6.91 06/06/2020    HGB 11.9 (L) 06/06/2020    HCT 36.7 (L) 06/06/2020    MCV 99 (H) 06/06/2020     06/06/2020    CMP  @LASTLAB(NA,K,CL,CO2,GLU,BUN,Creatinine,Calcium,PROT,Albumin,Bilitot,Alkphos,AST,ALT,CRP,ESR,RF,CCP,MONICA,SSA,CPK,uric acid) )@  I have reviewed all available lab results and radiology reports.    Radiology/Diagnostic Studies:        Assessment/Plan:   (1) 69 y.o. female with diagnosis of osteoarthritis.  More symptomatic in right knee.  Unable to inject due to very high blood sugar.  Patient will see her endocrinologist soon.  Chronic kidney disease  DM  Do uric acid with next blood work    Consider intra-articular injection the right knee when diabetes under better control.  Patient will call for appointment   Discussion:     I have explained all of the above in detail and the patient understands all of the current recommendation(s). I have answered all questions to the best of my ability and to their complete satisfaction.       The patient is to continue with the current management plan         RTC in         Electronically signed by Melissa Hernadnez MD

## 2020-06-12 ENCOUNTER — LAB VISIT (OUTPATIENT)
Dept: LAB | Facility: HOSPITAL | Age: 70
End: 2020-06-12
Attending: INTERNAL MEDICINE
Payer: MEDICARE

## 2020-06-12 DIAGNOSIS — J44.89 OBSTRUCTIVE CHRONIC BRONCHITIS WITHOUT EXACERBATION: Primary | ICD-10-CM

## 2020-06-12 LAB
ALBUMIN SERPL BCP-MCNC: 3.4 G/DL (ref 3.5–5.2)
ALP SERPL-CCNC: 124 U/L (ref 55–135)
ALT SERPL W/O P-5'-P-CCNC: 23 U/L (ref 10–44)
AMYLASE SERPL-CCNC: 22 U/L (ref 20–110)
ANION GAP SERPL CALC-SCNC: 10 MMOL/L (ref 8–16)
AST SERPL-CCNC: 25 U/L (ref 10–40)
BASOPHILS # BLD AUTO: 0.04 K/UL (ref 0–0.2)
BASOPHILS NFR BLD: 0.7 % (ref 0–1.9)
BILIRUB SERPL-MCNC: 0.3 MG/DL (ref 0.1–1)
BUN SERPL-MCNC: 23 MG/DL (ref 8–23)
CALCIUM SERPL-MCNC: 9.6 MG/DL (ref 8.7–10.5)
CHLORIDE SERPL-SCNC: 99 MMOL/L (ref 95–110)
CHOLEST SERPL-MCNC: 146 MG/DL (ref 120–199)
CHOLEST/HDLC SERPL: 4.7 {RATIO} (ref 2–5)
CO2 SERPL-SCNC: 28 MMOL/L (ref 23–29)
CREAT SERPL-MCNC: 1.6 MG/DL (ref 0.5–1.4)
DIFFERENTIAL METHOD: ABNORMAL
EOSINOPHIL # BLD AUTO: 0.2 K/UL (ref 0–0.5)
EOSINOPHIL NFR BLD: 3.2 % (ref 0–8)
ERYTHROCYTE [DISTWIDTH] IN BLOOD BY AUTOMATED COUNT: 13.9 % (ref 11.5–14.5)
EST. GFR  (AFRICAN AMERICAN): 38 ML/MIN/1.73 M^2
EST. GFR  (NON AFRICAN AMERICAN): 33 ML/MIN/1.73 M^2
GLUCOSE SERPL-MCNC: 303 MG/DL (ref 70–110)
HCT VFR BLD AUTO: 37.3 % (ref 37–48.5)
HDLC SERPL-MCNC: 31 MG/DL (ref 40–75)
HDLC SERPL: 21.2 % (ref 20–50)
HGB BLD-MCNC: 11.9 G/DL (ref 12–16)
IMM GRANULOCYTES # BLD AUTO: 0.05 K/UL (ref 0–0.04)
IMM GRANULOCYTES NFR BLD AUTO: 0.8 % (ref 0–0.5)
LDLC SERPL CALC-MCNC: ABNORMAL MG/DL (ref 63–159)
LIPASE SERPL-CCNC: 21 U/L (ref 4–60)
LYMPHOCYTES # BLD AUTO: 1.5 K/UL (ref 1–4.8)
LYMPHOCYTES NFR BLD: 24.2 % (ref 18–48)
MCH RBC QN AUTO: 31.4 PG (ref 27–31)
MCHC RBC AUTO-ENTMCNC: 31.9 G/DL (ref 32–36)
MCV RBC AUTO: 98 FL (ref 82–98)
MONOCYTES # BLD AUTO: 0.4 K/UL (ref 0.3–1)
MONOCYTES NFR BLD: 5.8 % (ref 4–15)
NEUTROPHILS # BLD AUTO: 3.9 K/UL (ref 1.8–7.7)
NEUTROPHILS NFR BLD: 65.3 % (ref 38–73)
NONHDLC SERPL-MCNC: 115 MG/DL
NRBC BLD-RTO: 0 /100 WBC
PLATELET # BLD AUTO: 241 K/UL (ref 150–350)
PMV BLD AUTO: 9.4 FL (ref 9.2–12.9)
POTASSIUM SERPL-SCNC: 4.7 MMOL/L (ref 3.5–5.1)
PROT SERPL-MCNC: 7.4 G/DL (ref 6–8.4)
RBC # BLD AUTO: 3.79 M/UL (ref 4–5.4)
SODIUM SERPL-SCNC: 137 MMOL/L (ref 136–145)
T4 FREE SERPL-MCNC: 0.86 NG/DL (ref 0.71–1.51)
TRIGL SERPL-MCNC: 514 MG/DL (ref 30–150)
TSH SERPL DL<=0.005 MIU/L-ACNC: 1.75 UIU/ML (ref 0.4–4)
WBC # BLD AUTO: 6 K/UL (ref 3.9–12.7)

## 2020-06-12 PROCEDURE — 84443 ASSAY THYROID STIM HORMONE: CPT

## 2020-06-12 PROCEDURE — 82306 VITAMIN D 25 HYDROXY: CPT

## 2020-06-12 PROCEDURE — 84439 ASSAY OF FREE THYROXINE: CPT

## 2020-06-12 PROCEDURE — 80053 COMPREHEN METABOLIC PANEL: CPT

## 2020-06-12 PROCEDURE — 36415 COLL VENOUS BLD VENIPUNCTURE: CPT

## 2020-06-12 PROCEDURE — 83690 ASSAY OF LIPASE: CPT

## 2020-06-12 PROCEDURE — 85025 COMPLETE CBC W/AUTO DIFF WBC: CPT

## 2020-06-12 PROCEDURE — 82150 ASSAY OF AMYLASE: CPT

## 2020-06-12 PROCEDURE — 82043 UR ALBUMIN QUANTITATIVE: CPT

## 2020-06-12 PROCEDURE — 80061 LIPID PANEL: CPT

## 2020-06-12 PROCEDURE — 84481 FREE ASSAY (FT-3): CPT

## 2020-06-13 LAB
25(OH)D3+25(OH)D2 SERPL-MCNC: 54 NG/ML (ref 30–96)
ALBUMIN/CREAT UR: 61.5 UG/MG (ref 0–30)
CREAT UR-MCNC: 13 MG/DL (ref 15–325)
MICROALBUMIN UR DL<=1MG/L-MCNC: 8 UG/ML
T3FREE SERPL-MCNC: 2.9 PG/ML (ref 2.3–4.2)

## 2020-06-25 ENCOUNTER — LAB VISIT (OUTPATIENT)
Dept: LAB | Facility: HOSPITAL | Age: 70
End: 2020-06-25
Attending: INTERNAL MEDICINE
Payer: MEDICARE

## 2020-06-25 DIAGNOSIS — D47.2 MGUS (MONOCLONAL GAMMOPATHY OF UNKNOWN SIGNIFICANCE): ICD-10-CM

## 2020-06-25 DIAGNOSIS — D64.9 ANEMIA, UNSPECIFIED TYPE: ICD-10-CM

## 2020-06-25 LAB
ALBUMIN SERPL BCP-MCNC: 3.4 G/DL (ref 3.5–5.2)
ALP SERPL-CCNC: 105 U/L (ref 55–135)
ALT SERPL W/O P-5'-P-CCNC: 23 U/L (ref 10–44)
ANION GAP SERPL CALC-SCNC: 8 MMOL/L (ref 8–16)
AST SERPL-CCNC: 24 U/L (ref 10–40)
BASOPHILS # BLD AUTO: 0.04 K/UL (ref 0–0.2)
BASOPHILS NFR BLD: 0.7 % (ref 0–1.9)
BILIRUB SERPL-MCNC: 0.2 MG/DL (ref 0.1–1)
BUN SERPL-MCNC: 21 MG/DL (ref 8–23)
CALCIUM SERPL-MCNC: 9.5 MG/DL (ref 8.7–10.5)
CHLORIDE SERPL-SCNC: 102 MMOL/L (ref 95–110)
CO2 SERPL-SCNC: 26 MMOL/L (ref 23–29)
CREAT SERPL-MCNC: 1.4 MG/DL (ref 0.5–1.4)
DIFFERENTIAL METHOD: ABNORMAL
EOSINOPHIL # BLD AUTO: 0.2 K/UL (ref 0–0.5)
EOSINOPHIL NFR BLD: 3.5 % (ref 0–8)
ERYTHROCYTE [DISTWIDTH] IN BLOOD BY AUTOMATED COUNT: 14.3 % (ref 11.5–14.5)
EST. GFR  (AFRICAN AMERICAN): 44 ML/MIN/1.73 M^2
EST. GFR  (NON AFRICAN AMERICAN): 38 ML/MIN/1.73 M^2
GLUCOSE SERPL-MCNC: 302 MG/DL (ref 70–110)
HCT VFR BLD AUTO: 36.1 % (ref 37–48.5)
HGB BLD-MCNC: 11.3 G/DL (ref 12–16)
IGA SERPL-MCNC: 450 MG/DL (ref 40–350)
IGG SERPL-MCNC: 861 MG/DL (ref 650–1600)
IGM SERPL-MCNC: 84 MG/DL (ref 50–300)
IMM GRANULOCYTES # BLD AUTO: 0.03 K/UL (ref 0–0.04)
IMM GRANULOCYTES NFR BLD AUTO: 0.5 % (ref 0–0.5)
LYMPHOCYTES # BLD AUTO: 1.5 K/UL (ref 1–4.8)
LYMPHOCYTES NFR BLD: 26.1 % (ref 18–48)
MCH RBC QN AUTO: 31 PG (ref 27–31)
MCHC RBC AUTO-ENTMCNC: 31.3 G/DL (ref 32–36)
MCV RBC AUTO: 99 FL (ref 82–98)
MONOCYTES # BLD AUTO: 0.5 K/UL (ref 0.3–1)
MONOCYTES NFR BLD: 7.9 % (ref 4–15)
NEUTROPHILS # BLD AUTO: 3.5 K/UL (ref 1.8–7.7)
NEUTROPHILS NFR BLD: 61.3 % (ref 38–73)
NRBC BLD-RTO: 0 /100 WBC
PLATELET # BLD AUTO: 232 K/UL (ref 150–350)
PMV BLD AUTO: 9.7 FL (ref 9.2–12.9)
POTASSIUM SERPL-SCNC: 4.8 MMOL/L (ref 3.5–5.1)
PROT SERPL-MCNC: 7 G/DL (ref 6–8.4)
RBC # BLD AUTO: 3.64 M/UL (ref 4–5.4)
SODIUM SERPL-SCNC: 136 MMOL/L (ref 136–145)
WBC # BLD AUTO: 5.67 K/UL (ref 3.9–12.7)

## 2020-06-25 PROCEDURE — 82784 ASSAY IGA/IGD/IGG/IGM EACH: CPT | Mod: 59

## 2020-06-25 PROCEDURE — 85025 COMPLETE CBC W/AUTO DIFF WBC: CPT

## 2020-06-25 PROCEDURE — 83520 IMMUNOASSAY QUANT NOS NONAB: CPT | Mod: 59

## 2020-06-25 PROCEDURE — 36415 COLL VENOUS BLD VENIPUNCTURE: CPT

## 2020-06-25 PROCEDURE — 80053 COMPREHEN METABOLIC PANEL: CPT

## 2020-06-26 LAB
KAPPA LC SER QL IA: 3.84 MG/DL (ref 0.33–1.94)
KAPPA LC/LAMBDA SER IA: 1.27 (ref 0.26–1.65)
LAMBDA LC SER QL IA: 3.02 MG/DL (ref 0.57–2.63)

## 2020-06-29 ENCOUNTER — TELEPHONE (OUTPATIENT)
Dept: HEMATOLOGY/ONCOLOGY | Facility: CLINIC | Age: 70
End: 2020-06-29

## 2020-06-30 ENCOUNTER — OFFICE VISIT (OUTPATIENT)
Dept: HEMATOLOGY/ONCOLOGY | Facility: CLINIC | Age: 70
End: 2020-06-30
Payer: MEDICARE

## 2020-06-30 VITALS
OXYGEN SATURATION: 97 % | HEART RATE: 84 BPM | DIASTOLIC BLOOD PRESSURE: 56 MMHG | WEIGHT: 263.25 LBS | BODY MASS INDEX: 48.44 KG/M2 | RESPIRATION RATE: 18 BRPM | HEIGHT: 62 IN | SYSTOLIC BLOOD PRESSURE: 115 MMHG | TEMPERATURE: 99 F

## 2020-06-30 DIAGNOSIS — Z79.83 ENCOUNTER FOR MONITORING BISPHOSPHONATE THERAPY: ICD-10-CM

## 2020-06-30 DIAGNOSIS — I50.32 CHRONIC DIASTOLIC HEART FAILURE, NYHA CLASS 1: ICD-10-CM

## 2020-06-30 DIAGNOSIS — D63.1 ANEMIA DUE TO END STAGE RENAL DISEASE: ICD-10-CM

## 2020-06-30 DIAGNOSIS — M85.80 OSTEOPENIA WITH HIGH RISK OF FRACTURE: ICD-10-CM

## 2020-06-30 DIAGNOSIS — I10 ESSENTIAL HYPERTENSION: ICD-10-CM

## 2020-06-30 DIAGNOSIS — N18.6 ANEMIA DUE TO END STAGE RENAL DISEASE: ICD-10-CM

## 2020-06-30 DIAGNOSIS — Z51.81 ENCOUNTER FOR MONITORING BISPHOSPHONATE THERAPY: ICD-10-CM

## 2020-06-30 DIAGNOSIS — B37.31 YEAST VAGINITIS: Primary | ICD-10-CM

## 2020-06-30 DIAGNOSIS — N18.30 CHRONIC KIDNEY DISEASE, STAGE III (MODERATE): ICD-10-CM

## 2020-06-30 PROCEDURE — 1125F PR PAIN SEVERITY QUANTIFIED, PAIN PRESENT: ICD-10-PCS | Mod: S$GLB,,, | Performed by: INTERNAL MEDICINE

## 2020-06-30 PROCEDURE — 1125F AMNT PAIN NOTED PAIN PRSNT: CPT | Mod: S$GLB,,, | Performed by: INTERNAL MEDICINE

## 2020-06-30 PROCEDURE — 1159F PR MEDICATION LIST DOCUMENTED IN MEDICAL RECORD: ICD-10-PCS | Mod: S$GLB,,, | Performed by: INTERNAL MEDICINE

## 2020-06-30 PROCEDURE — 3288F FALL RISK ASSESSMENT DOCD: CPT | Mod: CPTII,S$GLB,, | Performed by: INTERNAL MEDICINE

## 2020-06-30 PROCEDURE — 99499 UNLISTED E&M SERVICE: CPT | Mod: S$GLB,,, | Performed by: INTERNAL MEDICINE

## 2020-06-30 PROCEDURE — 99499 RISK ADDL DX/OHS AUDIT: ICD-10-PCS | Mod: S$GLB,,, | Performed by: INTERNAL MEDICINE

## 2020-06-30 PROCEDURE — 1159F MED LIST DOCD IN RCRD: CPT | Mod: S$GLB,,, | Performed by: INTERNAL MEDICINE

## 2020-06-30 PROCEDURE — 3008F BODY MASS INDEX DOCD: CPT | Mod: CPTII,S$GLB,, | Performed by: INTERNAL MEDICINE

## 2020-06-30 PROCEDURE — 3008F PR BODY MASS INDEX (BMI) DOCUMENTED: ICD-10-PCS | Mod: CPTII,S$GLB,, | Performed by: INTERNAL MEDICINE

## 2020-06-30 PROCEDURE — 99214 OFFICE O/P EST MOD 30 MIN: CPT | Mod: S$GLB,,, | Performed by: INTERNAL MEDICINE

## 2020-06-30 PROCEDURE — 99214 PR OFFICE/OUTPT VISIT, EST, LEVL IV, 30-39 MIN: ICD-10-PCS | Mod: S$GLB,,, | Performed by: INTERNAL MEDICINE

## 2020-06-30 PROCEDURE — 99999 PR PBB SHADOW E&M-EST. PATIENT-LVL V: CPT | Mod: PBBFAC,,, | Performed by: INTERNAL MEDICINE

## 2020-06-30 PROCEDURE — 3288F PR FALLS RISK ASSESSMENT DOCUMENTED: ICD-10-PCS | Mod: CPTII,S$GLB,, | Performed by: INTERNAL MEDICINE

## 2020-06-30 PROCEDURE — 1101F PR PT FALLS ASSESS DOC 0-1 FALLS W/OUT INJ PAST YR: ICD-10-PCS | Mod: CPTII,S$GLB,, | Performed by: INTERNAL MEDICINE

## 2020-06-30 PROCEDURE — 1101F PT FALLS ASSESS-DOCD LE1/YR: CPT | Mod: CPTII,S$GLB,, | Performed by: INTERNAL MEDICINE

## 2020-06-30 PROCEDURE — 99999 PR PBB SHADOW E&M-EST. PATIENT-LVL V: ICD-10-PCS | Mod: PBBFAC,,, | Performed by: INTERNAL MEDICINE

## 2020-06-30 RX ORDER — LOSARTAN POTASSIUM 25 MG/1
25 TABLET ORAL DAILY
COMMUNITY
Start: 2020-06-08

## 2020-06-30 RX ORDER — FLUCONAZOLE 100 MG/1
100 TABLET ORAL DAILY
Qty: 20 TABLET | Refills: 0 | Status: SHIPPED | OUTPATIENT
Start: 2020-06-30 | End: 2020-07-30

## 2020-06-30 NOTE — PROGRESS NOTES
Subjective:       Patient ID: Fifi Mcnair is a 69 y.o. female.    Chief Complaint: I am hurting in my left hip     Follow-up  Associated symptoms include arthralgias and fatigue. Pertinent negatives include no abdominal pain, chest pain, coughing, fever, headaches, nausea, neck pain, numbness, rash, sore throat, vomiting or weakness.       This is a 69  yr old female tolerating prolia for osteoporosis.  She started prolia for osteoporosis and now she has osteopenia. Pt has had a gastric sleeve in the past. She remains on B12 and iron supplement  She has not needed IV iron    Not on prolia started K 2 She is tolerating Synthroid for thyroid disorder and neurontin for neuropathy.  Past Labs revealed an elevated kappa light chain with No further elevation of the ratio, IGA  has been stable   Bone marrow showed no evidence of a plasma cell dyscrasia in 2016   Pt with history of sleeve gastrectomy contributing to malabsorption of certain vitamins  IgA remains elevated  as with the elevated kappa levels    She is tolerating allopurinol for gout prevention  She is tolerating Prozac for depression    January 20 2020 I saw my IGA     June 30 2020 : new complaint hip pain: she does have an orthopedist who monitors her knee arthritis     Current Outpatient Medications:     alendronate (FOSAMAX) 35 MG tablet, Take 1 tablet (35 mg total) by mouth every 7 days., Disp: 4 tablet, Rfl: 4    allopurinol (ZYLOPRIM) 100 MG tablet, Take 2 tablets (200 mg total) by mouth nightly., Disp: 180 tablet, Rfl: 3    amitriptyline (ELAVIL) 100 MG tablet, 1 tab at night, Disp: 90 tablet, Rfl: 2    ascorbic acid, vitamin C, (VITAMIN C) 500 MG tablet, Take 500 mg by mouth once daily., Disp: , Rfl:     atenolol (TENORMIN) 25 MG tablet, Take 1 tablet (25 mg total) by mouth 2 (two) times daily., Disp: 180 tablet, Rfl: 3    biotin 1 mg Cap, Take by mouth., Disp: , Rfl:     calcitRIOL (ROCALTROL) 0.25 MCG Cap, TAKE ONE TABLET BY MOUTH TWICE A  WEEK ON monday AND friday, Disp: 8 capsule, Rfl: 3    cetirizine (ZYRTEC) 10 MG tablet, Take 1 tablet (10 mg total) by mouth once daily., Disp: 1 Bottle, Rfl: 5    cinnamon bark (CINNAMON ORAL), Take by mouth once daily., Disp: , Rfl:     clotrimazole-betamethasone 1-0.05% (LOTRISONE) cream, , Disp: , Rfl:     cyanocobalamin, vitamin B-12, (VITAMIN B-12) 5,000 mcg Subl, Place 5,000 mg under the tongue once a week., Disp: , Rfl:     diclofenac sodium (VOLTAREN) 1 % Gel,  APPLY 2 GRAMS TOPICALLY ONCE DAILY, Disp: 1 Tube, Rfl: 0    diphenoxylate-atropine 2.5-0.025 mg (LOMOTIL) 2.5-0.025 mg per tablet, TAKE ONE TABLET BY MOUTH 4 TIMES DAILY AS NEEDED FOR DIARRHEA, Disp: 60 tablet, Rfl: 1    DYMISTA 137-50 mcg/spray Spry nassal spray, as needed. , Disp: , Rfl:     ferrous sulfate (IRON) 325 mg (65 mg iron) Tab tablet, Take 325 mg by mouth daily with breakfast., Disp: , Rfl:     fish oil-omega-3 fatty acids 300-1,000 mg capsule, Take 1 capsule by mouth once daily., Disp: , Rfl:     FLUoxetine 20 MG capsule, Take 2 capsules (40 mg total) by mouth once daily., Disp: 180 capsule, Rfl: 3    fluticasone furoate-vilanterol (BREO) 100-25 mcg/dose diskus inhaler, Inhale 1 puff into the lungs once daily., Disp: 60 each, Rfl: 3    fluticasone propionate (FLONASE) 50 mcg/actuation nasal spray, 1 spray (50 mcg total) by Each Nostril route once daily., Disp: 1 Bottle, Rfl: 4    gabapentin (NEURONTIN) 600 MG tablet, Take 1 tablet (600 mg total) by mouth 3 (three) times daily., Disp: 90 tablet, Rfl: 11    HUMALOG U-100 INSULIN 100 unit/mL injection, inject 100 UNITS DAILY via insulin pump, Disp: , Rfl:     hydrocortisone 2.5 % cream, Apply topically nightly., Disp: 3.5 g, Rfl: 1    L-LYSINE ORAL, Take by mouth., Disp: , Rfl:     l-methylfolate-b2-b6-b12 (CEREFOLIN) 6-5-50-1 mg Tab, Take 1 tablet by mouth once daily., Disp: , Rfl:     Lacto.acidophilus-Bif.animalis (PROBIOTIC) 5 billion cell CpSP, Take 1 capsule by  mouth once daily., Disp: 30 capsule, Rfl: 3    levothyroxine (SYNTHROID) 25 MCG tablet, Take 1 tablet (25 mcg total) by mouth once daily., Disp: 90 tablet, Rfl: 3    losartan (COZAAR) 25 MG tablet, Take 25 mg by mouth once daily., Disp: , Rfl:     magnesium oxide (MAG-OX) 400 mg (241.3 mg magnesium) tablet, Take 800 mg by mouth every evening., Disp: , Rfl:     montelukast (SINGULAIR) 10 mg tablet, Take 1 tablet (10 mg total) by mouth every evening., Disp: 30 tablet, Rfl: 8    MULTIVIT-IRON-MIN-FOLIC ACID 3,500-18-0.4 UNIT-MG-MG ORAL CHEW, Take by mouth 2 (two) times daily., Disp: , Rfl:     mupirocin (BACTROBAN) 2 % ointment, APPLY OINTMENT TOPICALLY TO AFFECTED AREA ON NOSE THREE TIMES DAILY AS DIRECTED, Disp: 15 g, Rfl: 11    oxyCODONE (ROXICODONE) 15 MG Tab, Take 1 tablet (15 mg total) by mouth every 6 (six) hours as needed for Pain., Disp: 30 tablet, Rfl: 0    pantoprazole (PROTONIX) 40 MG tablet, TAKE 1 TABLET BY MOUTH ONCE DAILY, Disp: 90 tablet, Rfl: 3    potassium chloride SA (K-DUR,KLOR-CON) 20 MEQ tablet, TAKE 1 TABLET BY MOUTH TWICE DAILY, Disp: 180 tablet, Rfl: 3    torsemide (DEMADEX) 20 MG Tab, TAKE ONE TABLET BY MOUTH TWICE DAILY, Disp: 60 tablet, Rfl: 2    TURMERIC ORAL, Take by mouth once daily., Disp: , Rfl:     CHERATUSSIN AC  mg/5 mL syrup, TAKE 5 ML (CC) BY MOUTH 4 TIMES DAILY AS NEEDED FOR COUGH FOR 5 DAYS, Disp: , Rfl: 0    insulin (BASAGLAR KWIKPEN U-100 INSULIN) glargine 100 units/mL (3mL) SubQ pen, Inject 20 Units into the skin every evening. Use 10 units if BS over 210.Night before surgery. (Patient not taking: Reported on 6/30/2020), Disp: , Rfl: 0    insulin aspart U-100 (NOVOLOG) 100 unit/mL injection, Use per insulin pump, 35-40 units daily. (Patient not taking: Reported on 6/30/2020), Disp: 40 mL, Rfl: 0    losartan (COZAAR) 50 MG tablet, Take 1 tablet (50 mg total) by mouth once daily. (Patient not taking: Reported on 6/30/2020), Disp: 90 tablet, Rfl: 3    SSD 1  "% cream, , Disp: , Rfl:     Current Facility-Administered Medications:     gentamicin injection 80 mg, 80 mg, Intramuscular, 1 time in Clinic/HOD, Angy Campos MD    Facility-Administered Medications Ordered in Other Visits:     lactated ringers infusion, , Intravenous, Continuous, Shaheen Hanson MD, Last Rate: 10 mL/hr at 08/31/18 0739    No prolia at this time   All medications and past history have been reviewed.    Review of Systems   Constitutional: Positive for fatigue. Negative for fever and unexpected weight change.   HENT: Negative.  Negative for ear discharge, mouth sores, rhinorrhea and sore throat.    Eyes: Negative.  Negative for photophobia and itching.   Respiratory: Negative for cough and shortness of breath.    Cardiovascular: Negative.  Negative for chest pain and leg swelling.   Gastrointestinal: Negative.  Negative for abdominal pain, constipation, diarrhea, nausea and vomiting.   Endocrine: Negative.  Negative for cold intolerance and heat intolerance.   Genitourinary: Negative for difficulty urinating, dysuria, frequency, hematuria, pelvic pain and urgency.   Musculoskeletal: Positive for arthralgias. Negative for back pain and neck pain.   Skin: Negative for pallor and rash.   Allergic/Immunologic: Negative.    Neurological: Negative for weakness, numbness and headaches.   Hematological: Negative for adenopathy. Does not bruise/bleed easily.   Psychiatric/Behavioral: Negative for agitation and confusion.   All other systems reviewed and are negative.       Pertinent positives are inew left hip pain   Depression screening negative on medication     Objective:        BP (!) 115/56 (BP Location: Right arm, Patient Position: Sitting, BP Method: Large (Automatic))   Pulse 84   Temp 98.5 °F (36.9 °C) (Temporal)   Resp 18   Ht 5' 2" (1.575 m)   Wt 119.4 kg (263 lb 3.7 oz)   LMP  (LMP Unknown)   SpO2 97%   BMI 48.15 kg/m²     Physical Exam  Vitals signs and nursing note " reviewed.   Constitutional:       Appearance: She is well-developed.   HENT:      Head: Normocephalic.      Mouth/Throat:      Pharynx: No oropharyngeal exudate.   Eyes:      General: No scleral icterus.     Conjunctiva/sclera: Conjunctivae normal.   Neck:      Musculoskeletal: Normal range of motion.      Vascular: No JVD.      Trachea: No tracheal deviation.   Cardiovascular:      Rate and Rhythm: Normal rate and regular rhythm.      Heart sounds: No murmur (2= capillary refill).   Pulmonary:      Effort: Pulmonary effort is normal. No respiratory distress.      Breath sounds: No wheezing.   Abdominal:      General: There is no distension.      Palpations: Abdomen is soft.      Tenderness: Tenderness: midepigastric.   Musculoskeletal: Normal range of motion.   Skin:     General: Skin is dry.      Findings: No erythema or rash.   Neurological:      Mental Status: She is alert and oriented to person, place, and time.      Cranial Nerves: No cranial nerve deficit.      Gait: Gait abnormal.      Comments: Steady gait        Bone marrow with no evidence of plasma cell dyscrasia, no evidence of myelodysplasia.    I reviewed the percentage of cells noted in the marrow along with flow cytometry and cytogenetics  Skeletal survey negative for any lytic or ostial sclerotic disease  All labs and imaging have been reviewed.   Lab Results   Component Value Date    WBC 5.67 06/25/2020    HGB 11.3 (L) 06/25/2020    HCT 36.1 (L) 06/25/2020    MCV 99 (H) 06/25/2020     06/25/2020     IGA elevated yet levels are decreasing     CMP  Sodium   Date Value Ref Range Status   06/25/2020 136 136 - 145 mmol/L Final     Potassium   Date Value Ref Range Status   06/25/2020 4.8 3.5 - 5.1 mmol/L Final     Chloride   Date Value Ref Range Status   06/25/2020 102 95 - 110 mmol/L Final     CO2   Date Value Ref Range Status   06/25/2020 26 23 - 29 mmol/L Final     Glucose   Date Value Ref Range Status   06/25/2020 302 (H) 70 - 110 mg/dL Final      BUN, Bld   Date Value Ref Range Status   06/25/2020 21 8 - 23 mg/dL Final     Creatinine   Date Value Ref Range Status   06/25/2020 1.4 0.5 - 1.4 mg/dL Final   08/31/2013 1.1 0.5 - 1.4 mg/dL Final     Calcium   Date Value Ref Range Status   06/25/2020 9.5 8.7 - 10.5 mg/dL Final   08/31/2013 9.5 8.7 - 10.5 mg/dL Final     Total Protein   Date Value Ref Range Status   06/25/2020 7.0 6.0 - 8.4 g/dL Final     Albumin   Date Value Ref Range Status   06/25/2020 3.4 (L) 3.5 - 5.2 g/dL Final     Total Bilirubin   Date Value Ref Range Status   06/25/2020 0.2 0.1 - 1.0 mg/dL Final     Comment:     For infants and newborns, interpretation of results should be based  on gestational age, weight and in agreement with clinical  observations.  Premature Infant recommended reference ranges:  Up to 24 hours.............<8.0 mg/dL  Up to 48 hours............<12.0 mg/dL  3-5 days..................<15.0 mg/dL  6-29 days.................<15.0 mg/dL       Alkaline Phosphatase   Date Value Ref Range Status   06/25/2020 105 55 - 135 U/L Final   08/30/2013 93 55 - 135 U/L Final     AST   Date Value Ref Range Status   06/25/2020 24 10 - 40 U/L Final   08/30/2013 21 10 - 40 U/L Final     ALT   Date Value Ref Range Status   06/25/2020 23 10 - 44 U/L Final     Anion Gap   Date Value Ref Range Status   06/25/2020 8 8 - 16 mmol/L Final   08/31/2013 12 5 - 15 meq/L Final     eGFR if    Date Value Ref Range Status   06/25/2020 44 (A) >60 mL/min/1.73 m^2 Final     eGFR if non    Date Value Ref Range Status   06/25/2020 38 (A) >60 mL/min/1.73 m^2 Final     Comment:     Calculation used to obtain the estimated glomerular filtration  rate (eGFR) is the CKD-EPI equation.        Leukemia/Lymphoma Screen - Bone Marrow Right Posterior Iliac Crest   Order: [id number]     Status:  Final result   Visible to patient:  Yes (Patient Portal) Next appt:  02/11/2019 at 02:45 PM in Orthopedics (Larry Molina MD) Dx:  Anemia,  unspecified type; Elevated se...   Component 2yr ago   Clinical Diagnosis - Bone Marrow ANM    Body Site - Bone Marrow RPI    Bone Marrow Interpretation No abnormal hematopoietic population is detected in this sample. Correlate with marrow morphology and other ancillary studies.      Comment: Interpreted by: Katarina Hicks MD, Signed on 10/10/2016 at 12:50   Bone Marrow Antibodies Analyzed All analyzed: CD2, CD3, CD4, CD5, CD7, CD8, CD10, CD13, CD19, CD20, CD34, , KAPPA, LAMBDA,CD45 and 7AAD.      Bone Marrow Comment Flow cytometric analysis of  bone marrow shows populations of polyclonal B lymphocytes with a subset of hematogones detected, and T lymphocytes that are immunophenotypically unremarkable.  The blast gate is not increased. Flow differential:  Lymphocytes   7.5%, Monocytes 2.9%, Granulocytes  74.9%, Blast  2.4%, Debris/nRBC 12.2%,  Viability 93.2%.                 Assessment:       Yeast vaginitis  -     fluconazole (DIFLUCAN) 100 MG tablet; Take 1 tablet (100 mg total) by mouth once daily.  Dispense: 20 tablet; Refill: 0    Anemia due to end stage renal disease  -     CBC auto differential; Future; Expected date: 06/30/2020  -     Immunoglobulin Free LT Chains Blood; Future; Expected date: 06/30/2020  -     Immunoglobulins (IgG, IgA, IgM) Quantitative; Future; Expected date: 06/30/2020  -     Iron and TIBC; Future; Expected date: 06/30/2020  -     Lactate Dehydrogenase; Future; Expected date: 06/30/2020  -     CMP; Future; Expected date: 06/30/2020  -     IgA; Future; Expected date: 06/30/2020    Chronic diastolic heart failure, NYHA class 1    Essential hypertension    Chronic kidney disease, stage III (moderate)  -     CBC auto differential; Future; Expected date: 06/30/2020  -     Immunoglobulin Free LT Chains Blood; Future; Expected date: 06/30/2020  -     Immunoglobulins (IgG, IgA, IgM) Quantitative; Future; Expected date: 06/30/2020  -     Iron and TIBC; Future; Expected date:  06/30/2020  -     Lactate Dehydrogenase; Future; Expected date: 06/30/2020  -     CMP; Future; Expected date: 06/30/2020  -     IgA; Future; Expected date: 06/30/2020    Osteopenia with high risk of fracture    Encounter for monitoring bisphosphonate therapy          Plan:     Anemia with progression  elev glucose with DM to endocrinology   CKD    elev IGA   Yet overall levels are decreasing   [place] elevated  DM monitored by Dr Cantu  Pt no longer meets criteria for prolia due to a change in her density from osteoporosis to osteopenia  She believes she is in need of a knee replacement : cont calcium and vitamin D3  Next dexa in Dec of 2019 No longer on prolia since she has osteopenia and she started fosamax orally   latest dexa this past March osteopenia   Cont K 2 for bone health   History of broken bones   NEW hip pain : to ortho for evaluation  Cont calcium and vitamin D  Cont prozac for depression  Vaginal yeast : cont diflucan monitored by endo   Explained MGUS and the transition to myeloma is rare and less than 2% I also explained this could be a component of her low GFR  This is not  myeloma   Cont calcium daily yet add an extra on MWF hence calcium 1200 mg on MWF and 600 or so the rest of the time        RTC 2 months for evaluation     The plan was discussed with the patient and all questions/concerns have been answered to the patient's satisfaction.

## 2020-07-06 ENCOUNTER — PATIENT OUTREACH (OUTPATIENT)
Dept: ADMINISTRATIVE | Facility: OTHER | Age: 70
End: 2020-07-06

## 2020-07-08 ENCOUNTER — OFFICE VISIT (OUTPATIENT)
Dept: OPHTHALMOLOGY | Facility: CLINIC | Age: 70
End: 2020-07-08
Payer: MEDICARE

## 2020-07-08 DIAGNOSIS — H43.813 PVD (POSTERIOR VITREOUS DETACHMENT), BILATERAL: ICD-10-CM

## 2020-07-08 DIAGNOSIS — H25.13 NUCLEAR SCLEROSIS, BILATERAL: Primary | ICD-10-CM

## 2020-07-08 DIAGNOSIS — H52.7 REFRACTIVE ERROR: ICD-10-CM

## 2020-07-08 DIAGNOSIS — E11.9 DIABETES MELLITUS TYPE 2 WITHOUT RETINOPATHY: ICD-10-CM

## 2020-07-08 DIAGNOSIS — H40.039 ANATOMICAL NARROW ANGLE: ICD-10-CM

## 2020-07-08 PROCEDURE — 92014 COMPRE OPH EXAM EST PT 1/>: CPT | Mod: S$GLB,,, | Performed by: OPHTHALMOLOGY

## 2020-07-08 PROCEDURE — 99999 PR PBB SHADOW E&M-EST. PATIENT-LVL V: ICD-10-PCS | Mod: PBBFAC,,, | Performed by: OPHTHALMOLOGY

## 2020-07-08 PROCEDURE — 92014 PR EYE EXAM, EST PATIENT,COMPREHESV: ICD-10-PCS | Mod: S$GLB,,, | Performed by: OPHTHALMOLOGY

## 2020-07-08 PROCEDURE — 99999 PR PBB SHADOW E&M-EST. PATIENT-LVL V: CPT | Mod: PBBFAC,,, | Performed by: OPHTHALMOLOGY

## 2020-07-08 NOTE — PATIENT INSTRUCTIONS
What Are Cataracts?    A clear lens in the eye focuses light. This lets the eye see images sharply. With age, the lens slowly becomes cloudy. The cloudy lens is a cataract. A cataract scatters light and makes it hard for the eye to focus. Cataracts often form in both eyes. But one lens may cloud faster than the other.  The aging of your lens  Your lens may cloud so slowly that you don`t notice any vision changes at first. But as the cataract gets worse, the eye has a harder time focusing. In early stages, glasses may help you see better. As the lens gets more cloudy, your doctor may recommend surgery to restore your vision.  A clear lens allows your eye to bring objects sharply into focus.  A mild cataract may slightly blur your vision.  A dense cataract can severely blur your vision.  Date Last Reviewed: 6/14/2015  © 8807-2770 [employer]. [address] [place] [address], [place] [address]. All rights reserved. This information is not intended as a substitute for professional medical care. Always follow your healthcare professional's instructions.

## 2020-07-08 NOTE — PROGRESS NOTES
HPI     DLE- 10/29/19     Pt is here for 6 month f/u on cataracts. Pt states, she has been having   some health concerns recently with her diabetes uncontrolled as well as   other infections. Pt states her diabetes is starting to get better. HTN   controlled with meds. No new floaters or flashes. +hx of lasik +hx of   vitreous detachment w/laser     Hemoglobin A1C       Date                     Value               Ref Range             Status                02/28/2020               9.9 (H)             4.0 - 5.6 %           Final                 12/02/2019               8.6 (H)             4.0 - 5.6 %           Final                 09/04/2019               9.4 (H)             4.0 - 5.6 %           Final                 08/30/2013               8.0 (H)             4.8 - 5.6 %           Final                 02/02/2013               8.2 (H)             4.8 - 5.6 %           Final                  Last edited by Dean Knowles on 7/8/2020  1:18 PM. (History)        ROS     Negative for: Constitutional, Gastrointestinal, Neurological, Skin,   Genitourinary, Musculoskeletal, HENT, Endocrine, Cardiovascular, Eyes,   Respiratory, Psychiatric, Allergic/Imm, Heme/Lymph    Last edited by Meek Haro Jr., MD on 7/8/2020  2:14 PM. (History)        Assessment /Plan     For exam results, see Encounter Report.    Nuclear sclerosis, bilateral    Diabetes mellitus type 2 without retinopathy    Anatomical narrow angle    PVD (posterior vitreous detachment), bilateral    Refractive error      -schedule cataract surgery, right; monovision Lasik OD near OS distance  -R&B benefits discussed with patient  -Risks of worse vision, loss of vision, infection, bleeding, re-surgery,and may need to have surgery done by a different physician was explained to the patient  -patient was also counseled on premium lens options, laser cataract, and astigmatic correction  -patient was also counseled that they may still need glasses after surgery to  help improve their vision, especially the need for reading glasses for reading small print texts after surgery  -patient understood the risks involved with cataract surgery and wanted to move forward with surgery  -discussed RBA of cataract surgery post-lasik and calculations can be unpredictable due to modifications of the cornea; can need glasses, contact lenses, or refractive lens exchange to improve vision, patient understood and wanted to move forward with the surgery  Follow up in about 1 month (around 8/8/2020) for Measurements, H&P, and consents.

## 2020-07-09 DIAGNOSIS — Z00.00 ANNUAL PHYSICAL EXAM: Primary | ICD-10-CM

## 2020-07-13 ENCOUNTER — OFFICE VISIT (OUTPATIENT)
Dept: CARDIOLOGY | Facility: CLINIC | Age: 70
End: 2020-07-13
Payer: MEDICARE

## 2020-07-13 VITALS
DIASTOLIC BLOOD PRESSURE: 64 MMHG | BODY MASS INDEX: 48.72 KG/M2 | WEIGHT: 264.75 LBS | OXYGEN SATURATION: 96 % | HEIGHT: 62 IN | HEART RATE: 91 BPM | SYSTOLIC BLOOD PRESSURE: 141 MMHG

## 2020-07-13 DIAGNOSIS — I44.7 LBBB (LEFT BUNDLE BRANCH BLOCK): ICD-10-CM

## 2020-07-13 DIAGNOSIS — E78.1 HYPERTRIGLYCERIDEMIA: ICD-10-CM

## 2020-07-13 DIAGNOSIS — I50.32 CHRONIC DIASTOLIC HEART FAILURE, NYHA CLASS 1: ICD-10-CM

## 2020-07-13 DIAGNOSIS — D63.1 ANEMIA DUE TO STAGE 3 CHRONIC KIDNEY DISEASE: ICD-10-CM

## 2020-07-13 DIAGNOSIS — G47.33 OSA (OBSTRUCTIVE SLEEP APNEA): ICD-10-CM

## 2020-07-13 DIAGNOSIS — M54.42 CHRONIC LOW BACK PAIN WITH LEFT-SIDED SCIATICA, UNSPECIFIED BACK PAIN LATERALITY: ICD-10-CM

## 2020-07-13 DIAGNOSIS — Z79.4 DIABETES MELLITUS DUE TO UNDERLYING CONDITION WITH DIABETIC NEPHROPATHY, WITH LONG-TERM CURRENT USE OF INSULIN: Chronic | ICD-10-CM

## 2020-07-13 DIAGNOSIS — Z91.89 CARDIOVASCULAR EVENT RISK: Primary | ICD-10-CM

## 2020-07-13 DIAGNOSIS — G47.19 EXCESSIVE DAYTIME SLEEPINESS: ICD-10-CM

## 2020-07-13 DIAGNOSIS — Z00.00 ANNUAL PHYSICAL EXAM: ICD-10-CM

## 2020-07-13 DIAGNOSIS — G47.33 OBSTRUCTIVE SLEEP APNEA SYNDROME: ICD-10-CM

## 2020-07-13 DIAGNOSIS — G89.29 CHRONIC LOW BACK PAIN WITH LEFT-SIDED SCIATICA, UNSPECIFIED BACK PAIN LATERALITY: ICD-10-CM

## 2020-07-13 DIAGNOSIS — R94.39 ABNORMAL CARDIOVASCULAR STRESS TEST: ICD-10-CM

## 2020-07-13 DIAGNOSIS — I11.9 HYPERTENSIVE LEFT VENTRICULAR HYPERTROPHY, WITHOUT HEART FAILURE: ICD-10-CM

## 2020-07-13 DIAGNOSIS — Z91.89 SEDENTARY LIFESTYLE: ICD-10-CM

## 2020-07-13 DIAGNOSIS — E08.21 DIABETES MELLITUS DUE TO UNDERLYING CONDITION WITH DIABETIC NEPHROPATHY, WITH LONG-TERM CURRENT USE OF INSULIN: Chronic | ICD-10-CM

## 2020-07-13 DIAGNOSIS — J44.89 CHRONIC OBSTRUCTIVE ASTHMA WITH STATUS ASTHMATICUS: ICD-10-CM

## 2020-07-13 DIAGNOSIS — E11.3393 MODERATE NONPROLIFERATIVE DIABETIC RETINOPATHY OF BOTH EYES ASSOCIATED WITH TYPE 2 DIABETES MELLITUS, MACULAR EDEMA PRESENCE UNSPECIFIED: ICD-10-CM

## 2020-07-13 DIAGNOSIS — Z98.84 BARIATRIC SURGERY STATUS: ICD-10-CM

## 2020-07-13 DIAGNOSIS — N18.30 ANEMIA DUE TO STAGE 3 CHRONIC KIDNEY DISEASE: ICD-10-CM

## 2020-07-13 DIAGNOSIS — E66.01 MORBID OBESITY WITH BMI OF 40.0-44.9, ADULT: ICD-10-CM

## 2020-07-13 DIAGNOSIS — I10 ESSENTIAL HYPERTENSION: ICD-10-CM

## 2020-07-13 DIAGNOSIS — N18.30 CHRONIC KIDNEY DISEASE, STAGE III (MODERATE): ICD-10-CM

## 2020-07-13 DIAGNOSIS — J45.902 CHRONIC OBSTRUCTIVE ASTHMA WITH STATUS ASTHMATICUS: ICD-10-CM

## 2020-07-13 PROCEDURE — 1125F PR PAIN SEVERITY QUANTIFIED, PAIN PRESENT: ICD-10-PCS | Mod: S$GLB,,, | Performed by: INTERNAL MEDICINE

## 2020-07-13 PROCEDURE — 93000 ELECTROCARDIOGRAM COMPLETE: CPT | Mod: S$GLB,,, | Performed by: INTERNAL MEDICINE

## 2020-07-13 PROCEDURE — 99215 PR OFFICE/OUTPT VISIT, EST, LEVL V, 40-54 MIN: ICD-10-PCS | Mod: 25,S$GLB,, | Performed by: INTERNAL MEDICINE

## 2020-07-13 PROCEDURE — 1101F PT FALLS ASSESS-DOCD LE1/YR: CPT | Mod: CPTII,S$GLB,, | Performed by: INTERNAL MEDICINE

## 2020-07-13 PROCEDURE — 1159F MED LIST DOCD IN RCRD: CPT | Mod: S$GLB,,, | Performed by: INTERNAL MEDICINE

## 2020-07-13 PROCEDURE — 3046F HEMOGLOBIN A1C LEVEL >9.0%: CPT | Mod: CPTII,S$GLB,, | Performed by: INTERNAL MEDICINE

## 2020-07-13 PROCEDURE — 3046F PR MOST RECENT HEMOGLOBIN A1C LEVEL > 9.0%: ICD-10-PCS | Mod: CPTII,S$GLB,, | Performed by: INTERNAL MEDICINE

## 2020-07-13 PROCEDURE — 1159F PR MEDICATION LIST DOCUMENTED IN MEDICAL RECORD: ICD-10-PCS | Mod: S$GLB,,, | Performed by: INTERNAL MEDICINE

## 2020-07-13 PROCEDURE — 3008F BODY MASS INDEX DOCD: CPT | Mod: CPTII,S$GLB,, | Performed by: INTERNAL MEDICINE

## 2020-07-13 PROCEDURE — 99499 UNLISTED E&M SERVICE: CPT | Mod: S$GLB,,, | Performed by: INTERNAL MEDICINE

## 2020-07-13 PROCEDURE — 99215 OFFICE O/P EST HI 40 MIN: CPT | Mod: 25,S$GLB,, | Performed by: INTERNAL MEDICINE

## 2020-07-13 PROCEDURE — 3008F PR BODY MASS INDEX (BMI) DOCUMENTED: ICD-10-PCS | Mod: CPTII,S$GLB,, | Performed by: INTERNAL MEDICINE

## 2020-07-13 PROCEDURE — 99999 PR PBB SHADOW E&M-EST. PATIENT-LVL V: CPT | Mod: PBBFAC,,, | Performed by: INTERNAL MEDICINE

## 2020-07-13 PROCEDURE — 1101F PR PT FALLS ASSESS DOC 0-1 FALLS W/OUT INJ PAST YR: ICD-10-PCS | Mod: CPTII,S$GLB,, | Performed by: INTERNAL MEDICINE

## 2020-07-13 PROCEDURE — 99999 PR PBB SHADOW E&M-EST. PATIENT-LVL V: ICD-10-PCS | Mod: PBBFAC,,, | Performed by: INTERNAL MEDICINE

## 2020-07-13 PROCEDURE — 1125F AMNT PAIN NOTED PAIN PRSNT: CPT | Mod: S$GLB,,, | Performed by: INTERNAL MEDICINE

## 2020-07-13 PROCEDURE — 99499 RISK ADDL DX/OHS AUDIT: ICD-10-PCS | Mod: S$GLB,,, | Performed by: INTERNAL MEDICINE

## 2020-07-13 PROCEDURE — 93000 EKG 12-LEAD: ICD-10-PCS | Mod: S$GLB,,, | Performed by: INTERNAL MEDICINE

## 2020-07-13 NOTE — PATIENT INSTRUCTIONS

## 2020-07-13 NOTE — PROGRESS NOTES
Subjective:    Patient ID:  Fifi Mcnair is a 69 y.o. female who presents for evaluation of Annual Exam  For post 100+ lbs loss after bariatric operation, chronic diastolic HF, class I, fell in 2/2018 and chronic infections, very sedentary, post left TKR and still waiting for right TKR, delayed due to poor T2DM control  PCP: Dr. Vargas, see biannually  Plastic surgeon: Dr. Becerril  Cardiologist: Dr. Mcmullen, last seen 11/16/2015  Vascular surgeon: Dr. Varela  Bariatric: Dr. Kwong, [facility]  GI: Dr. Navarro  Orthopedic and referred by: Dr. Molina  Renal: Dr. Soto  Lung: Dr. Tian  Eye: Dr. Yap  Retina: Dr. Easton  Back: Dr. Huntley  Urologist:  [place]  Lives with , Florian, here with patient, non-smoker  Disabled due to DM and CLBP, 2010, prior convenient     Health literacy: high  Vaccinations: up-to-date  Activities: sedentary due to chronic knee pains, left knee improved post TKR in 4/2019  Nicotine: quit 1990, 4 py  Alcohol: none  Illicit drugs: none  Cardiac symptoms: none  Home BP: 120/60-65  Medication compliance: yes  Diet: diabetic, no added salt  Caffeine: 2 cpd  Labs: 5/2019, LDL 37, not on Rx, CMP (, albumin 3.3, eGFR 52), CBC (Hgb 11.8), normal TSH, A1C 8.4%, urine negative for microalbuminuria  Lab Results   Component Value Date    TSH 1.749 06/12/2020        Lab Results   Component Value Date    HGBA1C 9.9 (H) 02/28/2020       Lab Results   Component Value Date    WBC 5.67 06/25/2020    HGB 11.3 (L) 06/25/2020    HCT 36.1 (L) 06/25/2020    MCV 99 (H) 06/25/2020     06/25/2020       CMP  Sodium   Date Value Ref Range Status   06/25/2020 136 136 - 145 mmol/L Final     Potassium   Date Value Ref Range Status   06/25/2020 4.8 3.5 - 5.1 mmol/L Final     Chloride   Date Value Ref Range Status   06/25/2020 102 95 - 110 mmol/L Final     CO2   Date Value Ref Range Status   06/25/2020 26 23 - 29 mmol/L Final     Glucose   Date Value Ref Range Status    06/25/2020 302 (H) 70 - 110 mg/dL Final     BUN, Bld   Date Value Ref Range Status   06/25/2020 21 8 - 23 mg/dL Final     Creatinine   Date Value Ref Range Status   06/25/2020 1.4 0.5 - 1.4 mg/dL Final   08/31/2013 1.1 0.5 - 1.4 mg/dL Final     Calcium   Date Value Ref Range Status   06/25/2020 9.5 8.7 - 10.5 mg/dL Final   08/31/2013 9.5 8.7 - 10.5 mg/dL Final     Total Protein   Date Value Ref Range Status   06/25/2020 7.0 6.0 - 8.4 g/dL Final     Albumin   Date Value Ref Range Status   06/25/2020 3.4 (L) 3.5 - 5.2 g/dL Final     Total Bilirubin   Date Value Ref Range Status   06/25/2020 0.2 0.1 - 1.0 mg/dL Final     Comment:     For infants and newborns, interpretation of results should be based  on gestational age, weight and in agreement with clinical  observations.  Premature Infant recommended reference ranges:  Up to 24 hours.............<8.0 mg/dL  Up to 48 hours............<12.0 mg/dL  3-5 days..................<15.0 mg/dL  6-29 days.................<15.0 mg/dL       Alkaline Phosphatase   Date Value Ref Range Status   06/25/2020 105 55 - 135 U/L Final   08/30/2013 93 55 - 135 U/L Final     AST   Date Value Ref Range Status   06/25/2020 24 10 - 40 U/L Final   08/30/2013 21 10 - 40 U/L Final     ALT   Date Value Ref Range Status   06/25/2020 23 10 - 44 U/L Final     Anion Gap   Date Value Ref Range Status   06/25/2020 8 8 - 16 mmol/L Final   08/31/2013 12 5 - 15 meq/L Final     eGFR if    Date Value Ref Range Status   06/25/2020 44 (A) >60 mL/min/1.73 m^2 Final     eGFR if non    Date Value Ref Range Status   06/25/2020 38 (A) >60 mL/min/1.73 m^2 Final     Comment:     Calculation used to obtain the estimated glomerular filtration  rate (eGFR) is the CKD-EPI equation.        @labrcntip(troponini)@    BNP   Date Value Ref Range Status   01/10/2017 34 0 - 99 pg/mL Final     Comment:     Values of less than 100 pg/ml are consistent with non-CHF populations.   }   Lab Results    Component Value Date    CHOL 146 06/12/2020    CHOL 165 02/28/2020    CHOL 148 12/02/2019     Lab Results   Component Value Date    HDL 31 (L) 06/12/2020    HDL 28 (L) 02/28/2020    HDL 34 (L) 12/02/2019     Lab Results   Component Value Date    LDLCALC Invalid, Trig>400.0 06/12/2020    LDLCALC Invalid, Trig>400.0 02/28/2020    LDLCALC Invalid, Trig>400.0 12/02/2019     Lab Results   Component Value Date    TRIG 514 (H) 06/12/2020    TRIG 881 (H) 02/28/2020    TRIG 483 (H) 12/02/2019     Lab Results   Component Value Date    CHOLHDL 21.2 06/12/2020    CHOLHDL 17.0 (L) 02/28/2020    CHOLHDL 23.0 12/02/2019        Last Echo: 5/2018  Last stress test: 10/2019, Vatoriscan  Cardiovascular angiogram: 12/2012  ECG: NSR. 87, LBBB  Fundoscopic exam: within the past year, negative for HTN changes but have diabetic retinopathy    In 9/2016:  White female here for follow up post medications adjustment due to hypotension with near syncope. Several medication was reduced in dose and now feeling OK. Noted still LOERA especially with walking, limited also by low back pains.    Chart reviewed - abnormal stress test in 11/2012  Nuclear Quantitative Functional Analysis:   LVEF: 52 % (normal is 55 - 69)  LVED Volume: 117 ml (normal is 60 - 98)  LVES Volume: 56 ml (normal is 20 - 42)    Impression: ABNORMAL MYOCARDIAL PERFUSION  1. There is evidence for mild myocardial ischemia in the anterolateral wall of the left ventricle.   2. The perfusion scan is free of evidence for myocardial injury.   3. There is a moderate intensity fixed defect in the anteroseptal wall of the left ventricle, secondary to breast attenuation.   4. Resting wall motion is physiologic.   5. There is resting LV dysfunction with a reduced ejection fraction of 52 %.  (normal is 55 - 69)  6. The ventricular volumes are normal at rest and stress.   7. The extracardiac distribution of radioactivity is normal.   8. [facility] SSS =8, SRS =6, SDS =2, suggest that 2.5% of  myocardium may be ischemic.     [facility] HEMODYNAMIC RESULTS:    LVEDP (Pre): 16 mmHg  LVEDP (Post): 16 mmHg  Ejection Fraction: 60%    C. ANGIOGRAPHIC RESULTS:    DIAGNOSTIC:       Patient has a right dominant coronary artery.        - Left Main Coronary Artery:             The LM has luminal irregularities. There is DAX 3 flow.       - Left Anterior Descending Artery:             The LAD has luminal irregularities. There is DAX 3 flow.       - Left Circumflex Artery:             The LCX has luminal irregularities. There is DAX 3 flow.       - Right Coronary Artery:             The RCA has luminal irregularities. There is DAX 3 flow.       - Left Renal Artery:             The proximal left renal has luminal irregularities.       - Right Renal Artery:             The proximal right renal has luminal irregularities.      D. SUMMARY:    1. Non-obstructive CAD.  2. Non-obstructive atherosclerotic disease of the  right and left renal arteries    E. RECOMMENDATIONS:    1. Maximize medical management.  2. Risk factor reductions.  3. ASA 81mg.    ECHO 4/2015 - CONCLUSIONS     1 - Eccentric hypertrophy.     2 - Normal left ventricular systolic function (EF 60-65%).     3 - Left ventricular diastolic dysfunction. E/e'(lat) is 12    4 - Normal right ventricular systolic function .     5 - Pulmonary hypertension. estimated PA systolic pressure is 45 mmHg.    6 - Mild mitral regurgitation.     7 - Mild tricuspid regurgitation.     Since visit of 1/21/2016, no further problem with low BP, little more active, able to walk longer before SOB. Also limited by chronic back pains with compression fracture with sudden weakness of the right leg. Uses a walking stick.     In 9/2016, here for pre op clearance to remove fatty tissue from abdominal wall, problem with chronic infection, pain, burning. Denies any CP nor SOB. Compliant with medications. Labs reviewed - .8, non- on no medications, ASCVD 10-year event risk is 12.3%.  "CMP shows CKD stage III, eGFR 48, recent Cr now reported down to 0.95. Continue on Insulin pump, dosage decrease 160 U to now 25 U daily. Only major surgical risk is being on Insulin. ECG continue to show LBBB.    In 10/2016, feeling "great", no new problem but request Lomotil for chronic diarrhea from IBS, given medication that cost $500 for 14 days.  Lexiscan - Nuclear Quantitative Functional Analysis:   LVEF: 58 % (normal is 55 - 69)  LVED Volume: 60 ml (normal is 60 - 98)  LVES Volume: 43 ml (normal is 20 - 42)    Impression: NORMAL MYOCARDIAL PERFUSION  1. The perfusion scan is free of evidence for myocardial ischemia or injury.   2. There is a moderate intensity fixed defect in the anteroseptal wall of the left ventricle, secondary to breast attenuation.   3. Resting wall motion is physiologic.   4. Resting LV function is normal.  (normal is 55 - 69)  5. The ventricular volumes are normal at rest and stress.   6. The extracardiac distribution of radioactivity is normal.   7. When compared to the previous study from 11/28/2012, the anterolateral wall now appears normal.    ECHO CONCLUSIONS     1 - Mild left atrial enlargement. measuring 36.24 cc/m2.     2 - Concentric hypertrophy. the septum and the posterior wall each measuring 1.2 cm across.     3 - Normal left ventricular systolic function (EF 55-60%).     4 - Left ventricular diastolic dysfunction. E/e'(lat) is 13    5 - Normal right ventricular systolic function .     6 - The estimated PA systolic pressure is 37 mmHg.     7 - Small pericardial effusion.     8 - No significant change from Echo in [id number].     9 - Difficult apical windows.     In 5/2018, here with concern of right thigh "edema" for the last couple of months, right much bigger than the left. Also persistent anterior right knee pain since fall in 3/2018.   Venous US - Impression     1. No evidence of deep vein thrombosis    2.  Venous insufficiency within the distal right greater saphenous vein " at the knee.  Reflux times are given above.    3.  Stable complex right Baker's cyst       Leg US - Nonspecific calcific subcentimeter mass at the site of palpable clinical concern in the anteromedial right leg.  This most likely represents a chronic soft tissue calcification and was present on the 03/22/2018 radiographs.    Moderate-sized, complex Baker cyst in the medial popliteal fossa.    CXR - heart is upper limits of normal-sized.  There is increased thoracic kyphosis with and there is anterior paravertebral osteophyte fight formation.    In 12/2018, here for follow up, had recommended seeing annually. At this time problem mostly non-cardiac, have noted fast heart beats over the last month, last up to 2 minutes, 4-5 times weekly, makes patient somewhat anxious. ECG in 5/2018, in NSR, chronic LBBB.    In 7/2019, here for pre-orthopedic surgery clearance for the right knee. Post left knee TKR in 4/2019, no complications. Have severe ASCVD risk factor and LBBB, denies any CP nor SOB. LDL is excellent at 37, not on any Rx.    HPI comments: in 7/2020, here for annual review. Right TKR on hold due to poorly controlled T2DM. No significant cardiac symptoms, very limited by OA. Dr. Soto have reduced Losartan to 25 mg daily due to CKD.   Echo 5/2018 - The left ventricle cavity is mildly dilated.  · Left ventricle shows moderate concentric hypertrophy.  · Left ventricle ejection fraction is normal.  · Left atrium is moderately dilated.  · Grade I (mild) left ventricular diastolic dysfunction.  · LA pressure is normal.  · RA cavity is moderately dilated.  · RV systolic function is normal.  · Normal central venous pressure (3 mm Hg).     Difficult windows, Lumason contrast used for wall motion analysis.    Lexiscan 7/2019 1. Moderate size focus of myocardial infarction with associated small amount of reversible ischemia in the anterior septal wall of the left ventricle. Ejection fraction remains within normal limits.        Review of Systems   Constitution: Positive for fever (with repeated infections.) and weight gain (23 lbs since last visit, 7/2019). Negative for diaphoresis, malaise/fatigue, night sweats and weight loss (110 lbs since operation 12/2014).        Quit sedentary due to chronic knees problem.   HENT: Positive for hoarse voice. Negative for nosebleeds and tinnitus.    Eyes: Positive for blurred vision and redness. Negative for visual disturbance.   Cardiovascular: Positive for dyspnea on exertion and leg swelling. Negative for chest pain, claudication, cyanosis, irregular heartbeat, near-syncope, orthopnea, palpitations and paroxysmal nocturnal dyspnea.   Respiratory: Positive for cough and snoring (on CPAP nightly, 100%). Negative for shortness of breath, sleep disturbances due to breathing and wheezing.         Gobles score 0 today up to a 9, using CPAP 100%,    Endocrine: Positive for cold intolerance, heat intolerance and polydipsia. Negative for polyuria.   Hematologic/Lymphatic: Bruises/bleeds easily.   Skin: Positive for poor wound healing and unusual hair distribution. Negative for color change, flushing, nail changes and suspicious lesions.   Musculoskeletal: Positive for arthritis, back pain, gout, joint pain, joint swelling, muscle cramps, myalgias, neck pain and stiffness. Negative for falls and muscle weakness.   Gastrointestinal: Positive for bloating, flatus and heartburn. Negative for hematemesis, hematochezia, melena and nausea.        Have gastric ulcer at the site of the sleeve   Genitourinary: Positive for dysuria and flank pain.        CKD due to DM   Neurological: Positive for brief paralysis, numbness and paresthesias (of stomach). Negative for disturbances in coordination, excessive daytime sleepiness, dizziness, focal weakness, headaches, light-headedness, loss of balance, vertigo and weakness.   Psychiatric/Behavioral: Negative for depression and substance abuse. The patient has  "insomnia. The patient is not nervous/anxious.    Allergic/Immunologic: Positive for environmental allergies and hives.        Objective:    Physical Exam   Constitutional: She is oriented to person, place, and time. She appears well-developed and well-nourished.   HENT:   Head: Normocephalic.   Eyes: Pupils are equal, round, and reactive to light. Conjunctivae and EOM are normal.   Neck: Normal range of motion. Neck supple. No JVD present. No thyromegaly present.   Circumference 16.5"   Cardiovascular: Normal rate, regular rhythm and intact distal pulses. Exam reveals distant heart sounds. Exam reveals no gallop and no friction rub.   No murmur heard.  Mild superficial varicose veins around the ankles.   Pulmonary/Chest: Effort normal. She has wheezes. She has no rales. She exhibits no tenderness.   Abdominal: Soft. Bowel sounds are normal. There is no abdominal tenderness.   Waist 47" up to 59", hips 53.25"   Musculoskeletal: Normal range of motion.         General: Edema (1+ pitting around the ankles.) present.   Lymphadenopathy:     She has no cervical adenopathy.   Neurological: She is alert and oriented to person, place, and time.   Skin: Skin is warm and dry. No rash noted.         Assessment:       1. Cardiovascular event risk, ASCVD 10-year risk 12.3%, 12/2015    2. Annual physical exam    3. Chronic kidney disease, stage III (moderate)    4. Anemia due to stage 3 chronic kidney disease    5. Morbid obesity with BMI of 40.0-44.9, adult, today 48.4    6. LBBB (left bundle branch block)    7. Hypertriglyceridemia    8. Excessive daytime sleepiness    9. Abnormal cardiovascular stress test    10. Bariatric surgery status, 12/2014    11. Chronic diastolic heart failure, NYHA class 1    12. Chronic low back pain with left-sided sciatica, unspecified back pain laterality    13. Chronic obstructive asthma, with status asthmaticus    14. Diabetes mellitus due to underlying condition with diabetic nephropathy, with " long-term current use of insulin    15. Essential hypertension    16. Hypertensive left ventricular hypertrophy, without heart failure    17. Moderate nonproliferative diabetic retinopathy of both eyes associated with type 2 diabetes mellitus, macular edema presence unspecified    18. MECHELLE (obstructive sleep apnea), on CPAP 100%    19. Sedentary lifestyle    20. Obstructive sleep apnea syndrome         Plan:       Fifi was seen today for follow-up.    Diagnoses and all orders for this visit:    Cardiovascular event risk, ASCVD 10-year risk 12.3%, 12/2015    Annual physical exam  -     EKG 12-lead    Chronic kidney disease, stage III (moderate)    Anemia due to stage 3 chronic kidney disease    Morbid obesity with BMI of 40.0-44.9, adult, today 48.4    LBBB (left bundle branch block)    Hypertriglyceridemia    Excessive daytime sleepiness    Abnormal cardiovascular stress test    Bariatric surgery status, 12/2014    Chronic diastolic heart failure, NYHA class 1    Chronic low back pain with left-sided sciatica, unspecified back pain laterality    Chronic obstructive asthma, with status asthmaticus    Diabetes mellitus due to underlying condition with diabetic nephropathy, with long-term current use of insulin    Essential hypertension    Hypertensive left ventricular hypertrophy, without heart failure    Moderate nonproliferative diabetic retinopathy of both eyes associated with type 2 diabetes mellitus, macular edema presence unspecified    MECHELLE (obstructive sleep apnea), on CPAP 100%    Sedentary lifestyle    Obstructive sleep apnea syndrome    - All medical issues reviewed, continue current Rx.  - Weigh twice weekly, try to lose 1-2 lbs per week, target loss of 5% to 10%  - CV status stable, all medications reviewed, patient acknowledge good understanding.  - Recommend healthy living: healthy diet and regular exercise, and weight control  - Highly recommend 30 minutes of exercise daily, can have Sunday off, with  2-3 sessions of muscle strengthening weekly. A  would be very helpful.  - Any activities is better than none  - Recommend at least annual cardiovascular evaluation in view of her significant risk factors.  - Phone review / encourage use of [facility], clearance after results  - Need to work with a         Patient Active Problem List   Diagnosis    Hypertension    S/P LASIK (laser assisted in situ keratomileusis)    GERD (gastroesophageal reflux disease)    Chronic kidney disease, stage III (moderate)    Acquired hypothyroidism    DM due to underlying condition with diabetic nephropathy, on Insulin    Conjunctival laceration - Left Eye    Leg pain, bilateral    Chronic obstructive asthma, with status asthmaticus    Chronic allergic rhinitis    Chronic diastolic heart failure, NYHA class 1    Nuclear sclerosis    Dry eye    Myopia with astigmatism and presbyopia    Morbid obesity with BMI of 40.0-44.9, adult, today 48.4    Anti-polysaccharide antibody deficiency    Controlled diabetes mellitus type 2 with complications    Insulin pump status    Bariatric surgery status, 12/2014    LBBB (left bundle branch block)    Osteoarthritis    Vitreo-retinal adhesions    Vitreous hemorrhage, right eye    Posterior vitreous detachment, right eye    Moderate nonproliferative diabetic retinopathy of both eyes    Osteopenia with high risk of fracture    Symptomatic posterior vitreous detachment of left eye    Type 2 diabetes mellitus    Chronic low back pain    Sleep apnea, using CPAP 100%    Posterior vitreous detachment, left eye    Vitreous hemorrhage, left eye    Cardiovascular event risk, ASCVD 10-year risk 12.3%, 12/2015    CKD (chronic kidney disease) stage 3, GFR 30-59 ml/min    Hypertensive left ventricular hypertrophy, without heart failure    Irritable bowel syndrome with diarrhea    PUD (peptic ulcer disease)    Essential hypertension    Thrombocytosis     Adjustment insomnia    Other osteoporosis without current pathological fracture    History of pathological fracture due to osteoporosis    Intestinal metaplasia of gastric mucosa    Fibromyalgia    Generalized osteoarthritis    Spinal stenosis of lumbar region    Xerosis of skin    Synovial cyst of right popliteal space    Sedentary lifestyle    Gross hematuria    Monoclonal gammopathy associated with lymphoplasmacytic dyscrasias    Arthritis of both knees    Obesity    Venous insufficiency of both lower extremities    MECHELLE (obstructive sleep apnea), on CPAP 100%    Mild persistent asthma without complication    S/P Emanuel total knee arthroplasty, left April 16, 2019    Gait abnormality    Anemia due to stage 3 chronic kidney disease    Anemia due to end stage renal disease    Hypertriglyceridemia    Excessive daytime sleepiness    Abnormal cardiovascular stress test     Greater than 50% was spent in counseling and coordination of care. The above assessment and plan have been discussed at length. Labs and procedure over the last 6 months reviewed. Problem List updated. Asked to bring in all active medications / pills bottles with next visit.

## 2020-07-13 NOTE — PROGRESS NOTES
" Patient ID:  Fifi Mcnair is a 69 y.o. female who presents for {Misc; evaluation/follow-up:11613::"follow-up"} of Annual Exam      Health literacy: {DESC; LOW/MEDIUM/HIGH:39711} high  Activities: none  Nicotine: quit   Alcohol: none  Illicit drugs: none  Cardiac symptoms: none  Home BP: yes  Medication compliance: yes  Diet: regular, diabetic, Mediterranean regular, no added salt  Caffeine: 1 cpd  Labs:   Last Echo: 2018  Last stress test: 2019  Cardiovascular angiogram:   EC2019  Fundoscopic exam:     EPWORTH SLEEPINESS SCALE 2014   Sitting and reading 2   Watching TV 1   Sitting, inactive in a public place (e.g. a theatre or a meeting) 1   As a passenger in a car for an hour without a break 2   Lying down to rest in the afternoon when circumstances permit 1   Sitting and talking to someone 0   Sitting quietly after a lunch without alcohol 1   In a car, while stopped for a few minutes in traffic 0   Total score 8         HPI    ROS     Objective:    Physical Exam      Assessment:       1. Annual physical exam         Plan:         Annual physical exam  -     EKG 12-lead                  "

## 2020-07-16 ENCOUNTER — TELEPHONE (OUTPATIENT)
Dept: OPHTHALMOLOGY | Facility: CLINIC | Age: 70
End: 2020-07-16

## 2020-07-16 ENCOUNTER — TELEPHONE (OUTPATIENT)
Dept: UROLOGY | Facility: CLINIC | Age: 70
End: 2020-07-16

## 2020-07-16 NOTE — TELEPHONE ENCOUNTER
----- Message from Svetlana Ruvalcaba sent at 7/16/2020  3:44 PM CDT -----  Regarding: apt  Contact: patient  Type:  Sooner Apoointment Request    Caller is requesting a sooner appointment.  Caller declined first available appointment listed below.  Caller will not accept being placed on the waitlist and is requesting a message be sent to doctor.    Name of Caller:  patient  When is the first available appointment?  September  Symptoms: bladder issues pain  Best Call Back Number:  [phone]  Additional Information:  please call to advise and schedule.  Thanks!

## 2020-07-16 NOTE — TELEPHONE ENCOUNTER
Spoke with patient informed her no sooner appointments with . patient okay with seeing NP. Appointment scheduled on 8/11 at 10:30am. Patient verbally voiced understanding.

## 2020-07-19 ENCOUNTER — PATIENT OUTREACH (OUTPATIENT)
Dept: ADMINISTRATIVE | Facility: OTHER | Age: 70
End: 2020-07-19

## 2020-07-19 DIAGNOSIS — Z79.4 CONTROLLED TYPE 2 DIABETES MELLITUS WITH COMPLICATION, WITH LONG-TERM CURRENT USE OF INSULIN: Primary | ICD-10-CM

## 2020-07-19 DIAGNOSIS — E11.8 CONTROLLED TYPE 2 DIABETES MELLITUS WITH COMPLICATION, WITH LONG-TERM CURRENT USE OF INSULIN: Primary | ICD-10-CM

## 2020-07-19 NOTE — PROGRESS NOTES
Chart was reviewed for overdue [facility] Encounters ([facility]LDO)  topics  Updates were requested from care everywhere  Health Maintenance has been updated  A1C ordered

## 2020-07-20 ENCOUNTER — TELEPHONE (OUTPATIENT)
Dept: RHEUMATOLOGY | Facility: CLINIC | Age: 70
End: 2020-07-20

## 2020-07-20 DIAGNOSIS — Z01.818 PRE-OP TESTING: ICD-10-CM

## 2020-07-21 DIAGNOSIS — M25.559 ARTHRALGIA OF HIP, UNSPECIFIED LATERALITY: Primary | ICD-10-CM

## 2020-07-23 ENCOUNTER — OFFICE VISIT (OUTPATIENT)
Dept: DERMATOLOGY | Facility: CLINIC | Age: 70
End: 2020-07-23
Payer: MEDICARE

## 2020-07-23 ENCOUNTER — HOSPITAL ENCOUNTER (OUTPATIENT)
Dept: RADIOLOGY | Facility: HOSPITAL | Age: 70
Discharge: HOME | End: 2020-07-23
Attending: ORTHOPAEDIC SURGERY
Payer: MEDICARE

## 2020-07-23 ENCOUNTER — OFFICE VISIT (OUTPATIENT)
Dept: ORTHOPEDICS | Facility: CLINIC | Age: 70
End: 2020-07-23
Payer: MEDICARE

## 2020-07-23 VITALS — HEIGHT: 62 IN | RESPIRATION RATE: 16 BRPM | WEIGHT: 264 LBS | BODY MASS INDEX: 48.58 KG/M2

## 2020-07-23 VITALS — HEIGHT: 62 IN | BODY MASS INDEX: 48.72 KG/M2 | WEIGHT: 264.75 LBS

## 2020-07-23 DIAGNOSIS — M25.559 ARTHRALGIA OF HIP, UNSPECIFIED LATERALITY: ICD-10-CM

## 2020-07-23 DIAGNOSIS — L72.0 EPIDERMAL CYST: ICD-10-CM

## 2020-07-23 DIAGNOSIS — L72.0 MILIA: ICD-10-CM

## 2020-07-23 DIAGNOSIS — L73.9 FOLLICULITIS: Primary | ICD-10-CM

## 2020-07-23 DIAGNOSIS — M76.892 TENDINITIS OF LEFT HIP FLEXOR: Primary | ICD-10-CM

## 2020-07-23 PROCEDURE — 73521 X-RAY EXAM HIPS BI 2 VIEWS: CPT | Mod: 26,,, | Performed by: RADIOLOGY

## 2020-07-23 PROCEDURE — 3008F PR BODY MASS INDEX (BMI) DOCUMENTED: ICD-10-PCS | Mod: CPTII,S$GLB,, | Performed by: DERMATOLOGY

## 2020-07-23 PROCEDURE — 3008F BODY MASS INDEX DOCD: CPT | Mod: CPTII,S$GLB,, | Performed by: ORTHOPAEDIC SURGERY

## 2020-07-23 PROCEDURE — 1159F MED LIST DOCD IN RCRD: CPT | Mod: S$GLB,,, | Performed by: DERMATOLOGY

## 2020-07-23 PROCEDURE — 99999 PR PBB SHADOW E&M-EST. PATIENT-LVL III: CPT | Mod: PBBFAC,,, | Performed by: DERMATOLOGY

## 2020-07-23 PROCEDURE — 99999 PR PBB SHADOW E&M-EST. PATIENT-LVL III: ICD-10-PCS | Mod: PBBFAC,,, | Performed by: ORTHOPAEDIC SURGERY

## 2020-07-23 PROCEDURE — 1126F AMNT PAIN NOTED NONE PRSNT: CPT | Mod: S$GLB,,, | Performed by: DERMATOLOGY

## 2020-07-23 PROCEDURE — 3008F PR BODY MASS INDEX (BMI) DOCUMENTED: ICD-10-PCS | Mod: CPTII,S$GLB,, | Performed by: ORTHOPAEDIC SURGERY

## 2020-07-23 PROCEDURE — 1101F PT FALLS ASSESS-DOCD LE1/YR: CPT | Mod: CPTII,S$GLB,, | Performed by: DERMATOLOGY

## 2020-07-23 PROCEDURE — 99999 PR PBB SHADOW E&M-EST. PATIENT-LVL III: ICD-10-PCS | Mod: PBBFAC,,, | Performed by: DERMATOLOGY

## 2020-07-23 PROCEDURE — 1101F PR PT FALLS ASSESS DOC 0-1 FALLS W/OUT INJ PAST YR: ICD-10-PCS | Mod: CPTII,S$GLB,, | Performed by: ORTHOPAEDIC SURGERY

## 2020-07-23 PROCEDURE — 1125F AMNT PAIN NOTED PAIN PRSNT: CPT | Mod: S$GLB,,, | Performed by: ORTHOPAEDIC SURGERY

## 2020-07-23 PROCEDURE — 73521 X-RAY EXAM HIPS BI 2 VIEWS: CPT | Mod: TC,PN

## 2020-07-23 PROCEDURE — 1101F PT FALLS ASSESS-DOCD LE1/YR: CPT | Mod: CPTII,S$GLB,, | Performed by: ORTHOPAEDIC SURGERY

## 2020-07-23 PROCEDURE — 1159F MED LIST DOCD IN RCRD: CPT | Mod: S$GLB,,, | Performed by: ORTHOPAEDIC SURGERY

## 2020-07-23 PROCEDURE — 1125F PR PAIN SEVERITY QUANTIFIED, PAIN PRESENT: ICD-10-PCS | Mod: S$GLB,,, | Performed by: ORTHOPAEDIC SURGERY

## 2020-07-23 PROCEDURE — 1126F PR PAIN SEVERITY QUANTIFIED, NO PAIN PRESENT: ICD-10-PCS | Mod: S$GLB,,, | Performed by: DERMATOLOGY

## 2020-07-23 PROCEDURE — 99213 PR OFFICE/OUTPT VISIT, EST, LEVL III, 20-29 MIN: ICD-10-PCS | Mod: S$GLB,,, | Performed by: ORTHOPAEDIC SURGERY

## 2020-07-23 PROCEDURE — 1159F PR MEDICATION LIST DOCUMENTED IN MEDICAL RECORD: ICD-10-PCS | Mod: S$GLB,,, | Performed by: DERMATOLOGY

## 2020-07-23 PROCEDURE — 99213 OFFICE O/P EST LOW 20 MIN: CPT | Mod: S$GLB,,, | Performed by: ORTHOPAEDIC SURGERY

## 2020-07-23 PROCEDURE — 3008F BODY MASS INDEX DOCD: CPT | Mod: CPTII,S$GLB,, | Performed by: DERMATOLOGY

## 2020-07-23 PROCEDURE — 99202 OFFICE O/P NEW SF 15 MIN: CPT | Mod: S$GLB,,, | Performed by: DERMATOLOGY

## 2020-07-23 PROCEDURE — 99999 PR PBB SHADOW E&M-EST. PATIENT-LVL III: CPT | Mod: PBBFAC,,, | Performed by: ORTHOPAEDIC SURGERY

## 2020-07-23 PROCEDURE — 1101F PR PT FALLS ASSESS DOC 0-1 FALLS W/OUT INJ PAST YR: ICD-10-PCS | Mod: CPTII,S$GLB,, | Performed by: DERMATOLOGY

## 2020-07-23 PROCEDURE — 73521 XR HIPS BILATERAL 2 VIEW INCL AP PELVIS: ICD-10-PCS | Mod: 26,,, | Performed by: RADIOLOGY

## 2020-07-23 PROCEDURE — 1159F PR MEDICATION LIST DOCUMENTED IN MEDICAL RECORD: ICD-10-PCS | Mod: S$GLB,,, | Performed by: ORTHOPAEDIC SURGERY

## 2020-07-23 PROCEDURE — 99202 PR OFFICE/OUTPT VISIT, NEW, LEVL II, 15-29 MIN: ICD-10-PCS | Mod: S$GLB,,, | Performed by: DERMATOLOGY

## 2020-07-23 RX ORDER — METRONIDAZOLE 10 MG/G
GEL TOPICAL DAILY
Qty: 60 G | Refills: 2 | Status: SHIPPED | OUTPATIENT
Start: 2020-07-23 | End: 2021-07-23

## 2020-07-23 NOTE — PROGRESS NOTES
Subjective:       Patient ID:  Fifi Mcnair is a 69 y.o. female who presents for   Chief Complaint   Patient presents with    Spot     scalp     Pt co bumps on and off x 8 years of the scalp.    No treatment tried.    Also co bumps on the face.      Review of Systems   Constitutional: Negative for fever.   HENT: Negative for sore throat.    Respiratory: Positive for cough (chronic).    Musculoskeletal: Positive for arthralgias (chronic). Negative for myalgias.   Skin: Negative for rash.        Objective:    Physical Exam   Constitutional: She appears well-developed and well-nourished.   Eyes: No conjunctival no injection.   Neurological: She is alert and oriented to person, place, and time.   Psychiatric: She has a normal mood and affect.   Skin:   Areas Examined (abnormalities noted in diagram):   Scalp / Hair Palpated and Inspected  Head / Face Inspection Performed  Neck Inspection Performed  RUE Inspected  LUE Inspection Performed                   Diagram Legend     Erythematous scaling macule/papule c/w actinic keratosis       Vascular papule c/w angioma      Pigmented verrucoid papule/plaque c/w seborrheic keratosis      Yellow umbilicated papule c/w sebaceous hyperplasia      Irregularly shaped tan macule c/w lentigo     1-2 mm smooth white papules consistent with Milia      Movable subcutaneous cyst with punctum c/w epidermal inclusion cyst      Subcutaneous movable cyst c/w pilar cyst      Firm pink to brown papule c/w dermatofibroma      Pedunculated fleshy papule(s) c/w skin tag(s)      Evenly pigmented macule c/w junctional nevus     Mildly variegated pigmented, slightly irregular-bordered macule c/w mildly atypical nevus      Flesh colored to evenly pigmented papule c/w intradermal nevus       Pink pearly papule/plaque c/w basal cell carcinoma      Erythematous hyperkeratotic cursted plaque c/w SCC      Surgical scar with no sign of skin cancer recurrence      Open and closed comedones       Inflammatory papules and pustules      Verrucoid papule consistent consistent with wart     Erythematous eczematous patches and plaques     Dystrophic onycholytic nail with subungual debris c/w onychomycosis     Umbilicated papule    Erythematous-base heme-crusted tan verrucoid plaque consistent with inflamed seborrheic keratosis     Erythematous Silvery Scaling Plaque c/w Psoriasis     See annotation      Assessment / Plan:        Folliculitis  -     metronidazole 1% (METROGEL) 1 % Gel; Apply topically once daily. Prn sores on scalp.  Dispense: 60 g; Refill: 2  Discussed with patient the etiology and pathogenesis of the disease or skin lesion(s) and possible treatments and aggravators.    Reviewed with patient different treatment options and associated risks.  Proper application of medications and or care for affected area(s) and condition(s) reviewed.  Pt allergic to mycins and klaron not covered.  Patient to start using sulfur bar soap for the face or affected area 1-2 x day.  For scalp usage lather from the soap to be applied to the roots of the scalp and allow to sit for 3-5 minutes regularly.  Same instructions for other affected areas.    Milia  Discussed with patient the benign nature of these lesions and that no treatment is indicated.      Epidermal cyst  Discussed with patient the benign nature of these lesions and that no treatment is indicated.    Small on post neck.           Follow up if symptoms worsen or fail to improve.

## 2020-07-23 NOTE — PROGRESS NOTES
Past Medical History:   Diagnosis Date    Allergy     multiple antibiotic allergies    Anemia     Anxiety     Arthritis     Asthma     CHF (congestive heart failure)     NYHA class III     Chronic kidney disease     Colon polyp     benign    COPD (chronic obstructive pulmonary disease)     DLCO 37% , and mild pulmonary HTN    Dental bridge present     LOWER    Depression     Diabetes mellitus     Diabetes mellitus without complication     Diabetic retinopathy     Diastolic dysfunction     Diverticulitis of large intestine without perforation or abscess without bleeding 2/12/2018    Diverticulosis     Former smoker     Fracture of lumbar spine     GERD (gastroesophageal reflux disease)     Hernia of unspecified site of abdominal cavity without mention of obstruction or gangrene     Hyperlipidemia     Hypertension     hypertensive renal    IBS (irritable bowel syndrome)     Mild nonproliferative diabetic retinopathy(362.04) 11/25/2013    Morbid obesity     Nuclear sclerosis 11/25/2013    Osteoporosis     Peripheral edema     Rash     Recurrent upper respiratory infection (URI)     S/P LASIK (laser assisted in situ keratomileusis)     Sleep apnea     sleep apnea uses bipap.    Stroke     tia    Thyroid disease     on synthroid    TIA (transient ischemic attack)     Urinary tract infection     Wears glasses        Past Surgical History:   Procedure Laterality Date    ABDOMINAL SURGERY      ADENOIDECTOMY      ARTHROSCOPIC CHONDROPLASTY OF KNEE JOINT Right 8/31/2018    Procedure: ARTHROSCOPY, KNEE, WITH CHONDROPLASTY;  Surgeon: Larry Molina MD;  Location: [facility] OR;  Service: Orthopedics;  Laterality: Right;  Tricompartmental chondroplasty    COLONOSCOPY  03/05/2013    repeat in 5 years    COLONOSCOPY N/A 5/31/2017    Procedure: COLONOSCOPY;  Surgeon: Luis Navarro MD;  Location: [facility];  Service: Endoscopy;  Laterality: N/A;    COLONOSCOPY N/A 4/11/2018     Procedure: COLONOSCOPY;  Surgeon: Luis Navarro MD;  Location: [facility];  Service: Endoscopy;  Laterality: N/A;    COSMETIC SURGERY      belt abdominoplasty    CYSTOSCOPY      CYSTOSCOPY N/A 8/6/2018    Procedure: CYSTOSCOPY;  Surgeon: Angy Campos MD;  Location: [facility] OR;  Service: Urology;  Laterality: N/A;    EYE SURGERY Right     mazzulla    GASTRECTOMY      gastric sleeve    gastric sleeve      HERNIA REPAIR      5 years old    HYSTERECTOMY      ovaries remain    JOINT REPLACEMENT      KNEE ARTHROPLASTY Left 4/16/2019    Procedure: ARTHROPLASTY, KNEE;  Surgeon: Larry Molina MD;  Location: [facility] OR;  Service: Orthopedics;  Laterality: Left;  Emanuel    KNEE ARTHROSCOPY W/ MENISCECTOMY Right 8/31/2018    Procedure: ARTHROSCOPY, KNEE, WITH MENISCECTOMY;  Surgeon: Larry Molina MD;  Location: [facility] OR;  Service: Orthopedics;  Laterality: Right;    REFRACTIVE SURGERY      mono va//ou//    RHINOPLASTY TIP      TONSILLECTOMY      TOTAL KNEE ARTHROPLASTY Left 4/16/19    TUBAL LIGATION      UPPER GASTROINTESTINAL ENDOSCOPY  03/05/2013    UPPER GASTROINTESTINAL ENDOSCOPY  08/24/2016    Dr. Navarro, repeat in 8 weeks    UPPER GASTROINTESTINAL ENDOSCOPY  07/21/2016    Dr. Randall       Current Outpatient Medications   Medication Sig    alendronate (FOSAMAX) 35 MG tablet Take 1 tablet (35 mg total) by mouth every 7 days.    allopurinol (ZYLOPRIM) 100 MG tablet Take 2 tablets (200 mg total) by mouth nightly.    amitriptyline (ELAVIL) 100 MG tablet 1 tab at night    ascorbic acid, vitamin C, (VITAMIN C) 500 MG tablet Take 500 mg by mouth once daily.    atenolol (TENORMIN) 25 MG tablet Take 1 tablet (25 mg total) by mouth 2 (two) times daily.    biotin 1 mg Cap Take by mouth.    calcitRIOL (ROCALTROL) 0.25 MCG Cap TAKE ONE TABLET BY MOUTH TWICE A WEEK ON monday AND friday    cetirizine (ZYRTEC) 10 MG tablet Take 1 tablet (10 mg total) by mouth once daily.     CHERATUSSIN AC  mg/5 mL syrup TAKE 5 ML (CC) BY MOUTH 4 TIMES DAILY AS NEEDED FOR COUGH FOR 5 DAYS    cinnamon bark (CINNAMON ORAL) Take by mouth once daily.    clotrimazole-betamethasone 1-0.05% (LOTRISONE) cream     cyanocobalamin, vitamin B-12, (VITAMIN B-12) 5,000 mcg Subl Place 5,000 mg under the tongue once a week.    diclofenac sodium (VOLTAREN) 1 % Gel  APPLY 2 GRAMS TOPICALLY ONCE DAILY    diphenoxylate-atropine 2.5-0.025 mg (LOMOTIL) 2.5-0.025 mg per tablet TAKE ONE TABLET BY MOUTH 4 TIMES DAILY AS NEEDED FOR DIARRHEA    DYMISTA 137-50 mcg/spray Spry nassal spray as needed.     ferrous sulfate (IRON) 325 mg (65 mg iron) Tab tablet Take 325 mg by mouth daily with breakfast.    fish oil-omega-3 fatty acids 300-1,000 mg capsule Take 1 capsule by mouth once daily.    fluconazole (DIFLUCAN) 100 MG tablet Take 1 tablet (100 mg total) by mouth once daily.    FLUoxetine 20 MG capsule Take 2 capsules (40 mg total) by mouth once daily.    fluticasone furoate-vilanterol (BREO) 100-25 mcg/dose diskus inhaler Inhale 1 puff into the lungs once daily.    fluticasone propionate (FLONASE) 50 mcg/actuation nasal spray 1 spray (50 mcg total) by Each Nostril route once daily.    gabapentin (NEURONTIN) 600 MG tablet Take 1 tablet (600 mg total) by mouth 3 (three) times daily.    HUMALOG U-100 INSULIN 100 unit/mL injection inject 100 UNITS DAILY via insulin pump    hydrocortisone 2.5 % cream Apply topically nightly.    insulin aspart U-100 (NOVOLOG) 100 unit/mL injection Use per insulin pump, 35-40 units daily.    L-LYSINE ORAL Take by mouth.    l-methylfolate-b2-b6-b12 (CEREFOLIN) 6-5-50-1 mg Tab Take 1 tablet by mouth once daily.    Lacto.acidophilus-Bif.animalis (PROBIOTIC) 5 billion cell CpSP Take 1 capsule by mouth once daily.    levothyroxine (SYNTHROID) 25 MCG tablet Take 1 tablet (25 mcg total) by mouth once daily.    losartan (COZAAR) 25 MG tablet Take 25 mg by mouth once daily.    losartan  (COZAAR) 50 MG tablet Take 1 tablet (50 mg total) by mouth once daily.    magnesium oxide (MAG-OX) 400 mg (241.3 mg magnesium) tablet Take 800 mg by mouth every evening.    metronidazole 1% (METROGEL) 1 % Gel Apply topically once daily. Prn sores on scalp.    montelukast (SINGULAIR) 10 mg tablet Take 1 tablet (10 mg total) by mouth every evening.    MULTIVIT-IRON-MIN-FOLIC ACID 3,500-18-0.4 UNIT-MG-MG ORAL CHEW Take by mouth 2 (two) times daily.    mupirocin (BACTROBAN) 2 % ointment APPLY OINTMENT TOPICALLY TO AFFECTED AREA ON NOSE THREE TIMES DAILY AS DIRECTED    oxyCODONE (ROXICODONE) 15 MG Tab Take 1 tablet (15 mg total) by mouth every 6 (six) hours as needed for Pain.    pantoprazole (PROTONIX) 40 MG tablet TAKE 1 TABLET BY MOUTH ONCE DAILY    potassium chloride SA (K-DUR,KLOR-CON) 20 MEQ tablet TAKE 1 TABLET BY MOUTH TWICE DAILY    SSD 1 % cream     torsemide (DEMADEX) 20 MG Tab TAKE ONE TABLET BY MOUTH TWICE DAILY    TURMERIC ORAL Take by mouth once daily.    insulin (BASAGLAR KWIKPEN U-100 INSULIN) glargine 100 units/mL (3mL) SubQ pen Inject 20 Units into the skin every evening. Use 10 units if BS over 210.Night before surgery. (Patient not taking: Reported on 6/30/2020)     Current Facility-Administered Medications   Medication    gentamicin injection 80 mg     Facility-Administered Medications Ordered in Other Visits   Medication    lactated ringers infusion       Review of patient's allergies indicates:   Allergen Reactions    Adhesive Other (See Comments)     Blisters    Cleocin [clindamycin hcl] Hives    Ceclor [cefaclor] Hives    Advair diskus [fluticasone propion-salmeterol]      Dry mouth    Aleve [naproxen sodium]      Unable to take secondary to kidney function.     Erythromycin Hives    Levaquin [levofloxacin] Dermatitis    Macrobid [nitrofurantoin monohyd/m-cryst] Other (See Comments)     Stomach pain/ GI issues    Macrodantin [nitrofurantoin macrocrystalline] Hives     Motrin [ibuprofen]      Unable to take secondary to kidney functions.    Restoril [temazepam]      LIGHTHEADED UPON WAKING.with poor results    Sulfa (sulfonamide antibiotics)      Hold due to renal problems    Trazodone      PALPITATIONS    Remeron [mirtazapine] Palpitations and Other (See Comments)     Jittery    Vancomycin analogues Rash     Feet broke out/inflammed blood vessels         Family History   Problem Relation Age of Onset    Heart disease Mother 59    Cancer Mother 59        throat    Allergies Mother     Diabetes Mother     Breast cancer Mother     Heart disease Father 70        flutter    Allergies Father     Diabetes Father     Cancer Sister 22        22 thyroid,49  breast    Allergies Sister     Breast cancer Sister     Diabetes Sister     Cancer Brother 62        lung    Diabetes Brother     Asthma Daughter     Diabetes Daughter     Depression Daughter     Asthma Grandchild     Eczema Grandchild     Eczema Grandchild     Breast cancer Maternal Grandmother     Ovarian cancer Cousin     Glaucoma Neg Hx     Macular degeneration Neg Hx     Retinal detachment Neg Hx        Social History     Socioeconomic History    Marital status:      Spouse name: Not on file    Number of children: Not on file    Years of education: Not on file    Highest education level: Not on file   Occupational History    Not on file   Social Needs    Financial resource strain: Somewhat hard    Food insecurity     Worry: Never true     Inability: Never true    Transportation needs     Medical: No     Non-medical: No   Tobacco Use    Smoking status: Former Smoker     Packs/day: 1.00     Years: 4.00     Pack years: 4.00     Types: Cigarettes     Quit date:      Years since quittin.5    Smokeless tobacco: Never Used    Tobacco comment: quit , lived with smokers    Substance and Sexual Activity    Alcohol use: Not Currently     Frequency: Never     Binge frequency: Never     Drug use: No    Sexual activity: Yes     Birth control/protection: Surgical   Lifestyle    Physical activity     Days per week: 0 days     Minutes per session: 10 min    Stress: Rather much   Relationships    Social connections     Talks on phone: More than three times a week     Gets together: More than three times a week     Attends Buddhism service: Not on file     Active member of club or organization: Yes     Attends meetings of clubs or organizations: More than 4 times per year     Relationship status:    Other Topics Concern    Not on file   Social History Narrative    Not on file       Chief Complaint:   Chief Complaint   Patient presents with    Hip Pain     left hip        Date of surgery:  April 16, 2019 left total knee    History of present illness:  69-year-old female who comes in with left hip pain.  Pain started about 10 days ago.  No injury or trauma.  Pain along the anterior hip.  Pain getting an hour out of a car.  Hard to raise her leg.  Pain is an 8/10.  Actually getting better over the last couple of days.    Review of Systems:    Musculoskeletal:  See HPI        Physical Examination:    Vital Signs:    Vitals:    07/23/20 1434   Resp: 16       Body mass index is 48.29 kg/m².    This a well-developed, well nourished patient in no acute distress.  They are alert and oriented and cooperative to examination.  Pt. walks with a mild antalgic gait.      Examination of the patient's left hip shows full range of motion with flexion to 160°, extension to 0, external rotation to 50°, internal rotation of 15°, abduction of 50°, adduction of 15°. Skin has no rashes or bruising. Patient has negative Stinchfield exam. Patient has negative straight leg raise.Negative internal impingement test. Negative FELIX test. Negative Emma's test. Patient has no pain with hip range of motion.  Mildly tender over the hip flexor.  Nontender to palpation over the greater trochanteric bursa. Patient is 5  out of 5 motor strength, palpable distal pulses, and intact light touch sensation.       X-rays:  X-rays left hip is ordered and reviewed which show some mild degenerative change without significant bony abnormality or severe arthritis.    Assessment::  Left hip flexor tendinitis    Plan:  Reviewed the findings with her today.  She thinks it is getting better.  We talked about possibly getting her into some physical therapy or some steroids either an injection or pills if it does not improve.  Patient is really working diligently on getting her blood sugar down so we can do her right total knee.    This note was created using M Modal voice recognition software that occasionally misinterpreted phrases or words.

## 2020-07-30 ENCOUNTER — PATIENT OUTREACH (OUTPATIENT)
Dept: ADMINISTRATIVE | Facility: OTHER | Age: 70
End: 2020-07-30

## 2020-07-31 ENCOUNTER — TELEPHONE (OUTPATIENT)
Dept: OPHTHALMOLOGY | Facility: CLINIC | Age: 70
End: 2020-07-31

## 2020-07-31 ENCOUNTER — OFFICE VISIT (OUTPATIENT)
Dept: OPHTHALMOLOGY | Facility: CLINIC | Age: 70
End: 2020-07-31
Payer: MEDICARE

## 2020-07-31 VITALS — DIASTOLIC BLOOD PRESSURE: 77 MMHG | HEART RATE: 78 BPM | SYSTOLIC BLOOD PRESSURE: 141 MMHG

## 2020-07-31 DIAGNOSIS — H25.11 AGE-RELATED NUCLEAR CATARACT OF RIGHT EYE: Primary | ICD-10-CM

## 2020-07-31 PROCEDURE — 92025 CORNEAL TOPOGRAPHY - OD - RIGHT EYE: ICD-10-PCS | Mod: S$GLB,,, | Performed by: OPHTHALMOLOGY

## 2020-07-31 PROCEDURE — 99499 UNLISTED E&M SERVICE: CPT | Mod: S$GLB,,, | Performed by: OPHTHALMOLOGY

## 2020-07-31 PROCEDURE — 92025 CPTRIZED CORNEAL TOPOGRAPHY: CPT | Mod: S$GLB,,, | Performed by: OPHTHALMOLOGY

## 2020-07-31 PROCEDURE — 92136 IOL MASTER - OD - RIGHT EYE: ICD-10-PCS | Mod: RT,S$GLB,, | Performed by: OPHTHALMOLOGY

## 2020-07-31 PROCEDURE — 99999 PR PBB SHADOW E&M-EST. PATIENT-LVL V: CPT | Mod: PBBFAC,,, | Performed by: OPHTHALMOLOGY

## 2020-07-31 PROCEDURE — 92136 OPHTHALMIC BIOMETRY: CPT | Mod: RT,S$GLB,, | Performed by: OPHTHALMOLOGY

## 2020-07-31 PROCEDURE — 99499 NO LOS: ICD-10-PCS | Mod: S$GLB,,, | Performed by: OPHTHALMOLOGY

## 2020-07-31 PROCEDURE — 99999 PR PBB SHADOW E&M-EST. PATIENT-LVL V: ICD-10-PCS | Mod: PBBFAC,,, | Performed by: OPHTHALMOLOGY

## 2020-07-31 RX ORDER — PREDNISOLONE ACETATE 10 MG/ML
1 SUSPENSION/ DROPS OPHTHALMIC 4 TIMES DAILY
Qty: 5 ML | Refills: 3 | Status: SHIPPED | OUTPATIENT
Start: 2020-07-31 | End: 2020-09-14

## 2020-07-31 RX ORDER — TOBRAMYCIN 3 MG/ML
1 SOLUTION/ DROPS OPHTHALMIC EVERY 6 HOURS
Qty: 5 ML | Refills: 1 | Status: SHIPPED | OUTPATIENT
Start: 2020-07-31 | End: 2020-09-02 | Stop reason: CLARIF

## 2020-07-31 RX ORDER — DICLOFENAC SODIUM 1 MG/ML
1 SOLUTION/ DROPS OPHTHALMIC 4 TIMES DAILY
Qty: 5 ML | Refills: 3 | Status: SHIPPED | OUTPATIENT
Start: 2020-07-31 | End: 2020-09-27

## 2020-07-31 RX ORDER — PROPARACAINE HYDROCHLORIDE 5 MG/ML
1 SOLUTION/ DROPS OPHTHALMIC
Status: CANCELLED | OUTPATIENT
Start: 2020-07-31 | End: 2020-07-31

## 2020-07-31 RX ORDER — PHENYLEPHRINE HYDROCHLORIDE 25 MG/ML
1 SOLUTION/ DROPS OPHTHALMIC
Status: CANCELLED | OUTPATIENT
Start: 2020-07-31 | End: 2020-07-31

## 2020-07-31 RX ORDER — PHENYLEPHRINE HYDROCHLORIDE 100 MG/ML
1 SOLUTION/ DROPS OPHTHALMIC
Status: CANCELLED | OUTPATIENT
Start: 2020-07-31 | End: 2020-07-31

## 2020-07-31 RX ORDER — TROPICAMIDE 10 MG/ML
1 SOLUTION/ DROPS OPHTHALMIC
Status: CANCELLED | OUTPATIENT
Start: 2020-07-31 | End: 2020-07-31

## 2020-07-31 NOTE — TELEPHONE ENCOUNTER
Called pt about note left in chart. Note stated that pt did not know about this appointment and was not staying. Notified pt that we talked about this appointment when I called and scheduled her to come in on the 16th. Pt stated she was confused but remembered our conversation. Stated she would be here this afternoon for her appointment time.

## 2020-07-31 NOTE — H&P (VIEW-ONLY)
CC: H&P for cataract surgery  HPI: Patient has been diagnosed with cataracts, which are interfering with daily activities due to blurred vision and glare which cannot adequately be corrected with glasses.   PMH:  Past Medical History:   Diagnosis Date    Allergy     multiple antibiotic allergies    Anemia     Anxiety     Arthritis     Asthma     CHF (congestive heart failure)     NYHA class III     Chronic kidney disease     Colon polyp     benign    COPD (chronic obstructive pulmonary disease)     DLCO 37% , and mild pulmonary HTN    Dental bridge present     LOWER    Depression     Diabetes mellitus     Diabetes mellitus without complication     Diabetic retinopathy     Diastolic dysfunction     Diverticulitis of large intestine without perforation or abscess without bleeding 2/12/2018    Diverticulosis     Former smoker     Fracture of lumbar spine     GERD (gastroesophageal reflux disease)     Hernia of unspecified site of abdominal cavity without mention of obstruction or gangrene     Hyperlipidemia     Hypertension     hypertensive renal    IBS (irritable bowel syndrome)     Mild nonproliferative diabetic retinopathy(362.04) 11/25/2013    Morbid obesity     Nuclear sclerosis 11/25/2013    Osteoporosis     Peripheral edema     Rash     Recurrent upper respiratory infection (URI)     S/P LASIK (laser assisted in situ keratomileusis)     Sleep apnea     sleep apnea uses bipap.    Stroke     tia    Thyroid disease     on synthroid    TIA (transient ischemic attack)     Urinary tract infection     Wears glasses        FH:  Family History   Problem Relation Age of Onset    Heart disease Mother 59    Cancer Mother 59        throat    Allergies Mother     Diabetes Mother     Breast cancer Mother     Heart disease Father 70        flutter    Allergies Father     Diabetes Father     Cancer Sister 22        22 thyroid,49  breast    Allergies Sister     Breast cancer Sister      Diabetes Sister     Cancer Brother 62        lung    Diabetes Brother     Asthma Daughter     Diabetes Daughter     Depression Daughter     Asthma Grandchild     Eczema Grandchild     Eczema Grandchild     Breast cancer Maternal Grandmother     Ovarian cancer Cousin     Glaucoma Neg Hx     Macular degeneration Neg Hx     Retinal detachment Neg Hx        SH:  Social History     Socioeconomic History    Marital status:      Spouse name: Not on file    Number of children: Not on file    Years of education: Not on file    Highest education level: Not on file   Occupational History    Not on file   Social Needs    Financial resource strain: Somewhat hard    Food insecurity     Worry: Never true     Inability: Never true    Transportation needs     Medical: No     Non-medical: No   Tobacco Use    Smoking status: Former Smoker     Packs/day: 1.00     Years: 4.00     Pack years: 4.00     Types: Cigarettes     Quit date:      Years since quittin.6    Smokeless tobacco: Never Used    Tobacco comment: quit , lived with smokers    Substance and Sexual Activity    Alcohol use: Not Currently     Frequency: Never     Binge frequency: Never    Drug use: No    Sexual activity: Yes     Birth control/protection: Surgical   Lifestyle    Physical activity     Days per week: 0 days     Minutes per session: 10 min    Stress: Rather much   Relationships    Social connections     Talks on phone: More than three times a week     Gets together: More than three times a week     Attends U[employer] service: Not on file     Active member of club or organization: Yes     Attends meetings of clubs or organizations: More than 4 times per year     Relationship status:    Other Topics Concern    Not on file   Social History Narrative    Not on file       ROS:  HEENT: Blurred vision  CV: No chest pain  Pulm: No SOB  Please see my last exam note for additional ROS details    PE:  Vitals:     07/31/20 1510   BP: (!) 141/77   Pulse: 78     HEENT: Cataract  CV: N S1 and S2 no murmurs  Pulm: CTAB wheezes, rales, or ronchi  Abd:  soft nabs NTND no masses  Extremeties: + edema    A/P  1. Cataract - Indications, risks and alternatives to the procedure were explained to the patient. The patient was given the opportunity to ask questions and consented to the procedure in writing.  Proceed with cataract surgery as scheduled.  2. Other health issues - patient has been cleared by their PCP. See last note for details.

## 2020-07-31 NOTE — H&P
CC: H&P for cataract surgery  HPI: Patient has been diagnosed with cataracts, which are interfering with daily activities due to blurred vision and glare which cannot adequately be corrected with glasses.   PMH:  Past Medical History:   Diagnosis Date    Allergy     multiple antibiotic allergies    Anemia     Anxiety     Arthritis     Asthma     CHF (congestive heart failure)     NYHA class III     Chronic kidney disease     Colon polyp     benign    COPD (chronic obstructive pulmonary disease)     DLCO 37% , and mild pulmonary HTN    Dental bridge present     LOWER    Depression     Diabetes mellitus     Diabetes mellitus without complication     Diabetic retinopathy     Diastolic dysfunction     Diverticulitis of large intestine without perforation or abscess without bleeding 2/12/2018    Diverticulosis     Former smoker     Fracture of lumbar spine     GERD (gastroesophageal reflux disease)     Hernia of unspecified site of abdominal cavity without mention of obstruction or gangrene     Hyperlipidemia     Hypertension     hypertensive renal    IBS (irritable bowel syndrome)     Mild nonproliferative diabetic retinopathy(362.04) 11/25/2013    Morbid obesity     Nuclear sclerosis 11/25/2013    Osteoporosis     Peripheral edema     Rash     Recurrent upper respiratory infection (URI)     S/P LASIK (laser assisted in situ keratomileusis)     Sleep apnea     sleep apnea uses bipap.    Stroke     tia    Thyroid disease     on synthroid    TIA (transient ischemic attack)     Urinary tract infection     Wears glasses        FH:  Family History   Problem Relation Age of Onset    Heart disease Mother 59    Cancer Mother 59        throat    Allergies Mother     Diabetes Mother     Breast cancer Mother     Heart disease Father 70        flutter    Allergies Father     Diabetes Father     Cancer Sister 22        22 thyroid,49  breast    Allergies Sister     Breast cancer Sister      Diabetes Sister     Cancer Brother 62        lung    Diabetes Brother     Asthma Daughter     Diabetes Daughter     Depression Daughter     Asthma Grandchild     Eczema Grandchild     Eczema Grandchild     Breast cancer Maternal Grandmother     Ovarian cancer Cousin     Glaucoma Neg Hx     Macular degeneration Neg Hx     Retinal detachment Neg Hx        SH:  Social History     Socioeconomic History    Marital status:      Spouse name: Not on file    Number of children: Not on file    Years of education: Not on file    Highest education level: Not on file   Occupational History    Not on file   Social Needs    Financial resource strain: Somewhat hard    Food insecurity     Worry: Never true     Inability: Never true    Transportation needs     Medical: No     Non-medical: No   Tobacco Use    Smoking status: Former Smoker     Packs/day: 1.00     Years: 4.00     Pack years: 4.00     Types: Cigarettes     Quit date:      Years since quittin.6    Smokeless tobacco: Never Used    Tobacco comment: quit , lived with smokers    Substance and Sexual Activity    Alcohol use: Not Currently     Frequency: Never     Binge frequency: Never    Drug use: No    Sexual activity: Yes     Birth control/protection: Surgical   Lifestyle    Physical activity     Days per week: 0 days     Minutes per session: 10 min    Stress: Rather much   Relationships    Social connections     Talks on phone: More than three times a week     Gets together: More than three times a week     Attends [employer] service: Not on file     Active member of club or organization: Yes     Attends meetings of clubs or organizations: More than 4 times per year     Relationship status:    Other Topics Concern    Not on file   Social History Narrative    Not on file       ROS:  HEENT: Blurred vision  CV: No chest pain  Pulm: No SOB  Please see my last exam note for additional ROS details    PE:  Vitals:     07/31/20 1510   BP: (!) 141/77   Pulse: 78     HEENT: Cataract  CV: N S1 and S2 no murmurs  Pulm: CTAB wheezes, rales, or ronchi  Abd:  soft nabs NTND no masses  Extremeties: + edema    A/P  1. Cataract - Indications, risks and alternatives to the procedure were explained to the patient. The patient was given the opportunity to ask questions and consented to the procedure in writing.  Proceed with cataract surgery as scheduled.  2. Other health issues - patient has been cleared by their PCP. See last note for details.

## 2020-08-10 ENCOUNTER — PATIENT OUTREACH (OUTPATIENT)
Dept: ADMINISTRATIVE | Facility: OTHER | Age: 70
End: 2020-08-10

## 2020-08-10 ENCOUNTER — LAB VISIT (OUTPATIENT)
Dept: FAMILY MEDICINE | Facility: CLINIC | Age: 70
End: 2020-08-10
Payer: MEDICARE

## 2020-08-10 DIAGNOSIS — Z01.818 PRE-OP TESTING: ICD-10-CM

## 2020-08-10 PROCEDURE — U0003 INFECTIOUS AGENT DETECTION BY NUCLEIC ACID (DNA OR RNA); SEVERE ACUTE RESPIRATORY SYNDROME CORONAVIRUS 2 (SARS-COV-2) (CORONAVIRUS DISEASE [COVID-19]), AMPLIFIED PROBE TECHNIQUE, MAKING USE OF HIGH THROUGHPUT TECHNOLOGIES AS DESCRIBED BY CMS-2020-01-R: HCPCS

## 2020-08-10 NOTE — PROGRESS NOTES
Chart was reviewed for overdue [facility] Encounters (NALD[facility])  topics  Updates were requested from care everywhere  Health Maintenance has been updated

## 2020-08-10 NOTE — PROGRESS NOTES
[facility] Note  Staff: AMAURI Turpin-C    PCP: Dr. Werner Vargas  Cardiologist:  Dr. Khai Appiah    Chief Complaint: Bladder issues    Subjective:        HPI: Fifi Mcnair is a 69 y.o. female presents today with c/o bladder pressure x one week.    The pt was last seen by Dr. Campos on 08/13/2019 for hx of gross hematuria and recurrent uti issues.     HX:  Gross hematuria, bladder pain, recurrent uti  -She was evaluated in 2014 by Dr. Parrish, Urologist for recurrent UTIs along with a cystoscopy procedure done in his office on or around 01/01/2014.  (No records to review during ov today). Pt states today that cysto results showed normal findings at that time.  -in the last 5 years she's had about 3 uti's a year all with gross hematuria. They all start with dysuria, then GH, then urgency, urge incontinence and frequency. This year she has had gross hematuria 3x starting in June and always continues to recur, associate with uti symptoms. Cultures however have been negative but she has also been off and on abx. Otherwise has no OAB except recently has UUI with OAB, but also on lasix.   -gross hematuria: on no anticoagulation. Former tobacco, <10 years in a row. Has h/o recurrent utis for the last 10 years. ctu 2/2018 shows transverse displacement of right kidney but no other abnormalities including no stones. Cytology 7/18/18 and 7/24/18 was  Negative. Cytology 10/10/18: urothelial atypia. cysto bladder bx on 8/6/18. Abnormal patch on posterior bladder. Found to be cystitis. RF for uti's: diarrhea 2x a week or more due to IBS.  Diabetes x 30. seen by gyn  on 1/17/19 to r/o vaginal cause of bleeding.     In February of 2019 and she had an episode of gross hematuria 1 month prior associated with bladder pain which improved with a uribel or two.  Her urine culture is negative, her urinalysis showed no blood, her cytology was negative.  I felt she had recurrent UTI versus IC  and felt that her symptoms were likely related to her IBS.  Since then she had a left knee replacement     Last ov with MD she stated that since MD saw her in February she had gross hematuria that lasted with 1 void 3 times that resolved with taking 1 Pyridium after the 1st 2 episodes, and then the 3rd episode required her to take 3 Pyridium.  Other than this she has had no issues with her bladder. She has also been working on the [employer] diet.  No smoking history.  She voids 2 times a day when she does not take fluid pill and more often than this when she does.  No pad usage.  Also currently on a probiotic.        ua today void: Small amt leukocytes, trace blood  Urine history:   4/19/19            Ng, void: 1+leuk, 25 wbc/many yeast/rare bact  4/4/19              No cx, void: tr leuk, 3 wbc  2/12/19            Ng, cath:4+glucose, 2 rbc/1wbc, cytology: negative  10/10/18          Ng,  Cath: nit+/2+glucose/tr ketones, 2 wbc, rare bacteria, cytology: urothelial atypia  8/6/18              Bladder path from cysto: cystitis, pvr by I&o: 40cc. Feels some pressure today, last urinated a few hours ago  8/1/18              No cx, void: neg, 1 rbc  7/24/18            No cx, void: neg, pvr by I&o: 30cc, cytology: negative  7/18/18            Multiple org, void: none, 1 rbc/1 wbc, cytology: URINE, VOIDED 7/18: no cancer cells seen  7/7/18              Ng, void: 2 protein/3+blood/nit+/2+leuk - +GH. ua +but no sx. tx with augmentin  6/24/18            Ng, void: red, nit+/3+leuk, 100 rbc, 40 wbc- +GH. ua + on cipro, but sent home with keflex and rocephin, + urgency  6/19/18            Ng, void: 3+glucose  2/12/18            No cx, void: tr wbc/no blood  8/4/17              Ng, void: tr leuk  2/22/17            Multiple org, void: neg  11/23/14          Ng  1/9/14              Multiple org  9/18/13            Ng  11/13/12          Multiple org  4/5/12              K.pneumoniae     G2, P 2, vaginal      REVIEW OF SYSTEMS:  A  comprehensive 10 system review was performed and is negative except as noted above in HPI    PMHx:  Past Medical History:   Diagnosis Date    Allergy     multiple antibiotic allergies    Anemia     Anxiety     Arthritis     Asthma     CHF (congestive heart failure)     NYHA class III     Chronic kidney disease     Colon polyp     benign    COPD (chronic obstructive pulmonary disease)     DLCO 37% , and mild pulmonary HTN    Dental bridge present     LOWER    Depression     Diabetes mellitus     Diabetes mellitus without complication     Diabetic retinopathy     Diastolic dysfunction     Diverticulitis of large intestine without perforation or abscess without bleeding 2/12/2018    Diverticulosis     Former smoker     Fracture of lumbar spine     GERD (gastroesophageal reflux disease)     Hernia of unspecified site of abdominal cavity without mention of obstruction or gangrene     Hyperlipidemia     Hypertension     hypertensive renal    IBS (irritable bowel syndrome)     Mild nonproliferative diabetic retinopathy(362.04) 11/25/2013    Morbid obesity     Nuclear sclerosis 11/25/2013    Osteoporosis     Peripheral edema     Rash     Recurrent upper respiratory infection (URI)     S/P LASIK (laser assisted in situ keratomileusis)     Sleep apnea     sleep apnea uses bipap.    Stroke     tia    Thyroid disease     on synthroid    TIA (transient ischemic attack)     Urinary tract infection     Wears glasses      PSHx:  Past Surgical History:   Procedure Laterality Date    ABDOMINAL SURGERY      ADENOIDECTOMY      ARTHROSCOPIC CHONDROPLASTY OF KNEE JOINT Right 8/31/2018    Procedure: ARTHROSCOPY, KNEE, WITH CHONDROPLASTY;  Surgeon: Larry Molina MD;  Location: [facility];  Service: Orthopedics;  Laterality: Right;  Tricompartmental chondroplasty    COLONOSCOPY  03/05/2013    repeat in 5 years    COLONOSCOPY N/A 5/31/2017    Procedure: COLONOSCOPY;  Surgeon: Luis Navarro,  MD;  Location: [facility] ENDO;  Service: Endoscopy;  Laterality: N/A;    COLONOSCOPY N/A 4/11/2018    Procedure: COLONOSCOPY;  Surgeon: Luis Navarro MD;  Location: [facility] END[facility];  Service: Endoscopy;  Laterality: N/A;    COSMETIC SURGERY      belt abdominoplasty    CYSTOSCOPY      CYSTOSCOPY N/A 8/6/2018    Procedure: CYSTOSCOPY;  Surgeon: Angy Campos MD;  Location: [facility] OR;  Service: Urology;  Laterality: N/A;    EYE SURGERY Right     mazzulla    GASTRECTOMY      gastric sleeve    gastric sleeve      HERNIA REPAIR      5 years old    HYSTERECTOMY      ovaries remain    JOINT REPLACEMENT      KNEE ARTHROPLASTY Left 4/16/2019    Procedure: ARTHROPLASTY, KNEE;  Surgeon: Larry Molina MD;  Location: [facility] OR;  Service: Orthopedics;  Laterality: Left;  Emanuel    KNEE ARTHROSCOPY W/ MENISCECTOMY Right 8/31/2018    Procedure: ARTHROSCOPY, KNEE, WITH MENISCECTOMY;  Surgeon: Larry Molina MD;  Location: [facility] OR;  Service: Orthopedics;  Laterality: Right;    REFRACTIVE SURGERY      mono va//ou//    RHINOPLASTY TIP      TONSILLECTOMY      TOTAL KNEE ARTHROPLASTY Left 4/16/19    TUBAL LIGATION      UPPER GASTROINTESTINAL ENDOSCOPY  03/05/2013    UPPER GASTROINTESTINAL ENDOSCOPY  08/24/2016    Dr. Navarro, repeat in 8 weeks    UPPER GASTROINTESTINAL ENDOSCOPY  07/21/2016    Dr. Randall     Allergies:  Adhesive, Cleocin [clindamycin hcl], Ceclor [cefaclor], Advair diskus [fluticasone propion-salmeterol], Aleve [naproxen sodium], Erythromycin, Levaquin [levofloxacin], Macrobid [nitrofurantoin monohyd/m-cryst], Macrodantin [nitrofurantoin macrocrystalline], Motrin [ibuprofen], Restoril [temazepam], Sulfa (sulfonamide antibiotics), Trazodone, Remeron [mirtazapine], and Vancomycin analogues    Medications: reviewed   Objective:     Vitals:    08/11/20 1038   BP: (!) 142/72   Pulse: 86   Resp: 18     Physical Exam   Vitals reviewed.  Constitutional: She is oriented to person, place, and  time. She appears well-developed.   HENT:   Head: Normocephalic and atraumatic.   Eyes: Conjunctivae are normal. Pupils are equal, round, and reactive to light.   Neck: Normal range of motion. Neck supple.   Cardiovascular: Normal rate, regular rhythm and normal heart sounds.    Pulmonary/Chest: Effort normal and breath sounds normal.   Abdominal: Soft. Bowel sounds are normal.   Musculoskeletal: Normal range of motion.   Neurological: She is alert and oriented to person, place, and time. She has normal reflexes.   Skin: Skin is warm and dry.     Psychiatric: Her behavior is normal. Judgment and thought content normal.     LABS REVIEW:  UA today:   Color:Clear, Yellow  Spec. Grav.  1.010  PH  6.0  Small amt leukocytes  100 Glucose  Trace blood  Assessment:       1. Bladder pain    2. Sensation of pressure in bladder area    3. Recurrent UTI    4. Interstitial cystitis          Plan:     1. UA Micro, Urine culture to be performed.  2. In the meantime, I will prescribe pt Cipro 500 mg BID x 5 days, which she states today she has taken in the past and tolerates with no problems.    F/u-Our office will contact this pt after we receive and review ALL urine results to determine best POC for this patient.  Pt verbalized understanding at conclusion of appointment today.  Pt was instructed to contact our office should their symptoms worsen or any new  complaints.    [facility]: Active    MOIZ Shahid

## 2020-08-11 ENCOUNTER — OFFICE VISIT (OUTPATIENT)
Dept: UROLOGY | Facility: CLINIC | Age: 70
End: 2020-08-11
Payer: MEDICARE

## 2020-08-11 ENCOUNTER — APPOINTMENT (OUTPATIENT)
Dept: LAB | Facility: HOSPITAL | Age: 70
End: 2020-08-11
Attending: NURSE PRACTITIONER
Payer: MEDICARE

## 2020-08-11 VITALS
HEIGHT: 62 IN | RESPIRATION RATE: 18 BRPM | BODY MASS INDEX: 49.86 KG/M2 | HEART RATE: 86 BPM | SYSTOLIC BLOOD PRESSURE: 142 MMHG | DIASTOLIC BLOOD PRESSURE: 72 MMHG | WEIGHT: 270.94 LBS

## 2020-08-11 DIAGNOSIS — R39.89 SENSATION OF PRESSURE IN BLADDER AREA: ICD-10-CM

## 2020-08-11 DIAGNOSIS — R39.89 BLADDER PAIN: Primary | ICD-10-CM

## 2020-08-11 DIAGNOSIS — N39.0 RECURRENT UTI: ICD-10-CM

## 2020-08-11 DIAGNOSIS — N30.10 INTERSTITIAL CYSTITIS: ICD-10-CM

## 2020-08-11 LAB
BACTERIA #/AREA URNS HPF: ABNORMAL /HPF
MICROSCOPIC COMMENT: ABNORMAL
SARS-COV-2 RNA RESP QL NAA+PROBE: NOT DETECTED
SQUAMOUS #/AREA URNS HPF: 3 /HPF
WBC #/AREA URNS HPF: 20 /HPF (ref 0–5)

## 2020-08-11 PROCEDURE — 99999 PR PBB SHADOW E&M-EST. PATIENT-LVL V: ICD-10-PCS | Mod: PBBFAC,,, | Performed by: NURSE PRACTITIONER

## 2020-08-11 PROCEDURE — 87186 SC STD MICRODIL/AGAR DIL: CPT

## 2020-08-11 PROCEDURE — 1126F AMNT PAIN NOTED NONE PRSNT: CPT | Mod: S$GLB,,, | Performed by: NURSE PRACTITIONER

## 2020-08-11 PROCEDURE — 1101F PR PT FALLS ASSESS DOC 0-1 FALLS W/OUT INJ PAST YR: ICD-10-PCS | Mod: CPTII,S$GLB,, | Performed by: NURSE PRACTITIONER

## 2020-08-11 PROCEDURE — 3008F PR BODY MASS INDEX (BMI) DOCUMENTED: ICD-10-PCS | Mod: CPTII,S$GLB,, | Performed by: NURSE PRACTITIONER

## 2020-08-11 PROCEDURE — 87088 URINE BACTERIA CULTURE: CPT

## 2020-08-11 PROCEDURE — 99999 PR PBB SHADOW E&M-EST. PATIENT-LVL V: CPT | Mod: PBBFAC,,, | Performed by: NURSE PRACTITIONER

## 2020-08-11 PROCEDURE — 99214 OFFICE O/P EST MOD 30 MIN: CPT | Mod: 25,S$GLB,, | Performed by: NURSE PRACTITIONER

## 2020-08-11 PROCEDURE — 81002 URINALYSIS NONAUTO W/O SCOPE: CPT | Mod: S$GLB,,, | Performed by: NURSE PRACTITIONER

## 2020-08-11 PROCEDURE — 81000 URINALYSIS NONAUTO W/SCOPE: CPT

## 2020-08-11 PROCEDURE — 87086 URINE CULTURE/COLONY COUNT: CPT

## 2020-08-11 PROCEDURE — 87077 CULTURE AEROBIC IDENTIFY: CPT

## 2020-08-11 PROCEDURE — 99214 PR OFFICE/OUTPT VISIT, EST, LEVL IV, 30-39 MIN: ICD-10-PCS | Mod: 25,S$GLB,, | Performed by: NURSE PRACTITIONER

## 2020-08-11 PROCEDURE — 1101F PT FALLS ASSESS-DOCD LE1/YR: CPT | Mod: CPTII,S$GLB,, | Performed by: NURSE PRACTITIONER

## 2020-08-11 PROCEDURE — 1159F PR MEDICATION LIST DOCUMENTED IN MEDICAL RECORD: ICD-10-PCS | Mod: S$GLB,,, | Performed by: NURSE PRACTITIONER

## 2020-08-11 PROCEDURE — 1159F MED LIST DOCD IN RCRD: CPT | Mod: S$GLB,,, | Performed by: NURSE PRACTITIONER

## 2020-08-11 PROCEDURE — 1126F PR PAIN SEVERITY QUANTIFIED, NO PAIN PRESENT: ICD-10-PCS | Mod: S$GLB,,, | Performed by: NURSE PRACTITIONER

## 2020-08-11 PROCEDURE — 3008F BODY MASS INDEX DOCD: CPT | Mod: CPTII,S$GLB,, | Performed by: NURSE PRACTITIONER

## 2020-08-11 PROCEDURE — 81002 POCT URINE DIPSTICK WITHOUT MICROSCOPE: ICD-10-PCS | Mod: S$GLB,,, | Performed by: NURSE PRACTITIONER

## 2020-08-11 RX ORDER — CIPROFLOXACIN 500 MG/1
500 TABLET ORAL 2 TIMES DAILY
Qty: 10 TABLET | Refills: 0 | Status: SHIPPED | OUTPATIENT
Start: 2020-08-11 | End: 2020-08-16

## 2020-08-11 NOTE — PATIENT INSTRUCTIONS

## 2020-08-12 ENCOUNTER — ANESTHESIA EVENT (OUTPATIENT)
Dept: SURGERY | Facility: HOSPITAL | Age: 70
End: 2020-08-12
Payer: MEDICARE

## 2020-08-13 ENCOUNTER — HOSPITAL ENCOUNTER (OUTPATIENT)
Facility: HOSPITAL | Age: 70
Discharge: HOME | End: 2020-08-13
Attending: OPHTHALMOLOGY | Admitting: OPHTHALMOLOGY
Payer: MEDICARE

## 2020-08-13 ENCOUNTER — ANESTHESIA (OUTPATIENT)
Dept: SURGERY | Facility: HOSPITAL | Age: 70
End: 2020-08-13
Payer: MEDICARE

## 2020-08-13 VITALS
HEIGHT: 62 IN | HEART RATE: 79 BPM | DIASTOLIC BLOOD PRESSURE: 61 MMHG | BODY MASS INDEX: 49.86 KG/M2 | TEMPERATURE: 98 F | SYSTOLIC BLOOD PRESSURE: 136 MMHG | WEIGHT: 270.94 LBS | OXYGEN SATURATION: 97 % | RESPIRATION RATE: 15 BRPM

## 2020-08-13 DIAGNOSIS — H25.11 AGE-RELATED NUCLEAR CATARACT OF RIGHT EYE: Primary | ICD-10-CM

## 2020-08-13 LAB — GLUCOSE SERPL-MCNC: 266 MG/DL (ref 70–110)

## 2020-08-13 PROCEDURE — 71000015 HC POSTOP RECOV 1ST HR: Mod: PO | Performed by: OPHTHALMOLOGY

## 2020-08-13 PROCEDURE — 63600175 PHARM REV CODE 636 W HCPCS: Mod: PO | Performed by: NURSE ANESTHETIST, CERTIFIED REGISTERED

## 2020-08-13 PROCEDURE — 25000003 PHARM REV CODE 250: Mod: PO | Performed by: OPHTHALMOLOGY

## 2020-08-13 PROCEDURE — 63600175 PHARM REV CODE 636 W HCPCS: Mod: PO | Performed by: OPHTHALMOLOGY

## 2020-08-13 PROCEDURE — 37000008 HC ANESTHESIA 1ST 15 MINUTES: Mod: PO | Performed by: OPHTHALMOLOGY

## 2020-08-13 PROCEDURE — 66984 XCAPSL CTRC RMVL W/O ECP: CPT | Mod: RT,,, | Performed by: OPHTHALMOLOGY

## 2020-08-13 PROCEDURE — V2632 POST CHMBR INTRAOCULAR LENS: HCPCS | Mod: PO | Performed by: OPHTHALMOLOGY

## 2020-08-13 PROCEDURE — 25000003 PHARM REV CODE 250: Mod: PO | Performed by: NURSE ANESTHETIST, CERTIFIED REGISTERED

## 2020-08-13 PROCEDURE — 63600175 PHARM REV CODE 636 W HCPCS: Mod: PO | Performed by: ANESTHESIOLOGY

## 2020-08-13 PROCEDURE — D9220A PRA ANESTHESIA: ICD-10-PCS | Mod: ANES,,, | Performed by: ANESTHESIOLOGY

## 2020-08-13 PROCEDURE — 36000706: Mod: PO | Performed by: OPHTHALMOLOGY

## 2020-08-13 PROCEDURE — D9220A PRA ANESTHESIA: Mod: CRNA,,, | Performed by: NURSE ANESTHETIST, CERTIFIED REGISTERED

## 2020-08-13 PROCEDURE — 66984 PR REMOVAL, CATARACT, W/INSRT INTRAOC LENS, W/O ENDO CYCLO: ICD-10-PCS | Mod: RT,,, | Performed by: OPHTHALMOLOGY

## 2020-08-13 PROCEDURE — D9220A PRA ANESTHESIA: ICD-10-PCS | Mod: CRNA,,, | Performed by: NURSE ANESTHETIST, CERTIFIED REGISTERED

## 2020-08-13 PROCEDURE — 36000707: Mod: PO | Performed by: OPHTHALMOLOGY

## 2020-08-13 PROCEDURE — 37000009 HC ANESTHESIA EA ADD 15 MINS: Mod: PO | Performed by: OPHTHALMOLOGY

## 2020-08-13 PROCEDURE — 25000003 PHARM REV CODE 250: Mod: PO

## 2020-08-13 PROCEDURE — D9220A PRA ANESTHESIA: Mod: ANES,,, | Performed by: ANESTHESIOLOGY

## 2020-08-13 DEVICE — LENS 24.0: Type: IMPLANTABLE DEVICE | Site: EYE | Status: FUNCTIONAL

## 2020-08-13 RX ORDER — OFLOXACIN 3 MG/ML
SOLUTION/ DROPS OPHTHALMIC
Status: DISCONTINUED | OUTPATIENT
Start: 2020-08-13 | End: 2020-08-13 | Stop reason: HOSPADM

## 2020-08-13 RX ORDER — MIDAZOLAM HYDROCHLORIDE 1 MG/ML
INJECTION, SOLUTION INTRAMUSCULAR; INTRAVENOUS
Status: DISCONTINUED | OUTPATIENT
Start: 2020-08-13 | End: 2020-08-13

## 2020-08-13 RX ORDER — LIDOCAINE HYDROCHLORIDE 10 MG/ML
1 INJECTION, SOLUTION EPIDURAL; INFILTRATION; INTRACAUDAL; PERINEURAL ONCE
Status: DISCONTINUED | OUTPATIENT
Start: 2020-08-13 | End: 2020-08-13 | Stop reason: HOSPADM

## 2020-08-13 RX ORDER — PHENYLEPHRINE HYDROCHLORIDE 25 MG/ML
1 SOLUTION/ DROPS OPHTHALMIC
Status: COMPLETED | OUTPATIENT
Start: 2020-08-13 | End: 2020-08-13

## 2020-08-13 RX ORDER — ONDANSETRON 2 MG/ML
INJECTION INTRAMUSCULAR; INTRAVENOUS
Status: DISCONTINUED | OUTPATIENT
Start: 2020-08-13 | End: 2020-08-13

## 2020-08-13 RX ORDER — PROPARACAINE HYDROCHLORIDE 5 MG/ML
1 SOLUTION/ DROPS OPHTHALMIC
Status: COMPLETED | OUTPATIENT
Start: 2020-08-13 | End: 2020-08-13

## 2020-08-13 RX ORDER — TROPICAMIDE 10 MG/ML
1 SOLUTION/ DROPS OPHTHALMIC
Status: COMPLETED | OUTPATIENT
Start: 2020-08-13 | End: 2020-08-13

## 2020-08-13 RX ORDER — EPINEPHRINE 1 MG/ML
INJECTION INTRAMUSCULAR; INTRAVENOUS; SUBCUTANEOUS
Status: DISCONTINUED | OUTPATIENT
Start: 2020-08-13 | End: 2020-08-13 | Stop reason: HOSPADM

## 2020-08-13 RX ORDER — LIDOCAINE HYDROCHLORIDE 40 MG/ML
INJECTION, SOLUTION RETROBULBAR
Status: DISCONTINUED | OUTPATIENT
Start: 2020-08-13 | End: 2020-08-13

## 2020-08-13 RX ORDER — LIDOCAINE HYDROCHLORIDE 10 MG/ML
INJECTION INFILTRATION; PERINEURAL
Status: DISCONTINUED | OUTPATIENT
Start: 2020-08-13 | End: 2020-08-13 | Stop reason: HOSPADM

## 2020-08-13 RX ORDER — PHENYLEPHRINE HYDROCHLORIDE 100 MG/ML
1 SOLUTION/ DROPS OPHTHALMIC
Status: COMPLETED | OUTPATIENT
Start: 2020-08-13 | End: 2020-08-13

## 2020-08-13 RX ORDER — SODIUM CHLORIDE, SODIUM LACTATE, POTASSIUM CHLORIDE, CALCIUM CHLORIDE 600; 310; 30; 20 MG/100ML; MG/100ML; MG/100ML; MG/100ML
INJECTION, SOLUTION INTRAVENOUS CONTINUOUS
Status: DISCONTINUED | OUTPATIENT
Start: 2020-08-13 | End: 2020-08-13 | Stop reason: HOSPADM

## 2020-08-13 RX ORDER — SODIUM CHLORIDE 0.9 % (FLUSH) 0.9 %
3 SYRINGE (ML) INJECTION
Status: DISCONTINUED | OUTPATIENT
Start: 2020-08-13 | End: 2020-08-13 | Stop reason: HOSPADM

## 2020-08-13 RX ADMIN — LIDOCAINE HYDROCHLORIDE 4 DROP: 40 SOLUTION RETROBULBAR; TOPICAL at 08:08

## 2020-08-13 RX ADMIN — ONDANSETRON 4 MG: 2 INJECTION, SOLUTION INTRAMUSCULAR; INTRAVENOUS at 08:08

## 2020-08-13 RX ADMIN — PROPARACAINE HYDROCHLORIDE 1 DROP: 5 SOLUTION/ DROPS OPHTHALMIC at 07:08

## 2020-08-13 RX ADMIN — PHENYLEPHRINE HYDROCHLORIDE 1 DROP: 25 SOLUTION/ DROPS OPHTHALMIC at 07:08

## 2020-08-13 RX ADMIN — MIDAZOLAM 1 MG: 1 INJECTION INTRAMUSCULAR; INTRAVENOUS at 08:08

## 2020-08-13 RX ADMIN — TROPICAMIDE 1 DROP: 10 SOLUTION/ DROPS OPHTHALMIC at 07:08

## 2020-08-13 RX ADMIN — SODIUM CHLORIDE, SODIUM LACTATE, POTASSIUM CHLORIDE, AND CALCIUM CHLORIDE: .6; .31; .03; .02 INJECTION, SOLUTION INTRAVENOUS at 07:08

## 2020-08-13 RX ADMIN — PHENYLEPHRINE HYDROCHLORIDE 1 DROP: 100 SOLUTION/ DROPS OPHTHALMIC at 07:08

## 2020-08-13 NOTE — TRANSFER OF CARE
"Anesthesia Transfer of Care Note    Patient: Fifi Mcnair    Procedure(s) Performed: Procedure(s) (LRB):  PHACOEMULSIFICATION, CATARACT (Right)    Patient location: PACU    Anesthesia Type: MAC    Transport from OR: Transported from OR on room air with adequate spontaneous ventilation    Post pain: adequate analgesia    Post assessment: no apparent anesthetic complications and tolerated procedure well    Post vital signs: stable    Level of consciousness: awake    Nausea/Vomiting: no nausea/vomiting    Complications: none    Transfer of care protocol was followed      Last vitals:   Visit Vitals  BP (!) 124/57 (BP Location: Right arm, Patient Position: Lying)   Pulse 77   Temp 36.2 °C (97.2 °F) (Skin)   Resp 18   Ht 5' 2" (1.575 m)   Wt 122.9 kg (270 lb 15.1 oz)   LMP  (LMP Unknown)   SpO2 96%   Breastfeeding No   BMI 49.56 kg/m²     "

## 2020-08-13 NOTE — ANESTHESIA PREPROCEDURE EVALUATION
08/13/2020  Fifi Mcnair is a 69 y.o., female.    Anesthesia Evaluation    I have reviewed the Patient Summary Reports.    I have reviewed the Nursing Notes.    I have reviewed the Medications.     Review of Systems  Anesthesia Hx:  No problems with previous Anesthesia    Social:  Former Smoker    Hematology/Oncology:         -- Anemia:   Cardiovascular:   Hypertension, well controlled Dysrhythmias CHF    Pulmonary:   COPD, moderate Asthma mild Recent URI Sleep Apnea    Education provided regarding risk of obstructive sleep apnea     Renal/:   Chronic Renal Disease (stage 3), CRI    Hepatic/GI:   PUD, GERD, well controlled IBS   Musculoskeletal:   Arthritis     Neurological:   TIA, CVA, no residual symptoms Neuromuscular Disease,    Endocrine:   Diabetes, poorly controlled, type 2, using insulin Hypothyroidism    Psych:   Psychiatric History anxiety depression          Physical Exam  General:  Well nourished, Morbid Obesity    Airway/Jaw/Neck:  Airway Findings: Mouth Opening: Normal Tongue: Normal  General Airway Assessment: Adult  Oropharynx Findings:  Mallampati: II  Jaw/Neck Findings:  Neck ROM: Normal ROM     Eyes/Ears/Nose:  Eyes/Ears/Nose Findings:    Dental:  Dental Findings:   Chest/Lungs:  Chest/Lungs Findings: Normal Respiratory Rate     Heart/Vascular:  Heart Findings: Rate: Normal  Rhythm: Regular Rhythm        Mental Status:  Mental Status Findings:  Cooperative, Alert and Oriented         Anesthesia Plan  Type of Anesthesia, risks & benefits discussed:  Anesthesia Type:  general  Patient's Preference:   Intra-op Monitoring Plan: standard ASA monitors  Intra-op Monitoring Plan Comments:   Post Op Pain Control Plan: multimodal analgesia  Post Op Pain Control Plan Comments:   Induction:   IV  Beta Blocker:  Patient is not currently on a Beta-Blocker (No further documentation required).        Informed Consent: Patient understands risks and agrees with Anesthesia plan.  Questions answered. Anesthesia consent signed with patient.  ASA Score: 4     Day of Surgery Review of History & Physical:            Ready For Surgery From Anesthesia Perspective.

## 2020-08-13 NOTE — DISCHARGE INSTRUCTIONS
Eye Care     1. No bending or lifting.  2. Sit upright in bed or in recliner.  3. Do not remove shield.  4. Do not use drops in surgical eye.   5. If lens falls out of eye, do not attempt to replace it.  6. Follow up tomorrow in clinic.  7. Bring black bag with drops to clinic.

## 2020-08-13 NOTE — ANESTHESIA POSTPROCEDURE EVALUATION
Anesthesia Post Evaluation    Patient: Fifi Mcnair    Procedure(s) Performed: Procedure(s) (LRB):  PHACOEMULSIFICATION, CATARACT (Right)    Final Anesthesia Type: MAC    Patient location during evaluation: PACU  Patient participation: Yes- Able to Participate  Level of consciousness: awake and alert and oriented  Post-procedure vital signs: reviewed and stable  Pain management: adequate  Airway patency: patent    PONV status at discharge: No PONV  Anesthetic complications: no      Cardiovascular status: blood pressure returned to baseline and stable  Respiratory status: unassisted and spontaneous ventilation  Hydration status: euvolemic  Follow-up not needed.          Vitals Value Taken Time   /61 08/13/20 0900   Temp 36.8 °C (98.3 °F) 08/13/20 0842   Pulse 79 08/13/20 0900   Resp 15 08/13/20 0900   SpO2 97 % 08/13/20 0900         No case tracking events are documented in the log.      Pain/Mohamud Score: Mohamud Score: 10 (8/13/2020  8:42 AM)

## 2020-08-13 NOTE — BRIEF OP NOTE
Operative Note     SUMMARY     Surgery Date: 8/13/2020     Surgeon(s) and Role:     * Meek Haro Jr., MD - Primary    Pre-op Diagnosis:  Age-related nuclear cataract of right eye [H25.11]    Post-op Diagnosis:  Age-related nuclear cataract of right eye [H25.11]    Procedure(s) (LRB):  PHACOEMULSIFICATION, CATARACT (Right)    Anesthesia: Monitor Anesthesia Care    Findings/Key Components:  Same as post op diagnosis    Estimated Blood Loss: * No values recorded between 8/13/2020  8:18 AM and 8/13/2020  8:40 AM *         Specimens (From admission, onward)    None            Discharge Note      SUMMARY     Admit Date: 8/13/2020    Attending Physician: Meek Haro Jr., MD     Discharge Physician: Meek Haro Jr., MD    Discharge Date: 8/13/2020     Final Diagnosis: Age-related nuclear cataract of right eye [H25.11]    Hospital Course: The patient was admitted for outpatient surgery and tolerated the procedure well. The patient was discharged home in stable condition the same day.    Diet: Advance as tolerated    Follow-Up: Follow up with Dr. Haro, next day, as previously scheduled  Activity as tolerated.      Activity: Per Handout Instructions    Disposition: Home or self care    Patient Instructions:   Current Discharge Medication List      CONTINUE these medications which have NOT CHANGED    Details   alendronate (FOSAMAX) 35 MG tablet Take 1 tablet (35 mg total) by mouth every 7 days.  Qty: 4 tablet, Refills: 4    Associated Diagnoses: Osteopenia of multiple sites; Renal insufficiency      allopurinol (ZYLOPRIM) 100 MG tablet Take 2 tablets (200 mg total) by mouth nightly.  Qty: 180 tablet, Refills: 3      amitriptyline (ELAVIL) 100 MG tablet 1 tab at night  Qty: 90 tablet, Refills: 2    Associated Diagnoses: Neuropathy of right sciatic nerve; Lymphedema of right lower extremity; Chronic left-sided thoracic back pain      ascorbic acid, vitamin C, (VITAMIN C) 500 MG tablet Take 500 mg by mouth once  daily.      atenolol (TENORMIN) 25 MG tablet Take 1 tablet (25 mg total) by mouth 2 (two) times daily.  Qty: 180 tablet, Refills: 3    Associated Diagnoses: Diastolic dysfunction; CHF (congestive heart failure), NYHA class III, chronic, diastolic      biotin 1 mg Cap Take by mouth.      calcitRIOL (ROCALTROL) 0.25 MCG Cap TAKE ONE TABLET BY MOUTH TWICE A WEEK ON monday AND friday  Qty: 8 capsule, Refills: 3      cetirizine (ZYRTEC) 10 MG tablet Take 1 tablet (10 mg total) by mouth once daily.  Qty: 1 Bottle, Refills: 5      ciprofloxacin HCl (CIPRO) 500 MG tablet Take 1 tablet (500 mg total) by mouth 2 (two) times daily. for 5 days  Qty: 10 tablet, Refills: 0      cyanocobalamin, vitamin B-12, (VITAMIN B-12) 5,000 mcg Subl Place 5,000 mg under the tongue once a week.      ferrous sulfate (IRON) 325 mg (65 mg iron) Tab tablet Take 325 mg by mouth daily with breakfast.      fish oil-omega-3 fatty acids 300-1,000 mg capsule Take 1 capsule by mouth once daily.      FLUoxetine 20 MG capsule Take 2 capsules (40 mg total) by mouth once daily.  Qty: 180 capsule, Refills: 3      fluticasone furoate-vilanterol (BREO) 100-25 mcg/dose diskus inhaler Inhale 1 puff into the lungs once daily.  Qty: 60 each, Refills: 3      fluticasone propionate (FLONASE) 50 mcg/actuation nasal spray 1 spray (50 mcg total) by Each Nostril route once daily.  Qty: 1 Bottle, Refills: 4      gabapentin (NEURONTIN) 600 MG tablet Take 1 tablet (600 mg total) by mouth 3 (three) times daily.  Qty: 90 tablet, Refills: 11      HUMALOG U-100 INSULIN 100 unit/mL injection inject 100 UNITS DAILY via insulin pump      L-LYSINE ORAL Take by mouth.      l-methylfolate-b2-b6-b12 (CEREFOLIN) 6-5-50-1 mg Tab Take 1 tablet by mouth once daily.      Lacto.acidophilus-Bif.animalis (PROBIOTIC) 5 billion cell CpSP Take 1 capsule by mouth once daily.  Qty: 30 capsule, Refills: 3    Associated Diagnoses: Diverticulitis      levothyroxine (SYNTHROID) 25 MCG tablet Take 1  tablet (25 mcg total) by mouth once daily.  Qty: 90 tablet, Refills: 3      !! losartan (COZAAR) 25 MG tablet Take 25 mg by mouth once daily.    Comments: .      montelukast (SINGULAIR) 10 mg tablet Take 1 tablet (10 mg total) by mouth every evening.  Qty: 30 tablet, Refills: 8      pantoprazole (PROTONIX) 40 MG tablet TAKE 1 TABLET BY MOUTH ONCE DAILY  Qty: 90 tablet, Refills: 3      potassium chloride SA (K-DUR,KLOR-CON) 20 MEQ tablet TAKE 1 TABLET BY MOUTH TWICE DAILY  Qty: 180 tablet, Refills: 3    Associated Diagnoses: Hypokalemia      torsemide (DEMADEX) 20 MG Tab TAKE ONE TABLET BY MOUTH TWICE DAILY  Qty: 60 tablet, Refills: 2    Comments: This prescription was filled on 3/3/2020. Any refills authorized will be placed on file.  Associated Diagnoses: Edema of extremities      TURMERIC ORAL Take by mouth once daily.      CHERATUSSIN AC  mg/5 mL syrup TAKE 5 ML (CC) BY MOUTH 4 TIMES DAILY AS NEEDED FOR COUGH FOR 5 DAYS  Refills: 0      cinnamon bark (CINNAMON ORAL) Take by mouth once daily.      clotrimazole-betamethasone 1-0.05% (LOTRISONE) cream       diclofenac (VOLTAREN) 0.1 % ophthalmic solution Place 1 drop into the right eye 4 (four) times daily. Start drops 3 days before surgery  Qty: 5 mL, Refills: 3    Associated Diagnoses: Age-related nuclear cataract of right eye      diclofenac sodium (VOLTAREN) 1 % Gel  APPLY 2 GRAMS TOPICALLY ONCE DAILY  Qty: 1 Tube, Refills: 0    Comments: Please consider 90 day supplies to promote better adherence      diphenoxylate-atropine 2.5-0.025 mg (LOMOTIL) 2.5-0.025 mg per tablet TAKE ONE TABLET BY MOUTH 4 TIMES DAILY AS NEEDED FOR DIARRHEA  Qty: 60 tablet, Refills: 1    Comments: Need office visit before next refill, last seen 10/2016. This will be the LAST Rx if visit is not made.  Associated Diagnoses: Irritable bowel syndrome with diarrhea      DYMISTA 137-50 mcg/spray Spry nassal spray as needed.       hydrocortisone 2.5 % cream Apply topically nightly.  Qty:  3.5 g, Refills: 1    Associated Diagnoses: Alopecia      insulin (BASAGLAR KWIKPEN U-100 INSULIN) glargine 100 units/mL (3mL) SubQ pen Inject 20 Units into the skin every evening. Use 10 units if BS over 210.Night before surgery.  Refills: 0      insulin aspart U-100 (NOVOLOG) 100 unit/mL injection Use per insulin pump, 35-40 units daily.  Qty: 40 mL, Refills: 0      !! losartan (COZAAR) 50 MG tablet Take 1 tablet (50 mg total) by mouth once daily.  Qty: 90 tablet, Refills: 3      magnesium oxide (MAG-OX) 400 mg (241.3 mg magnesium) tablet Take 800 mg by mouth every evening.      metronidazole 1% (METROGEL) 1 % Gel Apply topically once daily. Prn sores on scalp.  Qty: 60 g, Refills: 2    Associated Diagnoses: Folliculitis      MULTIVIT-IRON-MIN-FOLIC ACID 3,500-18-0.4 UNIT-MG-MG ORAL CHEW Take by mouth 2 (two) times daily.      mupirocin (BACTROBAN) 2 % ointment APPLY OINTMENT TOPICALLY TO AFFECTED AREA ON NOSE THREE TIMES DAILY AS DIRECTED  Qty: 15 g, Refills: 11    Comments: Please consider 90 day supplies to promote better adherence      oxyCODONE (ROXICODONE) 15 MG Tab Take 1 tablet (15 mg total) by mouth every 6 (six) hours as needed for Pain.  Qty: 30 tablet, Refills: 0      prednisoLONE acetate (PRED FORTE) 1 % DrpS Place 1 drop into the right eye 4 (four) times daily.  Qty: 5 mL, Refills: 3    Associated Diagnoses: Age-related nuclear cataract of right eye      SSD 1 % cream       tobramycin sulfate 0.3% (TOBREX) 0.3 % ophthalmic solution Place 1 drop into the right eye every 6 (six) hours. for 7 days  Qty: 5 mL, Refills: 1    Associated Diagnoses: Age-related nuclear cataract of right eye       !! - Potential duplicate medications found. Please discuss with provider.          Discharge Procedure Orders (must include Diet, Follow-up, Activity)   Discharge Procedure Orders (must include Diet, Follow-up, Activity)   Diet general     Lifting restrictions     Call MD for:  persistent nausea and vomiting     Call  MD for:  severe uncontrolled pain     Leave dressing on - Keep it clean, dry, and intact until clinic visit     Activity as tolerated

## 2020-08-13 NOTE — OP NOTE
Date of surgery: 8/13/2020    Operative Report    Preoperative Diagnosis: Nuclear sclerosis, right eye    Postoperative Diagnosis: Nuclear sclerosis, right eye    Procedure: Phacoemulsification with posterior chamber intraocular lens, right eye    Surgeon: Meek Haro Jr. M.D.    Anesthesia: MAC with topical     Brief Preoperative Note:  The patient was seen in the office with cataract of the right eye.  Because of the impairment of the patient's reading, driving, ambulation, and other activities of daily living needing a good quality of vision, the patient elected to remove the cataract and place a acrylic lens implant, if possible    Procedure in detail:    Informed consent was obtained. Indications, risks and alternatives to the procedure were explained to the patient. The patient was given the opportunity to ask questions and consented to the procedure in writing. In the preoperative holding area, the patients identity and operative site were confirmed.  Topical anesthetic was administered, consisting of alternating 0.5% Proparacaine and 4% preservative-free Lidocaine Q 5 minutes times 3.  The patient was taken to the operative suite.  A time-out was performed.  The right eye was prepped with 5% Betadine and draped in sterile fashion.  A lid speculum was placed in the right eye.  The temporal clear corneal incision was developed using a LRI katiuska knife and crescent blade for a 3 plane incision.  A paracentesis was done with a 1mm katiuska blade.  Before instillation of the Viscoat, approximately 0.1 to 0.3cc of diluted preservative free intracameral lidocaine was instilled.  Viscoat was injected into the anterior chamber with a 27-gauge needle.  A 2.4mm katiuska blade was used to make a temporal clear corneal incision.  Afterwards, a cystotome was used to perform a continuous curvilinear capsulorrhexis with the assistance of utrata forceps.  Hydrodissection was performed using balanced salt  solution,injected under the anterior capsule using a andres cannula and hydrodelineation was performed using a blunt-tipped cannula.  Next, phacoemulsification performed in a divide and conquer fashion with the use of a nucleus rotator to turn the lens and protect the posterior capsule.  Cortical material was then removed using irrigation and aspiration.  Provisc was then injected into the anterior chamber and into capsular bag to inflate the bag for the placement of the lens implant  and then an Martin [id number] 24.0 D lens was injected into the capsular bag and rotated in position with the assistance of a Sinsky hook.  Irrigation and aspiration were used to remove the remaining viscoelastic. Next, Miochol was injected into the anterior chamber and the pupil was allowed to constrict in a symmetrical fashion.  Wound closed with BSS.  A collagen shield was placed into the eye and the eye was covered with a Borden shield.  The patient tolerated the procedure well and was transferred to the recovery area in good condition.  Estimated blood loss:  none  Specimens:   none  Complications:  none  Disposition:   stable to PACU    Addendum: Patient was found to have a corneal epithelial defect on the right eye before any prepping or surgery was started  Tobrex ointment was placed on top of eye along with patching of the eye after the surgery.

## 2020-08-14 ENCOUNTER — OFFICE VISIT (OUTPATIENT)
Dept: OPHTHALMOLOGY | Facility: CLINIC | Age: 70
End: 2020-08-14
Payer: MEDICARE

## 2020-08-14 DIAGNOSIS — Z98.41 STATUS POST CATARACT EXTRACTION AND INSERTION OF INTRAOCULAR LENS OF RIGHT EYE: Primary | ICD-10-CM

## 2020-08-14 DIAGNOSIS — Z96.1 STATUS POST CATARACT EXTRACTION AND INSERTION OF INTRAOCULAR LENS OF RIGHT EYE: Primary | ICD-10-CM

## 2020-08-14 LAB — BACTERIA UR CULT: ABNORMAL

## 2020-08-14 PROCEDURE — 99999 PR PBB SHADOW E&M-EST. PATIENT-LVL III: CPT | Mod: PBBFAC,,, | Performed by: OPHTHALMOLOGY

## 2020-08-14 PROCEDURE — 99999 PR PBB SHADOW E&M-EST. PATIENT-LVL III: ICD-10-PCS | Mod: PBBFAC,,, | Performed by: OPHTHALMOLOGY

## 2020-08-14 PROCEDURE — 99024 PR POST-OP FOLLOW-UP VISIT: ICD-10-PCS | Mod: S$GLB,,, | Performed by: OPHTHALMOLOGY

## 2020-08-14 PROCEDURE — 99024 POSTOP FOLLOW-UP VISIT: CPT | Mod: S$GLB,,, | Performed by: OPHTHALMOLOGY

## 2020-08-14 NOTE — PROGRESS NOTES
HPI     1 day post op sx 8/13     Pt states, sx went well. Some eye pain. Pt is educated on how to   administer sx gtts.     Last edited by Dean Knowles on 8/14/2020 10:07 AM. (History)        ROS     Negative for: Constitutional, Gastrointestinal, Neurological, Skin,   Genitourinary, Musculoskeletal, HENT, Endocrine, Cardiovascular, Eyes,   Respiratory, Psychiatric, Allergic/Imm, Heme/Lymph    Last edited by Meek Haro Jr., MD on 8/14/2020 10:33 AM. (History)        Assessment /Plan     For exam results, see Encounter Report.    Status post cataract extraction and insertion of intraocular lens of right eye  -     tobramycin sulfate 0.3% (TOBREX) 0.3 % ophthalmic ointment; Place 0.5 inches into the right eye 4 (four) times daily. Place a 1/2 inch ribbon of ointment into the lower eyelid. for 10 days  Dispense: 3.5 g; Refill: 1      Prednisolone acetate (pink or white top drop) 1 drop 4x daily Right eye; shake well before using drop  Vigamox 1 drop 4x daily right eye (tan top)  Diclofenac 1 drop 4x daily right eye (gray top)  Tobrex ointment 4x daily right eye  No bending no lifting  Keep head elevated when laying down  Keep dry   F/u 1 week

## 2020-08-17 ENCOUNTER — TELEPHONE (OUTPATIENT)
Dept: OPHTHALMOLOGY | Facility: CLINIC | Age: 70
End: 2020-08-17

## 2020-08-17 NOTE — TELEPHONE ENCOUNTER
Spoke with pt to discuss va since sx. Pt stated her near va is completely blurred at near. Notified pt that she is still healing up from sx as well as taking multiple sx gtts and some ointment. Pt denied that she is having any pain. Denies flashes or floaters. Va is fine at distance, just blurred at near. Pt stated she is taking gtts as directed and following post op instructions. Notified pt that if anything changes to let us know.     ----- Message from Anurag Dow sent at 8/17/2020  1:14 PM CDT -----  Contact: pt  Type: Needs Medical Advice  Who Called:  pt  Symptoms (please be specific):  blurred vision making it very difficult to read and making her dizzy  How long has patient had these symptoms:  since operation  Pharmacy name and phone #:    [facility] - [place], [place] - [address]  [address] [address]  [facility] [address]  Phone: [phone] Fax: [phone]    Best Call Back Number: [phone] (home) or [phone]    Additional Information: Please call to advise

## 2020-08-21 ENCOUNTER — OFFICE VISIT (OUTPATIENT)
Dept: OPHTHALMOLOGY | Facility: CLINIC | Age: 70
End: 2020-08-21
Payer: MEDICARE

## 2020-08-21 DIAGNOSIS — Z96.1 STATUS POST CATARACT EXTRACTION AND INSERTION OF INTRAOCULAR LENS OF RIGHT EYE: Primary | ICD-10-CM

## 2020-08-21 DIAGNOSIS — Z98.41 STATUS POST CATARACT EXTRACTION AND INSERTION OF INTRAOCULAR LENS OF RIGHT EYE: Primary | ICD-10-CM

## 2020-08-21 PROCEDURE — 99999 PR PBB SHADOW E&M-EST. PATIENT-LVL IV: ICD-10-PCS | Mod: PBBFAC,,, | Performed by: OPHTHALMOLOGY

## 2020-08-21 PROCEDURE — 99999 PR PBB SHADOW E&M-EST. PATIENT-LVL IV: CPT | Mod: PBBFAC,,, | Performed by: OPHTHALMOLOGY

## 2020-08-21 PROCEDURE — 99024 PR POST-OP FOLLOW-UP VISIT: ICD-10-PCS | Mod: S$GLB,,, | Performed by: OPHTHALMOLOGY

## 2020-08-21 PROCEDURE — 99024 POSTOP FOLLOW-UP VISIT: CPT | Mod: S$GLB,,, | Performed by: OPHTHALMOLOGY

## 2020-08-21 NOTE — PROGRESS NOTES
HPI     1 wk f/u post op OD done 8/13     Pt states va is slightly blurred for near. Denies eye pain. Denies flashes   or floaters since sx. Pt is taking gtts and ointment as directed.     Last edited by Dean Knowles on 8/21/2020  1:42 PM. (History)        ROS     Negative for: Constitutional, Gastrointestinal, Neurological, Skin,   Genitourinary, Musculoskeletal, HENT, Endocrine, Cardiovascular, Eyes,   Respiratory, Psychiatric, Allergic/Imm, Heme/Lymph    Last edited by Meek Haro Jr., MD on 8/21/2020  5:35 PM. (History)        Assessment /Plan     For exam results, see Encounter Report.    Status post cataract extraction and insertion of intraocular lens of right eye      Confluent SPK OD, wears CPAP at night   Not using any eye lubricants  Aggressive lubrication  With Refresh and genteal ointment  Stop Moxifloxacin  Decrease PF and Diclofenac 1x daily right    Follow up in about 1 week (around 8/28/2020) for f/u dry eye right s/p ce/iol OD.

## 2020-08-21 NOTE — PATIENT INSTRUCTIONS
Aggressive lubrication right eye  Preservative free Refresh 1 drop 4-6x daily right eye  Genteal ointment at bedtime right eye  Prednisolone acetate 1 drop 1 x daily right eye  Diclofenac 1 drop 1 x daily right eye

## 2020-08-27 ENCOUNTER — OFFICE VISIT (OUTPATIENT)
Dept: ORTHOPEDICS | Facility: CLINIC | Age: 70
End: 2020-08-27
Payer: MEDICARE

## 2020-08-27 VITALS — WEIGHT: 270 LBS | HEIGHT: 62 IN | BODY MASS INDEX: 49.69 KG/M2 | RESPIRATION RATE: 18 BRPM

## 2020-08-27 DIAGNOSIS — M65.312 TRIGGER FINGER OF LEFT THUMB: Primary | ICD-10-CM

## 2020-08-27 PROCEDURE — 99999 PR PBB SHADOW E&M-EST. PATIENT-LVL IV: ICD-10-PCS | Mod: PBBFAC,,, | Performed by: ORTHOPAEDIC SURGERY

## 2020-08-27 PROCEDURE — 99213 OFFICE O/P EST LOW 20 MIN: CPT | Mod: 25,S$GLB,, | Performed by: ORTHOPAEDIC SURGERY

## 2020-08-27 PROCEDURE — 1159F MED LIST DOCD IN RCRD: CPT | Mod: S$GLB,,, | Performed by: ORTHOPAEDIC SURGERY

## 2020-08-27 PROCEDURE — 99999 PR PBB SHADOW E&M-EST. PATIENT-LVL IV: CPT | Mod: PBBFAC,,, | Performed by: ORTHOPAEDIC SURGERY

## 2020-08-27 PROCEDURE — 1159F PR MEDICATION LIST DOCUMENTED IN MEDICAL RECORD: ICD-10-PCS | Mod: S$GLB,,, | Performed by: ORTHOPAEDIC SURGERY

## 2020-08-27 PROCEDURE — 20550 TENDON SHEATH: L THUMB MCP: ICD-10-PCS | Mod: FA,S$GLB,, | Performed by: ORTHOPAEDIC SURGERY

## 2020-08-27 PROCEDURE — 99213 PR OFFICE/OUTPT VISIT, EST, LEVL III, 20-29 MIN: ICD-10-PCS | Mod: 25,S$GLB,, | Performed by: ORTHOPAEDIC SURGERY

## 2020-08-27 PROCEDURE — 20550 NJX 1 TENDON SHEATH/LIGAMENT: CPT | Mod: FA,S$GLB,, | Performed by: ORTHOPAEDIC SURGERY

## 2020-08-27 PROCEDURE — 1101F PT FALLS ASSESS-DOCD LE1/YR: CPT | Mod: CPTII,S$GLB,, | Performed by: ORTHOPAEDIC SURGERY

## 2020-08-27 PROCEDURE — 3008F BODY MASS INDEX DOCD: CPT | Mod: CPTII,S$GLB,, | Performed by: ORTHOPAEDIC SURGERY

## 2020-08-27 PROCEDURE — 1101F PR PT FALLS ASSESS DOC 0-1 FALLS W/OUT INJ PAST YR: ICD-10-PCS | Mod: CPTII,S$GLB,, | Performed by: ORTHOPAEDIC SURGERY

## 2020-08-27 PROCEDURE — 1125F AMNT PAIN NOTED PAIN PRSNT: CPT | Mod: S$GLB,,, | Performed by: ORTHOPAEDIC SURGERY

## 2020-08-27 PROCEDURE — 3008F PR BODY MASS INDEX (BMI) DOCUMENTED: ICD-10-PCS | Mod: CPTII,S$GLB,, | Performed by: ORTHOPAEDIC SURGERY

## 2020-08-27 PROCEDURE — 1125F PR PAIN SEVERITY QUANTIFIED, PAIN PRESENT: ICD-10-PCS | Mod: S$GLB,,, | Performed by: ORTHOPAEDIC SURGERY

## 2020-08-27 RX ORDER — TRIAMCINOLONE ACETONIDE 40 MG/ML
40 INJECTION, SUSPENSION INTRA-ARTICULAR; INTRAMUSCULAR
Status: DISCONTINUED | OUTPATIENT
Start: 2020-08-27 | End: 2020-08-27 | Stop reason: HOSPADM

## 2020-08-27 RX ADMIN — TRIAMCINOLONE ACETONIDE 40 MG: 40 INJECTION, SUSPENSION INTRA-ARTICULAR; INTRAMUSCULAR at 11:08

## 2020-08-27 NOTE — H&P (VIEW-ONLY)
Past Medical History:   Diagnosis Date    Allergy     multiple antibiotic allergies    Anemia     Anxiety     Arthritis     Asthma     CHF (congestive heart failure)     NYHA class III     Chronic kidney disease     Colon polyp     benign    COPD (chronic obstructive pulmonary disease)     DLCO 37% , and mild pulmonary HTN    Dental bridge present     LOWER    Depression     Diabetes mellitus     Diabetes mellitus without complication     Diabetic retinopathy     Diastolic dysfunction     Diverticulitis of large intestine without perforation or abscess without bleeding 2/12/2018    Diverticulosis     Former smoker     Fracture of lumbar spine     GERD (gastroesophageal reflux disease)     Hernia of unspecified site of abdominal cavity without mention of obstruction or gangrene     Hyperlipidemia     Hypertension     hypertensive renal    IBS (irritable bowel syndrome)     Mild nonproliferative diabetic retinopathy(362.04) 11/25/2013    Morbid obesity     Nuclear sclerosis 11/25/2013    Osteoporosis     Peripheral edema     Rash     Recurrent upper respiratory infection (URI)     S/P LASIK (laser assisted in situ keratomileusis)     Sleep apnea     sleep apnea uses bipap.    Stroke     tia    Thyroid disease     on synthroid    TIA (transient ischemic attack)     Urinary tract infection     Wears glasses        Past Surgical History:   Procedure Laterality Date    ABDOMINAL SURGERY      ADENOIDECTOMY      ARTHROSCOPIC CHONDROPLASTY OF KNEE JOINT Right 8/31/2018    Procedure: ARTHROSCOPY, KNEE, WITH CHONDROPLASTY;  Surgeon: Larry Molina MD;  Location: [facility] OR;  Service: Orthopedics;  Laterality: Right;  Tricompartmental chondroplasty    CATARACT EXTRACTION Right 08/13/2020    COLONOSCOPY  03/05/2013    repeat in 5 years    COLONOSCOPY N/A 5/31/2017    Procedure: COLONOSCOPY;  Surgeon: Luis Navarro MD;  Location: [facility] ENDO;  Service: Endoscopy;  Laterality:  N/A;    COLONOSCOPY N/A 4/11/2018    Procedure: COLONOSCOPY;  Surgeon: Luis Navarro MD;  Location: [facility] ENDO;  Service: Endoscopy;  Laterality: N/A;    COSMETIC SURGERY      belt abdominoplasty    CYSTOSCOPY      CYSTOSCOPY N/A 8/6/2018    Procedure: CYSTOSCOPY;  Surgeon: Angy Campos MD;  Location: [facility] OR;  Service: Urology;  Laterality: N/A;    EYE SURGERY Right     mazzulla    GASTRECTOMY      gastric sleeve    gastric sleeve      HERNIA REPAIR      5 years old    HYSTERECTOMY      ovaries remain    JOINT REPLACEMENT Left     knee replacement    KNEE ARTHROPLASTY Left 4/16/2019    Procedure: ARTHROPLASTY, KNEE;  Surgeon: Larry Molina MD;  Location: [facility] OR;  Service: Orthopedics;  Laterality: Left;  [place]    KNEE ARTHROSCOPY W/ MENISCECTOMY Right 8/31/2018    Procedure: ARTHROSCOPY, KNEE, WITH MENISCECTOMY;  Surgeon: Larry Molina MD;  Location: [facility] OR;  Service: Orthopedics;  Laterality: Right;    PHACOEMULSIFICATION OF CATARACT Right 8/13/2020    Procedure: PHACOEMULSIFICATION, CATARACT;  Surgeon: Meek Haro Jr., MD;  Location: [facility] OR;  Service: Ophthalmology;  Laterality: Right;  Right/DM    REFRACTIVE SURGERY      mono va//ou//    RHINOPLASTY TIP      TONSILLECTOMY      TOTAL KNEE ARTHROPLASTY Left 4/16/19    TUBAL LIGATION      UPPER GASTROINTESTINAL ENDOSCOPY  03/05/2013    UPPER GASTROINTESTINAL ENDOSCOPY  08/24/2016    Dr. Navarro, repeat in 8 weeks    UPPER GASTROINTESTINAL ENDOSCOPY  07/21/2016    Dr. Randall       Current Outpatient Medications   Medication Sig    alendronate (FOSAMAX) 35 MG tablet Take 1 tablet (35 mg total) by mouth every 7 days.    allopurinol (ZYLOPRIM) 100 MG tablet Take 2 tablets (200 mg total) by mouth nightly.    amitriptyline (ELAVIL) 100 MG tablet 1 tab at night    ascorbic acid, vitamin C, (VITAMIN C) 500 MG tablet Take 500 mg by mouth once daily.    atenolol (TENORMIN) 25 MG tablet Take 1 tablet (25 mg  total) by mouth 2 (two) times daily.    biotin 1 mg Cap Take by mouth.    calcitRIOL (ROCALTROL) 0.25 MCG Cap TAKE ONE TABLET BY MOUTH TWICE A WEEK ON monday AND friday    cetirizine (ZYRTEC) 10 MG tablet Take 1 tablet (10 mg total) by mouth once daily.    CHERATUSSIN AC  mg/5 mL syrup TAKE 5 ML (CC) BY MOUTH 4 TIMES DAILY AS NEEDED FOR COUGH FOR 5 DAYS    cinnamon bark (CINNAMON ORAL) Take by mouth once daily.    clotrimazole-betamethasone 1-0.05% (LOTRISONE) cream     cyanocobalamin, vitamin B-12, (VITAMIN B-12) 5,000 mcg Subl Place 5,000 mg under the tongue once a week.    diclofenac (VOLTAREN) 0.1 % ophthalmic solution Place 1 drop into the right eye 4 (four) times daily. Start drops 3 days before surgery    diclofenac sodium (VOLTAREN) 1 % Gel  APPLY 2 GRAMS TOPICALLY ONCE DAILY    diphenoxylate-atropine 2.5-0.025 mg (LOMOTIL) 2.5-0.025 mg per tablet TAKE ONE TABLET BY MOUTH 4 TIMES DAILY AS NEEDED FOR DIARRHEA    DYMISTA 137-50 mcg/spray Spry nassal spray as needed.     ferrous sulfate (IRON) 325 mg (65 mg iron) Tab tablet Take 325 mg by mouth daily with breakfast.    fish oil-omega-3 fatty acids 300-1,000 mg capsule Take 1 capsule by mouth once daily.    FLUoxetine 20 MG capsule Take 2 capsules (40 mg total) by mouth once daily.    fluticasone furoate-vilanterol (BREO) 100-25 mcg/dose diskus inhaler Inhale 1 puff into the lungs once daily.    fluticasone propionate (FLONASE) 50 mcg/actuation nasal spray 1 spray (50 mcg total) by Each Nostril route once daily.    gabapentin (NEURONTIN) 600 MG tablet Take 1 tablet (600 mg total) by mouth 3 (three) times daily.    HUMALOG U-100 INSULIN 100 unit/mL injection inject 100 UNITS DAILY via insulin pump    hydrocortisone 2.5 % cream Apply topically nightly.    insulin (BASAGLAR KWIKPEN U-100 INSULIN) glargine 100 units/mL (3mL) SubQ pen Inject 20 Units into the skin every evening. Use 10 units if BS over 210.Night before surgery. (Patient not  taking: Reported on 6/30/2020)    insulin aspart U-100 (NOVOLOG) 100 unit/mL injection Use per insulin pump, 35-40 units daily.    L-LYSINE ORAL Take by mouth.    l-methylfolate-b2-b6-b12 (CEREFOLIN) 6-5-50-1 mg Tab Take 1 tablet by mouth once daily.    Lacto.acidophilus-Bif.animalis (PROBIOTIC) 5 billion cell CpSP Take 1 capsule by mouth once daily.    levothyroxine (SYNTHROID) 25 MCG tablet Take 1 tablet (25 mcg total) by mouth once daily.    losartan (COZAAR) 25 MG tablet Take 25 mg by mouth once daily.    losartan (COZAAR) 50 MG tablet Take 1 tablet (50 mg total) by mouth once daily.    magnesium oxide (MAG-OX) 400 mg (241.3 mg magnesium) tablet Take 800 mg by mouth every evening.    metronidazole 1% (METROGEL) 1 % Gel Apply topically once daily. Prn sores on scalp.    montelukast (SINGULAIR) 10 mg tablet Take 1 tablet (10 mg total) by mouth every evening.    MULTIVIT-IRON-MIN-FOLIC ACID 3,500-18-0.4 UNIT-MG-MG ORAL CHEW Take by mouth 2 (two) times daily.    mupirocin (BACTROBAN) 2 % ointment APPLY OINTMENT TOPICALLY TO AFFECTED AREA ON NOSE THREE TIMES DAILY AS DIRECTED    oxyCODONE (ROXICODONE) 15 MG Tab Take 1 tablet (15 mg total) by mouth every 6 (six) hours as needed for Pain.    pantoprazole (PROTONIX) 40 MG tablet TAKE 1 TABLET BY MOUTH ONCE DAILY    potassium chloride SA (K-DUR,KLOR-CON) 20 MEQ tablet TAKE 1 TABLET BY MOUTH TWICE DAILY    prednisoLONE acetate (PRED FORTE) 1 % DrpS Place 1 drop into the right eye 4 (four) times daily.    SSD 1 % cream     tobramycin sulfate 0.3% (TOBREX) 0.3 % ophthalmic ointment Place 0.5 inches into the right eye 4 (four) times daily. Place a 1/2 inch ribbon of ointment into the lower eyelid. for 10 days    tobramycin sulfate 0.3% (TOBREX) 0.3 % ophthalmic solution Place 1 drop into the right eye every 6 (six) hours. for 7 days    torsemide (DEMADEX) 20 MG Tab TAKE ONE TABLET BY MOUTH TWICE DAILY    TURMERIC ORAL Take by mouth once daily.      Current Facility-Administered Medications   Medication    gentamicin injection 80 mg     Facility-Administered Medications Ordered in Other Visits   Medication    lactated ringers infusion       Review of patient's allergies indicates:   Allergen Reactions    Adhesive Other (See Comments)     Blisters    Cleocin [clindamycin hcl] Hives    Ceclor [cefaclor] Hives    Advair diskus [fluticasone propion-salmeterol]      Dry mouth    Aleve [naproxen sodium]      Unable to take secondary to kidney function.     Erythromycin Hives    Levaquin [levofloxacin] Dermatitis    Macrobid [nitrofurantoin monohyd/m-cryst] Other (See Comments)     Stomach pain/ GI issues    Macrodantin [nitrofurantoin macrocrystalline] Hives    Motrin [ibuprofen]      Unable to take secondary to kidney functions.    Restoril [temazepam]      LIGHTHEADED UPON WAKING.with poor results    Sulfa (sulfonamide antibiotics)      Hold due to renal problems    Trazodone      PALPITATIONS    Remeron [mirtazapine] Palpitations and Other (See Comments)     Jittery    Vancomycin analogues Rash     Feet broke out/inflammed blood vessels         Family History   Problem Relation Age of Onset    Heart disease Mother 59    Cancer Mother 59        throat    Allergies Mother     Diabetes Mother     Breast cancer Mother     Heart disease Father 70        flutter    Allergies Father     Diabetes Father     Cancer Sister 22        22 thyroid,49  breast    Allergies Sister     Breast cancer Sister     Diabetes Sister     Cancer Brother 62        lung    Diabetes Brother     Asthma Daughter     Diabetes Daughter     Depression Daughter     Asthma Grandchild     Eczema Grandchild     Eczema Grandchild     Breast cancer Maternal Grandmother     Ovarian cancer Cousin     Glaucoma Neg Hx     Macular degeneration Neg Hx     Retinal detachment Neg Hx        Social History     Socioeconomic History    Marital status:       Spouse name: Not on file    Number of children: Not on file    Years of education: Not on file    Highest education level: Not on file   Occupational History    Not on file   Social Needs    Financial resource strain: Somewhat hard    Food insecurity     Worry: Never true     Inability: Never true    Transportation needs     Medical: No     Non-medical: No   Tobacco Use    Smoking status: Former Smoker     Packs/day: 1.00     Years: 4.00     Pack years: 4.00     Types: Cigarettes     Quit date:      Years since quittin.6    Smokeless tobacco: Never Used    Tobacco comment: quit , lived with smokers    Substance and Sexual Activity    Alcohol use: Not Currently     Frequency: Never     Binge frequency: Never    Drug use: No    Sexual activity: Yes     Birth control/protection: Surgical   Lifestyle    Physical activity     Days per week: 0 days     Minutes per session: 10 min    Stress: Rather much   Relationships    Social connections     Talks on phone: More than three times a week     Gets together: More than three times a week     Attends [employer] service: Not on file     Active member of club or organization: Yes     Attends meetings of clubs or organizations: More than 4 times per year     Relationship status:    Other Topics Concern    Not on file   Social History Narrative    Not on file       Chief Complaint:   Chief Complaint   Patient presents with    Left Hand - Pain     Left hand (trigger finger thumb)       Date of surgery:  2019    History of present illness:  69-year-old female who has a history of left trigger thumb.  We injected her back in 2019.  It lasted about 11 months.  Pain returned week ago.  Hurts with the use.  She has tried bracing it but it does not really help.  Pain is back up to an 8/10.    Review of Systems:    Musculoskeletal:  See HPI        Physical Examination:    Vital Signs:    There were no vitals filed for this  visit.    There is no height or weight on file to calculate BMI.    This a well-developed, well nourished patient in no acute distress.  They are alert and oriented and cooperative to examination.  Pt. walks with a mild antalgic gait.      Examination of the left hand and wrist shows no signs of rashes or erythema. Patient has no masses ecchymosis or effusions. Patient has full range of motion of the wrist in flexion and extension as well as ulnar and radial deviation. The patient also has full range of motion of all joints in the hand. There are 2+ radial pulse and intact light touch sensation in all 5 digits. Nontender over the anatomic snuffbox. Negative Tinel's. Negative Finkelstein's test.  Positive triggering over the A1 pulley of the thumb.      X-rays:  X-rays left hand are  reviewed which show some mild degenerative change without significant bony abnormality or severe arthritis.    Assessment::  Status post left [place] total knee arthroplasty  Left trigger thumb    Plan:  We discussed trigger fingers in great detail today.  Offered her trigger finger injection for her trigger thumb.  Patient agreed to the injection.  Talked about possibly doing a surgical release if it continues to recur.  She will follow up as needed.    This note was created using [employer] voice recognition software that occasionally misinterpreted phrases or words.

## 2020-08-27 NOTE — PROCEDURES
Tendon Sheath: L thumb MCP    Date/Time: 8/27/2020 11:15 AM  Performed by: Larry Molina MD  Authorized by: Larry Molina MD     Consent Done?:  Yes (Verbal)  Indications:  Pain  Site marked: the procedure site was marked    Timeout: prior to procedure the correct patient, procedure, and site was verified    Prep: patient was prepped and draped in usual sterile fashion      Local anesthesia used?: Yes    Anesthesia:  Local infiltration  Local anesthetic:  Lidocaine 1% without epinephrine and bupivacaine 0.25% without epinephrine  Anesthetic total (ml):  2    Location:  Thumb  Site:  L thumb MCP  Ultrasonic guidance for needle placement?: No    Needle size:  22 G  Approach:  [place]  Medications:  40 mg triamcinolone acetonide 40 mg/mL  Patient tolerance:  Patient tolerated the procedure well with no immediate complications

## 2020-08-27 NOTE — PROGRESS NOTES
Past Medical History:   Diagnosis Date    Allergy     multiple antibiotic allergies    Anemia     Anxiety     Arthritis     Asthma     CHF (congestive heart failure)     NYHA class III     Chronic kidney disease     Colon polyp     benign    COPD (chronic obstructive pulmonary disease)     DLCO 37% , and mild pulmonary HTN    Dental bridge present     LOWER    Depression     Diabetes mellitus     Diabetes mellitus without complication     Diabetic retinopathy     Diastolic dysfunction     Diverticulitis of large intestine without perforation or abscess without bleeding 2/12/2018    Diverticulosis     Former smoker     Fracture of lumbar spine     GERD (gastroesophageal reflux disease)     Hernia of unspecified site of abdominal cavity without mention of obstruction or gangrene     Hyperlipidemia     Hypertension     hypertensive renal    IBS (irritable bowel syndrome)     Mild nonproliferative diabetic retinopathy(362.04) 11/25/2013    Morbid obesity     Nuclear sclerosis 11/25/2013    Osteoporosis     Peripheral edema     Rash     Recurrent upper respiratory infection (URI)     S/P LASIK (laser assisted in situ keratomileusis)     Sleep apnea     sleep apnea uses bipap.    Stroke     tia    Thyroid disease     on synthroid    TIA (transient ischemic attack)     Urinary tract infection     Wears glasses        Past Surgical History:   Procedure Laterality Date    ABDOMINAL SURGERY      ADENOIDECTOMY      ARTHROSCOPIC CHONDROPLASTY OF KNEE JOINT Right 8/31/2018    Procedure: ARTHROSCOPY, KNEE, WITH CHONDROPLASTY;  Surgeon: Larry Molina MD;  Location: [facility] OR;  Service: Orthopedics;  Laterality: Right;  Tricompartmental chondroplasty    CATARACT EXTRACTION Right 08/13/2020    COLONOSCOPY  03/05/2013    repeat in 5 years    COLONOSCOPY N/A 5/31/2017    Procedure: COLONOSCOPY;  Surgeon: Luis Navarro MD;  Location: [facility] ENDO;  Service: Endoscopy;  Laterality:  N/A;    COLONOSCOPY N/A 4/11/2018    Procedure: COLONOSCOPY;  Surgeon: Luis Navarro MD;  Location: [facility] ENDO;  Service: Endoscopy;  Laterality: N/A;    COSMETIC SURGERY      belt abdominoplasty    CYSTOSCOPY      CYSTOSCOPY N/A 8/6/2018    Procedure: CYSTOSCOPY;  Surgeon: Angy Campos MD;  [facility]  Service: Urology;  Laterality: N/A;    EYE SURGERY Right     mazzulla    GASTRECTOMY      gastric sleeve    gastric sleeve      HERNIA REPAIR      5 years old    HYSTERECTOMY      ovaries remain    JOINT REPLACEMENT Left     knee replacement    KNEE ARTHROPLASTY Left 4/16/2019    Procedure: ARTHROPLASTY, KNEE;  Surgeon: Larry Molina MD;  Location: [facility] OR;  Service: Orthopedics;  Laterality: Left;  [place]    KNEE ARTHROSCOPY W/ MENISCECTOMY Right 8/31/2018    Procedure: ARTHROSCOPY, KNEE, WITH MENISCECTOMY;  Surgeon: Larry Molina MD;  Location: [facility] OR;  Service: Orthopedics;  Laterality: Right;    PHACOEMULSIFICATION OF CATARACT Right 8/13/2020    Procedure: PHACOEMULSIFICATION, CATARACT;  Surgeon: Meek Haro Jr., MD;  Location: [facility] OR;  Service: Ophthalmology;  Laterality: Right;  Right/DM    REFRACTIVE SURGERY      mono va//ou//    RHINOPLASTY TIP      TONSILLECTOMY      TOTAL KNEE ARTHROPLASTY Left 4/16/19    TUBAL LIGATION      UPPER GASTROINTESTINAL ENDOSCOPY  03/05/2013    UPPER GASTROINTESTINAL ENDOSCOPY  08/24/2016    Dr. Navarro, repeat in 8 weeks    UPPER GASTROINTESTINAL ENDOSCOPY  07/21/2016    Dr. Randall       Current Outpatient Medications   Medication Sig    alendronate (FOSAMAX) 35 MG tablet Take 1 tablet (35 mg total) by mouth every 7 days.    allopurinol (ZYLOPRIM) 100 MG tablet Take 2 tablets (200 mg total) by mouth nightly.    amitriptyline (ELAVIL) 100 MG tablet 1 tab at night    ascorbic acid, vitamin C, (VITAMIN C) 500 MG tablet Take 500 mg by mouth once daily.    atenolol (TENORMIN) 25 MG tablet Take 1 tablet (25 mg  total) by mouth 2 (two) times daily.    biotin 1 mg Cap Take by mouth.    calcitRIOL (ROCALTROL) 0.25 MCG Cap TAKE ONE TABLET BY MOUTH TWICE A WEEK ON monday AND friday    cetirizine (ZYRTEC) 10 MG tablet Take 1 tablet (10 mg total) by mouth once daily.    CHERATUSSIN AC  mg/5 mL syrup TAKE 5 ML (CC) BY MOUTH 4 TIMES DAILY AS NEEDED FOR COUGH FOR 5 DAYS    cinnamon bark (CINNAMON ORAL) Take by mouth once daily.    clotrimazole-betamethasone 1-0.05% (LOTRISONE) cream     cyanocobalamin, vitamin B-12, (VITAMIN B-12) 5,000 mcg Subl Place 5,000 mg under the tongue once a week.    diclofenac (VOLTAREN) 0.1 % ophthalmic solution Place 1 drop into the right eye 4 (four) times daily. Start drops 3 days before surgery    diclofenac sodium (VOLTAREN) 1 % Gel  APPLY 2 GRAMS TOPICALLY ONCE DAILY    diphenoxylate-atropine 2.5-0.025 mg (LOMOTIL) 2.5-0.025 mg per tablet TAKE ONE TABLET BY MOUTH 4 TIMES DAILY AS NEEDED FOR DIARRHEA    DYMISTA 137-50 mcg/spray Spry nassal spray as needed.     ferrous sulfate (IRON) 325 mg (65 mg iron) Tab tablet Take 325 mg by mouth daily with breakfast.    fish oil-omega-3 fatty acids 300-1,000 mg capsule Take 1 capsule by mouth once daily.    FLUoxetine 20 MG capsule Take 2 capsules (40 mg total) by mouth once daily.    fluticasone furoate-vilanterol (BREO) 100-25 mcg/dose diskus inhaler Inhale 1 puff into the lungs once daily.    fluticasone propionate (FLONASE) 50 mcg/actuation nasal spray 1 spray (50 mcg total) by Each Nostril route once daily.    gabapentin (NEURONTIN) 600 MG tablet Take 1 tablet (600 mg total) by mouth 3 (three) times daily.    HUMALOG U-100 INSULIN 100 unit/mL injection inject 100 UNITS DAILY via insulin pump    hydrocortisone 2.5 % cream Apply topically nightly.    insulin (BASAGLAR KWIKPEN U-100 INSULIN) glargine 100 units/mL (3mL) SubQ pen Inject 20 Units into the skin every evening. Use 10 units if BS over 210.Night before surgery. (Patient not  taking: Reported on 6/30/2020)    insulin aspart U-100 (NOVOLOG) 100 unit/mL injection Use per insulin pump, 35-40 units daily.    L-LYSINE ORAL Take by mouth.    l-methylfolate-b2-b6-b12 (CEREFOLIN) 6-5-50-1 mg Tab Take 1 tablet by mouth once daily.    Lacto.acidophilus-Bif.animalis (PROBIOTIC) 5 billion cell CpSP Take 1 capsule by mouth once daily.    levothyroxine (SYNTHROID) 25 MCG tablet Take 1 tablet (25 mcg total) by mouth once daily.    losartan (COZAAR) 25 MG tablet Take 25 mg by mouth once daily.    losartan (COZAAR) 50 MG tablet Take 1 tablet (50 mg total) by mouth once daily.    magnesium oxide (MAG-OX) 400 mg (241.3 mg magnesium) tablet Take 800 mg by mouth every evening.    metronidazole 1% (METROGEL) 1 % Gel Apply topically once daily. Prn sores on scalp.    montelukast (SINGULAIR) 10 mg tablet Take 1 tablet (10 mg total) by mouth every evening.    MULTIVIT-IRON-MIN-FOLIC ACID 3,500-18-0.4 UNIT-MG-MG ORAL CHEW Take by mouth 2 (two) times daily.    mupirocin (BACTROBAN) 2 % ointment APPLY OINTMENT TOPICALLY TO AFFECTED AREA ON NOSE THREE TIMES DAILY AS DIRECTED    oxyCODONE (ROXICODONE) 15 MG Tab Take 1 tablet (15 mg total) by mouth every 6 (six) hours as needed for Pain.    pantoprazole (PROTONIX) 40 MG tablet TAKE 1 TABLET BY MOUTH ONCE DAILY    potassium chloride SA (K-DUR,KLOR-CON) 20 MEQ tablet TAKE 1 TABLET BY MOUTH TWICE DAILY    prednisoLONE acetate (PRED FORTE) 1 % DrpS Place 1 drop into the right eye 4 (four) times daily.    SSD 1 % cream     tobramycin sulfate 0.3% (TOBREX) 0.3 % ophthalmic ointment Place 0.5 inches into the right eye 4 (four) times daily. Place a 1/2 inch ribbon of ointment into the lower eyelid. for 10 days    tobramycin sulfate 0.3% (TOBREX) 0.3 % ophthalmic solution Place 1 drop into the right eye every 6 (six) hours. for 7 days    torsemide (DEMADEX) 20 MG Tab TAKE ONE TABLET BY MOUTH TWICE DAILY    TURMERIC ORAL Take by mouth once daily.      Current Facility-Administered Medications   Medication    gentamicin injection 80 mg     Facility-Administered Medications Ordered in Other Visits   Medication    lactated ringers infusion       Review of patient's allergies indicates:   Allergen Reactions    Adhesive Other (See Comments)     Blisters    Cleocin [clindamycin hcl] Hives    Ceclor [cefaclor] Hives    Advair diskus [fluticasone propion-salmeterol]      Dry mouth    Aleve [naproxen sodium]      Unable to take secondary to kidney function.     Erythromycin Hives    Levaquin [levofloxacin] Dermatitis    Macrobid [nitrofurantoin monohyd/m-cryst] Other (See Comments)     Stomach pain/ GI issues    Macrodantin [nitrofurantoin macrocrystalline] Hives    Motrin [ibuprofen]      Unable to take secondary to kidney functions.    Restoril [temazepam]      LIGHTHEADED UPON WAKING.with poor results    Sulfa (sulfonamide antibiotics)      Hold due to renal problems    Trazodone      PALPITATIONS    Remeron [mirtazapine] Palpitations and Other (See Comments)     Jittery    Vancomycin analogues Rash     Feet broke out/inflammed blood vessels         Family History   Problem Relation Age of Onset    Heart disease Mother 59    Cancer Mother 59        throat    Allergies Mother     Diabetes Mother     Breast cancer Mother     Heart disease Father 70        flutter    Allergies Father     Diabetes Father     Cancer Sister 22        22 thyroid,49  breast    Allergies Sister     Breast cancer Sister     Diabetes Sister     Cancer Brother 62        lung    Diabetes Brother     Asthma Daughter     Diabetes Daughter     Depression Daughter     Asthma Grandchild     Eczema Grandchild     Eczema Grandchild     Breast cancer Maternal Grandmother     Ovarian cancer Cousin     Glaucoma Neg Hx     Macular degeneration Neg Hx     Retinal detachment Neg Hx        Social History     Socioeconomic History    Marital status:       Spouse name: Not on file    Number of children: Not on file    Years of education: Not on file    Highest education level: Not on file   Occupational History    Not on file   Social Needs    Financial resource strain: Somewhat hard    Food insecurity     Worry: Never true     Inability: Never true    Transportation needs     Medical: No     Non-medical: No   Tobacco Use    Smoking status: Former Smoker     Packs/day: 1.00     Years: 4.00     Pack years: 4.00     Types: Cigarettes     Quit date:      Years since quittin.6    Smokeless tobacco: Never Used    Tobacco comment: quit , lived with smokers    Substance and Sexual Activity    Alcohol use: Not Currently     Frequency: Never     Binge frequency: Never    Drug use: No    Sexual activity: Yes     Birth control/protection: Surgical   Lifestyle    Physical activity     Days per week: 0 days     Minutes per session: 10 min    Stress: Rather much   Relationships    Social connections     Talks on phone: More than three times a week     Gets together: More than three times a week     Attends Religion service: Not on file     Active member of club or organization: Yes     Attends meetings of clubs or organizations: More than 4 times per year     Relationship status:    Other Topics Concern    Not on file   Social History Narrative    Not on file       Chief Complaint:   Chief Complaint   Patient presents with    Left Hand - Pain     Left hand (trigger finger thumb)       Date of surgery:  2019    History of present illness:  69-year-old female who has a history of left trigger thumb.  We injected her back in 2019.  It lasted about 11 months.  Pain returned week ago.  Hurts with the use.  She has tried bracing it but it does not really help.  Pain is back up to an 8/10.    Review of Systems:    Musculoskeletal:  See HPI        Physical Examination:    Vital Signs:    There were no vitals filed for this  visit.    There is no height or weight on file to calculate BMI.    This a well-developed, well nourished patient in no acute distress.  They are alert and oriented and cooperative to examination.  Pt. walks with a mild antalgic gait.      Examination of the left hand and wrist shows no signs of rashes or erythema. Patient has no masses ecchymosis or effusions. Patient has full range of motion of the wrist in flexion and extension as well as ulnar and radial deviation. The patient also has full range of motion of all joints in the hand. There are 2+ radial pulse and intact light touch sensation in all 5 digits. Nontender over the anatomic snuffbox. Negative Tinel's. Negative Finkelstein's test.  Positive triggering over the A1 pulley of the thumb.      X-rays:  X-rays left hand are  reviewed which show some mild degenerative change without significant bony abnormality or severe arthritis.    Assessment::  Status post left Beryl total knee arthroplasty  Left trigger thumb    Plan:  We discussed trigger fingers in great detail today.  Offered her trigger finger injection for her trigger thumb.  Patient agreed to the injection.  Talked about possibly doing a surgical release if it continues to recur.  She will follow up as needed.    This note was created using [employer] voice recognition software that occasionally misinterpreted phrases or words.

## 2020-08-28 ENCOUNTER — OFFICE VISIT (OUTPATIENT)
Dept: OPHTHALMOLOGY | Facility: CLINIC | Age: 70
End: 2020-08-28
Payer: MEDICARE

## 2020-08-28 DIAGNOSIS — H25.12 AGE-RELATED NUCLEAR CATARACT OF LEFT EYE: Primary | ICD-10-CM

## 2020-08-28 PROCEDURE — 99999 PR PBB SHADOW E&M-EST. PATIENT-LVL V: CPT | Mod: PBBFAC,,, | Performed by: OPHTHALMOLOGY

## 2020-08-28 PROCEDURE — 99024 POSTOP FOLLOW-UP VISIT: CPT | Mod: S$GLB,,, | Performed by: OPHTHALMOLOGY

## 2020-08-28 PROCEDURE — 99999 PR PBB SHADOW E&M-EST. PATIENT-LVL V: ICD-10-PCS | Mod: PBBFAC,,, | Performed by: OPHTHALMOLOGY

## 2020-08-28 PROCEDURE — 99024 PR POST-OP FOLLOW-UP VISIT: ICD-10-PCS | Mod: S$GLB,,, | Performed by: OPHTHALMOLOGY

## 2020-08-28 RX ORDER — PHENYLEPHRINE HYDROCHLORIDE 25 MG/ML
1 SOLUTION/ DROPS OPHTHALMIC
Status: CANCELLED | OUTPATIENT
Start: 2020-08-28 | End: 2020-08-28

## 2020-08-28 RX ORDER — TROPICAMIDE 10 MG/ML
1 SOLUTION/ DROPS OPHTHALMIC
Status: CANCELLED | OUTPATIENT
Start: 2020-08-28 | End: 2020-08-28

## 2020-08-28 RX ORDER — PROPARACAINE HYDROCHLORIDE 5 MG/ML
1 SOLUTION/ DROPS OPHTHALMIC
Status: CANCELLED | OUTPATIENT
Start: 2020-08-28 | End: 2020-08-28

## 2020-08-28 RX ORDER — PHENYLEPHRINE HYDROCHLORIDE 100 MG/ML
1 SOLUTION/ DROPS OPHTHALMIC
Status: CANCELLED | OUTPATIENT
Start: 2020-08-28 | End: 2020-08-28

## 2020-08-28 NOTE — PROGRESS NOTES
HPI     DLE- 8/21/20     Pt is here for f/u jessica OD post cat sx. OD near    Pt states va has somewhat improved. Denies eye pain, just occasional   burning. Pt is using artifical tears x4 in OU, genteal ointment QHS OD,   pred x1 OD, and diclo x1 OD.      Last edited by Dean Knowles on 8/28/2020  2:22 PM. (History)        ROS     Negative for: Constitutional, Gastrointestinal, Neurological, Skin,   Genitourinary, Musculoskeletal, HENT, Endocrine, Cardiovascular, Eyes,   Respiratory, Psychiatric, Allergic/Imm, Heme/Lymph    Last edited by Meek Haro Jr., MD on 8/28/2020  6:38 PM. (History)        Assessment /Plan     For exam results, see Encounter Report.    Age-related nuclear cataract of left eye  -     Case Request Operating Room: PHACOEMULSIFICATION, CATARACT    -schedule cataract surgery, left  Monovision for distance  -R&B benefits discussed with patient  -Risks of worse vision, loss of vision, infection, bleeding, re-surgery,and may need to have surgery done by a different physician was explained to the patient  -patient was also counseled on premium lens options, laser cataract, and astigmatic correction  -patient was also counseled that they may still need glasses after surgery to help improve their vision, especially the need for reading glasses for reading small print texts after surgery  -patient understood the risks involved with cataract surgery and wanted to move forward with surgery    No follow-ups on file.

## 2020-08-31 ENCOUNTER — LAB VISIT (OUTPATIENT)
Dept: FAMILY MEDICINE | Facility: CLINIC | Age: 70
End: 2020-08-31
Payer: MEDICARE

## 2020-08-31 DIAGNOSIS — Z01.818 PRE-OP TESTING: ICD-10-CM

## 2020-08-31 PROCEDURE — U0003 INFECTIOUS AGENT DETECTION BY NUCLEIC ACID (DNA OR RNA); SEVERE ACUTE RESPIRATORY SYNDROME CORONAVIRUS 2 (SARS-COV-2) (CORONAVIRUS DISEASE [COVID-19]), AMPLIFIED PROBE TECHNIQUE, MAKING USE OF HIGH THROUGHPUT TECHNOLOGIES AS DESCRIBED BY CMS-2020-01-R: HCPCS

## 2020-09-01 LAB — SARS-COV-2 RNA RESP QL NAA+PROBE: NOT DETECTED

## 2020-09-02 ENCOUNTER — ANESTHESIA EVENT (OUTPATIENT)
Dept: SURGERY | Facility: HOSPITAL | Age: 70
End: 2020-09-02
Payer: MEDICARE

## 2020-09-03 ENCOUNTER — HOSPITAL ENCOUNTER (OUTPATIENT)
Facility: HOSPITAL | Age: 70
Discharge: HOME | End: 2020-09-03
Attending: OPHTHALMOLOGY | Admitting: OPHTHALMOLOGY
Payer: MEDICARE

## 2020-09-03 ENCOUNTER — ANESTHESIA (OUTPATIENT)
Dept: SURGERY | Facility: HOSPITAL | Age: 70
End: 2020-09-03
Payer: MEDICARE

## 2020-09-03 VITALS
WEIGHT: 260 LBS | RESPIRATION RATE: 13 BRPM | DIASTOLIC BLOOD PRESSURE: 65 MMHG | SYSTOLIC BLOOD PRESSURE: 145 MMHG | HEIGHT: 62 IN | TEMPERATURE: 98 F | BODY MASS INDEX: 47.84 KG/M2 | OXYGEN SATURATION: 95 % | HEART RATE: 72 BPM

## 2020-09-03 DIAGNOSIS — H25.12 AGE-RELATED NUCLEAR CATARACT OF LEFT EYE: Primary | ICD-10-CM

## 2020-09-03 LAB — GLUCOSE SERPL-MCNC: 204 MG/DL (ref 70–110)

## 2020-09-03 PROCEDURE — D9220A PRA ANESTHESIA: ICD-10-PCS | Mod: ANES,,, | Performed by: ANESTHESIOLOGY

## 2020-09-03 PROCEDURE — D9220A PRA ANESTHESIA: Mod: ANES,,, | Performed by: ANESTHESIOLOGY

## 2020-09-03 PROCEDURE — V2632 POST CHMBR INTRAOCULAR LENS: HCPCS | Mod: PO | Performed by: OPHTHALMOLOGY

## 2020-09-03 PROCEDURE — 66984 XCAPSL CTRC RMVL W/O ECP: CPT | Mod: 79,LT,, | Performed by: OPHTHALMOLOGY

## 2020-09-03 PROCEDURE — 66984 PR REMOVAL, CATARACT, W/INSRT INTRAOC LENS, W/O ENDO CYCLO: ICD-10-PCS | Mod: 79,LT,, | Performed by: OPHTHALMOLOGY

## 2020-09-03 PROCEDURE — 36000707: Mod: PO | Performed by: OPHTHALMOLOGY

## 2020-09-03 PROCEDURE — 63600175 PHARM REV CODE 636 W HCPCS: Mod: PO | Performed by: NURSE ANESTHETIST, CERTIFIED REGISTERED

## 2020-09-03 PROCEDURE — D9220A PRA ANESTHESIA: ICD-10-PCS | Mod: CRNA,,, | Performed by: NURSE ANESTHETIST, CERTIFIED REGISTERED

## 2020-09-03 PROCEDURE — 25000003 PHARM REV CODE 250: Mod: PO

## 2020-09-03 PROCEDURE — D9220A PRA ANESTHESIA: Mod: CRNA,,, | Performed by: NURSE ANESTHETIST, CERTIFIED REGISTERED

## 2020-09-03 PROCEDURE — 37000008 HC ANESTHESIA 1ST 15 MINUTES: Mod: PO | Performed by: OPHTHALMOLOGY

## 2020-09-03 PROCEDURE — 25000003 PHARM REV CODE 250: Mod: PO | Performed by: OPHTHALMOLOGY

## 2020-09-03 PROCEDURE — 71000015 HC POSTOP RECOV 1ST HR: Mod: PO | Performed by: OPHTHALMOLOGY

## 2020-09-03 PROCEDURE — 63600175 PHARM REV CODE 636 W HCPCS: Mod: PO | Performed by: ANESTHESIOLOGY

## 2020-09-03 PROCEDURE — 63600175 PHARM REV CODE 636 W HCPCS: Mod: PO | Performed by: OPHTHALMOLOGY

## 2020-09-03 PROCEDURE — 37000009 HC ANESTHESIA EA ADD 15 MINS: Mod: PO | Performed by: OPHTHALMOLOGY

## 2020-09-03 PROCEDURE — 36000706: Mod: PO | Performed by: OPHTHALMOLOGY

## 2020-09-03 DEVICE — LENS 22.5 ACRYSOF: Type: IMPLANTABLE DEVICE | Site: EYE | Status: FUNCTIONAL

## 2020-09-03 RX ORDER — ONDANSETRON 2 MG/ML
INJECTION INTRAMUSCULAR; INTRAVENOUS
Status: DISCONTINUED | OUTPATIENT
Start: 2020-09-03 | End: 2020-09-03

## 2020-09-03 RX ORDER — TROPICAMIDE 10 MG/ML
1 SOLUTION/ DROPS OPHTHALMIC
Status: COMPLETED | OUTPATIENT
Start: 2020-09-03 | End: 2020-09-03

## 2020-09-03 RX ORDER — LIDOCAINE HYDROCHLORIDE 10 MG/ML
1 INJECTION, SOLUTION EPIDURAL; INFILTRATION; INTRACAUDAL; PERINEURAL ONCE
Status: DISCONTINUED | OUTPATIENT
Start: 2020-09-03 | End: 2021-03-22

## 2020-09-03 RX ORDER — DEXAMETHASONE SODIUM PHOSPHATE 4 MG/ML
8 INJECTION, SOLUTION INTRA-ARTICULAR; INTRALESIONAL; INTRAMUSCULAR; INTRAVENOUS; SOFT TISSUE
Status: DISCONTINUED | OUTPATIENT
Start: 2020-09-03 | End: 2020-09-03

## 2020-09-03 RX ORDER — FENTANYL CITRATE 50 UG/ML
INJECTION, SOLUTION INTRAMUSCULAR; INTRAVENOUS
Status: DISCONTINUED | OUTPATIENT
Start: 2020-09-03 | End: 2020-09-03

## 2020-09-03 RX ORDER — SODIUM CHLORIDE, SODIUM LACTATE, POTASSIUM CHLORIDE, CALCIUM CHLORIDE 600; 310; 30; 20 MG/100ML; MG/100ML; MG/100ML; MG/100ML
INJECTION, SOLUTION INTRAVENOUS CONTINUOUS
Status: DISCONTINUED | OUTPATIENT
Start: 2020-09-03 | End: 2021-03-03

## 2020-09-03 RX ORDER — LIDOCAINE HYDROCHLORIDE 10 MG/ML
INJECTION INFILTRATION; PERINEURAL
Status: DISCONTINUED | OUTPATIENT
Start: 2020-09-03 | End: 2020-09-03 | Stop reason: HOSPADM

## 2020-09-03 RX ORDER — EPINEPHRINE 1 MG/ML
INJECTION INTRAMUSCULAR; INTRAVENOUS; SUBCUTANEOUS
Status: DISCONTINUED | OUTPATIENT
Start: 2020-09-03 | End: 2020-09-03 | Stop reason: HOSPADM

## 2020-09-03 RX ORDER — PHENYLEPHRINE HYDROCHLORIDE 25 MG/ML
1 SOLUTION/ DROPS OPHTHALMIC
Status: COMPLETED | OUTPATIENT
Start: 2020-09-03 | End: 2020-09-03

## 2020-09-03 RX ORDER — PHENYLEPHRINE HYDROCHLORIDE 100 MG/ML
1 SOLUTION/ DROPS OPHTHALMIC
Status: COMPLETED | OUTPATIENT
Start: 2020-09-03 | End: 2020-09-03

## 2020-09-03 RX ORDER — PROPARACAINE HYDROCHLORIDE 5 MG/ML
1 SOLUTION/ DROPS OPHTHALMIC
Status: COMPLETED | OUTPATIENT
Start: 2020-09-03 | End: 2020-09-03

## 2020-09-03 RX ORDER — OFLOXACIN 3 MG/ML
SOLUTION/ DROPS OPHTHALMIC
Status: DISCONTINUED | OUTPATIENT
Start: 2020-09-03 | End: 2020-09-03 | Stop reason: HOSPADM

## 2020-09-03 RX ORDER — MIDAZOLAM HYDROCHLORIDE 1 MG/ML
INJECTION, SOLUTION INTRAMUSCULAR; INTRAVENOUS
Status: DISCONTINUED | OUTPATIENT
Start: 2020-09-03 | End: 2020-09-03

## 2020-09-03 RX ADMIN — SODIUM CHLORIDE, SODIUM LACTATE, POTASSIUM CHLORIDE, AND CALCIUM CHLORIDE: .6; .31; .03; .02 INJECTION, SOLUTION INTRAVENOUS at 06:09

## 2020-09-03 RX ADMIN — PHENYLEPHRINE HYDROCHLORIDE 1 DROP: 25 SOLUTION/ DROPS OPHTHALMIC at 06:09

## 2020-09-03 RX ADMIN — PROPARACAINE HYDROCHLORIDE 1 DROP: 5 SOLUTION/ DROPS OPHTHALMIC at 06:09

## 2020-09-03 RX ADMIN — PHENYLEPHRINE HYDROCHLORIDE 1 DROP: 100 SOLUTION/ DROPS OPHTHALMIC at 06:09

## 2020-09-03 RX ADMIN — MIDAZOLAM 1 MG: 1 INJECTION INTRAMUSCULAR; INTRAVENOUS at 06:09

## 2020-09-03 RX ADMIN — ONDANSETRON 4 MG: 2 INJECTION, SOLUTION INTRAMUSCULAR; INTRAVENOUS at 06:09

## 2020-09-03 RX ADMIN — TROPICAMIDE 1 DROP: 10 SOLUTION/ DROPS OPHTHALMIC at 06:09

## 2020-09-03 RX ADMIN — FENTANYL CITRATE 50 MCG: 50 INJECTION, SOLUTION INTRAMUSCULAR; INTRAVENOUS at 06:09

## 2020-09-03 RX ADMIN — FENTANYL CITRATE 50 MCG: 50 INJECTION, SOLUTION INTRAMUSCULAR; INTRAVENOUS at 07:09

## 2020-09-03 RX ADMIN — MIDAZOLAM 1 MG: 1 INJECTION INTRAMUSCULAR; INTRAVENOUS at 07:09

## 2020-09-03 NOTE — ANESTHESIA PREPROCEDURE EVALUATION
09/03/2020  Fifi Mcnair is a 69 y.o., female.    Pre-op Assessment    I have reviewed the Patient Summary Reports.     I have reviewed the Nursing Notes.    I have reviewed the Medications.     Review of Systems  Anesthesia Hx:  No problems with previous Anesthesia    Social:  Former Smoker    Hematology/Oncology:         -- Anemia:   Cardiovascular:   Exercise tolerance: poor Hypertension, well controlled Dysrhythmias CHF LOERA ECG has been reviewed.    Pulmonary:   COPD, moderate Asthma mild Recent URI Sleep Apnea    Education provided regarding risk of obstructive sleep apnea     Renal/:   Chronic Renal Disease (stage 3), CRI    Hepatic/GI:   PUD, GERD, well controlled IBS   Musculoskeletal:   Arthritis     Neurological:   TIA, CVA, no residual symptoms Neuromuscular Disease,    Endocrine:   Diabetes, poorly controlled, type 2, using insulin Hypothyroidism Insulin pump   Psych:   Psychiatric History anxiety depression          Physical Exam  General:  Morbid Obesity    Airway/Jaw/Neck:  Airway Findings: Mouth Opening: Normal Tongue: Normal  General Airway Assessment: Adult  Oropharynx Findings:  Mallampati: II  Jaw/Neck Findings:  Neck ROM: Normal ROM     Eyes/Ears/Nose:  Eyes/Ears/Nose Findings:    Dental:  Dental Findings:   Chest/Lungs:  Chest/Lungs Findings: Normal Respiratory Rate     Heart/Vascular:  Heart Findings: Rate: Normal  Rhythm: Regular Rhythm        Mental Status:  Mental Status Findings:  Cooperative, Alert and Oriented         Anesthesia Plan  Type of Anesthesia, risks & benefits discussed:  Anesthesia Type:  general  Patient's Preference:   Intra-op Monitoring Plan: standard ASA monitors  Intra-op Monitoring Plan Comments:   Post Op Pain Control Plan: multimodal analgesia  Post Op Pain Control Plan Comments:   Induction:   IV  Beta Blocker:  Patient is not currently on a  Beta-Blocker (No further documentation required).       Informed Consent: Patient understands risks and agrees with Anesthesia plan.  Questions answered. Anesthesia consent signed with patient.  ASA Score: 3     Day of Surgery Review of History & Physical:            Ready For Surgery From Anesthesia Perspective.

## 2020-09-03 NOTE — PLAN OF CARE
VSS, all questions answered. Denies recent fever or illness. Left eye dilating well. Insulin pump suspended for procedure per Dr. Khan. Pt states ready for procedure.

## 2020-09-03 NOTE — OP NOTE
Date of surgery: 9/3/2020    Operative Report    Preoperative Diagnosis: Nuclear sclerosis, left eye    Postoperative Diagnosis: Nuclear sclerosis, left eye    Procedure: Phacoemulsification with posterior chamber intraocular lens, left eye with ORA    Surgeon: Meek Haro Jr. M.D.    Anesthesia: MAC with topical     Brief Preoperative Note:  The patient was seen in the office with cataract of the left eye.  Because of the impairment of the patient's reading, driving, ambulation, and other activities of daily living needing a good quality of vision, the patient elected to remove the cataract and place a acrylic lens implant, if possible    Procedure in detail:    Informed consent was obtained. Indications, risks and alternatives to the procedure were explained to the patient. The patient was given the opportunity to ask questions and consented to the procedure in writing. In the preoperative holding area, the patients identity and operative site were confirmed.  Topical anesthetic was administered, consisting of alternating 0.5% Proparacaine and 4% preservative-free Lidocaine Q 5 minutes times 3.  The patient was taken to the operative suite.  A time-out was performed.  The left eye was prepped with 5% Betadine and draped in sterile fashion.  A lid speculum was placed in the left eye.  The temporal clear corneal incision was developed using a LRI katiuska knife and crescent blade for a 3 plane incision.  A paracentesis was done with a 1mm katiuska blade.  Before instillation of the Viscoat, approximately 0.1 to 0.3cc of diluted preservative free intracameral lidocaine was instilled.  Viscoat was injected into the anterior chamber with a 27-gauge needle.  A 2.4mm katiuska blade was used to make a temporal clear corneal incision.  Afterwards, a cystotome was used to perform a continuous curvilinear capsulorrhexis with the assistance of utrata forceps.  Hydrodissection was performed using balanced salt  solution,injected under the anterior capsule using a andres cannula and hydrodelineation was performed using a blunt-tipped cannula.  Next, phacoemulsification performed in a divide and conquer fashion with the use of a nucleus rotator to turn the lens and protect the posterior capsule.  Cortical material was then removed using irrigation and aspiration.  Provisc was then injected into the anterior chamber and into capsular bag to inflate the bag for the placement of the lens implant.  ORA was used for intraoperative aberrometry for confirm right IOL implantation and then an Martin [id number] 22.5 D lens was injected into the capsular bag and rotated in position with the assistance of a Sinsky hook.  Irrigation and aspiration were used to remove the remaining viscoelastic. Next, Miochol was injected into the anterior chamber and the pupil was allowed to constrict in a symmetrical fashion.  Wound hydrated with BSS. A collagen shield was placed into the eye and the eye was covered with a Borden shield.  The patient tolerated the procedure well and was transferred to the recovery area in good condition.  Estimated blood loss:  none  Specimens:   none  Complications:  none  Disposition:   stable to PACU

## 2020-09-03 NOTE — BRIEF OP NOTE
Operative Note     SUMMARY     Surgery Date: 9/3/2020     Surgeon(s) and Role:     * Meek Haro Jr., MD - Primary    Pre-op Diagnosis:  Age-related nuclear cataract of left eye [H25.12]    Post-op Diagnosis:  Age-related nuclear cataract of left eye [H25.12]    Procedure(s) (LRB):  PHACOEMULSIFICATION, CATARACT (Left)    Anesthesia: Monitor Anesthesia Care    Findings/Key Components:  Same as post op diagnosis    Estimated Blood Loss: * No values recorded between 9/3/2020  7:29 AM and 9/3/2020  7:55 AM *         Specimens (From admission, onward)    None            Discharge Note      SUMMARY     Admit Date: 9/3/2020    Attending Physician: Meek Haro Jr., MD     Discharge Physician: Meek Haro Jr., MD    Discharge Date: 9/3/2020     Final Diagnosis: Age-related nuclear cataract of left eye [H25.12]    Hospital Course: The patient was admitted for outpatient surgery and tolerated the procedure well. The patient was discharged home in stable condition the same day.    Diet: Advance as tolerated    Follow-Up: Follow up with Dr. Haro, next day, as previously scheduled  Activity as tolerated.      Activity: Per Handout Instructions    Disposition: Home or self care    Patient Instructions:   Current Discharge Medication List      CONTINUE these medications which have NOT CHANGED    Details   allopurinol (ZYLOPRIM) 100 MG tablet Take 2 tablets (200 mg total) by mouth nightly.  Qty: 180 tablet, Refills: 3      amitriptyline (ELAVIL) 100 MG tablet 1 tab at night  Qty: 90 tablet, Refills: 2    Associated Diagnoses: Neuropathy of right sciatic nerve; Lymphedema of right lower extremity; Chronic left-sided thoracic back pain      ascorbic acid, vitamin C, (VITAMIN C) 500 MG tablet Take 500 mg by mouth once daily.      atenolol (TENORMIN) 25 MG tablet Take 1 tablet (25 mg total) by mouth 2 (two) times daily.  Qty: 180 tablet, Refills: 3    Associated Diagnoses: Diastolic dysfunction; CHF (congestive heart  failure), NYHA class III, chronic, diastolic      biotin 1 mg Cap Take 1 tablet by mouth once daily.       cetirizine (ZYRTEC) 10 MG tablet Take 1 tablet (10 mg total) by mouth once daily.  Qty: 1 Bottle, Refills: 5      cinnamon bark (CINNAMON ORAL) Take by mouth once daily.      clotrimazole-betamethasone 1-0.05% (LOTRISONE) cream       cyanocobalamin, vitamin B-12, (VITAMIN B-12) 5,000 mcg Subl Place 5,000 mg under the tongue once a week.      diclofenac sodium (VOLTAREN) 1 % Gel  APPLY 2 GRAMS TOPICALLY ONCE DAILY  Qty: 1 Tube, Refills: 0    Comments: Please consider 90 day supplies to promote better adherence      DYMISTA 137-50 mcg/spray Spry nassal spray as needed.       ferrous sulfate (IRON) 325 mg (65 mg iron) Tab tablet Take 325 mg by mouth daily with breakfast.      FLUoxetine 20 MG capsule Take 2 capsules (40 mg total) by mouth once daily.  Qty: 180 capsule, Refills: 3      fluticasone furoate-vilanterol (BREO) 100-25 mcg/dose diskus inhaler Inhale 1 puff into the lungs once daily.  Qty: 60 each, Refills: 3      fluticasone propionate (FLONASE) 50 mcg/actuation nasal spray 1 spray (50 mcg total) by Each Nostril route once daily.  Qty: 1 Bottle, Refills: 4      gabapentin (NEURONTIN) 600 MG tablet Take 1 tablet (600 mg total) by mouth 3 (three) times daily.  Qty: 90 tablet, Refills: 11      HUMALOG U-100 INSULIN 100 unit/mL injection inject 100 UNITS DAILY via insulin pump      hydrocortisone 2.5 % cream Apply topically nightly.  Qty: 3.5 g, Refills: 1    Associated Diagnoses: Alopecia      Lacto.acidophilus-Bif.animalis (PROBIOTIC) 5 billion cell CpSP Take 1 capsule by mouth once daily.  Qty: 30 capsule, Refills: 3    Associated Diagnoses: Diverticulitis      levothyroxine (SYNTHROID) 25 MCG tablet Take 1 tablet (25 mcg total) by mouth once daily.  Qty: 90 tablet, Refills: 3      losartan (COZAAR) 25 MG tablet Take 25 mg by mouth once daily.    Comments: .      magnesium oxide (MAG-OX) 400 mg (241.3 mg  magnesium) tablet Take 800 mg by mouth every evening.      metronidazole 1% (METROGEL) 1 % Gel Apply topically once daily. Prn sores on scalp.  Qty: 60 g, Refills: 2    Associated Diagnoses: Folliculitis      montelukast (SINGULAIR) 10 mg tablet Take 1 tablet (10 mg total) by mouth every evening.  Qty: 30 tablet, Refills: 8      MULTIVIT-IRON-MIN-FOLIC ACID 3,500-18-0.4 UNIT-MG-MG ORAL CHEW Take by mouth 2 (two) times daily.      mupirocin (BACTROBAN) 2 % ointment APPLY OINTMENT TOPICALLY TO AFFECTED AREA ON NOSE THREE TIMES DAILY AS DIRECTED  Qty: 15 g, Refills: 11    Comments: Please consider 90 day supplies to promote better adherence      oxyCODONE (ROXICODONE) 15 MG Tab Take 1 tablet (15 mg total) by mouth every 6 (six) hours as needed for Pain.  Qty: 30 tablet, Refills: 0      pantoprazole (PROTONIX) 40 MG tablet TAKE 1 TABLET BY MOUTH ONCE DAILY  Qty: 90 tablet, Refills: 3      potassium chloride SA (K-DUR,KLOR-CON) 20 MEQ tablet TAKE 1 TABLET BY MOUTH TWICE DAILY  Qty: 180 tablet, Refills: 3    Associated Diagnoses: Hypokalemia      prednisoLONE acetate (PRED FORTE) 1 % DrpS Place 1 drop into the right eye 4 (four) times daily.  Qty: 5 mL, Refills: 3    Associated Diagnoses: Age-related nuclear cataract of right eye      torsemide (DEMADEX) 20 MG Tab TAKE ONE TABLET BY MOUTH TWICE DAILY  Qty: 60 tablet, Refills: 2    Comments: This prescription was filled on 3/3/2020. Any refills authorized will be placed on file.  Associated Diagnoses: Edema of extremities      TURMERIC ORAL Take by mouth once daily.      alendronate (FOSAMAX) 35 MG tablet Take 1 tablet (35 mg total) by mouth every 7 days.  Qty: 4 tablet, Refills: 2    Associated Diagnoses: Osteopenia of multiple sites; Renal insufficiency      calcitRIOL (ROCALTROL) 0.25 MCG Cap TAKE ONE TABLET BY MOUTH TWICE A WEEK ON monday AND friday  Qty: 8 capsule, Refills: 3      diclofenac (VOLTAREN) 0.1 % ophthalmic solution Place 1 drop into the right eye 4 (four)  times daily. Start drops 3 days before surgery  Qty: 5 mL, Refills: 3    Associated Diagnoses: Age-related nuclear cataract of right eye      diphenoxylate-atropine 2.5-0.025 mg (LOMOTIL) 2.5-0.025 mg per tablet TAKE ONE TABLET BY MOUTH 4 TIMES DAILY AS NEEDED FOR DIARRHEA  Qty: 60 tablet, Refills: 1    Comments: Need office visit before next refill, last seen 10/2016. This will be the LAST Rx if visit is not made.  Associated Diagnoses: Irritable bowel syndrome with diarrhea      fish oil-omega-3 fatty acids 300-1,000 mg capsule Take 1 capsule by mouth once daily.      insulin (BASAGLAR KWIKPEN U-100 INSULIN) glargine 100 units/mL (3mL) SubQ pen Inject 20 Units into the skin every evening. Use 10 units if BS over 210.Night before surgery.  Refills: 0      L-LYSINE ORAL Take by mouth.      l-methylfolate-b2-b6-b12 (CEREFOLIN) 6-5-50-1 mg Tab Take 1 tablet by mouth once daily.      SSD 1 % cream       tobramycin sulfate 0.3% (TOBREX) 0.3 % ophthalmic ointment Place 0.5 inches into the right eye 4 (four) times daily. Place a 1/2 inch ribbon of ointment into the lower eyelid. for 10 days  Qty: 3.5 g, Refills: 1    Associated Diagnoses: Status post cataract extraction and insertion of intraocular lens of right eye             Discharge Procedure Orders (must include Diet, Follow-up, Activity)   Discharge Procedure Orders (must include Diet, Follow-up, Activity)   Diet general     Lifting restrictions     Call MD for:  persistent nausea and vomiting     Call MD for:  severe uncontrolled pain     Leave dressing on - Keep it clean, dry, and intact until clinic visit     Activity as tolerated

## 2020-09-03 NOTE — ANESTHESIA POSTPROCEDURE EVALUATION
Anesthesia Post Evaluation    Patient: Fifi Mcnair    Procedure(s) Performed: Procedure(s) (LRB):  PHACOEMULSIFICATION, CATARACT WITH O.R.A (Left)    Final Anesthesia Type: MAC    Patient location during evaluation: GI PACU  Patient participation: Yes- Able to Participate  Level of consciousness: awake and alert and oriented  Post-procedure vital signs: reviewed and stable  Airway patency: patent    PONV status at discharge: No PONV  Anesthetic complications: no      Cardiovascular status: blood pressure returned to baseline  Respiratory status: unassisted, room air and spontaneous ventilation  Hydration status: euvolemic  Follow-up not needed.          Vitals Value Taken Time   /65 09/03/20 0813   Temp 36.5 °C (97.7 °F) 09/03/20 0755   Pulse 72 09/03/20 0813   Resp 13 09/03/20 0813   SpO2 95 % 09/03/20 0813         Event Time   Out of Recovery 08:14:20         Pain/Mohamud Score: Mohamud Score: 10 (9/3/2020  7:55 AM)

## 2020-09-04 ENCOUNTER — LAB VISIT (OUTPATIENT)
Dept: LAB | Facility: HOSPITAL | Age: 70
End: 2020-09-04
Attending: INTERNAL MEDICINE
Payer: MEDICARE

## 2020-09-04 ENCOUNTER — OFFICE VISIT (OUTPATIENT)
Dept: OPHTHALMOLOGY | Facility: CLINIC | Age: 70
End: 2020-09-04
Payer: MEDICARE

## 2020-09-04 DIAGNOSIS — Z98.42 STATUS POST CATARACT EXTRACTION AND INSERTION OF INTRAOCULAR LENS OF LEFT EYE: Primary | ICD-10-CM

## 2020-09-04 DIAGNOSIS — Z96.1 STATUS POST CATARACT EXTRACTION AND INSERTION OF INTRAOCULAR LENS OF LEFT EYE: Primary | ICD-10-CM

## 2020-09-04 DIAGNOSIS — N18.6 ANEMIA DUE TO END STAGE RENAL DISEASE: ICD-10-CM

## 2020-09-04 DIAGNOSIS — D63.1 ANEMIA DUE TO END STAGE RENAL DISEASE: ICD-10-CM

## 2020-09-04 DIAGNOSIS — N18.30 CHRONIC KIDNEY DISEASE, STAGE III (MODERATE): ICD-10-CM

## 2020-09-04 LAB
ALBUMIN SERPL BCP-MCNC: 3.4 G/DL (ref 3.5–5.2)
ALP SERPL-CCNC: 120 U/L (ref 55–135)
ALT SERPL W/O P-5'-P-CCNC: 30 U/L (ref 10–44)
ANION GAP SERPL CALC-SCNC: 11 MMOL/L (ref 8–16)
AST SERPL-CCNC: 23 U/L (ref 10–40)
BASOPHILS # BLD AUTO: 0.04 K/UL (ref 0–0.2)
BASOPHILS NFR BLD: 0.5 % (ref 0–1.9)
BILIRUB SERPL-MCNC: 0.2 MG/DL (ref 0.1–1)
BUN SERPL-MCNC: 23 MG/DL (ref 8–23)
CALCIUM SERPL-MCNC: 8.9 MG/DL (ref 8.7–10.5)
CHLORIDE SERPL-SCNC: 103 MMOL/L (ref 95–110)
CO2 SERPL-SCNC: 27 MMOL/L (ref 23–29)
CREAT SERPL-MCNC: 1.5 MG/DL (ref 0.5–1.4)
DIFFERENTIAL METHOD: ABNORMAL
EOSINOPHIL # BLD AUTO: 0.2 K/UL (ref 0–0.5)
EOSINOPHIL NFR BLD: 2 % (ref 0–8)
ERYTHROCYTE [DISTWIDTH] IN BLOOD BY AUTOMATED COUNT: 14.3 % (ref 11.5–14.5)
EST. GFR  (AFRICAN AMERICAN): 41 ML/MIN/1.73 M^2
EST. GFR  (NON AFRICAN AMERICAN): 35 ML/MIN/1.73 M^2
GLUCOSE SERPL-MCNC: 135 MG/DL (ref 70–110)
HCT VFR BLD AUTO: 38 % (ref 37–48.5)
HGB BLD-MCNC: 12 G/DL (ref 12–16)
IMM GRANULOCYTES # BLD AUTO: 0.05 K/UL (ref 0–0.04)
IMM GRANULOCYTES NFR BLD AUTO: 0.6 % (ref 0–0.5)
LDH SERPL L TO P-CCNC: 182 U/L (ref 110–260)
LYMPHOCYTES # BLD AUTO: 2.3 K/UL (ref 1–4.8)
LYMPHOCYTES NFR BLD: 26.1 % (ref 18–48)
MCH RBC QN AUTO: 30.8 PG (ref 27–31)
MCHC RBC AUTO-ENTMCNC: 31.6 G/DL (ref 32–36)
MCV RBC AUTO: 97 FL (ref 82–98)
MONOCYTES # BLD AUTO: 0.6 K/UL (ref 0.3–1)
MONOCYTES NFR BLD: 6.2 % (ref 4–15)
NEUTROPHILS # BLD AUTO: 5.7 K/UL (ref 1.8–7.7)
NEUTROPHILS NFR BLD: 64.6 % (ref 38–73)
NRBC BLD-RTO: 0 /100 WBC
PLATELET # BLD AUTO: 223 K/UL (ref 150–350)
PMV BLD AUTO: 9.4 FL (ref 9.2–12.9)
POTASSIUM SERPL-SCNC: 4.6 MMOL/L (ref 3.5–5.1)
PROT SERPL-MCNC: 6.8 G/DL (ref 6–8.4)
RBC # BLD AUTO: 3.9 M/UL (ref 4–5.4)
SODIUM SERPL-SCNC: 141 MMOL/L (ref 136–145)
WBC # BLD AUTO: 8.86 K/UL (ref 3.9–12.7)

## 2020-09-04 PROCEDURE — 99999 PR PBB SHADOW E&M-EST. PATIENT-LVL IV: ICD-10-PCS | Mod: PBBFAC,,, | Performed by: OPHTHALMOLOGY

## 2020-09-04 PROCEDURE — 83540 ASSAY OF IRON: CPT

## 2020-09-04 PROCEDURE — 99024 PR POST-OP FOLLOW-UP VISIT: ICD-10-PCS | Mod: S$GLB,,, | Performed by: OPHTHALMOLOGY

## 2020-09-04 PROCEDURE — 99024 POSTOP FOLLOW-UP VISIT: CPT | Mod: S$GLB,,, | Performed by: OPHTHALMOLOGY

## 2020-09-04 PROCEDURE — 99999 PR PBB SHADOW E&M-EST. PATIENT-LVL IV: CPT | Mod: PBBFAC,,, | Performed by: OPHTHALMOLOGY

## 2020-09-04 PROCEDURE — 80053 COMPREHEN METABOLIC PANEL: CPT

## 2020-09-04 PROCEDURE — 82784 ASSAY IGA/IGD/IGG/IGM EACH: CPT

## 2020-09-04 PROCEDURE — 85025 COMPLETE CBC W/AUTO DIFF WBC: CPT

## 2020-09-04 PROCEDURE — 83520 IMMUNOASSAY QUANT NOS NONAB: CPT | Mod: 59

## 2020-09-04 PROCEDURE — 83615 LACTATE (LD) (LDH) ENZYME: CPT

## 2020-09-04 NOTE — PROGRESS NOTES
HPI     1 day s/p phaco IOL OS (distance) done on 9/3/2020 pt states no   complaints. Denies pain/ FOL/ floaters. Feels a little pressure. Concerned   with near distance OD also. Started diclo three days prior to sx as   directed. Instilled 1 gtt diclo, pred, moxi OS in office today.    Last edited by Kaylynn Shah on 9/4/2020  9:02 AM. (History)        ROS     Negative for: Constitutional, Gastrointestinal, Neurological, Skin,   Genitourinary, Musculoskeletal, HENT, Endocrine, Cardiovascular, Eyes,   Respiratory, Psychiatric, Allergic/Imm, Heme/Lymph    Last edited by Meek Haro Jr., MD on 9/4/2020  9:15 AM. (History)        Assessment /Plan     For exam results, see Encounter Report.    Status post cataract extraction and insertion of intraocular lens of left eye         Prednisolone acetate (pink or white top drop) 1 drop 4x daily left eye; shake well before using drop  Vigamox 1 drop 4x daily left eye (tan top)  Diclofenac 1 drop 4x daily left eye (gray top)  No bending no lifting  Keep head elevated when laying down  Keep dry   F/u 1 week    -  Warm compresses to eyelids along with eyelid massages at least twice daily OU  -  Preservative free AT's 4-6x daliy OU; counseled patient on different brands that are available at pharmacies  -  Avoid Visine and Clear Eyes if they are not the preservative free brand  -  Discussed Gels and PM Ointment options  -  Counseled on external factors that can worsen symptom such as air vents and ceiling fans if they are blowing directly on patient for extended amounts of time  -  Counseled on taking more breaks when using the computer and reading  -counseled patient on dry eyes Started on Pres free refresh 4-6x daily with night time ointment  -Add tobrex ointment at bedtime OS  Follow up in about 1 week (around 9/11/2020) for POW #1 visit cataract surgery.

## 2020-09-05 LAB
IGA SERPL-MCNC: 446 MG/DL (ref 40–350)
IGA SERPL-MCNC: 446 MG/DL (ref 40–350)
IGG SERPL-MCNC: 894 MG/DL (ref 650–1600)
IGM SERPL-MCNC: 88 MG/DL (ref 50–300)
IRON SERPL-MCNC: 43 UG/DL (ref 30–160)
SATURATED IRON: 14 % (ref 20–50)
TOTAL IRON BINDING CAPACITY: 318 UG/DL (ref 250–450)
TRANSFERRIN SERPL-MCNC: 215 MG/DL (ref 200–375)

## 2020-09-08 ENCOUNTER — OFFICE VISIT (OUTPATIENT)
Dept: HEMATOLOGY/ONCOLOGY | Facility: CLINIC | Age: 70
End: 2020-09-08
Payer: MEDICARE

## 2020-09-08 VITALS
DIASTOLIC BLOOD PRESSURE: 61 MMHG | HEART RATE: 71 BPM | SYSTOLIC BLOOD PRESSURE: 132 MMHG | WEIGHT: 261 LBS | RESPIRATION RATE: 18 BRPM | BODY MASS INDEX: 47.74 KG/M2 | TEMPERATURE: 97 F | OXYGEN SATURATION: 98 %

## 2020-09-08 DIAGNOSIS — Z52.008 BLOOD DONOR: ICD-10-CM

## 2020-09-08 DIAGNOSIS — N18.30 CHRONIC KIDNEY DISEASE, STAGE III (MODERATE): ICD-10-CM

## 2020-09-08 DIAGNOSIS — H53.9 VISUAL CHANGES: Primary | ICD-10-CM

## 2020-09-08 DIAGNOSIS — E88.09 HYPOALBUMINEMIA: ICD-10-CM

## 2020-09-08 DIAGNOSIS — E03.9 ACQUIRED HYPOTHYROIDISM: ICD-10-CM

## 2020-09-08 DIAGNOSIS — D47.2 MONOCLONAL GAMMOPATHY ASSOCIATED WITH LYMPHOPLASMACYTIC DYSCRASIAS: ICD-10-CM

## 2020-09-08 DIAGNOSIS — D63.1 ANEMIA DUE TO END STAGE RENAL DISEASE: ICD-10-CM

## 2020-09-08 DIAGNOSIS — N18.6 ANEMIA DUE TO END STAGE RENAL DISEASE: ICD-10-CM

## 2020-09-08 DIAGNOSIS — R79.9 ABNORMAL FINDING OF BLOOD CHEMISTRY, UNSPECIFIED: ICD-10-CM

## 2020-09-08 LAB
KAPPA LC SER QL IA: 3.14 MG/DL (ref 0.33–1.94)
KAPPA LC/LAMBDA SER IA: 1.2 (ref 0.26–1.65)
LAMBDA LC SER QL IA: 2.61 MG/DL (ref 0.57–2.63)

## 2020-09-08 PROCEDURE — 99214 OFFICE O/P EST MOD 30 MIN: CPT | Mod: S$GLB,,, | Performed by: INTERNAL MEDICINE

## 2020-09-08 PROCEDURE — 1126F AMNT PAIN NOTED NONE PRSNT: CPT | Mod: S$GLB,,, | Performed by: INTERNAL MEDICINE

## 2020-09-08 PROCEDURE — 99999 PR PBB SHADOW E&M-EST. PATIENT-LVL V: CPT | Mod: PBBFAC,,, | Performed by: INTERNAL MEDICINE

## 2020-09-08 PROCEDURE — 99214 PR OFFICE/OUTPT VISIT, EST, LEVL IV, 30-39 MIN: ICD-10-PCS | Mod: S$GLB,,, | Performed by: INTERNAL MEDICINE

## 2020-09-08 PROCEDURE — 3008F BODY MASS INDEX DOCD: CPT | Mod: CPTII,S$GLB,, | Performed by: INTERNAL MEDICINE

## 2020-09-08 PROCEDURE — 1101F PR PT FALLS ASSESS DOC 0-1 FALLS W/OUT INJ PAST YR: ICD-10-PCS | Mod: CPTII,S$GLB,, | Performed by: INTERNAL MEDICINE

## 2020-09-08 PROCEDURE — 1126F PR PAIN SEVERITY QUANTIFIED, NO PAIN PRESENT: ICD-10-PCS | Mod: S$GLB,,, | Performed by: INTERNAL MEDICINE

## 2020-09-08 PROCEDURE — 3008F PR BODY MASS INDEX (BMI) DOCUMENTED: ICD-10-PCS | Mod: CPTII,S$GLB,, | Performed by: INTERNAL MEDICINE

## 2020-09-08 PROCEDURE — 3288F PR FALLS RISK ASSESSMENT DOCUMENTED: ICD-10-PCS | Mod: CPTII,S$GLB,, | Performed by: INTERNAL MEDICINE

## 2020-09-08 PROCEDURE — 3288F FALL RISK ASSESSMENT DOCD: CPT | Mod: CPTII,S$GLB,, | Performed by: INTERNAL MEDICINE

## 2020-09-08 PROCEDURE — 99499 UNLISTED E&M SERVICE: CPT | Mod: S$GLB,,, | Performed by: INTERNAL MEDICINE

## 2020-09-08 PROCEDURE — 99999 PR PBB SHADOW E&M-EST. PATIENT-LVL V: ICD-10-PCS | Mod: PBBFAC,,, | Performed by: INTERNAL MEDICINE

## 2020-09-08 PROCEDURE — 1159F MED LIST DOCD IN RCRD: CPT | Mod: S$GLB,,, | Performed by: INTERNAL MEDICINE

## 2020-09-08 PROCEDURE — 1159F PR MEDICATION LIST DOCUMENTED IN MEDICAL RECORD: ICD-10-PCS | Mod: S$GLB,,, | Performed by: INTERNAL MEDICINE

## 2020-09-08 PROCEDURE — 1101F PT FALLS ASSESS-DOCD LE1/YR: CPT | Mod: CPTII,S$GLB,, | Performed by: INTERNAL MEDICINE

## 2020-09-08 PROCEDURE — 99499 RISK ADDL DX/OHS AUDIT: ICD-10-PCS | Mod: S$GLB,,, | Performed by: INTERNAL MEDICINE

## 2020-09-08 NOTE — PROGRESS NOTES
Subjective:       Patient ID: Fifi Mcnair is a 69 y.o. female.    Chief Complaint: I gave blood     Follow-up  Associated symptoms include arthralgias and fatigue. Pertinent negatives include no abdominal pain, chest pain, coughing, fever, headaches, nausea, neck pain, numbness, rash, sore throat, vomiting or weakness.     This is a 69 yr old female tolerating prolia for osteoporosis.  She started prolia for osteoporosis and now she has osteopenia. Pt has had a gastric sleeve in the past. She remains on B12 and iron supplement  She is here to follow up on labs . She is iron deficient and at times anemic requiring IV iron    Not on prolia started K 2 She is tolerating Synthroid for thyroid disorder and neurontin for neuropathy.  Past Labs revealed an elevated kappa light chain with No further elevation of the ratio, IGA  has been stable   Bone marrow showed no evidence of a plasma cell dyscrasia in 2016   Pt with history of sleeve gastrectomy contributing to malabsorption of certain vitamins  IgA remains elevated  as with the elevated kappa levels    She is tolerating allopurinol for gout prevention  She is tolerating Prozac for depression  She is having visual changes            Current Outpatient Medications:     alendronate (FOSAMAX) 35 MG tablet, Take 1 tablet (35 mg total) by mouth every 7 days., Disp: 4 tablet, Rfl: 2    allopurinol (ZYLOPRIM) 100 MG tablet, Take 2 tablets (200 mg total) by mouth nightly., Disp: 180 tablet, Rfl: 3    amitriptyline (ELAVIL) 100 MG tablet, 1 tab at night, Disp: 90 tablet, Rfl: 2    ascorbic acid, vitamin C, (VITAMIN C) 500 MG tablet, Take 500 mg by mouth once daily., Disp: , Rfl:     atenolol (TENORMIN) 25 MG tablet, Take 1 tablet (25 mg total) by mouth 2 (two) times daily., Disp: 180 tablet, Rfl: 3    biotin 1 mg Cap, Take 1 tablet by mouth once daily. , Disp: , Rfl:     calcitRIOL (ROCALTROL) 0.25 MCG Cap, TAKE ONE TABLET BY MOUTH TWICE A WEEK ON monday AND friday,  Disp: 8 capsule, Rfl: 3    cetirizine (ZYRTEC) 10 MG tablet, Take 1 tablet (10 mg total) by mouth once daily., Disp: 1 Bottle, Rfl: 5    cinnamon bark (CINNAMON ORAL), Take by mouth once daily., Disp: , Rfl:     clotrimazole-betamethasone 1-0.05% (LOTRISONE) cream, , Disp: , Rfl:     cyanocobalamin, vitamin B-12, (VITAMIN B-12) 5,000 mcg Subl, Place 5,000 mg under the tongue once a week., Disp: , Rfl:     diclofenac (VOLTAREN) 0.1 % ophthalmic solution, Place 1 drop into the right eye 4 (four) times daily. Start drops 3 days before surgery, Disp: 5 mL, Rfl: 3    diclofenac sodium (VOLTAREN) 1 % Gel,  APPLY 2 GRAMS TOPICALLY ONCE DAILY, Disp: 1 Tube, Rfl: 0    diphenoxylate-atropine 2.5-0.025 mg (LOMOTIL) 2.5-0.025 mg per tablet, TAKE ONE TABLET BY MOUTH 4 TIMES DAILY AS NEEDED FOR DIARRHEA, Disp: 60 tablet, Rfl: 1    DYMISTA 137-50 mcg/spray Spry nassal spray, as needed. , Disp: , Rfl:     ferrous sulfate (IRON) 325 mg (65 mg iron) Tab tablet, Take 325 mg by mouth daily with breakfast., Disp: , Rfl:     fish oil-omega-3 fatty acids 300-1,000 mg capsule, Take 1 capsule by mouth once daily., Disp: , Rfl:     FLUoxetine 20 MG capsule, Take 2 capsules (40 mg total) by mouth once daily., Disp: 180 capsule, Rfl: 3    fluticasone furoate-vilanterol (BREO) 100-25 mcg/dose diskus inhaler, Inhale 1 puff into the lungs once daily., Disp: 60 each, Rfl: 3    fluticasone propionate (FLONASE) 50 mcg/actuation nasal spray, 1 spray (50 mcg total) by Each Nostril route once daily., Disp: 1 Bottle, Rfl: 4    gabapentin (NEURONTIN) 600 MG tablet, Take 1 tablet (600 mg total) by mouth 3 (three) times daily., Disp: 90 tablet, Rfl: 11    HUMALOG U-100 INSULIN 100 unit/mL injection, inject 100 UNITS DAILY via insulin pump, Disp: , Rfl:     hydrocortisone 2.5 % cream, Apply topically nightly., Disp: 3.5 g, Rfl: 1    insulin (BASAGLAR KWIKPEN U-100 INSULIN) glargine 100 units/mL (3mL) SubQ pen, Inject 20 Units into the skin  every evening. Use 10 units if BS over 210.Night before surgery. (Patient not taking: Reported on 6/30/2020), Disp: , Rfl: 0    L-LYSINE ORAL, Take by mouth., Disp: , Rfl:     l-methylfolate-b2-b6-b12 (CEREFOLIN) 6-5-50-1 mg Tab, Take 1 tablet by mouth once daily., Disp: , Rfl:     Lacto.acidophilus-Bif.animalis (PROBIOTIC) 5 billion cell CpSP, Take 1 capsule by mouth once daily., Disp: 30 capsule, Rfl: 3    levothyroxine (SYNTHROID) 25 MCG tablet, Take 1 tablet (25 mcg total) by mouth once daily., Disp: 90 tablet, Rfl: 3    losartan (COZAAR) 25 MG tablet, Take 25 mg by mouth once daily., Disp: , Rfl:     magnesium oxide (MAG-OX) 400 mg (241.3 mg magnesium) tablet, Take 800 mg by mouth every evening., Disp: , Rfl:     metronidazole 1% (METROGEL) 1 % Gel, Apply topically once daily. Prn sores on scalp., Disp: 60 g, Rfl: 2    montelukast (SINGULAIR) 10 mg tablet, Take 1 tablet (10 mg total) by mouth every evening., Disp: 30 tablet, Rfl: 8    MULTIVIT-IRON-MIN-FOLIC ACID 3,500-18-0.4 UNIT-MG-MG ORAL CHEW, Take by mouth 2 (two) times daily., Disp: , Rfl:     mupirocin (BACTROBAN) 2 % ointment, APPLY OINTMENT TOPICALLY TO AFFECTED AREA ON NOSE THREE TIMES DAILY AS DIRECTED, Disp: 15 g, Rfl: 11    oxyCODONE (ROXICODONE) 15 MG Tab, Take 1 tablet (15 mg total) by mouth every 6 (six) hours as needed for Pain., Disp: 30 tablet, Rfl: 0    pantoprazole (PROTONIX) 40 MG tablet, TAKE 1 TABLET BY MOUTH ONCE DAILY, Disp: 90 tablet, Rfl: 3    potassium chloride SA (K-DUR,KLOR-CON) 20 MEQ tablet, TAKE 1 TABLET BY MOUTH TWICE DAILY, Disp: 180 tablet, Rfl: 3    prednisoLONE acetate (PRED FORTE) 1 % DrpS, Place 1 drop into the right eye 4 (four) times daily., Disp: 5 mL, Rfl: 3    SSD 1 % cream, , Disp: , Rfl:     torsemide (DEMADEX) 20 MG Tab, TAKE ONE TABLET BY MOUTH TWICE DAILY, Disp: 60 tablet, Rfl: 2    TURMERIC ORAL, Take by mouth once daily., Disp: , Rfl:     Current Facility-Administered Medications:      gentamicin injection 80 mg, 80 mg, Intramuscular, 1 time in Clinic/HOD, Angy Campos MD    Facility-Administered Medications Ordered in Other Visits:     lactated ringers infusion, , Intravenous, Continuous, Shaheen Hanson MD, Last Rate: 10 mL/hr at 08/31/18 0739    lactated ringers infusion, , Intravenous, Continuous, John Villanueva MD, Last Rate: 10 mL/hr at 09/03/20 0640    lidocaine (PF) 10 mg/ml (1%) injection 10 mg, 1 mL, Intradermal, Once, John Villanueva MD    No prolia at this time   All medications and past history have been reviewed.    Review of Systems   Constitutional: Positive for fatigue. Negative for fever and unexpected weight change.   HENT: Negative.  Negative for ear discharge, mouth sores, rhinorrhea and sore throat.    Eyes: Negative.  Negative for photophobia and itching.   Respiratory: Negative for cough and shortness of breath.    Cardiovascular: Negative.  Negative for chest pain and leg swelling.   Gastrointestinal: Negative.  Negative for abdominal pain, constipation, diarrhea, nausea and vomiting.   Endocrine: Negative.  Negative for cold intolerance and heat intolerance.   Genitourinary: Negative for difficulty urinating, dysuria, frequency, hematuria, pelvic pain and urgency.   Musculoskeletal: Positive for arthralgias. Negative for back pain and neck pain.   Skin: Negative for pallor and rash.   Allergic/Immunologic: Negative.    Neurological: Negative for weakness, numbness and headaches.   Hematological: Negative for adenopathy. Does not bruise/bleed easily.   Psychiatric/Behavioral: Negative for agitation and confusion.   All other systems reviewed and are negative.       Pertinent positives are inew left hip pain   Depression screening negative on medication     Objective:        /61 (BP Location: Right arm, Patient Position: Sitting, BP Method: Large (Automatic))   Pulse 71   Temp 97.3 °F (36.3 °C) (Temporal)   Resp 18   Wt 118.4 kg (261 lb 0.4  oz)   LMP  (LMP Unknown)   SpO2 98%   BMI 47.74 kg/m²       Physical Exam  Vitals signs and nursing note reviewed.   Constitutional:       Appearance: She is well-developed.   HENT:      Head: Normocephalic.      Mouth/Throat:      Pharynx: No oropharyngeal exudate.   Eyes:      General: No scleral icterus.     Conjunctiva/sclera: Conjunctivae normal.   Neck:      Musculoskeletal: Normal range of motion.      Vascular: No JVD.      Trachea: No tracheal deviation.   Cardiovascular:      Rate and Rhythm: Normal rate and regular rhythm.      Heart sounds: No murmur (2= capillary refill).   Pulmonary:      Effort: Pulmonary effort is normal. No respiratory distress.      Breath sounds: No wheezing.   Abdominal:      General: There is no distension.      Palpations: Abdomen is soft.      Tenderness: Tenderness: midepigastric.   Musculoskeletal: Normal range of motion.   Skin:     General: Skin is dry.      Findings: No erythema or rash.   Neurological:      Mental Status: She is alert and oriented to person, place, and time.      Cranial Nerves: No cranial nerve deficit.      Gait: Gait abnormal.      Comments: Steady gait        Bone marrow with no evidence of plasma cell dyscrasia, no evidence of myelodysplasia.    I reviewed the percentage of cells noted in the marrow along with flow cytometry and cytogenetics  Skeletal survey negative for any lytic or ostial sclerotic disease  All labs and imaging have been reviewed.   Lab Results   Component Value Date    WBC 8.86 09/04/2020    HGB 12.0 09/04/2020    HCT 38.0 09/04/2020    MCV 97 09/04/2020     09/04/2020     IGA elevated yet levels are decreasing     CMP  Sodium   Date Value Ref Range Status   09/04/2020 141 136 - 145 mmol/L Final     Potassium   Date Value Ref Range Status   09/04/2020 4.6 3.5 - 5.1 mmol/L Final     Chloride   Date Value Ref Range Status   09/04/2020 103 95 - 110 mmol/L Final     CO2   Date Value Ref Range Status   09/04/2020 27 23 - 29  mmol/L Final     Glucose   Date Value Ref Range Status   09/04/2020 135 (H) 70 - 110 mg/dL Final     BUN, Bld   Date Value Ref Range Status   09/04/2020 23 8 - 23 mg/dL Final     Creatinine   Date Value Ref Range Status   09/04/2020 1.5 (H) 0.5 - 1.4 mg/dL Final   08/31/2013 1.1 0.5 - 1.4 mg/dL Final     Calcium   Date Value Ref Range Status   09/04/2020 8.9 8.7 - 10.5 mg/dL Final   08/31/2013 9.5 8.7 - 10.5 mg/dL Final     Total Protein   Date Value Ref Range Status   09/04/2020 6.8 6.0 - 8.4 g/dL Final     Albumin   Date Value Ref Range Status   09/04/2020 3.4 (L) 3.5 - 5.2 g/dL Final     Total Bilirubin   Date Value Ref Range Status   09/04/2020 0.2 0.1 - 1.0 mg/dL Final     Comment:     For infants and newborns, interpretation of results should be based  on gestational age, weight and in agreement with clinical  observations.  Premature Infant recommended reference ranges:  Up to 24 hours.............<8.0 mg/dL  Up to 48 hours............<12.0 mg/dL  3-5 days..................<15.0 mg/dL  6-29 days.................<15.0 mg/dL       Alkaline Phosphatase   Date Value Ref Range Status   09/04/2020 120 55 - 135 U/L Final   08/30/2013 93 55 - 135 U/L Final     AST   Date Value Ref Range Status   09/04/2020 23 10 - 40 U/L Final   08/30/2013 21 10 - 40 U/L Final     ALT   Date Value Ref Range Status   09/04/2020 30 10 - 44 U/L Final     Anion Gap   Date Value Ref Range Status   09/04/2020 11 8 - 16 mmol/L Final   08/31/2013 12 5 - 15 meq/L Final     eGFR if    Date Value Ref Range Status   09/04/2020 41 (A) >60 mL/min/1.73 m^2 Final     eGFR if non    Date Value Ref Range Status   09/04/2020 35 (A) >60 mL/min/1.73 m^2 Final     Comment:     Calculation used to obtain the estimated glomerular filtration  rate (eGFR) is the CKD-EPI equation.        Leukemia/Lymphoma Screen - Bone Marrow Right Posterior Iliac Crest   Order: [id number]     Status:  Final result   Visible to patient:  Yes  (Patient Portal) Next appt:  02/11/2019 at 02:45 PM in Orthopedics (Larry Molina MD) Dx:  Anemia, unspecified type; Elevated se...   Component 2yr ago   Clinical Diagnosis - Bone Marrow ANM    Body Site - Bone Marrow RPI    Bone Marrow Interpretation No abnormal hematopoietic population is detected in this sample. Correlate with marrow morphology and other ancillary studies.      Comment: Interpreted by: Katarina Hicks MD, Signed on 10/10/2016 at 12:50   Bone Marrow Antibodies Analyzed All analyzed: CD2, CD3, CD4, CD5, CD7, CD8, CD10, CD13, CD19, CD20, CD34, , KAPPA, LAMBDA,CD45 and 7AAD.      Bone Marrow Comment Flow cytometric analysis of  bone marrow shows populations of polyclonal B lymphocytes with a subset of hematogones detected, and T lymphocytes that are immunophenotypically unremarkable.  The blast gate is not increased. Flow differential:  Lymphocytes   7.5%, Monocytes 2.9%, Granulocytes  74.9%, Blast  2.4%, Debris/nRBC 12.2%,  Viability 93.2%.                 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  -     CBC auto differential; Standing  -     CMP; Future; Expected date: 09/08/2020  -     Iron and TIBC; Future; Expected date: 09/08/2020  -     IgA; Future; Expected date: 09/08/2020    Chronic kidney disease, stage III (moderate)    Anemia due to end stage renal disease    Acquired hypothyroidism    Abnormal finding of blood chemistry, unspecified   -     Iron and TIBC; Future; Expected date: 09/08/2020    Hypoalbuminemia    Blood donor          Plan:     Anemia resolved after she has no longer been donating her blood   elev glucose with DM to endocrinology   CKD  Stable   elev IGA   Yet overall levels are decreasing   [place] elevated  DM monitored by Dr Cantu  Pt no longer meets criteria for prolia due to a change in her density from osteoporosis to osteopenia  She believes she is in need of a knee replacement : cont calcium  and vitamin D3  osteopenia and she started fosamax orally   Cont K 2 for bone health   History of broken bones    Cont calcium and vitamin D  Cont prozac for depression  Explained MGUS and the transition to myeloma is rare and less than 2% I also explained this could be a component of her low GFR  This is not  myeloma   If IGA starts increasing she may need a renal biopsy to evaluate further for glomerular disease  RTC3 months for evaluation with labs to monitor for transition to myeloma   SHe understands she should not give blood as she has iron malabsorption for gastric bypass    The plan was discussed with the patient and all questions/concerns have been answered to the patient's satisfaction.

## 2020-09-09 ENCOUNTER — OFFICE VISIT (OUTPATIENT)
Dept: OPHTHALMOLOGY | Facility: CLINIC | Age: 70
End: 2020-09-09
Payer: MEDICARE

## 2020-09-09 DIAGNOSIS — Z96.1 STATUS POST CATARACT EXTRACTION AND INSERTION OF INTRAOCULAR LENS OF RIGHT EYE: Primary | ICD-10-CM

## 2020-09-09 DIAGNOSIS — Z98.41 STATUS POST CATARACT EXTRACTION AND INSERTION OF INTRAOCULAR LENS OF RIGHT EYE: Primary | ICD-10-CM

## 2020-09-09 DIAGNOSIS — Z96.1 STATUS POST CATARACT EXTRACTION AND INSERTION OF INTRAOCULAR LENS OF LEFT EYE: ICD-10-CM

## 2020-09-09 DIAGNOSIS — Z98.42 STATUS POST CATARACT EXTRACTION AND INSERTION OF INTRAOCULAR LENS OF LEFT EYE: ICD-10-CM

## 2020-09-09 PROCEDURE — 99024 POSTOP FOLLOW-UP VISIT: CPT | Mod: S$GLB,,, | Performed by: OPHTHALMOLOGY

## 2020-09-09 PROCEDURE — 99024 PR POST-OP FOLLOW-UP VISIT: ICD-10-PCS | Mod: S$GLB,,, | Performed by: OPHTHALMOLOGY

## 2020-09-09 PROCEDURE — 99999 PR PBB SHADOW E&M-EST. PATIENT-LVL IV: ICD-10-PCS | Mod: PBBFAC,,, | Performed by: OPHTHALMOLOGY

## 2020-09-09 PROCEDURE — 99999 PR PBB SHADOW E&M-EST. PATIENT-LVL IV: CPT | Mod: PBBFAC,,, | Performed by: OPHTHALMOLOGY

## 2020-09-09 NOTE — PROGRESS NOTES
HPI     1 wk post op OS done 9/3     Pt states sx went well. Denies eye pain. va is doing great. Pt taking gtts   as directed.         (instilled bandage cl OD today, with 1 line or tobradex adrienne )     Last edited by Kaylynn Shah on 9/9/2020  4:22 PM. (History)        ROS     Negative for: Constitutional, Gastrointestinal, Neurological, Skin,   Genitourinary, Musculoskeletal, HENT, Endocrine, Cardiovascular, Eyes,   Respiratory, Psychiatric, Allergic/Imm, Heme/Lymph    Last edited by Meek Haro Jr., MD on 9/9/2020  4:08 PM. (History)        Assessment /Plan     For exam results, see Encounter Report.    Status post cataract extraction and insertion of intraocular lens of right eye    Status post cataract extraction and insertion of intraocular lens of left eye    Stop Moxifloxacin  Decrease PF and Diclofenac 1x daily left  OD still dry and aggressive lubrication not working  BCTL OD  Tobramycin drops 1 drop BID OS  Follow up in about 1 week (around 9/16/2020) for f/u dry eye OD.

## 2020-09-09 NOTE — PATIENT INSTRUCTIONS
Continue Preservative free systane 4-6 x daily right eye  Tobramycin 1 drop 2 x daily right eye  Don't patch eye

## 2020-09-11 ENCOUNTER — LAB VISIT (OUTPATIENT)
Dept: LAB | Facility: HOSPITAL | Age: 70
End: 2020-09-11
Attending: INTERNAL MEDICINE
Payer: MEDICARE

## 2020-09-11 DIAGNOSIS — N18.30 CHRONIC KIDNEY DISEASE, STAGE III (MODERATE): Primary | ICD-10-CM

## 2020-09-11 LAB
ALBUMIN SERPL BCP-MCNC: 3.4 G/DL (ref 3.5–5.2)
ALP SERPL-CCNC: 111 U/L (ref 55–135)
ALT SERPL W/O P-5'-P-CCNC: 24 U/L (ref 10–44)
ANION GAP SERPL CALC-SCNC: 8 MMOL/L (ref 8–16)
AST SERPL-CCNC: 23 U/L (ref 10–40)
BASOPHILS # BLD AUTO: 0.04 K/UL (ref 0–0.2)
BASOPHILS NFR BLD: 0.5 % (ref 0–1.9)
BILIRUB SERPL-MCNC: 0.3 MG/DL (ref 0.1–1)
BUN SERPL-MCNC: 16 MG/DL (ref 8–23)
CALCIUM SERPL-MCNC: 9.8 MG/DL (ref 8.7–10.5)
CHLORIDE SERPL-SCNC: 104 MMOL/L (ref 95–110)
CO2 SERPL-SCNC: 28 MMOL/L (ref 23–29)
CREAT SERPL-MCNC: 1.2 MG/DL (ref 0.5–1.4)
DIFFERENTIAL METHOD: ABNORMAL
EOSINOPHIL # BLD AUTO: 0.2 K/UL (ref 0–0.5)
EOSINOPHIL NFR BLD: 2 % (ref 0–8)
ERYTHROCYTE [DISTWIDTH] IN BLOOD BY AUTOMATED COUNT: 14.2 % (ref 11.5–14.5)
EST. GFR  (AFRICAN AMERICAN): 53 ML/MIN/1.73 M^2
EST. GFR  (NON AFRICAN AMERICAN): 46 ML/MIN/1.73 M^2
GLUCOSE SERPL-MCNC: 171 MG/DL (ref 70–110)
HCT VFR BLD AUTO: 38.7 % (ref 37–48.5)
HGB BLD-MCNC: 12.4 G/DL (ref 12–16)
IMM GRANULOCYTES # BLD AUTO: 0.04 K/UL (ref 0–0.04)
IMM GRANULOCYTES NFR BLD AUTO: 0.5 % (ref 0–0.5)
LYMPHOCYTES # BLD AUTO: 2 K/UL (ref 1–4.8)
LYMPHOCYTES NFR BLD: 24.2 % (ref 18–48)
MCH RBC QN AUTO: 31.5 PG (ref 27–31)
MCHC RBC AUTO-ENTMCNC: 32 G/DL (ref 32–36)
MCV RBC AUTO: 98 FL (ref 82–98)
MONOCYTES # BLD AUTO: 0.6 K/UL (ref 0.3–1)
MONOCYTES NFR BLD: 6.8 % (ref 4–15)
NEUTROPHILS # BLD AUTO: 5.3 K/UL (ref 1.8–7.7)
NEUTROPHILS NFR BLD: 66 % (ref 38–73)
NRBC BLD-RTO: 0 /100 WBC
PHOSPHATE SERPL-MCNC: 3.5 MG/DL (ref 2.7–4.5)
PLATELET # BLD AUTO: 212 K/UL (ref 150–350)
PMV BLD AUTO: 9.2 FL (ref 9.2–12.9)
POTASSIUM SERPL-SCNC: 4.6 MMOL/L (ref 3.5–5.1)
PROT SERPL-MCNC: 7.2 G/DL (ref 6–8.4)
PTH-INTACT SERPL-MCNC: 43.5 PG/ML (ref 9–77)
RBC # BLD AUTO: 3.94 M/UL (ref 4–5.4)
SODIUM SERPL-SCNC: 140 MMOL/L (ref 136–145)
WBC # BLD AUTO: 8.05 K/UL (ref 3.9–12.7)

## 2020-09-11 PROCEDURE — 80053 COMPREHEN METABOLIC PANEL: CPT

## 2020-09-11 PROCEDURE — 85025 COMPLETE CBC W/AUTO DIFF WBC: CPT

## 2020-09-11 PROCEDURE — 83970 ASSAY OF PARATHORMONE: CPT

## 2020-09-11 PROCEDURE — 84100 ASSAY OF PHOSPHORUS: CPT

## 2020-09-11 PROCEDURE — 36415 COLL VENOUS BLD VENIPUNCTURE: CPT

## 2020-09-16 ENCOUNTER — LAB VISIT (OUTPATIENT)
Dept: LAB | Facility: HOSPITAL | Age: 70
End: 2020-09-16
Attending: INTERNAL MEDICINE
Payer: MEDICARE

## 2020-09-16 DIAGNOSIS — N18.2 CHRONIC KIDNEY DISEASE, STAGE II (MILD): ICD-10-CM

## 2020-09-16 DIAGNOSIS — E11.40 DIABETIC NEUROPATHY: Primary | ICD-10-CM

## 2020-09-16 DIAGNOSIS — Z96.41 INSULIN PUMP STATUS: ICD-10-CM

## 2020-09-16 DIAGNOSIS — D64.9 ANEMIA, UNSPECIFIED: ICD-10-CM

## 2020-09-16 DIAGNOSIS — I10 ESSENTIAL HYPERTENSION, MALIGNANT: ICD-10-CM

## 2020-09-16 DIAGNOSIS — J44.89 OBSTRUCTIVE CHRONIC BRONCHITIS WITHOUT EXACERBATION: ICD-10-CM

## 2020-09-16 LAB
ALBUMIN SERPL BCP-MCNC: 3.4 G/DL (ref 3.5–5.2)
ALP SERPL-CCNC: 127 U/L (ref 55–135)
ALT SERPL W/O P-5'-P-CCNC: 23 U/L (ref 10–44)
AMYLASE SERPL-CCNC: 28 U/L (ref 20–110)
ANION GAP SERPL CALC-SCNC: 12 MMOL/L (ref 8–16)
AST SERPL-CCNC: 24 U/L (ref 10–40)
BASOPHILS # BLD AUTO: 0.03 K/UL (ref 0–0.2)
BASOPHILS NFR BLD: 0.4 % (ref 0–1.9)
BILIRUB SERPL-MCNC: 0.3 MG/DL (ref 0.1–1)
BUN SERPL-MCNC: 24 MG/DL (ref 8–23)
CALCIUM SERPL-MCNC: 10 MG/DL (ref 8.7–10.5)
CHLORIDE SERPL-SCNC: 101 MMOL/L (ref 95–110)
CHOLEST SERPL-MCNC: 148 MG/DL (ref 120–199)
CHOLEST/HDLC SERPL: 3.4 {RATIO} (ref 2–5)
CO2 SERPL-SCNC: 29 MMOL/L (ref 23–29)
CREAT SERPL-MCNC: 1.2 MG/DL (ref 0.5–1.4)
DIFFERENTIAL METHOD: ABNORMAL
EOSINOPHIL # BLD AUTO: 0.2 K/UL (ref 0–0.5)
EOSINOPHIL NFR BLD: 2.2 % (ref 0–8)
ERYTHROCYTE [DISTWIDTH] IN BLOOD BY AUTOMATED COUNT: 14.1 % (ref 11.5–14.5)
EST. GFR  (AFRICAN AMERICAN): 53 ML/MIN/1.73 M^2
EST. GFR  (NON AFRICAN AMERICAN): 46 ML/MIN/1.73 M^2
GLUCOSE SERPL-MCNC: 113 MG/DL (ref 70–110)
HCT VFR BLD AUTO: 40.4 % (ref 37–48.5)
HDLC SERPL-MCNC: 44 MG/DL (ref 40–75)
HDLC SERPL: 29.7 % (ref 20–50)
HGB BLD-MCNC: 13.1 G/DL (ref 12–16)
IMM GRANULOCYTES # BLD AUTO: 0.02 K/UL (ref 0–0.04)
IMM GRANULOCYTES NFR BLD AUTO: 0.3 % (ref 0–0.5)
LDLC SERPL CALC-MCNC: 49.8 MG/DL (ref 63–159)
LIPASE SERPL-CCNC: 27 U/L (ref 4–60)
LYMPHOCYTES # BLD AUTO: 1.8 K/UL (ref 1–4.8)
LYMPHOCYTES NFR BLD: 25.4 % (ref 18–48)
MCH RBC QN AUTO: 31.4 PG (ref 27–31)
MCHC RBC AUTO-ENTMCNC: 32.4 G/DL (ref 32–36)
MCV RBC AUTO: 97 FL (ref 82–98)
MONOCYTES # BLD AUTO: 0.5 K/UL (ref 0.3–1)
MONOCYTES NFR BLD: 6.6 % (ref 4–15)
NEUTROPHILS # BLD AUTO: 4.7 K/UL (ref 1.8–7.7)
NEUTROPHILS NFR BLD: 65.1 % (ref 38–73)
NONHDLC SERPL-MCNC: 104 MG/DL
NRBC BLD-RTO: 0 /100 WBC
PLATELET # BLD AUTO: 245 K/UL (ref 150–350)
PMV BLD AUTO: 9.6 FL (ref 9.2–12.9)
POTASSIUM SERPL-SCNC: 4.4 MMOL/L (ref 3.5–5.1)
PROT SERPL-MCNC: 7.6 G/DL (ref 6–8.4)
RBC # BLD AUTO: 4.17 M/UL (ref 4–5.4)
SODIUM SERPL-SCNC: 142 MMOL/L (ref 136–145)
T4 FREE SERPL-MCNC: 0.92 NG/DL (ref 0.71–1.51)
TRIGL SERPL-MCNC: 271 MG/DL (ref 30–150)
TSH SERPL DL<=0.005 MIU/L-ACNC: 1.25 UIU/ML (ref 0.4–4)
WBC # BLD AUTO: 7.16 K/UL (ref 3.9–12.7)

## 2020-09-16 PROCEDURE — 84443 ASSAY THYROID STIM HORMONE: CPT

## 2020-09-16 PROCEDURE — 80061 LIPID PANEL: CPT

## 2020-09-16 PROCEDURE — 82150 ASSAY OF AMYLASE: CPT

## 2020-09-16 PROCEDURE — 80053 COMPREHEN METABOLIC PANEL: CPT

## 2020-09-16 PROCEDURE — 85025 COMPLETE CBC W/AUTO DIFF WBC: CPT

## 2020-09-16 PROCEDURE — 83690 ASSAY OF LIPASE: CPT

## 2020-09-16 PROCEDURE — 84481 FREE ASSAY (FT-3): CPT

## 2020-09-16 PROCEDURE — 36415 COLL VENOUS BLD VENIPUNCTURE: CPT

## 2020-09-16 PROCEDURE — 82043 UR ALBUMIN QUANTITATIVE: CPT

## 2020-09-16 PROCEDURE — 84439 ASSAY OF FREE THYROXINE: CPT

## 2020-09-16 PROCEDURE — 83036 HEMOGLOBIN GLYCOSYLATED A1C: CPT

## 2020-09-17 LAB
ALBUMIN/CREAT UR: 100 UG/MG (ref 0–30)
CREAT UR-MCNC: 9 MG/DL (ref 15–325)
ESTIMATED AVG GLUCOSE: 206 MG/DL (ref 68–131)
HBA1C MFR BLD HPLC: 8.8 % (ref 4–5.6)
MICROALBUMIN UR DL<=1MG/L-MCNC: 9 UG/ML
T3FREE SERPL-MCNC: 2.1 PG/ML (ref 2.3–4.2)

## 2020-09-18 ENCOUNTER — OFFICE VISIT (OUTPATIENT)
Dept: OPHTHALMOLOGY | Facility: CLINIC | Age: 70
End: 2020-09-18
Payer: MEDICARE

## 2020-09-18 DIAGNOSIS — Z98.41 STATUS POST CATARACT EXTRACTION AND INSERTION OF INTRAOCULAR LENS OF RIGHT EYE: Primary | ICD-10-CM

## 2020-09-18 DIAGNOSIS — Z96.1 STATUS POST CATARACT EXTRACTION AND INSERTION OF INTRAOCULAR LENS OF RIGHT EYE: Primary | ICD-10-CM

## 2020-09-18 DIAGNOSIS — H04.123 CHRONICALLY DRY EYES, BILATERAL: ICD-10-CM

## 2020-09-18 PROCEDURE — 68761 PR CLOSE TEAR DUCT OPENING BY PLUG,EA: ICD-10-PCS | Mod: 79,50,S$GLB, | Performed by: OPHTHALMOLOGY

## 2020-09-18 PROCEDURE — 99024 PR POST-OP FOLLOW-UP VISIT: ICD-10-PCS | Mod: S$GLB,,, | Performed by: OPHTHALMOLOGY

## 2020-09-18 PROCEDURE — 99024 POSTOP FOLLOW-UP VISIT: CPT | Mod: S$GLB,,, | Performed by: OPHTHALMOLOGY

## 2020-09-18 PROCEDURE — 68761 CLOSE TEAR DUCT OPENING: CPT | Mod: 79,50,S$GLB, | Performed by: OPHTHALMOLOGY

## 2020-09-18 PROCEDURE — 99999 PR PBB SHADOW E&M-EST. PATIENT-LVL III: ICD-10-PCS | Mod: PBBFAC,,, | Performed by: OPHTHALMOLOGY

## 2020-09-18 PROCEDURE — 99999 PR PBB SHADOW E&M-EST. PATIENT-LVL III: CPT | Mod: PBBFAC,,, | Performed by: OPHTHALMOLOGY

## 2020-09-18 NOTE — PROGRESS NOTES
HPI     Patient is here for follow up dry eye OD dls 09/09/2020  Patient states OD is feeling good, vision seems to be alittle bit better.  Using otc gtts 6 times a day ou, also using a grey cap OU QD and a white   cap OU QD  Using a gel gtts at night OU     Last edited by Enid Gonzalez MA on 9/18/2020  3:27 PM. (History)        ROS     Negative for: Constitutional, Gastrointestinal, Neurological, Skin,   Genitourinary, Musculoskeletal, HENT, Endocrine, Cardiovascular, Eyes,   Respiratory, Psychiatric, Allergic/Imm, Heme/Lymph    Last edited by Meek Haro Jr., MD on 9/18/2020  3:42 PM. (History)        Assessment /Plan     For exam results, see Encounter Report.    Status post cataract extraction and insertion of intraocular lens of right eye    Chronically dry eyes, bilateral      D/c bctl   Improved ocular surface decrease in PEE OD  Continue Refresh PF 4-6x daily OD with genteal ointment at bedtime   Procedure note:   Date: 09/18/2020  Time: 5:38 PM   Name of procedure being done: Placement of unilateral upper and lower punctum  Indications: dry eyes  Patient consent:   Indications, risks and alternatives to the procedure were explained to the patient. The patient was given the opportunity to ask questions and consented to the procedure in writing.   Pertinent lab values: Schirmer's test done prior visit  Type of anesthesia: 0.5% proparacaine.   Description of procedure: Vigamox pre- and post-.5mm plugs placed   Complication: none   EBL: none.   Disposition: stable   No follow-ups on file.

## 2020-09-20 ENCOUNTER — HOSPITAL ENCOUNTER (OUTPATIENT)
Facility: HOSPITAL | Age: 70
Discharge: HOME | End: 2020-09-21
Attending: EMERGENCY MEDICINE | Admitting: INTERNAL MEDICINE
Payer: MEDICARE

## 2020-09-20 DIAGNOSIS — G89.29 CHRONIC LEFT-SIDED THORACIC BACK PAIN: ICD-10-CM

## 2020-09-20 DIAGNOSIS — I51.89 DIASTOLIC DYSFUNCTION: ICD-10-CM

## 2020-09-20 DIAGNOSIS — I89.0 LYMPHEDEMA OF RIGHT LOWER EXTREMITY: ICD-10-CM

## 2020-09-20 DIAGNOSIS — I50.32 CHF (CONGESTIVE HEART FAILURE), NYHA CLASS III, CHRONIC, DIASTOLIC: ICD-10-CM

## 2020-09-20 DIAGNOSIS — R42 DIZZINESS: ICD-10-CM

## 2020-09-20 DIAGNOSIS — R55 POSTURAL DIZZINESS WITH NEAR SYNCOPE: Primary | ICD-10-CM

## 2020-09-20 DIAGNOSIS — R42 LIGHTHEADEDNESS: ICD-10-CM

## 2020-09-20 DIAGNOSIS — G57.01 NEUROPATHY OF RIGHT SCIATIC NERVE: ICD-10-CM

## 2020-09-20 DIAGNOSIS — M54.6 CHRONIC LEFT-SIDED THORACIC BACK PAIN: ICD-10-CM

## 2020-09-20 DIAGNOSIS — R07.9 CHEST PAIN, UNSPECIFIED TYPE: ICD-10-CM

## 2020-09-20 DIAGNOSIS — R55 NEAR SYNCOPE: ICD-10-CM

## 2020-09-20 DIAGNOSIS — R42 POSTURAL DIZZINESS WITH NEAR SYNCOPE: Primary | ICD-10-CM

## 2020-09-20 DIAGNOSIS — R55 SYNCOPE: ICD-10-CM

## 2020-09-20 LAB
ALBUMIN SERPL BCP-MCNC: 3.8 G/DL (ref 3.5–5.2)
ALP SERPL-CCNC: 114 U/L (ref 55–135)
ALT SERPL W/O P-5'-P-CCNC: 24 U/L (ref 10–44)
ANION GAP SERPL CALC-SCNC: 10 MMOL/L (ref 8–16)
APTT PPP: 29.2 SEC (ref 23.6–33.3)
AST SERPL-CCNC: 30 U/L (ref 10–40)
BASOPHILS # BLD AUTO: 0.03 K/UL (ref 0–0.2)
BASOPHILS NFR BLD: 0.4 % (ref 0–1.9)
BILIRUB SERPL-MCNC: 0.7 MG/DL (ref 0.1–1)
BILIRUB SERPL-MCNC: ABNORMAL MG/DL
BLOOD URINE, POC: ABNORMAL
BNP SERPL-MCNC: 83 PG/ML (ref 0–99)
BUN SERPL-MCNC: 19 MG/DL (ref 8–23)
CALCIUM SERPL-MCNC: 9.2 MG/DL (ref 8.7–10.5)
CHLORIDE SERPL-SCNC: 104 MMOL/L (ref 95–110)
CHOLEST SERPL-MCNC: 145 MG/DL (ref 120–199)
CHOLEST/HDLC SERPL: 3.4 {RATIO} (ref 2–5)
CLARITY, POC UA: ABNORMAL
CO2 SERPL-SCNC: 25 MMOL/L (ref 23–29)
COLOR, POC UA: ABNORMAL
CREAT SERPL-MCNC: 1.1 MG/DL (ref 0.5–1.4)
DIFFERENTIAL METHOD: NORMAL
EOSINOPHIL # BLD AUTO: 0.2 K/UL (ref 0–0.5)
EOSINOPHIL NFR BLD: 2.1 % (ref 0–8)
ERYTHROCYTE [DISTWIDTH] IN BLOOD BY AUTOMATED COUNT: 14.1 % (ref 11.5–14.5)
EST. GFR  (AFRICAN AMERICAN): 59.2 ML/MIN/1.73 M^2
EST. GFR  (NON AFRICAN AMERICAN): 51.3 ML/MIN/1.73 M^2
GLUCOSE SERPL-MCNC: 123 MG/DL (ref 70–110)
GLUCOSE SERPL-MCNC: 160 MG/DL (ref 70–110)
GLUCOSE SERPL-MCNC: 204 MG/DL (ref 70–110)
GLUCOSE SERPL-MCNC: 81 MG/DL (ref 70–110)
GLUCOSE UR QL STRIP: ABNORMAL
HCT VFR BLD AUTO: 39.3 % (ref 37–48.5)
HDLC SERPL-MCNC: 43 MG/DL (ref 40–75)
HDLC SERPL: 29.7 % (ref 20–50)
HGB BLD-MCNC: 12.9 G/DL (ref 12–16)
IMM GRANULOCYTES # BLD AUTO: 0.02 K/UL (ref 0–0.04)
IMM GRANULOCYTES NFR BLD AUTO: 0.3 % (ref 0–0.5)
INR PPP: 1
KETONES UR QL STRIP: ABNORMAL
LDLC SERPL CALC-MCNC: 64.8 MG/DL (ref 63–159)
LEUKOCYTE ESTERASE URINE, POC: ABNORMAL
LYMPHOCYTES # BLD AUTO: 1.7 K/UL (ref 1–4.8)
LYMPHOCYTES NFR BLD: 23 % (ref 18–48)
MAGNESIUM SERPL-MCNC: 2.2 MG/DL (ref 1.6–2.6)
MCH RBC QN AUTO: 31 PG (ref 27–31)
MCHC RBC AUTO-ENTMCNC: 32.8 G/DL (ref 32–36)
MCV RBC AUTO: 95 FL (ref 82–98)
MONOCYTES # BLD AUTO: 0.4 K/UL (ref 0.3–1)
MONOCYTES NFR BLD: 5.1 % (ref 4–15)
NEUTROPHILS # BLD AUTO: 5 K/UL (ref 1.8–7.7)
NEUTROPHILS NFR BLD: 69.1 % (ref 38–73)
NITRITE, POC UA: ABNORMAL
NONHDLC SERPL-MCNC: 102 MG/DL
NRBC BLD-RTO: 0 /100 WBC
PH, POC UA: ABNORMAL
PLATELET # BLD AUTO: 224 K/UL (ref 150–350)
PMV BLD AUTO: 9.2 FL (ref 9.2–12.9)
POTASSIUM SERPL-SCNC: 4.6 MMOL/L (ref 3.5–5.1)
PROT SERPL-MCNC: 7.3 G/DL (ref 6–8.4)
PROTEIN, POC: ABNORMAL
PROTHROMBIN TIME: 12.3 SEC (ref 10.6–14.8)
RBC # BLD AUTO: 4.16 M/UL (ref 4–5.4)
SARS-COV-2 RDRP RESP QL NAA+PROBE: NEGATIVE
SODIUM SERPL-SCNC: 139 MMOL/L (ref 136–145)
SPECIFIC GRAVITY, POC UA: ABNORMAL
TRIGL SERPL-MCNC: 186 MG/DL (ref 30–150)
TROPONIN I SERPL DL<=0.01 NG/ML-MCNC: <0.03 NG/ML
TROPONIN I SERPL DL<=0.01 NG/ML-MCNC: <0.03 NG/ML
TSH SERPL DL<=0.005 MIU/L-ACNC: 2.15 UIU/ML (ref 0.34–5.6)
UROBILINOGEN, POC UA: ABNORMAL
WBC # BLD AUTO: 7.25 K/UL (ref 3.9–12.7)

## 2020-09-20 PROCEDURE — 80061 LIPID PANEL: CPT

## 2020-09-20 PROCEDURE — 93010 ELECTROCARDIOGRAM REPORT: CPT | Mod: ,,, | Performed by: SPECIALIST

## 2020-09-20 PROCEDURE — 85610 PROTHROMBIN TIME: CPT

## 2020-09-20 PROCEDURE — 85730 THROMBOPLASTIN TIME PARTIAL: CPT

## 2020-09-20 PROCEDURE — 93010 EKG 12-LEAD: ICD-10-PCS | Mod: ,,, | Performed by: SPECIALIST

## 2020-09-20 PROCEDURE — G0378 HOSPITAL OBSERVATION PER HR: HCPCS

## 2020-09-20 PROCEDURE — 84484 ASSAY OF TROPONIN QUANT: CPT

## 2020-09-20 PROCEDURE — 36415 COLL VENOUS BLD VENIPUNCTURE: CPT

## 2020-09-20 PROCEDURE — 80053 COMPREHEN METABOLIC PANEL: CPT

## 2020-09-20 PROCEDURE — 82962 GLUCOSE BLOOD TEST: CPT

## 2020-09-20 PROCEDURE — 63600175 PHARM REV CODE 636 W HCPCS: Performed by: INTERNAL MEDICINE

## 2020-09-20 PROCEDURE — 96361 HYDRATE IV INFUSION ADD-ON: CPT

## 2020-09-20 PROCEDURE — 96360 HYDRATION IV INFUSION INIT: CPT

## 2020-09-20 PROCEDURE — 83880 ASSAY OF NATRIURETIC PEPTIDE: CPT

## 2020-09-20 PROCEDURE — 84484 ASSAY OF TROPONIN QUANT: CPT | Mod: 91

## 2020-09-20 PROCEDURE — 83735 ASSAY OF MAGNESIUM: CPT

## 2020-09-20 PROCEDURE — 63600175 PHARM REV CODE 636 W HCPCS: Performed by: EMERGENCY MEDICINE

## 2020-09-20 PROCEDURE — 84443 ASSAY THYROID STIM HORMONE: CPT

## 2020-09-20 PROCEDURE — 85025 COMPLETE CBC W/AUTO DIFF WBC: CPT

## 2020-09-20 PROCEDURE — 93005 ELECTROCARDIOGRAM TRACING: CPT | Performed by: SPECIALIST

## 2020-09-20 PROCEDURE — 99285 EMERGENCY DEPT VISIT HI MDM: CPT | Mod: 25

## 2020-09-20 PROCEDURE — 25000003 PHARM REV CODE 250: Performed by: INTERNAL MEDICINE

## 2020-09-20 PROCEDURE — U0002 COVID-19 LAB TEST NON-CDC: HCPCS

## 2020-09-20 RX ORDER — SODIUM CHLORIDE 0.9 % (FLUSH) 0.9 %
10 SYRINGE (ML) INJECTION
Status: DISCONTINUED | OUTPATIENT
Start: 2020-09-20 | End: 2020-09-21 | Stop reason: HOSPADM

## 2020-09-20 RX ORDER — PANTOPRAZOLE SODIUM 40 MG/1
40 TABLET, DELAYED RELEASE ORAL DAILY
Status: DISCONTINUED | OUTPATIENT
Start: 2020-09-21 | End: 2020-09-21 | Stop reason: HOSPADM

## 2020-09-20 RX ORDER — LEVOTHYROXINE SODIUM 25 UG/1
25 TABLET ORAL
Status: DISCONTINUED | OUTPATIENT
Start: 2020-09-21 | End: 2020-09-21 | Stop reason: HOSPADM

## 2020-09-20 RX ORDER — POLYETHYLENE GLYCOL 3350 17 G/17G
17 POWDER, FOR SOLUTION ORAL DAILY PRN
Status: DISCONTINUED | OUTPATIENT
Start: 2020-09-20 | End: 2020-09-21 | Stop reason: HOSPADM

## 2020-09-20 RX ORDER — GABAPENTIN 300 MG/1
600 CAPSULE ORAL 3 TIMES DAILY
Status: DISCONTINUED | OUTPATIENT
Start: 2020-09-20 | End: 2020-09-21 | Stop reason: HOSPADM

## 2020-09-20 RX ORDER — ASCORBIC ACID 500 MG
500 TABLET ORAL DAILY
Status: DISCONTINUED | OUTPATIENT
Start: 2020-09-21 | End: 2020-09-21 | Stop reason: HOSPADM

## 2020-09-20 RX ORDER — ALLOPURINOL 100 MG/1
200 TABLET ORAL NIGHTLY
Status: DISCONTINUED | OUTPATIENT
Start: 2020-09-20 | End: 2020-09-21 | Stop reason: HOSPADM

## 2020-09-20 RX ORDER — TALC
9 POWDER (GRAM) TOPICAL NIGHTLY PRN
Status: DISCONTINUED | OUTPATIENT
Start: 2020-09-20 | End: 2020-09-21 | Stop reason: HOSPADM

## 2020-09-20 RX ORDER — SODIUM CHLORIDE, SODIUM LACTATE, POTASSIUM CHLORIDE, CALCIUM CHLORIDE 600; 310; 30; 20 MG/100ML; MG/100ML; MG/100ML; MG/100ML
INJECTION, SOLUTION INTRAVENOUS CONTINUOUS
Status: DISCONTINUED | OUTPATIENT
Start: 2020-09-20 | End: 2020-09-21

## 2020-09-20 RX ORDER — ACETAMINOPHEN 325 MG/1
650 TABLET ORAL EVERY 4 HOURS PRN
Status: DISCONTINUED | OUTPATIENT
Start: 2020-09-20 | End: 2020-09-21 | Stop reason: HOSPADM

## 2020-09-20 RX ORDER — LANOLIN ALCOHOL/MO/W.PET/CERES
800 CREAM (GRAM) TOPICAL NIGHTLY
Status: DISCONTINUED | OUTPATIENT
Start: 2020-09-20 | End: 2020-09-21 | Stop reason: HOSPADM

## 2020-09-20 RX ORDER — DICLOFENAC SODIUM 10 MG/G
2 GEL TOPICAL DAILY
COMMUNITY

## 2020-09-20 RX ORDER — FERROUS SULFATE 325(65) MG
325 TABLET ORAL
Status: DISCONTINUED | OUTPATIENT
Start: 2020-09-21 | End: 2020-09-21 | Stop reason: HOSPADM

## 2020-09-20 RX ORDER — NITROGLYCERIN 0.4 MG/1
0.4 TABLET SUBLINGUAL EVERY 5 MIN PRN
Status: DISCONTINUED | OUTPATIENT
Start: 2020-09-20 | End: 2020-09-21 | Stop reason: HOSPADM

## 2020-09-20 RX ORDER — ONDANSETRON 4 MG/1
8 TABLET, ORALLY DISINTEGRATING ORAL EVERY 6 HOURS PRN
Status: DISCONTINUED | OUTPATIENT
Start: 2020-09-20 | End: 2020-09-21 | Stop reason: HOSPADM

## 2020-09-20 RX ORDER — AMITRIPTYLINE HYDROCHLORIDE 25 MG/1
100 TABLET, FILM COATED ORAL NIGHTLY
Status: DISCONTINUED | OUTPATIENT
Start: 2020-09-20 | End: 2020-09-21 | Stop reason: HOSPADM

## 2020-09-20 RX ORDER — ONDANSETRON 2 MG/ML
4 INJECTION INTRAMUSCULAR; INTRAVENOUS EVERY 6 HOURS PRN
Status: DISCONTINUED | OUTPATIENT
Start: 2020-09-20 | End: 2020-09-21 | Stop reason: HOSPADM

## 2020-09-20 RX ORDER — CALCITRIOL 0.25 UG/1
0.25 CAPSULE ORAL
Status: DISCONTINUED | OUTPATIENT
Start: 2020-09-21 | End: 2020-09-21 | Stop reason: HOSPADM

## 2020-09-20 RX ORDER — MONTELUKAST SODIUM 10 MG/1
10 TABLET ORAL NIGHTLY
Status: DISCONTINUED | OUTPATIENT
Start: 2020-09-20 | End: 2020-09-21 | Stop reason: HOSPADM

## 2020-09-20 RX ORDER — LOSARTAN POTASSIUM 25 MG/1
25 TABLET ORAL DAILY
Status: DISCONTINUED | OUTPATIENT
Start: 2020-09-21 | End: 2020-09-21 | Stop reason: HOSPADM

## 2020-09-20 RX ORDER — FLUOXETINE HYDROCHLORIDE 20 MG/1
40 CAPSULE ORAL DAILY
Status: DISCONTINUED | OUTPATIENT
Start: 2020-09-21 | End: 2020-09-21 | Stop reason: HOSPADM

## 2020-09-20 RX ORDER — DIPHENOXYLATE HYDROCHLORIDE AND ATROPINE SULFATE 2.5; .025 MG/1; MG/1
1 TABLET ORAL 4 TIMES DAILY PRN
Status: DISCONTINUED | OUTPATIENT
Start: 2020-09-20 | End: 2020-09-21 | Stop reason: HOSPADM

## 2020-09-20 RX ORDER — FEXOFENADINE HCL 60 MG
60 TABLET ORAL DAILY
COMMUNITY
End: 2021-03-16

## 2020-09-20 RX ORDER — ATENOLOL 25 MG/1
25 TABLET ORAL 2 TIMES DAILY
Status: DISCONTINUED | OUTPATIENT
Start: 2020-09-20 | End: 2020-09-21

## 2020-09-20 RX ADMIN — SODIUM CHLORIDE, SODIUM LACTATE, POTASSIUM CHLORIDE, AND CALCIUM CHLORIDE: .6; .31; .03; .02 INJECTION, SOLUTION INTRAVENOUS at 09:09

## 2020-09-20 RX ADMIN — GABAPENTIN 600 MG: 300 CAPSULE ORAL at 09:09

## 2020-09-20 RX ADMIN — ALLOPURINOL 200 MG: 100 TABLET ORAL at 09:09

## 2020-09-20 RX ADMIN — AMITRIPTYLINE HYDROCHLORIDE 100 MG: 25 TABLET, FILM COATED ORAL at 09:09

## 2020-09-20 RX ADMIN — MAGNESIUM OXIDE 800 MG: 400 TABLET ORAL at 09:09

## 2020-09-20 RX ADMIN — SODIUM CHLORIDE, SODIUM LACTATE, POTASSIUM CHLORIDE, AND CALCIUM CHLORIDE 500 ML: .6; .31; .03; .02 INJECTION, SOLUTION INTRAVENOUS at 05:09

## 2020-09-20 RX ADMIN — ATENOLOL 25 MG: 25 TABLET ORAL at 09:09

## 2020-09-20 RX ADMIN — MONTELUKAST 10 MG: 10 TABLET, FILM COATED ORAL at 09:09

## 2020-09-20 NOTE — H&P
[facility] Medicine History & Physical Examination   Patient Name: Fifi Mcnair  MRN: [id number]  Patient Class: OP- Observation   Admission Date: 9/20/2020  1:22 PM  Length of Stay: 0  Attending Physician: Asya Harris MD  Primary Care Provider: Werner Vargas MD  Face-to-Face encounter date: 09/20/2020  Code Status: Full  Chief Complaint: Dizziness      Covid test negative       Patient information was obtained from patient, past medical records and ER records and ED physician sign out.   HISTORY OF PRESENT ILLNESS:   Fifi Mcnair is a 69 y.o.  female who  has a past medical history of Allergy, Anemia, Anxiety, Arthritis, Asthma, CHF (congestive heart failure), Chronic kidney disease, Colon polyp, COPD (chronic obstructive pulmonary disease), Depression, Diabetes mellitus, Diabetes mellitus without complication, Diabetic retinopathy, Diastolic dysfunction, Diverticulitis of large intestine without perforation or abscess without bleeding (2/12/2018), Diverticulosis, Fracture of lumbar spine, GERD (gastroesophageal reflux disease), Hernia of unspecified site of abdominal cavity without mention of obstruction or gangrene, Hyperlipidemia, Hypertension, IBS (irritable bowel syndrome), Mild nonproliferative diabetic retinopathy(362.04) (11/25/2013), Morbid obesity, Nuclear sclerosis (11/25/2013), Osteoporosis, Peripheral edema, Rash, S/P LASIK (laser assisted in situ keratomileusis), Sleep apnea, Thyroid disease, TIA (transient ischemic attack), and Urinary tract infection.. The patient presented to [facility] on 9/20/2020 with a primary complaint of Dizziness    Patient has been feeling lightheaded at bedtime which gradually resolves when she wakes up, but today morning her symptoms persist he early morning as well, with nausea  felt like about to pass out so she decided to come to ED.  Patient denies any history of fall or loss of consciousness.  Used to wear  stockings with minimal help.  Her lightheadedness was precipitated with positional changes, not related to food intake.  Reports her blood pressure well controlled  Patient denies fever, chest pain or palpitation along with that lightheadedness, however she felt chest discomfort once yesterday.  She uses insulin pump at home, and her blood sugar was in low 80s, she missed of her meals and it happens at home too.   Even though patient reports orthostatic changes in ED no orthostatic hypotension noted in vitals.  No recent change in her blood pressure medications, denies any change in hydration or urine output.  Of note patient had a gastric sleeve surgery 6 years, started gaining weight back now and taking amitriptyline for many years without any side effects.  In ED vital signs stable , low  blood sugar, EKG no new ischemic changes chronic left bundle branch block, CKD 3, otherwise labs unremarkable    REVIEW OF SYSTEMS:   10 Point Review of System was performed and was found to be negative except for that mentioned already in the HPI above.     PAST MEDICAL HISTORY:     Past Medical History:   Diagnosis Date    Allergy     multiple antibiotic allergies    Anemia     Anxiety     Arthritis     Asthma     CHF (congestive heart failure)     NYHA class III     Chronic kidney disease     Colon polyp     benign    COPD (chronic obstructive pulmonary disease)     DLCO 37% , and mild pulmonary HTN    Depression     Diabetes mellitus     Diabetes mellitus without complication     Diabetic retinopathy     Diastolic dysfunction     Diverticulitis of large intestine without perforation or abscess without bleeding 2/12/2018    Diverticulosis     Fracture of lumbar spine     GERD (gastroesophageal reflux disease)     Hernia of unspecified site of abdominal cavity without mention of obstruction or gangrene     Hyperlipidemia     Hypertension     hypertensive renal    IBS (irritable bowel syndrome)     Mild  nonproliferative diabetic retinopathy(362.04) 11/25/2013    Morbid obesity     Nuclear sclerosis 11/25/2013    Osteoporosis     Peripheral edema     Rash     S/P LASIK (laser assisted in situ keratomileusis)     Sleep apnea     sleep apnea uses bipap.    Thyroid disease     on synthroid    TIA (transient ischemic attack)     Urinary tract infection        PAST SURGICAL HISTORY:     Past Surgical History:   Procedure Laterality Date    ABDOMINAL SURGERY      ADENOIDECTOMY      ARTHROSCOPIC CHONDROPLASTY OF KNEE JOINT Right 8/31/2018    Procedure: ARTHROSCOPY, KNEE, WITH CHONDROPLASTY;  Surgeon: Larry Molina MD;  Location: [facility] OR;  Service: Orthopedics;  Laterality: Right;  Tricompartmental chondroplasty    CATARACT EXTRACTION Right 08/13/2020    LF    CATARACT EXTRACTION Left 09/03/2020    LF    COLONOSCOPY  03/05/2013    repeat in 5 years    COLONOSCOPY N/A 5/31/2017    Procedure: COLONOSCOPY;  Surgeon: Luis Navarro MD;  Location: [facility] ENDO;  Service: Endoscopy;  Laterality: N/A;    COLONOSCOPY N/A 4/11/2018    Procedure: COLONOSCOPY;  Surgeon: Luis Navarro MD;  Location: [facility];  Service: Endoscopy;  Laterality: N/A;    COSMETIC SURGERY      belt abdominoplasty    CYSTOSCOPY      CYSTOSCOPY N/A 8/6/2018    Procedure: CYSTOSCOPY;  Surgeon: Angy Campos MD;  Location: [facility] OR;  Service: Urology;  Laterality: N/A;    EYE SURGERY Right     mazzulla    GASTRECTOMY      gastric sleeve    gastric sleeve      HERNIA REPAIR      5 years old    HYSTERECTOMY      ovaries remain    JOINT REPLACEMENT Left     knee replacement    KNEE ARTHROPLASTY Left 4/16/2019    Procedure: ARTHROPLASTY, KNEE;  Surgeon: Larry Molina MD;  Location: [facility] OR;  Service: Orthopedics;  Laterality: Left;  [place]    KNEE ARTHROSCOPY W/ MENISCECTOMY Right 8/31/2018    Procedure: ARTHROSCOPY, KNEE, WITH MENISCECTOMY;  Surgeon: Larry Molina MD;  Location: [facility];   Service: Orthopedics;  Laterality: Right;    PHACOEMULSIFICATION OF CATARACT Right 8/13/2020    Procedure: PHACOEMULSIFICATION, CATARACT;  Surgeon: Meek Haro Jr., MD;  Location: [facility] OR;  Service: Ophthalmology;  Laterality: Right;  Right/DM    PHACOEMULSIFICATION OF CATARACT Left 9/3/2020    Procedure: PHACOEMULSIFICATION, CATARACT WITH O.R.A;  Surgeon: Meek Haro Jr., MD;  Location: [facility] OR;  Service: Ophthalmology;  Laterality: Left;  Left/DM    REFRACTIVE SURGERY      mono va//ou//    RHINOPLASTY TIP      TONSILLECTOMY      TOTAL KNEE ARTHROPLASTY Left 4/16/19    TUBAL LIGATION      UPPER GASTROINTESTINAL ENDOSCOPY  03/05/2013    UPPER GASTROINTESTINAL ENDOSCOPY  08/24/2016    Dr. Veras, repeat in 8 weeks    UPPER GASTROINTESTINAL ENDOSCOPY  07/21/2016    Dr. Randall       ALLERGIES:   Adhesive, Ceclor [cefaclor], Cleocin [clindamycin hcl], Erythromycin, Aleve [naproxen sodium], Macrodantin [nitrofurantoin macrocrystalline], Motrin [ibuprofen], Advair diskus [fluticasone propion-salmeterol], Levaquin [levofloxacin], Macrobid [nitrofurantoin monohyd/m-cryst], Remeron [mirtazapine], Restoril [temazepam], Sulfa (sulfonamide antibiotics), Trazodone, and Vancomycin analogues    FAMILY HISTORY:     Family History   Problem Relation Age of Onset    Heart disease Mother 59    Cancer Mother 59        throat    Allergies Mother     Diabetes Mother     Breast cancer Mother     Heart disease Father 70        flutter    Allergies Father     Diabetes Father     Cancer Sister 22        22 thyroid,49  breast    Allergies Sister     Breast cancer Sister     Diabetes Sister     Cancer Brother 62        lung    Diabetes Brother     Asthma Daughter     Diabetes Daughter     Depression Daughter     Asthma Grandchild     Eczema Grandchild     Eczema Grandchild     Breast cancer Maternal Grandmother     Ovarian cancer Cousin     Glaucoma Neg Hx     Macular degeneration Neg Hx     Retinal  detachment Neg Hx        SOCIAL HISTORY:     Social History     Tobacco Use    Smoking status: Former Smoker     Packs/day: 1.00     Years: 4.00     Pack years: 4.00     Types: Cigarettes     Quit date:      Years since quittin.7    Smokeless tobacco: Never Used    Tobacco comment: quit , lived with smokers    Substance Use Topics    Alcohol use: Not Currently     Frequency: Never     Binge frequency: Never        Social History     Substance and Sexual Activity   Sexual Activity Yes    Birth control/protection: Surgical        HOME MEDICATIONS:     Prior to Admission medications    Medication Sig Start Date End Date Taking? Authorizing Provider   alendronate (FOSAMAX) 35 MG tablet Take 1 tablet (35 mg total) by mouth every 7 days. 20 Yes Gabriela Green MD   allopurinol (ZYLOPRIM) 100 MG tablet Take 2 tablets (200 mg total) by mouth nightly. 20  Yes Werner Vargas MD   amitriptyline (ELAVIL) 100 MG tablet 1 tab at night  Patient taking differently: Take 100 mg by mouth every evening. 1 tab at night 2/10/20  Yes Werner Vargas MD   ascorbic acid, vitamin C, (VITAMIN C) 500 MG tablet Take 500 mg by mouth once daily.   Yes Historical Provider   atenolol (TENORMIN) 25 MG tablet Take 1 tablet (25 mg total) by mouth 2 (two) times daily. 20  Yes Werner Vargas MD   biotin 1 mg Cap Take 1 tablet by mouth once daily.    Yes Historical Provider   calcitRIOL (ROCALTROL) 0.25 MCG Cap TAKE ONE TABLET BY MOUTH TWICE A WEEK ON monday AND friday  Patient taking differently: Take 0.25 mcg by mouth every Monday and Friday. TAKE ONE TABLET BY MOUTH TWICE A WEEK ON monday AND 20  Yes Werner Vargas MD   cyanocobalamin, vitamin B-12, (VITAMIN B-12) 5,000 mcg Subl Place 5,000 mg under the tongue once a week.   Yes Historical Provider   diphenoxylate-atropine 2.5-0.025 mg (LOMOTIL) 2.5-0.025 mg per tablet TAKE ONE TABLET BY MOUTH 4 TIMES DAILY AS NEEDED FOR DIARRHEA  Patient taking differently:  Take 1 tablet by mouth 4 (four) times daily as needed for Diarrhea.  5/3/17  Yes Khai Appiah MD   fexofenadine (ALLEGRA) 60 MG tablet Take 60 mg by mouth once daily.   Yes Historical Provider   FLUoxetine 20 MG capsule Take 2 capsules (40 mg total) by mouth once daily. 1/9/20  Yes Werner Vargas MD   fluticasone furoate-vilanterol (BREO) 100-25 mcg/dose diskus inhaler Inhale 1 puff into the lungs once daily. 1/9/20  Yes Werner Vargas MD   fluticasone propionate (FLONASE) 50 mcg/actuation nasal spray 1 spray (50 mcg total) by Each Nostril route once daily. 1/9/20  Yes Werner Vargas MD   gabapentin (NEURONTIN) 600 MG tablet Take 1 tablet (600 mg total) by mouth 3 (three) times daily. 2/10/20 2/9/21 Yes Werner Vargas MD   HUMALOG U-100 INSULIN 100 unit/mL injection Inject 100 Units into the skin once daily. Via pump 1/4/20  Yes Historical Provider   insulin (BASAGLAR KWIKPEN U-100 INSULIN) glargine 100 units/mL (3mL) SubQ pen Inject 20 Units into the skin every evening. Use 10 units if BS over 210.Night before surgery. 4/10/19 9/20/20 Yes Werner Vargas MD   g-bpjgrrtilfad-b3-b6-b12 (CEREFOLIN) 6-5-50-1 mg Tab Take 1 tablet by mouth once daily.   Yes Historical Provider   Lacto.acidophilus-Bif.animalis (PROBIOTIC) 5 billion cell CpSP Take 1 capsule by mouth once daily. 2/14/18  Yes Jaycee Lou, CARMENP-C   levothyroxine (SYNTHROID) 25 MCG tablet Take 1 tablet (25 mcg total) by mouth once daily. 1/9/20  Yes Werner Vargas MD   losartan (COZAAR) 25 MG tablet Take 25 mg by mouth once daily. 6/8/20  Yes Historical Provider   magnesium oxide (MAG-OX) 400 mg (241.3 mg magnesium) tablet Take 800 mg by mouth every evening.   Yes Historical Provider   montelukast (SINGULAIR) 10 mg tablet Take 1 tablet (10 mg total) by mouth every evening. 1/9/20  Yes Werner Vargas MD   MULTIVIT-IRON-MIN-FOLIC ACID 3,500-18-0.4 UNIT-MG-MG ORAL CHEW Take 1 tablet by mouth 2 (two) times daily.    Yes Historical Provider   pantoprazole (PROTONIX)  40 MG tablet TAKE 1 TABLET BY MOUTH ONCE DAILY  Patient taking differently: Take 40 mg by mouth once daily.  11/27/19  Yes Werner Vargas MD   potassium chloride SA (K-DUR,KLOR-CON) 20 MEQ tablet TAKE 1 TABLET BY MOUTH TWICE DAILY  Patient taking differently: Take 20 mEq by mouth 2 (two) times daily.  12/2/19  Yes Josefa Hicks NP   torsemide (DEMADEX) 20 MG Tab TAKE ONE TABLET BY MOUTH TWICE DAILY  Patient taking differently: Take 20 mg by mouth 2 (two) times daily.  3/3/20  Yes Werner Vargas MD   TURMERIC ORAL Take 1 tablet by mouth once daily.    Yes Historical Provider   cinnamon bark (CINNAMON ORAL) Take 1 tablet by mouth once daily.     Historical Provider   clotrimazole-betamethasone 1-0.05% (LOTRISONE) cream  9/4/15   Historical Provider   diclofenac (VOLTAREN) 0.1 % ophthalmic solution Place 1 drop into the right eye 4 (four) times daily. Start drops 3 days before surgery 7/31/20 9/27/20  Meek Haro Jr., MD   diclofenac sodium (VOLTAREN) 1 % Gel Apply 2 g topically once daily.    Historical Provider   DYMISTA 137-50 mcg/spray Spry nassal spray as needed.  3/21/18   Historical Provider   ferrous sulfate (IRON) 325 mg (65 mg iron) Tab tablet Take 325 mg by mouth daily with breakfast.    Historical Provider   fish oil-omega-3 fatty acids 300-1,000 mg capsule Take 1 capsule by mouth once daily.    Historical Provider   hydrocortisone 2.5 % cream Apply topically nightly. 2/10/20   Werner Vargas MD   L-LYSINE ORAL Take 1 tablet by mouth once daily.     Historical Provider   metronidazole 1% (METROGEL) 1 % Gel Apply topically once daily. Prn sores on scalp. 7/23/20 7/23/21  John Robles MD   mupirocin (BACTROBAN) 2 % ointment APPLY OINTMENT TOPICALLY TO AFFECTED AREA ON NOSE THREE TIMES DAILY AS DIRECTED 1/16/19   John Beckham MD   oxyCODONE (ROXICODONE) 15 MG Tab Take 1 tablet (15 mg total) by mouth every 6 (six) hours as needed for Pain. 1/25/18   Larry Molina MD   SSD 1 % cream   "9/8/18   Historical Provider   cetirizine (ZYRTEC) 10 MG tablet Take 1 tablet (10 mg total) by mouth once daily. 12/16/12 9/20/20  Vidal Blake MD   diclofenac sodium (VOLTAREN) 1 % Gel  APPLY 2 GRAMS TOPICALLY ONCE DAILY  Patient taking differently: Apply 2 g topically.  3/27/19 9/20/20  Larry Molina MD         PHYSICAL EXAM:   BP (!) 152/67   Pulse 78   Temp 98.6 °F (37 °C) (Oral)   Resp (!) 29   Ht 5' 2" (1.575 m)   Wt 117.9 kg (260 lb)   LMP  (LMP Unknown)   SpO2 96%   BMI 47.55 kg/m²   Vitals Reviewed  General appearance: Well-developed, well-nourished female in no apparent distress.  Skin: No Rash.   Neuro: Motor and sensory exams grossly intact. Good tone. Power in all 4 extremities 5/5.   HENT: Atraumatic head. Moist mucous membranes of oral cavity.  Eyes: Normal extraocular movements.   Neck: Supple. No evidence of lymphadenopathy. No thyroidomegaly.  Lungs: Clear to auscultation bilaterally. No wheezing present.   Heart: Regular rate and rhythm. S1 and S2 present with no murmurs/gallop/rub. No pedal edema. No JVD present.   Abdomen: Soft, non-distended, non-tender. No rebound tenderness/guarding. No masses or organomegaly. Bowel sounds are normal. Bladder is not palpable.  :no tucker ,no CVA tenderness  Extremities: No cyanosis, clubbing, or edema.  Psych/mental status: Alert and oriented. Cooperative. Responds appropriately to questions.     EMERGENCY DEPARTMENT LABS AND IMAGING:     Labs Reviewed   COMPREHENSIVE METABOLIC PANEL - Abnormal; Notable for the following components:       Result Value    Glucose 123 (*)     eGFR if  59.2 (*)     eGFR if non  51.3 (*)     All other components within normal limits   POCT GLUCOSE - Abnormal; Notable for the following components:    POC Glucose 160 (*)     All other components within normal limits   CBC W/ AUTO DIFFERENTIAL   B-TYPE NATRIURETIC PEPTIDE   MAGNESIUM   TROPONIN I   TSH   SARS-COV-2 RNA " AMPLIFICATION, QUAL   URINALYSIS, REFLEX TO URINE CULTURE   LIPID PANEL   TROPONIN I   PROTIME-INR   APTT   POCT GLUCOSE   POCT GLUCOSE MONITORING CONTINUOUS   POCT GLUCOSE MONITORING CONTINUOUS       X-Ray Chest AP Portable   Final Result          ASSESSMENT & PLAN:   Fifi Mcnair is a 69 y.o. female admitted for evaluation of lightheadedness/presyncope  Possibly due to polypharmacy/hypoglycemia/dehydration, need to rule out arrhythmia    Active Hospital Problems    Diagnosis  POA    Chest pain [R07.9]  Yes    Obesity [E66.9]  Yes    LBBB (left bundle branch block) [I44.7]  Yes    Controlled diabetes mellitus type 2 with complications [E11.8]  Yes    Chronic diastolic heart failure, NYHA class 1 [I50.32]  Yes    Acquired hypothyroidism [E03.9]  Yes    Chronic kidney disease, stage III (moderate) [N18.3]  Yes     Stage 3      Hypertension [I10]  Yes     hypertensive renal        Resolved Hospital Problems   No resolved problems to display.         Plan:  Admit to telemetry  Trend CE with EKG   Echo to rule out structural abnormality  Consult cardiology and Neurology for further workup  Restart home medication  Monitor blood sugar control  Obtain A1c  Gentle hydration  cc/hour  Will obtain CT head to rule out any ischemia      DVT Prophylaxis: will be placed on Lovenox for DVT prophylaxis and will be advised to be as mobile as possible and sit in a chair as tolerated.     INPATIENT LIST OF MEDICATIONS     Current Facility-Administered Medications:     acetaminophen tablet 650 mg, 650 mg, Oral, Q4H PRN, Asya Harris MD    allopurinoL tablet 200 mg, 200 mg, Oral, Nightly, Asya Harris MD    [START ON 9/21/2020] ascorbic acid (vitamin C) tablet 500 mg, 500 mg, Oral, Daily, Asya Harris MD    atenoloL tablet 25 mg, 25 mg, Oral, BID, Asya Harris MD    [START ON 9/21/2020] calcitRIOL capsule 0.25 mcg, 0.25 mcg, Oral, Every Mon, Fri, Asya Harris MD     diphenoxylate-atropine 2.5-0.025 mg per tablet 1 tablet, 1 tablet, Oral, QID PRN, Asya Harris MD    [START ON 9/21/2020] ferrous sulfate tablet 325 mg, 325 mg, Oral, Daily with breakfast, Asya Harris MD    [START ON 9/21/2020] FLUoxetine capsule 40 mg, 40 mg, Oral, Daily, Asya Harris MD    gabapentin tablet 600 mg, 600 mg, Oral, TID, Asya Harris MD    gentamicin injection 80 mg, 80 mg, Intramuscular, 1 time in Clinic/HOD, Angy Campos MD    [START ON 9/21/2020] levothyroxine tablet 25 mcg, 25 mcg, Oral, Daily, Asya Harris MD    [START ON 9/21/2020] losartan tablet 25 mg, 25 mg, Oral, Daily, Asya Harris MD    magnesium oxide tablet 800 mg, 800 mg, Oral, QHS, Asya Harris MD    melatonin tablet 9 mg, 9 mg, Oral, Nightly PRN, Asya Harris MD    montelukast tablet 10 mg, 10 mg, Oral, QHS, Asya Harris MD    nitroGLYCERIN SL tablet 0.4 mg, 0.4 mg, Sublingual, Q5 Min PRN, Asya Harris MD    ondansetron disintegrating tablet 8 mg, 8 mg, Oral, Q6H PRN, Asya Harris MD    ondansetron injection 4 mg, 4 mg, Intravenous, Q6H PRN, Asya Harris MD    [START ON 9/21/2020] pantoprazole EC tablet 40 mg, 40 mg, Oral, Daily, Asya Harris MD    polyethylene glycol packet 17 g, 17 g, Oral, Daily PRN, Asya Harris MD    sodium chloride 0.9% flush 10 mL, 10 mL, Intravenous, PRN, Asya Harris MD    Current Outpatient Medications:     alendronate (FOSAMAX) 35 MG tablet, Take 1 tablet (35 mg total) by mouth every 7 days., Disp: 4 tablet, Rfl: 2    allopurinol (ZYLOPRIM) 100 MG tablet, Take 2 tablets (200 mg total) by mouth nightly., Disp: 180 tablet, Rfl: 3    amitriptyline (ELAVIL) 100 MG tablet, 1 tab at night (Patient taking differently: Take 100 mg by mouth every evening. 1 tab at night), Disp: 90 tablet, Rfl: 2    ascorbic acid, vitamin C, (VITAMIN C) 500 MG tablet, Take 500 mg by mouth once daily., Disp: , Rfl:      atenolol (TENORMIN) 25 MG tablet, Take 1 tablet (25 mg total) by mouth 2 (two) times daily., Disp: 180 tablet, Rfl: 3    biotin 1 mg Cap, Take 1 tablet by mouth once daily. , Disp: , Rfl:     calcitRIOL (ROCALTROL) 0.25 MCG Cap, TAKE ONE TABLET BY MOUTH TWICE A WEEK ON monday AND friday (Patient taking differently: Take 0.25 mcg by mouth every Monday and Friday. TAKE ONE TABLET BY MOUTH TWICE A WEEK ON monday AND friday), Disp: 8 capsule, Rfl: 3    cyanocobalamin, vitamin B-12, (VITAMIN B-12) 5,000 mcg Subl, Place 5,000 mg under the tongue once a week., Disp: , Rfl:     diphenoxylate-atropine 2.5-0.025 mg (LOMOTIL) 2.5-0.025 mg per tablet, TAKE ONE TABLET BY MOUTH 4 TIMES DAILY AS NEEDED FOR DIARRHEA (Patient taking differently: Take 1 tablet by mouth 4 (four) times daily as needed for Diarrhea. ), Disp: 60 tablet, Rfl: 1    fexofenadine (ALLEGRA) 60 MG tablet, Take 60 mg by mouth once daily., Disp: , Rfl:     FLUoxetine 20 MG capsule, Take 2 capsules (40 mg total) by mouth once daily., Disp: 180 capsule, Rfl: 3    fluticasone furoate-vilanterol (BREO) 100-25 mcg/dose diskus inhaler, Inhale 1 puff into the lungs once daily., Disp: 60 each, Rfl: 3    fluticasone propionate (FLONASE) 50 mcg/actuation nasal spray, 1 spray (50 mcg total) by Each Nostril route once daily., Disp: 1 Bottle, Rfl: 4    gabapentin (NEURONTIN) 600 MG tablet, Take 1 tablet (600 mg total) by mouth 3 (three) times daily., Disp: 90 tablet, Rfl: 11    HUMALOG U-100 INSULIN 100 unit/mL injection, Inject 100 Units into the skin once daily. Via pump, Disp: , Rfl:     insulin (BASAGLAR KWIKPEN U-100 INSULIN) glargine 100 units/mL (3mL) SubQ pen, Inject 20 Units into the skin every evening. Use 10 units if BS over 210.Night before surgery., Disp: , Rfl: 0    l-methylfolate-b2-b6-b12 (CEREFOLIN) 6-5-50-1 mg Tab, Take 1 tablet by mouth once daily., Disp: , Rfl:     Lacto.acidophilus-Bif.animalis (PROBIOTIC) 5 billion cell CpSP, Take 1  capsule by mouth once daily., Disp: 30 capsule, Rfl: 3    levothyroxine (SYNTHROID) 25 MCG tablet, Take 1 tablet (25 mcg total) by mouth once daily., Disp: 90 tablet, Rfl: 3    losartan (COZAAR) 25 MG tablet, Take 25 mg by mouth once daily., Disp: , Rfl:     magnesium oxide (MAG-OX) 400 mg (241.3 mg magnesium) tablet, Take 800 mg by mouth every evening., Disp: , Rfl:     montelukast (SINGULAIR) 10 mg tablet, Take 1 tablet (10 mg total) by mouth every evening., Disp: 30 tablet, Rfl: 8    MULTIVIT-IRON-MIN-FOLIC ACID 3,500-18-0.4 UNIT-MG-MG ORAL CHEW, Take 1 tablet by mouth 2 (two) times daily. , Disp: , Rfl:     pantoprazole (PROTONIX) 40 MG tablet, TAKE 1 TABLET BY MOUTH ONCE DAILY (Patient taking differently: Take 40 mg by mouth once daily. ), Disp: 90 tablet, Rfl: 3    potassium chloride SA (K-DUR,KLOR-CON) 20 MEQ tablet, TAKE 1 TABLET BY MOUTH TWICE DAILY (Patient taking differently: Take 20 mEq by mouth 2 (two) times daily. ), Disp: 180 tablet, Rfl: 3    torsemide (DEMADEX) 20 MG Tab, TAKE ONE TABLET BY MOUTH TWICE DAILY (Patient taking differently: Take 20 mg by mouth 2 (two) times daily. ), Disp: 60 tablet, Rfl: 2    TURMERIC ORAL, Take 1 tablet by mouth once daily. , Disp: , Rfl:     cinnamon bark (CINNAMON ORAL), Take 1 tablet by mouth once daily. , Disp: , Rfl:     clotrimazole-betamethasone 1-0.05% (LOTRISONE) cream, , Disp: , Rfl:     diclofenac (VOLTAREN) 0.1 % ophthalmic solution, Place 1 drop into the right eye 4 (four) times daily. Start drops 3 days before surgery, Disp: 5 mL, Rfl: 3    diclofenac sodium (VOLTAREN) 1 % Gel, Apply 2 g topically once daily., Disp: , Rfl:     DYMISTA 137-50 mcg/spray Spry nassal spray, as needed. , Disp: , Rfl:     ferrous sulfate (IRON) 325 mg (65 mg iron) Tab tablet, Take 325 mg by mouth daily with breakfast., Disp: , Rfl:     fish oil-omega-3 fatty acids 300-1,000 mg capsule, Take 1 capsule by mouth once daily., Disp: , Rfl:     hydrocortisone 2.5 %  cream, Apply topically nightly., Disp: 3.5 g, Rfl: 1    L-LYSINE ORAL, Take 1 tablet by mouth once daily. , Disp: , Rfl:     metronidazole 1% (METROGEL) 1 % Gel, Apply topically once daily. Prn sores on scalp., Disp: 60 g, Rfl: 2    mupirocin (BACTROBAN) 2 % ointment, APPLY OINTMENT TOPICALLY TO AFFECTED AREA ON NOSE THREE TIMES DAILY AS DIRECTED, Disp: 15 g, Rfl: 11    oxyCODONE (ROXICODONE) 15 MG Tab, Take 1 tablet (15 mg total) by mouth every 6 (six) hours as needed for Pain., Disp: 30 tablet, Rfl: 0    SSD 1 % cream, , Disp: , Rfl:     Facility-Administered Medications Ordered in Other Encounters:     lactated ringers infusion, , Intravenous, Continuous, Shaheen Hanson MD, Last Rate: 10 mL/hr at 08/31/18 0739    lactated ringers infusion, , Intravenous, Continuous, John Villanueva MD, Last Rate: 10 mL/hr at 09/03/20 0640    lidocaine (PF) 10 mg/ml (1%) injection 10 mg, 1 mL, Intradermal, Once, John Villanueva MD      Scheduled Meds:   allopurinoL  200 mg Oral Nightly    [START ON 9/21/2020] ascorbic acid (vitamin C)  500 mg Oral Daily    atenoloL  25 mg Oral BID    [START ON 9/21/2020] calcitRIOL  0.25 mcg Oral Every Mon, Fri    [START ON 9/21/2020] ferrous sulfate  325 mg Oral Daily with breakfast    [START ON 9/21/2020] FLUoxetine  40 mg Oral Daily    gabapentin  600 mg Oral TID    gentamicin  80 mg Intramuscular 1 time in Clinic/HOD    [START ON 9/21/2020] levothyroxine  25 mcg Oral Daily    [START ON 9/21/2020] losartan  25 mg Oral Daily    magnesium oxide  800 mg Oral QHS    montelukast  10 mg Oral QHS    [START ON 9/21/2020] pantoprazole  40 mg Oral Daily     Continuous Infusions:  PRN Meds:.acetaminophen, diphenoxylate-atropine 2.5-0.025 mg, melatonin, nitroGLYCERIN, ondansetron, ondansetron, polyethylene glycol, sodium chloride 0.9%      Asya Harris  [facility] Hospitalist  09/20/2020

## 2020-09-20 NOTE — ED PROVIDER NOTES
Encounter Date: 9/20/2020       History     Chief Complaint   Patient presents with    Dizziness     69-year-old female with a history of HTN, HLD, IDDM, CHF, hypothyroidism, TIA presents to the ER with dizziness.  Patient states that for past couple of days, she has had recurring episodes of dizziness that she describes as lightheadedness.  States that she will intermittently feel like she is going to pass out.  Comes and goes randomly, sometimes at rest and sometimes with position change.  Endorses some heaviness in her chest yesterday, but this was not necessarily associated with the lightheadedness.  Occasional nausea.  Denies fever, vomiting, palpitations, shortness of breath, abdominal pain, or changes in bladder function.  Has had some loose stool lately, but not bloody or black.  Denies pain or swelling in extremities. Denies recent long distance travel or surgery.  Denies history of DVT, PE, MI.  Nonsmoker.  No history of CAD.        Review of patient's allergies indicates:   Allergen Reactions    Adhesive Other (See Comments)     Blisters    Ceclor [cefaclor] Hives    Cleocin [clindamycin hcl] Hives    Erythromycin Hives    Aleve [naproxen sodium]      Unable to take secondary to kidney function.     Macrodantin [nitrofurantoin macrocrystalline] Hives    Motrin [ibuprofen]      Unable to take secondary to kidney functions.    Advair diskus [fluticasone propion-salmeterol] Other (See Comments)     Dry mouth    Levaquin [levofloxacin] Dermatitis    Macrobid [nitrofurantoin monohyd/m-cryst] Other (See Comments)     Stomach pain/ GI issues    Remeron [mirtazapine] Palpitations and Other (See Comments)     Jittery    Restoril [temazepam] Other (See Comments)     LIGHTHEADED UPON WAKING.with poor results    Sulfa (sulfonamide antibiotics) Other (See Comments)     Hold due to renal problems    Trazodone Palpitations    Vancomycin analogues Rash     Feet broke out/inflammed blood vessels        Past Medical History:   Diagnosis Date    Allergy     multiple antibiotic allergies    Anemia     Anxiety     Arthritis     Asthma     CHF (congestive heart failure)     NYHA class III     Chronic kidney disease     Colon polyp     benign    COPD (chronic obstructive pulmonary disease)     DLCO 37% , and mild pulmonary HTN    Depression     Diabetes mellitus     Diabetes mellitus without complication     Diabetic retinopathy     Diastolic dysfunction     Diverticulitis of large intestine without perforation or abscess without bleeding 2/12/2018    Diverticulosis     Fracture of lumbar spine     GERD (gastroesophageal reflux disease)     Hernia of unspecified site of abdominal cavity without mention of obstruction or gangrene     Hyperlipidemia     Hypertension     hypertensive renal    IBS (irritable bowel syndrome)     Mild nonproliferative diabetic retinopathy(362.04) 11/25/2013    Morbid obesity     Nuclear sclerosis 11/25/2013    Osteoporosis     Peripheral edema     Rash     S/P LASIK (laser assisted in situ keratomileusis)     Sleep apnea     sleep apnea uses bipap.    Thyroid disease     on synthroid    TIA (transient ischemic attack)     Urinary tract infection      Past Surgical History:   Procedure Laterality Date    ABDOMINAL SURGERY      ADENOIDECTOMY      ARTHROSCOPIC CHONDROPLASTY OF KNEE JOINT Right 8/31/2018    Procedure: ARTHROSCOPY, KNEE, WITH CHONDROPLASTY;  Surgeon: Larry Molina MD;  Location: [facility];  Service: Orthopedics;  Laterality: Right;  Tricompartmental chondroplasty    CATARACT EXTRACTION Right 08/13/2020    LF    CATARACT EXTRACTION Left 09/03/2020    LF    COLONOSCOPY  03/05/2013    repeat in 5 years    COLONOSCOPY N/A 5/31/2017    Procedure: COLONOSCOPY;  Surgeon: Luis Navarro MD;  Location: [facility];  Service: Endoscopy;  Laterality: N/A;    COLONOSCOPY N/A 4/11/2018    Procedure: COLONOSCOPY;  Surgeon: Luis Navarro,  MD;  Location: [facility];  Service: Endoscopy;  Laterality: N/A;    COSMETIC SURGERY      belt abdominoplasty    CYSTOSCOPY      CYSTOSCOPY N/A 8/6/2018    Procedure: CYSTOSCOPY;  Surgeon: Angy Campos MD;  Location: [facility];  Service: Urology;  Laterality: N/A;    EYE SURGERY Right     mazzulla    GASTRECTOMY      gastric sleeve    gastric sleeve      HERNIA REPAIR      5 years old    HYSTERECTOMY      ovaries remain    JOINT REPLACEMENT Left     knee replacement    KNEE ARTHROPLASTY Left 4/16/2019    Procedure: ARTHROPLASTY, KNEE;  Surgeon: Larry Molina MD;  Location: [facility];  Service: Orthopedics;  Laterality: Left;  [place]    KNEE ARTHROSCOPY W/ MENISCECTOMY Right 8/31/2018    Procedure: ARTHROSCOPY, KNEE, WITH MENISCECTOMY;  Surgeon: Larry Molina MD;  Location: [facility];  Service: Orthopedics;  Laterality: Right;    PHACOEMULSIFICATION OF CATARACT Right 8/13/2020    Procedure: PHACOEMULSIFICATION, CATARACT;  Surgeon: Meek Haro Jr., MD;  Location: [facility] OR;  Service: Ophthalmology;  Laterality: Right;  Right/DM    PHACOEMULSIFICATION OF CATARACT Left 9/3/2020    Procedure: PHACOEMULSIFICATION, CATARACT WITH O.R.A;  Surgeon: Meek Haro Jr., MD;  Location: [facility] OR;  Service: Ophthalmology;  Laterality: Left;  Left/DM    REFRACTIVE SURGERY      mono va//ou//    RHINOPLASTY TIP      TONSILLECTOMY      TOTAL KNEE ARTHROPLASTY Left 4/16/19    TUBAL LIGATION      UPPER GASTROINTESTINAL ENDOSCOPY  03/05/2013    UPPER GASTROINTESTINAL ENDOSCOPY  08/24/2016    Dr. Veras, repeat in 8 weeks    UPPER GASTROINTESTINAL ENDOSCOPY  07/21/2016    Dr. Randall     Family History   Problem Relation Age of Onset    Heart disease Mother 59    Cancer Mother 59        throat    Allergies Mother     Diabetes Mother     Breast cancer Mother     Heart disease Father 70        flutter    Allergies Father     Diabetes Father     Cancer Sister 22        22 thyroid,49   breast    Allergies Sister     Breast cancer Sister     Diabetes Sister     Cancer Brother 62        lung    Diabetes Brother     Asthma Daughter     Diabetes Daughter     Depression Daughter     Asthma Grandchild     Eczema Grandchild     Eczema Grandchild     Breast cancer Maternal Grandmother     Ovarian cancer Cousin     Glaucoma Neg Hx     Macular degeneration Neg Hx     Retinal detachment Neg Hx      Social History     Tobacco Use    Smoking status: Former Smoker     Packs/day: 1.00     Years: 4.00     Pack years: 4.00     Types: Cigarettes     Quit date:      Years since quittin.7    Smokeless tobacco: Never Used    Tobacco comment: quit , lived with smokers    Substance Use Topics    Alcohol use: Not Currently     Frequency: Never     Binge frequency: Never    Drug use: No     Review of Systems   Constitutional: Negative for fever.   HENT: Negative for sore throat.    Respiratory: Negative for shortness of breath.    Cardiovascular: Negative for chest pain.   Gastrointestinal: Positive for nausea. Negative for abdominal pain and vomiting.   Genitourinary: Negative for dysuria.   Musculoskeletal: Negative for back pain.   Skin: Negative for rash.   Neurological: Positive for dizziness and light-headedness. Negative for weakness.   Hematological: Does not bruise/bleed easily.   All other systems reviewed and are negative.      Physical Exam     Initial Vitals [20 1326]   BP Pulse Resp Temp SpO2   (!) 143/67 79 18 98.6 °F (37 °C) 96 %      MAP       --         Physical Exam    Constitutional: She appears well-developed and well-nourished. No distress.   HENT:   Head: Normocephalic and atraumatic.   Mouth/Throat: Oropharynx is clear and moist.   Eyes: Conjunctivae and EOM are normal. Pupils are equal, round, and reactive to light.   Neck: Normal range of motion.   Cardiovascular: Normal rate, regular rhythm, normal heart sounds and intact distal pulses.   No murmur  heard.  Pulmonary/Chest: Breath sounds normal. No respiratory distress. She has no wheezes. She has no rhonchi. She has no rales.   Abdominal: Soft. Bowel sounds are normal. She exhibits no distension. There is no abdominal tenderness. There is no rebound and no guarding.   Musculoskeletal: Normal range of motion. No tenderness or edema.   Neurological: She is alert and oriented to person, place, and time. She has normal strength. She is not disoriented. No cranial nerve deficit or sensory deficit. She exhibits normal muscle tone. Coordination normal. GCS eye subscore is 4. GCS verbal subscore is 5. GCS motor subscore is 6.   AAO. EOMI, PERRL. CN II-XII intact. BUE and BLE 5/5 strength. Normal sensation.  Normal finger-to-nose, heel-to-shin, rapid alternating hand movements.   Skin: Skin is warm and dry.   Psychiatric: She has a normal mood and affect. Thought content normal.         ED Course   Procedures  Labs Reviewed   COMPREHENSIVE METABOLIC PANEL - Abnormal; Notable for the following components:       Result Value    Glucose 123 (*)     eGFR if  59.2 (*)     eGFR if non  51.3 (*)     All other components within normal limits   POCT GLUCOSE - Abnormal; Notable for the following components:    POC Glucose 160 (*)     All other components within normal limits   CBC W/ AUTO DIFFERENTIAL   B-TYPE NATRIURETIC PEPTIDE   MAGNESIUM   TROPONIN I   TSH   SARS-COV-2 RNA AMPLIFICATION, QUAL   URINALYSIS, REFLEX TO URINE CULTURE   LIPID PANEL   TROPONIN I   PROTIME-INR   APTT   POCT GLUCOSE   POCT GLUCOSE MONITORING CONTINUOUS   POCT GLUCOSE MONITORING CONTINUOUS        ECG Results          EKG 12-lead (In process)  Result time 09/20/20 17:43:37    In process by Interface, Lab In [facility] (09/20/20 17:43:37)                 Narrative:    Test Reason : R42,    Vent. Rate : 070 BPM     Atrial Rate : 070 BPM     P-R Int : 168 ms          QRS Dur : 144 ms      QT Int : 442 ms       P-R-T Axes :  067 000 085 degrees     QTc Int : 477 ms    Normal sinus rhythm  Left bundle branch block  Abnormal ECG      Referred By: [email or web address]   SELF           Confirmed By:                             Imaging Results    None          Medical Decision Making:   Initial Assessment:   69-year-old female with a history of HTN, HLD, IDDM, CHF, hypothyroidism, TIA presents to the ER with near syncope.  ED Management:  Plan:  Afebrile, vital signs stable.  O2 sat 99% on room air.  Cardiac workup, TSH.  EKG.  Orthostatic vital signs.  Possible orthostatic or vasovagal near-syncope, but concern for dysrhythmia or other cardiac cause.  Patient has multiple risk factors and comorbidities.  She did have an episode of chest pain yesterday.  She is currently in between cardiologist.  Possible admission.    Orthostatic vitals show no change an objective vital signs, but patient endorses feeling lightheadedness.  Lab showed WBC 7.3.  H&H 12.9/39.3.  BUN 19, creatinine 1.1.  Troponin less than 0.03, BNP 83.  TSH within normal limits at 2.15.  EKG shows left bundle branch block without acute ST changes.  .  This appears similar to previous.  Unclear cause of patient's near-syncope at this time.  Low suspicion for central cause.  Possible orthostatic.  Will start IV fluids.  Patient did have some chest pain as well yesterday, and she has multiple risk factors and comorbidities.  Will admit for further evaluation of her near syncope and chest pain.                             Clinical Impression:       ICD-10-CM ICD-9-CM   1. Chest pain, unspecified type  R07.9 786.50   2. Lightheadedness  R42 780.4   3. Near syncope  R55 780.2                          ED Disposition Condition    Observation                             Juan Mccracken MD  09/20/20 2050

## 2020-09-20 NOTE — ED NOTES
"First contact with patient. Pt is awake and alert today. She reports that the gets dizzy spells at night. Onset  approx 1 week ago. Pt states last night "was bad" and she has been dizzy today. Dizziness is worse when she stands up. Pt reports nausea without vomiting. Pt says when she has these episodes, she feels as though she is going to "pass out." Pt has hx of IBS and says she had no BM from Wed to Friday and then 1 loose stool yesterday. Chronic cough that she has had approx 1 year. Pt states she as shortness "a little bit." She reports tightness in her chest  Yesterday while sitting at her computer that spontaneously resolved and denies any chest tightness today. Pt states she has been dropping things out of her hands for approx 1 month.   "

## 2020-09-20 NOTE — ED NOTES
"Pt sitting up at side of bed. Informed RN that her "sugar was dropping." RN checked sugar and it was 81. Dr. Mccracken informed and said it was ok for pt to eat. [place] and juice provided to pt.   "

## 2020-09-21 ENCOUNTER — CLINICAL SUPPORT (OUTPATIENT)
Dept: CARDIOLOGY | Facility: HOSPITAL | Age: 70
End: 2020-09-21
Attending: INTERNAL MEDICINE
Payer: MEDICARE

## 2020-09-21 VITALS
RESPIRATION RATE: 18 BRPM | BODY MASS INDEX: 49.05 KG/M2 | DIASTOLIC BLOOD PRESSURE: 62 MMHG | TEMPERATURE: 98 F | OXYGEN SATURATION: 95 % | WEIGHT: 266.56 LBS | HEIGHT: 62 IN | SYSTOLIC BLOOD PRESSURE: 133 MMHG | HEART RATE: 81 BPM

## 2020-09-21 VITALS — BODY MASS INDEX: 48.95 KG/M2 | WEIGHT: 266 LBS | HEIGHT: 62 IN

## 2020-09-21 PROBLEM — R42 POSTURAL DIZZINESS WITH NEAR SYNCOPE: Status: RESOLVED | Noted: 2020-09-20 | Resolved: 2020-09-21

## 2020-09-21 PROBLEM — R07.9 CHEST PAIN: Status: RESOLVED | Noted: 2020-09-20 | Resolved: 2020-09-21

## 2020-09-21 PROBLEM — R55 POSTURAL DIZZINESS WITH NEAR SYNCOPE: Status: RESOLVED | Noted: 2020-09-20 | Resolved: 2020-09-21

## 2020-09-21 PROBLEM — I50.22 CHRONIC SYSTOLIC CONGESTIVE HEART FAILURE: Status: ACTIVE | Noted: 2020-09-21

## 2020-09-21 LAB
ALBUMIN SERPL BCP-MCNC: 3.1 G/DL (ref 3.5–5.2)
ALP SERPL-CCNC: 95 U/L (ref 55–135)
ALT SERPL W/O P-5'-P-CCNC: 20 U/L (ref 10–44)
ANION GAP SERPL CALC-SCNC: 7 MMOL/L (ref 8–16)
AORTIC ROOT ANNULUS: 2.84 CM
AORTIC VALVE CUSP SEPERATION: 2.22 CM
AST SERPL-CCNC: 22 U/L (ref 10–40)
AV INDEX (PROSTH): 0.55
AV MEAN GRADIENT: 6 MMHG
AV PEAK GRADIENT: 10 MMHG
AV VALVE AREA: 1.65 CM2
AV VELOCITY RATIO: 61.04
BACTERIA #/AREA URNS HPF: NEGATIVE /HPF
BASOPHILS # BLD AUTO: 0.02 K/UL (ref 0–0.2)
BASOPHILS NFR BLD: 0.3 % (ref 0–1.9)
BILIRUB SERPL-MCNC: 0.5 MG/DL (ref 0.1–1)
BILIRUB UR QL STRIP: NEGATIVE
BSA FOR ECHO PROCEDURE: 2.3 M2
BUN SERPL-MCNC: 22 MG/DL (ref 8–23)
CALCIUM SERPL-MCNC: 8.7 MG/DL (ref 8.7–10.5)
CHLORIDE SERPL-SCNC: 105 MMOL/L (ref 95–110)
CK MB SERPL-MCNC: 2.1 NG/ML (ref 0.1–6.5)
CK MB SERPL-MCNC: 2.2 NG/ML (ref 0.1–6.5)
CLARITY UR: CLEAR
CO2 SERPL-SCNC: 26 MMOL/L (ref 23–29)
COLOR UR: YELLOW
CREAT SERPL-MCNC: 1.2 MG/DL (ref 0.5–1.4)
CV ECHO LV RWT: 0.45 CM
DIFFERENTIAL METHOD: ABNORMAL
DOP CALC AO PEAK VEL: 1.61 M/S
DOP CALC AO VTI: 36.18 CM
DOP CALC LVOT AREA: 3 CM2
DOP CALC LVOT DIAMETER: 1.96 CM
DOP CALC LVOT PEAK VEL: 98.27 M/S
DOP CALC LVOT STROKE VOLUME: 59.65 CM3
DOP CALCLVOT PEAK VEL VTI: 19.78 CM
E WAVE DECELERATION TIME: 174.65 MSEC
E/A RATIO: 1.05
E/E' RATIO: 21.5 M/S
ECHO LV POSTERIOR WALL: 1.16 CM (ref 0.6–1.1)
EOSINOPHIL # BLD AUTO: 0.2 K/UL (ref 0–0.5)
EOSINOPHIL NFR BLD: 2.1 % (ref 0–8)
ERYTHROCYTE [DISTWIDTH] IN BLOOD BY AUTOMATED COUNT: 14.1 % (ref 11.5–14.5)
EST. GFR  (AFRICAN AMERICAN): 53.3 ML/MIN/1.73 M^2
EST. GFR  (NON AFRICAN AMERICAN): 46.2 ML/MIN/1.73 M^2
FRACTIONAL SHORTENING: 14 % (ref 28–44)
GLUCOSE SERPL-MCNC: 164 MG/DL (ref 70–110)
GLUCOSE SERPL-MCNC: 242 MG/DL (ref 70–110)
GLUCOSE SERPL-MCNC: 328 MG/DL (ref 70–110)
GLUCOSE UR QL STRIP: ABNORMAL
HCT VFR BLD AUTO: 35.4 % (ref 37–48.5)
HGB BLD-MCNC: 11.4 G/DL (ref 12–16)
HGB UR QL STRIP: NEGATIVE
HYALINE CASTS #/AREA URNS LPF: 9 /LPF
IMM GRANULOCYTES # BLD AUTO: 0.02 K/UL (ref 0–0.04)
IMM GRANULOCYTES NFR BLD AUTO: 0.3 % (ref 0–0.5)
INTERVENTRICULAR SEPTUM: 1.13 CM (ref 0.6–1.1)
IVRT: 124.32 MSEC
KETONES UR QL STRIP: NEGATIVE
LEFT ATRIUM SIZE: 5.24 CM
LEFT INTERNAL DIMENSION IN SYSTOLE: 4.41 CM (ref 2.1–4)
LEFT VENTRICLE MASS INDEX: 106 G/M2
LEFT VENTRICULAR INTERNAL DIMENSION IN DIASTOLE: 5.14 CM (ref 3.5–6)
LEFT VENTRICULAR MASS: 228.88 G
LEUKOCYTE ESTERASE UR QL STRIP: ABNORMAL
LV LATERAL E/E' RATIO: 21.5 M/S
LV SEPTAL E/E' RATIO: 21.5 M/S
LYMPHOCYTES # BLD AUTO: 1.8 K/UL (ref 1–4.8)
LYMPHOCYTES NFR BLD: 23.7 % (ref 18–48)
MAGNESIUM SERPL-MCNC: 2 MG/DL (ref 1.6–2.6)
MCH RBC QN AUTO: 30.4 PG (ref 27–31)
MCHC RBC AUTO-ENTMCNC: 32.2 G/DL (ref 32–36)
MCV RBC AUTO: 94 FL (ref 82–98)
MICROSCOPIC COMMENT: ABNORMAL
MONOCYTES # BLD AUTO: 0.5 K/UL (ref 0.3–1)
MONOCYTES NFR BLD: 6.2 % (ref 4–15)
MV PEAK A VEL: 1.23 M/S
MV PEAK E VEL: 1.29 M/S
NEUTROPHILS # BLD AUTO: 5.3 K/UL (ref 1.8–7.7)
NEUTROPHILS NFR BLD: 67.4 % (ref 38–73)
NITRITE UR QL STRIP: NEGATIVE
NRBC BLD-RTO: 0 /100 WBC
PH UR STRIP: 6 [PH] (ref 5–8)
PISA TR MAX VEL: 2.7 M/S
PLATELET # BLD AUTO: 247 K/UL (ref 150–350)
PMV BLD AUTO: 9.7 FL (ref 9.2–12.9)
POTASSIUM SERPL-SCNC: 4.3 MMOL/L (ref 3.5–5.1)
PROT SERPL-MCNC: 6.3 G/DL (ref 6–8.4)
PROT UR QL STRIP: ABNORMAL
PV PEAK VELOCITY: 118.56 CM/S
RA PRESSURE: 15 MMHG
RBC # BLD AUTO: 3.75 M/UL (ref 4–5.4)
RBC #/AREA URNS HPF: 0 /HPF (ref 0–4)
SODIUM SERPL-SCNC: 138 MMOL/L (ref 136–145)
SP GR UR STRIP: 1.02 (ref 1–1.03)
SQUAMOUS #/AREA URNS HPF: 2 /HPF
TDI LATERAL: 0.06 M/S
TDI SEPTAL: 0.06 M/S
TDI: 0.06 M/S
TR MAX PG: 29 MMHG
TROPONIN I SERPL DL<=0.01 NG/ML-MCNC: <0.03 NG/ML
TV REST PULMONARY ARTERY PRESSURE: 44 MMHG
URN SPEC COLLECT METH UR: ABNORMAL
UROBILINOGEN UR STRIP-ACNC: NEGATIVE EU/DL
WBC # BLD AUTO: 7.77 K/UL (ref 3.9–12.7)
WBC #/AREA URNS HPF: 5 /HPF (ref 0–5)
YEAST URNS QL MICRO: ABNORMAL

## 2020-09-21 PROCEDURE — 94640 AIRWAY INHALATION TREATMENT: CPT

## 2020-09-21 PROCEDURE — 82553 CREATINE MB FRACTION: CPT | Mod: 91

## 2020-09-21 PROCEDURE — 99900035 HC TECH TIME PER 15 MIN (STAT)

## 2020-09-21 PROCEDURE — 82962 GLUCOSE BLOOD TEST: CPT | Mod: 91

## 2020-09-21 PROCEDURE — 82553 CREATINE MB FRACTION: CPT

## 2020-09-21 PROCEDURE — 36415 COLL VENOUS BLD VENIPUNCTURE: CPT

## 2020-09-21 PROCEDURE — 93010 ELECTROCARDIOGRAM REPORT: CPT | Mod: ,,, | Performed by: INTERNAL MEDICINE

## 2020-09-21 PROCEDURE — 25000003 PHARM REV CODE 250: Performed by: INTERNAL MEDICINE

## 2020-09-21 PROCEDURE — 27000221 HC OXYGEN, UP TO 24 HOURS

## 2020-09-21 PROCEDURE — 94761 N-INVAS EAR/PLS OXIMETRY MLT: CPT

## 2020-09-21 PROCEDURE — 93010 EKG 12-LEAD: ICD-10-PCS | Mod: ,,, | Performed by: INTERNAL MEDICINE

## 2020-09-21 PROCEDURE — 81001 URINALYSIS AUTO W/SCOPE: CPT

## 2020-09-21 PROCEDURE — 25000003 PHARM REV CODE 250: Performed by: HOSPITALIST

## 2020-09-21 PROCEDURE — 83735 ASSAY OF MAGNESIUM: CPT

## 2020-09-21 PROCEDURE — 25000242 PHARM REV CODE 250 ALT 637 W/ HCPCS

## 2020-09-21 PROCEDURE — 85025 COMPLETE CBC W/AUTO DIFF WBC: CPT

## 2020-09-21 PROCEDURE — 93005 ELECTROCARDIOGRAM TRACING: CPT | Performed by: INTERNAL MEDICINE

## 2020-09-21 PROCEDURE — G0378 HOSPITAL OBSERVATION PER HR: HCPCS

## 2020-09-21 PROCEDURE — 84484 ASSAY OF TROPONIN QUANT: CPT

## 2020-09-21 PROCEDURE — 80053 COMPREHEN METABOLIC PANEL: CPT

## 2020-09-21 PROCEDURE — 96361 HYDRATE IV INFUSION ADD-ON: CPT

## 2020-09-21 PROCEDURE — 93306 TTE W/DOPPLER COMPLETE: CPT

## 2020-09-21 RX ORDER — ATENOLOL 25 MG/1
25 TABLET ORAL DAILY
Status: DISCONTINUED | OUTPATIENT
Start: 2020-09-21 | End: 2020-09-21 | Stop reason: HOSPADM

## 2020-09-21 RX ORDER — AMITRIPTYLINE HYDROCHLORIDE 100 MG/1
100 TABLET ORAL NIGHTLY
Start: 2020-09-21 | End: 2020-09-27 | Stop reason: SDUPTHER

## 2020-09-21 RX ORDER — OXYCODONE HYDROCHLORIDE 15 MG/1
15 TABLET ORAL EVERY 6 HOURS PRN
Qty: 30 TABLET | Refills: 0
Start: 2020-09-21

## 2020-09-21 RX ORDER — ALBUTEROL SULFATE 0.83 MG/ML
SOLUTION RESPIRATORY (INHALATION)
Status: COMPLETED
Start: 2020-09-21 | End: 2020-09-21

## 2020-09-21 RX ORDER — ATENOLOL 25 MG/1
25 TABLET ORAL DAILY
Qty: 180 TABLET | Refills: 3
Start: 2020-09-21 | End: 2020-09-28 | Stop reason: SDUPTHER

## 2020-09-21 RX ORDER — OXYCODONE AND ACETAMINOPHEN 7.5; 325 MG/1; MG/1
2 TABLET ORAL EVERY 6 HOURS PRN
Status: DISCONTINUED | OUTPATIENT
Start: 2020-09-21 | End: 2020-09-21 | Stop reason: HOSPADM

## 2020-09-21 RX ORDER — ALBUTEROL SULFATE 0.83 MG/ML
2.5 SOLUTION RESPIRATORY (INHALATION) EVERY 4 HOURS PRN
Status: DISCONTINUED | OUTPATIENT
Start: 2020-09-21 | End: 2020-09-21 | Stop reason: HOSPADM

## 2020-09-21 RX ADMIN — ALBUTEROL SULFATE 2.5 MG: 2.5 SOLUTION RESPIRATORY (INHALATION) at 04:09

## 2020-09-21 RX ADMIN — CALCITRIOL CAPSULES 0.25 MCG 0.25 MCG: 0.25 CAPSULE ORAL at 04:09

## 2020-09-21 RX ADMIN — OXYCODONE HYDROCHLORIDE AND ACETAMINOPHEN 500 MG: 500 TABLET ORAL at 10:09

## 2020-09-21 RX ADMIN — OXYCODONE HYDROCHLORIDE AND ACETAMINOPHEN 2 TABLET: 7.5; 325 TABLET ORAL at 11:09

## 2020-09-21 RX ADMIN — FLUOXETINE 40 MG: 20 CAPSULE ORAL at 10:09

## 2020-09-21 RX ADMIN — LEVOTHYROXINE SODIUM 25 MCG: 25 TABLET ORAL at 05:09

## 2020-09-21 RX ADMIN — ATENOLOL 25 MG: 25 TABLET ORAL at 10:09

## 2020-09-21 RX ADMIN — PANTOPRAZOLE SODIUM 40 MG: 40 TABLET, DELAYED RELEASE ORAL at 05:09

## 2020-09-21 RX ADMIN — LOSARTAN POTASSIUM 25 MG: 25 TABLET, FILM COATED ORAL at 10:09

## 2020-09-21 RX ADMIN — GABAPENTIN 600 MG: 300 CAPSULE ORAL at 03:09

## 2020-09-21 RX ADMIN — FERROUS SULFATE TAB 325 MG (65 MG ELEMENTAL FE) 325 MG: 325 (65 FE) TAB at 10:09

## 2020-09-21 RX ADMIN — GABAPENTIN 600 MG: 300 CAPSULE ORAL at 10:09

## 2020-09-21 NOTE — PLAN OF CARE
PCP-Dr. Vargas  [facility]- PHN     09/21/20 1115   Discharge Assessment   Assessment Type Discharge Planning Assessment   Confirmed/corrected address and phone number on facesheet? Yes   Assessment information obtained from? Patient   Expected Length of Stay (days) 2   Communicated expected length of stay with patient/caregiver yes   Prior to hospitilization cognitive status: Alert/Oriented   Prior to hospitalization functional status: Assistive Equipment   Current cognitive status: Alert/Oriented   Current Functional Status: Assistive Equipment   Facility Arrived From: home   Lives With spouse;child(praveen), adult;grandchild(praveen)   Able to Return to Prior Arrangements yes   Is patient able to care for self after discharge? Yes   Who are your caregiver(s) and their phone number(s)? Mr. Mcnair, spouse [phone]   Patient's perception of discharge disposition home or selfcare   Readmission Within the Last 30 Days no previous admission in last 30 days   Patient currently being followed by outpatient case management? No   Patient currently receives any other outside agency services? No   Equipment Currently Used at Home CPAP;wheelchair;rollator;walker, rolling;cane, quad;3-in-1 commode;shower chair   Do you have any problems affording any of your prescribed medications? No   Is the patient taking medications as prescribed? yes   Does the patient have transportation home? Yes   Transportation Anticipated family or friend will provide   Dialysis Name and Scheduled days na   Does the patient receive services at the Coumadin Clinic? No   Discharge Plan A Home with family   Discharge Plan B Home with family   DME Needed Upon Discharge  none

## 2020-09-21 NOTE — HPI
"As per admitting provider's note:    "Fifi Mcnair is a 69 y.o.  female who  has a past medical history of Allergy, Anemia, Anxiety, Arthritis, Asthma, CHF (congestive heart failure), Chronic kidney disease, Colon polyp, COPD (chronic obstructive pulmonary disease), Depression, Diabetes mellitus, Diabetes mellitus without complication, Diabetic retinopathy, Diastolic dysfunction, Diverticulitis of large intestine without perforation or abscess without bleeding (2/12/2018), Diverticulosis, Fracture of lumbar spine, GERD (gastroesophageal reflux disease), Hernia of unspecified site of abdominal cavity without mention of obstruction or gangrene, Hyperlipidemia, Hypertension, IBS (irritable bowel syndrome), Mild nonproliferative diabetic retinopathy(362.04) (11/25/2013), Morbid obesity, Nuclear sclerosis (11/25/2013), Osteoporosis, Peripheral edema, Rash, S/P LASIK (laser assisted in situ keratomileusis), Sleep apnea, Thyroid disease, TIA (transient ischemic attack), and Urinary tract infection.. The patient presented to [facility] on 9/20/2020 with a primary complaint of Dizziness     Patient has been feeling lightheaded at bedtime which gradually resolves when she wakes up, but today morning her symptoms persist he early morning as well, with nausea  felt like about to pass out so she decided to come to ED.  Patient denies any history of fall or loss of consciousness.  Used to wear stockings with minimal help.  Her lightheadedness was precipitated with positional changes, not related to food intake.  Reports her blood pressure well controlled  Patient denies fever, chest pain or palpitation along with that lightheadedness, however she felt chest discomfort once yesterday.  She uses insulin pump at home, and her blood sugar was in low 80s, she missed of her meals and it happens at home too.   Even though patient reports orthostatic changes in ED no orthostatic hypotension noted in " "vitals.  No recent change in her blood pressure medications, denies any change in hydration or urine output.  Of note patient had a gastric sleeve surgery 6 years, started gaining weight back now and taking amitriptyline for many years without any side effects.  In ED vital signs stable , low  blood sugar, EKG no new ischemic changes chronic left bundle branch block, CKD 3, otherwise labs unremarkable"  "

## 2020-09-21 NOTE — RESPIRATORY THERAPY
09/21/20 0413   Patient Assessment/Suction   Respiratory Effort Unlabored   All Lung Fields Breath Sounds wheezes, expiratory   Rhythm/Pattern, Respiratory pattern regular   PRE-TX-O2   O2 Device (Oxygen Therapy) nasal cannula   $ Is the patient on Low Flow Oxygen? Yes   Flow (L/min) 2   SpO2 (!) 94 %   Pulse 83   Resp 16   Aerosol Therapy   $ Aerosol Therapy Charges Aerosol Treatment   Respiratory Treatment Status (SVN) given   Treatment Route (SVN) mask   Patient Position (SVN) HOB elevated   Post Treatment Assessment (SVN) breath sounds improved   Signs of Intolerance (SVN) none   Breath Sounds Post-Respiratory Treatment   Throughout All Fields Post-Treatment All Fields   Throughout All Fields Post-Treatment aeration increased   Post-treatment Heart Rate (beats/min) 80   Post-treatment Resp Rate (breaths/min) 16   Respiratory Evaluation   $ Care Plan Tech Time 15 min   Evaluation For New Orders   Admitting Diagnosis chest pain

## 2020-09-21 NOTE — PLAN OF CARE
09/21/20 0850   Patient Assessment/Suction   Level of Consciousness (AVPU) responds to voice   Respiratory Effort Normal;Unlabored   All Lung Fields Breath Sounds clear   PRE-TX-O2   O2 Device (Oxygen Therapy) room air   SpO2 96 %   Pulse Oximetry Type Intermittent   $ Pulse Oximetry - Multiple Charge Pulse Oximetry - Multiple   Pulse 86   Resp 16   Aerosol Therapy   $ Aerosol Therapy Charges PRN treatment not required   Respiratory Treatment Status (SVN) PRN treatment not required   Respiratory Evaluation   $ Care Plan Tech Time 15 min

## 2020-09-21 NOTE — HOSPITAL COURSE
During her short hospital stay CVA/TIA was ruled out by extensive neurocardiac workup.  Patient was admitted for presyncope/dizziness and all her symptoms resolved while inpatient.  Overall it appears that patient suffers from polypharmacy however she is very protective about her outpatient medications and does not want us to change anything.  Neurology and cardiology was consulted.  A 2D echo showed ejection fraction of 35% which I discussed with her.  I recommended to reduced atenolol to once a day.  I also advised her to see her PCP who can wean her off from multiple medications(for example amitriptyline, narcotics, gabapentin).  She was discharged in hemodynamically stable condition.     Instructions provided to follow up with primary care physician as outpatient. Patient verbalized understanding and is aware to contact primary care physician or return to ED if new or worsening symptoms.    Physical exam on the day of discharge:  General: Patient resting comfortably in no acute distress.  Morbidly obese  Lungs: CTA. Good air entry.  Cor: Regular rate and rhythm. No murmurs. No pedal edema.  Abd: Soft. Nontender. Non-distended.  Neuro: A&O x3. Moving all 4 extremities equally  Ext: No clubbing. No cyanosis.     Vital signs reviewed.  Nursing notes reviewed

## 2020-09-21 NOTE — PLAN OF CARE
Medicare Outpatient Observation Notice was signed, explained and given to patient/caregiver on 09/21/2020 at 9:58am     answered all questions

## 2020-09-21 NOTE — DISCHARGE SUMMARY
"[facility]  Discharge Summary      Patient Name: Fifi Mcnair  MRN: [id number]  Admission Date: 9/20/2020  Hospital Length of Stay: 0 days  Discharge Date and Time:  09/21/2020 6:20 PM  Attending Physician: No att. providers found   Discharging Provider: Tony Lara MD  Primary Care Provider: Werner Vargas MD      HPI:   As per admitting provider's note:    "Fifi Mcnair is a 69 y.o.  female who  has a past medical history of Allergy, Anemia, Anxiety, Arthritis, Asthma, CHF (congestive heart failure), Chronic kidney disease, Colon polyp, COPD (chronic obstructive pulmonary disease), Depression, Diabetes mellitus, Diabetes mellitus without complication, Diabetic retinopathy, Diastolic dysfunction, Diverticulitis of large intestine without perforation or abscess without bleeding (2/12/2018), Diverticulosis, Fracture of lumbar spine, GERD (gastroesophageal reflux disease), Hernia of unspecified site of abdominal cavity without mention of obstruction or gangrene, Hyperlipidemia, Hypertension, IBS (irritable bowel syndrome), Mild nonproliferative diabetic retinopathy(362.04) (11/25/2013), Morbid obesity, Nuclear sclerosis (11/25/2013), Osteoporosis, Peripheral edema, Rash, S/P LASIK (laser assisted in situ keratomileusis), Sleep apnea, Thyroid disease, TIA (transient ischemic attack), and Urinary tract infection.. The patient presented to [facility] on 9/20/2020 with a primary complaint of Dizziness     Patient has been feeling lightheaded at bedtime which gradually resolves when she wakes up, but today morning her symptoms persist he early morning as well, with nausea  felt like about to pass out so she decided to come to ED.  Patient denies any history of fall or loss of consciousness.  Used to wear stockings with minimal help.  Her lightheadedness was precipitated with positional changes, not related to food intake.  Reports her blood pressure well " "controlled  Patient denies fever, chest pain or palpitation along with that lightheadedness, however she felt chest discomfort once yesterday.  She uses insulin pump at home, and her blood sugar was in low 80s, she missed of her meals and it happens at home too.   Even though patient reports orthostatic changes in ED no orthostatic hypotension noted in vitals.  No recent change in her blood pressure medications, denies any change in hydration or urine output.  Of note patient had a gastric sleeve surgery 6 years, started gaining weight back now and taking amitriptyline for many years without any side effects.  In ED vital signs stable , low  blood sugar, EKG no new ischemic changes chronic left bundle branch block, CKD 3, otherwise labs unremarkable"    * No surgery found *      Hospital Course:   During her short hospital stay CVA/TIA was ruled out by extensive neurocardiac workup.  Patient was admitted for presyncope/dizziness and all her symptoms resolved while inpatient.  Overall it appears that patient suffers from polypharmacy however she is very protective about her outpatient medications and does not want us to change anything.  Neurology and cardiology was consulted.  A 2D echo showed ejection fraction of 35% which I discussed with her.  I recommended to reduced atenolol to once a day.  I also advised her to see her PCP who can wean her off from multiple medications(for example amitriptyline, narcotics, gabapentin).  She was discharged in hemodynamically stable condition.     Instructions provided to follow up with primary care physician as outpatient. Patient verbalized understanding and is aware to contact primary care physician or return to ED if new or worsening symptoms.    Physical exam on the day of discharge:  General: Patient resting comfortably in no acute distress.  Morbidly obese  Lungs: CTA. Good air entry.  Cor: Regular rate and rhythm. No murmurs. No pedal edema.  Abd: Soft. Nontender. " Non-distended.  Neuro: A&O x3. Moving all 4 extremities equally  Ext: No clubbing. No cyanosis.     Vital signs reviewed.  Nursing notes reviewed     Consults:   Consults (From admission, onward)        Status Ordering Provider     Inpatient consult to Cardiology  Once     Provider:  Beth Magallanes MD    Acknowledged BLAIR MCLAIN     Inpatient consult to Hospitalist  Once     Provider:  Tony Lara MD    Acknowledged FIDENCIO OROZCO     Inpatient consult to Neurology  Once     Provider:  John Galloway MD    Completed BLAIR MCLAIN          No new Assessment & Plan notes have been filed under this hospital service since the last note was generated.  Service: Hospital Medicine    Final Active Diagnoses:    Diagnosis Date Noted POA    PRINCIPAL PROBLEM:  Dizziness [R42] 09/20/2020 Unknown    Chronic systolic congestive heart failure [I50.22] 09/21/2020 Yes    Obesity [E66.9] 10/11/2018 Yes    LBBB (left bundle branch block) [I44.7] 05/07/2015 Yes    Controlled diabetes mellitus type 2 with complications [E11.8] 12/12/2014 Yes    Chronic diastolic heart failure, NYHA class 1 [I50.32] 08/05/2013 Yes    Acquired hypothyroidism [E03.9]  Yes    Chronic kidney disease, stage III (moderate) [N18.3]  Yes    Hypertension [I10]  Yes      Problems Resolved During this Admission:    Diagnosis Date Noted Date Resolved POA    Chest pain [R07.9] 09/20/2020 09/21/2020 Yes       Discharged Condition: good    Disposition: Home or Self Care    Follow Up:  Follow-up Information     Werner Vargas MD In 1 week.    Specialty: Family Medicine  Contact information:  [address]  [place] [address] [phone]             Beth Magallanes MD In 2 weeks.    Specialty: Cardiology  Contact information:  [address]  [address]  [place] [address] [phone]             Boston Oneal MD In 2 weeks.    Specialties: Vascular Neurology, Neurology  Contact information:  [address]  [address]  [place]  [place] [address]  [phone]                 Patient Instructions:      Diet Cardiac     Diet diabetic     Notify your health care provider if you experience any of the following:  persistent dizziness, light-headedness, or visual disturbances     Activity as tolerated       Significant Diagnostic Studies: Labs: All labs within the past 24 hours have been reviewed    Pending Diagnostic Studies:     Procedure Component Value Units Date/Time    EKG 12-LEAD [[id number]] Collected: 09/21/20 0613    Order Status: Sent Lab Status: In process Updated: 09/21/20 0736    Narrative:      Test Reason :     Vent. Rate : 093 BPM     Atrial Rate : 093 BPM     P-R Int : 164 ms          QRS Dur : 144 ms      QT Int : 394 ms       P-R-T Axes : 066 -08 096 degrees     QTc Int : 489 ms    Normal sinus rhythm  Left bundle branch block  Abnormal ECG  When compared with ECG of 20-SEP-2020 16:13,  No significant change was found    Referred By: ANUSHA   SELF           Confirmed By:          Medications:  Reconciled Home Medications:      Medication List      CHANGE how you take these medications    atenoloL 25 MG tablet  Commonly known as: TENORMIN  Take 1 tablet (25 mg total) by mouth once daily.  What changed: when to take this     calcitRIOL 0.25 MCG Cap  Commonly known as: ROCALTROL  TAKE ONE TABLET BY MOUTH TWICE A WEEK ON monday AND friday  What changed:   · how much to take  · how to take this  · when to take this     diphenoxylate-atropine 2.5-0.025 mg 2.5-0.025 mg per tablet  Commonly known as: LOMOTIL  TAKE ONE TABLET BY MOUTH 4 TIMES DAILY AS NEEDED FOR DIARRHEA  What changed: See the new instructions.        CONTINUE taking these medications    alendronate 35 MG tablet  Commonly known as: FOSAMAX  Take 1 tablet (35 mg total) by mouth every 7 days.     allopurinoL 100 MG tablet  Commonly known as: ZYLOPRIM  Take 2 tablets (200 mg total) by mouth nightly.     amitriptyline 100 MG tablet  Commonly known as: ELAVIL  Take 1 tablet (100  mg total) by mouth every evening. 1 tab at night     biotin 1 mg Cap  Take 1 tablet by mouth once daily.     CENTRUM 3,500-18-0.4 unit-mg-mg Chew  Generic drug: multivit-iron-min-folic acid  Take 1 tablet by mouth 2 (two) times daily.     CINNAMON ORAL  Take 1 tablet by mouth once daily.     clotrimazole-betamethasone 1-0.05% cream  Commonly known as: LOTRISONE     * diclofenac sodium 1 % Gel  Commonly known as: VOLTAREN  Apply 2 g topically once daily.     * diclofenac 0.1 % ophthalmic solution  Commonly known as: VOLTAREN  Place 1 drop into the right eye 4 (four) times daily. Start drops 3 days before surgery     DYMISTA 137-50 mcg/spray Spry nassal spray  Generic drug: azelastine-fluticasone  as needed.     fexofenadine 60 MG tablet  Commonly known as: ALLEGRA  Take 60 mg by mouth once daily.     fish oil-omega-3 fatty acids 300-1,000 mg capsule  Take 1 capsule by mouth once daily.     FLUoxetine 20 MG capsule  Take 2 capsules (40 mg total) by mouth once daily.     fluticasone furoate-vilanteroL 100-25 mcg/dose diskus inhaler  Commonly known as: BREO  Inhale 1 puff into the lungs once daily.     fluticasone propionate 50 mcg/actuation nasal spray  Commonly known as: FLONASE  1 spray (50 mcg total) by Each Nostril route once daily.     gabapentin 600 MG tablet  Commonly known as: NEURONTIN  Take 1 tablet (600 mg total) by mouth 3 (three) times daily.     HumaLOG U-100 Insulin 100 unit/mL injection  Generic drug: insulin lispro  Inject 100 Units into the skin once daily. Via pump     hydrocortisone 2.5 % cream  Apply topically nightly.     insulin glargine 100 units/mL (3mL) SubQ pen  Commonly known as: BASAGLAR KWIKPEN U-100 INSULIN  Inject 20 Units into the skin every evening. Use 10 units if BS over 210.Night before surgery.     IRON 325 mg (65 mg iron) Tab tablet  Generic drug: ferrous sulfate  Take 325 mg by mouth daily with breakfast.     L-LYSINE ORAL  Take 1 tablet by mouth once daily.      l-methylfolate-b2-b6-b12 6-5-50-1 mg Tab  Commonly known as: CEREFOLIN  Take 1 tablet by mouth once daily.     Lacto.acidophilus-Bif.animalis 5 billion cell Cpsp  Commonly known as: PROBIOTIC  Take 1 capsule by mouth once daily.     levothyroxine 25 MCG tablet  Commonly known as: SYNTHROID  Take 1 tablet (25 mcg total) by mouth once daily.     losartan 25 MG tablet  Commonly known as: COZAAR  Take 25 mg by mouth once daily.     magnesium oxide 400 mg (241.3 mg magnesium) tablet  Commonly known as: MAG-OX  Take 800 mg by mouth every evening.     metronidazole 1% 1 % Gel  Commonly known as: MetrogeL  Apply topically once daily. Prn sores on scalp.     montelukast 10 mg tablet  Commonly known as: SINGULAIR  Take 1 tablet (10 mg total) by mouth every evening.     mupirocin 2 % ointment  Commonly known as: BACTROBAN  APPLY OINTMENT TOPICALLY TO AFFECTED AREA ON NOSE THREE TIMES DAILY AS DIRECTED     oxyCODONE 15 MG Tab  Commonly known as: ROXICODONE  Take 1 tablet (15 mg total) by mouth every 6 (six) hours as needed for Pain.     pantoprazole 40 MG tablet  Commonly known as: PROTONIX  TAKE 1 TABLET BY MOUTH ONCE DAILY     potassium chloride SA 20 MEQ tablet  Commonly known as: K-DUR,KLOR-CON  TAKE 1 TABLET BY MOUTH TWICE DAILY     SSD 1 % cream  Generic drug: silver sulfADIAZINE 1%     torsemide 20 MG Tab  Commonly known as: DEMADEX  TAKE ONE TABLET BY MOUTH TWICE DAILY     TURMERIC ORAL  Take 1 tablet by mouth once daily.     VITAMIN B-12 5,000 mcg Subl  Generic drug: cyanocobalamin (vitamin B-12)  Place 5,000 mg under the tongue once a week.     VITAMIN C 500 MG tablet  Generic drug: ascorbic acid (vitamin C)  Take 500 mg by mouth once daily.         * This list has 2 medication(s) that are the same as other medications prescribed for you. Read the directions carefully, and ask your doctor or other care provider to review them with you.                Indwelling Lines/Drains at time of discharge:    Lines/Drains/Airways     None                 Time spent on the discharge of patient: 38 minutes  Patient was seen and examined on the date of discharge and determined to be suitable for discharge.         Tony Lara MD  Department of Hospital Medicine  [facility]

## 2020-09-21 NOTE — NURSING
Discharge instructions reviewed with pt. Questions answered. Copy of discharge instructions given to pt. Discharge home via w/c to vehicle. Accompanied by spouse.

## 2020-09-21 NOTE — CONSULTS
Cardiology consultation                                                                                                                              9/20//20    This 69-year-old white lady is admitted with a weak/near syncopal spell.    The patient has been dizzy for approximately the past week usually at bedtime.  The dizziness was constant 09/19/2020; there was some associated nausea but no vomiting.  There have been no falls or syncope.  The patient ambulates with a cane or walker.  She is unsteady on her feet.  She denies palpitation.  The patient reports bilateral leg edema especially the left lower extremity-status post left TKA.  The patient is on torsemide b.i.d..  There is a questionable history of congestive heart failure 20/30 years ago when she was admitted with acute shortness of breath to the hospital.  Patient sleeps on 2 pillows; she denies paroxysmal nocturnal dyspnea or orthopnea.  She sleeps elevated primarily due to reflux.  She did have some shortness of breath last night after admission.  She has sleep apnea; she does did not use a CPAP last night in the hospital.  Patient sees Dr. Tian and has been on Breo.  Her last episode of short shortness of breath was 1.5 years ago.  The patient had a Lexiscan Myoview stress test in 2016 which revealed possible anterior wall scar versus breast attenuation.  There is no definite ischemia.  Echocardiogram revealed mildly dilated left ventricle-54/37 mm, however.  Mild left ventricular hypertrophy-1.24/1.2 cm and EF of 58%.  The patient had a repeat Lexiscan Myoview stress test 07/24/2019 which revealed anteroseptal scar with possible mild reversible ischemia.  A nuclear medicine no lymphatic study was essentially normal in June 2018.    Past history:  Anemia.  Anxiety.  Arthritis.  History of asthma; COPD; obstructive sleep apnea on CPAP.   CHF by history.  CKD.  Colon polyps.  Depression.  Diabetes mellitus; diabetic retinopathy.  Dyslipidemia Colon diverticulitis and diverticulosis; GERD; gastric sleeve 8 years ago with 100 lb weight loss; the patient has gained approximately 20-30 lb in the last 6 months.  Lumbar spine fracture.  Hypothyroidism.  History of TIA.  Urinary tract infection.  Left TKA.  Cataract extraction intraocular lenses.    Social history:  The patient stopped smoking in 1990 she sleeps with his; for 3-4 years.  She does not drink any alcohol.    Family history:  Mother had heart disease at age 59.  Father had diabetes mellitus    Review of systems:  Twenty 30 lb weight gain over the last 6 months.  The patient appears to be noncompliant with her diet.  There is no history of hematemesis hematochezia melena.    Medications:  Review.  Flonase.  Alendronate 35 mg weekly.  Allopurinol 200 mg daily.  Vitamin-C 500 mg daily.  Calciferol twice weekly.  Vitamin B12 sublingually weekly.  Lomotil q.i.d. p.r.n. for diarrhea.  Allegra 60 mg daily.  Fluoxetine 40 mg daily.  Gabapentin 600 mg t.i.d..  Humeral continues in remission pump.  Basilar 20 units q.p.m. the neck vessels.  Cerefolin 1 tablet daily.  Synthroid 25 mg daily.  Losartan 25 mg daily.  Mag-Ox 800 mg daily.  Singular 10 mg daily.  In VT.  K-Dur 20 mg daily.  Demadex 20 mg b.i.d..  Clotrimazole betamethasone cream.  Voltaren gel.  Dymista nasal spray p.r.n..  Metronidazole 1% gel to source on scalp p.r.n..  Bactroban to the nose t.i.d. p.r.n..  Omega 3 fish oil.  Ferrous sulfate 325 mg daily.    Physical examination is:  This is a morbidly obese white lady no acute distress.    Blood pressure 113/54 respirations 20 per minute pulse 90 per minute temperature 98.4° F.  Eyes:  Conjunctival pallor or scleral icterus  Throat:  No masses are observed  Neck:  JVP is normal.  Hepatojugular reflex negative.  Carotids are without bruits.  Chest:  Air entry is equal bilaterally.  There  were no rales or rhonchi.  Heart:  S1-S2 well heard.  There are no murmurs gallops or rubs.  Abdomen:  Obese; nontender.  The liver edge is not palpable.  Extremities:  Mild bilateral pitting edema especially left lower extremity.      ECG reveals sinus rhythm with left bundle branch block-Old.    Imaging Results          X-Ray Chest AP Portable (Final result)  Result time 09/20/20 18:30:49    Final result by Isaiah Castro Jr., MD (09/20/20 18:30:49)                 Narrative:    PORTABLE CHEST X-RAY SINGLE VIEW    HISTORY: Chest pain. Dizziness.    PREVIOUS STUDIES: None available    FINDINGS:  The heart size and pulmonary vascularity are within the range of  normal.  There is no significant hilar nor mediastinal process.  The aerated lungs are well expanded and clear.  The right and left CP angles are rather sharp.  The osseous structures show nothing unusual.    IMPRESSION:   Negative chest.    Electronically Signed by Isaiah DOBBINS on 9/20/2020 6:37 PM                           Giovani hematocrit 12.9 and 39.3 WBC count 7250 id::  24,000 granulocytes 69% sodium 139 potassium 4.6 BUN 19 creatinine 1.1 total protein 7.3 albumin 3.8 cholesterol 145 triglycerides 186 HDL 43 LDL 65 troponin less than 0.03 x 2.  BNP is 83.  MRI of the brain revealed mild chronic involutional change.  MRA was normal.    Impression:  1.  Wake and near fainting spell.  No syncope.  2.  Old left bundle-branch block.  No significant cardiac arrhythmias on telemetry  3.  Chronic bilaterally lower extremity edema-normal BNP; long-term diuretic therapy  4.  COPD; sleep apnea on CPAP; morbid obesity; status post gastric sleeve  5.  Hypothyroidism; CKD; diabetes mellitus; hypertension    The patient does not appear to be orthostatic.  It might be prudent to decrease her torsemide 20 mg a day as an outpatient and monitor her edema closely; and also to assess if her symptoms of weakness improve.  Thank you for asking me to evaluate Ms.  Ervin

## 2020-09-21 NOTE — CONSULTS
[facility]  Neurology  Consult Note    Patient Name: Fifi Mcnair  MRN: [id number]  Admission Date: 9/20/2020  Hospital Length of Stay: 0 days  Code Status: Full Code   Attending Provider: Tony Lara MD  Consulting Provider: Gina Patterson NP  Primary Care Physician: Werner Vargas MD  Principal Problem:<principal problem not specified>    Inpatient consult to Neurology  Consult performed by: Gina Patterson NP  Consult ordered by: Asya Harris MD        Subjective:     Chief Complaint:  Dizziness    HPI Per EMR:   Fifi Mcnair is a 69 y.o.  female who  has a past medical history of Allergy, Anemia, Anxiety, Arthritis, Asthma, CHF (congestive heart failure), Chronic kidney disease, Colon polyp, COPD (chronic obstructive pulmonary disease), Depression, Diabetes mellitus, Diabetes mellitus without complication, Diabetic retinopathy, Diastolic dysfunction, Diverticulitis of large intestine without perforation or abscess without bleeding (2/12/2018), Diverticulosis, Fracture of lumbar spine, GERD (gastroesophageal reflux disease), Hernia of unspecified site of abdominal cavity without mention of obstruction or gangrene, Hyperlipidemia, Hypertension, IBS (irritable bowel syndrome), Mild nonproliferative diabetic retinopathy(362.04) (11/25/2013), Morbid obesity, Nuclear sclerosis (11/25/2013), Osteoporosis, Peripheral edema, Rash, S/P LASIK (laser assisted in situ keratomileusis), Sleep apnea, Thyroid disease, TIA (transient ischemic attack), and Urinary tract infection.. The patient presented to [facility] on 9/20/2020 with a primary complaint of Dizziness     Patient has been feeling lightheaded at bedtime which gradually resolves when she wakes up, but today morning her symptoms persist he early morning as well, with nausea  felt like about to pass out so she decided to come to ED.  Patient denies any history of fall or loss of consciousness.  Used to wear stockings  with minimal help.  Her lightheadedness was precipitated with positional changes, not related to food intake.  Reports her blood pressure well controlled  Patient denies fever, chest pain or palpitation along with that lightheadedness, however she felt chest discomfort once yesterday.  She uses insulin pump at home, and her blood sugar was in low 80s, she missed of her meals and it happens at home too.   Even though patient reports orthostatic changes in ED no orthostatic hypotension noted in vitals.  No recent change in her blood pressure medications, denies any change in hydration or urine output.  Of note patient had a gastric sleeve surgery 6 years, started gaining weight back now and taking amitriptyline for many years without any side effects.  In ED vital signs stable , low  blood sugar, EKG no new ischemic changes chronic left bundle branch block, CKD 3, otherwise labs unremarkable    Neurology consult note:  Pt seen and examined with  at bedside. Pt states she has dizziness that occurs at night. The dizziness occurred last on Saturday night but it did not go away in the morning. Pt state usually when she experiences dizziness, it goes away the following morning. Pt denies passing out. Also, pt states she has been dropping things involuntarily for about a month now. Pt reports having tingling sensation to bilateral arms. Pt denies any chest pain, SOB, nausea. Pt also states that she feels that she has short term memory loss for 2-3 months. Pt denies any recent changes in her medications.     Past Medical History:   Diagnosis Date    Allergy     multiple antibiotic allergies    Anemia     Anxiety     Arthritis     Asthma     CHF (congestive heart failure)     NYHA class III     Chronic kidney disease     Colon polyp     benign    COPD (chronic obstructive pulmonary disease)     DLCO 37% , and mild pulmonary HTN    Depression     Diabetes mellitus     Diabetes mellitus without complication      Diabetic retinopathy     Diastolic dysfunction     Diverticulitis of large intestine without perforation or abscess without bleeding 2/12/2018    Diverticulosis     Fracture of lumbar spine     GERD (gastroesophageal reflux disease)     Hernia of unspecified site of abdominal cavity without mention of obstruction or gangrene     Hyperlipidemia     Hypertension     hypertensive renal    IBS (irritable bowel syndrome)     Mild nonproliferative diabetic retinopathy(362.04) 11/25/2013    Morbid obesity     Nuclear sclerosis 11/25/2013    Osteoporosis     Peripheral edema     Rash     S/P LASIK (laser assisted in situ keratomileusis)     Sleep apnea     sleep apnea uses bipap.    Thyroid disease     on synthroid    TIA (transient ischemic attack)     Urinary tract infection        Past Surgical History:   Procedure Laterality Date    ABDOMINAL SURGERY      ADENOIDECTOMY      ARTHROSCOPIC CHONDROPLASTY OF KNEE JOINT Right 8/31/2018    Procedure: ARTHROSCOPY, KNEE, WITH CHONDROPLASTY;  Surgeon: Larry Molina MD;  Location: [facility];  Service: Orthopedics;  Laterality: Right;  Tricompartmental chondroplasty    CATARACT EXTRACTION Right 08/13/2020    LF    CATARACT EXTRACTION Left 09/03/2020    LF    COLONOSCOPY  03/05/2013    repeat in 5 years    COLONOSCOPY N/A 5/31/2017    Procedure: COLONOSCOPY;  Surgeon: Luis Navarro MD;  Location: [facility];  Service: Endoscopy;  Laterality: N/A;    COLONOSCOPY N/A 4/11/2018    Procedure: COLONOSCOPY;  Surgeon: Luis Navarro MD;  Location: [facility];  Service: Endoscopy;  Laterality: N/A;    COSMETIC SURGERY      belt abdominoplasty    CYSTOSCOPY      CYSTOSCOPY N/A 8/6/2018    Procedure: CYSTOSCOPY;  Surgeon: Angy Campos MD;  Location: [facility] OR;  Service: Urology;  Laterality: N/A;    EYE SURGERY Right     mazzulla    GASTRECTOMY      gastric sleeve    gastric sleeve      HERNIA REPAIR      5 years old     HYSTERECTOMY      ovaries remain    JOINT REPLACEMENT Left     knee replacement    KNEE ARTHROPLASTY Left 4/16/2019    Procedure: ARTHROPLASTY, KNEE;  Surgeon: Larry Molina MD;  Location: [facility] OR;  Service: Orthopedics;  Laterality: Left;  [place]    KNEE ARTHROSCOPY W/ MENISCECTOMY Right 8/31/2018    Procedure: ARTHROSCOPY, KNEE, WITH MENISCECTOMY;  Surgeon: Larry Molina MD;  Location: [facility] OR;  Service: Orthopedics;  Laterality: Right;    PHACOEMULSIFICATION OF CATARACT Right 8/13/2020    Procedure: PHACOEMULSIFICATION, CATARACT;  Surgeon: Meek Haro Jr., MD;  Location: [facility] OR;  Service: Ophthalmology;  Laterality: Right;  Right/DM    PHACOEMULSIFICATION OF CATARACT Left 9/3/2020    Procedure: PHACOEMULSIFICATION, CATARACT WITH O.R.A;  Surgeon: Meek Haro Jr., MD;  Location: [facility] OR;  Service: Ophthalmology;  Laterality: Left;  Left/DM    REFRACTIVE SURGERY      mono va//ou//    RHINOPLASTY TIP      TONSILLECTOMY      TOTAL KNEE ARTHROPLASTY Left 4/16/19    TUBAL LIGATION      UPPER GASTROINTESTINAL ENDOSCOPY  03/05/2013    UPPER GASTROINTESTINAL ENDOSCOPY  08/24/2016    Dr. Veras, repeat in 8 weeks    UPPER GASTROINTESTINAL ENDOSCOPY  07/21/2016    Dr. Randall       Review of patient's allergies indicates:   Allergen Reactions    Adhesive Other (See Comments)     Blisters    Ceclor [cefaclor] Hives    Cleocin [clindamycin hcl] Hives    Erythromycin Hives    Aleve [naproxen sodium]      Unable to take secondary to kidney function.     Macrodantin [nitrofurantoin macrocrystalline] Hives    Motrin [ibuprofen]      Unable to take secondary to kidney functions.    Advair diskus [fluticasone propion-salmeterol] Other (See Comments)     Dry mouth    Levaquin [levofloxacin] Dermatitis    Macrobid [nitrofurantoin monohyd/m-cryst] Other (See Comments)     Stomach pain/ GI issues    Remeron [mirtazapine] Palpitations and Other (See Comments)     Jittery    Restoril  [temazepam] Other (See Comments)     LIGHTHEADED UPON WAKING.with poor results    Sulfa (sulfonamide antibiotics) Other (See Comments)     Hold due to renal problems    Trazodone Palpitations    Vancomycin analogues Rash     Feet broke out/inflammed blood vessels         Current Neurological Medications:     Current Facility-Administered Medications on File Prior to Encounter   Medication    lactated ringers infusion    lactated ringers infusion    lidocaine (PF) 10 mg/ml (1%) injection 10 mg     Current Outpatient Medications on File Prior to Encounter   Medication Sig    alendronate (FOSAMAX) 35 MG tablet Take 1 tablet (35 mg total) by mouth every 7 days.    allopurinol (ZYLOPRIM) 100 MG tablet Take 2 tablets (200 mg total) by mouth nightly.    amitriptyline (ELAVIL) 100 MG tablet 1 tab at night (Patient taking differently: Take 100 mg by mouth every evening. 1 tab at night)    ascorbic acid, vitamin C, (VITAMIN C) 500 MG tablet Take 500 mg by mouth once daily.    atenolol (TENORMIN) 25 MG tablet Take 1 tablet (25 mg total) by mouth 2 (two) times daily.    biotin 1 mg Cap Take 1 tablet by mouth once daily.     calcitRIOL (ROCALTROL) 0.25 MCG Cap TAKE ONE TABLET BY MOUTH TWICE A WEEK ON monday AND friday (Patient taking differently: Take 0.25 mcg by mouth every Monday and Friday. TAKE ONE TABLET BY MOUTH TWICE A WEEK ON monday AND friday)    cyanocobalamin, vitamin B-12, (VITAMIN B-12) 5,000 mcg Subl Place 5,000 mg under the tongue once a week.    diphenoxylate-atropine 2.5-0.025 mg (LOMOTIL) 2.5-0.025 mg per tablet TAKE ONE TABLET BY MOUTH 4 TIMES DAILY AS NEEDED FOR DIARRHEA (Patient taking differently: Take 1 tablet by mouth 4 (four) times daily as needed for Diarrhea. )    fexofenadine (ALLEGRA) 60 MG tablet Take 60 mg by mouth once daily.    FLUoxetine 20 MG capsule Take 2 capsules (40 mg total) by mouth once daily.    fluticasone furoate-vilanterol (BREO) 100-25 mcg/dose diskus inhaler  Inhale 1 puff into the lungs once daily.    fluticasone propionate (FLONASE) 50 mcg/actuation nasal spray 1 spray (50 mcg total) by Each Nostril route once daily.    gabapentin (NEURONTIN) 600 MG tablet Take 1 tablet (600 mg total) by mouth 3 (three) times daily.    HUMALOG U-100 INSULIN 100 unit/mL injection Inject 100 Units into the skin once daily. Via pump    insulin (BASAGLAR KWIKPEN U-100 INSULIN) glargine 100 units/mL (3mL) SubQ pen Inject 20 Units into the skin every evening. Use 10 units if BS over 210.Night before surgery.    l-methylfolate-b2-b6-b12 (CEREFOLIN) 6-5-50-1 mg Tab Take 1 tablet by mouth once daily.    Lacto.acidophilus-Bif.animalis (PROBIOTIC) 5 billion cell CpSP Take 1 capsule by mouth once daily.    levothyroxine (SYNTHROID) 25 MCG tablet Take 1 tablet (25 mcg total) by mouth once daily.    losartan (COZAAR) 25 MG tablet Take 25 mg by mouth once daily.    magnesium oxide (MAG-OX) 400 mg (241.3 mg magnesium) tablet Take 800 mg by mouth every evening.    montelukast (SINGULAIR) 10 mg tablet Take 1 tablet (10 mg total) by mouth every evening.    MULTIVIT-IRON-MIN-FOLIC ACID 3,500-18-0.4 UNIT-MG-MG ORAL CHEW Take 1 tablet by mouth 2 (two) times daily.     pantoprazole (PROTONIX) 40 MG tablet TAKE 1 TABLET BY MOUTH ONCE DAILY (Patient taking differently: Take 40 mg by mouth once daily. )    potassium chloride SA (K-DUR,KLOR-CON) 20 MEQ tablet TAKE 1 TABLET BY MOUTH TWICE DAILY (Patient taking differently: Take 20 mEq by mouth 2 (two) times daily. )    torsemide (DEMADEX) 20 MG Tab TAKE ONE TABLET BY MOUTH TWICE DAILY (Patient taking differently: Take 20 mg by mouth 2 (two) times daily. )    TURMERIC ORAL Take 1 tablet by mouth once daily.     cinnamon bark (CINNAMON ORAL) Take 1 tablet by mouth once daily.     clotrimazole-betamethasone 1-0.05% (LOTRISONE) cream     diclofenac (VOLTAREN) 0.1 % ophthalmic solution Place 1 drop into the right eye 4 (four) times daily. Start drops  3 days before surgery    diclofenac sodium (VOLTAREN) 1 % Gel Apply 2 g topically once daily.    DYMISTA 137-50 mcg/spray Spry nassal spray as needed.     ferrous sulfate (IRON) 325 mg (65 mg iron) Tab tablet Take 325 mg by mouth daily with breakfast.    fish oil-omega-3 fatty acids 300-1,000 mg capsule Take 1 capsule by mouth once daily.    hydrocortisone 2.5 % cream Apply topically nightly.    L-LYSINE ORAL Take 1 tablet by mouth once daily.     metronidazole 1% (METROGEL) 1 % Gel Apply topically once daily. Prn sores on scalp.    mupirocin (BACTROBAN) 2 % ointment APPLY OINTMENT TOPICALLY TO AFFECTED AREA ON NOSE THREE TIMES DAILY AS DIRECTED    oxyCODONE (ROXICODONE) 15 MG Tab Take 1 tablet (15 mg total) by mouth every 6 (six) hours as needed for Pain.    SSD 1 % cream       Family History     Problem Relation (Age of Onset)    Allergies Mother, Father, Sister    Asthma Daughter, Grandchild    Breast cancer Mother, Sister, Maternal Grandmother    Cancer Mother (59), Sister (22), Brother (62)    Depression Daughter    Diabetes Mother, Father, Sister, Brother, Daughter    Eczema Grandchild, Grandchild    Heart disease Mother (59), Father (70)    Ovarian cancer Cousin        Tobacco Use    Smoking status: Former Smoker     Packs/day: 1.00     Years: 4.00     Pack years: 4.00     Types: Cigarettes     Quit date:      Years since quittin.7    Smokeless tobacco: Never Used    Tobacco comment: quit , lived with smokers    Substance and Sexual Activity    Alcohol use: Not Currently     Frequency: Never     Binge frequency: Never    Drug use: No    Sexual activity: Yes     Birth control/protection: Surgical     Review of Systems   Constitutional: Negative.    HENT: Negative.    Eyes: Negative.    Respiratory: Negative.    Cardiovascular: Negative.    Gastrointestinal: Negative.    Endocrine: Negative.    Musculoskeletal: Negative.    Skin: Negative.    Allergic/Immunologic: Negative.     Neurological: Positive for dizziness.   Hematological: Negative.    Psychiatric/Behavioral: Negative.      Objective:     Vital Signs (Most Recent):  Temp: 97.6 °F (36.4 °C) (09/21/20 1515)  Pulse: 81 (09/21/20 1515)  Resp: 18 (09/21/20 1515)  BP: 133/62 (09/21/20 1515)  SpO2: 95 % (09/21/20 1515) Vital Signs (24h Range):  Temp:  [97.6 °F (36.4 °C)-99.2 °F (37.3 °C)] 97.6 °F (36.4 °C)  Pulse:  [73-90] 81  Resp:  [13-29] 18  SpO2:  [94 %-99 %] 95 %  BP: (113-170)/(54-77) 133/62     Weight: 120.9 kg (266 lb 8.6 oz)  Body mass index is 48.75 kg/m².    Physical Exam  Vitals signs and nursing note reviewed. Exam conducted with a chaperone present.   Constitutional:       Appearance: Normal appearance.   HENT:      Head: Normocephalic and atraumatic.      Nose: Nose normal.   Eyes:      General: Lids are normal.      Extraocular Movements: Extraocular movements intact.      Pupils: Pupils are equal, round, and reactive to light.   Cardiovascular:      Rate and Rhythm: Normal rate and regular rhythm.   Pulmonary:      Effort: Pulmonary effort is normal.      Breath sounds: Normal breath sounds.   Abdominal:      Palpations: Abdomen is soft.   Musculoskeletal: Normal range of motion.   Skin:     General: Skin is warm and dry.      Capillary Refill: Capillary refill takes 2 to 3 seconds.   Neurological:      General: No focal deficit present.      Mental Status: She is alert and oriented to person, place, and time.      GCS: GCS eye subscore is 4. GCS verbal subscore is 5. GCS motor subscore is 6.      Cranial Nerves: Cranial nerves are intact.      Sensory: Sensation is intact.      Motor: Motor function is intact.      Coordination: Coordination is intact.      Deep Tendon Reflexes: Babinski sign absent on the left side.   Psychiatric:         Attention and Perception: Attention and perception normal.         Mood and Affect: Mood and affect normal.         Speech: Speech normal.         Behavior: Behavior normal.  Behavior is cooperative.         Cognition and Memory: Cognition and memory normal.         NEUROLOGICAL EXAMINATION:     MENTAL STATUS   Oriented to person, place, and time.   Oriented to person.   Oriented to place.   Attention: normal.   Speech: speech is normal   Level of consciousness: alert    CRANIAL NERVES     CN II   Visual fields full to confrontation.     CN III, IV, VI   Pupils are equal, round, and reactive to light.  Right pupil: Size: 3 mm. Reactivity: brisk.   Left pupil: Size: 3 mm. Reactivity: brisk.   Nystagmus: none     CN V   Facial sensation intact.     CN VII   Facial expression full, symmetric.     CN VIII   CN VIII normal.     CN XI   CN XI normal.     CN XII   CN XII normal.     SENSORY EXAM   Light touch normal.       Significant Labs:   CMP  Sodium   Date Value Ref Range Status   09/21/2020 138 136 - 145 mmol/L Final     Potassium   Date Value Ref Range Status   09/21/2020 4.3 3.5 - 5.1 mmol/L Final     Chloride   Date Value Ref Range Status   09/21/2020 105 95 - 110 mmol/L Final     CO2   Date Value Ref Range Status   09/21/2020 26 23 - 29 mmol/L Final     Glucose   Date Value Ref Range Status   09/21/2020 242 (H) 70 - 110 mg/dL Final     BUN, Bld   Date Value Ref Range Status   09/21/2020 22 8 - 23 mg/dL Final     Creatinine   Date Value Ref Range Status   09/21/2020 1.2 0.5 - 1.4 mg/dL Final   08/31/2013 1.1 0.5 - 1.4 mg/dL Final     Calcium   Date Value Ref Range Status   09/21/2020 8.7 8.7 - 10.5 mg/dL Final   08/31/2013 9.5 8.7 - 10.5 mg/dL Final     Total Protein   Date Value Ref Range Status   09/21/2020 6.3 6.0 - 8.4 g/dL Final     Albumin   Date Value Ref Range Status   09/21/2020 3.1 (L) 3.5 - 5.2 g/dL Final     Total Bilirubin   Date Value Ref Range Status   09/21/2020 0.5 0.1 - 1.0 mg/dL Final     Comment:     For infants and newborns, interpretation of results should be based  on gestational age, weight and in agreement with clinical  observations.  Premature Infant  recommended reference ranges:  Up to 24 hours.............<8.0 mg/dL  Up to 48 hours............<12.0 mg/dL  3-5 days..................<15.0 mg/dL  6-29 days.................<15.0 mg/dL       Alkaline Phosphatase   Date Value Ref Range Status   09/21/2020 95 55 - 135 U/L Final   08/30/2013 93 55 - 135 U/L Final     AST   Date Value Ref Range Status   09/21/2020 22 10 - 40 U/L Final   08/30/2013 21 10 - 40 U/L Final     ALT   Date Value Ref Range Status   09/21/2020 20 10 - 44 U/L Final     Anion Gap   Date Value Ref Range Status   09/21/2020 7 (L) 8 - 16 mmol/L Final   08/31/2013 12 5 - 15 meq/L Final     eGFR if    Date Value Ref Range Status   09/21/2020 53.3 (A) >60 mL/min/1.73 m^2 Final     eGFR if non    Date Value Ref Range Status   09/21/2020 46.2 (A) >60 mL/min/1.73 m^2 Final     Comment:     Calculation used to obtain the estimated glomerular filtration  rate (eGFR) is the CKD-EPI equation.          Lab Results   Component Value Date    WBC 7.77 09/21/2020    HGB 11.4 (L) 09/21/2020    HCT 35.4 (L) 09/21/2020    MCV 94 09/21/2020     09/21/2020           Significant Imaging:   CT Head:  No acute intracranial hemorrhage, edema or mass effect, and no acute  parenchymal abnormality. There is no hydrocephalus, evidence of  herniation or midline shift. The basal and suprasellar cisterns are  within normal limits. The osseous structures show no acute skull  fracture.     Very mild periventricular deep cerebral white matter low attenuation,  a nonspecific finding which can be seen in any diffuse white matter  process but most commonly associated with chronic microvascular  ischemic disease. There are scattered atheromatous calcifications in  the intracranial internal carotid arteries.     The orbits appear within normal limits noting likely bilateral lens  replacement. External auditory canals are unremarkable. The visualized  paranasal sinuses and mastoid air cells are  essentially clear.     IMPRESSION:  No acute intracranial process    MRI Brain:       FINDINGS:  No abnormal diffusion restriction to suggest an acute infarct, no  abnormal GRE signal intensity to suggest an intracranial bleed. There  is no hydrocephalus, herniation or midline shift and the  basal/suprasellar cisterns are patent. No cerebral/cerebellar atrophy  is seen when accounting for age. There are mild scattered  periventricular and deep cerebral white matter nodular T2 FLAIR  hyperintense foci, a nonspecific finding in this age group most  commonly associated with small vessel ischemic disease (and less  likely demyelinating/dysmyelinating processes).The pituitary gland and  infundibulum is normal in size without abnormal signal pattern. The  craniocervical junction is unremarkable. The temporal bones, internal  and external meati, and semicircular canals appear normal. The orbital  contents are within normal limits noting likely bilateral lens  replacement/cataract surgery. The mastoids and paranasal sinuses are  clear.     IMPRESSION:  1. No finding to suggest an acute infarct or intracranial bleed.  2. Mild chronic/involutional findings as noted above.     MRA Brain:  Major intracranial arteries show normal intraluminal flow related  signal. Negative for vascular occlusion, focal stenosis, or  dissection. Negative for aneurysm or evidence of vasculitis.  Predominant fetal origin of right posterior cerebral artery, a normal  variant, is incidentally noted.     IMPRESSION:  Normal MRA brain.    CUS:  IMPRESSION:     Scattered bilateral atheromatous changes without evidence of  hemodynamically significant carotid arterial stenosis.    TTE:   · The estimated PA systolic pressure is 44 mmHg.  · There is left ventricular concentric hypertrophy.  · Mild left ventricular enlargement.  · Septal wall has abnormal motion consistent with left bundle branch block.  · Moderately decreased left ventricular systolic  function. The estimated ejection fraction is 35- 40 %.  · Grade I (mild) left ventricular diastolic dysfunction consistent with impaired relaxation.  · Normal right ventricular systolic function.  · Severe left atrial enlargement.  · Mild-to-moderate mitral regurgitation.  · Mild tricuspid regurgitation.  · Elevated central venous pressure (15 mmHg).  · Pulmonary hypertension present.    Assessment and Plan:  Dizziness  - CT Head- negative acute.  -MRI Brain- Negative acute.  -MRA Brain- unremarkable.   -CUS- No hemodynamic significant stenosis.   - TTE- As noted above.    Type 2 DM    -Managed by Primary.    Chronic Diastolic Heart failure.    -Managed by Primary.    - CKD Stage 3.     Managed by Primary.  - Acquired Hypothyroidism.      -Managed by Primary.        PLAN- Recommend pt follows up out patient with cardiology  with regards to findings on TTE including Left atrial enlargement. All other tests are unremarkable.                    [phone] within 3 days from discharge.     Stroke education was provided including stroke risk factors modification and any acute neurological changes including weakness, confusion, visual changes to come straight to the ER.     All questions were answered.                                                            Active Diagnoses:    Diagnosis Date Noted POA    Chest pain [R07.9] 09/20/2020 Yes    Postural dizziness with near syncope [R42, R55] 09/20/2020 Yes    Obesity [E66.9] 10/11/2018 Yes    LBBB (left bundle branch block) [I44.7] 05/07/2015 Yes    Controlled diabetes mellitus type 2 with complications [E11.8] 12/12/2014 Yes    Chronic diastolic heart failure, NYHA class 1 [I50.32] 08/05/2013 Yes    Acquired hypothyroidism [E03.9]  Yes    Chronic kidney disease, stage III (moderate) [N18.3]  Yes    Hypertension [I10]  Yes      Problems Resolved During this Admission:       VTE Risk Mitigation (From admission,  onward)         Ordered     IP VTE HIGH RISK PATIENT  Once      09/20/20 1802     Place sequential compression device  Until discontinued      09/20/20 1802                Thank you for your consult. We will continue to follow the patient. Please let us know if you have any additional questions.     Gina Patterson NP  Neurology  [facility]

## 2020-09-21 NOTE — PLAN OF CARE
Problem: Diabetes Comorbidity  Goal: Blood Glucose Level Within Desired Range  Outcome: Ongoing, Progressing     Problem: Fall Injury Risk  Goal: Absence of Fall and Fall-Related Injury  Outcome: Met     Problem: Adult Inpatient Plan of Care  Goal: Plan of Care Review  Outcome: Met  Goal: Patient-Specific Goal (Individualization)  Outcome: Met  Goal: Absence of Hospital-Acquired Illness or Injury  Outcome: Met  Goal: Optimal Comfort and Wellbeing  Outcome: Met  Goal: Readiness for Transition of Care  Outcome: Met  Goal: Rounds/Family Conference  Outcome: Met     Problem: Bariatric Environmental Safety  Goal: Safety Maintained with Care  Outcome: Met

## 2020-09-24 ENCOUNTER — TELEPHONE (OUTPATIENT)
Dept: FAMILY MEDICINE | Facility: CLINIC | Age: 70
End: 2020-09-24

## 2020-09-24 NOTE — TELEPHONE ENCOUNTER
Pt called back to schedule [facility]. Pt scheduled Monday, 09/28/2020 at 1420 with ROWAN Mccarthy MD. Pt verbalized understanding.

## 2020-09-24 NOTE — TELEPHONE ENCOUNTER
----- Message from Caryl Esqueda sent at 9/24/2020 11:28 AM CDT -----  Type:  Sooner Apoointment Request    Caller is requesting a sooner appointment.  Caller declined first available appointment listed below.  Caller will not accept being placed on the waitlist and is requesting a message be sent to doctor.    Name of Caller:  Patient   When is the first available appointment?  Schedule unavailable   Symptoms:  Hospital Follow UP  Best Call Back Number [phone]  Additional Information:  Per patient needs a one week follow up, offered a NP or PA visit and patient declined-please advise-thank you

## 2020-09-28 ENCOUNTER — OFFICE VISIT (OUTPATIENT)
Dept: FAMILY MEDICINE | Facility: CLINIC | Age: 70
End: 2020-09-28
Payer: MEDICARE

## 2020-09-28 VITALS
BODY MASS INDEX: 47.92 KG/M2 | SYSTOLIC BLOOD PRESSURE: 138 MMHG | OXYGEN SATURATION: 95 % | HEART RATE: 85 BPM | HEIGHT: 62 IN | WEIGHT: 260.38 LBS | DIASTOLIC BLOOD PRESSURE: 74 MMHG | TEMPERATURE: 98 F

## 2020-09-28 DIAGNOSIS — E66.01 MORBID OBESITY WITH BMI OF 40.0-44.9, ADULT: ICD-10-CM

## 2020-09-28 DIAGNOSIS — N18.30 ANEMIA DUE TO STAGE 3 CHRONIC KIDNEY DISEASE: ICD-10-CM

## 2020-09-28 DIAGNOSIS — Z96.41 INSULIN PUMP STATUS: ICD-10-CM

## 2020-09-28 DIAGNOSIS — E08.21 DIABETES MELLITUS DUE TO UNDERLYING CONDITION WITH DIABETIC NEPHROPATHY, WITH LONG-TERM CURRENT USE OF INSULIN: Chronic | ICD-10-CM

## 2020-09-28 DIAGNOSIS — I10 ESSENTIAL HYPERTENSION: ICD-10-CM

## 2020-09-28 DIAGNOSIS — Z09 HOSPITAL DISCHARGE FOLLOW-UP: Primary | ICD-10-CM

## 2020-09-28 DIAGNOSIS — Z79.4 DIABETES MELLITUS DUE TO UNDERLYING CONDITION WITH DIABETIC NEPHROPATHY, WITH LONG-TERM CURRENT USE OF INSULIN: Chronic | ICD-10-CM

## 2020-09-28 DIAGNOSIS — D63.1 ANEMIA DUE TO STAGE 3 CHRONIC KIDNEY DISEASE: ICD-10-CM

## 2020-09-28 DIAGNOSIS — I50.22 CHRONIC SYSTOLIC HEART FAILURE: ICD-10-CM
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
## 2020-09-28 DIAGNOSIS — I50.22 CHRONIC SYSTOLIC CONGESTIVE HEART FAILURE: ICD-10-CM

## 2020-09-28 DIAGNOSIS — R42 VERTIGO: ICD-10-CM

## 2020-09-28 PROBLEM — N18.6 ANEMIA DUE TO END STAGE RENAL DISEASE: Status: RESOLVED | Noted: 2020-06-30 | Resolved: 2020-09-28

## 2020-09-28 PROCEDURE — 3008F PR BODY MASS INDEX (BMI) DOCUMENTED: ICD-10-PCS | Mod: CPTII,S$GLB,, | Performed by: STUDENT IN AN ORGANIZED HEALTH CARE EDUCATION/TRAINING PROGRAM

## 2020-09-28 PROCEDURE — 99214 PR OFFICE/OUTPT VISIT, EST, LEVL IV, 30-39 MIN: ICD-10-PCS | Mod: S$GLB,,, | Performed by: STUDENT IN AN ORGANIZED HEALTH CARE EDUCATION/TRAINING PROGRAM

## 2020-09-28 PROCEDURE — 1159F MED LIST DOCD IN RCRD: CPT | Mod: S$GLB,,, | Performed by: STUDENT IN AN ORGANIZED HEALTH CARE EDUCATION/TRAINING PROGRAM

## 2020-09-28 PROCEDURE — 99999 PR PBB SHADOW E&M-EST. PATIENT-LVL V: CPT | Mod: PBBFAC,,, | Performed by: STUDENT IN AN ORGANIZED HEALTH CARE EDUCATION/TRAINING PROGRAM

## 2020-09-28 PROCEDURE — 1101F PT FALLS ASSESS-DOCD LE1/YR: CPT | Mod: CPTII,S$GLB,, | Performed by: STUDENT IN AN ORGANIZED HEALTH CARE EDUCATION/TRAINING PROGRAM

## 2020-09-28 PROCEDURE — 1125F AMNT PAIN NOTED PAIN PRSNT: CPT | Mod: S$GLB,,, | Performed by: STUDENT IN AN ORGANIZED HEALTH CARE EDUCATION/TRAINING PROGRAM

## 2020-09-28 PROCEDURE — 1101F PR PT FALLS ASSESS DOC 0-1 FALLS W/OUT INJ PAST YR: ICD-10-PCS | Mod: CPTII,S$GLB,, | Performed by: STUDENT IN AN ORGANIZED HEALTH CARE EDUCATION/TRAINING PROGRAM

## 2020-09-28 PROCEDURE — 1159F PR MEDICATION LIST DOCUMENTED IN MEDICAL RECORD: ICD-10-PCS | Mod: S$GLB,,, | Performed by: STUDENT IN AN ORGANIZED HEALTH CARE EDUCATION/TRAINING PROGRAM

## 2020-09-28 PROCEDURE — 3008F BODY MASS INDEX DOCD: CPT | Mod: CPTII,S$GLB,, | Performed by: STUDENT IN AN ORGANIZED HEALTH CARE EDUCATION/TRAINING PROGRAM

## 2020-09-28 PROCEDURE — 99214 OFFICE O/P EST MOD 30 MIN: CPT | Mod: S$GLB,,, | Performed by: STUDENT IN AN ORGANIZED HEALTH CARE EDUCATION/TRAINING PROGRAM

## 2020-09-28 PROCEDURE — 99999 PR PBB SHADOW E&M-EST. PATIENT-LVL V: ICD-10-PCS | Mod: PBBFAC,,, | Performed by: STUDENT IN AN ORGANIZED HEALTH CARE EDUCATION/TRAINING PROGRAM

## 2020-09-28 PROCEDURE — 1125F PR PAIN SEVERITY QUANTIFIED, PAIN PRESENT: ICD-10-PCS | Mod: S$GLB,,, | Performed by: STUDENT IN AN ORGANIZED HEALTH CARE EDUCATION/TRAINING PROGRAM

## 2020-09-28 RX ORDER — CARVEDILOL 12.5 MG/1
12.5 TABLET ORAL 2 TIMES DAILY WITH MEALS
Qty: 60 TABLET | Refills: 3 | Status: SHIPPED | OUTPATIENT
Start: 2020-09-28 | End: 2020-12-28 | Stop reason: ALTCHOICE

## 2020-09-28 RX ORDER — LOSARTAN POTASSIUM 50 MG/1
50 TABLET ORAL DAILY
Status: ON HOLD | COMMUNITY
Start: 2020-09-23 | End: 2021-02-09

## 2020-09-28 RX ORDER — MECLIZINE HCL 12.5 MG 12.5 MG/1
12.5 TABLET ORAL 2 TIMES DAILY PRN
Qty: 30 TABLET | Refills: 0 | Status: SHIPPED | OUTPATIENT
Start: 2020-09-28

## 2020-09-28 NOTE — PROGRESS NOTES
[facility]    Patient Name: Fifi Mcnair  YOB: 1950    PRESENTING HISTORY   Chief Complaint:   Chief Complaint   Patient presents with    Hospital Follow Up    Dizziness    Shortness of Breath    Nausea        History of Present Illness:  Ms. Fifi Mcnair is a 69 y.o. female here for hospital follow up.     Hospitalized 9/20-9/21 for acute dizziness.   Underwent MRA, Carotid US, Echocardiogram and evaluation by Cardiology.     No cuase of dizziness definitively identified.     Patient reports worsening after hospital discharge. She developed a fever and ear pain on 9/21. Fever resolved by the following day and has not recurred. Ear pain has gradually resolved.   She fell going to the bathroom on day of discharge in the middle of the night. Did not hit head and complains of no injuries.     Her dizziness has gradually improved without intervention.   Feels like she is spinning around in the room.   Some worsening with positional changes and sitting to standing.   Associated with nausea  No headaches, numbness/weakness in extremities.      DM2:  On pump  Saw endo last week since discharged. Was uncontrolled in hospital.     CHF:  Hx HFpEF  2D Echo during hospitalization showed reduced EF to 35 to 40%.   She has follow up with cardiology scheduled.       Review of Systems   Constitutional: Positive for fever. Negative for weight loss.   HENT: Positive for ear pain. Negative for hearing loss.    Eyes: Negative for blurred vision and double vision.   Respiratory: Negative for cough and shortness of breath.    Cardiovascular: Positive for leg swelling and PND. Negative for chest pain and palpitations.   Gastrointestinal: Positive for diarrhea and nausea. Negative for abdominal pain.   Genitourinary: Negative for dysuria and hematuria.   Musculoskeletal: Positive for joint pain and myalgias.   Skin: Negative for itching and rash.   Neurological: Positive for dizziness  and tremors. Negative for focal weakness.   Psychiatric/Behavioral: Negative for memory loss. The patient has insomnia.          PAST HISTORY:     Past Medical History:   Diagnosis Date    Allergy     multiple antibiotic allergies    Anemia     Anxiety     Arthritis     Asthma     Cataract     CHF (congestive heart failure)     NYHA class III     Chronic kidney disease     Colon polyp     benign    COPD (chronic obstructive pulmonary disease)     DLCO 37% , and mild pulmonary HTN    Depression     Diabetes mellitus     Diabetes mellitus without complication     Diabetic retinopathy     Diastolic dysfunction     Diverticulitis of large intestine without perforation or abscess without bleeding 2/12/2018    Diverticulosis     Fracture of lumbar spine     GERD (gastroesophageal reflux disease)     Hernia of unspecified site of abdominal cavity without mention of obstruction or gangrene     Hyperlipidemia     Hypertension     hypertensive renal    IBS (irritable bowel syndrome)     Mild nonproliferative diabetic retinopathy(362.04) 11/25/2013    Morbid obesity     Nuclear sclerosis 11/25/2013    Osteoporosis     Peripheral edema     Rash     S/P LASIK (laser assisted in situ keratomileusis)     Sleep apnea     sleep apnea uses bipap.    Thyroid disease     on synthroid    TIA (transient ischemic attack)     Urinary tract infection        Past Surgical History:   Procedure Laterality Date    ABDOMINAL SURGERY      ADENOIDECTOMY      ARTHROSCOPIC CHONDROPLASTY OF KNEE JOINT Right 8/31/2018    Procedure: ARTHROSCOPY, KNEE, WITH CHONDROPLASTY;  Surgeon: Larry Molina MD;  Location: [facility];  Service: Orthopedics;  Laterality: Right;  Tricompartmental chondroplasty    CATARACT EXTRACTION Right 08/13/2020    LF    CATARACT EXTRACTION Left 09/03/2020    LF    COLONOSCOPY  03/05/2013    repeat in 5 years    COLONOSCOPY N/A 5/31/2017    Procedure: COLONOSCOPY;  Surgeon: Luis DOBBINS  MD Ramon;  Location: [facility];  Service: Endoscopy;  Laterality: N/A;    COLONOSCOPY N/A 4/11/2018    Procedure: COLONOSCOPY;  Surgeon: Luis Navarro MD;  Location: [facility];  Service: Endoscopy;  Laterality: N/A;    COSMETIC SURGERY      belt abdominoplasty    CYSTOSCOPY      CYSTOSCOPY N/A 8/6/2018    Procedure: CYSTOSCOPY;  Surgeon: Angy Campos MD;  Location[facility];  Service: Urology;  Laterality: N/A;    EYE SURGERY Right     mazzulla    GASTRECTOMY      gastric sleeve    gastric sleeve      HERNIA REPAIR      5 years old    HYSTERECTOMY      ovaries remain    JOINT REPLACEMENT Left     knee replacement    KNEE ARTHROPLASTY Left 4/16/2019    Procedure: ARTHROPLASTY, KNEE;  Surgeon: Larry Molina MD;  [facility];  Service: Orthopedics;  Laterality: Left;  Emanuel    KNEE ARTHROSCOPY W/ MENISCECTOMY Right 8/31/2018    Procedure: ARTHROSCOPY, KNEE, WITH MENISCECTOMY;  Surgeon: Larry Molina MD;  [facility];  Service: Orthopedics;  Laterality: Right;    PHACOEMULSIFICATION OF CATARACT Right 8/13/2020    Procedure: PHACOEMULSIFICATION, CATARACT;  Surgeon: Meek Haro Jr., MD;  Location: [facility] OR;  Service: Ophthalmology;  Laterality: Right;  Right/DM    PHACOEMULSIFICATION OF CATARACT Left 9/3/2020    Procedure: PHACOEMULSIFICATION, CATARACT WITH O.R.A;  Surgeon: Meek Haro Jr., MD;  Location: [facility] OR;  Service: Ophthalmology;  Laterality: Left;  Left/DM    REFRACTIVE SURGERY      mono va//ou//    RHINOPLASTY TIP      TONSILLECTOMY      TOTAL KNEE ARTHROPLASTY Left 4/16/19    TUBAL LIGATION      UPPER GASTROINTESTINAL ENDOSCOPY  03/05/2013    UPPER GASTROINTESTINAL ENDOSCOPY  08/24/2016    Dr. Navarro, repeat in 8 weeks    UPPER GASTROINTESTINAL ENDOSCOPY  07/21/2016    Dr. Randall       Family History   Problem Relation Age of Onset    Heart disease Mother 59    Cancer Mother 59        throat    Allergies Mother     Diabetes  Mother     Breast cancer Mother     Heart disease Father 70        flutter    Allergies Father     Diabetes Father     Cancer Sister 22        22 thyroid,49  breast    Allergies Sister     Breast cancer Sister     Diabetes Sister     Cancer Brother 62        lung    Diabetes Brother     Asthma Daughter     Diabetes Daughter     Depression Daughter     Asthma Grandchild     Eczema Grandchild     Eczema Grandchild     Breast cancer Maternal Grandmother     Ovarian cancer Cousin     Glaucoma Neg Hx     Macular degeneration Neg Hx     Retinal detachment Neg Hx        Social History     Socioeconomic History    Marital status:      Spouse name: Not on file    Number of children: Not on file    Years of education: Not on file    Highest education level: Not on file   Occupational History    Not on file   Social Needs    Financial resource strain: Somewhat hard    Food insecurity     Worry: Never true     Inability: Never true    Transportation needs     Medical: No     Non-medical: No   Tobacco Use    Smoking status: Former Smoker     Packs/day: 1.00     Years: 4.00     Pack years: 4.00     Types: Cigarettes     Quit date:      Years since quittin.7    Smokeless tobacco: Never Used    Tobacco comment: quit , lived with smokers    Substance and Sexual Activity    Alcohol use: Not Currently     Frequency: Never     Binge frequency: Never    Drug use: No    Sexual activity: Yes     Birth control/protection: Surgical   Lifestyle    Physical activity     Days per week: 0 days     Minutes per session: 10 min    Stress: Rather much   Relationships    Social connections     Talks on phone: More than three times a week     Gets together: More than three times a week     Attends Faith service: Not on file     Active member of club or organization: Yes     Attends meetings of clubs or organizations: More than 4 times per year     Relationship status:    Other  Topics Concern    Not on file   Social History Narrative    Not on file       MEDICATIONS & ALLERGIES:     Current Outpatient Medications on File Prior to Visit   Medication Sig    alendronate (FOSAMAX) 35 MG tablet Take 1 tablet (35 mg total) by mouth every 7 days.    allopurinol (ZYLOPRIM) 100 MG tablet Take 2 tablets (200 mg total) by mouth nightly.    amitriptyline (ELAVIL) 100 MG tablet TAKE ONE TABLET BY MOUTH AT NIGHT    ascorbic acid, vitamin C, (VITAMIN C) 500 MG tablet Take 500 mg by mouth once daily.    biotin 1 mg Cap Take 1 tablet by mouth once daily.     calcitRIOL (ROCALTROL) 0.25 MCG Cap TAKE ONE TABLET BY MOUTH TWICE A WEEK ON monday AND friday (Patient taking differently: Take 0.25 mcg by mouth every Monday and Friday. TAKE ONE TABLET BY MOUTH TWICE A WEEK ON monday AND friday)    cinnamon bark (CINNAMON ORAL) Take 1 tablet by mouth once daily.     clotrimazole-betamethasone 1-0.05% (LOTRISONE) cream     cyanocobalamin, vitamin B-12, (VITAMIN B-12) 5,000 mcg Subl Place 5,000 mg under the tongue once a week.    diclofenac sodium (VOLTAREN) 1 % Gel Apply 2 g topically once daily.    diphenoxylate-atropine 2.5-0.025 mg (LOMOTIL) 2.5-0.025 mg per tablet TAKE ONE TABLET BY MOUTH 4 TIMES DAILY AS NEEDED FOR DIARRHEA (Patient taking differently: Take 1 tablet by mouth 4 (four) times daily as needed for Diarrhea. )    DYMISTA 137-50 mcg/spray Spry nassal spray as needed.     ferrous sulfate (IRON) 325 mg (65 mg iron) Tab tablet Take 325 mg by mouth daily with breakfast.    fexofenadine (ALLEGRA) 60 MG tablet Take 60 mg by mouth once daily.    fish oil-omega-3 fatty acids 300-1,000 mg capsule Take 1 capsule by mouth once daily.    FLUoxetine 20 MG capsule Take 2 capsules (40 mg total) by mouth once daily.    fluticasone furoate-vilanterol (BREO) 100-25 mcg/dose diskus inhaler Inhale 1 puff into the lungs once daily.    fluticasone propionate (FLONASE) 50 mcg/actuation nasal spray 1 spray  (50 mcg total) by Each Nostril route once daily.    gabapentin (NEURONTIN) 600 MG tablet Take 1 tablet (600 mg total) by mouth 3 (three) times daily.    HUMALOG U-100 INSULIN 100 unit/mL injection Inject 100 Units into the skin once daily. Via pump    hydrocortisone 2.5 % cream Apply topically nightly.    L-LYSINE ORAL Take 1 tablet by mouth once daily.     l-methylfolate-b2-b6-b12 (CEREFOLIN) 6-5-50-1 mg Tab Take 1 tablet by mouth once daily.    Lacto.acidophilus-Bif.animalis (PROBIOTIC) 5 billion cell CpSP Take 1 capsule by mouth once daily.    levothyroxine (SYNTHROID) 25 MCG tablet Take 1 tablet (25 mcg total) by mouth once daily.    losartan (COZAAR) 25 MG tablet Take 25 mg by mouth once daily.    magnesium oxide (MAG-OX) 400 mg (241.3 mg magnesium) tablet Take 800 mg by mouth every evening.    metronidazole 1% (METROGEL) 1 % Gel Apply topically once daily. Prn sores on scalp.    montelukast (SINGULAIR) 10 mg tablet Take 1 tablet (10 mg total) by mouth every evening.    MULTIVIT-IRON-MIN-FOLIC ACID 3,500-18-0.4 UNIT-MG-MG ORAL CHEW Take 1 tablet by mouth 2 (two) times daily.     mupirocin (BACTROBAN) 2 % ointment APPLY OINTMENT TOPICALLY TO AFFECTED AREA ON NOSE THREE TIMES DAILY AS DIRECTED    oxyCODONE (ROXICODONE) 15 MG Tab Take 1 tablet (15 mg total) by mouth every 6 (six) hours as needed for Pain.    pantoprazole (PROTONIX) 40 MG tablet TAKE 1 TABLET BY MOUTH ONCE DAILY (Patient taking differently: Take 40 mg by mouth once daily. )    potassium chloride SA (K-DUR,KLOR-CON) 20 MEQ tablet TAKE 1 TABLET BY MOUTH TWICE DAILY (Patient taking differently: Take 20 mEq by mouth 2 (two) times daily. )    SSD 1 % cream     torsemide (DEMADEX) 20 MG Tab TAKE ONE TABLET BY MOUTH TWICE DAILY (Patient taking differently: Take 20 mg by mouth 2 (two) times daily. )    TURMERIC ORAL Take 1 tablet by mouth once daily.     [DISCONTINUED] atenoloL (TENORMIN) 25 MG tablet Take 1 tablet (25 mg total) by  "mouth once daily.    diclofenac (VOLTAREN) 0.1 % ophthalmic solution Place 1 drop into the right eye 4 (four) times daily. Start drops 3 days before surgery    insulin (BASAGLAR KWIKPEN U-100 INSULIN) glargine 100 units/mL (3mL) SubQ pen Inject 20 Units into the skin every evening. Use 10 units if BS over 210.Night before surgery.    losartan (COZAAR) 50 MG tablet Take 50 mg by mouth once daily.    [DISCONTINUED] amitriptyline (ELAVIL) 100 MG tablet Take 1 tablet (100 mg total) by mouth every evening. 1 tab at night     Current Facility-Administered Medications on File Prior to Visit   Medication    gentamicin injection 80 mg    lactated ringers infusion    lactated ringers infusion    lidocaine (PF) 10 mg/ml (1%) injection 10 mg       Review of patient's allergies indicates:   Allergen Reactions    Adhesive Other (See Comments)     Blisters    Ceclor [cefaclor] Hives    Cleocin [clindamycin hcl] Hives    Erythromycin Hives    Aleve [naproxen sodium]      Unable to take secondary to kidney function.     Macrodantin [nitrofurantoin macrocrystalline] Hives    Motrin [ibuprofen]      Unable to take secondary to kidney functions.    Advair diskus [fluticasone propion-salmeterol] Other (See Comments)     Dry mouth    Levaquin [levofloxacin] Dermatitis    Macrobid [nitrofurantoin monohyd/m-cryst] Other (See Comments)     Stomach pain/ GI issues    Remeron [mirtazapine] Palpitations and Other (See Comments)     Jittery    Restoril [temazepam] Other (See Comments)     LIGHTHEADED UPON WAKING.with poor results    Sulfa (sulfonamide antibiotics) Other (See Comments)     Hold due to renal problems    Trazodone Palpitations    Vancomycin analogues Rash     Feet broke out/inflammed blood vessels         OBJECTIVE:   Vital Signs:  Vitals:    09/28/20 1415   BP: 138/74   Pulse: 85   Temp: 98.2 °F (36.8 °C)   SpO2: 95%   Weight: 118.1 kg (260 lb 5.8 oz)   Height: 5' 2" (1.575 m)       No results found for " this or any previous visit (from the past 24 hour(s)).      Physical Exam   Constitutional: She is oriented to person, place, and time and well-developed, well-nourished, and in no distress.   Class 3 obesity   HENT:   Head: Normocephalic and atraumatic.   Eyes: Pupils are equal, round, and reactive to light. Conjunctivae and EOM are normal. No scleral icterus.   Neck: Normal range of motion. Neck supple. No thyromegaly present.   Cardiovascular: Normal rate, regular rhythm, normal heart sounds and intact distal pulses.   No murmur heard.  Pulmonary/Chest: Effort normal and breath sounds normal. No respiratory distress. She has no wheezes. She has no rales.   Abdominal: Soft. Bowel sounds are normal. She exhibits no distension. There is no abdominal tenderness.   Musculoskeletal: Normal range of motion.         General: No tenderness, deformity or edema.   Lymphadenopathy:     She has no cervical adenopathy.   Neurological: She is alert and oriented to person, place, and time. No cranial nerve deficit. Gait normal. Coordination normal. GCS score is 15.   Normal Romberg   Skin: Skin is warm and dry. No rash noted. She is not diaphoretic. No erythema.   Psychiatric: Mood, memory, affect and judgment normal.   Nursing note and vitals reviewed.    Protective Sensation (w/ 10 gram monofilament):  Right: Intact  Left: Intact    Visual Inspection:  Normal -  Bilateral    Pedal Pulses:   Right: Present  Left: Present    Posterior tibialis:   Right:Present  Left: Present    Diminished vibration sensation below knees bilaterally.       ASSESSMENT & PLAN:     Hospital discharge follow-up  Recent admission for dizziness.   MRA brain, Carotid US unrevealing.   Incidentally noted to have HFrEF.   Blood glucose not at goal.     Has already f/u with endo in clinic. On pump  Has f/u with cards scheduled.     Vertigo  -     meclizine (ANTIVERT) 12.5 mg tablet; Take 1 tablet (12.5 mg total) by mouth 2 (two) times daily as needed for  Dizziness or Nausea.  Dispense: 30 tablet; Refill: 0  Suspect she may have had some otitis media which is resolving which triggered secondary vertigo.   Symptomatic treatment of dizziness and nausea with meclizine.   She will alert me if symptoms worsen    Chronic systolic heart failure  -     carvediloL (COREG) 12.5 MG tablet; Take 1 tablet (12.5 mg total) by mouth 2 (two) times daily with meals.  Dispense: 60 tablet; Refill: 3  Changing from atenolol to carvedilol due to cardioprotective effect    Chronic systolic congestive heart failure  Well compensated  C/b deconditioning  Transitioning to medications with more cardio-protection    Essential hypertension  As above management of CHF    Anemia due to stage 3 chronic kidney disease  Mild, likely due to phlebotomy while admitted    Diabetes mellitus due to underlying condition with diabetic nephropathy, with long-term current use of insulin  Following with endocrine, pump in place    Insulin pump status  As above    Morbid obesity with BMI of 40.0-44.9, adult, today 48.4  Would benefit from weight loss, discuss at future encounter    Fall, initial encounter  At risk for falls due to dizziness  No injuries currently  Advised to use walker to prevent falls.       Jose Mccarthy MD

## 2020-10-01 ENCOUNTER — OFFICE VISIT (OUTPATIENT)
Dept: OPHTHALMOLOGY | Facility: CLINIC | Age: 70
End: 2020-10-01
Payer: MEDICARE

## 2020-10-01 DIAGNOSIS — Z96.1 STATUS POST CATARACT EXTRACTION AND INSERTION OF INTRAOCULAR LENS OF RIGHT EYE: Primary | ICD-10-CM

## 2020-10-01 DIAGNOSIS — Z96.1 STATUS POST CATARACT EXTRACTION AND INSERTION OF INTRAOCULAR LENS OF LEFT EYE: ICD-10-CM

## 2020-10-01 DIAGNOSIS — Z98.42 STATUS POST CATARACT EXTRACTION AND INSERTION OF INTRAOCULAR LENS OF LEFT EYE: ICD-10-CM

## 2020-10-01 DIAGNOSIS — Z98.41 STATUS POST CATARACT EXTRACTION AND INSERTION OF INTRAOCULAR LENS OF RIGHT EYE: Primary | ICD-10-CM

## 2020-10-01 DIAGNOSIS — H04.123 CHRONICALLY DRY EYES, BILATERAL: ICD-10-CM

## 2020-10-01 PROCEDURE — 99999 PR PBB SHADOW E&M-EST. PATIENT-LVL V: CPT | Mod: PBBFAC,,, | Performed by: OPHTHALMOLOGY

## 2020-10-01 PROCEDURE — 99999 PR PBB SHADOW E&M-EST. PATIENT-LVL V: ICD-10-PCS | Mod: PBBFAC,,, | Performed by: OPHTHALMOLOGY

## 2020-10-01 PROCEDURE — 99024 POSTOP FOLLOW-UP VISIT: CPT | Mod: S$GLB,,, | Performed by: OPHTHALMOLOGY

## 2020-10-01 PROCEDURE — 99024 PR POST-OP FOLLOW-UP VISIT: ICD-10-PCS | Mod: S$GLB,,, | Performed by: OPHTHALMOLOGY

## 2020-10-01 RX ORDER — CYCLOSPORINE 0.5 MG/ML
1 EMULSION OPHTHALMIC 2 TIMES DAILY
Qty: 60 VIAL | Refills: 11 | Status: ON HOLD | OUTPATIENT
Start: 2020-10-01 | End: 2021-02-09 | Stop reason: SDDI

## 2020-10-01 NOTE — PATIENT INSTRUCTIONS
Continue genteal night time gel both eyes  Refresh preservative free drops 4 x daily   Start restasis 1 drop 2 x daily both eyes  Can continue use patch on right eye with genteal ointment  Counseled on using moisture goggles and humidifiers in room at night

## 2020-10-01 NOTE — PROGRESS NOTES
HPI     Post-op Evaluation      Additional comments: od              Comments     1 month post Phaco IOL Right Eye Eye. Denies eye pain. Using Pred & Diclo   os daily also use PF Tears 6 times a day eyes are still very dry. Using   Cpap machine at night states she has eye pads.            Last edited by Humera Valdes on 10/1/2020  2:34 PM. (History)        ROS     Negative for: Constitutional, Gastrointestinal, Neurological, Skin,   Genitourinary, Musculoskeletal, HENT, Endocrine, Cardiovascular, Eyes,   Respiratory, Psychiatric, Allergic/Imm, Heme/Lymph    Last edited by Meek Haro Jr., MD on 10/1/2020  3:17 PM. (History)        Assessment /Plan     For exam results, see Encounter Report.    Status post cataract extraction and insertion of intraocular lens of right eye    Status post cataract extraction and insertion of intraocular lens of left eye    Chronically dry eyes, bilateral  -     cycloSPORINE (RESTASIS) 0.05 % ophthalmic emulsion; Place 1 drop into both eyes 2 (two) times daily.  Dispense: 60 vial; Refill: 11      Continue PF and diclofenac 1 drop QDaily OS  Stop PF and Diclofenac  Continue genteal night time gel both eyes  Refresh preservative free drops 4 x daily   Start restasis 1 drop 2 x daily both eyes  Can continue use patch on right eye with genteal ointment  Counseled on using moisture goggles and humidifiers in room at night    Follow up in about 2 weeks (around 10/15/2020) for POM #1 visit cataract surgery.

## 2020-10-12 ENCOUNTER — OFFICE VISIT (OUTPATIENT)
Dept: RHEUMATOLOGY | Facility: CLINIC | Age: 70
End: 2020-10-12
Payer: MEDICARE

## 2020-10-12 VITALS
WEIGHT: 261.13 LBS | SYSTOLIC BLOOD PRESSURE: 116 MMHG | BODY MASS INDEX: 47.76 KG/M2 | DIASTOLIC BLOOD PRESSURE: 60 MMHG | TEMPERATURE: 98 F

## 2020-10-12 DIAGNOSIS — M19.91 PRIMARY OSTEOARTHRITIS, UNSPECIFIED SITE: Primary | ICD-10-CM

## 2020-10-12 DIAGNOSIS — M10.9 ARTHRITIS DUE TO GOUT: ICD-10-CM

## 2020-10-12 PROCEDURE — 1125F PR PAIN SEVERITY QUANTIFIED, PAIN PRESENT: ICD-10-PCS | Mod: S$GLB,,, | Performed by: INTERNAL MEDICINE

## 2020-10-12 PROCEDURE — 3074F SYST BP LT 130 MM HG: CPT | Mod: S$GLB,,, | Performed by: INTERNAL MEDICINE

## 2020-10-12 PROCEDURE — 99213 OFFICE O/P EST LOW 20 MIN: CPT | Mod: S$GLB,,, | Performed by: INTERNAL MEDICINE

## 2020-10-12 PROCEDURE — 1101F PR PT FALLS ASSESS DOC 0-1 FALLS W/OUT INJ PAST YR: ICD-10-PCS | Mod: S$GLB,,, | Performed by: INTERNAL MEDICINE

## 2020-10-12 PROCEDURE — 1159F PR MEDICATION LIST DOCUMENTED IN MEDICAL RECORD: ICD-10-PCS | Mod: S$GLB,,, | Performed by: INTERNAL MEDICINE

## 2020-10-12 PROCEDURE — 1101F PT FALLS ASSESS-DOCD LE1/YR: CPT | Mod: S$GLB,,, | Performed by: INTERNAL MEDICINE

## 2020-10-12 PROCEDURE — 1125F AMNT PAIN NOTED PAIN PRSNT: CPT | Mod: S$GLB,,, | Performed by: INTERNAL MEDICINE

## 2020-10-12 PROCEDURE — 3078F DIAST BP <80 MM HG: CPT | Mod: S$GLB,,, | Performed by: INTERNAL MEDICINE

## 2020-10-12 PROCEDURE — 3008F PR BODY MASS INDEX (BMI) DOCUMENTED: ICD-10-PCS | Mod: S$GLB,,, | Performed by: INTERNAL MEDICINE

## 2020-10-12 PROCEDURE — 3078F PR MOST RECENT DIASTOLIC BLOOD PRESSURE < 80 MM HG: ICD-10-PCS | Mod: S$GLB,,, | Performed by: INTERNAL MEDICINE

## 2020-10-12 PROCEDURE — 3008F BODY MASS INDEX DOCD: CPT | Mod: S$GLB,,, | Performed by: INTERNAL MEDICINE

## 2020-10-12 PROCEDURE — 1159F MED LIST DOCD IN RCRD: CPT | Mod: S$GLB,,, | Performed by: INTERNAL MEDICINE

## 2020-10-12 PROCEDURE — 99213 PR OFFICE/OUTPT VISIT, EST, LEVL III, 20-29 MIN: ICD-10-PCS | Mod: S$GLB,,, | Performed by: INTERNAL MEDICINE

## 2020-10-12 PROCEDURE — 3074F PR MOST RECENT SYSTOLIC BLOOD PRESSURE < 130 MM HG: ICD-10-PCS | Mod: S$GLB,,, | Performed by: INTERNAL MEDICINE

## 2020-10-12 NOTE — PROGRESS NOTES
[facility] RHEUMATOLOGY            PROGRESS NOTE      Subjective:       Patient ID:   NAME: Fifi Mcnair : 1950     69 y.o. female    Referring Doc: No ref. provider found  Other Physicians:    Chief Complaint:  Arthritis due to gout and Osteoarthritis      History of Present Illness:     Patient returns today for a regularly scheduled follow-up visit for osteoarthritis history of gout      The patient has had some fatigue and arthralgias but no joint swelling.  No fevers.  No chest pains.  No GI complaints            ROS:   GEN:  No  fever, night sweats . weight is stable   + fatigue  SKIN: no rashes, no bruising, no ulcerations, no Raynaud's  HEENT: no HA's, No visual changes, no mucosal ulcers, no sicca symptoms,  CV:   no CP, SOB, PND, LOERA, no orthopnea, no palpitations  PULM: normal with no SOB, cough, hemoptysis, sputum or pleuritic pain  GI:  no abdominal pain, nausea, vomiting, constipation, diarrhea, melanotic stools, BRBPR, hematemesis, no dysphagia  :   no dysuria  NEURO: no paresthesias, headaches, visual disturbances, muscle weakness  MUSCULOSKELETAL:no joint swelling, prolonged AM stiffness, + back pain, + muscle pain  Allergies:  Review of patient's allergies indicates:   Allergen Reactions    Adhesive Other (See Comments)     Blisters    Ceclor [cefaclor] Hives    Cleocin [clindamycin hcl] Hives    Erythromycin Hives    Aleve [naproxen sodium]      Unable to take secondary to kidney function.     Macrodantin [nitrofurantoin macrocrystalline] Hives    Motrin [ibuprofen]      Unable to take secondary to kidney functions.    Advair diskus [fluticasone propion-salmeterol] Other (See Comments)     Dry mouth    Levaquin [levofloxacin] Dermatitis    Macrobid [nitrofurantoin monohyd/m-cryst] Other (See Comments)     Stomach pain/ GI issues    Remeron [mirtazapine] Palpitations and Other (See Comments)     Jittery    Restoril [temazepam] Other (See Comments)     LIGHTHEADED UPON  WAKING.with poor results    Sulfa (sulfonamide antibiotics) Other (See Comments)     Hold due to renal problems    Trazodone Palpitations    Vancomycin analogues Rash     Feet broke out/inflammed blood vessels         Medications:    Current Outpatient Medications:     alendronate (FOSAMAX) 35 MG tablet, Take 1 tablet (35 mg total) by mouth every 7 days., Disp: 4 tablet, Rfl: 2    allopurinol (ZYLOPRIM) 100 MG tablet, Take 2 tablets (200 mg total) by mouth nightly., Disp: 180 tablet, Rfl: 3    amitriptyline (ELAVIL) 100 MG tablet, TAKE ONE TABLET BY MOUTH AT NIGHT, Disp: 90 tablet, Rfl: 2    ascorbic acid, vitamin C, (VITAMIN C) 500 MG tablet, Take 500 mg by mouth once daily., Disp: , Rfl:     biotin 1 mg Cap, Take 1 tablet by mouth once daily. , Disp: , Rfl:     calcitRIOL (ROCALTROL) 0.25 MCG Cap, TAKE ONE TABLET BY MOUTH TWICE A WEEK ON monday AND friday (Patient taking differently: Take 0.25 mcg by mouth every Monday and Friday. TAKE ONE TABLET BY MOUTH TWICE A WEEK ON monday AND friday), Disp: 8 capsule, Rfl: 3    carvediloL (COREG) 12.5 MG tablet, Take 1 tablet (12.5 mg total) by mouth 2 (two) times daily with meals., Disp: 60 tablet, Rfl: 3    cinnamon bark (CINNAMON ORAL), Take 1 tablet by mouth once daily. , Disp: , Rfl:     clotrimazole-betamethasone 1-0.05% (LOTRISONE) cream, , Disp: , Rfl:     cyanocobalamin, vitamin B-12, (VITAMIN B-12) 5,000 mcg Subl, Place 5,000 mg under the tongue once a week., Disp: , Rfl:     cycloSPORINE (RESTASIS) 0.05 % ophthalmic emulsion, Place 1 drop into both eyes 2 (two) times daily., Disp: 60 vial, Rfl: 11    diclofenac sodium (VOLTAREN) 1 % Gel, Apply 2 g topically once daily., Disp: , Rfl:     diphenoxylate-atropine 2.5-0.025 mg (LOMOTIL) 2.5-0.025 mg per tablet, TAKE ONE TABLET BY MOUTH 4 TIMES DAILY AS NEEDED FOR DIARRHEA (Patient taking differently: Take 1 tablet by mouth 4 (four) times daily as needed for Diarrhea. ), Disp: 60 tablet, Rfl: 1     DYMISTA 137-50 mcg/spray Spry nassal spray, as needed. , Disp: , Rfl:     ferrous sulfate (IRON) 325 mg (65 mg iron) Tab tablet, Take 325 mg by mouth daily with breakfast., Disp: , Rfl:     fexofenadine (ALLEGRA) 60 MG tablet, Take 60 mg by mouth once daily., Disp: , Rfl:     fish oil-omega-3 fatty acids 300-1,000 mg capsule, Take 1 capsule by mouth once daily., Disp: , Rfl:     FLUoxetine 20 MG capsule, Take 2 capsules (40 mg total) by mouth once daily., Disp: 180 capsule, Rfl: 3    fluticasone furoate-vilanterol (BREO) 100-25 mcg/dose diskus inhaler, Inhale 1 puff into the lungs once daily., Disp: 60 each, Rfl: 3    fluticasone propionate (FLONASE) 50 mcg/actuation nasal spray, 1 spray (50 mcg total) by Each Nostril route once daily., Disp: 1 Bottle, Rfl: 4    gabapentin (NEURONTIN) 600 MG tablet, Take 1 tablet (600 mg total) by mouth 3 (three) times daily., Disp: 90 tablet, Rfl: 11    HUMALOG U-100 INSULIN 100 unit/mL injection, Inject 100 Units into the skin once daily. Via pump, Disp: , Rfl:     hydrocortisone 2.5 % cream, Apply topically nightly., Disp: 3.5 g, Rfl: 1    insulin (BASAGLAR KWIKPEN U-100 INSULIN) glargine 100 units/mL (3mL) SubQ pen, Inject 20 Units into the skin every evening. Use 10 units if BS over 210.Night before surgery., Disp: , Rfl: 0    L-LYSINE ORAL, Take 1 tablet by mouth once daily. , Disp: , Rfl:     l-methylfolate-b2-b6-b12 (CEREFOLIN) 6-5-50-1 mg Tab, Take 1 tablet by mouth once daily., Disp: , Rfl:     Lacto.acidophilus-Bif.animalis (PROBIOTIC) 5 billion cell CpSP, Take 1 capsule by mouth once daily., Disp: 30 capsule, Rfl: 3    levothyroxine (SYNTHROID) 25 MCG tablet, Take 1 tablet (25 mcg total) by mouth once daily., Disp: 90 tablet, Rfl: 3    losartan (COZAAR) 25 MG tablet, Take 25 mg by mouth once daily., Disp: , Rfl:     losartan (COZAAR) 50 MG tablet, Take 50 mg by mouth once daily., Disp: , Rfl:     magnesium oxide (MAG-OX) 400 mg (241.3 mg magnesium)  tablet, Take 800 mg by mouth every evening., Disp: , Rfl:     meclizine (ANTIVERT) 12.5 mg tablet, Take 1 tablet (12.5 mg total) by mouth 2 (two) times daily as needed for Dizziness or Nausea., Disp: 30 tablet, Rfl: 0    metronidazole 1% (METROGEL) 1 % Gel, Apply topically once daily. Prn sores on scalp., Disp: 60 g, Rfl: 2    montelukast (SINGULAIR) 10 mg tablet, Take 1 tablet (10 mg total) by mouth every evening., Disp: 30 tablet, Rfl: 8    MULTIVIT-IRON-MIN-FOLIC ACID 3,500-18-0.4 UNIT-MG-MG ORAL CHEW, Take 1 tablet by mouth 2 (two) times daily. , Disp: , Rfl:     mupirocin (BACTROBAN) 2 % ointment, APPLY OINTMENT TOPICALLY TO AFFECTED AREA ON NOSE THREE TIMES DAILY AS DIRECTED, Disp: 15 g, Rfl: 11    oxyCODONE (ROXICODONE) 15 MG Tab, Take 1 tablet (15 mg total) by mouth every 6 (six) hours as needed for Pain., Disp: 30 tablet, Rfl: 0    pantoprazole (PROTONIX) 40 MG tablet, TAKE 1 TABLET BY MOUTH ONCE DAILY (Patient taking differently: Take 40 mg by mouth once daily. ), Disp: 90 tablet, Rfl: 3    potassium chloride SA (K-DUR,KLOR-CON) 20 MEQ tablet, TAKE 1 TABLET BY MOUTH TWICE DAILY (Patient taking differently: Take 20 mEq by mouth 2 (two) times daily. ), Disp: 180 tablet, Rfl: 3    SSD 1 % cream, , Disp: , Rfl:     torsemide (DEMADEX) 20 MG Tab, TAKE ONE TABLET BY MOUTH TWICE DAILY (Patient taking differently: Take 20 mg by mouth 2 (two) times daily. ), Disp: 60 tablet, Rfl: 2    TURMERIC ORAL, Take 1 tablet by mouth once daily. , Disp: , Rfl:     Current Facility-Administered Medications:     gentamicin injection 80 mg, 80 mg, Intramuscular, 1 time in Clinic/HOD, Angy Campos MD    Facility-Administered Medications Ordered in Other Visits:     lactated ringers infusion, , Intravenous, Continuous, Shaheen Hanson MD, Last Rate: 10 mL/hr at 08/31/18 0739    lactated ringers infusion, , Intravenous, Continuous, John Villanueva MD, Last Rate: 10 mL/hr at 09/03/20 0640    lidocaine  (PF) 10 mg/ml (1%) injection 10 mg, 1 mL, Intradermal, Once, John Villanueva MD    PMHx/PSHx Updates:    Objective:     Vitals:  Blood pressure 116/60, temperature 97.7 °F (36.5 °C), weight 118.4 kg (261 lb 1.6 oz).    Physical Examination:   GEN: no apparent distress, comfortable; AAOx3  SKIN: no rashes,no ulceration, no Raynaud's, no petechiae, no SQ nodules,  HEAD: normal  EYES: no pallor, no icterus  NECK: no masses, thyroid normal, trachea midline, no LAD/LN's, supple  CV: RRR with no murmur; l S1 and S2 reg. ,no gallop no rubs,   CHEST: Normal respiratory effort; CTAB; normal breath sounds; no wheeze or crackles  MUSC/Skeletal: ROM normal; no crepitus; joints without synovitis,    No joint swelling or tenderness of PIP, MCP, wrist, elbow, shoulder, or knee joints  EXTREM: no clubbing, cyanosis, no edema,normal  pulses   NEURO: grossly intact; motor WNL; AAOx3; ,  PSYCH: normal mood, affect and behavior  LYMPH: normal cervical, supraclavicular          Labs:   Lab Results   Component Value Date    WBC 7.77 09/21/2020    HGB 11.4 (L) 09/21/2020    HCT 35.4 (L) 09/21/2020    MCV 94 09/21/2020     09/21/2020    CMP  @LASTLAB(NA,K,CL,CO2,GLU,BUN,Creatinine,Calcium,PROT,Albumin,Bilitot,Alkphos,AST,ALT,CRP,ESR,RF,CCP,MONICA,SSA,CPK,uric acid) )@  I have reviewed all available lab results and radiology reports.    Radiology/Diagnostic Studies:        Assessment/Plan:   (1) 69 y.o. female with diagnosis of osteoarthritis.  She is stable    Chronic kidney disease  History of gout    Last uric acid 5.9    Discussion:     I have explained all of the above in detail and the patient understands all of the current recommendation(s). I have answered all questions to the best of my ability and to their complete satisfaction.       The patient is to continue with the current management plan         RTC in   for 5 months    Electronically signed by Melissa Hernandez MD

## 2020-10-15 RX ORDER — PANTOPRAZOLE SODIUM 40 MG/1
40 TABLET, DELAYED RELEASE ORAL DAILY
Qty: 90 TABLET | Refills: 0 | Status: SHIPPED | OUTPATIENT
Start: 2020-10-15 | End: 2021-02-25 | Stop reason: SDUPTHER

## 2020-10-15 NOTE — TELEPHONE ENCOUNTER
lov Dr Mccarthy for hospital follow up, 9-. Current aptwith dr Mccarthy for f/u to hospital f/u 12-30-20.

## 2020-10-19 ENCOUNTER — OFFICE VISIT (OUTPATIENT)
Dept: OPHTHALMOLOGY | Facility: CLINIC | Age: 70
End: 2020-10-19
Payer: MEDICARE

## 2020-10-19 DIAGNOSIS — H04.123 CHRONICALLY DRY EYES, BILATERAL: ICD-10-CM

## 2020-10-19 DIAGNOSIS — Z96.1 STATUS POST CATARACT EXTRACTION AND INSERTION OF INTRAOCULAR LENS OF RIGHT EYE: Primary | ICD-10-CM

## 2020-10-19 DIAGNOSIS — Z96.1 STATUS POST CATARACT EXTRACTION AND INSERTION OF INTRAOCULAR LENS OF LEFT EYE: ICD-10-CM

## 2020-10-19 DIAGNOSIS — R55 SYNCOPE AND COLLAPSE: Primary | ICD-10-CM

## 2020-10-19 DIAGNOSIS — Z98.41 STATUS POST CATARACT EXTRACTION AND INSERTION OF INTRAOCULAR LENS OF RIGHT EYE: Primary | ICD-10-CM

## 2020-10-19 DIAGNOSIS — Z98.42 STATUS POST CATARACT EXTRACTION AND INSERTION OF INTRAOCULAR LENS OF LEFT EYE: ICD-10-CM

## 2020-10-19 PROCEDURE — 99999 PR PBB SHADOW E&M-EST. PATIENT-LVL V: ICD-10-PCS | Mod: PBBFAC,,, | Performed by: OPHTHALMOLOGY

## 2020-10-19 PROCEDURE — 99024 POSTOP FOLLOW-UP VISIT: CPT | Mod: S$GLB,,, | Performed by: OPHTHALMOLOGY

## 2020-10-19 PROCEDURE — 99999 PR PBB SHADOW E&M-EST. PATIENT-LVL V: CPT | Mod: PBBFAC,,, | Performed by: OPHTHALMOLOGY

## 2020-10-19 PROCEDURE — 99024 PR POST-OP FOLLOW-UP VISIT: ICD-10-PCS | Mod: S$GLB,,, | Performed by: OPHTHALMOLOGY

## 2020-10-19 RX ORDER — LIFITEGRAST 50 MG/ML
1 SOLUTION/ DROPS OPHTHALMIC 2 TIMES DAILY
Qty: 60 EACH | Refills: 2 | Status: ON HOLD | OUTPATIENT
Start: 2020-10-19 | End: 2021-02-09 | Stop reason: SDDI

## 2020-10-19 NOTE — PROGRESS NOTES
Assessment /Plan     For exam results, see Encounter Report.    Status post cataract extraction and insertion of intraocular lens of right eye    Status post cataract extraction and insertion of intraocular lens of left eye    Chronically dry eyes, bilateral  -     lifitegrast (XIIDRA) 5 % Dpet; Apply 1 drop to eye 2 (two) times a day.  Dispense: 60 each; Refill: 2      Stable VA, improved OSD  D/c restasis  D/c diclofenac  Continue PF 1 drop 2 x daily both eyes; stop once patient starts xiidra  Start xiidra 1 drop 2 x daily OU  Follow up in about 3 months (around 1/19/2021) for f/u dry eye and punctal plugs placement.

## 2020-10-20 ENCOUNTER — HOSPITAL ENCOUNTER (OUTPATIENT)
Dept: CARDIOLOGY | Facility: HOSPITAL | Age: 70
Discharge: HOME | End: 2020-10-20
Payer: MEDICARE

## 2020-10-20 DIAGNOSIS — R55 SYNCOPE AND COLLAPSE: ICD-10-CM

## 2020-10-20 PROCEDURE — 93226 XTRNL ECG REC<48 HR SCAN A/R: CPT

## 2020-10-29 LAB
OHS CV EVENT MONITOR DAY: 0
OHS CV HOLTER LENGTH DECIMAL HOURS: 48
OHS CV HOLTER LENGTH HOURS: 48
OHS CV HOLTER LENGTH MINUTES: 0

## 2020-11-25 RX ORDER — ALLOPURINOL 100 MG/1
200 TABLET ORAL NIGHTLY
Qty: 180 TABLET | Refills: 3 | Status: SHIPPED | OUTPATIENT
Start: 2020-11-25

## 2020-12-04 ENCOUNTER — LAB VISIT (OUTPATIENT)
Dept: LAB | Facility: HOSPITAL | Age: 70
End: 2020-12-04
Attending: INTERNAL MEDICINE
Payer: MEDICARE

## 2020-12-04 DIAGNOSIS — D47.2 MONOCLONAL GAMMOPATHY ASSOCIATED WITH LYMPHOPLASMACYTIC DYSCRASIAS: ICD-10-CM

## 2020-12-04 DIAGNOSIS — R79.9 ABNORMAL FINDING OF BLOOD CHEMISTRY, UNSPECIFIED: ICD-10-CM

## 2020-12-04 LAB
ALBUMIN SERPL BCP-MCNC: 3.5 G/DL (ref 3.5–5.2)
ALP SERPL-CCNC: 130 U/L (ref 55–135)
ALT SERPL W/O P-5'-P-CCNC: 24 U/L (ref 10–44)
ANION GAP SERPL CALC-SCNC: 10 MMOL/L (ref 8–16)
AST SERPL-CCNC: 25 U/L (ref 10–40)
BASOPHILS # BLD AUTO: 0.04 K/UL (ref 0–0.2)
BASOPHILS NFR BLD: 0.6 % (ref 0–1.9)
BILIRUB SERPL-MCNC: 0.3 MG/DL (ref 0.1–1)
BUN SERPL-MCNC: 21 MG/DL (ref 8–23)
CALCIUM SERPL-MCNC: 9.3 MG/DL (ref 8.7–10.5)
CHLORIDE SERPL-SCNC: 98 MMOL/L (ref 95–110)
CO2 SERPL-SCNC: 30 MMOL/L (ref 23–29)
CREAT SERPL-MCNC: 1.4 MG/DL (ref 0.5–1.4)
DIFFERENTIAL METHOD: ABNORMAL
EOSINOPHIL # BLD AUTO: 0.2 K/UL (ref 0–0.5)
EOSINOPHIL NFR BLD: 3.2 % (ref 0–8)
ERYTHROCYTE [DISTWIDTH] IN BLOOD BY AUTOMATED COUNT: 14.6 % (ref 11.5–14.5)
EST. GFR  (AFRICAN AMERICAN): 44 ML/MIN/1.73 M^2
EST. GFR  (NON AFRICAN AMERICAN): 38 ML/MIN/1.73 M^2
GLUCOSE SERPL-MCNC: 251 MG/DL (ref 70–110)
HCT VFR BLD AUTO: 39.4 % (ref 37–48.5)
HGB BLD-MCNC: 12.5 G/DL (ref 12–16)
IMM GRANULOCYTES # BLD AUTO: 0.04 K/UL (ref 0–0.04)
IMM GRANULOCYTES NFR BLD AUTO: 0.6 % (ref 0–0.5)
LYMPHOCYTES # BLD AUTO: 1.6 K/UL (ref 1–4.8)
LYMPHOCYTES NFR BLD: 22.8 % (ref 18–48)
MCH RBC QN AUTO: 30.3 PG (ref 27–31)
MCHC RBC AUTO-ENTMCNC: 31.7 G/DL (ref 32–36)
MCV RBC AUTO: 95 FL (ref 82–98)
MONOCYTES # BLD AUTO: 0.5 K/UL (ref 0.3–1)
MONOCYTES NFR BLD: 7 % (ref 4–15)
NEUTROPHILS # BLD AUTO: 4.6 K/UL (ref 1.8–7.7)
NEUTROPHILS NFR BLD: 65.8 % (ref 38–73)
NRBC BLD-RTO: 0 /100 WBC
PLATELET # BLD AUTO: 234 K/UL (ref 150–350)
PMV BLD AUTO: 9.6 FL (ref 9.2–12.9)
POTASSIUM SERPL-SCNC: 3.9 MMOL/L (ref 3.5–5.1)
PROT SERPL-MCNC: 7.3 G/DL (ref 6–8.4)
RBC # BLD AUTO: 4.13 M/UL (ref 4–5.4)
SODIUM SERPL-SCNC: 138 MMOL/L (ref 136–145)
WBC # BLD AUTO: 6.96 K/UL (ref 3.9–12.7)

## 2020-12-04 PROCEDURE — 36415 COLL VENOUS BLD VENIPUNCTURE: CPT

## 2020-12-04 PROCEDURE — 83540 ASSAY OF IRON: CPT

## 2020-12-04 PROCEDURE — 82784 ASSAY IGA/IGD/IGG/IGM EACH: CPT

## 2020-12-04 PROCEDURE — 80053 COMPREHEN METABOLIC PANEL: CPT

## 2020-12-04 PROCEDURE — 85025 COMPLETE CBC W/AUTO DIFF WBC: CPT

## 2020-12-05 LAB
IGA SERPL-MCNC: 519 MG/DL (ref 40–350)
IRON SERPL-MCNC: 65 UG/DL (ref 30–160)
SATURATED IRON: 21 % (ref 20–50)
TOTAL IRON BINDING CAPACITY: 315 UG/DL (ref 250–450)
TRANSFERRIN SERPL-MCNC: 213 MG/DL (ref 200–375)

## 2020-12-07 ENCOUNTER — OFFICE VISIT (OUTPATIENT)
Dept: PULMONOLOGY | Facility: CLINIC | Age: 70
End: 2020-12-07
Payer: MEDICARE

## 2020-12-07 ENCOUNTER — HOSPITAL ENCOUNTER (OUTPATIENT)
Dept: RADIOLOGY | Facility: HOSPITAL | Age: 70
Discharge: HOME | End: 2020-12-07
Attending: NURSE PRACTITIONER
Payer: MEDICARE

## 2020-12-07 VITALS
WEIGHT: 271.38 LBS | HEART RATE: 102 BPM | DIASTOLIC BLOOD PRESSURE: 80 MMHG | SYSTOLIC BLOOD PRESSURE: 135 MMHG | OXYGEN SATURATION: 98 % | BODY MASS INDEX: 49.64 KG/M2

## 2020-12-07 DIAGNOSIS — J45.30 MILD PERSISTENT ASTHMA WITHOUT COMPLICATION: Primary | ICD-10-CM

## 2020-12-07 DIAGNOSIS — G47.33 OSA (OBSTRUCTIVE SLEEP APNEA): ICD-10-CM

## 2020-12-07 DIAGNOSIS — R05.9 COUGH: ICD-10-CM

## 2020-12-07 PROCEDURE — 3008F PR BODY MASS INDEX (BMI) DOCUMENTED: ICD-10-PCS | Mod: S$GLB,,, | Performed by: NURSE PRACTITIONER

## 2020-12-07 PROCEDURE — 99214 PR OFFICE/OUTPT VISIT, EST, LEVL IV, 30-39 MIN: ICD-10-PCS | Mod: S$GLB,,, | Performed by: NURSE PRACTITIONER

## 2020-12-07 PROCEDURE — 1101F PR PT FALLS ASSESS DOC 0-1 FALLS W/OUT INJ PAST YR: ICD-10-PCS | Mod: S$GLB,,, | Performed by: NURSE PRACTITIONER

## 2020-12-07 PROCEDURE — 3288F PR FALLS RISK ASSESSMENT DOCUMENTED: ICD-10-PCS | Mod: S$GLB,,, | Performed by: NURSE PRACTITIONER

## 2020-12-07 PROCEDURE — 3008F BODY MASS INDEX DOCD: CPT | Mod: S$GLB,,, | Performed by: NURSE PRACTITIONER

## 2020-12-07 PROCEDURE — 71046 XR CHEST PA AND LATERAL: ICD-10-PCS | Mod: 26,,, | Performed by: RADIOLOGY

## 2020-12-07 PROCEDURE — 1159F PR MEDICATION LIST DOCUMENTED IN MEDICAL RECORD: ICD-10-PCS | Mod: S$GLB,,, | Performed by: NURSE PRACTITIONER

## 2020-12-07 PROCEDURE — 1126F AMNT PAIN NOTED NONE PRSNT: CPT | Mod: S$GLB,,, | Performed by: NURSE PRACTITIONER

## 2020-12-07 PROCEDURE — 3079F DIAST BP 80-89 MM HG: CPT | Mod: S$GLB,,, | Performed by: NURSE PRACTITIONER

## 2020-12-07 PROCEDURE — 99214 OFFICE O/P EST MOD 30 MIN: CPT | Mod: S$GLB,,, | Performed by: NURSE PRACTITIONER

## 2020-12-07 PROCEDURE — 71046 X-RAY EXAM CHEST 2 VIEWS: CPT | Mod: TC,FY

## 2020-12-07 PROCEDURE — 71046 X-RAY EXAM CHEST 2 VIEWS: CPT | Mod: 26,,, | Performed by: RADIOLOGY

## 2020-12-07 PROCEDURE — 1101F PT FALLS ASSESS-DOCD LE1/YR: CPT | Mod: S$GLB,,, | Performed by: NURSE PRACTITIONER

## 2020-12-07 PROCEDURE — 1126F PR PAIN SEVERITY QUANTIFIED, NO PAIN PRESENT: ICD-10-PCS | Mod: S$GLB,,, | Performed by: NURSE PRACTITIONER

## 2020-12-07 PROCEDURE — 3075F PR MOST RECENT SYSTOLIC BLOOD PRESS GE 130-139MM HG: ICD-10-PCS | Mod: S$GLB,,, | Performed by: NURSE PRACTITIONER

## 2020-12-07 PROCEDURE — 3079F PR MOST RECENT DIASTOLIC BLOOD PRESSURE 80-89 MM HG: ICD-10-PCS | Mod: S$GLB,,, | Performed by: NURSE PRACTITIONER

## 2020-12-07 PROCEDURE — 3288F FALL RISK ASSESSMENT DOCD: CPT | Mod: S$GLB,,, | Performed by: NURSE PRACTITIONER

## 2020-12-07 PROCEDURE — 3075F SYST BP GE 130 - 139MM HG: CPT | Mod: S$GLB,,, | Performed by: NURSE PRACTITIONER

## 2020-12-07 PROCEDURE — 1159F MED LIST DOCD IN RCRD: CPT | Mod: S$GLB,,, | Performed by: NURSE PRACTITIONER

## 2020-12-07 RX ORDER — FLUTICASONE FUROATE AND VILANTEROL TRIFENATATE 100; 25 UG/1; UG/1
1 POWDER RESPIRATORY (INHALATION) DAILY
Qty: 1 EACH | Refills: 6 | Status: SHIPPED | OUTPATIENT
Start: 2020-12-07

## 2020-12-07 RX ORDER — ALBUTEROL SULFATE 1.25 MG/3ML
1.25 SOLUTION RESPIRATORY (INHALATION) EVERY 6 HOURS PRN
Qty: 1 BOX | Refills: 6 | Status: SHIPPED | OUTPATIENT
Start: 2020-12-07 | End: 2021-12-07

## 2020-12-07 RX ORDER — ATENOLOL 25 MG/1
25 TABLET ORAL 2 TIMES DAILY
COMMUNITY
Start: 2020-11-24 | End: 2020-12-28 | Stop reason: SDUPTHER

## 2020-12-07 NOTE — PROGRESS NOTES
SUBJECTIVE:    Patient ID: Fifi Mcnair is a 70 y.o. female.    Chief Complaint: Asthma (follow up )    Patient here today with increased cough for approximately 2 days.  She is using Breo daily. She does have reflux, and does have issues swallowing at times. She had a gastric sleeve in 2014 she has gained all the weight back.  She is sleeping on her CPAP every night.         Past Medical History:   Diagnosis Date    Allergy     multiple antibiotic allergies    Anemia     Anxiety     Arthritis     Asthma     Cataract     CHF (congestive heart failure)     NYHA class III     Chronic kidney disease     Colon polyp     benign    COPD (chronic obstructive pulmonary disease)     DLCO 37% , and mild pulmonary HTN    Depression     Diabetes mellitus     Diabetes mellitus without complication     Diabetic retinopathy     Diastolic dysfunction     Diverticulitis of large intestine without perforation or abscess without bleeding 2/12/2018    Diverticulosis     Fracture of lumbar spine     GERD (gastroesophageal reflux disease)     Hernia of unspecified site of abdominal cavity without mention of obstruction or gangrene     Hyperlipidemia     Hypertension     hypertensive renal    IBS (irritable bowel syndrome)     Mild nonproliferative diabetic retinopathy(362.04) 11/25/2013    Morbid obesity     Nuclear sclerosis 11/25/2013    Osteoporosis     Peripheral edema     Rash     S/P LASIK (laser assisted in situ keratomileusis)     Sleep apnea     sleep apnea uses bipap.    Thyroid disease     on synthroid    TIA (transient ischemic attack)     Urinary tract infection      Past Surgical History:   Procedure Laterality Date    ABDOMINAL SURGERY      ADENOIDECTOMY      ARTHROSCOPIC CHONDROPLASTY OF KNEE JOINT Right 8/31/2018    Procedure: ARTHROSCOPY, KNEE, WITH CHONDROPLASTY;  Surgeon: Larry Molina MD;  Location: [facility];  Service: Orthopedics;  Laterality: Right;   Tricompartmental chondroplasty    CATARACT EXTRACTION Right 08/13/2020    LF    CATARACT EXTRACTION Left 09/03/2020    LF    COLONOSCOPY  03/05/2013    repeat in 5 years    COLONOSCOPY N/A 5/31/2017    Procedure: COLONOSCOPY;  Surgeon: Luis Navarro MD;  Location: [facility] ENDO;  Service: Endoscopy;  Laterality: N/A;    COLONOSCOPY N/A 4/11/2018    Procedure: COLONOSCOPY;  Surgeon: Luis Navarro MD;  Location: [facility] ENDO;  Service: Endoscopy;  Laterality: N/A;    COSMETIC SURGERY      belt abdominoplasty    CYSTOSCOPY      CYSTOSCOPY N/A 8/6/2018    Procedure: CYSTOSCOPY;  Surgeon: Angy Campos MD;  Location: [facility] OR;  Service: Urology;  Laterality: N/A;    EYE SURGERY Right     mazzulla    GASTRECTOMY      gastric sleeve    gastric sleeve      HERNIA REPAIR      5 years old    HYSTERECTOMY      ovaries remain    JOINT REPLACEMENT Left     knee replacement    KNEE ARTHROPLASTY Left 4/16/2019    Procedure: ARTHROPLASTY, KNEE;  Surgeon: Larry Molina MD;  Location: [facility] OR;  Service: Orthopedics;  Laterality: Left;  Emanuel    KNEE ARTHROSCOPY W/ MENISCECTOMY Right 8/31/2018    Procedure: ARTHROSCOPY, KNEE, WITH MENISCECTOMY;  Surgeon: Larry Molina MD;  Location: [facility] OR;  Service: Orthopedics;  Laterality: Right;    PHACOEMULSIFICATION OF CATARACT Right 8/13/2020    Procedure: PHACOEMULSIFICATION, CATARACT;  Surgeon: Meek Haro Jr., MD;  Location: [facility] OR;  Service: Ophthalmology;  Laterality: Right;  Right/DM    PHACOEMULSIFICATION OF CATARACT Left 9/3/2020    Procedure: PHACOEMULSIFICATION, CATARACT WITH O.R.A;  Surgeon: Meek Haro Jr., MD;  Location: [facility] OR;  Service: Ophthalmology;  Laterality: Left;  Left/DM    REFRACTIVE SURGERY      mono va//ou//    RHINOPLASTY TIP      TONSILLECTOMY      TOTAL KNEE ARTHROPLASTY Left 4/16/19    TUBAL LIGATION      UPPER GASTROINTESTINAL ENDOSCOPY  03/05/2013    UPPER GASTROINTESTINAL ENDOSCOPY  08/24/2016     Dr. Veras, repeat in 8 weeks    UPPER GASTROINTESTINAL ENDOSCOPY  2016    Dr. Randall     Social History     Tobacco Use    Smoking status: Former Smoker     Packs/day: 1.00     Years: 4.00     Pack years: 4.00     Types: Cigarettes     Quit date:      Years since quittin.9    Smokeless tobacco: Never Used    Tobacco comment: quit , lived with smokers    Substance Use Topics    Alcohol use: Not Currently     Frequency: Never     Binge frequency: Never     Family History   Problem Relation Age of Onset    Heart disease Mother 59    Cancer Mother 59        throat    Allergies Mother     Diabetes Mother     Breast cancer Mother     Heart disease Father 70        flutter    Allergies Father     Diabetes Father     Cancer Sister 22        22 thyroid,49  breast    Allergies Sister     Breast cancer Sister     Diabetes Sister     Cancer Brother 62        lung    Diabetes Brother     Asthma Daughter     Diabetes Daughter     Depression Daughter     Asthma Grandchild     Eczema Grandchild     Eczema Grandchild     Breast cancer Maternal Grandmother     Ovarian cancer Cousin     Glaucoma Neg Hx     Macular degeneration Neg Hx     Retinal detachment Neg Hx       Review of patient's allergies indicates:   Allergen Reactions    Adhesive Other (See Comments)     Blisters    Cleocin [clindamycin hcl] Hives    Ceclor [cefaclor] Hives    Advair diskus [fluticasone-salmeterol]      Dry mouth    Aleve [naproxen sodium]      Unable to take secondary to kidney function.     Erythromycin Hives    Levaquin [levofloxacin] Dermatitis    Macrobid [nitrofurantoin monohyd/m-cryst] Other (See Comments)     Stomach pain/ GI issues    Macrodantin [nitrofurantoin macrocrystalline] Hives    Motrin [ibuprofen]      Unable to take secondary to kidney functions.    Restoril [temazepam]      LIGHTHEADED UPON WAKING.with poor results    Sulfa (sulfonamide antibiotics)      Hold due to renal  problems    Trazodone      PALPITATIONS    Remeron [mirtazapine] Palpitations and Other (See Comments)     Jittery    Vancomycin analogues Rash     Feet broke out/inflammed blood vessels         Current Outpatient Medications   Medication Sig Dispense Refill    alendronate (FOSAMAX) 35 MG tablet Take 1 tablet (35 mg total) by mouth every 7 days. 4 tablet 2    allopurinoL (ZYLOPRIM) 100 MG tablet Take 2 tablets (200 mg total) by mouth nightly. 180 tablet 3    amitriptyline (ELAVIL) 100 MG tablet TAKE ONE TABLET BY MOUTH AT NIGHT 90 tablet 2    ascorbic acid, vitamin C, (VITAMIN C) 500 MG tablet Take 500 mg by mouth once daily.      atenoloL (TENORMIN) 25 MG tablet Take 25 mg by mouth 2 (two) times daily.      biotin 1 mg Cap Take 1 tablet by mouth once daily.       calcitRIOL (ROCALTROL) 0.25 MCG Cap TAKE ONE TABLET BY MOUTH TWICE A WEEK ON monday AND friday (Patient taking differently: Take 0.25 mcg by mouth every Monday and Friday. TAKE ONE TABLET BY MOUTH TWICE A WEEK ON monday AND friday) 8 capsule 3    carvediloL (COREG) 12.5 MG tablet Take 1 tablet (12.5 mg total) by mouth 2 (two) times daily with meals. 60 tablet 3    cinnamon bark (CINNAMON ORAL) Take 1 tablet by mouth once daily.       clotrimazole-betamethasone 1-0.05% (LOTRISONE) cream       cyanocobalamin, vitamin B-12, (VITAMIN B-12) 5,000 mcg Subl Place 5,000 mg under the tongue once a week.      cycloSPORINE (RESTASIS) 0.05 % ophthalmic emulsion Place 1 drop into both eyes 2 (two) times daily. 60 vial 11    diclofenac sodium (VOLTAREN) 1 % Gel Apply 2 g topically once daily.      diphenoxylate-atropine 2.5-0.025 mg (LOMOTIL) 2.5-0.025 mg per tablet TAKE ONE TABLET BY MOUTH 4 TIMES DAILY AS NEEDED FOR DIARRHEA (Patient taking differently: Take 1 tablet by mouth 4 (four) times daily as needed for Diarrhea. ) 60 tablet 1    DYMISTA 137-50 mcg/spray Spry nassal spray as needed.       ferrous sulfate (IRON) 325 mg (65 mg iron) Tab tablet  Take 325 mg by mouth daily with breakfast.      fexofenadine (ALLEGRA) 60 MG tablet Take 60 mg by mouth once daily.      fish oil-omega-3 fatty acids 300-1,000 mg capsule Take 1 capsule by mouth once daily.      FLUoxetine 20 MG capsule Take 2 capsules (40 mg total) by mouth once daily. 180 capsule 3    fluticasone furoate-vilanterol (BREO) 100-25 mcg/dose diskus inhaler Inhale 1 puff into the lungs once daily. 60 each 3    fluticasone propionate (FLONASE) 50 mcg/actuation nasal spray 1 spray (50 mcg total) by Each Nostril route once daily. 1 Bottle 4    gabapentin (NEURONTIN) 600 MG tablet Take 1 tablet (600 mg total) by mouth 3 (three) times daily. 90 tablet 11    HUMALOG U-100 INSULIN 100 unit/mL injection Inject 100 Units into the skin once daily. Via pump      hydrocortisone 2.5 % cream Apply topically nightly. 3.5 g 1    L-LYSINE ORAL Take 1 tablet by mouth once daily.       l-methylfolate-b2-b6-b12 (CEREFOLIN) 6-5-50-1 mg Tab Take 1 tablet by mouth once daily.      Lacto.acidophilus-Bif.animalis (PROBIOTIC) 5 billion cell CpSP Take 1 capsule by mouth once daily. 30 capsule 3    levothyroxine (SYNTHROID) 25 MCG tablet Take 1 tablet (25 mcg total) by mouth once daily. 90 tablet 3    lifitegrast (XIIDRA) 5 % Dpet Apply 1 drop to eye 2 (two) times a day. 60 each 2    losartan (COZAAR) 25 MG tablet Take 25 mg by mouth once daily.      losartan (COZAAR) 50 MG tablet Take 50 mg by mouth once daily.      magnesium oxide (MAG-OX) 400 mg (241.3 mg magnesium) tablet Take 800 mg by mouth every evening.      meclizine (ANTIVERT) 12.5 mg tablet Take 1 tablet (12.5 mg total) by mouth 2 (two) times daily as needed for Dizziness or Nausea. 30 tablet 0    metronidazole 1% (METROGEL) 1 % Gel Apply topically once daily. Prn sores on scalp. 60 g 2    montelukast (SINGULAIR) 10 mg tablet Take 1 tablet (10 mg total) by mouth every evening. 30 tablet 8    MULTIVIT-IRON-MIN-FOLIC ACID 3,500-18-0.4 UNIT-MG-MG ORAL  CHEW Take 1 tablet by mouth 2 (two) times daily.       mupirocin (BACTROBAN) 2 % ointment APPLY OINTMENT TOPICALLY TO AFFECTED AREA ON NOSE THREE TIMES DAILY AS DIRECTED 15 g 11    oxyCODONE (ROXICODONE) 15 MG Tab Take 1 tablet (15 mg total) by mouth every 6 (six) hours as needed for Pain. 30 tablet 0    pantoprazole (PROTONIX) 40 MG tablet Take 1 tablet (40 mg total) by mouth once daily. 90 tablet 0    potassium chloride SA (K-DUR,KLOR-CON) 20 MEQ tablet TAKE 1 TABLET BY MOUTH TWICE DAILY (Patient taking differently: Take 20 mEq by mouth 2 (two) times daily. ) 180 tablet 3    SSD 1 % cream       torsemide (DEMADEX) 20 MG Tab TAKE ONE TABLET BY MOUTH TWICE DAILY (Patient taking differently: Take 20 mg by mouth 2 (two) times daily. ) 60 tablet 2    TURMERIC ORAL Take 1 tablet by mouth once daily.       albuterol (ACCUNEB) 1.25 mg/3 mL Nebu Take 3 mLs (1.25 mg total) by nebulization every 6 (six) hours as needed. Rescue 1 Box 6    fluticasone furoate-vilanteroL (BREO ELLIPTA) 100-25 mcg/dose diskus inhaler Inhale 1 puff into the lungs once daily. Controller Rinse after you use it 1 each 6    insulin (BASAGLAR KWIKPEN U-100 INSULIN) glargine 100 units/mL (3mL) SubQ pen Inject 20 Units into the skin every evening. Use 10 units if BS over 210.Night before surgery.  0     Current Facility-Administered Medications   Medication Dose Route Frequency Provider Last Rate Last Dose    gentamicin injection 80 mg  80 mg Intramuscular 1 time in Clinic/HOD Angy Campos MD         Facility-Administered Medications Ordered in Other Visits   Medication Dose Route Frequency Provider Last Rate Last Dose    lactated ringers infusion   Intravenous Continuous Shaheen Hanson MD 10 mL/hr at 08/31/18 0739      lactated ringers infusion   Intravenous Continuous John Villanueva MD 10 mL/hr at 09/03/20 0640      lidocaine (PF) 10 mg/ml (1%) injection 10 mg  1 mL Intradermal Once John Villanueva MD              Last PFT: 06/05/2017  Last Chest xray:  04/04/2019      General: feels well   Eyes: dry eyes  ENT:  Post nasal drip   Heart:: No chest pain or palpitations.  Lungs: dry cough at times   GI: Reflux, difficulty swallowing at times   : No dysuria, hesitancy, or nocturia.  Skin: No lesions or rashes.  Musculoskeletal: knee pain   Neuro: no headaches   Lymph: a little swelling in legs   Psych: No anxiety or depression.  Endo:  More weight gain     OBJECTIVE:      /80 (BP Location: Left arm, Patient Position: Sitting)   Pulse 102   Wt 123.1 kg (271 lb 6.4 oz)   LMP  (LMP Unknown)   SpO2 98%   BMI 49.64 kg/m²     Physical Exam  GENERAL: Older morbidly obese patient in no distress.  HEENT: Pupils equal and reactive. Extraocular movements intact. Nose intact.      Pharynx moist, post nasal drip and cobblestoning   NECK: Supple.   HEART: Regular rate and rhythm. No murmur or gallop auscultated.  LUNGS: Clear to auscultation and percussion. Lung excursion symmetrical. No change in fremitus. No adventitial noises.  ABDOMEN: Bowel sounds present. Non-tender, no masses palpated. Obese   : Normal anatomy.  EXTREMITIES: Normal muscle tone and joint movement, no cyanosis or clubbing.   LYMPHATICS: No adenopathy palpated, no edema.  SKIN: Dry, intact, no lesions.   NEURO: Cranial nerves II-XII intact. Motor strength 5/5 bilaterally, upper and lower extremities.  PSYCH: Appropriate affect.          Assessment:       1. Mild persistent asthma without complication    2. MECHELLE (obstructive sleep apnea), on CPAP 100%    3. Cough          Plan:       Mild persistent asthma without complication    MECHELLE (obstructive sleep apnea), on CPAP 100%    Cough  -     X-Ray Chest PA And Lateral; Future    Other orders  -     fluticasone furoate-vilanteroL (BREO ELLIPTA) 100-25 mcg/dose diskus inhaler; Inhale 1 puff into the lungs once daily. Controller Rinse after you use it  Dispense: 1 each; Refill: 6  -     albuterol (ACCUNEB)  1.25 mg/3 mL Nebu; Take 3 mLs (1.25 mg total) by nebulization every 6 (six) hours as needed. Rescue  Dispense: 1 Box; Refill: 6         Have to lose weight   Keep sleeping on your CPAP every night   Continue the Breo Daily   Chest xray   Need to talk to your GI doctor about difficulty swallowing or coughing after you eat   Refill Albuterol use every 4-6 hours as needed for cough and shortness of breath   Follow up in about 6 months (around 6/7/2021).

## 2020-12-07 NOTE — PATIENT INSTRUCTIONS
Have to lose weight   Keep sleeping on your CPAP every night   Continue the Breo Daily   Chest xray   Need to talk to your GI doctor about difficulty swallowing or coughing after you eat   Refill Albuterol use every 4-6 hours as needed for cough and shortness of breath     Asthma Trigger Checklist  Allergens, irritants, and other things may trigger your asthma. Check the box next to each of your triggers. After each trigger is a list of ways to avoid it.     Dust mites. Dust mites live in mattresses, bedding, carpets, curtains, and indoor dust.  · To kill dust mites, wash bedding in hot water (130°F) each week.  · Cover mattress and pillows with special dust-mite-proof cases.  · Dont use upholstered furniture like sofas or chairs in the bedroom.  · Use allergy-proof filters for air conditioners and furnaces. Replace or clean them as instructed.  · If you can, replace carpeting with wood or tile caleb, especially in the bedroom.    Animals. Animals with fur or feathers shed dander (allergens).  · Its best to choose a pet that doesnt have fur or feathers, such as a fish or a reptile.  · If you have pets, keep them off of your bed and out of your bedroom.  · Wash your hands and clothes after handling pets.      Mold. Mold grows in damp places, such as bathrooms, basements, and closets.  · Ask someone to clean damp areas in your home every week. Or try wearing a face mask while you clean.  · Run an exhaust fan while bathing. Or leave a window open in the bathroom.  · Repair water leaks in or around your home.  · Have someone else cut grass or rake leaves, if possible.  · Dont use vaporizers or humidifiers. They encourage mold growth.      Pollen. Pollen from trees, grasses, and weeds is a common allergen. (Flower pollens are generally not a problem).  · Try to learn what types of pollen affect you most. Pollen levels vary depending on the plant, the season, and the time of day.  · If possible, use air  conditioning instead of opening the windows in your home or car.  · Have someone else do yard work, if possible.      Cockroaches. Roaches are found in many homes and produce allergens.  · Keep your kitchen clean and dry. A leaky faucet or drain can attract roaches.  · Remove garbage from your home daily.  · Store food in tightly sealed containers. Wash dishes as soon as they are used.  · Use bait stations or traps to control roaches. Avoid using chemical sprays.      Smoke. Smoke may be from cigarettes, cigars, pipes, incense or candles, barbecues or grills, and fireplaces.  · Dont smoke. And dont let people smoke in your home or car.  · When you travel, ask for nonsmoking rental cars and hotel rooms.  · Avoid fireplaces and wood stoves. If you cant, sit away from them. Make sure the smoke is directed outside.  · Dont burn incense or use candles.  · Move away from smoky outdoor cooking grills.      Smog.  Smog is from car exhaust and other pollution.  · Read or listen to local air-quality reports. These let you know when air quality is poor.  · Stay indoors as much as you can on smoggy days. If possible, use air conditioning instead of opening the windows.  · In your car, set air conditioning to recirculate air, so less pollution gets in.      Strong odors. These include air fresheners, deodorizers, and cleaning products; perfume, deodorant, and other beauty products; incense and candles; and insect sprays and other sprays.  · Use scent-free products like deodorant or body lotion.  · Avoid using cleaning products with bleach and ammonia. Make your own cleaning solution with white vinegar, baking soda, or mild dish soap.  · Use exhaust fans while cooking. Or open a window, if possible.   · Avoid perfumes, air fresheners, potpourri, and other scented products.      Other irritants. These include dust, aerosol sprays, and powders.  · Wear a face mask while doing tasks like sanding, dusting, sweeping, and yard  work. Open doors and windows if working indoors.  · Use pump spray bottles instead of aerosols.  · Pour liquid  onto a rag or cloth instead of spraying them.      Weather. Weather conditions can trigger symptoms or make them worse.  · Watch for very high or low temperatures, very humid conditions, or a lot of wind, as these conditions can make symptoms worse.  · Limit outdoor activity during the type of weather that affects you.  · Wear a scarf over your mouth and nose in cold weather.      Colds, flu, and sinus infections. Upper respiratory infections can trigger asthma.  · Wash your hands often with soap and warm water or use a hand  containing alcohol.  · Get a yearly flu shot. And ask if you should get a pneumonia vaccine.  · Take care of your general health. Get plenty of sleep. And eat a variety of healthy foods.      Food additives. Food additives can trigger asthma flare-ups in some people.  · Check food labels for sulfites or other similar ingredients. These are often found in foods such as wine, beer, and dried fruits.  · Avoid foods that contain these additives.      Medicine. Aspirin, NSAIDS like ibuprofen and naproxen, and heart medicines like beta-blockers may be triggers.  · Tell your health care provider if you think certain medicines trigger symptoms.   · Be sure to read the labels on over-the-counter medicines. They may have ingredients that cause symptoms for you.     , Emotions. Laughing, crying, or feeling excited are triggers for some people.   · To help you stay calm: Try breathing in slowly through your nose for a count of 2 seconds. Close your lips and breathe out for 4 seconds. Repeat.  · Try to focus on a soothing image in your mind. This will help relax you and calm your breathing.  · Remember to take your daily controller medicines. When youre upset or under stress, its easy to forget.      Exercise. For some people, exercise can trigger symptoms. Dont let this keep  you from being active.   · If you have not been exercising regularly, start slow and work up gradually.  · Take all of your medicines as prescribed.  · If you use quick-relief medicine, make sure you have it with you when you exercise.  · Stop if you have any symptoms. Make sure you talk with your provider about these symptoms.  Date Last Reviewed: 1/1/2017  © 1920-2104 [employer]. [address] [place] [address], [place] [address]. All rights reserved. This information is not intended as a substitute for professional medical care. Always follow your healthcare professional's instructions.

## 2020-12-08 ENCOUNTER — TELEPHONE (OUTPATIENT)
Dept: HEMATOLOGY/ONCOLOGY | Facility: CLINIC | Age: 70
End: 2020-12-08

## 2020-12-08 ENCOUNTER — TELEPHONE (OUTPATIENT)
Dept: FAMILY MEDICINE | Facility: CLINIC | Age: 70
End: 2020-12-08

## 2020-12-08 ENCOUNTER — TELEPHONE (OUTPATIENT)
Dept: PULMONOLOGY | Facility: CLINIC | Age: 70
End: 2020-12-08

## 2020-12-08 NOTE — TELEPHONE ENCOUNTER
Left VM requesting callback from patient to re-schedule her appt or see STAR Pinzon as Dr. Green has called out of office today. First attempt. Portal msg'd pt letting them know above. Encouraged to ack.

## 2020-12-08 NOTE — TELEPHONE ENCOUNTER
----- Message from Kaley Chau sent at 12/8/2020 12:19 PM CST -----  Regarding: orders  Contact: patient  Type: Needs Medical Advice  Who Called:  patient  Best Call Back Number: [phone] (home)   Additional Information: Patient needs a order for a mammogram. Please call  patient. Thanks!

## 2020-12-08 NOTE — TELEPHONE ENCOUNTER
Chest xray   Impression:     Pulmonary vascular prominence and mild bilateral airspace disease is concerning for CHF/volume overload in this patient with underlying scarring and cardiomegaly.    Patient needs to lay off the salt and take her diuretics. Message left informing her

## 2020-12-16 ENCOUNTER — PATIENT OUTREACH (OUTPATIENT)
Dept: ADMINISTRATIVE | Facility: HOSPITAL | Age: 70
End: 2020-12-16

## 2020-12-16 NOTE — PROGRESS NOTES
2020 Care Everywhere updates requested and reviewed.  Immunizations reconciled. Media reports reviewed.  Duplicate HM overrides and  orders removed.  Overdue HM topic chart audit and/or requested.  Overdue lab testing linked to upcoming lab appointments if applies.    LINKS DOWN 2020    A1C UP TO DATE. FOLLOW UP SCHEDULED    Health Maintenance Due   Topic Date Due    Influenza Vaccine (1) 2020

## 2020-12-28 RX ORDER — ATENOLOL 25 MG/1
25 TABLET ORAL 2 TIMES DAILY
Qty: 60 TABLET | Refills: 4 | Status: ON HOLD | OUTPATIENT
Start: 2020-12-28 | End: 2021-02-09 | Stop reason: ALTCHOICE

## 2020-12-30 ENCOUNTER — OFFICE VISIT (OUTPATIENT)
Dept: FAMILY MEDICINE | Facility: CLINIC | Age: 70
End: 2020-12-30
Payer: MEDICARE

## 2020-12-30 VITALS
DIASTOLIC BLOOD PRESSURE: 80 MMHG | RESPIRATION RATE: 18 BRPM | BODY MASS INDEX: 49.7 KG/M2 | HEART RATE: 100 BPM | HEIGHT: 62 IN | OXYGEN SATURATION: 98 % | TEMPERATURE: 98 F | SYSTOLIC BLOOD PRESSURE: 134 MMHG | WEIGHT: 270.06 LBS

## 2020-12-30 DIAGNOSIS — R42 VERTIGO: ICD-10-CM

## 2020-12-30 DIAGNOSIS — E87.70 HYPERVOLEMIA, UNSPECIFIED HYPERVOLEMIA TYPE: Primary | ICD-10-CM

## 2020-12-30 DIAGNOSIS — N18.30 ANEMIA DUE TO STAGE 3 CHRONIC KIDNEY DISEASE, UNSPECIFIED WHETHER STAGE 3A OR 3B CKD: ICD-10-CM

## 2020-12-30 DIAGNOSIS — G47.00 INSOMNIA, UNSPECIFIED TYPE: ICD-10-CM

## 2020-12-30 DIAGNOSIS — D63.1 ANEMIA DUE TO STAGE 3 CHRONIC KIDNEY DISEASE, UNSPECIFIED WHETHER STAGE 3A OR 3B CKD: ICD-10-CM

## 2020-12-30 PROCEDURE — 1125F PR PAIN SEVERITY QUANTIFIED, PAIN PRESENT: ICD-10-PCS | Mod: S$GLB,,, | Performed by: STUDENT IN AN ORGANIZED HEALTH CARE EDUCATION/TRAINING PROGRAM

## 2020-12-30 PROCEDURE — 3079F DIAST BP 80-89 MM HG: CPT | Mod: CPTII,S$GLB,, | Performed by: STUDENT IN AN ORGANIZED HEALTH CARE EDUCATION/TRAINING PROGRAM

## 2020-12-30 PROCEDURE — 99214 PR OFFICE/OUTPT VISIT, EST, LEVL IV, 30-39 MIN: ICD-10-PCS | Mod: S$GLB,,, | Performed by: STUDENT IN AN ORGANIZED HEALTH CARE EDUCATION/TRAINING PROGRAM

## 2020-12-30 PROCEDURE — 3008F PR BODY MASS INDEX (BMI) DOCUMENTED: ICD-10-PCS | Mod: CPTII,S$GLB,, | Performed by: STUDENT IN AN ORGANIZED HEALTH CARE EDUCATION/TRAINING PROGRAM

## 2020-12-30 PROCEDURE — 3075F PR MOST RECENT SYSTOLIC BLOOD PRESS GE 130-139MM HG: ICD-10-PCS | Mod: CPTII,S$GLB,, | Performed by: STUDENT IN AN ORGANIZED HEALTH CARE EDUCATION/TRAINING PROGRAM

## 2020-12-30 PROCEDURE — 3079F PR MOST RECENT DIASTOLIC BLOOD PRESSURE 80-89 MM HG: ICD-10-PCS | Mod: CPTII,S$GLB,, | Performed by: STUDENT IN AN ORGANIZED HEALTH CARE EDUCATION/TRAINING PROGRAM

## 2020-12-30 PROCEDURE — 99214 OFFICE O/P EST MOD 30 MIN: CPT | Mod: S$GLB,,, | Performed by: STUDENT IN AN ORGANIZED HEALTH CARE EDUCATION/TRAINING PROGRAM

## 2020-12-30 PROCEDURE — 3075F SYST BP GE 130 - 139MM HG: CPT | Mod: CPTII,S$GLB,, | Performed by: STUDENT IN AN ORGANIZED HEALTH CARE EDUCATION/TRAINING PROGRAM

## 2020-12-30 PROCEDURE — 1125F AMNT PAIN NOTED PAIN PRSNT: CPT | Mod: S$GLB,,, | Performed by: STUDENT IN AN ORGANIZED HEALTH CARE EDUCATION/TRAINING PROGRAM

## 2020-12-30 PROCEDURE — 99999 PR PBB SHADOW E&M-EST. PATIENT-LVL V: CPT | Mod: PBBFAC,,, | Performed by: STUDENT IN AN ORGANIZED HEALTH CARE EDUCATION/TRAINING PROGRAM

## 2020-12-30 PROCEDURE — 1101F PR PT FALLS ASSESS DOC 0-1 FALLS W/OUT INJ PAST YR: ICD-10-PCS | Mod: CPTII,S$GLB,, | Performed by: STUDENT IN AN ORGANIZED HEALTH CARE EDUCATION/TRAINING PROGRAM

## 2020-12-30 PROCEDURE — 3288F FALL RISK ASSESSMENT DOCD: CPT | Mod: CPTII,S$GLB,, | Performed by: STUDENT IN AN ORGANIZED HEALTH CARE EDUCATION/TRAINING PROGRAM

## 2020-12-30 PROCEDURE — 3288F PR FALLS RISK ASSESSMENT DOCUMENTED: ICD-10-PCS | Mod: CPTII,S$GLB,, | Performed by: STUDENT IN AN ORGANIZED HEALTH CARE EDUCATION/TRAINING PROGRAM

## 2020-12-30 PROCEDURE — 3008F BODY MASS INDEX DOCD: CPT | Mod: CPTII,S$GLB,, | Performed by: STUDENT IN AN ORGANIZED HEALTH CARE EDUCATION/TRAINING PROGRAM

## 2020-12-30 PROCEDURE — 99999 PR PBB SHADOW E&M-EST. PATIENT-LVL V: ICD-10-PCS | Mod: PBBFAC,,, | Performed by: STUDENT IN AN ORGANIZED HEALTH CARE EDUCATION/TRAINING PROGRAM

## 2020-12-30 PROCEDURE — 1159F PR MEDICATION LIST DOCUMENTED IN MEDICAL RECORD: ICD-10-PCS | Mod: S$GLB,,, | Performed by: STUDENT IN AN ORGANIZED HEALTH CARE EDUCATION/TRAINING PROGRAM

## 2020-12-30 PROCEDURE — 1101F PT FALLS ASSESS-DOCD LE1/YR: CPT | Mod: CPTII,S$GLB,, | Performed by: STUDENT IN AN ORGANIZED HEALTH CARE EDUCATION/TRAINING PROGRAM

## 2020-12-30 PROCEDURE — 1159F MED LIST DOCD IN RCRD: CPT | Mod: S$GLB,,, | Performed by: STUDENT IN AN ORGANIZED HEALTH CARE EDUCATION/TRAINING PROGRAM

## 2020-12-30 RX ORDER — TORSEMIDE 20 MG/1
40 TABLET ORAL 2 TIMES DAILY
Qty: 120 TABLET | Refills: 2 | Status: SHIPPED | OUTPATIENT
Start: 2020-12-30

## 2020-12-30 RX ORDER — FOLIC ACID 1 MG/1
1 TABLET ORAL DAILY
Qty: 90 TABLET | Refills: 3 | Status: SHIPPED | OUTPATIENT
Start: 2020-12-30 | End: 2021-12-30

## 2020-12-30 NOTE — PROGRESS NOTES
[facility] NOTE    Patient Name: Fifi Mcnair  YOB: 1950    PRESENTING HISTORY   Chief Complaint:   Chief Complaint   Patient presents with    Follow-up     SOB, dizzy         History of Present Illness:  Ms. Fifi Mcnair is a 70 y.o. female     Seeing Endocrine in [place] for DM- blood glucose in 200s, on continuous pump. Admits to eating pies, worsening control.     Sleeping on wedge and 2 pillows. No change.   Exertional dyspnea- hard to walk from car. No chest pain.   On CPAP QHS  Saw Cardiology 2 months ago. Has appointment 1 month from now    Recently saw Pulm- CXR showed volume overload.     On torsemide, difficult to gauge efficacy due to IC, frequent bladder infections. Taking once daily.     Also c/o vertigo, improved from previous visit with me. Intermittent.     Review of Systems   Constitutional: Negative for chills and fever.   Respiratory: Positive for shortness of breath. Negative for cough.    Cardiovascular: Negative for chest pain, orthopnea, leg swelling and PND.   Neurological: Positive for dizziness. Negative for headaches.         PAST HISTORY:     Past Medical History:   Diagnosis Date    Allergy     multiple antibiotic allergies    Anemia     Anxiety     Arthritis     Asthma     Cataract     CHF (congestive heart failure)     NYHA class III     Chronic kidney disease     Colon polyp     benign    COPD (chronic obstructive pulmonary disease)     DLCO 37% , and mild pulmonary HTN    Depression     Diabetes mellitus     Diabetes mellitus without complication     Diabetic retinopathy     Diastolic dysfunction     Diverticulitis of large intestine without perforation or abscess without bleeding 2/12/2018    Diverticulosis     Fracture of lumbar spine     GERD (gastroesophageal reflux disease)     Hernia of unspecified site of abdominal cavity without mention of obstruction or gangrene     Hyperlipidemia     Hypertension      hypertensive renal    IBS (irritable bowel syndrome)     Mild nonproliferative diabetic retinopathy(362.04) 11/25/2013    Morbid obesity     Nuclear sclerosis 11/25/2013    Osteoporosis     Peripheral edema     Rash     S/P LASIK (laser assisted in situ keratomileusis)     Sleep apnea     sleep apnea uses bipap.    Thyroid disease     on synthroid    TIA (transient ischemic attack)     Urinary tract infection        Past Surgical History:   Procedure Laterality Date    ABDOMINAL SURGERY      ADENOIDECTOMY      ARTHROSCOPIC CHONDROPLASTY OF KNEE JOINT Right 8/31/2018    Procedure: ARTHROSCOPY, KNEE, WITH CHONDROPLASTY;  Surgeon: Larry Molina MD;  Location: [facility] OR;  Service: Orthopedics;  Laterality: Right;  Tricompartmental chondroplasty    CATARACT EXTRACTION Right 08/13/2020    LF    CATARACT EXTRACTION Left 09/03/2020    LF    COLONOSCOPY  03/05/2013    repeat in 5 years    COLONOSCOPY N/A 5/31/2017    Procedure: COLONOSCOPY;  Surgeon: Luis Navarro MD;  Location: [facility] ENDO;  Service: Endoscopy;  Laterality: N/A;    COLONOSCOPY N/A 4/11/2018    Procedure: COLONOSCOPY;  Surgeon: Luis Navarro MD;  Location: [facility];  Service: Endoscopy;  Laterality: N/A;    COSMETIC SURGERY      belt abdominoplasty    CYSTOSCOPY      CYSTOSCOPY N/A 8/6/2018    Procedure: CYSTOSCOPY;  Surgeon: Angy Campos MD;  Location: [facility] OR;  Service: Urology;  Laterality: N/A;    EYE SURGERY Right     mazzulla    GASTRECTOMY      gastric sleeve    gastric sleeve      HERNIA REPAIR      5 years old    HYSTERECTOMY      ovaries remain    JOINT REPLACEMENT Left     knee replacement    KNEE ARTHROPLASTY Left 4/16/2019    Procedure: ARTHROPLASTY, KNEE;  Surgeon: Larry Molina MD;  Location: [facility] OR;  Service: Orthopedics;  Laterality: Left;  [place]    KNEE ARTHROSCOPY W/ MENISCECTOMY Right 8/31/2018    Procedure: ARTHROSCOPY, KNEE, WITH MENISCECTOMY;  Surgeon: Larry Awad  MD Jesse;  Location: [facility] OR;  Service: Orthopedics;  Laterality: Right;    PHACOEMULSIFICATION OF CATARACT Right 8/13/2020    Procedure: PHACOEMULSIFICATION, CATARACT;  Surgeon: Meek Haro Jr., MD;  Location: [facility] OR;  Service: Ophthalmology;  Laterality: Right;  Right/DM    PHACOEMULSIFICATION OF CATARACT Left 9/3/2020    Procedure: PHACOEMULSIFICATION, CATARACT WITH O.R.A;  Surgeon: Meek Haro Jr., MD;  Location: [facility] OR;  Service: Ophthalmology;  Laterality: Left;  Left/DM    REFRACTIVE SURGERY      mono va//ou//    RHINOPLASTY TIP      TONSILLECTOMY      TOTAL KNEE ARTHROPLASTY Left 4/16/19    TUBAL LIGATION      UPPER GASTROINTESTINAL ENDOSCOPY  03/05/2013    UPPER GASTROINTESTINAL ENDOSCOPY  08/24/2016    Dr. Veras, repeat in 8 weeks    UPPER GASTROINTESTINAL ENDOSCOPY  07/21/2016    Dr. Randall       Family History   Problem Relation Age of Onset    Heart disease Mother 59    Cancer Mother 59        throat    Allergies Mother     Diabetes Mother     Breast cancer Mother     Heart disease Father 70        flutter    Allergies Father     Diabetes Father     Cancer Sister 22        22 thyroid,49  breast    Allergies Sister     Breast cancer Sister     Diabetes Sister     Cancer Brother 62        lung    Diabetes Brother     Asthma Daughter     Diabetes Daughter     Depression Daughter     Asthma Grandchild     Eczema Grandchild     Eczema Grandchild     Breast cancer Maternal Grandmother     Ovarian cancer Cousin     Glaucoma Neg Hx     Macular degeneration Neg Hx     Retinal detachment Neg Hx        Social History     Socioeconomic History    Marital status:      Spouse name: Not on file    Number of children: Not on file    Years of education: Not on file    Highest education level: Not on file   Occupational History    Not on file   Social Needs    Financial resource strain: Somewhat hard    Food insecurity     Worry: Never true     Inability:  Never true    Transportation needs     Medical: No     Non-medical: No   Tobacco Use    Smoking status: Former Smoker     Packs/day: 1.00     Years: 4.00     Pack years: 4.00     Types: Cigarettes     Quit date:      Years since quittin.0    Smokeless tobacco: Never Used    Tobacco comment: quit , lived with smokers    Substance and Sexual Activity    Alcohol use: Not Currently     Frequency: Never     Binge frequency: Never    Drug use: No    Sexual activity: Yes     Birth control/protection: Surgical   Lifestyle    Physical activity     Days per week: 0 days     Minutes per session: 10 min    Stress: Rather much   Relationships    Social connections     Talks on phone: More than three times a week     Gets together: More than three times a week     Attends Alevism service: Not on file     Active member of club or organization: Yes     Attends meetings of clubs or organizations: More than 4 times per year     Relationship status:    Other Topics Concern    Not on file   Social History Narrative    Not on file       MEDICATIONS & ALLERGIES:     Current Outpatient Medications on File Prior to Visit   Medication Sig    albuterol (ACCUNEB) 1.25 mg/3 mL Nebu Take 3 mLs (1.25 mg total) by nebulization every 6 (six) hours as needed. Rescue    alendronate (FOSAMAX) 35 MG tablet Take 1 tablet (35 mg total) by mouth every 7 days.    allopurinoL (ZYLOPRIM) 100 MG tablet Take 2 tablets (200 mg total) by mouth nightly.    amitriptyline (ELAVIL) 100 MG tablet TAKE ONE TABLET BY MOUTH AT NIGHT    ascorbic acid, vitamin C, (VITAMIN C) 500 MG tablet Take 500 mg by mouth once daily.    atenoloL (TENORMIN) 25 MG tablet Take 1 tablet (25 mg total) by mouth 2 (two) times daily. Take 25 mg by mouth 2 (two) times daily.    biotin 1 mg Cap Take 1 tablet by mouth once daily.     calcitRIOL (ROCALTROL) 0.25 MCG Cap TAKE ONE TABLET BY MOUTH TWICE A WEEK ON monday AND friday (Patient taking  differently: Take 0.25 mcg by mouth every Monday and Friday. TAKE ONE TABLET BY MOUTH TWICE A WEEK ON monday AND friday)    cinnamon bark (CINNAMON ORAL) Take 1 tablet by mouth once daily.     clotrimazole-betamethasone 1-0.05% (LOTRISONE) cream     cyanocobalamin, vitamin B-12, (VITAMIN B-12) 5,000 mcg Subl Place 5,000 mg under the tongue once a week.    cycloSPORINE (RESTASIS) 0.05 % ophthalmic emulsion Place 1 drop into both eyes 2 (two) times daily.    diclofenac sodium (VOLTAREN) 1 % Gel Apply 2 g topically once daily.    diphenoxylate-atropine 2.5-0.025 mg (LOMOTIL) 2.5-0.025 mg per tablet TAKE ONE TABLET BY MOUTH 4 TIMES DAILY AS NEEDED FOR DIARRHEA (Patient taking differently: Take 1 tablet by mouth 4 (four) times daily as needed for Diarrhea. )    DYMISTA 137-50 mcg/spray Spry nassal spray as needed.     ferrous sulfate (IRON) 325 mg (65 mg iron) Tab tablet Take 325 mg by mouth daily with breakfast.    fexofenadine (ALLEGRA) 60 MG tablet Take 60 mg by mouth once daily.    fish oil-omega-3 fatty acids 300-1,000 mg capsule Take 1 capsule by mouth once daily.    FLUoxetine 20 MG capsule Take 2 capsules (40 mg total) by mouth once daily.    fluticasone furoate-vilanteroL (BREO ELLIPTA) 100-25 mcg/dose diskus inhaler Inhale 1 puff into the lungs once daily. Controller Rinse after you use it    fluticasone furoate-vilanterol (BREO) 100-25 mcg/dose diskus inhaler Inhale 1 puff into the lungs once daily.    fluticasone propionate (FLONASE) 50 mcg/actuation nasal spray 1 spray (50 mcg total) by Each Nostril route once daily.    gabapentin (NEURONTIN) 600 MG tablet Take 1 tablet (600 mg total) by mouth 3 (three) times daily.    HUMALOG U-100 INSULIN 100 unit/mL injection Inject 100 Units into the skin once daily. Via pump    hydrocortisone 2.5 % cream Apply topically nightly.    L-LYSINE ORAL Take 1 tablet by mouth once daily.     l-methylfolate-b2-b6-b12 (CEREFOLIN) 6-5-50-1 mg Tab Take 1 tablet  by mouth once daily.    Lacto.acidophilus-Bif.animalis (PROBIOTIC) 5 billion cell CpSP Take 1 capsule by mouth once daily.    levothyroxine (SYNTHROID) 25 MCG tablet Take 1 tablet (25 mcg total) by mouth once daily.    lifitegrast (XIIDRA) 5 % Dpet Apply 1 drop to eye 2 (two) times a day.    losartan (COZAAR) 25 MG tablet Take 25 mg by mouth once daily.    losartan (COZAAR) 50 MG tablet Take 50 mg by mouth once daily.    magnesium oxide (MAG-OX) 400 mg (241.3 mg magnesium) tablet Take 800 mg by mouth every evening.    meclizine (ANTIVERT) 12.5 mg tablet Take 1 tablet (12.5 mg total) by mouth 2 (two) times daily as needed for Dizziness or Nausea.    metronidazole 1% (METROGEL) 1 % Gel Apply topically once daily. Prn sores on scalp.    montelukast (SINGULAIR) 10 mg tablet Take 1 tablet (10 mg total) by mouth every evening.    MULTIVIT-IRON-MIN-FOLIC ACID 3,500-18-0.4 UNIT-MG-MG ORAL CHEW Take 1 tablet by mouth 2 (two) times daily.     mupirocin (BACTROBAN) 2 % ointment APPLY OINTMENT TOPICALLY TO AFFECTED AREA ON NOSE THREE TIMES DAILY AS DIRECTED    oxyCODONE (ROXICODONE) 15 MG Tab Take 1 tablet (15 mg total) by mouth every 6 (six) hours as needed for Pain.    pantoprazole (PROTONIX) 40 MG tablet Take 1 tablet (40 mg total) by mouth once daily.    potassium chloride SA (K-DUR,KLOR-CON) 20 MEQ tablet TAKE 1 TABLET BY MOUTH TWICE DAILY (Patient taking differently: Take 20 mEq by mouth 2 (two) times daily. )    SSD 1 % cream     TURMERIC ORAL Take 1 tablet by mouth once daily.     [DISCONTINUED] torsemide (DEMADEX) 20 MG Tab TAKE ONE TABLET BY MOUTH TWICE DAILY (Patient taking differently: Take 20 mg by mouth 2 (two) times daily. )    insulin (BASAGLAR KWIKPEN U-100 INSULIN) glargine 100 units/mL (3mL) SubQ pen Inject 20 Units into the skin every evening. Use 10 units if BS over 210.Night before surgery.    [DISCONTINUED] amitriptyline (ELAVIL) 100 MG tablet Take 1 tablet (100 mg total) by mouth every  "evening. 1 tab at night     Current Facility-Administered Medications on File Prior to Visit   Medication    gentamicin injection 80 mg    lactated ringers infusion    lactated ringers infusion    lidocaine (PF) 10 mg/ml (1%) injection 10 mg       Review of patient's allergies indicates:   Allergen Reactions    Adhesive Other (See Comments)     Blisters    Ceclor [cefaclor] Hives    Cleocin [clindamycin hcl] Hives    Erythromycin Hives    Aleve [naproxen sodium]      Unable to take secondary to kidney function.     Macrodantin [nitrofurantoin macrocrystalline] Hives    Motrin [ibuprofen]      Unable to take secondary to kidney functions.    Advair diskus [fluticasone propion-salmeterol] Other (See Comments)     Dry mouth    Levaquin [levofloxacin] Dermatitis    Macrobid [nitrofurantoin monohyd/m-cryst] Other (See Comments)     Stomach pain/ GI issues    Remeron [mirtazapine] Palpitations and Other (See Comments)     Jittery    Restoril [temazepam] Other (See Comments)     LIGHTHEADED UPON WAKING.with poor results    Sulfa (sulfonamide antibiotics) Other (See Comments)     Hold due to renal problems    Trazodone Palpitations    Vancomycin analogues Rash     Feet broke out/inflammed blood vessels         OBJECTIVE:   Vital Signs:  Vitals:    12/30/20 0846   BP: 134/80   Pulse: 100   Resp: 18   Temp: 97.7 °F (36.5 °C)   TempSrc: Temporal   SpO2: 98%   Weight: 122.5 kg (270 lb 1 oz)   Height: 5' 2" (1.575 m)       No results found for this or any previous visit (from the past 24 hour(s)).      Physical Exam   Constitutional: She is oriented to person, place, and time and well-developed, well-nourished, and in no distress.   Class 3 obesity   HENT:   Head: Normocephalic and atraumatic.   Right Ear: External ear normal.   Left Ear: External ear normal.   Eyes: Pupils are equal, round, and reactive to light. Conjunctivae and EOM are normal. No scleral icterus.   Neck: Normal range of motion. Neck supple. " No thyromegaly present.   Cardiovascular: Normal rate, regular rhythm and normal heart sounds.   No murmur heard.  Pulmonary/Chest: Effort normal and breath sounds normal. No respiratory distress. She has no wheezes. She has no rales.   Exam limited due to body habitus. Inspiratory crackles to mid lung fields.    Musculoskeletal: Normal range of motion.         General: No tenderness, deformity or edema.      Comments: No significant peripheral edema.    Lymphadenopathy:     She has no cervical adenopathy.   Neurological: She is alert and oriented to person, place, and time. Gait normal. GCS score is 15.   Skin: Skin is warm and dry. No rash noted. She is not diaphoretic. No erythema.   Psychiatric: Mood, memory, affect and judgment normal.   Nursing note and vitals reviewed.      ASSESSMENT & PLAN:     70 F with HFpEF, uncontrolled DM here with exertional dyspnea.     Some evidence of volume overload. Increase aggressiveness of diuresis for 1 week and reassess. May need stress testing.   Likely a component of decondition, OHS. Unable to exercise due to back and knee pain.     Hypervolemia, unspecified hypervolemia type  -     torsemide (DEMADEX) 20 MG Tab; Take 2 tablets (40 mg total) by mouth 2 (two) times daily.  Dispense: 120 tablet; Refill: 2  -     Basic Metabolic Panel; Future; Expected date: 12/30/2020    Anemia due to stage 3 chronic kidney disease, unspecified whether stage 3a or 3b CKD  -     folic acid (FOLVITE) 1 MG tablet; Take 1 tablet (1 mg total) by mouth once daily.  Dispense: 90 tablet; Refill: 3    Insomnia, unspecified type  Given sleep hygiene instructions. Currently watches tv in bed after getting off computer. Asking for BZDs. Will avoid due to respiratory suppression.     Vertigo  Treatment with Epley maneuvers     Jose Mccarthy MD

## 2020-12-30 NOTE — PATIENT INSTRUCTIONS
Increase torsemide to 2 pills twice a day.         Limit use of screens, especially before bed. No television, computer or phone 2 hours before bed.     Reduce or stop drinking caffeine, especially at night. This can worsen sleep.     No alcohol.     Bed is for sleeping. Don't get in bed and do other activities like watch tv, listen to music or anything else.     Don't keep a clock right by your bed. If you wake up and look at the clock it can be stimulating.     Keep room dark, quiet and cool. You will sleep better in a cool dark room.     If you wake up, get out of bed. Don't lay in bed awake. Do a relaxing activity without screens like reading. Try to minimize the number of lights you turn on.     Try using meditation or relaxation techniques. Can try CBT-I benny (free benny available in usual benny stores) for ideas.

## 2021-01-05 ENCOUNTER — TELEPHONE (OUTPATIENT)
Dept: HEMATOLOGY/ONCOLOGY | Facility: CLINIC | Age: 71
End: 2021-01-05

## 2021-01-06 ENCOUNTER — LAB VISIT (OUTPATIENT)
Dept: LAB | Facility: HOSPITAL | Age: 71
End: 2021-01-06
Attending: STUDENT IN AN ORGANIZED HEALTH CARE EDUCATION/TRAINING PROGRAM
Payer: MEDICARE

## 2021-01-06 DIAGNOSIS — E87.70 HYPERVOLEMIA, UNSPECIFIED HYPERVOLEMIA TYPE: ICD-10-CM

## 2021-01-06 LAB
ANION GAP SERPL CALC-SCNC: 17 MMOL/L (ref 8–16)
BUN SERPL-MCNC: 24 MG/DL (ref 8–23)
CALCIUM SERPL-MCNC: 9.5 MG/DL (ref 8.7–10.5)
CHLORIDE SERPL-SCNC: 99 MMOL/L (ref 95–110)
CO2 SERPL-SCNC: 24 MMOL/L (ref 23–29)
CREAT SERPL-MCNC: 1.4 MG/DL (ref 0.5–1.4)
EST. GFR  (AFRICAN AMERICAN): 43.9 ML/MIN/1.73 M^2
EST. GFR  (NON AFRICAN AMERICAN): 38.1 ML/MIN/1.73 M^2
GLUCOSE SERPL-MCNC: 229 MG/DL (ref 70–110)
POTASSIUM SERPL-SCNC: 3.9 MMOL/L (ref 3.5–5.1)
SODIUM SERPL-SCNC: 140 MMOL/L (ref 136–145)

## 2021-01-06 PROCEDURE — 36415 COLL VENOUS BLD VENIPUNCTURE: CPT | Mod: PO

## 2021-01-06 PROCEDURE — 80048 BASIC METABOLIC PNL TOTAL CA: CPT

## 2021-01-07 ENCOUNTER — OFFICE VISIT (OUTPATIENT)
Dept: FAMILY MEDICINE | Facility: CLINIC | Age: 71
End: 2021-01-07
Payer: MEDICARE

## 2021-01-07 VITALS
TEMPERATURE: 98 F | DIASTOLIC BLOOD PRESSURE: 80 MMHG | OXYGEN SATURATION: 97 % | RESPIRATION RATE: 18 BRPM | WEIGHT: 266.13 LBS | SYSTOLIC BLOOD PRESSURE: 134 MMHG | HEART RATE: 95 BPM | BODY MASS INDEX: 48.97 KG/M2 | HEIGHT: 62 IN

## 2021-01-07 DIAGNOSIS — E87.70 HYPERVOLEMIA, UNSPECIFIED HYPERVOLEMIA TYPE: Primary | ICD-10-CM

## 2021-01-07 PROCEDURE — 3079F DIAST BP 80-89 MM HG: CPT | Mod: CPTII,S$GLB,, | Performed by: STUDENT IN AN ORGANIZED HEALTH CARE EDUCATION/TRAINING PROGRAM

## 2021-01-07 PROCEDURE — 1125F PR PAIN SEVERITY QUANTIFIED, PAIN PRESENT: ICD-10-PCS | Mod: S$GLB,,, | Performed by: STUDENT IN AN ORGANIZED HEALTH CARE EDUCATION/TRAINING PROGRAM

## 2021-01-07 PROCEDURE — 1125F AMNT PAIN NOTED PAIN PRSNT: CPT | Mod: S$GLB,,, | Performed by: STUDENT IN AN ORGANIZED HEALTH CARE EDUCATION/TRAINING PROGRAM

## 2021-01-07 PROCEDURE — 1159F MED LIST DOCD IN RCRD: CPT | Mod: S$GLB,,, | Performed by: STUDENT IN AN ORGANIZED HEALTH CARE EDUCATION/TRAINING PROGRAM

## 2021-01-07 PROCEDURE — 1159F PR MEDICATION LIST DOCUMENTED IN MEDICAL RECORD: ICD-10-PCS | Mod: S$GLB,,, | Performed by: STUDENT IN AN ORGANIZED HEALTH CARE EDUCATION/TRAINING PROGRAM

## 2021-01-07 PROCEDURE — 99999 PR PBB SHADOW E&M-EST. PATIENT-LVL V: ICD-10-PCS | Mod: PBBFAC,,, | Performed by: STUDENT IN AN ORGANIZED HEALTH CARE EDUCATION/TRAINING PROGRAM

## 2021-01-07 PROCEDURE — 3075F SYST BP GE 130 - 139MM HG: CPT | Mod: CPTII,S$GLB,, | Performed by: STUDENT IN AN ORGANIZED HEALTH CARE EDUCATION/TRAINING PROGRAM

## 2021-01-07 PROCEDURE — 3075F PR MOST RECENT SYSTOLIC BLOOD PRESS GE 130-139MM HG: ICD-10-PCS | Mod: CPTII,S$GLB,, | Performed by: STUDENT IN AN ORGANIZED HEALTH CARE EDUCATION/TRAINING PROGRAM

## 2021-01-07 PROCEDURE — 3008F BODY MASS INDEX DOCD: CPT | Mod: CPTII,S$GLB,, | Performed by: STUDENT IN AN ORGANIZED HEALTH CARE EDUCATION/TRAINING PROGRAM

## 2021-01-07 PROCEDURE — 99499 RISK ADDL DX/OHS AUDIT: ICD-10-PCS | Mod: S$GLB,,, | Performed by: STUDENT IN AN ORGANIZED HEALTH CARE EDUCATION/TRAINING PROGRAM

## 2021-01-07 PROCEDURE — 3079F PR MOST RECENT DIASTOLIC BLOOD PRESSURE 80-89 MM HG: ICD-10-PCS | Mod: CPTII,S$GLB,, | Performed by: STUDENT IN AN ORGANIZED HEALTH CARE EDUCATION/TRAINING PROGRAM

## 2021-01-07 PROCEDURE — 99999 PR PBB SHADOW E&M-EST. PATIENT-LVL V: CPT | Mod: PBBFAC,,, | Performed by: STUDENT IN AN ORGANIZED HEALTH CARE EDUCATION/TRAINING PROGRAM

## 2021-01-07 PROCEDURE — 99213 PR OFFICE/OUTPT VISIT, EST, LEVL III, 20-29 MIN: ICD-10-PCS | Mod: S$GLB,,, | Performed by: STUDENT IN AN ORGANIZED HEALTH CARE EDUCATION/TRAINING PROGRAM

## 2021-01-07 PROCEDURE — 1101F PT FALLS ASSESS-DOCD LE1/YR: CPT | Mod: CPTII,S$GLB,, | Performed by: STUDENT IN AN ORGANIZED HEALTH CARE EDUCATION/TRAINING PROGRAM

## 2021-01-07 PROCEDURE — 99499 UNLISTED E&M SERVICE: CPT | Mod: S$GLB,,, | Performed by: STUDENT IN AN ORGANIZED HEALTH CARE EDUCATION/TRAINING PROGRAM

## 2021-01-07 PROCEDURE — 1101F PR PT FALLS ASSESS DOC 0-1 FALLS W/OUT INJ PAST YR: ICD-10-PCS | Mod: CPTII,S$GLB,, | Performed by: STUDENT IN AN ORGANIZED HEALTH CARE EDUCATION/TRAINING PROGRAM

## 2021-01-07 PROCEDURE — 3008F PR BODY MASS INDEX (BMI) DOCUMENTED: ICD-10-PCS | Mod: CPTII,S$GLB,, | Performed by: STUDENT IN AN ORGANIZED HEALTH CARE EDUCATION/TRAINING PROGRAM

## 2021-01-07 PROCEDURE — 3288F FALL RISK ASSESSMENT DOCD: CPT | Mod: CPTII,S$GLB,, | Performed by: STUDENT IN AN ORGANIZED HEALTH CARE EDUCATION/TRAINING PROGRAM

## 2021-01-07 PROCEDURE — 3288F PR FALLS RISK ASSESSMENT DOCUMENTED: ICD-10-PCS | Mod: CPTII,S$GLB,, | Performed by: STUDENT IN AN ORGANIZED HEALTH CARE EDUCATION/TRAINING PROGRAM

## 2021-01-07 PROCEDURE — 99213 OFFICE O/P EST LOW 20 MIN: CPT | Mod: S$GLB,,, | Performed by: STUDENT IN AN ORGANIZED HEALTH CARE EDUCATION/TRAINING PROGRAM

## 2021-01-14 ENCOUNTER — HOSPITAL ENCOUNTER (OUTPATIENT)
Dept: RADIOLOGY | Facility: CLINIC | Age: 71
Discharge: HOME | End: 2021-01-14
Attending: STUDENT IN AN ORGANIZED HEALTH CARE EDUCATION/TRAINING PROGRAM
Payer: MEDICARE

## 2021-01-14 DIAGNOSIS — E87.70 HYPERVOLEMIA, UNSPECIFIED HYPERVOLEMIA TYPE: ICD-10-CM

## 2021-01-14 PROCEDURE — 71046 XR CHEST PA AND LATERAL: ICD-10-PCS | Mod: 26,,, | Performed by: RADIOLOGY

## 2021-01-14 PROCEDURE — 71046 X-RAY EXAM CHEST 2 VIEWS: CPT | Mod: 26,,, | Performed by: RADIOLOGY

## 2021-01-14 PROCEDURE — 71046 X-RAY EXAM CHEST 2 VIEWS: CPT | Mod: TC,FY,PO

## 2021-01-19 ENCOUNTER — PROCEDURE VISIT (OUTPATIENT)
Dept: OPHTHALMOLOGY | Facility: CLINIC | Age: 71
End: 2021-01-19
Payer: MEDICARE

## 2021-01-19 DIAGNOSIS — H04.123 CHRONICALLY DRY EYES, BILATERAL: Primary | ICD-10-CM

## 2021-01-19 DIAGNOSIS — Z96.1 PSEUDOPHAKIA OF BOTH EYES: ICD-10-CM

## 2021-01-19 PROCEDURE — 92012 PR EYE EXAM, EST PATIENT,INTERMED: ICD-10-PCS | Mod: S$GLB,,, | Performed by: OPHTHALMOLOGY

## 2021-01-19 PROCEDURE — 92012 INTRM OPH EXAM EST PATIENT: CPT | Mod: S$GLB,,, | Performed by: OPHTHALMOLOGY

## 2021-01-20 DIAGNOSIS — N18.32 STAGE 3B CHRONIC KIDNEY DISEASE: Primary | ICD-10-CM

## 2021-01-20 RX ORDER — CALCITRIOL 0.25 UG/1
CAPSULE ORAL
Qty: 8 CAPSULE | Refills: 3 | Status: SHIPPED | OUTPATIENT
Start: 2021-01-20

## 2021-01-26 ENCOUNTER — OFFICE VISIT (OUTPATIENT)
Dept: HEMATOLOGY/ONCOLOGY | Facility: CLINIC | Age: 71
End: 2021-01-26
Payer: MEDICARE

## 2021-01-26 VITALS
WEIGHT: 268.31 LBS | DIASTOLIC BLOOD PRESSURE: 66 MMHG | SYSTOLIC BLOOD PRESSURE: 134 MMHG | HEIGHT: 62 IN | HEART RATE: 103 BPM | TEMPERATURE: 97 F | BODY MASS INDEX: 49.37 KG/M2 | OXYGEN SATURATION: 96 % | RESPIRATION RATE: 18 BRPM

## 2021-01-26 DIAGNOSIS — D89.2 HYPERGAMMAGLOBULINEMIA: ICD-10-CM

## 2021-01-26 DIAGNOSIS — D47.2 MONOCLONAL GAMMOPATHY ASSOCIATED WITH LYMPHOPLASMACYTIC DYSCRASIAS: Primary | ICD-10-CM

## 2021-01-26 DIAGNOSIS — N18.4 CKD (CHRONIC KIDNEY DISEASE) STAGE 4, GFR 15-29 ML/MIN: ICD-10-CM

## 2021-01-26 PROCEDURE — 3008F BODY MASS INDEX DOCD: CPT | Mod: CPTII,S$GLB,, | Performed by: INTERNAL MEDICINE

## 2021-01-26 PROCEDURE — 3075F SYST BP GE 130 - 139MM HG: CPT | Mod: CPTII,S$GLB,, | Performed by: INTERNAL MEDICINE

## 2021-01-26 PROCEDURE — 3078F PR MOST RECENT DIASTOLIC BLOOD PRESSURE < 80 MM HG: ICD-10-PCS | Mod: CPTII,S$GLB,, | Performed by: INTERNAL MEDICINE

## 2021-01-26 PROCEDURE — 99999 PR PBB SHADOW E&M-EST. PATIENT-LVL III: CPT | Mod: PBBFAC,,, | Performed by: INTERNAL MEDICINE

## 2021-01-26 PROCEDURE — 1101F PT FALLS ASSESS-DOCD LE1/YR: CPT | Mod: CPTII,S$GLB,, | Performed by: INTERNAL MEDICINE

## 2021-01-26 PROCEDURE — 3008F PR BODY MASS INDEX (BMI) DOCUMENTED: ICD-10-PCS | Mod: CPTII,S$GLB,, | Performed by: INTERNAL MEDICINE

## 2021-01-26 PROCEDURE — 1125F AMNT PAIN NOTED PAIN PRSNT: CPT | Mod: S$GLB,,, | Performed by: INTERNAL MEDICINE

## 2021-01-26 PROCEDURE — 3288F PR FALLS RISK ASSESSMENT DOCUMENTED: ICD-10-PCS | Mod: CPTII,S$GLB,, | Performed by: INTERNAL MEDICINE

## 2021-01-26 PROCEDURE — 3075F PR MOST RECENT SYSTOLIC BLOOD PRESS GE 130-139MM HG: ICD-10-PCS | Mod: CPTII,S$GLB,, | Performed by: INTERNAL MEDICINE

## 2021-01-26 PROCEDURE — 1159F PR MEDICATION LIST DOCUMENTED IN MEDICAL RECORD: ICD-10-PCS | Mod: S$GLB,,, | Performed by: INTERNAL MEDICINE

## 2021-01-26 PROCEDURE — 1101F PR PT FALLS ASSESS DOC 0-1 FALLS W/OUT INJ PAST YR: ICD-10-PCS | Mod: CPTII,S$GLB,, | Performed by: INTERNAL MEDICINE

## 2021-01-26 PROCEDURE — 99999 PR PBB SHADOW E&M-EST. PATIENT-LVL III: ICD-10-PCS | Mod: PBBFAC,,, | Performed by: INTERNAL MEDICINE

## 2021-01-26 PROCEDURE — 99215 PR OFFICE/OUTPT VISIT, EST, LEVL V, 40-54 MIN: ICD-10-PCS | Mod: S$GLB,,, | Performed by: INTERNAL MEDICINE

## 2021-01-26 PROCEDURE — 99215 OFFICE O/P EST HI 40 MIN: CPT | Mod: S$GLB,,, | Performed by: INTERNAL MEDICINE

## 2021-01-26 PROCEDURE — 3078F DIAST BP <80 MM HG: CPT | Mod: CPTII,S$GLB,, | Performed by: INTERNAL MEDICINE

## 2021-01-26 PROCEDURE — 3288F FALL RISK ASSESSMENT DOCD: CPT | Mod: CPTII,S$GLB,, | Performed by: INTERNAL MEDICINE

## 2021-01-26 PROCEDURE — 1159F MED LIST DOCD IN RCRD: CPT | Mod: S$GLB,,, | Performed by: INTERNAL MEDICINE

## 2021-01-26 PROCEDURE — 1125F PR PAIN SEVERITY QUANTIFIED, PAIN PRESENT: ICD-10-PCS | Mod: S$GLB,,, | Performed by: INTERNAL MEDICINE

## 2021-01-28 ENCOUNTER — TELEPHONE (OUTPATIENT)
Dept: HEMATOLOGY/ONCOLOGY | Facility: CLINIC | Age: 71
End: 2021-01-28

## 2021-02-01 DIAGNOSIS — N18.4 CKD (CHRONIC KIDNEY DISEASE) STAGE 4, GFR 15-29 ML/MIN: Primary | ICD-10-CM

## 2021-02-04 ENCOUNTER — TELEPHONE (OUTPATIENT)
Dept: FAMILY MEDICINE | Facility: CLINIC | Age: 71
End: 2021-02-04

## 2021-02-05 ENCOUNTER — PATIENT MESSAGE (OUTPATIENT)
Dept: HEMATOLOGY/ONCOLOGY | Facility: CLINIC | Age: 71
End: 2021-02-05

## 2021-02-05 ENCOUNTER — HOSPITAL ENCOUNTER (EMERGENCY)
Facility: HOSPITAL | Age: 71
Discharge: HOME | End: 2021-02-05
Attending: EMERGENCY MEDICINE
Payer: MEDICARE

## 2021-02-05 VITALS
DIASTOLIC BLOOD PRESSURE: 61 MMHG | WEIGHT: 260 LBS | HEIGHT: 62 IN | RESPIRATION RATE: 18 BRPM | HEART RATE: 87 BPM | BODY MASS INDEX: 47.84 KG/M2 | OXYGEN SATURATION: 99 % | SYSTOLIC BLOOD PRESSURE: 128 MMHG | TEMPERATURE: 98 F

## 2021-02-05 DIAGNOSIS — S51.811A SKIN TEAR OF RIGHT FOREARM WITHOUT COMPLICATION, INITIAL ENCOUNTER: ICD-10-CM
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
## 2021-02-05 PROCEDURE — 99283 EMERGENCY DEPT VISIT LOW MDM: CPT

## 2021-02-05 RX ORDER — DOXYCYCLINE 100 MG/1
100 CAPSULE ORAL 2 TIMES DAILY
Qty: 14 CAPSULE | Refills: 0 | Status: SHIPPED | OUTPATIENT
Start: 2021-02-05 | End: 2021-02-12

## 2021-02-09 ENCOUNTER — HOSPITAL ENCOUNTER (OUTPATIENT)
Dept: RADIOLOGY | Facility: HOSPITAL | Age: 71
Discharge: HOME | End: 2021-02-09
Attending: INTERNAL MEDICINE
Payer: MEDICARE

## 2021-02-09 ENCOUNTER — HOSPITAL ENCOUNTER (OUTPATIENT)
Facility: HOSPITAL | Age: 71
Discharge: HOME | End: 2021-02-09
Attending: INTERNAL MEDICINE | Admitting: INTERNAL MEDICINE
Payer: MEDICARE

## 2021-02-09 VITALS
RESPIRATION RATE: 12 BRPM | SYSTOLIC BLOOD PRESSURE: 131 MMHG | HEART RATE: 96 BPM | DIASTOLIC BLOOD PRESSURE: 66 MMHG | OXYGEN SATURATION: 99 %

## 2021-02-09 VITALS
HEIGHT: 62 IN | SYSTOLIC BLOOD PRESSURE: 134 MMHG | BODY MASS INDEX: 47.84 KG/M2 | RESPIRATION RATE: 18 BRPM | WEIGHT: 260 LBS | DIASTOLIC BLOOD PRESSURE: 74 MMHG | OXYGEN SATURATION: 97 % | TEMPERATURE: 98 F | HEART RATE: 99 BPM

## 2021-02-09 DIAGNOSIS — N18.4 CKD (CHRONIC KIDNEY DISEASE) STAGE 4, GFR 15-29 ML/MIN: Primary | ICD-10-CM

## 2021-02-09 DIAGNOSIS — N18.4 CKD (CHRONIC KIDNEY DISEASE) STAGE 4, GFR 15-29 ML/MIN: ICD-10-CM

## 2021-02-09 PROCEDURE — 71000016 HC POSTOP RECOV ADDL HR

## 2021-02-09 PROCEDURE — 50200 RENAL BIOPSY PERQ: CPT | Mod: RT,,, | Performed by: RADIOLOGY

## 2021-02-09 PROCEDURE — 99152 MOD SED SAME PHYS/QHP 5/>YRS: CPT | Mod: ,,, | Performed by: RADIOLOGY

## 2021-02-09 PROCEDURE — 63600175 PHARM REV CODE 636 W HCPCS: Performed by: RADIOLOGY

## 2021-02-09 PROCEDURE — 77012 CT BIOPSY KIDNEY (XPD): ICD-10-PCS | Mod: 26,,, | Performed by: RADIOLOGY

## 2021-02-09 PROCEDURE — 25000003 PHARM REV CODE 250: Performed by: RADIOLOGY

## 2021-02-09 PROCEDURE — 77012 CT SCAN FOR NEEDLE BIOPSY: CPT | Mod: TC | Performed by: RADIOLOGY

## 2021-02-09 PROCEDURE — 99152 MOD SED SAME PHYS/QHP 5/>YRS: CPT | Performed by: RADIOLOGY

## 2021-02-09 PROCEDURE — 99900104 DSU ONLY-NO CHARGE-EA ADD'L HR (STAT)

## 2021-02-09 PROCEDURE — 99152 PR MOD CONSCIOUS SEDATION, SAME PHYS, 5+ YRS, FIRST 15 MIN: ICD-10-PCS | Mod: ,,, | Performed by: RADIOLOGY

## 2021-02-09 PROCEDURE — 99153 MOD SED SAME PHYS/QHP EA: CPT | Performed by: RADIOLOGY

## 2021-02-09 PROCEDURE — 77012 CT SCAN FOR NEEDLE BIOPSY: CPT | Mod: 26,,, | Performed by: RADIOLOGY

## 2021-02-09 PROCEDURE — 71000015 HC POSTOP RECOV 1ST HR

## 2021-02-09 PROCEDURE — 50200 CT BIOPSY KIDNEY (XPD): ICD-10-PCS | Mod: RT,,, | Performed by: RADIOLOGY

## 2021-02-09 PROCEDURE — 77012 CT SCAN FOR NEEDLE BIOPSY: CPT | Mod: TC

## 2021-02-09 PROCEDURE — 99900103 DSU ONLY-NO CHARGE-INITIAL HR (STAT)

## 2021-02-09 RX ORDER — LIDOCAINE HYDROCHLORIDE 10 MG/ML
1 INJECTION, SOLUTION EPIDURAL; INFILTRATION; INTRACAUDAL; PERINEURAL ONCE
Status: COMPLETED | OUTPATIENT
Start: 2021-02-09 | End: 2021-02-09

## 2021-02-09 RX ORDER — FENTANYL CITRATE 50 UG/ML
25 INJECTION, SOLUTION INTRAMUSCULAR; INTRAVENOUS
Status: DISCONTINUED | OUTPATIENT
Start: 2021-02-09 | End: 2021-02-09

## 2021-02-09 RX ORDER — MIDAZOLAM HYDROCHLORIDE 1 MG/ML
1 INJECTION INTRAMUSCULAR; INTRAVENOUS
Status: DISCONTINUED | OUTPATIENT
Start: 2021-02-09 | End: 2021-02-09

## 2021-02-09 RX ADMIN — FENTANYL CITRATE 25 MCG: 50 INJECTION INTRAMUSCULAR; INTRAVENOUS at 09:02

## 2021-02-09 RX ADMIN — LIDOCAINE HYDROCHLORIDE 100 MG: 10 INJECTION, SOLUTION EPIDURAL; INFILTRATION; INTRACAUDAL; PERINEURAL at 09:02

## 2021-02-09 RX ADMIN — MIDAZOLAM 1 MG: 1 INJECTION INTRAMUSCULAR; INTRAVENOUS at 09:02

## 2021-02-17 ENCOUNTER — OFFICE VISIT (OUTPATIENT)
Dept: RHEUMATOLOGY | Facility: CLINIC | Age: 71
End: 2021-02-17
Payer: MEDICARE

## 2021-02-17 VITALS
TEMPERATURE: 98 F | BODY MASS INDEX: 48.65 KG/M2 | SYSTOLIC BLOOD PRESSURE: 104 MMHG | DIASTOLIC BLOOD PRESSURE: 66 MMHG | WEIGHT: 266 LBS

## 2021-02-17 DIAGNOSIS — M19.91 PRIMARY OSTEOARTHRITIS, UNSPECIFIED SITE: Primary | ICD-10-CM

## 2021-02-17 DIAGNOSIS — M10.9 ARTHRITIS DUE TO GOUT: ICD-10-CM

## 2021-02-17 PROCEDURE — 3078F PR MOST RECENT DIASTOLIC BLOOD PRESSURE < 80 MM HG: ICD-10-PCS | Mod: S$GLB,,, | Performed by: INTERNAL MEDICINE

## 2021-02-17 PROCEDURE — 99213 OFFICE O/P EST LOW 20 MIN: CPT | Mod: S$GLB,,, | Performed by: INTERNAL MEDICINE

## 2021-02-17 PROCEDURE — 3008F PR BODY MASS INDEX (BMI) DOCUMENTED: ICD-10-PCS | Mod: S$GLB,,, | Performed by: INTERNAL MEDICINE

## 2021-02-17 PROCEDURE — 3074F PR MOST RECENT SYSTOLIC BLOOD PRESSURE < 130 MM HG: ICD-10-PCS | Mod: S$GLB,,, | Performed by: INTERNAL MEDICINE

## 2021-02-17 PROCEDURE — 3008F BODY MASS INDEX DOCD: CPT | Mod: S$GLB,,, | Performed by: INTERNAL MEDICINE

## 2021-02-17 PROCEDURE — 1159F MED LIST DOCD IN RCRD: CPT | Mod: S$GLB,,, | Performed by: INTERNAL MEDICINE

## 2021-02-17 PROCEDURE — 3078F DIAST BP <80 MM HG: CPT | Mod: S$GLB,,, | Performed by: INTERNAL MEDICINE

## 2021-02-17 PROCEDURE — 99213 PR OFFICE/OUTPT VISIT, EST, LEVL III, 20-29 MIN: ICD-10-PCS | Mod: S$GLB,,, | Performed by: INTERNAL MEDICINE

## 2021-02-17 PROCEDURE — 3074F SYST BP LT 130 MM HG: CPT | Mod: S$GLB,,, | Performed by: INTERNAL MEDICINE

## 2021-02-17 PROCEDURE — 1159F PR MEDICATION LIST DOCUMENTED IN MEDICAL RECORD: ICD-10-PCS | Mod: S$GLB,,, | Performed by: INTERNAL MEDICINE

## 2021-02-20 ENCOUNTER — HOSPITAL ENCOUNTER (INPATIENT)
Facility: HOSPITAL | Age: 71
LOS: 2 days | Discharge: HOME | DRG: 291 | End: 2021-02-22
Attending: EMERGENCY MEDICINE | Admitting: INTERNAL MEDICINE
Payer: MEDICARE

## 2021-02-20 ENCOUNTER — CLINICAL SUPPORT (OUTPATIENT)
Dept: CARDIOLOGY | Facility: HOSPITAL | Age: 71
DRG: 291 | End: 2021-02-20
Attending: INTERNAL MEDICINE
Payer: MEDICARE

## 2021-02-20 VITALS — HEIGHT: 62 IN | WEIGHT: 272.94 LBS | BODY MASS INDEX: 50.23 KG/M2

## 2021-02-20 DIAGNOSIS — J44.1 COPD EXACERBATION: Primary | ICD-10-CM

## 2021-02-20 DIAGNOSIS — J96.91 RESPIRATORY FAILURE WITH HYPOXIA: ICD-10-CM

## 2021-02-20 DIAGNOSIS — J96.02 ACUTE RESPIRATORY FAILURE WITH HYPOXIA AND HYPERCARBIA: ICD-10-CM

## 2021-02-20 DIAGNOSIS — I50.9 CHF (CONGESTIVE HEART FAILURE): ICD-10-CM

## 2021-02-20 DIAGNOSIS — E11.8 CONTROLLED DIABETES MELLITUS TYPE 2 WITH COMPLICATIONS, UNSPECIFIED WHETHER LONG TERM INSULIN USE: ICD-10-CM

## 2021-02-20 DIAGNOSIS — J96.01 ACUTE RESPIRATORY FAILURE WITH HYPOXIA AND HYPERCARBIA: ICD-10-CM

## 2021-02-20 DIAGNOSIS — I50.9 ACUTE ON CHRONIC CONGESTIVE HEART FAILURE, UNSPECIFIED HEART FAILURE TYPE: ICD-10-CM

## 2021-02-20 DIAGNOSIS — I50.1 CARDIOGENIC PULMONARY EDEMA: ICD-10-CM

## 2021-02-20 DIAGNOSIS — R33.8 ACUTE URINARY RETENTION: ICD-10-CM

## 2021-02-20 DIAGNOSIS — R06.02 SHORTNESS OF BREATH: ICD-10-CM

## 2021-02-20 DIAGNOSIS — R07.9 CHEST PAIN: ICD-10-CM

## 2021-02-20 PROBLEM — E66.01 MORBID OBESITY: Status: ACTIVE | Noted: 2021-02-20

## 2021-02-20 LAB
ALBUMIN SERPL BCP-MCNC: 3.6 G/DL (ref 3.5–5.2)
ALLENS TEST: ABNORMAL
ALP SERPL-CCNC: 126 U/L (ref 55–135)
ALT SERPL W/O P-5'-P-CCNC: 47 U/L (ref 10–44)
ANION GAP SERPL CALC-SCNC: 7 MMOL/L (ref 8–16)
AORTIC ROOT ANNULUS: 2.76 CM
AORTIC VALVE CUSP SEPERATION: 2 CM
AST SERPL-CCNC: 64 U/L (ref 10–40)
AV INDEX (PROSTH): 0.51
AV MEAN GRADIENT: 5 MMHG
AV PEAK GRADIENT: 9 MMHG
AV VALVE AREA: 1.76 CM2
AV VELOCITY RATIO: 63.62
BASOPHILS # BLD AUTO: 0.05 K/UL (ref 0–0.2)
BASOPHILS NFR BLD: 0.5 % (ref 0–1.9)
BILIRUB SERPL-MCNC: 0.9 MG/DL (ref 0.1–1)
BNP SERPL-MCNC: 183 PG/ML (ref 0–99)
BSA FOR ECHO PROCEDURE: 2.33 M2
BUN SERPL-MCNC: 22 MG/DL (ref 8–23)
CALCIUM SERPL-MCNC: 8.9 MG/DL (ref 8.7–10.5)
CHLORIDE SERPL-SCNC: 104 MMOL/L (ref 95–110)
CO2 SERPL-SCNC: 24 MMOL/L (ref 23–29)
CREAT SERPL-MCNC: 1.3 MG/DL (ref 0.5–1.4)
CV ECHO LV RWT: 0.48 CM
DELSYS: ABNORMAL
DIFFERENTIAL METHOD: ABNORMAL
DOP CALC AO PEAK VEL: 1.46 M/S
DOP CALC AO VTI: 30.73 CM
DOP CALC LVOT AREA: 3.5 CM2
DOP CALC LVOT DIAMETER: 2.1 CM
DOP CALC LVOT PEAK VEL: 92.89 M/S
DOP CALC LVOT STROKE VOLUME: 54.11 CM3
DOP CALCLVOT PEAK VEL VTI: 15.63 CM
E WAVE DECELERATION TIME: 203.05 MSEC
E/A RATIO: 0.83
E/E' RATIO: 12.57 M/S
ECHO LV POSTERIOR WALL: 1.21 CM (ref 0.6–1.1)
EOSINOPHIL # BLD AUTO: 0.2 K/UL (ref 0–0.5)
EOSINOPHIL NFR BLD: 1.9 % (ref 0–8)
EP: 10
ERYTHROCYTE [DISTWIDTH] IN BLOOD BY AUTOMATED COUNT: 14.2 % (ref 11.5–14.5)
EST. GFR  (AFRICAN AMERICAN): 48 ML/MIN/1.73 M^2
EST. GFR  (NON AFRICAN AMERICAN): 41.7 ML/MIN/1.73 M^2
FIO2: 100
FRACTIONAL SHORTENING: 14 % (ref 28–44)
GLUCOSE SERPL-MCNC: 337 MG/DL (ref 70–110)
GLUCOSE SERPL-MCNC: 349 MG/DL (ref 70–110)
GLUCOSE SERPL-MCNC: 369 MG/DL (ref 70–110)
GLUCOSE SERPL-MCNC: 385 MG/DL (ref 70–110)
GLUCOSE SERPL-MCNC: 447 MG/DL (ref 70–110)
HCO3 UR-SCNC: 27.7 MMOL/L (ref 24–28)
HCT VFR BLD AUTO: 41.7 % (ref 37–48.5)
HGB BLD-MCNC: 13.3 G/DL (ref 12–16)
IMM GRANULOCYTES # BLD AUTO: 0.06 K/UL (ref 0–0.04)
IMM GRANULOCYTES NFR BLD AUTO: 0.6 % (ref 0–0.5)
INTERVENTRICULAR SEPTUM: 1.22 CM (ref 0.6–1.1)
IP: 16
IVRT: 87.02 MSEC
LEFT ATRIUM SIZE: 4.16 CM
LEFT INTERNAL DIMENSION IN SYSTOLE: 4.31 CM (ref 2.1–4)
LEFT VENTRICLE MASS INDEX: 110 G/M2
LEFT VENTRICULAR INTERNAL DIMENSION IN DIASTOLE: 5.03 CM (ref 3.5–6)
LEFT VENTRICULAR MASS: 240.13 G
LV LATERAL E/E' RATIO: 11 M/S
LV SEPTAL E/E' RATIO: 14.67 M/S
LYMPHOCYTES # BLD AUTO: 3.4 K/UL (ref 1–4.8)
LYMPHOCYTES NFR BLD: 33.7 % (ref 18–48)
MAGNESIUM SERPL-MCNC: 1.9 MG/DL (ref 1.6–2.6)
MCH RBC QN AUTO: 31.2 PG (ref 27–31)
MCHC RBC AUTO-ENTMCNC: 31.9 G/DL (ref 32–36)
MCV RBC AUTO: 98 FL (ref 82–98)
MODE: ABNORMAL
MONOCYTES # BLD AUTO: 0.6 K/UL (ref 0.3–1)
MONOCYTES NFR BLD: 6.2 % (ref 4–15)
MV PEAK A VEL: 1.06 M/S
MV PEAK E VEL: 0.88 M/S
NEUTROPHILS # BLD AUTO: 5.7 K/UL (ref 1.8–7.7)
NEUTROPHILS NFR BLD: 57.1 % (ref 38–73)
NRBC BLD-RTO: 0 /100 WBC
PCO2 BLDA: 69.4 MMHG (ref 35–45)
PH SMN: 7.21 [PH] (ref 7.35–7.45)
PISA TR MAX VEL: 2.49 M/S
PLATELET # BLD AUTO: 309 K/UL (ref 150–350)
PMV BLD AUTO: 9.8 FL (ref 9.2–12.9)
PO2 BLDA: 56 MMHG (ref 40–60)
POC BE: 0 MMOL/L
POC SATURATED O2: 80 % (ref 95–100)
POC TCO2: 30 MMOL/L (ref 24–29)
POTASSIUM SERPL-SCNC: 4.8 MMOL/L (ref 3.5–5.1)
PROCALCITONIN SERPL IA-MCNC: <0.05 NG/ML (ref 0–0.5)
PROT SERPL-MCNC: 7.8 G/DL (ref 6–8.4)
PV PEAK VELOCITY: 122.22 CM/S
RA PRESSURE: 8 MMHG
RBC # BLD AUTO: 4.26 M/UL (ref 4–5.4)
RIGHT VENTRICULAR END-DIASTOLIC DIMENSION: 190 CM
SAMPLE: ABNORMAL
SARS-COV-2 RDRP RESP QL NAA+PROBE: NEGATIVE
SITE: ABNORMAL
SODIUM SERPL-SCNC: 135 MMOL/L (ref 136–145)
SP02: 99
TDI LATERAL: 0.08 M/S
TDI SEPTAL: 0.06 M/S
TDI: 0.07 M/S
TR MAX PG: 25 MMHG
TROPONIN I SERPL DL<=0.01 NG/ML-MCNC: 0.03 NG/ML
TROPONIN I SERPL DL<=0.01 NG/ML-MCNC: 0.08 NG/ML
TSH SERPL DL<=0.005 MIU/L-ACNC: 3.47 UIU/ML (ref 0.34–5.6)
TV REST PULMONARY ARTERY PRESSURE: 33 MMHG
WBC # BLD AUTO: 10 K/UL (ref 3.9–12.7)

## 2021-02-20 PROCEDURE — 25000003 PHARM REV CODE 250: Performed by: INTERNAL MEDICINE

## 2021-02-20 PROCEDURE — 99900035 HC TECH TIME PER 15 MIN (STAT)

## 2021-02-20 PROCEDURE — 99291 CRITICAL CARE FIRST HOUR: CPT

## 2021-02-20 PROCEDURE — 25000242 PHARM REV CODE 250 ALT 637 W/ HCPCS: Performed by: INTERNAL MEDICINE

## 2021-02-20 PROCEDURE — 94640 AIRWAY INHALATION TREATMENT: CPT

## 2021-02-20 PROCEDURE — 99291 PR CRITICAL CARE, E/M 30-74 MINUTES: ICD-10-PCS | Mod: ,,, | Performed by: INTERNAL MEDICINE

## 2021-02-20 PROCEDURE — 83880 ASSAY OF NATRIURETIC PEPTIDE: CPT

## 2021-02-20 PROCEDURE — 96375 TX/PRO/DX INJ NEW DRUG ADDON: CPT

## 2021-02-20 PROCEDURE — 93010 ELECTROCARDIOGRAM REPORT: CPT | Mod: ,,, | Performed by: INTERNAL MEDICINE

## 2021-02-20 PROCEDURE — 80053 COMPREHEN METABOLIC PANEL: CPT

## 2021-02-20 PROCEDURE — C9399 UNCLASSIFIED DRUGS OR BIOLOG: HCPCS | Performed by: INTERNAL MEDICINE

## 2021-02-20 PROCEDURE — 84484 ASSAY OF TROPONIN QUANT: CPT | Mod: 91

## 2021-02-20 PROCEDURE — 93005 ELECTROCARDIOGRAM TRACING: CPT | Performed by: INTERNAL MEDICINE

## 2021-02-20 PROCEDURE — 63600175 PHARM REV CODE 636 W HCPCS: Performed by: INTERNAL MEDICINE

## 2021-02-20 PROCEDURE — 83735 ASSAY OF MAGNESIUM: CPT

## 2021-02-20 PROCEDURE — 93010 EKG 12-LEAD: ICD-10-PCS | Mod: ,,, | Performed by: INTERNAL MEDICINE

## 2021-02-20 PROCEDURE — 27000221 HC OXYGEN, UP TO 24 HOURS

## 2021-02-20 PROCEDURE — 94761 N-INVAS EAR/PLS OXIMETRY MLT: CPT

## 2021-02-20 PROCEDURE — 84145 PROCALCITONIN (PCT): CPT

## 2021-02-20 PROCEDURE — 82962 GLUCOSE BLOOD TEST: CPT

## 2021-02-20 PROCEDURE — 99900031 HC PATIENT EDUCATION (STAT)

## 2021-02-20 PROCEDURE — 82803 BLOOD GASES ANY COMBINATION: CPT

## 2021-02-20 PROCEDURE — 96365 THER/PROPH/DIAG IV INF INIT: CPT

## 2021-02-20 PROCEDURE — 36415 COLL VENOUS BLD VENIPUNCTURE: CPT

## 2021-02-20 PROCEDURE — 25000242 PHARM REV CODE 250 ALT 637 W/ HCPCS: Performed by: STUDENT IN AN ORGANIZED HEALTH CARE EDUCATION/TRAINING PROGRAM

## 2021-02-20 PROCEDURE — 94660 CPAP INITIATION&MGMT: CPT

## 2021-02-20 PROCEDURE — 93306 TTE W/DOPPLER COMPLETE: CPT

## 2021-02-20 PROCEDURE — 20000000 HC ICU ROOM

## 2021-02-20 PROCEDURE — 84443 ASSAY THYROID STIM HORMONE: CPT

## 2021-02-20 PROCEDURE — 99291 CRITICAL CARE FIRST HOUR: CPT | Mod: ,,, | Performed by: INTERNAL MEDICINE

## 2021-02-20 PROCEDURE — 85025 COMPLETE CBC W/AUTO DIFF WBC: CPT

## 2021-02-20 PROCEDURE — U0002 COVID-19 LAB TEST NON-CDC: HCPCS

## 2021-02-20 PROCEDURE — 63600175 PHARM REV CODE 636 W HCPCS: Performed by: EMERGENCY MEDICINE

## 2021-02-20 RX ORDER — CALCITRIOL 0.25 UG/1
0.25 CAPSULE ORAL
Status: DISCONTINUED | OUTPATIENT
Start: 2021-02-22 | End: 2021-02-22 | Stop reason: HOSPADM

## 2021-02-20 RX ORDER — ONDANSETRON 2 MG/ML
4 INJECTION INTRAMUSCULAR; INTRAVENOUS EVERY 12 HOURS PRN
Status: DISCONTINUED | OUTPATIENT
Start: 2021-02-20 | End: 2021-02-22 | Stop reason: HOSPADM

## 2021-02-20 RX ORDER — ALBUTEROL SULFATE 0.83 MG/ML
1.25 SOLUTION RESPIRATORY (INHALATION) EVERY 6 HOURS
Status: DISCONTINUED | OUTPATIENT
Start: 2021-02-20 | End: 2021-02-20

## 2021-02-20 RX ORDER — LANOLIN ALCOHOL/MO/W.PET/CERES
800 CREAM (GRAM) TOPICAL
Status: DISCONTINUED | OUTPATIENT
Start: 2021-02-20 | End: 2021-02-22 | Stop reason: HOSPADM

## 2021-02-20 RX ORDER — INSULIN LISPRO 100 [IU]/ML
INJECTION, SOLUTION INTRAVENOUS; SUBCUTANEOUS
COMMUNITY

## 2021-02-20 RX ORDER — FUROSEMIDE 10 MG/ML
60 INJECTION INTRAMUSCULAR; INTRAVENOUS
Status: DISCONTINUED | OUTPATIENT
Start: 2021-02-20 | End: 2021-02-22 | Stop reason: HOSPADM

## 2021-02-20 RX ORDER — ALLOPURINOL 100 MG/1
200 TABLET ORAL NIGHTLY
Status: DISCONTINUED | OUTPATIENT
Start: 2021-02-20 | End: 2021-02-22 | Stop reason: HOSPADM

## 2021-02-20 RX ORDER — FLUTICASONE PROPIONATE 50 MCG
1 SPRAY, SUSPENSION (ML) NASAL DAILY
Status: DISCONTINUED | OUTPATIENT
Start: 2021-02-20 | End: 2021-02-22 | Stop reason: HOSPADM

## 2021-02-20 RX ORDER — IBUPROFEN 200 MG
16 TABLET ORAL
Status: DISCONTINUED | OUTPATIENT
Start: 2021-02-20 | End: 2021-02-22 | Stop reason: HOSPADM

## 2021-02-20 RX ORDER — FLUTICASONE FUROATE AND VILANTEROL 100; 25 UG/1; UG/1
1 POWDER RESPIRATORY (INHALATION) DAILY
Status: DISCONTINUED | OUTPATIENT
Start: 2021-02-20 | End: 2021-02-22 | Stop reason: HOSPADM

## 2021-02-20 RX ORDER — AMITRIPTYLINE HYDROCHLORIDE 25 MG/1
100 TABLET, FILM COATED ORAL NIGHTLY
Status: DISCONTINUED | OUTPATIENT
Start: 2021-02-20 | End: 2021-02-22 | Stop reason: HOSPADM

## 2021-02-20 RX ORDER — METHYLPREDNISOLONE SOD SUCC 125 MG
40 VIAL (EA) INJECTION
Status: DISCONTINUED | OUTPATIENT
Start: 2021-02-20 | End: 2021-02-20

## 2021-02-20 RX ORDER — DIPHENOXYLATE HYDROCHLORIDE AND ATROPINE SULFATE 2.5; .025 MG/1; MG/1
1 TABLET ORAL 4 TIMES DAILY PRN
Status: DISCONTINUED | OUTPATIENT
Start: 2021-02-20 | End: 2021-02-22 | Stop reason: HOSPADM

## 2021-02-20 RX ORDER — IBUPROFEN 200 MG
24 TABLET ORAL
Status: DISCONTINUED | OUTPATIENT
Start: 2021-02-20 | End: 2021-02-22 | Stop reason: HOSPADM

## 2021-02-20 RX ORDER — LEVOTHYROXINE SODIUM 25 UG/1
25 TABLET ORAL
Status: DISCONTINUED | OUTPATIENT
Start: 2021-02-20 | End: 2021-02-22 | Stop reason: HOSPADM

## 2021-02-20 RX ORDER — GLUCAGON 1 MG
1 KIT INJECTION
Status: DISCONTINUED | OUTPATIENT
Start: 2021-02-20 | End: 2021-02-22 | Stop reason: HOSPADM

## 2021-02-20 RX ORDER — GABAPENTIN 300 MG/1
600 CAPSULE ORAL 3 TIMES DAILY
Status: DISCONTINUED | OUTPATIENT
Start: 2021-02-20 | End: 2021-02-22 | Stop reason: HOSPADM

## 2021-02-20 RX ORDER — FERROUS SULFATE 325(65) MG
325 TABLET ORAL
Status: DISCONTINUED | OUTPATIENT
Start: 2021-02-20 | End: 2021-02-22 | Stop reason: HOSPADM

## 2021-02-20 RX ORDER — LOSARTAN POTASSIUM 25 MG/1
25 TABLET ORAL DAILY
Status: DISCONTINUED | OUTPATIENT
Start: 2021-02-20 | End: 2021-02-22 | Stop reason: HOSPADM

## 2021-02-20 RX ORDER — MORPHINE SULFATE 2 MG/ML
2 INJECTION, SOLUTION INTRAMUSCULAR; INTRAVENOUS EVERY 4 HOURS PRN
Status: DISCONTINUED | OUTPATIENT
Start: 2021-02-20 | End: 2021-02-22 | Stop reason: HOSPADM

## 2021-02-20 RX ORDER — OXYCODONE HYDROCHLORIDE 5 MG/1
15 TABLET ORAL EVERY 6 HOURS PRN
Status: DISCONTINUED | OUTPATIENT
Start: 2021-02-20 | End: 2021-02-22 | Stop reason: HOSPADM

## 2021-02-20 RX ORDER — OXYCODONE HYDROCHLORIDE 5 MG/1
5 TABLET ORAL EVERY 6 HOURS PRN
Status: DISCONTINUED | OUTPATIENT
Start: 2021-02-20 | End: 2021-02-22 | Stop reason: HOSPADM

## 2021-02-20 RX ORDER — ACETAMINOPHEN 325 MG/1
650 TABLET ORAL EVERY 8 HOURS PRN
Status: DISCONTINUED | OUTPATIENT
Start: 2021-02-20 | End: 2021-02-22 | Stop reason: HOSPADM

## 2021-02-20 RX ORDER — PANTOPRAZOLE SODIUM 40 MG/1
40 TABLET, DELAYED RELEASE ORAL DAILY
Status: DISCONTINUED | OUTPATIENT
Start: 2021-02-20 | End: 2021-02-22 | Stop reason: HOSPADM

## 2021-02-20 RX ORDER — FOLIC ACID 1 MG/1
1 TABLET ORAL DAILY
Status: DISCONTINUED | OUTPATIENT
Start: 2021-02-20 | End: 2021-02-22 | Stop reason: HOSPADM

## 2021-02-20 RX ORDER — ALBUTEROL SULFATE 0.83 MG/ML
2.5 SOLUTION RESPIRATORY (INHALATION) EVERY 6 HOURS
Status: DISCONTINUED | OUTPATIENT
Start: 2021-02-20 | End: 2021-02-20

## 2021-02-20 RX ORDER — ASCORBIC ACID 500 MG
500 TABLET ORAL DAILY
Status: DISCONTINUED | OUTPATIENT
Start: 2021-02-20 | End: 2021-02-22 | Stop reason: HOSPADM

## 2021-02-20 RX ORDER — INSULIN ASPART 100 [IU]/ML
7 INJECTION, SOLUTION INTRAVENOUS; SUBCUTANEOUS ONCE
Status: COMPLETED | OUTPATIENT
Start: 2021-02-20 | End: 2021-02-20

## 2021-02-20 RX ORDER — CETIRIZINE HYDROCHLORIDE 10 MG/1
10 TABLET ORAL DAILY
Status: DISCONTINUED | OUTPATIENT
Start: 2021-02-20 | End: 2021-02-22 | Stop reason: HOSPADM

## 2021-02-20 RX ORDER — POLYETHYLENE GLYCOL 3350 17 G/17G
17 POWDER, FOR SOLUTION ORAL 2 TIMES DAILY PRN
Status: DISCONTINUED | OUTPATIENT
Start: 2021-02-20 | End: 2021-02-22 | Stop reason: HOSPADM

## 2021-02-20 RX ORDER — POTASSIUM CHLORIDE 20 MEQ/1
20 TABLET, EXTENDED RELEASE ORAL 2 TIMES DAILY
Status: DISCONTINUED | OUTPATIENT
Start: 2021-02-20 | End: 2021-02-22 | Stop reason: HOSPADM

## 2021-02-20 RX ORDER — MECLIZINE HCL 12.5 MG 12.5 MG/1
12.5 TABLET ORAL 2 TIMES DAILY PRN
Status: DISCONTINUED | OUTPATIENT
Start: 2021-02-20 | End: 2021-02-22 | Stop reason: HOSPADM

## 2021-02-20 RX ORDER — ENOXAPARIN SODIUM 100 MG/ML
40 INJECTION SUBCUTANEOUS EVERY 12 HOURS
Status: DISCONTINUED | OUTPATIENT
Start: 2021-02-20 | End: 2021-02-22 | Stop reason: HOSPADM

## 2021-02-20 RX ORDER — GUAIFENESIN 600 MG/1
600 TABLET, EXTENDED RELEASE ORAL 2 TIMES DAILY
Status: DISCONTINUED | OUTPATIENT
Start: 2021-02-20 | End: 2021-02-22 | Stop reason: HOSPADM

## 2021-02-20 RX ORDER — FLUOXETINE HYDROCHLORIDE 20 MG/1
40 CAPSULE ORAL DAILY
Status: DISCONTINUED | OUTPATIENT
Start: 2021-02-20 | End: 2021-02-22 | Stop reason: HOSPADM

## 2021-02-20 RX ORDER — FUROSEMIDE 10 MG/ML
80 INJECTION INTRAMUSCULAR; INTRAVENOUS
Status: COMPLETED | OUTPATIENT
Start: 2021-02-20 | End: 2021-02-20

## 2021-02-20 RX ORDER — MONTELUKAST SODIUM 10 MG/1
10 TABLET ORAL DAILY
Status: DISCONTINUED | OUTPATIENT
Start: 2021-02-20 | End: 2021-02-22 | Stop reason: HOSPADM

## 2021-02-20 RX ORDER — BENZONATATE 100 MG/1
200 CAPSULE ORAL 3 TIMES DAILY PRN
Status: DISCONTINUED | OUTPATIENT
Start: 2021-02-20 | End: 2021-02-22 | Stop reason: HOSPADM

## 2021-02-20 RX ORDER — SODIUM CHLORIDE 0.9 % (FLUSH) 0.9 %
10 SYRINGE (ML) INJECTION
Status: DISCONTINUED | OUTPATIENT
Start: 2021-02-20 | End: 2021-02-22 | Stop reason: HOSPADM

## 2021-02-20 RX ORDER — MAGNESIUM SULFATE HEPTAHYDRATE 40 MG/ML
2 INJECTION, SOLUTION INTRAVENOUS ONCE
Status: COMPLETED | OUTPATIENT
Start: 2021-02-20 | End: 2021-02-20

## 2021-02-20 RX ORDER — ALBUTEROL SULFATE 0.83 MG/ML
2.5 SOLUTION RESPIRATORY (INHALATION) EVERY 6 HOURS PRN
Status: DISCONTINUED | OUTPATIENT
Start: 2021-02-21 | End: 2021-02-22 | Stop reason: HOSPADM

## 2021-02-20 RX ORDER — IPRATROPIUM BROMIDE AND ALBUTEROL SULFATE 2.5; .5 MG/3ML; MG/3ML
3 SOLUTION RESPIRATORY (INHALATION)
Status: COMPLETED | OUTPATIENT
Start: 2021-02-20 | End: 2021-02-20

## 2021-02-20 RX ORDER — METHYLPREDNISOLONE SOD SUCC 125 MG
125 VIAL (EA) INJECTION EVERY 6 HOURS
Status: DISCONTINUED | OUTPATIENT
Start: 2021-02-20 | End: 2021-02-20

## 2021-02-20 RX ORDER — TALC
9 POWDER (GRAM) TOPICAL NIGHTLY PRN
Status: DISCONTINUED | OUTPATIENT
Start: 2021-02-20 | End: 2021-02-22 | Stop reason: HOSPADM

## 2021-02-20 RX ORDER — INSULIN ASPART 100 [IU]/ML
1-10 INJECTION, SOLUTION INTRAVENOUS; SUBCUTANEOUS
Status: DISCONTINUED | OUTPATIENT
Start: 2021-02-20 | End: 2021-02-20 | Stop reason: SDUPTHER

## 2021-02-20 RX ORDER — PREDNISONE 20 MG/1
40 TABLET ORAL DAILY
Status: DISCONTINUED | OUTPATIENT
Start: 2021-02-21 | End: 2021-02-22

## 2021-02-20 RX ORDER — LANOLIN ALCOHOL/MO/W.PET/CERES
800 CREAM (GRAM) TOPICAL NIGHTLY
Status: DISCONTINUED | OUTPATIENT
Start: 2021-02-20 | End: 2021-02-22 | Stop reason: HOSPADM

## 2021-02-20 RX ADMIN — HUMAN INSULIN 15 UNITS: 100 INJECTION, SOLUTION SUBCUTANEOUS at 05:02

## 2021-02-20 RX ADMIN — GABAPENTIN 600 MG: 300 CAPSULE ORAL at 02:02

## 2021-02-20 RX ADMIN — GUAIFENESIN 600 MG: 600 TABLET, EXTENDED RELEASE ORAL at 08:02

## 2021-02-20 RX ADMIN — IPRATROPIUM BROMIDE AND ALBUTEROL SULFATE 3 ML: .5; 3 SOLUTION RESPIRATORY (INHALATION) at 03:02

## 2021-02-20 RX ADMIN — GABAPENTIN 600 MG: 300 CAPSULE ORAL at 08:02

## 2021-02-20 RX ADMIN — METHYLPREDNISOLONE SODIUM SUCCINATE 125 MG: 125 INJECTION, POWDER, FOR SOLUTION INTRAMUSCULAR; INTRAVENOUS at 03:02

## 2021-02-20 RX ADMIN — FLUTICASONE FUROATE AND VILANTEROL TRIFENATATE 1 PUFF: 100; 25 POWDER RESPIRATORY (INHALATION) at 09:02

## 2021-02-20 RX ADMIN — LOSARTAN POTASSIUM 25 MG: 25 TABLET, FILM COATED ORAL at 08:02

## 2021-02-20 RX ADMIN — OXYCODONE HYDROCHLORIDE 15 MG: 5 TABLET ORAL at 04:02

## 2021-02-20 RX ADMIN — HUMAN INSULIN 12 UNITS: 100 INJECTION, SOLUTION SUBCUTANEOUS at 10:02

## 2021-02-20 RX ADMIN — MONTELUKAST 10 MG: 10 TABLET, FILM COATED ORAL at 08:02

## 2021-02-20 RX ADMIN — FOLIC ACID 1 MG: 1 TABLET ORAL at 10:02

## 2021-02-20 RX ADMIN — ALLOPURINOL 200 MG: 100 TABLET ORAL at 08:02

## 2021-02-20 RX ADMIN — LACTOBACILLUS TAB 1 TABLET: TAB at 04:02

## 2021-02-20 RX ADMIN — MAGNESIUM SULFATE 2 G: 2 INJECTION INTRAVENOUS at 03:02

## 2021-02-20 RX ADMIN — LEVOTHYROXINE SODIUM 25 MCG: 25 TABLET ORAL at 08:02

## 2021-02-20 RX ADMIN — LACTOBACILLUS TAB 1 TABLET: TAB at 08:02

## 2021-02-20 RX ADMIN — FUROSEMIDE 80 MG: 10 INJECTION, SOLUTION INTRAMUSCULAR; INTRAVENOUS at 03:02

## 2021-02-20 RX ADMIN — MAGNESIUM OXIDE 800 MG: 400 TABLET ORAL at 08:02

## 2021-02-20 RX ADMIN — OXYCODONE HYDROCHLORIDE AND ACETAMINOPHEN 500 MG: 500 TABLET ORAL at 08:02

## 2021-02-20 RX ADMIN — INSULIN ASPART 7 UNITS: 100 INJECTION, SOLUTION INTRAVENOUS; SUBCUTANEOUS at 08:02

## 2021-02-20 RX ADMIN — ENOXAPARIN SODIUM 40 MG: 40 INJECTION SUBCUTANEOUS at 08:02

## 2021-02-20 RX ADMIN — FUROSEMIDE 60 MG: 10 INJECTION, SOLUTION INTRAVENOUS at 02:02

## 2021-02-20 RX ADMIN — CETIRIZINE HYDROCHLORIDE 10 MG: 10 TABLET, FILM COATED ORAL at 10:02

## 2021-02-20 RX ADMIN — ALBUTEROL SULFATE 2.5 MG: 2.5 SOLUTION RESPIRATORY (INHALATION) at 01:02

## 2021-02-20 RX ADMIN — POTASSIUM CHLORIDE 20 MEQ: 20 TABLET, EXTENDED RELEASE ORAL at 08:02

## 2021-02-20 RX ADMIN — PANTOPRAZOLE SODIUM 40 MG: 40 TABLET, DELAYED RELEASE ORAL at 08:02

## 2021-02-20 RX ADMIN — AMITRIPTYLINE HYDROCHLORIDE 100 MG: 25 TABLET, FILM COATED ORAL at 08:02

## 2021-02-20 RX ADMIN — FLUOXETINE 40 MG: 20 CAPSULE ORAL at 08:02

## 2021-02-20 RX ADMIN — LACTOBACILLUS TAB 1 TABLET: TAB at 01:02

## 2021-02-20 RX ADMIN — FERROUS SULFATE TAB 325 MG (65 MG ELEMENTAL FE) 325 MG: 325 (65 FE) TAB at 10:02

## 2021-02-20 RX ADMIN — FLUTICASONE PROPIONATE 50 MCG: 50 SPRAY, METERED NASAL at 10:02

## 2021-02-20 RX ADMIN — INSULIN DETEMIR 45 UNITS: 100 INJECTION, SOLUTION SUBCUTANEOUS at 08:02

## 2021-02-20 RX ADMIN — ALBUTEROL SULFATE 2.5 MG: 2.5 SOLUTION RESPIRATORY (INHALATION) at 07:02

## 2021-02-21 LAB
ALBUMIN SERPL BCP-MCNC: 3.3 G/DL (ref 3.5–5.2)
ALP SERPL-CCNC: 102 U/L (ref 55–135)
ALT SERPL W/O P-5'-P-CCNC: 54 U/L (ref 10–44)
ANION GAP SERPL CALC-SCNC: 11 MMOL/L (ref 8–16)
AST SERPL-CCNC: 43 U/L (ref 10–40)
BASOPHILS # BLD AUTO: 0.03 K/UL (ref 0–0.2)
BASOPHILS NFR BLD: 0.2 % (ref 0–1.9)
BILIRUB SERPL-MCNC: 0.7 MG/DL (ref 0.1–1)
BUN SERPL-MCNC: 33 MG/DL (ref 8–23)
CALCIUM SERPL-MCNC: 8.9 MG/DL (ref 8.7–10.5)
CHLORIDE SERPL-SCNC: 102 MMOL/L (ref 95–110)
CHOLEST SERPL-MCNC: 120 MG/DL (ref 120–199)
CHOLEST/HDLC SERPL: 3.4 {RATIO} (ref 2–5)
CO2 SERPL-SCNC: 26 MMOL/L (ref 23–29)
CREAT SERPL-MCNC: 1.5 MG/DL (ref 0.5–1.4)
DIFFERENTIAL METHOD: ABNORMAL
EOSINOPHIL # BLD AUTO: 0 K/UL (ref 0–0.5)
EOSINOPHIL NFR BLD: 0.1 % (ref 0–8)
ERYTHROCYTE [DISTWIDTH] IN BLOOD BY AUTOMATED COUNT: 14.2 % (ref 11.5–14.5)
EST. GFR  (AFRICAN AMERICAN): 40.4 ML/MIN/1.73 M^2
EST. GFR  (NON AFRICAN AMERICAN): 35 ML/MIN/1.73 M^2
ESTIMATED AVG GLUCOSE: 217 MG/DL (ref 68–131)
GLUCOSE SERPL-MCNC: 218 MG/DL (ref 70–110)
GLUCOSE SERPL-MCNC: 269 MG/DL (ref 70–110)
GLUCOSE SERPL-MCNC: 273 MG/DL (ref 70–110)
GLUCOSE SERPL-MCNC: 328 MG/DL (ref 70–110)
GLUCOSE SERPL-MCNC: 332 MG/DL (ref 70–110)
HBA1C MFR BLD: 9.2 % (ref 4.5–6.2)
HCT VFR BLD AUTO: 35.5 % (ref 37–48.5)
HDLC SERPL-MCNC: 35 MG/DL (ref 40–75)
HDLC SERPL: 29.2 % (ref 20–50)
HGB BLD-MCNC: 11.6 G/DL (ref 12–16)
IMM GRANULOCYTES # BLD AUTO: 0.06 K/UL (ref 0–0.04)
IMM GRANULOCYTES NFR BLD AUTO: 0.5 % (ref 0–0.5)
LDLC SERPL CALC-MCNC: 47.8 MG/DL (ref 63–159)
LYMPHOCYTES # BLD AUTO: 2.2 K/UL (ref 1–4.8)
LYMPHOCYTES NFR BLD: 17.7 % (ref 18–48)
MAGNESIUM SERPL-MCNC: 2.1 MG/DL (ref 1.6–2.6)
MCH RBC QN AUTO: 31.4 PG (ref 27–31)
MCHC RBC AUTO-ENTMCNC: 32.7 G/DL (ref 32–36)
MCV RBC AUTO: 96 FL (ref 82–98)
MONOCYTES # BLD AUTO: 1 K/UL (ref 0.3–1)
MONOCYTES NFR BLD: 8.5 % (ref 4–15)
NEUTROPHILS # BLD AUTO: 9 K/UL (ref 1.8–7.7)
NEUTROPHILS NFR BLD: 73 % (ref 38–73)
NONHDLC SERPL-MCNC: 85 MG/DL
NRBC BLD-RTO: 0 /100 WBC
PLATELET # BLD AUTO: 257 K/UL (ref 150–350)
PMV BLD AUTO: 9.7 FL (ref 9.2–12.9)
POTASSIUM SERPL-SCNC: 4.8 MMOL/L (ref 3.5–5.1)
PROT SERPL-MCNC: 6.9 G/DL (ref 6–8.4)
RBC # BLD AUTO: 3.69 M/UL (ref 4–5.4)
SODIUM SERPL-SCNC: 139 MMOL/L (ref 136–145)
TRIGL SERPL-MCNC: 186 MG/DL (ref 30–150)
WBC # BLD AUTO: 12.28 K/UL (ref 3.9–12.7)

## 2021-02-21 PROCEDURE — 80053 COMPREHEN METABOLIC PANEL: CPT

## 2021-02-21 PROCEDURE — 94761 N-INVAS EAR/PLS OXIMETRY MLT: CPT

## 2021-02-21 PROCEDURE — 82962 GLUCOSE BLOOD TEST: CPT

## 2021-02-21 PROCEDURE — 83036 HEMOGLOBIN GLYCOSYLATED A1C: CPT

## 2021-02-21 PROCEDURE — 63600175 PHARM REV CODE 636 W HCPCS: Performed by: INTERNAL MEDICINE

## 2021-02-21 PROCEDURE — 83735 ASSAY OF MAGNESIUM: CPT

## 2021-02-21 PROCEDURE — 20000000 HC ICU ROOM

## 2021-02-21 PROCEDURE — 94660 CPAP INITIATION&MGMT: CPT

## 2021-02-21 PROCEDURE — 25000003 PHARM REV CODE 250: Performed by: INTERNAL MEDICINE

## 2021-02-21 PROCEDURE — 36415 COLL VENOUS BLD VENIPUNCTURE: CPT

## 2021-02-21 PROCEDURE — C9399 UNCLASSIFIED DRUGS OR BIOLOG: HCPCS | Performed by: INTERNAL MEDICINE

## 2021-02-21 PROCEDURE — 94640 AIRWAY INHALATION TREATMENT: CPT

## 2021-02-21 PROCEDURE — 99900035 HC TECH TIME PER 15 MIN (STAT)

## 2021-02-21 PROCEDURE — 80061 LIPID PANEL: CPT

## 2021-02-21 PROCEDURE — 85025 COMPLETE CBC W/AUTO DIFF WBC: CPT

## 2021-02-21 RX ADMIN — LOSARTAN POTASSIUM 25 MG: 25 TABLET, FILM COATED ORAL at 08:02

## 2021-02-21 RX ADMIN — FLUTICASONE FUROATE AND VILANTEROL TRIFENATATE 1 PUFF: 100; 25 POWDER RESPIRATORY (INHALATION) at 10:02

## 2021-02-21 RX ADMIN — LACTOBACILLUS TAB 1 TABLET: TAB at 11:02

## 2021-02-21 RX ADMIN — HUMAN INSULIN 12 UNITS: 100 INJECTION, SOLUTION SUBCUTANEOUS at 09:02

## 2021-02-21 RX ADMIN — ENOXAPARIN SODIUM 40 MG: 40 INJECTION SUBCUTANEOUS at 08:02

## 2021-02-21 RX ADMIN — MELATONIN 9 MG: at 09:02

## 2021-02-21 RX ADMIN — FERROUS SULFATE TAB 325 MG (65 MG ELEMENTAL FE) 325 MG: 325 (65 FE) TAB at 07:02

## 2021-02-21 RX ADMIN — AMITRIPTYLINE HYDROCHLORIDE 100 MG: 25 TABLET, FILM COATED ORAL at 09:02

## 2021-02-21 RX ADMIN — PREDNISONE 40 MG: 20 TABLET ORAL at 08:02

## 2021-02-21 RX ADMIN — GABAPENTIN 600 MG: 300 CAPSULE ORAL at 08:02

## 2021-02-21 RX ADMIN — ALLOPURINOL 200 MG: 100 TABLET ORAL at 09:02

## 2021-02-21 RX ADMIN — ENOXAPARIN SODIUM 40 MG: 40 INJECTION SUBCUTANEOUS at 09:02

## 2021-02-21 RX ADMIN — HUMAN INSULIN 6 UNITS: 100 INJECTION, SOLUTION SUBCUTANEOUS at 09:02

## 2021-02-21 RX ADMIN — FLUOXETINE 40 MG: 20 CAPSULE ORAL at 08:02

## 2021-02-21 RX ADMIN — POTASSIUM CHLORIDE 20 MEQ: 20 TABLET, EXTENDED RELEASE ORAL at 08:02

## 2021-02-21 RX ADMIN — INSULIN DETEMIR 10 UNITS: 100 INJECTION, SOLUTION SUBCUTANEOUS at 09:02

## 2021-02-21 RX ADMIN — CETIRIZINE HYDROCHLORIDE 10 MG: 10 TABLET, FILM COATED ORAL at 08:02

## 2021-02-21 RX ADMIN — MAGNESIUM OXIDE 800 MG: 400 TABLET ORAL at 09:02

## 2021-02-21 RX ADMIN — LEVOTHYROXINE SODIUM 25 MCG: 25 TABLET ORAL at 06:02

## 2021-02-21 RX ADMIN — GUAIFENESIN 600 MG: 600 TABLET, EXTENDED RELEASE ORAL at 09:02

## 2021-02-21 RX ADMIN — HUMAN INSULIN 12 UNITS: 100 INJECTION, SOLUTION SUBCUTANEOUS at 06:02

## 2021-02-21 RX ADMIN — FUROSEMIDE 60 MG: 10 INJECTION, SOLUTION INTRAVENOUS at 04:02

## 2021-02-21 RX ADMIN — FLUTICASONE PROPIONATE 50 MCG: 50 SPRAY, METERED NASAL at 08:02

## 2021-02-21 RX ADMIN — HUMAN INSULIN 9 UNITS: 100 INJECTION, SOLUTION SUBCUTANEOUS at 04:02

## 2021-02-21 RX ADMIN — HUMAN INSULIN 12 UNITS: 100 INJECTION, SOLUTION SUBCUTANEOUS at 12:02

## 2021-02-21 RX ADMIN — GABAPENTIN 600 MG: 300 CAPSULE ORAL at 04:02

## 2021-02-21 RX ADMIN — FOLIC ACID 1 MG: 1 TABLET ORAL at 08:02

## 2021-02-21 RX ADMIN — MONTELUKAST 10 MG: 10 TABLET, FILM COATED ORAL at 08:02

## 2021-02-21 RX ADMIN — LACTOBACILLUS TAB 1 TABLET: TAB at 07:02

## 2021-02-21 RX ADMIN — OXYCODONE HYDROCHLORIDE AND ACETAMINOPHEN 500 MG: 500 TABLET ORAL at 08:02

## 2021-02-21 RX ADMIN — FUROSEMIDE 60 MG: 10 INJECTION, SOLUTION INTRAVENOUS at 03:02

## 2021-02-21 RX ADMIN — PANTOPRAZOLE SODIUM 40 MG: 40 TABLET, DELAYED RELEASE ORAL at 08:02

## 2021-02-21 RX ADMIN — INSULIN DETEMIR 10 UNITS: 100 INJECTION, SOLUTION SUBCUTANEOUS at 08:02

## 2021-02-21 RX ADMIN — GUAIFENESIN 600 MG: 600 TABLET, EXTENDED RELEASE ORAL at 08:02

## 2021-02-21 RX ADMIN — GABAPENTIN 600 MG: 300 CAPSULE ORAL at 09:02

## 2021-02-22 VITALS
WEIGHT: 272.25 LBS | HEIGHT: 62 IN | SYSTOLIC BLOOD PRESSURE: 137 MMHG | RESPIRATION RATE: 37 BRPM | DIASTOLIC BLOOD PRESSURE: 68 MMHG | TEMPERATURE: 97 F | OXYGEN SATURATION: 99 % | BODY MASS INDEX: 50.1 KG/M2 | HEART RATE: 98 BPM

## 2021-02-22 PROBLEM — J96.02 ACUTE RESPIRATORY FAILURE WITH HYPOXIA AND HYPERCARBIA: Status: RESOLVED | Noted: 2021-02-20 | Resolved: 2021-02-22

## 2021-02-22 PROBLEM — J96.91 RESPIRATORY FAILURE WITH HYPOXIA: Status: RESOLVED | Noted: 2021-02-20 | Resolved: 2021-02-22

## 2021-02-22 PROBLEM — J44.1 COPD EXACERBATION: Status: RESOLVED | Noted: 2021-02-20 | Resolved: 2021-02-22

## 2021-02-22 PROBLEM — J96.01 ACUTE RESPIRATORY FAILURE WITH HYPOXIA AND HYPERCARBIA: Status: RESOLVED | Noted: 2021-02-20 | Resolved: 2021-02-22

## 2021-02-22 PROBLEM — I50.9 ACUTE ON CHRONIC HEART FAILURE: Status: RESOLVED | Noted: 2021-02-20 | Resolved: 2021-02-22

## 2021-02-22 LAB
ALBUMIN SERPL BCP-MCNC: 3.2 G/DL (ref 3.5–5.2)
ALP SERPL-CCNC: 107 U/L (ref 55–135)
ALT SERPL W/O P-5'-P-CCNC: 43 U/L (ref 10–44)
ANION GAP SERPL CALC-SCNC: 12 MMOL/L (ref 8–16)
AST SERPL-CCNC: 28 U/L (ref 10–40)
BASOPHILS # BLD AUTO: 0.03 K/UL (ref 0–0.2)
BASOPHILS NFR BLD: 0.3 % (ref 0–1.9)
BILIRUB SERPL-MCNC: 0.6 MG/DL (ref 0.1–1)
BUN SERPL-MCNC: 33 MG/DL (ref 8–23)
CALCIUM SERPL-MCNC: 9 MG/DL (ref 8.7–10.5)
CHLORIDE SERPL-SCNC: 100 MMOL/L (ref 95–110)
CO2 SERPL-SCNC: 28 MMOL/L (ref 23–29)
CREAT SERPL-MCNC: 1.2 MG/DL (ref 0.5–1.4)
DIFFERENTIAL METHOD: ABNORMAL
EOSINOPHIL # BLD AUTO: 0.1 K/UL (ref 0–0.5)
EOSINOPHIL NFR BLD: 0.6 % (ref 0–8)
ERYTHROCYTE [DISTWIDTH] IN BLOOD BY AUTOMATED COUNT: 14.1 % (ref 11.5–14.5)
EST. GFR  (AFRICAN AMERICAN): 52.9 ML/MIN/1.73 M^2
EST. GFR  (NON AFRICAN AMERICAN): 45.9 ML/MIN/1.73 M^2
GLUCOSE SERPL-MCNC: 169 MG/DL (ref 70–110)
GLUCOSE SERPL-MCNC: 411 MG/DL (ref 70–110)
HCT VFR BLD AUTO: 35.6 % (ref 37–48.5)
HGB BLD-MCNC: 11.9 G/DL (ref 12–16)
IMM GRANULOCYTES # BLD AUTO: 0.06 K/UL (ref 0–0.04)
IMM GRANULOCYTES NFR BLD AUTO: 0.6 % (ref 0–0.5)
LYMPHOCYTES # BLD AUTO: 3.2 K/UL (ref 1–4.8)
LYMPHOCYTES NFR BLD: 30.8 % (ref 18–48)
MAGNESIUM SERPL-MCNC: 1.9 MG/DL (ref 1.6–2.6)
MCH RBC QN AUTO: 31.7 PG (ref 27–31)
MCHC RBC AUTO-ENTMCNC: 33.4 G/DL (ref 32–36)
MCV RBC AUTO: 95 FL (ref 82–98)
MONOCYTES # BLD AUTO: 0.7 K/UL (ref 0.3–1)
MONOCYTES NFR BLD: 6.7 % (ref 4–15)
NEUTROPHILS # BLD AUTO: 6.4 K/UL (ref 1.8–7.7)
NEUTROPHILS NFR BLD: 61 % (ref 38–73)
NRBC BLD-RTO: 0 /100 WBC
PLATELET # BLD AUTO: 254 K/UL (ref 150–350)
PMV BLD AUTO: 9.7 FL (ref 9.2–12.9)
POTASSIUM SERPL-SCNC: 3.6 MMOL/L (ref 3.5–5.1)
PROT SERPL-MCNC: 6.9 G/DL (ref 6–8.4)
RBC # BLD AUTO: 3.75 M/UL (ref 4–5.4)
SODIUM SERPL-SCNC: 140 MMOL/L (ref 136–145)
WBC # BLD AUTO: 10.52 K/UL (ref 3.9–12.7)

## 2021-02-22 PROCEDURE — 63600175 PHARM REV CODE 636 W HCPCS: Performed by: INTERNAL MEDICINE

## 2021-02-22 PROCEDURE — 94761 N-INVAS EAR/PLS OXIMETRY MLT: CPT

## 2021-02-22 PROCEDURE — 25000003 PHARM REV CODE 250: Performed by: INTERNAL MEDICINE

## 2021-02-22 PROCEDURE — 80053 COMPREHEN METABOLIC PANEL: CPT

## 2021-02-22 PROCEDURE — 25000003 PHARM REV CODE 250

## 2021-02-22 PROCEDURE — 83735 ASSAY OF MAGNESIUM: CPT

## 2021-02-22 PROCEDURE — 99232 SBSQ HOSP IP/OBS MODERATE 35: CPT | Mod: ,,, | Performed by: INTERNAL MEDICINE

## 2021-02-22 PROCEDURE — 36415 COLL VENOUS BLD VENIPUNCTURE: CPT

## 2021-02-22 PROCEDURE — 99900035 HC TECH TIME PER 15 MIN (STAT)

## 2021-02-22 PROCEDURE — 85025 COMPLETE CBC W/AUTO DIFF WBC: CPT

## 2021-02-22 PROCEDURE — 99232 PR SUBSEQUENT HOSPITAL CARE,LEVL II: ICD-10-PCS | Mod: ,,, | Performed by: INTERNAL MEDICINE

## 2021-02-22 RX ORDER — MUPIROCIN 20 MG/G
OINTMENT TOPICAL 2 TIMES DAILY
Status: DISCONTINUED | OUTPATIENT
Start: 2021-02-22 | End: 2021-02-22 | Stop reason: HOSPADM

## 2021-02-22 RX ORDER — FLUTICASONE PROPIONATE 50 MCG
SPRAY, SUSPENSION (ML) NASAL
Qty: 16 G | Refills: 3 | Status: SHIPPED | OUTPATIENT
Start: 2021-02-22

## 2021-02-22 RX ORDER — CHLORHEXIDINE GLUCONATE ORAL RINSE 1.2 MG/ML
15 SOLUTION DENTAL 2 TIMES DAILY
Status: DISCONTINUED | OUTPATIENT
Start: 2021-02-22 | End: 2021-02-22 | Stop reason: HOSPADM

## 2021-02-22 RX ORDER — BENZONATATE 200 MG/1
200 CAPSULE ORAL 3 TIMES DAILY PRN
Qty: 30 CAPSULE | Refills: 1 | Status: SHIPPED | OUTPATIENT
Start: 2021-02-22 | End: 2021-03-04

## 2021-02-22 RX ORDER — GUAIFENESIN 600 MG/1
600 TABLET, EXTENDED RELEASE ORAL 2 TIMES DAILY
Start: 2021-02-22

## 2021-02-22 RX ADMIN — BENZONATATE 200 MG: 100 CAPSULE ORAL at 08:02

## 2021-02-22 RX ADMIN — OXYCODONE HYDROCHLORIDE AND ACETAMINOPHEN 500 MG: 500 TABLET ORAL at 08:02

## 2021-02-22 RX ADMIN — FUROSEMIDE 60 MG: 10 INJECTION, SOLUTION INTRAVENOUS at 03:02

## 2021-02-22 RX ADMIN — MUPIROCIN: 20 OINTMENT TOPICAL at 08:02

## 2021-02-22 RX ADMIN — OXYCODONE HYDROCHLORIDE 15 MG: 5 TABLET ORAL at 08:02

## 2021-02-22 RX ADMIN — LACTOBACILLUS TAB 1 TABLET: TAB at 08:02

## 2021-02-22 RX ADMIN — LEVOTHYROXINE SODIUM 25 MCG: 25 TABLET ORAL at 05:02

## 2021-02-22 RX ADMIN — HUMAN INSULIN 18 UNITS: 100 INJECTION, SOLUTION SUBCUTANEOUS at 11:02

## 2021-02-22 RX ADMIN — POTASSIUM CHLORIDE 20 MEQ: 20 TABLET, EXTENDED RELEASE ORAL at 08:02

## 2021-02-22 RX ADMIN — FLUTICASONE PROPIONATE 50 MCG: 50 SPRAY, METERED NASAL at 08:02

## 2021-02-22 RX ADMIN — CETIRIZINE HYDROCHLORIDE 10 MG: 10 TABLET, FILM COATED ORAL at 08:02

## 2021-02-22 RX ADMIN — FLUOXETINE 40 MG: 20 CAPSULE ORAL at 08:02

## 2021-02-22 RX ADMIN — PANTOPRAZOLE SODIUM 40 MG: 40 TABLET, DELAYED RELEASE ORAL at 08:02

## 2021-02-22 RX ADMIN — MONTELUKAST 10 MG: 10 TABLET, FILM COATED ORAL at 08:02

## 2021-02-22 RX ADMIN — GABAPENTIN 600 MG: 300 CAPSULE ORAL at 08:02

## 2021-02-22 RX ADMIN — INSULIN DETEMIR 10 UNITS: 100 INJECTION, SOLUTION SUBCUTANEOUS at 08:02

## 2021-02-22 RX ADMIN — FOLIC ACID 1 MG: 1 TABLET ORAL at 08:02

## 2021-02-22 RX ADMIN — GUAIFENESIN 600 MG: 600 TABLET, EXTENDED RELEASE ORAL at 08:02

## 2021-02-22 RX ADMIN — FLUTICASONE FUROATE AND VILANTEROL TRIFENATATE 1 PUFF: 100; 25 POWDER RESPIRATORY (INHALATION) at 07:02

## 2021-02-22 RX ADMIN — ENOXAPARIN SODIUM 40 MG: 40 INJECTION SUBCUTANEOUS at 08:02

## 2021-02-22 RX ADMIN — CALCITRIOL CAPSULES 0.25 MCG 0.25 MCG: 0.25 CAPSULE ORAL at 08:02

## 2021-02-22 RX ADMIN — CHLORHEXIDINE GLUCONATE 15 ML: 1.2 RINSE ORAL at 08:02

## 2021-02-22 RX ADMIN — FERROUS SULFATE TAB 325 MG (65 MG ELEMENTAL FE) 325 MG: 325 (65 FE) TAB at 08:02

## 2021-02-22 RX ADMIN — LOSARTAN POTASSIUM 25 MG: 25 TABLET, FILM COATED ORAL at 08:02

## 2021-02-22 RX ADMIN — PREDNISONE 40 MG: 20 TABLET ORAL at 08:02

## 2021-02-23 ENCOUNTER — PATIENT OUTREACH (OUTPATIENT)
Dept: ADMINISTRATIVE | Facility: OTHER | Age: 71
End: 2021-02-23

## 2021-02-23 ENCOUNTER — OFFICE VISIT (OUTPATIENT)
Dept: UROLOGY | Facility: CLINIC | Age: 71
End: 2021-02-23
Payer: MEDICARE

## 2021-02-23 VITALS
HEIGHT: 62 IN | WEIGHT: 261.56 LBS | DIASTOLIC BLOOD PRESSURE: 61 MMHG | BODY MASS INDEX: 48.13 KG/M2 | SYSTOLIC BLOOD PRESSURE: 118 MMHG | HEART RATE: 90 BPM

## 2021-02-23 DIAGNOSIS — R33.9 URINARY RETENTION: Primary | ICD-10-CM

## 2021-02-23 PROCEDURE — 3008F BODY MASS INDEX DOCD: CPT | Mod: CPTII,S$GLB,, | Performed by: UROLOGY

## 2021-02-23 PROCEDURE — 3078F PR MOST RECENT DIASTOLIC BLOOD PRESSURE < 80 MM HG: ICD-10-PCS | Mod: CPTII,S$GLB,, | Performed by: UROLOGY

## 2021-02-23 PROCEDURE — 1159F PR MEDICATION LIST DOCUMENTED IN MEDICAL RECORD: ICD-10-PCS | Mod: S$GLB,,, | Performed by: UROLOGY

## 2021-02-23 PROCEDURE — 87077 CULTURE AEROBIC IDENTIFY: CPT

## 2021-02-23 PROCEDURE — 51700 PR IRRIGATION, BLADDER: ICD-10-PCS | Mod: S$GLB,,, | Performed by: UROLOGY

## 2021-02-23 PROCEDURE — 3074F SYST BP LT 130 MM HG: CPT | Mod: CPTII,S$GLB,, | Performed by: UROLOGY

## 2021-02-23 PROCEDURE — 96372 PR INJECTION,THERAP/PROPH/DIAG2ST, IM OR SUBCUT: ICD-10-PCS | Mod: 59,S$GLB,, | Performed by: UROLOGY

## 2021-02-23 PROCEDURE — 1159F MED LIST DOCD IN RCRD: CPT | Mod: S$GLB,,, | Performed by: UROLOGY

## 2021-02-23 PROCEDURE — 3078F DIAST BP <80 MM HG: CPT | Mod: CPTII,S$GLB,, | Performed by: UROLOGY

## 2021-02-23 PROCEDURE — 99999 PR PBB SHADOW E&M-EST. PATIENT-LVL V: ICD-10-PCS | Mod: PBBFAC,,, | Performed by: UROLOGY

## 2021-02-23 PROCEDURE — 87186 SC STD MICRODIL/AGAR DIL: CPT

## 2021-02-23 PROCEDURE — 87086 URINE CULTURE/COLONY COUNT: CPT

## 2021-02-23 PROCEDURE — 51700 IRRIGATION OF BLADDER: CPT | Mod: S$GLB,,, | Performed by: UROLOGY

## 2021-02-23 PROCEDURE — 3008F PR BODY MASS INDEX (BMI) DOCUMENTED: ICD-10-PCS | Mod: CPTII,S$GLB,, | Performed by: UROLOGY

## 2021-02-23 PROCEDURE — 3074F PR MOST RECENT SYSTOLIC BLOOD PRESSURE < 130 MM HG: ICD-10-PCS | Mod: CPTII,S$GLB,, | Performed by: UROLOGY

## 2021-02-23 PROCEDURE — 99214 PR OFFICE/OUTPT VISIT, EST, LEVL IV, 30-39 MIN: ICD-10-PCS | Mod: 25,S$GLB,, | Performed by: UROLOGY

## 2021-02-23 PROCEDURE — 99999 PR PBB SHADOW E&M-EST. PATIENT-LVL V: CPT | Mod: PBBFAC,,, | Performed by: UROLOGY

## 2021-02-23 PROCEDURE — 96372 THER/PROPH/DIAG INJ SC/IM: CPT | Mod: 59,S$GLB,, | Performed by: UROLOGY

## 2021-02-23 PROCEDURE — 87088 URINE BACTERIA CULTURE: CPT

## 2021-02-23 PROCEDURE — 99214 OFFICE O/P EST MOD 30 MIN: CPT | Mod: 25,S$GLB,, | Performed by: UROLOGY

## 2021-02-23 RX ORDER — AMOXICILLIN AND CLAVULANATE POTASSIUM 875; 125 MG/1; MG/1
1 TABLET, FILM COATED ORAL 2 TIMES DAILY
Qty: 14 TABLET | Refills: 0 | Status: SHIPPED | OUTPATIENT
Start: 2021-02-23 | End: 2021-03-02

## 2021-02-23 RX ORDER — GENTAMICIN SULFATE 40 MG/ML
80 INJECTION, SOLUTION INTRAMUSCULAR; INTRAVENOUS
Status: COMPLETED | OUTPATIENT
Start: 2021-02-23 | End: 2021-02-23

## 2021-02-23 RX ADMIN — GENTAMICIN SULFATE 80 MG: 40 INJECTION, SOLUTION INTRAMUSCULAR; INTRAVENOUS at 02:02

## 2021-02-24 LAB — GLUCOSE SERPL-MCNC: 315 MG/DL (ref 70–110)

## 2021-02-25 ENCOUNTER — TELEPHONE (OUTPATIENT)
Dept: FAMILY MEDICINE | Facility: CLINIC | Age: 71
End: 2021-02-25

## 2021-02-25 RX ORDER — PANTOPRAZOLE SODIUM 40 MG/1
40 TABLET, DELAYED RELEASE ORAL DAILY
Qty: 90 TABLET | Refills: 0 | Status: SHIPPED | OUTPATIENT
Start: 2021-02-25

## 2021-02-26 ENCOUNTER — CLINICAL SUPPORT (OUTPATIENT)
Dept: UROLOGY | Facility: CLINIC | Age: 71
End: 2021-02-26
Payer: MEDICARE

## 2021-02-26 DIAGNOSIS — R33.9 URINARY RETENTION: Primary | ICD-10-CM

## 2021-02-26 LAB — BACTERIA UR CULT: ABNORMAL

## 2021-02-26 PROCEDURE — 99499 NO LOS: ICD-10-PCS | Mod: S$GLB,,, | Performed by: UROLOGY

## 2021-02-26 PROCEDURE — 99499 UNLISTED E&M SERVICE: CPT | Mod: S$GLB,,, | Performed by: UROLOGY

## 2021-02-26 RX ORDER — CARVEDILOL 12.5 MG/1
12.5 TABLET ORAL 2 TIMES DAILY WITH MEALS
Qty: 180 TABLET | Refills: 3 | Status: SHIPPED | OUTPATIENT
Start: 2021-02-26 | End: 2022-02-26

## 2021-03-03 ENCOUNTER — OFFICE VISIT (OUTPATIENT)
Dept: FAMILY MEDICINE | Facility: CLINIC | Age: 71
End: 2021-03-03
Payer: MEDICARE

## 2021-03-03 ENCOUNTER — HOSPITAL ENCOUNTER (OUTPATIENT)
Dept: RADIOLOGY | Facility: CLINIC | Age: 71
Discharge: HOME | End: 2021-03-03
Attending: STUDENT IN AN ORGANIZED HEALTH CARE EDUCATION/TRAINING PROGRAM
Payer: MEDICARE

## 2021-03-03 VITALS
HEIGHT: 62 IN | TEMPERATURE: 98 F | HEART RATE: 96 BPM | OXYGEN SATURATION: 97 % | WEIGHT: 265.19 LBS | SYSTOLIC BLOOD PRESSURE: 138 MMHG | DIASTOLIC BLOOD PRESSURE: 84 MMHG | RESPIRATION RATE: 18 BRPM | BODY MASS INDEX: 48.8 KG/M2

## 2021-03-03 DIAGNOSIS — I10 ESSENTIAL HYPERTENSION: ICD-10-CM

## 2021-03-03 DIAGNOSIS — I50.9 HEART FAILURE, UNSPECIFIED: ICD-10-CM

## 2021-03-03 DIAGNOSIS — I70.1 ATHEROSCLEROSIS OF RENAL ARTERY: ICD-10-CM

## 2021-03-03 DIAGNOSIS — I50.22 CHRONIC SYSTOLIC CONGESTIVE HEART FAILURE: ICD-10-CM

## 2021-03-03 DIAGNOSIS — D47.2 MONOCLONAL GAMMOPATHY ASSOCIATED WITH LYMPHOPLASMACYTIC DYSCRASIAS: Chronic | ICD-10-CM

## 2021-03-03 DIAGNOSIS — Z79.4 TYPE 2 DIABETES MELLITUS WITH STAGE 3A CHRONIC KIDNEY DISEASE, WITH LONG-TERM CURRENT USE OF INSULIN: ICD-10-CM

## 2021-03-03 DIAGNOSIS — N18.31 TYPE 2 DIABETES MELLITUS WITH STAGE 3A CHRONIC KIDNEY DISEASE, WITH LONG-TERM CURRENT USE OF INSULIN: ICD-10-CM

## 2021-03-03 DIAGNOSIS — D63.1 ANEMIA DUE TO STAGE 3A CHRONIC KIDNEY DISEASE: ICD-10-CM

## 2021-03-03 DIAGNOSIS — E87.6 HYPOKALEMIA: ICD-10-CM

## 2021-03-03 DIAGNOSIS — Z09 HOSPITAL DISCHARGE FOLLOW-UP: Primary | ICD-10-CM

## 2021-03-03 DIAGNOSIS — H43.12 VITREOUS HEMORRHAGE, LEFT EYE: ICD-10-CM

## 2021-03-03 DIAGNOSIS — K58.0 IRRITABLE BOWEL SYNDROME WITH DIARRHEA: ICD-10-CM

## 2021-03-03 DIAGNOSIS — E11.22 TYPE 2 DIABETES MELLITUS WITH STAGE 3A CHRONIC KIDNEY DISEASE, WITH LONG-TERM CURRENT USE OF INSULIN: ICD-10-CM

## 2021-03-03 DIAGNOSIS — N18.31 ANEMIA DUE TO STAGE 3A CHRONIC KIDNEY DISEASE: ICD-10-CM

## 2021-03-03 DIAGNOSIS — F41.9 ANXIETY: ICD-10-CM

## 2021-03-03 DIAGNOSIS — N18.31 STAGE 3A CHRONIC KIDNEY DISEASE: ICD-10-CM

## 2021-03-03 DIAGNOSIS — G47.33 OSA (OBSTRUCTIVE SLEEP APNEA): ICD-10-CM

## 2021-03-03 DIAGNOSIS — R07.9 CHEST PAIN, UNSPECIFIED: Primary | ICD-10-CM

## 2021-03-03 DIAGNOSIS — J44.1 OBSTRUCTIVE CHRONIC BRONCHITIS WITH EXACERBATION: ICD-10-CM

## 2021-03-03 PROBLEM — D75.839 THROMBOCYTOSIS: Status: RESOLVED | Noted: 2017-01-10 | Resolved: 2021-03-03

## 2021-03-03 PROBLEM — R42 DIZZINESS: Status: RESOLVED | Noted: 2020-09-20 | Resolved: 2021-03-03

## 2021-03-03 PROBLEM — Z96.652 S/P TOTAL KNEE ARTHROPLASTY, LEFT: Status: RESOLVED | Noted: 2019-04-16 | Resolved: 2021-03-03

## 2021-03-03 PROBLEM — G47.19 EXCESSIVE DAYTIME SLEEPINESS: Status: RESOLVED | Noted: 2020-07-13 | Resolved: 2021-03-03

## 2021-03-03 PROBLEM — M71.21 SYNOVIAL CYST OF RIGHT POPLITEAL SPACE: Status: RESOLVED | Noted: 2018-05-21 | Resolved: 2021-03-03

## 2021-03-03 PROBLEM — F51.02 ADJUSTMENT INSOMNIA: Status: RESOLVED | Noted: 2017-04-12 | Resolved: 2021-03-03

## 2021-03-03 PROBLEM — R31.0 GROSS HEMATURIA: Status: RESOLVED | Noted: 2018-08-06 | Resolved: 2021-03-03

## 2021-03-03 PROBLEM — R26.9 GAIT ABNORMALITY: Status: RESOLVED | Noted: 2019-05-21 | Resolved: 2021-03-03

## 2021-03-03 PROBLEM — N18.4 CKD (CHRONIC KIDNEY DISEASE) STAGE 4, GFR 15-29 ML/MIN: Status: RESOLVED | Noted: 2021-02-09 | Resolved: 2021-03-03

## 2021-03-03 PROCEDURE — 99499 UNLISTED E&M SERVICE: CPT | Mod: S$GLB,,, | Performed by: STUDENT IN AN ORGANIZED HEALTH CARE EDUCATION/TRAINING PROGRAM

## 2021-03-03 PROCEDURE — 3046F HEMOGLOBIN A1C LEVEL >9.0%: CPT | Mod: CPTII,S$GLB,, | Performed by: STUDENT IN AN ORGANIZED HEALTH CARE EDUCATION/TRAINING PROGRAM

## 2021-03-03 PROCEDURE — 1159F MED LIST DOCD IN RCRD: CPT | Mod: S$GLB,,, | Performed by: STUDENT IN AN ORGANIZED HEALTH CARE EDUCATION/TRAINING PROGRAM

## 2021-03-03 PROCEDURE — 3288F FALL RISK ASSESSMENT DOCD: CPT | Mod: CPTII,S$GLB,, | Performed by: STUDENT IN AN ORGANIZED HEALTH CARE EDUCATION/TRAINING PROGRAM

## 2021-03-03 PROCEDURE — 99499 RISK ADDL DX/OHS AUDIT: ICD-10-PCS | Mod: S$GLB,,, | Performed by: STUDENT IN AN ORGANIZED HEALTH CARE EDUCATION/TRAINING PROGRAM

## 2021-03-03 PROCEDURE — 3046F PR MOST RECENT HEMOGLOBIN A1C LEVEL > 9.0%: ICD-10-PCS | Mod: CPTII,S$GLB,, | Performed by: STUDENT IN AN ORGANIZED HEALTH CARE EDUCATION/TRAINING PROGRAM

## 2021-03-03 PROCEDURE — 3075F PR MOST RECENT SYSTOLIC BLOOD PRESS GE 130-139MM HG: ICD-10-PCS | Mod: CPTII,S$GLB,, | Performed by: STUDENT IN AN ORGANIZED HEALTH CARE EDUCATION/TRAINING PROGRAM

## 2021-03-03 PROCEDURE — 3079F PR MOST RECENT DIASTOLIC BLOOD PRESSURE 80-89 MM HG: ICD-10-PCS | Mod: CPTII,S$GLB,, | Performed by: STUDENT IN AN ORGANIZED HEALTH CARE EDUCATION/TRAINING PROGRAM

## 2021-03-03 PROCEDURE — 1125F AMNT PAIN NOTED PAIN PRSNT: CPT | Mod: S$GLB,,, | Performed by: STUDENT IN AN ORGANIZED HEALTH CARE EDUCATION/TRAINING PROGRAM

## 2021-03-03 PROCEDURE — 3079F DIAST BP 80-89 MM HG: CPT | Mod: CPTII,S$GLB,, | Performed by: STUDENT IN AN ORGANIZED HEALTH CARE EDUCATION/TRAINING PROGRAM

## 2021-03-03 PROCEDURE — 3288F PR FALLS RISK ASSESSMENT DOCUMENTED: ICD-10-PCS | Mod: CPTII,S$GLB,, | Performed by: STUDENT IN AN ORGANIZED HEALTH CARE EDUCATION/TRAINING PROGRAM

## 2021-03-03 PROCEDURE — 3075F SYST BP GE 130 - 139MM HG: CPT | Mod: CPTII,S$GLB,, | Performed by: STUDENT IN AN ORGANIZED HEALTH CARE EDUCATION/TRAINING PROGRAM

## 2021-03-03 PROCEDURE — 3008F BODY MASS INDEX DOCD: CPT | Mod: CPTII,S$GLB,, | Performed by: STUDENT IN AN ORGANIZED HEALTH CARE EDUCATION/TRAINING PROGRAM

## 2021-03-03 PROCEDURE — 71046 XR CHEST PA AND LATERAL: ICD-10-PCS | Mod: 26,,, | Performed by: RADIOLOGY

## 2021-03-03 PROCEDURE — 1125F PR PAIN SEVERITY QUANTIFIED, PAIN PRESENT: ICD-10-PCS | Mod: S$GLB,,, | Performed by: STUDENT IN AN ORGANIZED HEALTH CARE EDUCATION/TRAINING PROGRAM

## 2021-03-03 PROCEDURE — 99214 PR OFFICE/OUTPT VISIT, EST, LEVL IV, 30-39 MIN: ICD-10-PCS | Mod: S$GLB,,, | Performed by: STUDENT IN AN ORGANIZED HEALTH CARE EDUCATION/TRAINING PROGRAM

## 2021-03-03 PROCEDURE — 99999 PR PBB SHADOW E&M-EST. PATIENT-LVL V: CPT | Mod: PBBFAC,,, | Performed by: STUDENT IN AN ORGANIZED HEALTH CARE EDUCATION/TRAINING PROGRAM

## 2021-03-03 PROCEDURE — 71046 X-RAY EXAM CHEST 2 VIEWS: CPT | Mod: 26,,, | Performed by: RADIOLOGY

## 2021-03-03 PROCEDURE — 1159F PR MEDICATION LIST DOCUMENTED IN MEDICAL RECORD: ICD-10-PCS | Mod: S$GLB,,, | Performed by: STUDENT IN AN ORGANIZED HEALTH CARE EDUCATION/TRAINING PROGRAM

## 2021-03-03 PROCEDURE — 3008F PR BODY MASS INDEX (BMI) DOCUMENTED: ICD-10-PCS | Mod: CPTII,S$GLB,, | Performed by: STUDENT IN AN ORGANIZED HEALTH CARE EDUCATION/TRAINING PROGRAM

## 2021-03-03 PROCEDURE — 99999 PR PBB SHADOW E&M-EST. PATIENT-LVL V: ICD-10-PCS | Mod: PBBFAC,,, | Performed by: STUDENT IN AN ORGANIZED HEALTH CARE EDUCATION/TRAINING PROGRAM

## 2021-03-03 PROCEDURE — 1101F PT FALLS ASSESS-DOCD LE1/YR: CPT | Mod: CPTII,S$GLB,, | Performed by: STUDENT IN AN ORGANIZED HEALTH CARE EDUCATION/TRAINING PROGRAM

## 2021-03-03 PROCEDURE — 71046 X-RAY EXAM CHEST 2 VIEWS: CPT | Mod: TC,FY,PO

## 2021-03-03 PROCEDURE — 1101F PR PT FALLS ASSESS DOC 0-1 FALLS W/OUT INJ PAST YR: ICD-10-PCS | Mod: CPTII,S$GLB,, | Performed by: STUDENT IN AN ORGANIZED HEALTH CARE EDUCATION/TRAINING PROGRAM

## 2021-03-03 PROCEDURE — 99214 OFFICE O/P EST MOD 30 MIN: CPT | Mod: S$GLB,,, | Performed by: STUDENT IN AN ORGANIZED HEALTH CARE EDUCATION/TRAINING PROGRAM

## 2021-03-03 RX ORDER — DIPHENOXYLATE HYDROCHLORIDE AND ATROPINE SULFATE 2.5; .025 MG/1; MG/1
TABLET ORAL
Qty: 60 TABLET | Refills: 1
Start: 2021-03-03

## 2021-03-03 RX ORDER — POTASSIUM CHLORIDE 20 MEQ/1
20 TABLET, EXTENDED RELEASE ORAL 2 TIMES DAILY
Start: 2021-03-03

## 2021-03-04 LAB
FINAL PATHOLOGIC DIAGNOSIS: NORMAL
GROSS: NORMAL

## 2021-03-05 ENCOUNTER — LAB VISIT (OUTPATIENT)
Dept: LAB | Facility: HOSPITAL | Age: 71
End: 2021-03-05
Attending: INTERNAL MEDICINE
Payer: MEDICARE

## 2021-03-05 DIAGNOSIS — N18.2 CHRONIC KIDNEY DISEASE, STAGE II (MILD): ICD-10-CM

## 2021-03-05 DIAGNOSIS — D64.9 ANEMIA, UNSPECIFIED: ICD-10-CM

## 2021-03-05 DIAGNOSIS — Z96.41 INSULIN PUMP STATUS: ICD-10-CM

## 2021-03-05 DIAGNOSIS — E11.40 DIABETIC NEUROPATHY: Primary | ICD-10-CM

## 2021-03-05 DIAGNOSIS — J44.89 OBSTRUCTIVE CHRONIC BRONCHITIS WITHOUT EXACERBATION: ICD-10-CM

## 2021-03-05 DIAGNOSIS — I10 ESSENTIAL HYPERTENSION, MALIGNANT: ICD-10-CM

## 2021-03-05 LAB
ALBUMIN SERPL BCP-MCNC: 3.4 G/DL (ref 3.5–5.2)
ALP SERPL-CCNC: 112 U/L (ref 55–135)
ALT SERPL W/O P-5'-P-CCNC: 22 U/L (ref 10–44)
AMYLASE SERPL-CCNC: 23 U/L (ref 20–110)
ANION GAP SERPL CALC-SCNC: 8 MMOL/L (ref 8–16)
AST SERPL-CCNC: 23 U/L (ref 10–40)
BASOPHILS # BLD AUTO: 0.04 K/UL (ref 0–0.2)
BASOPHILS NFR BLD: 0.5 % (ref 0–1.9)
BILIRUB SERPL-MCNC: 0.4 MG/DL (ref 0.1–1)
BUN SERPL-MCNC: 21 MG/DL (ref 8–23)
CALCIUM SERPL-MCNC: 9.1 MG/DL (ref 8.7–10.5)
CHLORIDE SERPL-SCNC: 97 MMOL/L (ref 95–110)
CHOLEST SERPL-MCNC: 136 MG/DL (ref 120–199)
CHOLEST/HDLC SERPL: 4.3 {RATIO} (ref 2–5)
CO2 SERPL-SCNC: 32 MMOL/L (ref 23–29)
CREAT SERPL-MCNC: 1.6 MG/DL (ref 0.5–1.4)
DIFFERENTIAL METHOD: ABNORMAL
EOSINOPHIL # BLD AUTO: 0.1 K/UL (ref 0–0.5)
EOSINOPHIL NFR BLD: 1.6 % (ref 0–8)
ERYTHROCYTE [DISTWIDTH] IN BLOOD BY AUTOMATED COUNT: 13.9 % (ref 11.5–14.5)
EST. GFR  (AFRICAN AMERICAN): 37 ML/MIN/1.73 M^2
EST. GFR  (NON AFRICAN AMERICAN): 32 ML/MIN/1.73 M^2
GLUCOSE SERPL-MCNC: 368 MG/DL (ref 70–110)
HCT VFR BLD AUTO: 37.9 % (ref 37–48.5)
HDLC SERPL-MCNC: 32 MG/DL (ref 40–75)
HDLC SERPL: 23.5 % (ref 20–50)
HGB BLD-MCNC: 12.3 G/DL (ref 12–16)
IMM GRANULOCYTES # BLD AUTO: 0.03 K/UL (ref 0–0.04)
IMM GRANULOCYTES NFR BLD AUTO: 0.4 % (ref 0–0.5)
LDLC SERPL CALC-MCNC: ABNORMAL MG/DL (ref 63–159)
LIPASE SERPL-CCNC: 26 U/L (ref 4–60)
LYMPHOCYTES # BLD AUTO: 1.7 K/UL (ref 1–4.8)
LYMPHOCYTES NFR BLD: 22 % (ref 18–48)
MCH RBC QN AUTO: 31.4 PG (ref 27–31)
MCHC RBC AUTO-ENTMCNC: 32.5 G/DL (ref 32–36)
MCV RBC AUTO: 97 FL (ref 82–98)
MONOCYTES # BLD AUTO: 0.5 K/UL (ref 0.3–1)
MONOCYTES NFR BLD: 5.9 % (ref 4–15)
NEUTROPHILS # BLD AUTO: 5.3 K/UL (ref 1.8–7.7)
NEUTROPHILS NFR BLD: 69.6 % (ref 38–73)
NONHDLC SERPL-MCNC: 104 MG/DL
NRBC BLD-RTO: 0 /100 WBC
PLATELET # BLD AUTO: 252 K/UL (ref 150–350)
PMV BLD AUTO: 9.7 FL (ref 9.2–12.9)
POTASSIUM SERPL-SCNC: 4 MMOL/L (ref 3.5–5.1)
PROT SERPL-MCNC: 7.2 G/DL (ref 6–8.4)
RBC # BLD AUTO: 3.92 M/UL (ref 4–5.4)
SODIUM SERPL-SCNC: 137 MMOL/L (ref 136–145)
T4 FREE SERPL-MCNC: 0.83 NG/DL (ref 0.71–1.51)
TRIGL SERPL-MCNC: 470 MG/DL (ref 30–150)
TSH SERPL DL<=0.005 MIU/L-ACNC: 2.06 UIU/ML (ref 0.4–4)
WBC # BLD AUTO: 7.65 K/UL (ref 3.9–12.7)

## 2021-03-05 PROCEDURE — 83690 ASSAY OF LIPASE: CPT | Performed by: INTERNAL MEDICINE

## 2021-03-05 PROCEDURE — 84443 ASSAY THYROID STIM HORMONE: CPT | Performed by: INTERNAL MEDICINE

## 2021-03-05 PROCEDURE — 82570 ASSAY OF URINE CREATININE: CPT | Performed by: INTERNAL MEDICINE

## 2021-03-05 PROCEDURE — 80061 LIPID PANEL: CPT | Performed by: INTERNAL MEDICINE

## 2021-03-05 PROCEDURE — 80053 COMPREHEN METABOLIC PANEL: CPT | Performed by: INTERNAL MEDICINE

## 2021-03-05 PROCEDURE — 82150 ASSAY OF AMYLASE: CPT | Performed by: INTERNAL MEDICINE

## 2021-03-05 PROCEDURE — 84439 ASSAY OF FREE THYROXINE: CPT | Performed by: INTERNAL MEDICINE

## 2021-03-05 PROCEDURE — 84681 ASSAY OF C-PEPTIDE: CPT | Performed by: INTERNAL MEDICINE

## 2021-03-05 PROCEDURE — 84481 FREE ASSAY (FT-3): CPT | Performed by: INTERNAL MEDICINE

## 2021-03-05 PROCEDURE — 36415 COLL VENOUS BLD VENIPUNCTURE: CPT | Performed by: INTERNAL MEDICINE

## 2021-03-05 PROCEDURE — 82043 UR ALBUMIN QUANTITATIVE: CPT | Performed by: INTERNAL MEDICINE

## 2021-03-05 PROCEDURE — 85025 COMPLETE CBC W/AUTO DIFF WBC: CPT | Performed by: INTERNAL MEDICINE

## 2021-03-05 PROCEDURE — 83036 HEMOGLOBIN GLYCOSYLATED A1C: CPT | Performed by: INTERNAL MEDICINE

## 2021-03-06 LAB
ALBUMIN/CREAT UR: 35.1 UG/MG (ref 0–30)
C PEPTIDE SERPL-MCNC: 5.08 NG/ML (ref 0.78–5.19)
CREAT UR-MCNC: 57 MG/DL (ref 15–325)
ESTIMATED AVG GLUCOSE: 200 MG/DL (ref 68–131)
HBA1C MFR BLD: 8.6 % (ref 4–5.6)
MICROALBUMIN UR DL<=1MG/L-MCNC: 20 UG/ML
T3FREE SERPL-MCNC: 2.1 PG/ML (ref 2.3–4.2)

## 2021-03-10 ENCOUNTER — TELEPHONE (OUTPATIENT)
Dept: CARDIOLOGY | Facility: HOSPITAL | Age: 71
End: 2021-03-10

## 2021-03-11 ENCOUNTER — CLINICAL SUPPORT (OUTPATIENT)
Dept: CARDIOLOGY | Facility: HOSPITAL | Age: 71
End: 2021-03-11
Attending: SPECIALIST
Payer: MEDICARE

## 2021-03-11 ENCOUNTER — HOSPITAL ENCOUNTER (OUTPATIENT)
Dept: RADIOLOGY | Facility: HOSPITAL | Age: 71
Discharge: HOME | End: 2021-03-11
Attending: SPECIALIST
Payer: MEDICARE

## 2021-03-11 DIAGNOSIS — I50.9 HEART FAILURE, UNSPECIFIED: ICD-10-CM

## 2021-03-11 DIAGNOSIS — J44.1 OBSTRUCTIVE CHRONIC BRONCHITIS WITH EXACERBATION: ICD-10-CM

## 2021-03-11 DIAGNOSIS — R07.9 CHEST PAIN, UNSPECIFIED: ICD-10-CM

## 2021-03-11 LAB
CV PHARM DOSE: 0.4 MG
CV STRESS BASE HR: 90 BPM
DIASTOLIC BLOOD PRESSURE: 70 MMHG
OHS CV CPX 85 PERCENT MAX PREDICTED HEART RATE MALE: 123
OHS CV CPX MAX PREDICTED HEART RATE: 144
OHS CV CPX PATIENT IS FEMALE: 1
OHS CV CPX PATIENT IS MALE: 0
OHS CV CPX PEAK DIASTOLIC BLOOD PRESSURE: 70 MMHG
OHS CV CPX PEAK HEAR RATE: 111 BPM
OHS CV CPX PEAK RATE PRESSURE PRODUCT: NORMAL
OHS CV CPX PEAK SYSTOLIC BLOOD PRESSURE: 144 MMHG
OHS CV CPX PERCENT MAX PREDICTED HEART RATE ACHIEVED: 77
OHS CV CPX RATE PRESSURE PRODUCT PRESENTING: NORMAL
SYSTOLIC BLOOD PRESSURE: 140 MMHG

## 2021-03-11 PROCEDURE — 63600175 PHARM REV CODE 636 W HCPCS: Performed by: SPECIALIST

## 2021-03-11 PROCEDURE — A9502 TC99M TETROFOSMIN: HCPCS

## 2021-03-11 PROCEDURE — 78452 HT MUSCLE IMAGE SPECT MULT: CPT | Mod: TC

## 2021-03-11 PROCEDURE — 93017 CV STRESS TEST TRACING ONLY: CPT

## 2021-03-11 RX ORDER — REGADENOSON 0.08 MG/ML
0.4 INJECTION, SOLUTION INTRAVENOUS ONCE
Status: COMPLETED | OUTPATIENT
Start: 2021-03-11 | End: 2021-03-11

## 2021-03-11 RX ADMIN — REGADENOSON 0.4 MG: 0.08 INJECTION, SOLUTION INTRAVENOUS at 10:03

## 2021-03-12 ENCOUNTER — LAB VISIT (OUTPATIENT)
Dept: LAB | Facility: HOSPITAL | Age: 71
End: 2021-03-12
Attending: INTERNAL MEDICINE
Payer: MEDICARE

## 2021-03-12 DIAGNOSIS — D47.2 MONOCLONAL GAMMOPATHY ASSOCIATED WITH LYMPHOPLASMACYTIC DYSCRASIAS: ICD-10-CM

## 2021-03-12 LAB
ALBUMIN SERPL BCP-MCNC: 3.6 G/DL (ref 3.5–5.2)
ALP SERPL-CCNC: 110 U/L (ref 55–135)
ALT SERPL W/O P-5'-P-CCNC: 21 U/L (ref 10–44)
ANION GAP SERPL CALC-SCNC: 12 MMOL/L (ref 8–16)
AST SERPL-CCNC: 22 U/L (ref 10–40)
BASOPHILS # BLD AUTO: 0.03 K/UL (ref 0–0.2)
BASOPHILS # BLD AUTO: 0.03 K/UL (ref 0–0.2)
BASOPHILS NFR BLD: 0.5 % (ref 0–1.9)
BASOPHILS NFR BLD: 0.5 % (ref 0–1.9)
BILIRUB SERPL-MCNC: 0.4 MG/DL (ref 0.1–1)
BUN SERPL-MCNC: 25 MG/DL (ref 8–23)
CALCIUM SERPL-MCNC: 8.8 MG/DL (ref 8.7–10.5)
CHLORIDE SERPL-SCNC: 98 MMOL/L (ref 95–110)
CO2 SERPL-SCNC: 27 MMOL/L (ref 23–29)
CREAT SERPL-MCNC: 1.4 MG/DL (ref 0.5–1.4)
DIFFERENTIAL METHOD: ABNORMAL
DIFFERENTIAL METHOD: ABNORMAL
EOSINOPHIL # BLD AUTO: 0.1 K/UL (ref 0–0.5)
EOSINOPHIL # BLD AUTO: 0.1 K/UL (ref 0–0.5)
EOSINOPHIL NFR BLD: 2 % (ref 0–8)
EOSINOPHIL NFR BLD: 2 % (ref 0–8)
ERYTHROCYTE [DISTWIDTH] IN BLOOD BY AUTOMATED COUNT: 13.7 % (ref 11.5–14.5)
ERYTHROCYTE [DISTWIDTH] IN BLOOD BY AUTOMATED COUNT: 13.7 % (ref 11.5–14.5)
EST. GFR  (AFRICAN AMERICAN): 44 ML/MIN/1.73 M^2
EST. GFR  (NON AFRICAN AMERICAN): 38 ML/MIN/1.73 M^2
GLUCOSE SERPL-MCNC: 316 MG/DL (ref 70–110)
HCT VFR BLD AUTO: 38.7 % (ref 37–48.5)
HCT VFR BLD AUTO: 38.7 % (ref 37–48.5)
HGB BLD-MCNC: 13.1 G/DL (ref 12–16)
HGB BLD-MCNC: 13.1 G/DL (ref 12–16)
IMM GRANULOCYTES # BLD AUTO: 0.02 K/UL (ref 0–0.04)
IMM GRANULOCYTES # BLD AUTO: 0.02 K/UL (ref 0–0.04)
IMM GRANULOCYTES NFR BLD AUTO: 0.3 % (ref 0–0.5)
IMM GRANULOCYTES NFR BLD AUTO: 0.3 % (ref 0–0.5)
LYMPHOCYTES # BLD AUTO: 1.6 K/UL (ref 1–4.8)
LYMPHOCYTES # BLD AUTO: 1.6 K/UL (ref 1–4.8)
LYMPHOCYTES NFR BLD: 24.9 % (ref 18–48)
LYMPHOCYTES NFR BLD: 24.9 % (ref 18–48)
MCH RBC QN AUTO: 32.3 PG (ref 27–31)
MCH RBC QN AUTO: 32.3 PG (ref 27–31)
MCHC RBC AUTO-ENTMCNC: 33.9 G/DL (ref 32–36)
MCHC RBC AUTO-ENTMCNC: 33.9 G/DL (ref 32–36)
MCV RBC AUTO: 96 FL (ref 82–98)
MCV RBC AUTO: 96 FL (ref 82–98)
MONOCYTES # BLD AUTO: 0.5 K/UL (ref 0.3–1)
MONOCYTES # BLD AUTO: 0.5 K/UL (ref 0.3–1)
MONOCYTES NFR BLD: 7 % (ref 4–15)
MONOCYTES NFR BLD: 7 % (ref 4–15)
NEUTROPHILS # BLD AUTO: 4.2 K/UL (ref 1.8–7.7)
NEUTROPHILS # BLD AUTO: 4.2 K/UL (ref 1.8–7.7)
NEUTROPHILS NFR BLD: 65.3 % (ref 38–73)
NEUTROPHILS NFR BLD: 65.3 % (ref 38–73)
NRBC BLD-RTO: 0 /100 WBC
NRBC BLD-RTO: 0 /100 WBC
PLATELET # BLD AUTO: 225 K/UL (ref 150–350)
PLATELET # BLD AUTO: 225 K/UL (ref 150–350)
PMV BLD AUTO: 10 FL (ref 9.2–12.9)
PMV BLD AUTO: 10 FL (ref 9.2–12.9)
POTASSIUM SERPL-SCNC: 4 MMOL/L (ref 3.5–5.1)
PROT SERPL-MCNC: 7.5 G/DL (ref 6–8.4)
RBC # BLD AUTO: 4.05 M/UL (ref 4–5.4)
RBC # BLD AUTO: 4.05 M/UL (ref 4–5.4)
SODIUM SERPL-SCNC: 137 MMOL/L (ref 136–145)
WBC # BLD AUTO: 6.46 K/UL (ref 3.9–12.7)
WBC # BLD AUTO: 6.46 K/UL (ref 3.9–12.7)

## 2021-03-12 PROCEDURE — 82784 ASSAY IGA/IGD/IGG/IGM EACH: CPT | Performed by: INTERNAL MEDICINE

## 2021-03-12 PROCEDURE — 80053 COMPREHEN METABOLIC PANEL: CPT | Performed by: INTERNAL MEDICINE

## 2021-03-12 PROCEDURE — 82232 ASSAY OF BETA-2 PROTEIN: CPT | Performed by: INTERNAL MEDICINE

## 2021-03-12 PROCEDURE — 83520 IMMUNOASSAY QUANT NOS NONAB: CPT | Mod: 59 | Performed by: INTERNAL MEDICINE

## 2021-03-12 PROCEDURE — 85025 COMPLETE CBC W/AUTO DIFF WBC: CPT | Performed by: INTERNAL MEDICINE

## 2021-03-12 PROCEDURE — 36415 COLL VENOUS BLD VENIPUNCTURE: CPT | Performed by: INTERNAL MEDICINE

## 2021-03-13 ENCOUNTER — HOSPITAL ENCOUNTER (OUTPATIENT)
Dept: RADIOLOGY | Facility: HOSPITAL | Age: 71
Discharge: HOME | End: 2021-03-13
Attending: SPECIALIST
Payer: MEDICARE

## 2021-03-13 LAB
B2 MICROGLOB SERPL-MCNC: 3.7 UG/ML (ref 0–2.5)
IGA SERPL-MCNC: 482 MG/DL (ref 40–350)
IGG SERPL-MCNC: 867 MG/DL (ref 650–1600)
IGM SERPL-MCNC: 92 MG/DL (ref 50–300)

## 2021-03-15 LAB
KAPPA LC SER QL IA: 4.92 MG/DL (ref 0.33–1.94)
KAPPA LC/LAMBDA SER IA: 1.34 (ref 0.26–1.65)
LAMBDA LC SER QL IA: 3.66 MG/DL (ref 0.57–2.63)

## 2021-03-16 ENCOUNTER — OFFICE VISIT (OUTPATIENT)
Dept: HEMATOLOGY/ONCOLOGY | Facility: CLINIC | Age: 71
End: 2021-03-16
Payer: MEDICARE

## 2021-03-16 VITALS
TEMPERATURE: 98 F | RESPIRATION RATE: 18 BRPM | WEIGHT: 263.44 LBS | SYSTOLIC BLOOD PRESSURE: 120 MMHG | BODY MASS INDEX: 48.48 KG/M2 | OXYGEN SATURATION: 95 % | DIASTOLIC BLOOD PRESSURE: 59 MMHG | HEART RATE: 93 BPM | HEIGHT: 62 IN

## 2021-03-16 DIAGNOSIS — N18.31 ANEMIA DUE TO STAGE 3A CHRONIC KIDNEY DISEASE: Primary | ICD-10-CM

## 2021-03-16 DIAGNOSIS — D47.2 MONOCLONAL GAMMOPATHY ASSOCIATED WITH LYMPHOPLASMACYTIC DYSCRASIAS: ICD-10-CM

## 2021-03-16 DIAGNOSIS — D63.1 ANEMIA DUE TO STAGE 3A CHRONIC KIDNEY DISEASE: Primary | ICD-10-CM

## 2021-03-16 DIAGNOSIS — R60.9 EDEMA, UNSPECIFIED TYPE: ICD-10-CM

## 2021-03-16 DIAGNOSIS — I50.9 CONGESTIVE HEART FAILURE, UNSPECIFIED HF CHRONICITY, UNSPECIFIED HEART FAILURE TYPE: ICD-10-CM

## 2021-03-16 PROCEDURE — 1159F PR MEDICATION LIST DOCUMENTED IN MEDICAL RECORD: ICD-10-PCS | Mod: S$GLB,,, | Performed by: INTERNAL MEDICINE

## 2021-03-16 PROCEDURE — 1159F MED LIST DOCD IN RCRD: CPT | Mod: S$GLB,,, | Performed by: INTERNAL MEDICINE

## 2021-03-16 PROCEDURE — 99214 OFFICE O/P EST MOD 30 MIN: CPT | Mod: S$GLB,,, | Performed by: INTERNAL MEDICINE

## 2021-03-16 PROCEDURE — 99214 PR OFFICE/OUTPT VISIT, EST, LEVL IV, 30-39 MIN: ICD-10-PCS | Mod: S$GLB,,, | Performed by: INTERNAL MEDICINE

## 2021-03-16 PROCEDURE — 1101F PT FALLS ASSESS-DOCD LE1/YR: CPT | Mod: CPTII,S$GLB,, | Performed by: INTERNAL MEDICINE

## 2021-03-16 PROCEDURE — 1126F PR PAIN SEVERITY QUANTIFIED, NO PAIN PRESENT: ICD-10-PCS | Mod: S$GLB,,, | Performed by: INTERNAL MEDICINE

## 2021-03-16 PROCEDURE — 3074F SYST BP LT 130 MM HG: CPT | Mod: CPTII,S$GLB,, | Performed by: INTERNAL MEDICINE

## 2021-03-16 PROCEDURE — 1101F PR PT FALLS ASSESS DOC 0-1 FALLS W/OUT INJ PAST YR: ICD-10-PCS | Mod: CPTII,S$GLB,, | Performed by: INTERNAL MEDICINE

## 2021-03-16 PROCEDURE — 99999 PR PBB SHADOW E&M-EST. PATIENT-LVL V: CPT | Mod: PBBFAC,,, | Performed by: INTERNAL MEDICINE

## 2021-03-16 PROCEDURE — 3078F DIAST BP <80 MM HG: CPT | Mod: CPTII,S$GLB,, | Performed by: INTERNAL MEDICINE

## 2021-03-16 PROCEDURE — 3078F PR MOST RECENT DIASTOLIC BLOOD PRESSURE < 80 MM HG: ICD-10-PCS | Mod: CPTII,S$GLB,, | Performed by: INTERNAL MEDICINE

## 2021-03-16 PROCEDURE — 3008F PR BODY MASS INDEX (BMI) DOCUMENTED: ICD-10-PCS | Mod: CPTII,S$GLB,, | Performed by: INTERNAL MEDICINE

## 2021-03-16 PROCEDURE — 1125F AMNT PAIN NOTED PAIN PRSNT: CPT | Mod: S$GLB,,, | Performed by: INTERNAL MEDICINE

## 2021-03-16 PROCEDURE — 3288F PR FALLS RISK ASSESSMENT DOCUMENTED: ICD-10-PCS | Mod: CPTII,S$GLB,, | Performed by: INTERNAL MEDICINE

## 2021-03-16 PROCEDURE — 1126F AMNT PAIN NOTED NONE PRSNT: CPT | Mod: S$GLB,,, | Performed by: INTERNAL MEDICINE

## 2021-03-16 PROCEDURE — 3074F PR MOST RECENT SYSTOLIC BLOOD PRESSURE < 130 MM HG: ICD-10-PCS | Mod: CPTII,S$GLB,, | Performed by: INTERNAL MEDICINE

## 2021-03-16 PROCEDURE — 99999 PR PBB SHADOW E&M-EST. PATIENT-LVL V: ICD-10-PCS | Mod: PBBFAC,,, | Performed by: INTERNAL MEDICINE

## 2021-03-16 PROCEDURE — 1125F PR PAIN SEVERITY QUANTIFIED, PAIN PRESENT: ICD-10-PCS | Mod: S$GLB,,, | Performed by: INTERNAL MEDICINE

## 2021-03-16 PROCEDURE — 3288F FALL RISK ASSESSMENT DOCD: CPT | Mod: CPTII,S$GLB,, | Performed by: INTERNAL MEDICINE

## 2021-03-16 PROCEDURE — 3008F BODY MASS INDEX DOCD: CPT | Mod: CPTII,S$GLB,, | Performed by: INTERNAL MEDICINE

## 2021-03-22 ENCOUNTER — OFFICE VISIT (OUTPATIENT)
Dept: PRIMARY CARE CLINIC | Facility: CLINIC | Age: 71
End: 2021-03-22
Payer: MEDICARE

## 2021-03-22 VITALS
SYSTOLIC BLOOD PRESSURE: 106 MMHG | TEMPERATURE: 98 F | BODY MASS INDEX: 49.21 KG/M2 | HEART RATE: 90 BPM | DIASTOLIC BLOOD PRESSURE: 68 MMHG | RESPIRATION RATE: 18 BRPM | OXYGEN SATURATION: 95 % | WEIGHT: 267.44 LBS | HEIGHT: 62 IN

## 2021-03-22 DIAGNOSIS — Z09 HOSPITAL DISCHARGE FOLLOW-UP: ICD-10-CM

## 2021-03-22 DIAGNOSIS — M54.12 CERVICAL RADICULOPATHY: ICD-10-CM

## 2021-03-22 DIAGNOSIS — E66.01 MORBID OBESITY WITH BMI OF 45.0-49.9, ADULT: ICD-10-CM

## 2021-03-22 DIAGNOSIS — I50.22 CHRONIC SYSTOLIC CONGESTIVE HEART FAILURE: Primary | ICD-10-CM

## 2021-03-22 DIAGNOSIS — E11.22 TYPE 2 DIABETES MELLITUS WITH STAGE 3B CHRONIC KIDNEY DISEASE, WITH LONG-TERM CURRENT USE OF INSULIN: ICD-10-CM

## 2021-03-22 DIAGNOSIS — M17.0 PRIMARY OSTEOARTHRITIS OF BOTH KNEES: ICD-10-CM

## 2021-03-22 DIAGNOSIS — N18.32 TYPE 2 DIABETES MELLITUS WITH STAGE 3B CHRONIC KIDNEY DISEASE, WITH LONG-TERM CURRENT USE OF INSULIN: ICD-10-CM

## 2021-03-22 DIAGNOSIS — Z79.4 TYPE 2 DIABETES MELLITUS WITH STAGE 3B CHRONIC KIDNEY DISEASE, WITH LONG-TERM CURRENT USE OF INSULIN: ICD-10-CM

## 2021-03-22 DIAGNOSIS — R30.0 DYSURIA: ICD-10-CM

## 2021-03-22 DIAGNOSIS — J01.00 ACUTE MAXILLARY SINUSITIS, RECURRENCE NOT SPECIFIED: ICD-10-CM

## 2021-03-22 PROBLEM — E66.9 OBESITY: Status: RESOLVED | Noted: 2018-10-11 | Resolved: 2021-03-22

## 2021-03-22 PROBLEM — I50.1 CARDIOGENIC PULMONARY EDEMA: Status: RESOLVED | Noted: 2021-02-20 | Resolved: 2021-03-22

## 2021-03-22 PROCEDURE — 1125F PR PAIN SEVERITY QUANTIFIED, PAIN PRESENT: ICD-10-PCS | Mod: S$GLB,,, | Performed by: INTERNAL MEDICINE

## 2021-03-22 PROCEDURE — 3288F PR FALLS RISK ASSESSMENT DOCUMENTED: ICD-10-PCS | Mod: CPTII,S$GLB,, | Performed by: INTERNAL MEDICINE

## 2021-03-22 PROCEDURE — 1101F PT FALLS ASSESS-DOCD LE1/YR: CPT | Mod: CPTII,S$GLB,, | Performed by: INTERNAL MEDICINE

## 2021-03-22 PROCEDURE — 3008F PR BODY MASS INDEX (BMI) DOCUMENTED: ICD-10-PCS | Mod: CPTII,S$GLB,, | Performed by: INTERNAL MEDICINE

## 2021-03-22 PROCEDURE — 87086 URINE CULTURE/COLONY COUNT: CPT | Performed by: INTERNAL MEDICINE

## 2021-03-22 PROCEDURE — 3052F PR MOST RECENT HEMOGLOBIN A1C LEVEL 8.0 - < 9.0%: ICD-10-PCS | Mod: CPTII,S$GLB,, | Performed by: INTERNAL MEDICINE

## 2021-03-22 PROCEDURE — 3008F BODY MASS INDEX DOCD: CPT | Mod: CPTII,S$GLB,, | Performed by: INTERNAL MEDICINE

## 2021-03-22 PROCEDURE — 3052F HG A1C>EQUAL 8.0%<EQUAL 9.0%: CPT | Mod: CPTII,S$GLB,, | Performed by: INTERNAL MEDICINE

## 2021-03-22 PROCEDURE — 99215 PR OFFICE/OUTPT VISIT, EST, LEVL V, 40-54 MIN: ICD-10-PCS | Mod: S$GLB,,, | Performed by: INTERNAL MEDICINE

## 2021-03-22 PROCEDURE — 1125F AMNT PAIN NOTED PAIN PRSNT: CPT | Mod: S$GLB,,, | Performed by: INTERNAL MEDICINE

## 2021-03-22 PROCEDURE — 1101F PR PT FALLS ASSESS DOC 0-1 FALLS W/OUT INJ PAST YR: ICD-10-PCS | Mod: CPTII,S$GLB,, | Performed by: INTERNAL MEDICINE

## 2021-03-22 PROCEDURE — 99215 OFFICE O/P EST HI 40 MIN: CPT | Mod: S$GLB,,, | Performed by: INTERNAL MEDICINE

## 2021-03-22 PROCEDURE — 3288F FALL RISK ASSESSMENT DOCD: CPT | Mod: CPTII,S$GLB,, | Performed by: INTERNAL MEDICINE

## 2021-03-22 RX ORDER — GABAPENTIN 600 MG/1
600 TABLET ORAL 2 TIMES DAILY
Qty: 90 TABLET | Refills: 11
Start: 2021-03-22 | End: 2022-03-22

## 2021-03-22 RX ORDER — GABAPENTIN 800 MG/1
800 TABLET ORAL NIGHTLY
Qty: 30 TABLET | Refills: 0 | Status: SHIPPED | OUTPATIENT
Start: 2021-03-22 | End: 2022-03-22

## 2021-03-23 ENCOUNTER — TELEPHONE (OUTPATIENT)
Dept: PRIMARY CARE CLINIC | Facility: CLINIC | Age: 71
End: 2021-03-23

## 2021-03-23 PROBLEM — M54.12 CERVICAL RADICULOPATHY: Status: ACTIVE | Noted: 2021-03-23

## 2021-03-23 PROBLEM — R30.0 DYSURIA: Status: ACTIVE | Noted: 2021-03-23

## 2021-03-23 PROBLEM — J32.0 MAXILLARY SINUSITIS: Status: ACTIVE | Noted: 2021-03-23

## 2021-03-24 ENCOUNTER — IMMUNIZATION (OUTPATIENT)
Dept: PRIMARY CARE CLINIC | Facility: CLINIC | Age: 71
End: 2021-03-24

## 2021-03-24 DIAGNOSIS — Z23 NEED FOR VACCINATION: Primary | ICD-10-CM

## 2021-03-24 PROCEDURE — 91300 COVID-19, MRNA, LNP-S, PF, 30 MCG/0.3 ML DOSE VACCINE: CPT | Mod: S$GLB,,, | Performed by: FAMILY MEDICINE

## 2021-03-24 PROCEDURE — 0001A COVID-19, MRNA, LNP-S, PF, 30 MCG/0.3 ML DOSE VACCINE: ICD-10-PCS | Mod: CV19,S$GLB,, | Performed by: FAMILY MEDICINE

## 2021-03-24 PROCEDURE — 0001A COVID-19, MRNA, LNP-S, PF, 30 MCG/0.3 ML DOSE VACCINE: CPT | Mod: CV19,S$GLB,, | Performed by: FAMILY MEDICINE

## 2021-03-24 PROCEDURE — 91300 COVID-19, MRNA, LNP-S, PF, 30 MCG/0.3 ML DOSE VACCINE: ICD-10-PCS | Mod: S$GLB,,, | Performed by: FAMILY MEDICINE

## 2021-03-25 ENCOUNTER — TELEPHONE (OUTPATIENT)
Dept: PRIMARY CARE CLINIC | Facility: CLINIC | Age: 71
End: 2021-03-25

## 2021-03-25 LAB — BACTERIA UR CULT: NO GROWTH

## 2021-03-26 RX ORDER — INSULIN GLARGINE AND LIXISENATIDE 100; 33 U/ML; UG/ML
20 INJECTION, SOLUTION SUBCUTANEOUS EVERY OTHER DAY
Qty: 3 ML | Refills: 0
Start: 2021-03-26

## 2021-03-26 RX ORDER — FLUCONAZOLE 150 MG/1
150 TABLET ORAL ONCE
COMMUNITY
Start: 2021-03-15

## 2021-03-26 RX ORDER — CEPHALEXIN 500 MG/1
500 CAPSULE ORAL 2 TIMES DAILY
COMMUNITY
Start: 2021-03-18

## 2021-04-14 ENCOUNTER — IMMUNIZATION (OUTPATIENT)
Dept: PRIMARY CARE CLINIC | Facility: CLINIC | Age: 71
End: 2021-04-14
Payer: MEDICARE

## 2021-04-14 DIAGNOSIS — Z23 NEED FOR VACCINATION: Primary | ICD-10-CM

## 2021-04-14 PROCEDURE — 0002A COVID-19, MRNA, LNP-S, PF, 30 MCG/0.3 ML DOSE VACCINE: CPT | Mod: CV19,S$GLB,, | Performed by: PHYSICIAN ASSISTANT

## 2021-04-14 PROCEDURE — 91300 COVID-19, MRNA, LNP-S, PF, 30 MCG/0.3 ML DOSE VACCINE: CPT | Mod: S$GLB,,, | Performed by: PHYSICIAN ASSISTANT

## 2021-04-14 PROCEDURE — 0002A COVID-19, MRNA, LNP-S, PF, 30 MCG/0.3 ML DOSE VACCINE: ICD-10-PCS | Mod: CV19,S$GLB,, | Performed by: PHYSICIAN ASSISTANT

## 2021-04-14 PROCEDURE — 91300 COVID-19, MRNA, LNP-S, PF, 30 MCG/0.3 ML DOSE VACCINE: ICD-10-PCS | Mod: S$GLB,,, | Performed by: PHYSICIAN ASSISTANT

## 2021-04-15 ENCOUNTER — PATIENT OUTREACH (OUTPATIENT)
Dept: ADMINISTRATIVE | Facility: OTHER | Age: 71
End: 2021-04-15

## 2021-04-15 DIAGNOSIS — Z12.31 BREAST CANCER SCREENING BY MAMMOGRAM: Primary | ICD-10-CM

## 2021-04-17 ENCOUNTER — PATIENT MESSAGE (OUTPATIENT)
Dept: OPHTHALMOLOGY | Facility: CLINIC | Age: 71
End: 2021-04-17

## 2022-01-19 ENCOUNTER — TELEPHONE (OUTPATIENT)
Dept: PRIMARY CARE CLINIC | Facility: CLINIC | Age: 72
End: 2022-01-19
Payer: MEDICARE

## 2022-01-19 NOTE — TELEPHONE ENCOUNTER
Spoke to , he was instructed by the  office to call pt PCP who signed the death certificate and request an amended certificate.    stated that pt rec her Covid vaccine, that night pt was complaining of severe arm pain and at 0200 passed away.     was in contact with [employer] to assist with  cost and they have requested the amended death certificate.

## 2022-01-19 NOTE — TELEPHONE ENCOUNTER
----- Message from Mary Saavedra sent at 2022  3:49 PM CST -----  Regarding: Medical Assistance  Contact: Patient Spouse Florian  Spouse is requesting a call back regarding an amended death certificate   Spouse stated he is attempting to get assistance to help cover  costs for patient and [employer] is requesting an amended death certificate to assist   Spouse was advised to contact physician who signed original   Please Assist     Patient Spouse Florian can be reached at [phone]

## 2022-01-20 NOTE — TELEPHONE ENCOUNTER
I did not sign her death certificate so I am unsure of what her cause of death was documented to be.   Did patient receive an autopsy to determine cause of death?   TELMA can fax us a request for medical information from her last visit but as far as details surrounding her death, I did not evaluate her or know of any autopsy report to amend a death certificate

## 2023-04-26 NOTE — ANESTHESIA POSTPROCEDURE EVALUATION
Anesthesia Post Evaluation    Patient: Fifi Mcnair    Procedure(s) Performed: Procedure(s) (LRB):  ESOPHAGOGASTRODUODENOSCOPY (EGD) (N/A)    Final Anesthesia Type: general  Patient location during evaluation: PACU  Patient participation: Yes- Able to Participate  Level of consciousness: awake and alert  Post-procedure vital signs: reviewed and stable  Pain management: adequate  Airway patency: patent  PONV status at discharge: No PONV  Anesthetic complications: no      Cardiovascular status: blood pressure returned to baseline and hemodynamically stable  Respiratory status: unassisted  Hydration status: euvolemic  Follow-up not needed.        Visit Vitals  BP (!) 122/58   Pulse 75   Temp 36.6 °C (97.8 °F)   Resp (!) 22   LMP  (LMP Unknown)   SpO2 96%   Breastfeeding? No       Pain/Mohamud Score: Pain Assessment Performed: Yes (1/11/2018  9:42 AM)  Presence of Pain: denies (1/11/2018 12:02 PM)  Mohamud Score: 10 (1/11/2018 12:02 PM)       Done. Medication increased and refilled

## 2023-07-17 NOTE — PROGRESS NOTES
Diagnosis:    Right shoulder strain (G03.192O)   Referring Provider: Sierra Kwong  Date of Evaluation:    6/29/2023    Precautions:   DM 2 Next MD visit:   8/2023  Date of Surgery: n/a   Insurance Primary/Secondary: MEDICARE / [employer] [place]     # Auth Visits:  POC every 10 visits       Subjective:  Less R shoulder pain. Completing HEP. Muscles were sore after last session. Pain: 0/10 at rest      Objective:     AROM: (* denotes performed with pain)  Shoulder    Flexion: R 160; L 160  Abduction: R 160*c/o end range; L 140*       Accessory motion: Hypomobile L/R GH post glide    Flexibility: restriction B pec length    Strength/MMT: (* denotes performed with pain)  Shoulder   Flexion: R 4+/5; L 5-/5  Abduction: R 4-/5*; L 5/5     Assessment:   Modified session based on subjective; see below; pt belén well overall. Patient progressing, demonstrating improvements in R shoulder AROM, MMT, ADL tolerance. Pt with c/o L shoulder pain/restriction with L shoulder abd; incorporating treatment for c/o. Pt tolerated session well. Patient with continued R shoulder signs/symptoms suggestive of rotator cuff and possible biceps involvement. Goals:   (to be met in 8-10 visits)   Patient to report independence with PT HEP to facilitate self management and to maintain progress made in PT. Patient to have at least 150 deg active R shoulder abd to facilitate overhead ADL (haircare, reaching) and work. Patient to have active R shoulder IR to at least T9 to facilitate don/doff clothing, bathing, self care. Patient to have R shoulder flex, abd, ER MMT at least 4/+/5 for min c/o with reaching. Patient to report at least 50% improvement in R shoulder for increased ease with ADL. Plan: Continue PT 2 x weekly for 8-10 visits. F/u on response to today's session. Progress R shoulder AAROM, rotator cuff/periscapular strengthening, postural exercises.   Date: 7/6/2023  TX#: 2/8-10 Date:  7/10/2023             TX#: 3/8-10 Date: Subjective:      Patient ID: Fifi Mcnair is a 67 y.o. female.    Chief Complaint: Foot Problem (left heel )    Pain, redness, blister left heel.  Gradual onset, worsening over past few weeks, aggravated by increased weight bearing, shoe gear, pressure.  Keflex at urgent care has improved the condition.  OTC pain med not helping.  Denies trauma, signs infection.    10/9  Patient complaining of pain to heel.  States she is offloading foot with boots as instructed.  States she does minimal walking due to bad knees.  States concern about infection.  No new pedal complaints    Review of Systems   Constitution: Negative for chills, diaphoresis, fever, malaise/fatigue and night sweats.   Cardiovascular: Negative for claudication, cyanosis, leg swelling and syncope.   Skin: Positive for poor wound healing and suspicious lesions. Negative for color change, dry skin, nail changes, rash and unusual hair distribution.   Musculoskeletal: Negative for falls, joint pain, joint swelling, muscle cramps, muscle weakness and stiffness.   Gastrointestinal: Negative for constipation, diarrhea, nausea and vomiting.   Neurological: Negative for brief paralysis, disturbances in coordination, focal weakness, numbness, paresthesias, sensory change and tremors.           Objective:      Physical Exam   Constitutional: She is oriented to person, place, and time. She appears well-developed and well-nourished. She is cooperative. No distress.   Cardiovascular:   Pulses:       Popliteal pulses are 2+ on the right side, and 2+ on the left side.        Dorsalis pedis pulses are 2+ on the right side, and 2+ on the left side.        Posterior tibial pulses are 2+ on the right side, and 2+ on the left side.   Capillary refill 3 seconds all toes/distal feet, all toes/both feet warm to touch.      Negative lymphadenopathy bilateral popliteal fossa and tarsal tunnel.      Negavie lower extremity edema bilateral.     Musculoskeletal:        Right  7/13/2023              TX#: 4/8-10 Date: 7/17/2023                TX#: 5/8-10 Date: Tx#: 6/   There Ex:   HEP review/practice; see below.    B row using GTB - verbal cuing form x 10 reps  R shoulder ER using YTB x 10 reps  Wall slide - flex, abd x 10 reps each  S/L R shoulder abduction 0#  10 reps   S/L R shoulder ER 0# x 10 reps - pt reports easy; 1# x 10 reps  UBE: 2 min forward/2 min backward, level 1 (4 min total)  Standing R shoulder IR using GTB 2 x 10 reps   B low row using blue tubing 3 x 10 reps   Seated shoulder pulleys: flex, scap, abd x 10 reps each   There Ex:   UBE: 2 min forward/2 min backward, level 1 (4 min total)  Reviewed/practiced ER strengthening options due to c/o popping with YTB: standing YTB x 10 reps; s/l R shoulder ER 1# x 10 reps, 2# x 10 reps   S/L R shoulder abduction 0# x 10 reps, 1# x 10 reps, 2# x 10 reps - progress next session   Hip hinge row R UE 0# x 3 reps; 5# x 20 reps  Standing R shoulder IR using GTB 2 x 10 reps   Wall circles using towel 2 x 5 reps each clockwise/counterclockwise There Ex:   UBE: 2 min forward/2 min backward, level 1 (4 min total)  Supine B shoulder horizontal abduction using RTB x 20 reps  Supine B shoulder ER  x 5 reps - symptoms, stopped; using YTB  2 x 10   S/L R shoulder abduction 0# x 10 reps, 1# 2 x 10 reps, 2# - pt declined 2# today  Seated thoracic spine ext/pec stretch 5 x 5 sec each  B low row using blue tubing x 30 reps  Standing R shoulder IR using GTB 2 x 10 reps   R shoulder hand behind back IR stretch using green strap 10 x 3 sec  Wall circles using towel 2 x 5 reps each clockwise/counterclockwise There Ex:   UBE:  1 min forward, 1 min backward, level 1 (2 min total)  Seated thoracic spine ext/pec stretch 5 x 5 sec each  B low row using using blue tubing x 20 reps  Standing B shoulder ER using YTB x 10 reps, supine x 10 reps   Standing B shoulder horiz abd using YTB, supine x 10 reps   S/L shoulder abduction: R 0# x 5 reps, 1# x 12 reps, 2# ankle: She exhibits normal range of motion, no swelling, no ecchymosis, no deformity, no laceration and normal pulse. Achilles tendon normal. Achilles tendon exhibits no pain, no defect and normal Zhou's test results.   Normal angle, base, station of gait. All ten toes without clubbing, cyanosis, or signs of ischemia.  No pain to palpation bilateral lower extremities.  Range of motion, stability, muscle strength, and muscle tone normal bilateral feet and legs.     Lymphadenopathy: No inguinal adenopathy noted on the right or left side.   Negative lymphadenopathy bilateral popliteal fossa and tarsal tunnel.    Negative lymphangitic streaking bilateral feet/ankles/legs.   Neurological: She is alert and oriented to person, place, and time. She has normal strength. She displays no atrophy and no tremor. A sensory deficit is present. She exhibits normal muscle tone. Gait normal.   Reflex Scores:       Patellar reflexes are 2+ on the right side and 2+ on the left side.       Achilles reflexes are 2+ on the right side and 2+ on the left side.  Diminished/loss of protective sensation all toes bilateral to 10 gram monofilament.    Decreased/absent vibratory sensation bilateral feet to 128Hz tuning fork.     Skin: Skin is warm, dry and intact. Capillary refill takes 2 to 3 seconds. No abrasion, no bruising, no burn, no ecchymosis, no laceration, no lesion and no rash noted. She is not diaphoretic. No cyanosis or erythema. No pallor. Nails show no clubbing.     Intact skin posterior left heel with mild resolving pressure erythema, positive hyperkeratotic covering,  without ulceration, drainage, pus, tracking, fluctuance, malodor, or cardinal signs infection.    Otherwise, Skin is normal age and health appropriate color, turgor, texture, and temperature bilateral lower extremities without ulceration, hyperpigmentation, discoloration, masses nodules or cords palpated.  No ecchymosis, erythema, edema, or cardinal signs of  infection bilateral lower extremities.     Psychiatric: She has a normal mood and affect.             Assessment:       Encounter Diagnoses   Name Primary?    Chronic heel pain, left Yes    Type II diabetes mellitus with neurological manifestations          Plan:       Fifi was seen today for foot problem.    Diagnoses and all orders for this visit:    Chronic heel pain, left    Type II diabetes mellitus with neurological manifestations      I counseled the patient on her conditions, their implications and medical management.    With patients verbal consent Trimmed callus with tissue nippers and blade.  No blood was drawn    Offload all times nwb with multipodis boot -dispensed today- to prevent posterior pressure.    Discussed conservative treatment with shoes of adequate dimensions, material, and style to alleviate symptoms and delay or prevent surgical intervention.    Inspect feet multiple times daily for signs of occurrence/recurrence ulceration.    RTC 2 weeks    Assisted by Harrison Witt, [id number]          No Follow-up on file.       x 3  reps - stopped; L 0# x 5 reps, 1# x 12 reps  Standing R shoulder IR using  blue TB x 15 reps   R shoulder hand behind back IR stretch using green strap x 5 reps  Wall circles using towel x 7 reps each clockwise/counterclockwise          Manual:   R GH posterior and inferior glides grade II -III   PROM R shoulder IR (PROM flex, abd, ER each WNL, PROM not necessary) Manual:   R GH posterior and inferior glides grade II -III   PROM R shoulder IR (PROM flex, abd, ER each WNL but impingement end range) Manual:   R GH posterior and inferior glides grade II -III   PROM R shoulder - not completed due to ROM WNL Manual:   B pec stretch 3 x 20 sec each   L/R GH posterior and inferior glides grade II -III                   HEP: standing R shoulder ER using yellow band, wall slides - flex/abd, standing row using GTB (progress to blue TB)    Charges: 3 TE (40 min), 0 manual (5 min)      Total Timed Treatment: 45 min  Total Treatment Time: 45 min

## 2023-08-09 NOTE — TELEPHONE ENCOUNTER
Called and spoke with Ms. Escamillayuni, She stated that she seen where the lasartan was being recalled due to causing cancer. I advised her to call her pharmacy to make sure it was her mediation that was recalled, and if it was I would let Dr. Appiah know. She verbalized understanding. No further issues noted.    no

## 2023-09-19 NOTE — TELEPHONE ENCOUNTER
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 you find your triggers and what you can do about them.     1. Identify Early Warning Signs:    Sometimes symptoms return, even when people do their best to stay well. Symptoms can develop over a short period of time with little or no warning, but most of the time they emerge gradually over several weeks.  Early warning signs are changes that people experience when a relapse is starting. Some early warning signs are common and others are not as common.   Common Early Warning Signs:    Feeling depressed or low, Feeling like not being around other people, and loneliness      2. Identify action steps to take when warning signs are noticed:    Taking Action- It is important to take action if you are experiencing early warning signs of a relapse.  The faster you act, the more likely it is that you can avoid a full relapse.  It is helpful to identify several specific ways to cope with symptoms.      The following is my list of symptoms and coping strategies that I can use when they are present:    Symptom Coping Strategies   Anxiety -Talk with someone in your support system and let him or her know how you are feeling.  -Use relaxation techniques such as  deep breathing or imagery.  -Use positive affirmations to counteract negative self-talk such as  I am learning to let go of worry.    Depression - Schedule your day; include activities you have to do and activities you enjoy doing.  - Get some exercise - walk, run, bike, or swim.  - Give yourself credit for even the smallest things you get done.   Sleep Difficulties   - Go to sleep at the same time every day.  - Do something relaxing before bed, such as drinking herbal tea or listening to music.  - Avoid having discussions about upsetting topics before going to bed.   Delusions   - Distract yourself from the disturbing thought by doing something that requires your attention such as a puzzle.  - Check out your beliefs by talking to someone you trust.    Hallucinations   - Use headphones to listen to music.  - Tell voices to  stop  or say to yourself,  I am safe.   - Ignore the hallucinations as much as possible; focus on other things.   Concentration Difficulties - Minimize distractions so there is only one thing for you to focus on at a time.    - Ask the person you are having a conversation with to slow down or repeat things you are unsure of.             Provider Name/ Credentials:  Peyton Mathews [facility]  She/her   September 19, 2023    Crisis Resources    Refer to the resources below as needed.    Steps to care for yourself    If you are currently in counseling, call your counselor for an appointment  Call the local crisis resources below if needed.  Contact friends or family for support.  Get more exercise.  Do activities you enjoy.  Eat a well-balanced diet and drink plenty of fluids.  Rest as needed.  Limit alcohol and recreational drugs. These can worsen depression.  Properly store or remove fire arms.     When to contact your primary care provider     You have thoughts of harming or killing yourself but have not made a plan to carry it out.  Your depression gets in the way of daily activities.  You are  often unable to sleep.  You need help cutting back on alcohol or recreational drugs.    When to call 911 or go to the Emergency Room     Get emergency help right away if you have any of the following:  You are planning to harm or kill yourself and you have a way to carry out the plan.   You have injured yourself or others. Or, you think you will.  You feel confused or are having trouble thinking or remembering.  You are having delusions (false beliefs).  You are hearing voices or seeing things that aren t there.  You are feeling psychotic (paranoid, fearful, restless, agitated, nervous, racing thoughts or speech)    Crisis Resources   The [facility] is an adults only unit located at [facility] in [place] is a short term (generally less than 23 hour stay) designed for crisis intervention and stabilization. Pts have the opportunity to meet quickly with a behavioral health team for evaluation in a calm and peaceful therapuetic environment. To be evaluated for admission pts are triaged throught the [facility] ED.     The [facility] ([facility]) is located at [facility].The BEC is open to ages and provides a comprehensive behavioral health evaluation to those in crisis. Patients typically stay 24-36 hours.     The following hotlines are for both adults and children. The and are open 24 hours a day, 7 days a week unless noted otherwise.      Crisis Lines      Crisis Text Line  Text 765136  You will be connected with a trained live crisis counselor to provide support.        Gambling Hotline  1.968.642.9925 [hope]        línea de crisis española  451.765.1935        [facility] [employer] Helpline  444.681.4532        National Hope Line  3.011.840.6007 [hope]        National Suicide Prevention Lifeline  Free and confidential support  988 or 1.800.273.TALK [3255]  http://suicidepreventionlifeline.org        The [employer] Project (LGBTQ Youth Crisis Line)   9.381.005.0765  text START to 770-317        's Crisis Line  4.141.077.3374 (Press 1)  or text 334911    [facility] Mental Health Crisis Response  Within [place], call **CRISIS [**914505] to be connected to a mental health professional who can assist you.        [facility] Crisis  [phone]      [facility] Mobile Crisis  [phone]      [facility] Crisis  [phone]      [facility] Mobile Crisis  [phone] (adults)  [phone] (children)      [facility] Mobile Crisis  [phone] (adults)  [phone] (children)      [facility] Mobile Crisis  [phone]      [facility] Mobile Crisis  [phone]    Community Resources      Fast Tracker  Linking people to mental health and substance use disorder resources  fasttrackForward Financial Technologiesn.org        [place] Mental Health Warmline  Peer to peer support  5 pm to 9 am 7 days/week  6.247.575.1236  https://mnwitw.org/mnwarfrank        National [place] on Mental Illness (LEEANNE)  316.736.0696 or 1.888.LEEANNE.HELPS  https://namimn.org/        [facility]  [facility] residents [address]  [phone]        [facility]  Free mental health counseling  https://walkin.org/  612.870.0565 X2    Mental Health Apps      Calm Harm  https://calmharm.co.uk/      My3  https://my3app.org/      [place] Safety Plan  https://www.mysafetyplan.org/

## 2025-03-09 NOTE — PROCEDURES
89 on 2/22/25   Large Joint Aspiration/Injection  Date/Time: 6/7/2018 2:05 PM  Performed by: WALE BENTON  Authorized by: WALE BENTON     Consent Done?:  Yes (Verbal)  Indications:  Pain  Procedure site marked: Yes    Timeout: Prior to procedure the correct patient, procedure, and site was verified      Location:  Knee  Site:  R knee and L knee  Prep: Patient was prepped and draped in usual sterile fashion    Needle size:  20 G  Approach:  Anterolateral  Medications:  16 mg hyaluronate 16 mg/2 mL; 16 mg hyaluronate 16 mg/2 mL  Patient tolerance:  Patient tolerated the procedure well with no immediate complications
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
## (undated) DEVICE — PAD EYE OVAL CNTOUR 1.62X2.62

## (undated) DEVICE — SYR DISP LL 5CC

## (undated) DEVICE — SPONGE WEC CEL SPEARS

## (undated) DEVICE — DRESSING N ADH OIL EMUL 3X8 3S

## (undated) DEVICE — INTERPULSE SET

## (undated) DEVICE — SUT ETHILON 3-0 PS2 18 BLK

## (undated) DEVICE — PACK LOWER EXTREMITY

## (undated) DEVICE — TUBING FLOSTEADY ARTHROSCOPY

## (undated) DEVICE — TOURNIQUET SB QC DP 34X4IN

## (undated) DEVICE — SYR 3CC LUER LOC

## (undated) DEVICE — ELECTRODE REM PLYHSV RETURN 9

## (undated) DEVICE — BANDAGE ESMARK 6X12

## (undated) DEVICE — SHEET DRAPE MEDIUM

## (undated) DEVICE — APRON DISPOSP PLASTIC 24INX42

## (undated) DEVICE — GLOVE PROTEXIS HYDROGEL SZ8

## (undated) DEVICE — ADHESIVE DERMABOND ADVANCED

## (undated) DEVICE — GLOVE SURG PLYSPHRN ORTH SZ 8

## (undated) DEVICE — TIP I/A CURVED SINGLE USE

## (undated) DEVICE — TOWEL OR NONABSORB ADH 17X26

## (undated) DEVICE — BLADE SURG CARBON STEEL SZ11

## (undated) DEVICE — PACK ARTHROSCOPY W/ISO BAC

## (undated) DEVICE — GLOVE 7.5 PROTEXIS PI ORTHO PF

## (undated) DEVICE — SEE MEDLINE ITEM 152622

## (undated) DEVICE — APPLICATOR CHLORAPREP ORN 26ML

## (undated) DEVICE — SEE MEDLINE ITEM 146231

## (undated) DEVICE — CUBE COLD THERAPY POLAR CARE

## (undated) DEVICE — SEE MEDLINE ITEM 157166

## (undated) DEVICE — NDL SPINAL 18GX3.5 SPINOCAN

## (undated) DEVICE — GLOVE PROTEXIS SZ6.5 BEAD CUFF

## (undated) DEVICE — SET CYSTO IRRIGATION UNIV SPIK

## (undated) DEVICE — SYR 50CC LL

## (undated) DEVICE — SEE MEDLINE ITEM 152487

## (undated) DEVICE — ALCOHOL 70% ISOP RUBBING 4OZ

## (undated) DEVICE — DRAPE STERI U-SHAPED 47X51IN

## (undated) DEVICE — GAUZE SPONGE BULKEE 6X6.75IN

## (undated) DEVICE — SOL BETADINE 5%

## (undated) DEVICE — GOWN SURG 2XL DISP TIE BACK

## (undated) DEVICE — SPONGE GAUZE 16PLY 4X4

## (undated) DEVICE — SOL IRR NACL .9% 3000ML

## (undated) DEVICE — COVER OVERHEAD SURG LT BLUE

## (undated) DEVICE — PACK OPHTHALMIC

## (undated) DEVICE — UNDERGLOVES BIOGEL PI SZ 6 LF

## (undated) DEVICE — CANISTER SUCTION MEDI-VAC 12L

## (undated) DEVICE — WATER STERILE INJ 500ML BAG

## (undated) DEVICE — BLADE SAG DUAL 18MMX1.27MMX90M

## (undated) DEVICE — PRESSURIZER HI VAC HIP KIT

## (undated) DEVICE — SEE MEDLINE ITEM 146271

## (undated) DEVICE — MAT QUICK 40X30 FLOOR FLUID LF

## (undated) DEVICE — PROBE ABLATION RF ARTHSCP 3.5

## (undated) DEVICE — DRESSING N ADH OIL EMUL 3X3

## (undated) DEVICE — DRESSING AQUACEL AG 3.5X10IN

## (undated) DEVICE — SET DECANTER MEDICHOICE

## (undated) DEVICE — SEE MEDLINE ITEM 157131

## (undated) DEVICE — COVER SURG LIGHT HANDLE

## (undated) DEVICE — SEE MEDLINE ITEM 152651

## (undated) DEVICE — SEE MEDLINE ITEM 146313

## (undated) DEVICE — SYR 10CC LUER LOCK

## (undated) DEVICE — TUBE SUCTION YANKAUER HI CAP

## (undated) DEVICE — DRAPE STERI INSTRUMENT 1018

## (undated) DEVICE — KNEE IMMB UNV FOAM/MESH 20IN

## (undated) DEVICE — PAD ABD 8X10 STERILE

## (undated) DEVICE — LINER SUCTION 3000CC

## (undated) DEVICE — SPONGE SUPER KERLIX 6X6.75IN

## (undated) DEVICE — SOL 9P NACL IRR PIC IL

## (undated) DEVICE — SOL IRR BSS OPHTH 500ML STRL

## (undated) DEVICE — SEE MEDLINE ITEM 152530

## (undated) DEVICE — TOGA FLYTE PEEL AWAY XLARGE

## (undated) DEVICE — BLADE SURG CARBON STEEL #10

## (undated) DEVICE — SUT 2/0 18IN COATED VICRYL

## (undated) DEVICE — SLEEVE SCD EXPRESS CALF MEDIUM

## (undated) DEVICE — WRAP PROTECTIVE LEG POS STRL

## (undated) DEVICE — GLOVE SURG ULTRA TOUCH 7.5

## (undated) DEVICE — GLOVE PROTEXIS PI CLASSIC 7.5

## (undated) DEVICE — PACK CUSTOM UNIV BASIN SLI

## (undated) DEVICE — GLOVE 7.0 PROTEXIS PI BLUE

## (undated) DEVICE — SHIELD COLLAGEN 12HR CORNEAL

## (undated) DEVICE — PACK BASIC

## (undated) DEVICE — CONTAINER SPECIMEN STRL 4OZ

## (undated) DEVICE — SEE MEDLINE ITEM 157128

## (undated) DEVICE — MIXER BONE CEMENT

## (undated) DEVICE — GLOVE 7.5 PROTEXIS PI BLUE

## (undated) DEVICE — NDL BOX COUNTER

## (undated) DEVICE — DRESSING GAUZE 6PLY 4X4

## (undated) DEVICE — STRAP OR TABLE 5IN X 72IN

## (undated) DEVICE — SOLN BETADINE SWAB AIDS

## (undated) DEVICE — GLOVE PROTEXIS PI CLASSIC 8.0

## (undated) DEVICE — PACK EYE CUSTOM COVINGTON.

## (undated) DEVICE — PAD CAST SPECIALIST STRL 6

## (undated) DEVICE — CUTTER AGGRESSIVE PLUS 3.5MM

## (undated) DEVICE — GOWN B1 X-LG X-LONG

## (undated) DEVICE — GLOVE SURG ULTRA TOUCH 8

## (undated) DEVICE — DRAPE INCISE IOBAN 2 23X17IN

## (undated) DEVICE — GOWN TOGA SYS PEELWY ZIP 2 XL

## (undated) DEVICE — PADDING CAST SPECIALIST 6X4YD

## (undated) DEVICE — SYS LABEL CORRECT MED

## (undated) DEVICE — JELLY LUBRICANT STERILE 4 OZ

## (undated) DEVICE — SUT STRATAFIX PDS 1 CTX 18IN

## (undated) DEVICE — MANIFOLD 4 PORT

## (undated) DEVICE — SUT STRATAFIX PDS 2 CT-1 14IN

## (undated) DEVICE — CUTTER MENISCUS AGGRESSIVE 4.0

## (undated) DEVICE — UNDERGLOVES BIOGEL PI SIZE 7.5

## (undated) DEVICE — SCRUB 10% POVIDONE IODINE 4OZ

## (undated) DEVICE — NDL SAFETY 21G X 1 1/2 ECLPSE

## (undated) DEVICE — SEE MEDLINE ITEM 107746